# Patient Record
Sex: FEMALE | Race: WHITE | NOT HISPANIC OR LATINO | Employment: FULL TIME | URBAN - METROPOLITAN AREA
[De-identification: names, ages, dates, MRNs, and addresses within clinical notes are randomized per-mention and may not be internally consistent; named-entity substitution may affect disease eponyms.]

---

## 2017-03-22 DIAGNOSIS — Z94.0 KIDNEY REPLACED BY TRANSPLANT: Primary | ICD-10-CM

## 2017-03-22 DIAGNOSIS — Z94.0 STATUS POST KIDNEY TRANSPLANT: ICD-10-CM

## 2017-03-22 RX ORDER — TACROLIMUS 0.5 MG/1
1.5 CAPSULE ORAL 2 TIMES DAILY
Qty: 180 CAPSULE | Refills: 0 | Status: SHIPPED | OUTPATIENT
Start: 2017-03-22 | End: 2017-07-25

## 2017-03-22 RX ORDER — SULFAMETHOXAZOLE AND TRIMETHOPRIM 400; 80 MG/1; MG/1
1 TABLET ORAL DAILY
Qty: 30 TABLET | Refills: 0 | Status: SHIPPED | OUTPATIENT
Start: 2017-03-22 | End: 2017-07-25

## 2017-04-22 DIAGNOSIS — Z94.0 KIDNEY REPLACED BY TRANSPLANT: ICD-10-CM

## 2017-04-22 DIAGNOSIS — Z94.0 STATUS POST KIDNEY TRANSPLANT: ICD-10-CM

## 2017-04-22 DIAGNOSIS — Z94.0 KIDNEY TRANSPLANTED: Primary | ICD-10-CM

## 2017-04-24 RX ORDER — MYCOPHENOLATE MOFETIL 250 MG/1
500 CAPSULE ORAL 2 TIMES DAILY
Qty: 120 CAPSULE | Refills: 0 | Status: SHIPPED | OUTPATIENT
Start: 2017-04-24 | End: 2017-04-28

## 2017-04-24 RX ORDER — SULFAMETHOXAZOLE AND TRIMETHOPRIM 400; 80 MG/1; MG/1
1 TABLET ORAL DAILY
Qty: 30 TABLET | Refills: 0 | Status: SHIPPED | OUTPATIENT
Start: 2017-04-24 | End: 2017-07-25

## 2017-04-24 RX ORDER — TACROLIMUS 0.5 MG/1
1.5 CAPSULE ORAL 2 TIMES DAILY
Qty: 180 CAPSULE | Refills: 0 | Status: SHIPPED | OUTPATIENT
Start: 2017-04-24 | End: 2017-07-25

## 2017-04-25 DIAGNOSIS — T86.10 COMPLICATIONS, KIDNEY TRANSPLANT: ICD-10-CM

## 2017-04-25 DIAGNOSIS — Z94.0 KIDNEY REPLACED BY TRANSPLANT: ICD-10-CM

## 2017-04-25 DIAGNOSIS — Z48.298 AFTERCARE FOLLOWING ORGAN TRANSPLANT: ICD-10-CM

## 2017-04-25 DIAGNOSIS — Z94.0 KIDNEY TRANSPLANTED: ICD-10-CM

## 2017-04-25 LAB — MAGNESIUM SERPL-MCNC: 1.7 MG/DL (ref 1.6–2.3)

## 2017-04-26 ENCOUNTER — TELEPHONE (OUTPATIENT)
Dept: TRANSPLANT | Facility: CLINIC | Age: 27
End: 2017-04-26

## 2017-04-26 DIAGNOSIS — Z79.899 ENCOUNTER FOR LONG-TERM CURRENT USE OF MEDICATION: ICD-10-CM

## 2017-04-26 DIAGNOSIS — Z48.298 AFTERCARE FOLLOWING ORGAN TRANSPLANT: Primary | ICD-10-CM

## 2017-04-26 DIAGNOSIS — Z94.0 KIDNEY REPLACED BY TRANSPLANT: ICD-10-CM

## 2017-04-26 LAB
EBV DNA # SPEC NAA+PROBE: ABNORMAL {COPIES}/ML
EBV DNA SPEC NAA+PROBE-LOG#: 4.6 {LOG_COPIES}/ML

## 2017-04-26 NOTE — LETTER
[facility]  Room 2-200  [facility],  [address]  [address]  Tel [phone]  Toll Free [phone]                OUTPATIENT LABORATORY TEST ORDER    Patient Name: Caity Horan  Transplant Date: 12/5/2014 (Kidney)  YOB: 1990  Issue Date & Time: 4-27,-2017 12:44 PM    [facility] MR:  [id number]  Exp. Date (1 year after date issued)      Diagnoses: Kidney Transplant (ICD-10  Z94.0)   Long term use of medications (ICD-10  Z79.899)     Lab results to be available on the same day drawn.   Patient should release information to the [facility], [facility].  Please fax to the Transplant Center at [phone].    With next lab draw   ?MPA level (once)  Monthly    ?Hemogram and Platelet   ?Basic Metabolic Panel (Sodium, Potassium, Chloride, CO2, Creatinine, BUN, Glucose, Calcium)   ?/Tacrolimus/Prograf drug level (mail to [facility] - mailers and instructions provided by the patient)           ?EBV PCR QT ( until no longer detected)                   Every 6 Months Due:                  ?BK (Polyoma Virus) PCR Quantitative - Plasma                                            ?Urine for protein/creatinine    Yearly:   ? PRA/DSA level (mailers provided by the patient)       HIV, HBsAb, HBcAb, HCV  If you have any questions, please call [facility] at [phone] or [phone].    Please fax labs to [phone]  .

## 2017-04-27 NOTE — TELEPHONE ENCOUNTER
Call placed to patient: No answer. Voice message left informing patient of lab schedule and requesting a return call to confirm. Will try back. Order placed

## 2017-04-27 NOTE — TELEPHONE ENCOUNTER
Results for PIETER GREEN (MRN [id number])    Ref. Range 12/13/2016 16:55 4/25/2017 06:29   EBV DNA Copies/mL Latest Ref Range: EBVNEG Copies/mL 65168 (A) 60299 (A)   EBV DNA Log of Copies Latest Ref Range: <2.7 Log_copies/mL 4.5 (H) 4.6 (H)     LPN task:  Update lab orders per protocol.  Add: EBV levels monthly until not detected.  Please add MPA levels with next lab.

## 2017-04-28 DIAGNOSIS — Z94.0 KIDNEY REPLACED BY TRANSPLANT: ICD-10-CM

## 2017-04-28 DIAGNOSIS — Z94.0 STATUS POST KIDNEY TRANSPLANT: ICD-10-CM

## 2017-04-28 DIAGNOSIS — Z94.0 KIDNEY TRANSPLANTED: ICD-10-CM

## 2017-04-28 NOTE — TELEPHONE ENCOUNTER
"Per Dr Mar:  \"  Slight increase in EBV viremia.  Would recommend decreasing mycophenolate mofetil to 250 mg twice daily and target tacrolimus levels at 3-5.              Will decrease MMF from 500 mg bid to 250 mg bid  "

## 2017-04-28 NOTE — TELEPHONE ENCOUNTER
Call placed to patient: No answer. Voice message left requesting a call back to discuss MMF. Will try back

## 2017-05-01 RX ORDER — MYCOPHENOLATE MOFETIL 250 MG/1
250 CAPSULE ORAL 2 TIMES DAILY
Qty: 120 CAPSULE | Refills: 0
Start: 2017-05-01 | End: 2018-02-24

## 2017-05-24 DIAGNOSIS — Z79.899 ENCOUNTER FOR LONG-TERM CURRENT USE OF MEDICATION: ICD-10-CM

## 2017-05-24 DIAGNOSIS — Z94.0 KIDNEY REPLACED BY TRANSPLANT: ICD-10-CM

## 2017-05-24 DIAGNOSIS — Z48.298 AFTERCARE FOLLOWING ORGAN TRANSPLANT: ICD-10-CM

## 2017-05-24 LAB
ANION GAP SERPL CALCULATED.3IONS-SCNC: 6 MMOL/L (ref 3–14)
BUN SERPL-MCNC: 16 MG/DL (ref 7–30)
CALCIUM SERPL-MCNC: 8.6 MG/DL (ref 8.5–10.1)
CHLORIDE SERPL-SCNC: 108 MMOL/L (ref 94–109)
CO2 SERPL-SCNC: 26 MMOL/L (ref 20–32)
CREAT SERPL-MCNC: 1.09 MG/DL (ref 0.52–1.04)
ERYTHROCYTE [DISTWIDTH] IN BLOOD BY AUTOMATED COUNT: 13.2 % (ref 10–15)
GFR SERPL CREATININE-BSD FRML MDRD: 60 ML/MIN/1.7M2
GLUCOSE SERPL-MCNC: 98 MG/DL (ref 70–99)
HCT VFR BLD AUTO: 31.3 % (ref 35–47)
HGB BLD-MCNC: 9.5 G/DL (ref 11.7–15.7)
MAGNESIUM SERPL-MCNC: 1.6 MG/DL (ref 1.6–2.3)
MCH RBC QN AUTO: 27.6 PG (ref 26.5–33)
MCHC RBC AUTO-ENTMCNC: 30.4 G/DL (ref 31.5–36.5)
MCV RBC AUTO: 91 FL (ref 78–100)
PLATELET # BLD AUTO: 313 10E9/L (ref 150–450)
POTASSIUM SERPL-SCNC: 4.8 MMOL/L (ref 3.4–5.3)
RBC # BLD AUTO: 3.44 10E12/L (ref 3.8–5.2)
SODIUM SERPL-SCNC: 141 MMOL/L (ref 133–144)
TACROLIMUS BLD-MCNC: 4.5 UG/L (ref 5–15)
TME LAST DOSE: ABNORMAL H
WBC # BLD AUTO: 6.7 10E9/L (ref 4–11)

## 2017-05-25 LAB
EBV DNA # SPEC NAA+PROBE: ABNORMAL {COPIES}/ML
EBV DNA SPEC NAA+PROBE-LOG#: 4.7 {LOG_COPIES}/ML

## 2017-05-27 LAB
MYCOPHENOLATE SERPL LC/MS/MS-MCNC: 0.75 MG/L (ref 1–3.5)
MYCOPHENOLATE-G SERPL LC/MS/MS-MCNC: 10.2 MG/L (ref 30–95)
TME LAST DOSE: ABNORMAL H

## 2017-05-30 ENCOUNTER — DOCUMENTATION ONLY (OUTPATIENT)
Dept: TRANSPLANT | Facility: CLINIC | Age: 27
End: 2017-05-30

## 2017-05-30 NOTE — PROGRESS NOTES
Notes Recorded by Otilio Mar MD on 5/26/2017 at 6:27 AM  Slightly increased EBV viremia and recommend lowering tacrolimus level to target closer to 3.    Would recheck EBV PCR in ~ 3-4 weeks.  Otherwise, labs are normal or unchanged.  Would make no changes or interventions at this time.  ------  Notes Recorded by Mayi Mcarthur RN on 5/25/2017 at 6:04 PM  5/24/17: EBV = 75259 (29841) (10401)  Sent to Provider to review.

## 2017-06-01 DIAGNOSIS — Z94.0 KIDNEY REPLACED BY TRANSPLANT: ICD-10-CM

## 2017-06-01 DIAGNOSIS — Z94.0 STATUS POST KIDNEY TRANSPLANT: Primary | ICD-10-CM

## 2017-06-01 DIAGNOSIS — Z94.0 KIDNEY TRANSPLANTED: ICD-10-CM

## 2017-06-01 DIAGNOSIS — E83.42 HYPOMAGNESEMIA: ICD-10-CM

## 2017-06-02 RX ORDER — TACROLIMUS 0.5 MG/1
1.5 CAPSULE ORAL 2 TIMES DAILY
Qty: 180 CAPSULE | Refills: 4 | Status: SHIPPED | OUTPATIENT
Start: 2017-06-02 | End: 2017-10-27

## 2017-06-02 RX ORDER — MYCOPHENOLATE MOFETIL 250 MG/1
500 CAPSULE ORAL 2 TIMES DAILY
Qty: 120 CAPSULE | Refills: 4 | Status: SHIPPED | OUTPATIENT
Start: 2017-06-02 | End: 2017-07-25

## 2017-06-02 RX ORDER — MAGNESIUM OXIDE 400 MG/1
400 TABLET ORAL DAILY
Qty: 60 TABLET | Refills: 4 | Status: SHIPPED | OUTPATIENT
Start: 2017-06-02 | End: 2018-01-05

## 2017-06-02 RX ORDER — SULFAMETHOXAZOLE AND TRIMETHOPRIM 400; 80 MG/1; MG/1
1 TABLET ORAL DAILY
Qty: 30 TABLET | Refills: 4 | Status: SHIPPED | OUTPATIENT
Start: 2017-06-02 | End: 2017-10-27

## 2017-07-19 DIAGNOSIS — Z94.0 KIDNEY REPLACED BY TRANSPLANT: ICD-10-CM

## 2017-07-19 DIAGNOSIS — Z79.899 ENCOUNTER FOR LONG-TERM CURRENT USE OF MEDICATION: ICD-10-CM

## 2017-07-19 DIAGNOSIS — Z48.298 AFTERCARE FOLLOWING ORGAN TRANSPLANT: ICD-10-CM

## 2017-07-19 LAB
ANION GAP SERPL CALCULATED.3IONS-SCNC: 6 MMOL/L (ref 3–14)
BUN SERPL-MCNC: 22 MG/DL (ref 7–30)
CALCIUM SERPL-MCNC: 8.8 MG/DL (ref 8.5–10.1)
CHLORIDE SERPL-SCNC: 105 MMOL/L (ref 94–109)
CO2 SERPL-SCNC: 27 MMOL/L (ref 20–32)
CREAT SERPL-MCNC: 1.19 MG/DL (ref 0.52–1.04)
CREAT UR-MCNC: 200 MG/DL
ERYTHROCYTE [DISTWIDTH] IN BLOOD BY AUTOMATED COUNT: 14.9 % (ref 10–15)
GFR SERPL CREATININE-BSD FRML MDRD: 54 ML/MIN/1.7M2
GLUCOSE SERPL-MCNC: 92 MG/DL (ref 70–99)
HCT VFR BLD AUTO: 29.8 % (ref 35–47)
HGB BLD-MCNC: 8.7 G/DL (ref 11.7–15.7)
MCH RBC QN AUTO: 24.6 PG (ref 26.5–33)
MCHC RBC AUTO-ENTMCNC: 29.2 G/DL (ref 31.5–36.5)
MCV RBC AUTO: 84 FL (ref 78–100)
PLATELET # BLD AUTO: 276 10E9/L (ref 150–450)
POTASSIUM SERPL-SCNC: 4.1 MMOL/L (ref 3.4–5.3)
PROT UR-MCNC: 0.2 G/L
PROT/CREAT 24H UR: 0.1 G/G CR (ref 0–0.2)
RBC # BLD AUTO: 3.53 10E12/L (ref 3.8–5.2)
SODIUM SERPL-SCNC: 137 MMOL/L (ref 133–144)
TACROLIMUS BLD-MCNC: 4.4 UG/L (ref 5–15)
TME LAST DOSE: ABNORMAL H
WBC # BLD AUTO: 5.5 10E9/L (ref 4–11)

## 2017-07-20 ENCOUNTER — TELEPHONE (OUTPATIENT)
Dept: TRANSPLANT | Facility: CLINIC | Age: 27
End: 2017-07-20

## 2017-07-20 DIAGNOSIS — Z94.0 KIDNEY REPLACED BY TRANSPLANT: ICD-10-CM

## 2017-07-20 DIAGNOSIS — B27.90 EBV INFECTION: Primary | ICD-10-CM

## 2017-07-20 NOTE — TELEPHONE ENCOUNTER
"Per Dr Mar:  \"She needs CMV, EBV and parvovirus PCR.  Would also send LDH, haptoglobin, peripheral smear and iron studies.  If EBV viremia is higher, she needs a CT chest/abd/pelvis.\"    Orders put in her chart.  I left a message on her VM letting her know that Dr Mar has some additional labs he charo like drawn before her appt with him next Tuesday. Asked to go get them drawn either tomorrow or Monday.  "

## 2017-07-21 LAB
BKV DNA # SPEC NAA+PROBE: NORMAL COPIES/ML
BKV DNA SPEC NAA+PROBE-LOG#: NORMAL LOG COPIES/ML
EBV DNA # SPEC NAA+PROBE: ABNORMAL {COPIES}/ML
EBV DNA SPEC NAA+PROBE-LOG#: 5.2 {LOG_COPIES}/ML
SPECIMEN SOURCE: NORMAL

## 2017-07-25 ENCOUNTER — OFFICE VISIT (OUTPATIENT)
Dept: NEPHROLOGY | Facility: CLINIC | Age: 27
End: 2017-07-25
Attending: INTERNAL MEDICINE
Payer: COMMERCIAL

## 2017-07-25 VITALS
DIASTOLIC BLOOD PRESSURE: 74 MMHG | HEIGHT: 63 IN | HEART RATE: 82 BPM | BODY MASS INDEX: 24.41 KG/M2 | OXYGEN SATURATION: 100 % | SYSTOLIC BLOOD PRESSURE: 114 MMHG | WEIGHT: 137.8 LBS

## 2017-07-25 DIAGNOSIS — B27.00 EBV (EPSTEIN-BARR VIRUS) VIREMIA: Primary | ICD-10-CM

## 2017-07-25 DIAGNOSIS — E83.42 HYPOMAGNESEMIA: ICD-10-CM

## 2017-07-25 DIAGNOSIS — Z94.0 KIDNEY REPLACED BY TRANSPLANT: ICD-10-CM

## 2017-07-25 DIAGNOSIS — Z48.298 AFTERCARE FOLLOWING ORGAN TRANSPLANT: ICD-10-CM

## 2017-07-25 DIAGNOSIS — B27.90 EBV INFECTION: ICD-10-CM

## 2017-07-25 DIAGNOSIS — D84.9 IMMUNOSUPPRESSED STATUS (H): ICD-10-CM

## 2017-07-25 DIAGNOSIS — B27.00 EBV (EPSTEIN-BARR VIRUS) VIREMIA: ICD-10-CM

## 2017-07-25 LAB
BASOPHILS # BLD AUTO: 0 10E9/L (ref 0–0.2)
BASOPHILS NFR BLD AUTO: 0.6 %
DIFFERENTIAL METHOD BLD: ABNORMAL
EOSINOPHIL # BLD AUTO: 0.1 10E9/L (ref 0–0.7)
EOSINOPHIL NFR BLD AUTO: 0.9 %
ERYTHROCYTE [DISTWIDTH] IN BLOOD BY AUTOMATED COUNT: 15.1 % (ref 10–15)
FERRITIN SERPL-MCNC: 3 NG/ML (ref 12–150)
HCG UR QL: NEGATIVE
HCT VFR BLD AUTO: 28.9 % (ref 35–47)
HGB BLD-MCNC: 8.6 G/DL (ref 11.7–15.7)
IMM GRANULOCYTES # BLD: 0 10E9/L (ref 0–0.4)
IMM GRANULOCYTES NFR BLD: 0.3 %
IRON SATN MFR SERPL: 4 % (ref 15–46)
IRON SERPL-MCNC: 21 UG/DL (ref 35–180)
LDH SERPL L TO P-CCNC: 175 U/L (ref 81–234)
LYMPHOCYTES # BLD AUTO: 1.4 10E9/L (ref 0.8–5.3)
LYMPHOCYTES NFR BLD AUTO: 22 %
MCH RBC QN AUTO: 25 PG (ref 26.5–33)
MCHC RBC AUTO-ENTMCNC: 29.8 G/DL (ref 31.5–36.5)
MCV RBC AUTO: 84 FL (ref 78–100)
MONOCYTES # BLD AUTO: 0.7 10E9/L (ref 0–1.3)
MONOCYTES NFR BLD AUTO: 10.7 %
NEUTROPHILS # BLD AUTO: 4.3 10E9/L (ref 1.6–8.3)
NEUTROPHILS NFR BLD AUTO: 65.5 %
NRBC # BLD AUTO: 0 10*3/UL
NRBC BLD AUTO-RTO: 0 /100
PLATELET # BLD AUTO: 310 10E9/L (ref 150–450)
RBC # BLD AUTO: 3.44 10E12/L (ref 3.8–5.2)
RETICS # AUTO: 44.4 10E9/L (ref 25–95)
RETICS/RBC NFR AUTO: 1.3 % (ref 0.5–2)
TIBC SERPL-MCNC: 467 UG/DL (ref 240–430)
WBC # BLD AUTO: 6.5 10E9/L (ref 4–11)

## 2017-07-25 PROCEDURE — 36415 COLL VENOUS BLD VENIPUNCTURE: CPT | Performed by: INTERNAL MEDICINE

## 2017-07-25 PROCEDURE — 86665 EPSTEIN-BARR CAPSID VCA: CPT | Performed by: INTERNAL MEDICINE

## 2017-07-25 PROCEDURE — 85025 COMPLETE CBC W/AUTO DIFF WBC: CPT | Performed by: INTERNAL MEDICINE

## 2017-07-25 PROCEDURE — 83550 IRON BINDING TEST: CPT | Performed by: INTERNAL MEDICINE

## 2017-07-25 PROCEDURE — 83540 ASSAY OF IRON: CPT | Performed by: INTERNAL MEDICINE

## 2017-07-25 PROCEDURE — 99212 OFFICE O/P EST SF 10 MIN: CPT | Mod: ZF

## 2017-07-25 PROCEDURE — 83615 LACTATE (LD) (LDH) ENZYME: CPT | Performed by: INTERNAL MEDICINE

## 2017-07-25 PROCEDURE — 83010 ASSAY OF HAPTOGLOBIN QUANT: CPT | Performed by: INTERNAL MEDICINE

## 2017-07-25 PROCEDURE — 85045 AUTOMATED RETICULOCYTE COUNT: CPT | Performed by: INTERNAL MEDICINE

## 2017-07-25 PROCEDURE — 82728 ASSAY OF FERRITIN: CPT | Performed by: INTERNAL MEDICINE

## 2017-07-25 PROCEDURE — 87798 DETECT AGENT NOS DNA AMP: CPT | Performed by: INTERNAL MEDICINE

## 2017-07-25 PROCEDURE — 40000611 ZZHCL STATISTIC MORPHOLOGY W/INTERP HEMEPATH TC 85060: Performed by: INTERNAL MEDICINE

## 2017-07-25 PROCEDURE — 81025 URINE PREGNANCY TEST: CPT | Performed by: INTERNAL MEDICINE

## 2017-07-25 PROCEDURE — 87799 DETECT AGENT NOS DNA QUANT: CPT | Performed by: INTERNAL MEDICINE

## 2017-07-25 ASSESSMENT — PAIN SCALES - GENERAL: PAINLEVEL: NO PAIN (0)

## 2017-07-25 NOTE — LETTER
7/25/2017      RE: Caity Horan  [address]  [facility] [address]       Assessment and Plan:  1. LDKT - baseline Cr ~ 1.0-1.2, which has remained stable.  Normal proteinuria.  No DSA.  Will make no changes in immunosuppression.  2. BP - well controlled at target of less than 140/90 off antihypertensive medications.  No changes.  3. Anemia in chronic renal disease - decreased Hgb with no evidence of hemolysis.  Patient is very iron deficient and will start oral iron sulfate 325 mg daily.  Will follow.  4. 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  Marked increase in EBV PCR, now up to ~ 160K, which is not unexpected with new seroconversion.  Will obtain CT chest/abd/pelvis to rule out lymphoma and refer patient to Transplant ID.  Patient is on lower immunosuppression, but with expected decrease in EBV viremia, will look to slightly increase immunosuppression back up.  5. Hypomagnesemia - normal serum magnesium level and will continue on oral magnesium supplement.  6. Recommend return visit in 3 months.    Assessment and plan was discussed with patient and she voiced her understanding and agreement.    Addendum: new EBV viremia and will decrease mycophenolate mofetil to 500 mg bid.  Will follow.    Reason for Visit:  Ms. Horan is here for routine follow up.    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         Transplant Hx:       Tx: LDKT  Date: 12/5/14       Present Maintenance IS: Tacrolimus and Mycophenolate mofetil       Baseline Creatinine: 1.0-1.2       Recent DSA: No  Date last checked: 12/2016       Biopsy: No    Ms. Horan reports feeling good overall with minimal medical complaints.  Since patient's last clinic visit, she has developed marked EBV viremia.  Patient had her mycophenolate mofetil dose reduced to 250 mg bid and goal tacrolimus 3-5.   Her energy level remains good to being a bit more tired the last couple of months.  She is active and continues to get regular exercise.  Denies any chest pain or shortness of breath with exertion.  Appetite is good and weight is stable.  No nausea, vomiting or diarrhea.  No fever, sweats or chills.  No night sweats.  No leg swelling.  No lymph nodes or new lumps or bumps.    Home BP: Not checked.      ROS:   A comprehensive review of systems was obtained and negative, except as noted in the HPI or PMH.    Active Medical Problems:  Patient Active Problem List   Diagnosis     IgA nephropathy     Kidney replaced by transplant     Immunosuppressed status (H)     Hypomagnesemia     Aftercare following organ transplant     EBV (Nicole-Barr virus) viremia       Personal Hx:  Social History     Social History     Marital status: Single     Spouse name: N/A     Number of children: N/A     Years of education: 16     Occupational History     advertising [employer]     Social History Main Topics     Smoking status: Never Smoker     Smokeless tobacco: Never Used     Alcohol use No     Drug use: No     Sexual activity: No     Other Topics Concern     Blood Transfusions No     Seat Belt Yes     Social History Narrative       Allergies:  Allergies   Allergen Reactions     Amoxicillin Rash       Medications:  Prior to Admission medications    Medication Sig Start Date End Date Taking? Authorizing Provider   tacrolimus (PROGRAF BRAND) 0.5 MG capsule Take 1.5 mg in the am  And 1.0  Mg in pm     Generic  Patient taking differently: Take 1.5 mg in the PM  And 1.0  Mg in AM     Generic 2/6/15  Yes Patricia Rodriguez MD   magnesium oxide (MAG-OX) 400 MG tablet Take 2 tablets (800 mg) by mouth daily 1/2/15  Yes More Cardona MD   senna-docusate (SENOKOT-S;PERICOLACE) 8.6-50 MG per tablet Take 2 tablets by mouth 2 times daily as needed for constipation  Patient taking differently: Take 2 tablets by mouth as needed for  "constipation  12/29/14  Yes Patricia Rodriguez MD   mycophenolate (CELLCEPT-GENERIC EQUIVALENT) 250 MG capsule Take 3 capsules (750 mg) by mouth 2 times daily 12/9/14  Yes Jessica Dumont PA-C   sulfamethoxazole-trimethoprim (BACTRIM,SEPTRA) 400-80 MG per tablet Take 1 tablet by mouth daily 12/9/14  Yes Jessica Dumont PA-C       Vitals:  /74  Pulse 82  Ht 1.6 m (5' 3\")  Wt 62.5 kg (137 lb 12.8 oz)  SpO2 100%  BMI 24.41 kg/m2    Exam:   GENERAL APPEARANCE: alert and no distress  HENT: mouth without ulcers or lesions  LYMPHATICS: no cervical or supraclavicular nodes  RESP: lungs clear to auscultation - no rales, rhonchi or wheezes  CV: regular rhythm, normal rate, no rub, no murmur  EDEMA: no LE edema bilaterally  ABDOMEN: soft, nondistended, nontender, bowel sounds normal  MS: extremities normal - no gross deformities noted, no evidence of inflammation in joints, no muscle tenderness  SKIN: no rash  TX KIDNEY: normal    Results:   Recent Results (from the past 504 hour(s))   HCG qualitative urine    Collection Time: 07/25/17  4:25 PM   Result Value Ref Range    HCG Qual Urine Negative NEG   CMV DNA quantification    Collection Time: 07/25/17  4:48 PM   Result Value Ref Range    CMV DNA Quantitation Specimen Plasma, EDTA anticoagulant     CMV Quant IU/mL  CMVND [IU]/mL     CMV DNA Not Detected   Mutations within the highly conserved regions of the viral genome covered by   the ALICIA AmpliPrep/ALICIA TaqMan CMV Test primers and/or probes have been   identified and may result in under-quantitation of or failure to detect the   virus.  Supplemental testing methods should be used for testing when this is   suspected.   The ALICIA AmpliPrep/ALICIA TaqMan CMV Test is an FDA-approved in vitro nucleic   acid amplification test for the quantitation of cytomegalovirus DNA in human   plasma (EDTA plasma) using the ALICIA AmpliPrep Instrument for automated viral   nucleic acid extraction and the ALICIA TaqMan " Analyzer or ALICIA TaqMan for   automated Real Time amplification and detection of the viral nucleic acid   target.   Titer results are reported in International Units/mL (IU/mL using 1st WHO   International standard for Human Cytomegalovirus for Nucleic Acid Amplification   based assays. The conversion factor between CMV DNA copis/mL (as defined by the   Roche ALICIA  TaqMan CMV test) and International Units is the CMV DNA   concentration in IU/mL x 1.1 copies/IU = CMV DNA in copies/mL.   This assay has received FDA approval for the testing of human plasma only. The   Infectious Disease Diagnostic Laboratory at the [facility], [place], has validated the performance characteristics of the Roche   CMV assay for plasma, bronchial alveolar lavage/wash and urine.      Log IU/mL of CMVQNT Not Calculated <2.1 [Log_IU]/mL   EBV DNA PCR Quantitative Whole Blood    Collection Time: 07/25/17  4:48 PM   Result Value Ref Range    EBV DNA Copies/mL 11817 (A) EBVNEG [Copies]/mL    EBV DNA Log of Copies 5.0 (H) <2.7 [Log_copies]/mL   Parvovirus B19 DNA PCR    Collection Time: 07/25/17  4:48 PM   Result Value Ref Range    Parvovirus DNAPCR Specimen Plasma     Parvovirus DNAPCR       Not Detected  (Note)  NOT DETECTED - A negative result does not rule out the  presence of PCR inhibitors in the patient specimen or assay  specific nucleic acid in concentrations below the level of  detection by the assay.  INTERPRETIVE INFORMATION: Parvovirus B19 by Qualitative PCR  Test developed and characteristics determined by [facility]. See Compliance Statement B: [employer]  Performed by [facility],  [address] [place] [address] [phone]  [email or web address], Austin Marte MD, Lab. Director     Lactate Dehydrogenase    Collection Time: 07/25/17  4:48 PM   Result Value Ref Range    Lactate Dehydrogenase 175 81 - 234 U/L   Haptoglobin    Collection Time: 07/25/17  4:48 PM   Result Value Ref Range     Haptoglobin 104 15 - 200 mg/dL   Bld morphology pathology review    Collection Time: 07/25/17  4:48 PM   Result Value Ref Range    Copath Report       Patient Name: PIETER GREEN  MR#: [id number]  Specimen #: [id number]  Collected: 7/25/2017  Received: 7/26/2017  Reported: 7/26/2017 15:37  Ordering Phy(s): MAURISIO GOODWIN    For improved result formatting, select 'View Enhanced Report Format'  under Linked Documents section.    TEST(S):  Blood Smear Morphology    FINAL DIAGNOSIS:  Peripheral blood smear:       Moderate hypochromic and normochromic normocytic anemia with no  increase in polychromasia and rare elliptocytes.    I have personally reviewed all specimens and/or slides, including the  listed special stains, and used them with my medical judgment to  determine the final diagnosis.    Electronically signed out by:  Berna Mathew M.D., [facility]    Technical testing/processing performed at [place], [place]    CLINICAL HISTORY:  27 year old female with history of renal transplant 12/2014 for ESRD  from IgA nephropathy now with increasing anemia.    MICR OSCOPIC DESCRIPTION:  Peripheral Blood  PERIPHERAL BLOOD DATA (Date: 07/2/2017)         Patient Value      (Reference range 18 years and older female)             6.5     WBC (4.0-11.0 x10*9/L)           3.4     RBC (3.8-5.2 x10*12/L)           8.6     Hgb (11.7-15.7 g/dl)           84     MCV ( fl)           29.8     MCHC (31.5-36.5 g/dL)           15.1     RDW (10.0-15.0 %)           310     Plt (150-450 x10*9/L)           44.4    RETIC (25-95x10*9/L)    PERIPHERAL BLOOD DIFFERENTIAL (automated):                                     (Reference range 18 years and older)  Percent       Neutrophils,         segmented and bands       65.5%       Lymphocytes     22.0       Monocytes     10.7       Eosinophils     0.9       Basophils     0.6       Immature granulocytes      0.3    Absolute       Neutrophils,         segmented and bands     4.3     (1.6-8.3)       Lymphocytes     1.4      (0.8-5.3)       Monocytes     0.7      (0-1.3)       Eosinophils     0.1     (0-0.7)       Basophil s     0.0     (0-0.2)       Immature granulocytes     0.0    The red blood cells include both normochromic and hypochromic forms.  Poikilocytosis includes rare elliptocytes and rare non-specific  poikilocytosis.  Polychromasia is not increased. Rouleaux formation is  not increased.  The morphology of the platelets is normal; rare large  platelet seen with normal granulation.    Reporting Fellow: HERMELINDO Rodriguez MD    CPT Codes:  A: 97550-YKCVG    TESTING LAB LOCATION:  [facility], [address]   [address]  [phone]    COLLECTION SITE:  Client:  [facility]  Location:  [facility] ()     IRON AND IRON BINDING CAPACITY    Collection Time: 07/25/17  4:48 PM   Result Value Ref Range    Iron 21 (L) 35 - 180 ug/dL    Iron Binding Cap 467 (H) 240 - 430 ug/dL    Iron Saturation Index 4 (L) 15 - 46 %   FERRITIN    Collection Time: 07/25/17  4:48 PM   Result Value Ref Range    Ferritin 3 (L) 12 - 150 ng/mL   EBV Capsid Antibody IgG    Collection Time: 07/25/17  4:48 PM   Result Value Ref Range    EBV Capsid Antibody IgG (H) 0.0 - 0.8 AI     >8.0  Positive, suggests recent or past exposure   Antibody index (AI) values reflect qualitative changes in antibody   concentration that cannot be directly associated with clinical condition or   disease state.     CBC with platelets differential    Collection Time: 07/25/17  4:48 PM   Result Value Ref Range    WBC 6.5 4.0 - 11.0 10e9/L    RBC Count 3.44 (L) 3.8 - 5.2 10e12/L    Hemoglobin 8.6 (L) 11.7 - 15.7 g/dL    Hematocrit 28.9 (L) 35.0 - 47.0 %    MCV 84 78 - 100 fl    MCH 25.0 (L) 26.5 - 33.0 pg    MCHC 29.8 (L) 31.5 - 36.5 g/dL    RDW 15.1 (H) 10.0 -  15.0 %    Platelet Count 310 150 - 450 10e9/L    Diff Method Automated Method     % Neutrophils 65.5 %    % Lymphocytes 22.0 %    % Monocytes 10.7 %    % Eosinophils 0.9 %    % Basophils 0.6 %    % Immature Granulocytes 0.3 %    Nucleated RBCs 0 0 /100    Absolute Neutrophil 4.3 1.6 - 8.3 10e9/L    Absolute Lymphocytes 1.4 0.8 - 5.3 10e9/L    Absolute Monocytes 0.7 0.0 - 1.3 10e9/L    Absolute Eosinophils 0.1 0.0 - 0.7 10e9/L    Absolute Basophils 0.0 0.0 - 0.2 10e9/L    Abs Immature Granulocytes 0.0 0 - 0.4 10e9/L    Absolute Nucleated RBC 0.0    Reticulocyte count    Collection Time: 07/25/17  4:48 PM   Result Value Ref Range    % Retic 1.3 0.5 - 2.0 %    Absolute Retic 44.4 25 - 95 10e9/L       Otilio Mar MD

## 2017-07-25 NOTE — NURSING NOTE
"Chief Complaint   Patient presents with     RECHECK     Kidney Tx follow up        Initial /74  Pulse 82  Ht 1.6 m (5' 3\")  Wt 62.5 kg (137 lb 12.8 oz)  SpO2 100%  BMI 24.41 kg/m2 Estimated body mass index is 24.41 kg/(m^2) as calculated from the following:    Height as of this encounter: 1.6 m (5' 3\").    Weight as of this encounter: 62.5 kg (137 lb 12.8 oz).  Medication Reconciliation: complete   Mariah Guajardo CMA    "

## 2017-07-25 NOTE — MR AVS SNAPSHOT
After Visit Summary   7/25/2017    Caity Horan    MRN: [id number]           Patient Information     Date Of Birth          1990        Visit Information        Provider Department      7/25/2017 3:35 PM Otilio Mar MD [facility] Ne[facility]        Today's Diagnoses     EBV (Nicole-Barr virus) viremia    -  1    Immunosuppressed status (H)        Kidney replaced by transplant        Aftercare following organ transplant        Hypomagnesemia           Follow-ups after your visit        Additional Services     Infectious Disease Referral                 Follow-up notes from your care team     Return in about 3 months (around 10/25/2017).      Your next 10 appointments already scheduled     Oct 03, 2017  3:35 PM CDT   (Arrive by 3:05 PM)   Return Kidney Transplant with Otilio Mar MD   [facility] ([facility])    [address] [place] [address] [phone]              Who to contact     If you have questions or need follow up information about today's clinic visit or your schedule please contact [facility] directly at [phone].  Normal or non-critical lab and imaging results will be communicated to you by MyChart, letter or phone within 4 business days after the clinic has received the results. If you do not hear from us within 7 days, please contact the clinic through [facility] or phone. If you have a critical or abnormal lab result, we will notify you by phone as soon as possible.  Submit refill requests through [facility] or call your pharmacy and they will forward the refill request to us. Please allow 3 business days for your refill to be completed.          Additional Information About Your Visit        Bswifthart Information     [facility] gives you secure access to your electronic health record. If you see a primary care provider, you can also send messages to your care team and make appointments. If you  "have questions, please call your primary care clinic.  If you do not have a primary care provider, please call [phone] and they will assist you.        Care EveryWhere ID     This is your Care EveryWhere ID. This could be used by other organizations to access your [place] medical records  [id number]        Your Vitals Were     Pulse Height Pulse Oximetry BMI (Body Mass Index)          82 1.6 m (5' 3\") 100% 24.41 kg/m2         Blood Pressure from Last 3 Encounters:   07/25/17 114/74   12/13/16 127/74   06/07/16 116/70    Weight from Last 3 Encounters:   07/25/17 62.5 kg (137 lb 12.8 oz)   12/13/16 62.4 kg (137 lb 9.6 oz)   06/07/16 62.6 kg (138 lb)              We Performed the Following     HCG qualitative urine     Infectious Disease Referral        Primary Care Provider Office Phone # Fax #    Ritu CHLOE Little [phone]       [address]  [address] [address]        Equal Access to Services     [facility]: Hadii aad ku hadasho Soomaali, waaxda luqadaha, qaybta kaalmada adeegyada, philip lee . So [facility] [phone].    ATENCIÓN: Si habla español, tiene a carreon disposición servicios gratuitos de asistencia lingüística. Llame al [phone].    We comply with applicable federal civil rights laws and [place] laws. We do not discriminate on the basis of race, color, national origin, age, disability sex, sexual orientation or gender identity.            Thank you!     Thank you for choosing [facility]  for your care. Our goal is always to provide you with excellent care. Hearing back from our patients is one way we can continue to improve our services. Please take a few minutes to complete the written survey that you may receive in the mail after your visit with us. Thank you!             Your Updated Medication List - Protect others around you: Learn how to safely use, store and throw away your medicines at " www.disposemymeds.org.          This list is accurate as of: 7/25/17 11:59 PM.  Always use your most recent med list.                   Brand Name Dispense Instructions for use Diagnosis    magnesium oxide 400 MG tablet    MAG-OX    60 tablet    Take 1 tablet (400 mg) by mouth daily    Hypomagnesemia, Status post kidney transplant, Kidney replaced by transplant, Kidney transplanted       mycophenolate 250 MG capsule    GENERIC EQUIVALENT    120 capsule    Take 1 capsule (250 mg) by mouth 2 times daily    Kidney transplanted, Kidney replaced by transplant, Status post kidney transplant       sulfamethoxazole-trimethoprim 400-80 MG per tablet    BACTRIM/SEPTRA    30 tablet    Take 1 tablet by mouth daily    Status post kidney transplant, Kidney replaced by transplant, Hypomagnesemia, Kidney transplanted       tacrolimus 0.5 MG capsule    GENERIC EQUIVALENT    180 capsule    Take 3 capsules (1.5 mg) by mouth 2 times daily    Kidney replaced by transplant, Status post kidney transplant, Hypomagnesemia, Kidney transplanted

## 2017-07-26 ENCOUNTER — DOCUMENTATION ONLY (OUTPATIENT)
Dept: TRANSPLANT | Facility: CLINIC | Age: 27
End: 2017-07-26

## 2017-07-26 LAB
CMV DNA SPEC NAA+PROBE-ACNC: NORMAL [IU]/ML
CMV DNA SPEC NAA+PROBE-LOG#: NORMAL {LOG_IU}/ML
COPATH REPORT: NORMAL
EBV DNA # SPEC NAA+PROBE: ABNORMAL {COPIES}/ML
EBV DNA SPEC NAA+PROBE-LOG#: 5 {LOG_COPIES}/ML
EBV VCA IGG SER QL IA: ABNORMAL AI (ref 0–0.8)
HAPTOGLOB SERPL-MCNC: 104 MG/DL (ref 15–200)
SPECIMEN SOURCE: NORMAL

## 2017-07-26 NOTE — PROGRESS NOTES
CT Chest Abdomen Pelvis w/o Contrast   Status:  Final result   Visible to patient:  Yes (MyChart) Dx:  EBV (Nicole-Barr virus) viremia; Imm... Order: [id number]       Notes Recorded by Otilio Mar MD on 7/26/2017 at 6:39 AM  Unremarkable CT scan with no evidence for lymphoma or other abnormality.

## 2017-07-27 ENCOUNTER — TELEPHONE (OUTPATIENT)
Dept: TRANSPLANT | Facility: CLINIC | Age: 27
End: 2017-07-27

## 2017-07-27 DIAGNOSIS — Z94.0 KIDNEY REPLACED BY TRANSPLANT: Primary | ICD-10-CM

## 2017-07-27 NOTE — TELEPHONE ENCOUNTER
Notes Recorded by Otilio Mar MD on 7/26/2017 at 2:49 PM  Decreased EBV viremia and will continue on lower immunosuppression.    Otilio Mar MD Ututalum, Teresa, RN                   [facility],     Please recheck EBV PCR in 3 weeks and if significantly lower, would look to slightly increase mycophenolate mofetil back up.     Please let me know.       LPN task:  Please call and have EBV PCR Quant rechecked in 3 weeks.  If results are lower, Dr. Mar plans to slightly increase MMF.

## 2017-07-29 LAB
B19V DNA SER QL NAA+PROBE: NORMAL
SPECIMEN SOURCE: NORMAL

## 2017-08-15 ENCOUNTER — TELEPHONE (OUTPATIENT)
Dept: NEPHROLOGY | Facility: CLINIC | Age: 27
End: 2017-08-15

## 2017-08-15 DIAGNOSIS — D64.9 ANEMIA: Primary | ICD-10-CM

## 2017-08-15 PROBLEM — B27.00 EBV (EPSTEIN-BARR VIRUS) VIREMIA: Status: ACTIVE | Noted: 2017-08-15

## 2017-08-15 RX ORDER — FERROUS SULFATE 325(65) MG
1 TABLET ORAL DAILY
Qty: 90 TABLET | Refills: 3 | Status: SHIPPED | OUTPATIENT
Start: 2017-08-15 | End: 2017-10-03

## 2017-08-15 NOTE — TELEPHONE ENCOUNTER
Spoke with patient, verbalized understanding. Rx sent. She will be repeating labs within the next few days.    Angelina Dwyer RN

## 2017-08-15 NOTE — PROGRESS NOTES
Assessment and Plan:  1. LDKT - baseline Cr ~ 1.0-1.2, which has remained stable.  Normal proteinuria.  No DSA.  Will make no changes in immunosuppression.  2. BP - well controlled at target of less than 140/90 off antihypertensive medications.  No changes.  3. Anemia in chronic renal disease - decreased Hgb with no evidence of hemolysis.  Patient is very iron deficient and will start oral iron sulfate 325 mg daily.  Will follow.  4. 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  Marked increase in EBV PCR, now up to ~ 160K, which is not unexpected with new seroconversion.  Will obtain CT chest/abd/pelvis to rule out lymphoma and refer patient to Transplant ID.  Patient is on lower immunosuppression, but with expected decrease in EBV viremia, will look to slightly increase immunosuppression back up.  5. Hypomagnesemia - normal serum magnesium level and will continue on oral magnesium supplement.  6. Recommend return visit in 3 months.    Assessment and plan was discussed with patient and she voiced her understanding and agreement.    Addendum: new EBV viremia and will decrease mycophenolate mofetil to 500 mg bid.  Will follow.    Reason for Visit:  Ms. Horan is here for routine follow up.    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         Transplant Hx:       Tx: LDKT  Date: 12/5/14       Present Maintenance IS: Tacrolimus and Mycophenolate mofetil       Baseline Creatinine: 1.0-1.2       Recent DSA: No  Date last checked: 12/2016       Biopsy: No    Ms. Horan reports feeling good overall with minimal medical complaints.  Since patient's last clinic visit, she has developed marked EBV viremia.  Patient had her mycophenolate mofetil dose reduced to 250 mg bid and goal tacrolimus 3-5.  Her energy level remains good to being a bit more tired the last couple of months.  She is  active and continues to get regular exercise.  Denies any chest pain or shortness of breath with exertion.  Appetite is good and weight is stable.  No nausea, vomiting or diarrhea.  No fever, sweats or chills.  No night sweats.  No leg swelling.  No lymph nodes or new lumps or bumps.    Home BP: Not checked.      ROS:   A comprehensive review of systems was obtained and negative, except as noted in the HPI or PMH.    Active Medical Problems:  Patient Active Problem List   Diagnosis     IgA nephropathy     Kidney replaced by transplant     Immunosuppressed status (H)     Hypomagnesemia     Aftercare following organ transplant     EBV (Nicole-Barr virus) viremia       Personal Hx:  Social History     Social History     Marital status: Single     Spouse name: N/A     Number of children: N/A     Years of education: 16     Occupational History     [employer]vertising [employer]     Social History Main Topics     Smoking status: Never Smoker     Smokeless tobacco: Never Used     Alcohol use No     Drug use: No     Sexual activity: No     Other Topics Concern     Blood Transfusions No     Seat Belt Yes     Social History Narrative       Allergies:  Allergies   Allergen Reactions     Amoxicillin Rash       Medications:  Prior to Admission medications    Medication Sig Start Date End Date Taking? Authorizing Provider   tacrolimus (PROGRAF BRAND) 0.5 MG capsule Take 1.5 mg in the am  And 1.0  Mg in pm     Generic  Patient taking differently: Take 1.5 mg in the PM  And 1.0  Mg in AM     Generic 2/6/15  Yes Patricia Rodriguez MD   magnesium oxide (MAG-OX) 400 MG tablet Take 2 tablets (800 mg) by mouth daily 1/2/15  Yes More Cardona MD   senna-docusate (SENOKOT-S;PERICOLACE) 8.6-50 MG per tablet Take 2 tablets by mouth 2 times daily as needed for constipation  Patient taking differently: Take 2 tablets by mouth as needed for constipation  12/29/14  Yes Patricia Rodriguez MD   mycophenolate (CELLCEPT-GENERIC  "EQUIVALENT) 250 MG capsule Take 3 capsules (750 mg) by mouth 2 times daily 12/9/14  Yes Fast, Jessica Rm PA-C   sulfamethoxazole-trimethoprim (BACTRIM,SEPTRA) 400-80 MG per tablet Take 1 tablet by mouth daily 12/9/14  Yes Fast, Jessica Rm PA-C       Vitals:  /74  Pulse 82  Ht 1.6 m (5' 3\")  Wt 62.5 kg (137 lb 12.8 oz)  SpO2 100%  BMI 24.41 kg/m2    Exam:   GENERAL APPEARANCE: alert and no distress  HENT: mouth without ulcers or lesions  LYMPHATICS: no cervical or supraclavicular nodes  RESP: lungs clear to auscultation - no rales, rhonchi or wheezes  CV: regular rhythm, normal rate, no rub, no murmur  EDEMA: no LE edema bilaterally  ABDOMEN: soft, nondistended, nontender, bowel sounds normal  MS: extremities normal - no gross deformities noted, no evidence of inflammation in joints, no muscle tenderness  SKIN: no rash  TX KIDNEY: normal    Results:   Recent Results (from the past 504 hour(s))   HCG qualitative urine    Collection Time: 07/25/17  4:25 PM   Result Value Ref Range    HCG Qual Urine Negative NEG   CMV DNA quantification    Collection Time: 07/25/17  4:48 PM   Result Value Ref Range    CMV DNA Quantitation Specimen Plasma, EDTA anticoagulant     CMV Quant IU/mL  CMVND [IU]/mL     CMV DNA Not Detected   Mutations within the highly conserved regions of the viral genome covered by   the ALICIA AmpliPrep/LAICIA TaqMan CMV Test primers and/or probes have been   identified and may result in under-quantitation of or failure to detect the   virus.  Supplemental testing methods should be used for testing when this is   suspected.   The ALICIA AmpliPrep/ALICIA TaqMan CMV Test is an FDA-approved in vitro nucleic   acid amplification test for the quantitation of cytomegalovirus DNA in human   plasma (EDTA plasma) using the ALICIA AmpliPrep Instrument for automated viral   nucleic acid extraction and the atCollab TaqMan Analyzer or atCollab TaqMan for   automated Real Time amplification and detection of the " viral nucleic acid   target.   Titer results are reported in International Units/mL (IU/mL using 1st WHO   International standard for Human Cytomegalovirus for Nucleic Acid Amplification   based assays. The conversion factor between CMV DNA copis/mL (as defined by the   Roche ALICIA  TaqMan CMV test) and International Units is the CMV DNA   concentration in IU/mL x 1.1 copies/IU = CMV DNA in copies/mL.   This assay has received FDA approval for the testing of human plasma only. The   [facility]fect[facility] Diagnostic Laboratory at the [facility], [place], has validated the performance characteristics of the Roche   CMV assay for plasma, bronchial alveolar lavage/wash and urine.      Log IU/mL of CMVQNT Not Calculated <2.1 [Log_IU]/mL   EBV DNA PCR Quantitative Whole Blood    Collection Time: 07/25/17  4:48 PM   Result Value Ref Range    EBV DNA Copies/mL 68189 (A) EBVNEG [Copies]/mL    EBV DNA Log of Copies 5.0 (H) <2.7 [Log_copies]/mL   Parvovirus B19 DNA PCR    Collection Time: 07/25/17  4:48 PM   Result Value Ref Range    Parvovirus DNAPCR Specimen Plasma     Parvovirus DNAPCR       Not Detected  (Note)  NOT DETECTED - A negative result does not rule out the  presence of PCR inhibitors in the patient specimen or assay  specific nucleic acid in concentrations below the level of  detection by the assay.  INTERPRETIVE INFORMATION: Parvovirus B19 by Qualitative PCR  Test developed and characteristics determined by [facility]. See Compliance Statement B: [facility]  Performed by [facility],  [address] [place] [id number] [phone]  [email or web address], Austin Marte MD, Lab. Director     Lactate Dehydrogenase    Collection Time: 07/25/17  4:48 PM   Result Value Ref Range    Lactate Dehydrogenase 175 81 - 234 U/L   Haptoglobin    Collection Time: 07/25/17  4:48 PM   Result Value Ref Range    Haptoglobin 104 15 - 200 mg/dL   Bld morphology pathology review    Collection Time:  07/25/17  4:48 PM   Result Value Ref Range    Copath Report       Patient Name: PIETER GREEN  MR#: [id number]  Specimen #: [id number]  Collected: 7/25/2017  Received: 7/26/2017  Reported: 7/26/2017 15:37  Ordering Phy(s): MAURISIO GOODWIN    For improved result formatting, select 'View Enhanced Report Format'  under Linked Documents section.    TEST(S):  Blood Smear Morphology    FINAL DIAGNOSIS:  Peripheral blood smear:       Moderate hypochromic and normochromic normocytic anemia with no  increase in polychromasia and rare elliptocytes.    I have personally reviewed all specimens and/or slides, including the  listed special stains, and used them with my medical judgment to  determine the final diagnosis.    Electronically signed out by:  Berna Mathew M.D., [facility]    Technical testing/processing performed at [place], [place]    CLINICAL HISTORY:  27 year old female with history of renal transplant 12/2014 for ESRD  from IgA nephropathy now with increasing anemia.    MICR OSCOPIC DESCRIPTION:  Peripheral Blood  PERIPHERAL BLOOD DATA (Date: 07/2/2017)         Patient Value      (Reference range 18 years and older female)             6.5     WBC (4.0-11.0 x10*9/L)           3.4     RBC (3.8-5.2 x10*12/L)           8.6     Hgb (11.7-15.7 g/dl)           84     MCV ( fl)           29.8     MCHC (31.5-36.5 g/dL)           15.1     RDW (10.0-15.0 %)           310     Plt (150-450 x10*9/L)           44.4    RETIC (25-95x10*9/L)    PERIPHERAL BLOOD DIFFERENTIAL (automated):                                     (Reference range 18 years and older)  Percent       Neutrophils,         segmented and bands       65.5%       Lymphocytes     22.0       Monocytes     10.7       Eosinophils     0.9       Basophils     0.6       Immature granulocytes     0.3    Absolute       Neutrophils,         segmented and bands     4.3     (1.6-8.3)        Lymphocytes     1.4      (0.8-5.3)       Monocytes     0.7      (0-1.3)       Eosinophils     0.1     (0-0.7)       Basophil s     0.0     (0-0.2)       Immature granulocytes     0.0    The red blood cells include both normochromic and hypochromic forms.  Poikilocytosis includes rare elliptocytes and rare non-specific  poikilocytosis.  Polychromasia is not increased. Rouleaux formation is  not increased.  The morphology of the platelets is normal; rare large  platelet seen with normal granulation.    Reporting Fellow: HERMELINDO Rodriguez MD    CPT Codes:  A: 33669-FDTLP    TESTING LAB LOCATION:  [facility], [facility] [address]  [address] [place] [address]   [id number] [phone]    COLLECTION SITE:  Client:  [facility]  Location:  [facility] (B)     IRON AND IRON BINDING CAPACITY    Collection Time: 07/25/17  4:48 PM   Result Value Ref Range    Iron 21 (L) 35 - 180 ug/dL    Iron Binding Cap 467 (H) 240 - 430 ug/dL    Iron Saturation Index 4 (L) 15 - 46 %   FERRITIN    Collection Time: 07/25/17  4:48 PM   Result Value Ref Range    Ferritin 3 (L) 12 - 150 ng/mL   EBV Capsid Antibody IgG    Collection Time: 07/25/17  4:48 PM   Result Value Ref Range    EBV Capsid Antibody IgG (H) 0.0 - 0.8 AI     >8.0  Positive, suggests recent or past exposure   Antibody index (AI) values reflect qualitative changes in antibody   concentration that cannot be directly associated with clinical condition or   disease state.     CBC with platelets differential    Collection Time: 07/25/17  4:48 PM   Result Value Ref Range    WBC 6.5 4.0 - 11.0 10e9/L    RBC Count 3.44 (L) 3.8 - 5.2 10e12/L    Hemoglobin 8.6 (L) 11.7 - 15.7 g/dL    Hematocrit 28.9 (L) 35.0 - 47.0 %    MCV 84 78 - 100 fl    MCH 25.0 (L) 26.5 - 33.0 pg    MCHC 29.8 (L) 31.5 - 36.5 g/dL    RDW 15.1 (H) 10.0 - 15.0 %    Platelet Count 310 150 - 450 10e9/L    Diff Method Automated Method     %  Neutrophils 65.5 %    % Lymphocytes 22.0 %    % Monocytes 10.7 %    % Eosinophils 0.9 %    % Basophils 0.6 %    % Immature Granulocytes 0.3 %    Nucleated RBCs 0 0 /100    Absolute Neutrophil 4.3 1.6 - 8.3 10e9/L    Absolute Lymphocytes 1.4 0.8 - 5.3 10e9/L    Absolute Monocytes 0.7 0.0 - 1.3 10e9/L    Absolute Eosinophils 0.1 0.0 - 0.7 10e9/L    Absolute Basophils 0.0 0.0 - 0.2 10e9/L    Abs Immature Granulocytes 0.0 0 - 0.4 10e9/L    Absolute Nucleated RBC 0.0    Reticulocyte count    Collection Time: 07/25/17  4:48 PM   Result Value Ref Range    % Retic 1.3 0.5 - 2.0 %    Absolute Retic 44.4 25 - 95 10e9/L

## 2017-08-15 NOTE — TELEPHONE ENCOUNTER
Received message from Dr. Mar:  Patient is very iron deficient and recommend she start oral iron sulfate 325 mg daily at lunch.     You could see if she has any questions about her EBV viremia.  The viral load is decreasing and her CT looked fine.  I still want her to see Transplant ID and will get that scheduled.     Left voicemail for patient to call back. Sent [employer] message with update.    Angelina Dwyer RN

## 2017-08-16 DIAGNOSIS — Z94.0 KIDNEY REPLACED BY TRANSPLANT: ICD-10-CM

## 2017-08-16 DIAGNOSIS — Z48.298 AFTERCARE FOLLOWING ORGAN TRANSPLANT: ICD-10-CM

## 2017-08-16 DIAGNOSIS — Z79.899 ENCOUNTER FOR LONG-TERM CURRENT USE OF MEDICATION: ICD-10-CM

## 2017-08-16 LAB
ANION GAP SERPL CALCULATED.3IONS-SCNC: 8 MMOL/L (ref 3–14)
BUN SERPL-MCNC: 19 MG/DL (ref 7–30)
CALCIUM SERPL-MCNC: 8.9 MG/DL (ref 8.5–10.1)
CHLORIDE SERPL-SCNC: 106 MMOL/L (ref 94–109)
CO2 SERPL-SCNC: 24 MMOL/L (ref 20–32)
CREAT SERPL-MCNC: 1.19 MG/DL (ref 0.52–1.04)
ERYTHROCYTE [DISTWIDTH] IN BLOOD BY AUTOMATED COUNT: 15.4 % (ref 10–15)
GFR SERPL CREATININE-BSD FRML MDRD: 54 ML/MIN/1.7M2
GLUCOSE SERPL-MCNC: 100 MG/DL (ref 70–99)
HCT VFR BLD AUTO: 28.4 % (ref 35–47)
HGB BLD-MCNC: 8.2 G/DL (ref 11.7–15.7)
MCH RBC QN AUTO: 24.3 PG (ref 26.5–33)
MCHC RBC AUTO-ENTMCNC: 28.9 G/DL (ref 31.5–36.5)
MCV RBC AUTO: 84 FL (ref 78–100)
PLATELET # BLD AUTO: 307 10E9/L (ref 150–450)
POTASSIUM SERPL-SCNC: 4.6 MMOL/L (ref 3.4–5.3)
RBC # BLD AUTO: 3.38 10E12/L (ref 3.8–5.2)
SODIUM SERPL-SCNC: 139 MMOL/L (ref 133–144)
TACROLIMUS BLD-MCNC: 4.3 UG/L (ref 5–15)
TME LAST DOSE: ABNORMAL H
WBC # BLD AUTO: 4.6 10E9/L (ref 4–11)

## 2017-08-17 LAB
EBV DNA # SPEC NAA+PROBE: ABNORMAL {COPIES}/ML
EBV DNA SPEC NAA+PROBE-LOG#: 5 {LOG_COPIES}/ML

## 2017-08-18 ENCOUNTER — DOCUMENTATION ONLY (OUTPATIENT)
Dept: TRANSPLANT | Facility: CLINIC | Age: 27
End: 2017-08-18

## 2017-08-18 NOTE — PROGRESS NOTES
Notes Recorded by Otilio Mar MD on 8/17/2017 at 6:16 PM  Stable, elevated EBV viremia and patient has been referred to Transplant ID.  Also low hemoglobin and would continue with oral iron.  Otherwise, labs are normal or unchanged.  Would make no changes or interventions at this time.

## 2017-09-29 ENCOUNTER — TELEPHONE (OUTPATIENT)
Dept: TRANSPLANT | Facility: CLINIC | Age: 27
End: 2017-09-29

## 2017-09-29 DIAGNOSIS — Z94.0 KIDNEY REPLACED BY TRANSPLANT: Primary | ICD-10-CM

## 2017-09-29 DIAGNOSIS — Z48.298 AFTERCARE FOLLOWING ORGAN TRANSPLANT: ICD-10-CM

## 2017-09-29 DIAGNOSIS — Z94.0 KIDNEY REPLACED BY TRANSPLANT: ICD-10-CM

## 2017-09-29 DIAGNOSIS — Z79.899 ENCOUNTER FOR LONG-TERM CURRENT USE OF MEDICATION: ICD-10-CM

## 2017-09-29 LAB
ANION GAP SERPL CALCULATED.3IONS-SCNC: 6 MMOL/L (ref 3–14)
BUN SERPL-MCNC: 20 MG/DL (ref 7–30)
CALCIUM SERPL-MCNC: 9.2 MG/DL (ref 8.5–10.1)
CHLORIDE SERPL-SCNC: 109 MMOL/L (ref 94–109)
CO2 SERPL-SCNC: 24 MMOL/L (ref 20–32)
CREAT SERPL-MCNC: 1.49 MG/DL (ref 0.52–1.04)
ERYTHROCYTE [DISTWIDTH] IN BLOOD BY AUTOMATED COUNT: 16.3 % (ref 10–15)
GFR SERPL CREATININE-BSD FRML MDRD: 42 ML/MIN/1.7M2
GLUCOSE SERPL-MCNC: 106 MG/DL (ref 70–99)
HCT VFR BLD AUTO: 29.4 % (ref 35–47)
HGB BLD-MCNC: 8.9 G/DL (ref 11.7–15.7)
MCH RBC QN AUTO: 25 PG (ref 26.5–33)
MCHC RBC AUTO-ENTMCNC: 30.3 G/DL (ref 31.5–36.5)
MCV RBC AUTO: 83 FL (ref 78–100)
PLATELET # BLD AUTO: 269 10E9/L (ref 150–450)
POTASSIUM SERPL-SCNC: 4.5 MMOL/L (ref 3.4–5.3)
RBC # BLD AUTO: 3.56 10E12/L (ref 3.8–5.2)
SODIUM SERPL-SCNC: 140 MMOL/L (ref 133–144)
TACROLIMUS BLD-MCNC: 4.8 UG/L (ref 5–15)
TME LAST DOSE: 2230 H
WBC # BLD AUTO: 7.4 10E9/L (ref 4–11)

## 2017-09-29 NOTE — TELEPHONE ENCOUNTER
ISSUE: creatinine = 1.49    LPN task:  Check if patient feels sick?  Having UTI / URTI symptoms?  Diarrhea? Dehydration?  Started on new medication?  If on diuretic, was it increased?    PLAN:  Instruct to improve hydration and recheck BMP.  If with symptoms, recheck once symptoms are better.  [facility] [phone] (Phone)  [phone] (Fax)

## 2017-10-01 LAB
EBV DNA # SPEC NAA+PROBE: ABNORMAL {COPIES}/ML
EBV DNA SPEC NAA+PROBE-LOG#: 5 {LOG_COPIES}/ML

## 2017-10-02 ENCOUNTER — TELEPHONE (OUTPATIENT)
Dept: TRANSPLANT | Facility: CLINIC | Age: 27
End: 2017-10-02

## 2017-10-02 NOTE — TELEPHONE ENCOUNTER
Call placed to patient: No answer. Detailed voice message left requesting patient return call to discuss recent creatinine level. Order sent

## 2017-10-03 ENCOUNTER — OFFICE VISIT (OUTPATIENT)
Dept: NEPHROLOGY | Facility: CLINIC | Age: 27
End: 2017-10-03
Attending: INTERNAL MEDICINE
Payer: COMMERCIAL

## 2017-10-03 VITALS
HEIGHT: 63 IN | WEIGHT: 142.6 LBS | SYSTOLIC BLOOD PRESSURE: 117 MMHG | DIASTOLIC BLOOD PRESSURE: 73 MMHG | HEART RATE: 86 BPM | OXYGEN SATURATION: 100 % | BODY MASS INDEX: 25.27 KG/M2

## 2017-10-03 DIAGNOSIS — D84.9 IMMUNOSUPPRESSED STATUS (H): ICD-10-CM

## 2017-10-03 DIAGNOSIS — R79.89 ELEVATED SERUM CREATININE: ICD-10-CM

## 2017-10-03 DIAGNOSIS — Z48.298 AFTERCARE FOLLOWING ORGAN TRANSPLANT: ICD-10-CM

## 2017-10-03 DIAGNOSIS — Z94.0 KIDNEY REPLACED BY TRANSPLANT: ICD-10-CM

## 2017-10-03 DIAGNOSIS — B27.00 EBV (EPSTEIN-BARR VIRUS) VIREMIA: ICD-10-CM

## 2017-10-03 DIAGNOSIS — Z94.0 KIDNEY REPLACED BY TRANSPLANT: Primary | ICD-10-CM

## 2017-10-03 DIAGNOSIS — Z23 NEED FOR INFLUENZA VACCINATION: ICD-10-CM

## 2017-10-03 LAB
ANION GAP SERPL CALCULATED.3IONS-SCNC: 6 MMOL/L (ref 3–14)
BUN SERPL-MCNC: 22 MG/DL (ref 7–30)
CALCIUM SERPL-MCNC: 8.5 MG/DL (ref 8.5–10.1)
CHLORIDE SERPL-SCNC: 106 MMOL/L (ref 94–109)
CO2 SERPL-SCNC: 23 MMOL/L (ref 20–32)
CREAT SERPL-MCNC: 1.29 MG/DL (ref 0.52–1.04)
GFR SERPL CREATININE-BSD FRML MDRD: 49 ML/MIN/1.7M2
GLUCOSE SERPL-MCNC: 98 MG/DL (ref 70–99)
POTASSIUM SERPL-SCNC: 4.3 MMOL/L (ref 3.4–5.3)
SODIUM SERPL-SCNC: 135 MMOL/L (ref 133–144)

## 2017-10-03 PROCEDURE — G0008 ADMIN INFLUENZA VIRUS VAC: HCPCS

## 2017-10-03 PROCEDURE — 90686 IIV4 VACC NO PRSV 0.5 ML IM: CPT | Mod: ZF | Performed by: INTERNAL MEDICINE

## 2017-10-03 PROCEDURE — 80048 BASIC METABOLIC PNL TOTAL CA: CPT | Performed by: INTERNAL MEDICINE

## 2017-10-03 PROCEDURE — 99212 OFFICE O/P EST SF 10 MIN: CPT | Mod: ZF

## 2017-10-03 PROCEDURE — 25000128 H RX IP 250 OP 636: Mod: ZF | Performed by: INTERNAL MEDICINE

## 2017-10-03 PROCEDURE — 36415 COLL VENOUS BLD VENIPUNCTURE: CPT | Performed by: INTERNAL MEDICINE

## 2017-10-03 RX ADMIN — INFLUENZA A VIRUS A/MICHIGAN/45/2015 X-275 (H1N1) ANTIGEN (FORMALDEHYDE INACTIVATED), INFLUENZA A VIRUS A/HONG KONG/4801/2014 X-263B (H3N2) ANTIGEN (FORMALDEHYDE INACTIVATED), INFLUENZA B VIRUS B/PHUKET/3073/2013 ANTIGEN (FORMALDEHYDE INACTIVATED), AND INFLUENZA B VIRUS B/BRISBANE/60/2008 ANTIGEN (FORMALDEHYDE INACTIVATED) 0.5 ML: 15; 15; 15; 15 INJECTION, SUSPENSION INTRAMUSCULAR at 17:19

## 2017-10-03 ASSESSMENT — PAIN SCALES - GENERAL: PAINLEVEL: NO PAIN (0)

## 2017-10-03 NOTE — PROGRESS NOTES
Assessment and Plan:  1. LDKT - baseline Cr ~ 1.0-1.2.  She has slightly worse renal function last week but history significant for dehydration prior to blood work. On recheck today the creatinine is slightly improved but not at baseline.  Normal proteinuria.  No DSA.  Will make no changes in immunosuppression.  2. BP - well controlled at target of less than 140/90 off antihypertensive medications.  No changes.  3. Anemia in chronic renal disease - decreased Hgb with no evidence of hemolysis.  Patient is very iron deficient and not tolerant to ferrous sulfate.  Will refer to anemia services to see if patient tolerant of a different formulation of iron or requiring iv iron.  4. 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  Marked increase in EBV PCR, stable ~ 100K.  Will refer patient to Transplant ID for assistance in management.  Patient is on lower immunosuppression, but with expected decrease in EBV viremia, will look to slightly increase immunosuppression back up.  5. Hypomagnesemia - normal serum magnesium level and will continue on oral magnesium supplement.  6. Recommend return visit in 4 months.    Assessment and plan was discussed with patient and she voiced her understanding and agreement.    Attestation:  This patient has been seen and evaluated by me, Otilio Mar MD.  I have reviewed the note and agree with plan of care as documented by the fellow.       Reason for Visit:  Ms. Horan is here for routine follow up.    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          Transplant Hx:        Tx: LDKT                                    Date: 12/5/14       Present Maintenance IS: Tacrolimus and Mycophenolate mofetil       Baseline Creatinine: 1.0-1.2       Recent DSA: No                                    Date last checked: 12/2016       Biopsy: No     Ms. Horan  reports feeling good overall with minimal medical complaints.  She developed EBV viremia in 12/2016.  This was new exposure as she was D-R-.  Her mycophenolate was reduced to 500 mg bid.  In 7/2017, the patient developed an increase in EBV up to 162k.  She seroconverted in 7/2017.  Her mycophenolate was additionally lowered to 250 mg bid.  Since then, her EBV has slightly decreased but still at 100k.  She underwent a CT scan of the chest, abdomen, and pelvis that had no evidence of lymphoma.  She was referred to ID in the past but she is hesitant about taking time off work to see additional physicians.  Her tacrolimus is consistently at goal, within 3-5.  Her energy level remains good.  She is active and continues to get regular exercise.  She did hot yoga before her most recent labs on 9/29 where her creatinine is slightly higher.  Denies any chest pain or shortness of breath with exertion.  Appetite is good and weight is stable.  No nausea, vomiting or diarrhea.  No fever, sweats or chills.  No night sweats.  No leg swelling.  No lymph nodes or new lumps or bumps.    Home BP: Not checked.      ROS:   A comprehensive review of systems was obtained and negative, except as noted in the HPI or PMH.    Active Medical Problems:  Patient Active Problem List   Diagnosis     IgA nephropathy     Kidney replaced by transplant     Immunosuppressed status (H)     Hypomagnesemia     Aftercare following organ transplant     EBV (Nicole-Barr virus) viremia       Personal Hx:  Social History     Social History     Marital status: Single     Spouse name: N/A     Number of children: N/A     Years of education: 16     Occupational History     [employer]     Social History Main Topics     Smoking status: Never Smoker     Smokeless tobacco: Never Used     Alcohol use No     Drug use: No     Sexual activity: No     Other Topics Concern     Blood Transfusions No     Seat Belt Yes     Social History Narrative  "      Allergies:  Allergies   Allergen Reactions     Amoxicillin Rash       Medications:  Prior to Admission medications    Medication Sig Start Date End Date Taking? Authorizing Provider   sulfamethoxazole-trimethoprim (BACTRIM/SEPTRA) 400-80 MG per tablet Take 1 tablet by mouth daily 6/2/17  Yes Otilio Mar MD   tacrolimus (PROGRAF - GENERIC EQUIVALENT) 0.5 MG capsule Take 3 capsules (1.5 mg) by mouth 2 times daily 6/2/17  Yes Otilio Mar MD   magnesium oxide (MAG-OX) 400 MG tablet Take 1 tablet (400 mg) by mouth daily 6/2/17  Yes Otilio Mar MD   mycophenolate (CELLCEPT - GENERIC EQUIVALENT) 250 MG capsule Take 1 capsule (250 mg) by mouth 2 times daily 5/1/17  Yes Otilio Mar MD               Vitals:  /73  Pulse 86  Ht 1.6 m (5' 3\")  Wt 64.7 kg (142 lb 9.6 oz)  SpO2 100%  BMI 25.26 kg/m2    Exam:   GENERAL APPEARANCE: alert and no distress  HENT: mouth without ulcers or lesions.  Tonsils are mildly enlarged.  LYMPHATICS: no cervical or supraclavicular nodes  RESP: lungs clear to auscultation - no rales, rhonchi or wheezes  CV: regular rhythm, normal rate, no rub, no murmur  EDEMA: no LE edema bilaterally  ABDOMEN: soft, nondistended, nontender, bowel sounds normal  MS: extremities normal - no gross deformities noted, no evidence of inflammation in joints, no muscle tenderness  SKIN: no rash    Results:   Recent Results (from the past 200 hour(s))   EBV DNA PCR Quantitative Whole Blood    Collection Time: 09/29/17  7:40 AM   Result Value Ref Range    EBV DNA Copies/mL 273407 (A) EBVNEG^EBV DNA Not Detected [Copies]/mL    EBV DNA Log of Copies 5.0 (H) <2.7 [Log_copies]/mL   CBC with platelets    Collection Time: 09/29/17  7:40 AM   Result Value Ref Range    WBC 7.4 4.0 - 11.0 10e9/L    RBC Count 3.56 (L) 3.8 - 5.2 10e12/L    Hemoglobin 8.9 (L) 11.7 - 15.7 g/dL    Hematocrit 29.4 (L) 35.0 - 47.0 %    MCV 83 78 - 100 fl    MCH 25.0 (L) 26.5 - 33.0 pg    MCHC 30.3 " (L) 31.5 - 36.5 g/dL    RDW 16.3 (H) 10.0 - 15.0 %    Platelet Count 269 150 - 450 10e9/L   Basic metabolic panel    Collection Time: 09/29/17  7:40 AM   Result Value Ref Range    Sodium 140 133 - 144 mmol/L    Potassium 4.5 3.4 - 5.3 mmol/L    Chloride 109 94 - 109 mmol/L    Carbon Dioxide 24 20 - 32 mmol/L    Anion Gap 6 3 - 14 mmol/L    Glucose 106 (H) 70 - 99 mg/dL    Urea Nitrogen 20 7 - 30 mg/dL    Creatinine 1.49 (H) 0.52 - 1.04 mg/dL    GFR Estimate 42 (L) >60 mL/min/1.7m2    GFR Estimate If Black 51 (L) >60 mL/min/1.7m2    Calcium 9.2 8.5 - 10.1 mg/dL   Tacrolimus level    Collection Time: 09/29/17  7:40 AM   Result Value Ref Range    Tacrolimus Last Dose 2230     Tacrolimus Level 4.8 (L) 5.0 - 15.0 ug/L

## 2017-10-03 NOTE — NURSING NOTE
"Chief Complaint   Patient presents with     RECHECK     6 month post kidney tx follow up        Initial /73  Pulse 86  Ht 1.6 m (5' 3\")  Wt 64.7 kg (142 lb 9.6 oz)  SpO2 100%  BMI 25.26 kg/m2 Estimated body mass index is 25.26 kg/(m^2) as calculated from the following:    Height as of this encounter: 1.6 m (5' 3\").    Weight as of this encounter: 64.7 kg (142 lb 9.6 oz).  Medication Reconciliation: complete   Mariah Guajardo CMA    "

## 2017-10-03 NOTE — MR AVS SNAPSHOT
After Visit Summary   10/3/2017    Caity Horan    MRN: [id number]           Patient Information     Date Of Birth          1990        Visit Information        Provider Department      10/3/2017 3:35 PM Otilio Mar MD [facility] Nephrology        Today's Diagnoses     Kidney replaced by transplant    -  1    Aftercare following organ transplant        Immunosuppressed status (H)        EBV (Nicole-Barr virus) viremia        Elevated serum creatinine        Need for influenza vaccination          Care Instructions    1. We will refer you to the infectious disease physicians to help manage the EBV Virus  2. We will refer you to anemia services to help manage the low hemoglobin levels and iron deficiency.  The anemia services pharmacist will contact you regarding treatment with iron.  3. Please check your kidney function today to ensure that it is improved and related to the dehydration          Follow-ups after your visit        Additional Services     Infectious Disease Referral                 Follow-up notes from your care team     Return in about 4 months (around 2/3/2018).      Your next 10 appointments already scheduled     Oct 03, 2017  5:00 PM CDT   Lab with  LAB   [facility] Lab ([facility])    [address] [place] [address] [id number] [phone]            Nov 01, 2017  2:30 PM CDT   (Arrive by 2:15 PM)   New Patient Visit with Raquel Mensah MD   [facility] and Infectious Diseases ([facility])    [address] [place] [address] [id number] [phone]            Feb 27, 2018  3:35 PM CST   (Arrive by 3:05 PM)   Return Kidney Transplant with Otilio Mar MD   [facility] Nephrology ([facility])    [address] [place] [address] [id number] [phone]              Future tests that were ordered for you today     Open Future  "Orders        Priority Expected Expires Ordered    Basic metabolic panel Routine  11/2/2017 10/3/2017            Who to contact     If you have questions or need follow up information about today's clinic visit or your schedule please contact [facility] NEPHROLOGY directly at [phone].  Normal or non-critical lab and imaging results will be communicated to you by Snootlabhart, letter or phone within 4 business days after the clinic has received the results. If you do not hear from us within 7 days, please contact the clinic through Snootlabhart or phone. If you have a critical or abnormal lab result, we will notify you by phone as soon as possible.  Submit refill requests through [employer]ame or call your pharmacy and they will forward the refill request to us. Please allow 3 business days for your refill to be completed.          Additional Information About Your Visit        Snootlabhart Information     [employer] gives you secure access to your electronic health record. If you see a primary care provider, you can also send messages to your care team and make appointments. If you have questions, please call your primary care clinic.  If you do not have a primary care provider, please call [phone] and they will assist you.        Care EveryWhere ID     This is your Care EveryWhere ID. This could be used by other organizations to access your [place] medical records  [id number]        Your Vitals Were     Pulse Height Pulse Oximetry BMI (Body Mass Index)          86 1.6 m (5' 3\") 100% 25.26 kg/m2         Blood Pressure from Last 3 Encounters:   10/03/17 117/73   07/25/17 114/74   12/13/16 127/74    Weight from Last 3 Encounters:   10/03/17 64.7 kg (142 lb 9.6 oz)   07/25/17 62.5 kg (137 lb 12.8 oz)   12/13/16 62.4 kg (137 lb 9.6 oz)              We Performed the Following     Infectious Disease Referral          Today's Medication Changes          These changes are accurate as of: 10/3/17  4:41 PM.  If you have any questions, ask " your nurse or doctor.               Stop taking these medicines if you haven't already. Please contact your care team if you have questions.     ferrous sulfate 325 (65 FE) MG tablet   Commonly known as:  IRON   Stopped by:  Otilio Mar MD                    Primary Care Provider Office Phone # Fax #    Ritu LittleALEJANDRO [phone]       [address]  [address] [address]        Equal Access to Services     [facility]: Hadii aad ku hadasho Soomaali, waaxda luqadaha, qaybta kaalmada adeegyada, waxay idiin hayaan adeeg khyulianash la'jordon . So [facility] [phone].    ATENCIÓN: Si amiela espsilvano, tiene a carreon disposición servicios gratuitos de asistencia lingüística. Llame al [phone].    We comply with applicable federal civil rights laws and [place] laws. We do not discriminate on the basis of race, color, national origin, age, disability, sex, sexual orientation, or gender identity.            Thank you!     Thank you for choosing [facility] NEPHROLOGY  for your care. Our goal is always to provide you with excellent care. Hearing back from our patients is one way we can continue to improve our services. Please take a few minutes to complete the written survey that you may receive in the mail after your visit with us. Thank you!             Your Updated Medication List - Protect others around you: Learn how to safely use, store and throw away your medicines at www.disposemymeds.org.          This list is accurate as of: 10/3/17  4:41 PM.  Always use your most recent med list.                   Brand Name Dispense Instructions for use Diagnosis    magnesium oxide 400 MG tablet    MAG-OX    60 tablet    Take 1 tablet (400 mg) by mouth daily    Hypomagnesemia, Status post kidney transplant, Kidney replaced by transplant, Kidney transplanted       mycophenolate 250 MG capsule    GENERIC EQUIVALENT    120 capsule    Take 1 capsule (250 mg) by mouth 2 times daily     Kidney transplanted, Kidney replaced by transplant, Status post kidney transplant       sulfamethoxazole-trimethoprim 400-80 MG per tablet    BACTRIM/SEPTRA    30 tablet    Take 1 tablet by mouth daily    Status post kidney transplant, Kidney replaced by transplant, Hypomagnesemia, Kidney transplanted       tacrolimus 0.5 MG capsule    GENERIC EQUIVALENT    180 capsule    Take 3 capsules (1.5 mg) by mouth 2 times daily    Kidney replaced by transplant, Status post kidney transplant, Hypomagnesemia, Kidney transplanted

## 2017-10-03 NOTE — LETTER
10/3/2017      RE: Caity Horan  [address]  [facility] [address]       Assessment and Plan:  1. LDKT - baseline Cr ~ 1.0-1.2.  She has slightly worse renal function last week but history significant for dehydration prior to blood work. On recheck today the creatinine is slightly improved but not at baseline.  Normal proteinuria.  No DSA.  Will make no changes in immunosuppression.  2. BP - well controlled at target of less than 140/90 off antihypertensive medications.  No changes.  3. Anemia in chronic renal disease - decreased Hgb with no evidence of hemolysis.  Patient is very iron deficient and not tolerant to ferrous sulfate.  Will refer to anemia services to see if patient tolerant of a different formulation of iron or requiring iv iron.  4. 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  Marked increase in EBV PCR, stable ~ 100K.  Will refer patient to Transplant ID for assistance in management.  Patient is on lower immunosuppression, but with expected decrease in EBV viremia, will look to slightly increase immunosuppression back up.  5. Hypomagnesemia - normal serum magnesium level and will continue on oral magnesium supplement.  6. Recommend return visit in 4 months.    Assessment and plan was discussed with patient and she voiced her understanding and agreement.    Attestation:  This patient has been seen and evaluated by me, Otilio Mar MD.  I have reviewed the note and agree with plan of care as documented by the fellow.       Reason for Visit:  Ms. Horan is here for routine follow up.    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          Transplant Hx:        Tx: LDKT                                    Date: 12/5/14       Present Maintenance IS: Tacrolimus and Mycophenolate mofetil       Baseline Creatinine: 1.0-1.2       Recent DSA: No                                     Date last checked: 12/2016       Biopsy: No     Ms. Horan reports feeling good overall with minimal medical complaints.  She developed EBV viremia in 12/2016.  This was new exposure as she was D-R-.  Her mycophenolate was reduced to 500 mg bid.  In 7/2017, the patient developed an increase in EBV up to 162k.  She seroconverted in 7/2017.  Her mycophenolate was additionally lowered to 250 mg bid.  Since then, her EBV has slightly decreased but still at 100k.  She underwent a CT scan of the chest, abdomen, and pelvis that had no evidence of lymphoma.  She was referred to ID in the past but she is hesitant about taking time off work to see additional physicians.  Her tacrolimus is consistently at goal, within 3-5.  Her energy level remains good.  She is active and continues to get regular exercise.  She did hot yoga before her most recent labs on 9/29 where her creatinine is slightly higher.  Denies any chest pain or shortness of breath with exertion.  Appetite is good and weight is stable.  No nausea, vomiting or diarrhea.  No fever, sweats or chills.  No night sweats.  No leg swelling.  No lymph nodes or new lumps or bumps.    Home BP: Not checked.      ROS:   A comprehensive review of systems was obtained and negative, except as noted in the HPI or PMH.    Active Medical Problems:  Patient Active Problem List   Diagnosis     IgA nephropathy     Kidney replaced by transplant     Immunosuppressed status (H)     Hypomagnesemia     Aftercare following organ transplant     EBV (Nicole-Barr virus) viremia       Personal Hx:  Social History     Social History     Marital status: Single     Spouse name: N/A     Number of children: N/A     Years of education: 16     Occupational History     advertising [employer]     Social History Main Topics     Smoking status: Never Smoker     Smokeless tobacco: Never Used     Alcohol use No     Drug use: No     Sexual activity: No     Other  "Topics Concern     Blood Transfusions No     Seat Belt Yes     Social History Narrative       Allergies:  Allergies   Allergen Reactions     Amoxicillin Rash       Medications:  Prior to Admission medications    Medication Sig Start Date End Date Taking? Authorizing Provider   sulfamethoxazole-trimethoprim (BACTRIM/SEPTRA) 400-80 MG per tablet Take 1 tablet by mouth daily 6/2/17  Yes Otilio Mar MD   tacrolimus (PROGRAF - GENERIC EQUIVALENT) 0.5 MG capsule Take 3 capsules (1.5 mg) by mouth 2 times daily 6/2/17  Yes Otilio Mar MD   magnesium oxide (MAG-OX) 400 MG tablet Take 1 tablet (400 mg) by mouth daily 6/2/17  Yes Otilio Mar MD   mycophenolate (CELLCEPT - GENERIC EQUIVALENT) 250 MG capsule Take 1 capsule (250 mg) by mouth 2 times daily 5/1/17  Yes Otilio Mar MD               Vitals:  /73  Pulse 86  Ht 1.6 m (5' 3\")  Wt 64.7 kg (142 lb 9.6 oz)  SpO2 100%  BMI 25.26 kg/m2    Exam:   GENERAL APPEARANCE: alert and no distress  HENT: mouth without ulcers or lesions.  Tonsils are mildly enlarged.  LYMPHATICS: no cervical or supraclavicular nodes  RESP: lungs clear to auscultation - no rales, rhonchi or wheezes  CV: regular rhythm, normal rate, no rub, no murmur  EDEMA: no LE edema bilaterally  ABDOMEN: soft, nondistended, nontender, bowel sounds normal  MS: extremities normal - no gross deformities noted, no evidence of inflammation in joints, no muscle tenderness  SKIN: no rash    Results:   Recent Results (from the past 200 hour(s))   EBV DNA PCR Quantitative Whole Blood    Collection Time: 09/29/17  7:40 AM   Result Value Ref Range    EBV DNA Copies/mL 342815 (A) EBVNEG^EBV DNA Not Detected [Copies]/mL    EBV DNA Log of Copies 5.0 (H) <2.7 [Log_copies]/mL   CBC with platelets    Collection Time: 09/29/17  7:40 AM   Result Value Ref Range    WBC 7.4 4.0 - 11.0 10e9/L    RBC Count 3.56 (L) 3.8 - 5.2 10e12/L    Hemoglobin 8.9 (L) 11.7 - 15.7 g/dL    Hematocrit " 29.4 (L) 35.0 - 47.0 %    MCV 83 78 - 100 fl    MCH 25.0 (L) 26.5 - 33.0 pg    MCHC 30.3 (L) 31.5 - 36.5 g/dL    RDW 16.3 (H) 10.0 - 15.0 %    Platelet Count 269 150 - 450 10e9/L   Basic metabolic panel    Collection Time: 09/29/17  7:40 AM   Result Value Ref Range    Sodium 140 133 - 144 mmol/L    Potassium 4.5 3.4 - 5.3 mmol/L    Chloride 109 94 - 109 mmol/L    Carbon Dioxide 24 20 - 32 mmol/L    Anion Gap 6 3 - 14 mmol/L    Glucose 106 (H) 70 - 99 mg/dL    Urea Nitrogen 20 7 - 30 mg/dL    Creatinine 1.49 (H) 0.52 - 1.04 mg/dL    GFR Estimate 42 (L) >60 mL/min/1.7m2    GFR Estimate If Black 51 (L) >60 mL/min/1.7m2    Calcium 9.2 8.5 - 10.1 mg/dL   Tacrolimus level    Collection Time: 09/29/17  7:40 AM   Result Value Ref Range    Tacrolimus Last Dose 2230     Tacrolimus Level 4.8 (L) 5.0 - 15.0 ug/L           Otilio Mar MD

## 2017-10-03 NOTE — PATIENT INSTRUCTIONS
1. We will refer you to the infectious disease physicians to help manage the EBV Virus  2. We will refer you to anemia services to help manage the low hemoglobin levels and iron deficiency.  The anemia services pharmacist will contact you regarding treatment with iron.  3. Please check your kidney function today to ensure that it is improved and related to the dehydration

## 2017-10-05 ENCOUNTER — TELEPHONE (OUTPATIENT)
Dept: TRANSPLANT | Facility: CLINIC | Age: 27
End: 2017-10-05

## 2017-10-05 DIAGNOSIS — Z94.0 KIDNEY TRANSPLANTED: Primary | ICD-10-CM

## 2017-10-05 DIAGNOSIS — Z48.298 AFTERCARE FOLLOWING ORGAN TRANSPLANT: ICD-10-CM

## 2017-10-05 DIAGNOSIS — D64.9 ANEMIA: ICD-10-CM

## 2017-10-05 DIAGNOSIS — T86.10 COMPLICATIONS, KIDNEY TRANSPLANT: ICD-10-CM

## 2017-10-05 NOTE — TELEPHONE ENCOUNTER
Joselito Livingston MD Hillman, Lisa A, [facility]        Cc: Mayi Mcarthur, MARIS                     We would like to refer Ms. Horan for anemia services with iron deficiency.  She does not tolerate po in the past.       Anemia Management referral, ordered.

## 2017-10-06 ENCOUNTER — TELEPHONE (OUTPATIENT)
Dept: PHARMACY | Facility: CLINIC | Age: 27
End: 2017-10-06

## 2017-10-06 DIAGNOSIS — N18.30 ANEMIA IN STAGE 3 CHRONIC KIDNEY DISEASE (H): Primary | ICD-10-CM

## 2017-10-06 DIAGNOSIS — D63.1 ANEMIA IN STAGE 3 CHRONIC KIDNEY DISEASE (H): Primary | ICD-10-CM

## 2017-10-06 DIAGNOSIS — N18.30 CHRONIC KIDNEY DISEASE, STAGE 3 (MODERATE): ICD-10-CM

## 2017-10-06 NOTE — TELEPHONE ENCOUNTER
Anemia Management Note - Enrollment  SUBJECTIVE/OBJECTIVE:  Patient called today for enrollment in Anemia Management Service.      Referred by Dr. Otilio Mar on 10/6/2017  Primary Diagnosis: Anemia in Chronic Kidney Disease (N18.3, D63.1)     Secondary Diagnosis:  Chronic Kidney Disease, Stage 3 (N18.3)   Hgb goal range:  9-10  Epo/Darbo: None  Iron regimen:  Does not tolerate oral iron  Recent MICKEY use, transfusion, IV iron: None    Anemia Latest Ref Rng & Units 8/23/2016 12/13/2016 5/24/2017 7/19/2017 7/25/2017 8/16/2017 9/29/2017   Hemoglobin 11.7 - 15.7 g/dL 12.2 12.1 9.5(L) 8.7(L) 8.6(L) 8.2(L) 8.9(L)   TSAT 15 - 46 % - - - - 4(L) - -   Ferritin 12 - 150 ng/mL - - - - 3(L) - -     BP Readings from Last 3 Encounters:   10/03/17 117/73   07/25/17 114/74   12/13/16 127/74     Wt Readings from Last 2 Encounters:   10/03/17 142 lb 9.6 oz (64.7 kg)   07/25/17 137 lb 12.8 oz (62.5 kg)     Current Outpatient Prescriptions   Medication Sig Dispense Refill     sulfamethoxazole-trimethoprim (BACTRIM/SEPTRA) 400-80 MG per tablet Take 1 tablet by mouth daily 30 tablet 4     tacrolimus (PROGRAF - GENERIC EQUIVALENT) 0.5 MG capsule Take 3 capsules (1.5 mg) by mouth 2 times daily 180 capsule 4     magnesium oxide (MAG-OX) 400 MG tablet Take 1 tablet (400 mg) by mouth daily 60 tablet 4     mycophenolate (CELLCEPT - GENERIC EQUIVALENT) 250 MG capsule Take 1 capsule (250 mg) by mouth 2 times daily 120 capsule 0     ASSESSMENT:  Hgb Not at goal   Ferritin: Due for labs  TSat: Due for labs    PLAN:  1. Due for iron studies  2. Send new patient letter    Next call date:  10/10    Anemia Management Service  Alesha Whitehead PharmD and Princess Harrington CPhT  Phone: [phone]  Fax: [phone]

## 2017-10-09 NOTE — TELEPHONE ENCOUNTER
10/9/17:  Caity returned Alesha's call, I told her Alesha will be calling her to discuss the services an role the Anemia clinic plays in her anemia treatment.  In the meantime I recommended that she return for ferritin and iron labs that are needed to initiated her treatment plan.  She will go in this week.    Amaris

## 2017-10-10 ENCOUNTER — TELEPHONE (OUTPATIENT)
Dept: PHARMACY | Facility: CLINIC | Age: 27
End: 2017-10-10

## 2017-10-10 NOTE — TELEPHONE ENCOUNTER
Follow-up with anemia management service:    LM for Caity that I will f/u after I see her labs (hgb and iron studies; to enroll in anemia clinic).    Anemia Latest Ref Rng & Units 2016   Hemoglobin 11.7 - 15.7 g/dL 12.2 12.1 9.5(L) 8.7(L) 8.6(L) 8.2(L) 8.9(L)   TSAT 15 - 46 % - - - - 4(L) - -   Ferritin 12 - 150 ng/mL - - - - 3(L) - -     Orders needed to be renewed (for next follow-up date) in EPIC: None   Lab orders : 10/5/18    Follow-up call date: 10/13    [facility]    Anemia Management Service  Alesha Whitehead,Dionna and Princess Harrington CPhT  Phone: [phone]  Fax: [phone]

## 2017-10-12 DIAGNOSIS — D63.1 ANEMIA IN STAGE 3 CHRONIC KIDNEY DISEASE (H): ICD-10-CM

## 2017-10-12 DIAGNOSIS — N18.30 CHRONIC KIDNEY DISEASE, STAGE 3 (MODERATE): ICD-10-CM

## 2017-10-12 DIAGNOSIS — Z94.0 KIDNEY REPLACED BY TRANSPLANT: ICD-10-CM

## 2017-10-12 DIAGNOSIS — N18.30 ANEMIA IN STAGE 3 CHRONIC KIDNEY DISEASE (H): ICD-10-CM

## 2017-10-12 LAB
ANION GAP SERPL CALCULATED.3IONS-SCNC: 7 MMOL/L (ref 3–14)
BUN SERPL-MCNC: 16 MG/DL (ref 7–30)
CALCIUM SERPL-MCNC: 8.8 MG/DL (ref 8.5–10.1)
CHLORIDE SERPL-SCNC: 108 MMOL/L (ref 94–109)
CO2 SERPL-SCNC: 24 MMOL/L (ref 20–32)
CREAT SERPL-MCNC: 1.19 MG/DL (ref 0.52–1.04)
FERRITIN SERPL-MCNC: 3 NG/ML (ref 12–150)
GFR SERPL CREATININE-BSD FRML MDRD: 54 ML/MIN/1.7M2
GLUCOSE SERPL-MCNC: 94 MG/DL (ref 70–99)
HCT VFR BLD AUTO: 29.8 % (ref 35–47)
HGB BLD-MCNC: 8.8 G/DL (ref 11.7–15.7)
IRON SATN MFR SERPL: 3 % (ref 15–46)
IRON SERPL-MCNC: 14 UG/DL (ref 35–180)
POTASSIUM SERPL-SCNC: 4.7 MMOL/L (ref 3.4–5.3)
SODIUM SERPL-SCNC: 139 MMOL/L (ref 133–144)
TIBC SERPL-MCNC: 423 UG/DL (ref 240–430)

## 2017-10-13 ENCOUNTER — TELEPHONE (OUTPATIENT)
Dept: PHARMACY | Facility: CLINIC | Age: 27
End: 2017-10-13

## 2017-10-13 NOTE — TELEPHONE ENCOUNTER
Anemia Management Note  SUBJECTIVE/OBJECTIVE:  Referred by Dr. Otilio Mar on 10/6/2017  Primary Diagnosis: Anemia in Chronic Kidney Disease (N18.3, D63.1)     Secondary Diagnosis:  Chronic Kidney Disease, Stage 3 (N18.3)   Hgb goal range:  9-10  Epo/Darbo: None  Iron regimen:  Does not tolerate oral iron    Anemia Latest Ref Rng & Units 12/13/2016 5/24/2017 7/19/2017 7/25/2017 8/16/2017 9/29/2017 10/12/2017   Hemoglobin 11.7 - 15.7 g/dL 12.1 9.5(L) 8.7(L) 8.6(L) 8.2(L) 8.9(L) 8.8(L)   TSAT 15 - 46 % - - - 4(L) - - 3(L)   Ferritin 12 - 150 ng/mL - - - 3(L) - - 3(L)     BP Readings from Last 3 Encounters:   10/03/17 117/73   07/25/17 114/74   12/13/16 127/74     Wt Readings from Last 2 Encounters:   10/03/17 142 lb 9.6 oz (64.7 kg)   07/25/17 137 lb 12.8 oz (62.5 kg)     ASSESSMENT:  Hgb:Not at goal  TSat: not at goal of >30% Ferritin: At goal (>100ng/mL). Recommend course IV iron    PLAN:  Entered therapy plan for Injectafer 750mg weekly x 2 doses  Sent messge to Dr Mensah about getting IV iron prior to heme appt then will call Caity.    Orders needed to be renewed (for next follow-up date) in EPIC: None    Iron labs due:  4 weeks after IV iron    Plan discussed with:  Caity  Plan provided by:  Alesha    NEXT FOLLOW-UP DATE:  10/17    Anemia Management Service  Alesha Whitehead,FemiD and Princess Harrington CPhT  Phone: [phone]  Fax: [phone]

## 2017-10-17 ENCOUNTER — TELEPHONE (OUTPATIENT)
Dept: PHARMACY | Facility: CLINIC | Age: 27
End: 2017-10-17

## 2017-10-17 NOTE — TELEPHONE ENCOUNTER
----- Message from Raquel Mensah MD sent at 10/13/2017  4:29 PM CDT -----  Regarding: RE: IV iron  Hi,   I think she will be seeing me for EBV. If that's the case, then the iv iron will not interfere and she can go ahead and get it.   Genesis García     ----- Message -----     From: Alesha Whitehead, [facility]     Sent: 10/13/2017   3:18 PM       To: Otilio Mar MD, #  Subject: IV iron                                          Hi Dr Mensah    I am ordering course IV iron for Caity. She sees you 11/1 and wants to make sure this will not get in the way of your work-up. Should she wait on IV iron or ok to schedule now?    Thanks    Alesha Whitehead, PharmD, [facility]  [phone]  October 13, 2017

## 2017-10-17 NOTE — TELEPHONE ENCOUNTER
Caity returned Alesha's call.  I gave her the phone # [phone] to call to schedule her 2 IV injectafer infusion appts (one week apart)    Amaris

## 2017-10-17 NOTE — TELEPHONE ENCOUNTER
ALEXEY Carolina to call back. She should schedule Injectafer weekly x 2 doses, per Dr Mar and Dr Mensah.    Call date: 10/18    Alesha Whitehead, PharmD, [facility]  [phone]  October 17, 2017

## 2017-10-18 NOTE — TELEPHONE ENCOUNTER
Injectafer weekly x 2 doses has been scheduled for 11/3 at 8:00 and 11/10/17 at 1:00 pm    NEXT FOLLOW-UP DATE:  11/3/17    Amaris

## 2017-10-27 DIAGNOSIS — Z94.0 KIDNEY TRANSPLANTED: ICD-10-CM

## 2017-10-27 DIAGNOSIS — Z94.0 KIDNEY REPLACED BY TRANSPLANT: Primary | ICD-10-CM

## 2017-10-27 DIAGNOSIS — E83.42 HYPOMAGNESEMIA: ICD-10-CM

## 2017-10-27 DIAGNOSIS — Z94.0 STATUS POST KIDNEY TRANSPLANT: ICD-10-CM

## 2017-10-27 RX ORDER — TACROLIMUS 0.5 MG/1
1.5 CAPSULE ORAL 2 TIMES DAILY
Qty: 180 CAPSULE | Refills: 11 | Status: SHIPPED | OUTPATIENT
Start: 2017-10-27 | End: 2018-10-13

## 2017-10-27 RX ORDER — SULFAMETHOXAZOLE AND TRIMETHOPRIM 400; 80 MG/1; MG/1
1 TABLET ORAL DAILY
Qty: 30 TABLET | Refills: 11 | Status: SHIPPED | OUTPATIENT
Start: 2017-10-27 | End: 2017-11-03

## 2017-10-27 NOTE — TELEPHONE ENCOUNTER
Drug Name: tacrolimus 0.5mg  Last Fill Date: 10/4/17  Quantity: 180    Drug Name: smz/tmp 400-80mg  Last Fill Date: 10/4/17  Quantity: 30    Bruna Duran   [facility]  [phone]

## 2017-10-31 ENCOUNTER — TELEPHONE (OUTPATIENT)
Dept: TRANSPLANT | Facility: CLINIC | Age: 27
End: 2017-10-31

## 2017-10-31 DIAGNOSIS — Z94.0 KIDNEY REPLACED BY TRANSPLANT: Primary | ICD-10-CM

## 2017-10-31 DIAGNOSIS — Z79.899 ENCOUNTER FOR LONG-TERM CURRENT USE OF MEDICATION: ICD-10-CM

## 2017-10-31 DIAGNOSIS — Z48.298 AFTERCARE FOLLOWING ORGAN TRANSPLANT: ICD-10-CM

## 2017-10-31 DIAGNOSIS — Z94.0 KIDNEY REPLACED BY TRANSPLANT: ICD-10-CM

## 2017-10-31 LAB
ANION GAP SERPL CALCULATED.3IONS-SCNC: 8 MMOL/L (ref 3–14)
BUN SERPL-MCNC: 26 MG/DL (ref 7–30)
CALCIUM SERPL-MCNC: 8.4 MG/DL (ref 8.5–10.1)
CHLORIDE SERPL-SCNC: 106 MMOL/L (ref 94–109)
CO2 SERPL-SCNC: 22 MMOL/L (ref 20–32)
CREAT SERPL-MCNC: 1.22 MG/DL (ref 0.52–1.04)
EBV DNA # SPEC NAA+PROBE: ABNORMAL {COPIES}/ML
EBV DNA SPEC NAA+PROBE-LOG#: 5 {LOG_COPIES}/ML
ERYTHROCYTE [DISTWIDTH] IN BLOOD BY AUTOMATED COUNT: 16 % (ref 10–15)
GFR SERPL CREATININE-BSD FRML MDRD: 53 ML/MIN/1.7M2
GLUCOSE SERPL-MCNC: 92 MG/DL (ref 70–99)
HCT VFR BLD AUTO: 29.4 % (ref 35–47)
HGB BLD-MCNC: 8.8 G/DL (ref 11.7–15.7)
MCH RBC QN AUTO: 25.1 PG (ref 26.5–33)
MCHC RBC AUTO-ENTMCNC: 29.9 G/DL (ref 31.5–36.5)
MCV RBC AUTO: 84 FL (ref 78–100)
PLATELET # BLD AUTO: 272 10E9/L (ref 150–450)
POTASSIUM SERPL-SCNC: 4.8 MMOL/L (ref 3.4–5.3)
RBC # BLD AUTO: 3.51 10E12/L (ref 3.8–5.2)
SODIUM SERPL-SCNC: 137 MMOL/L (ref 133–144)
TACROLIMUS BLD-MCNC: 7.7 UG/L (ref 5–15)
TME LAST DOSE: NORMAL H
WBC # BLD AUTO: 7.6 10E9/L (ref 4–11)

## 2017-10-31 NOTE — TELEPHONE ENCOUNTER
Tacrolimus level 7.7   Goal 3-5  Please phone patient and ask if last Tacrolimus level was a good 12 hour level.  If so, decrease Tacrolimus from 1.5 mg twice per day to 1.0 mg twice per day  Repeat labs in 1-2 weeks.

## 2017-11-01 ENCOUNTER — OFFICE VISIT (OUTPATIENT)
Dept: INFECTIOUS DISEASES | Facility: CLINIC | Age: 27
End: 2017-11-01
Attending: STUDENT IN AN ORGANIZED HEALTH CARE EDUCATION/TRAINING PROGRAM
Payer: COMMERCIAL

## 2017-11-01 VITALS
BODY MASS INDEX: 24.64 KG/M2 | TEMPERATURE: 98 F | OXYGEN SATURATION: 100 % | SYSTOLIC BLOOD PRESSURE: 110 MMHG | WEIGHT: 139.1 LBS | HEART RATE: 87 BPM | DIASTOLIC BLOOD PRESSURE: 73 MMHG | RESPIRATION RATE: 16 BRPM | HEIGHT: 63 IN

## 2017-11-01 DIAGNOSIS — Z94.0 KIDNEY REPLACED BY TRANSPLANT: ICD-10-CM

## 2017-11-01 DIAGNOSIS — B27.00 EBV (EPSTEIN-BARR VIRUS) VIREMIA: ICD-10-CM

## 2017-11-01 DIAGNOSIS — D50.9 IRON DEFICIENCY ANEMIA, UNSPECIFIED IRON DEFICIENCY ANEMIA TYPE: Primary | ICD-10-CM

## 2017-11-01 PROCEDURE — 90715 TDAP VACCINE 7 YRS/> IM: CPT | Mod: ZF | Performed by: STUDENT IN AN ORGANIZED HEALTH CARE EDUCATION/TRAINING PROGRAM

## 2017-11-01 PROCEDURE — 90472 IMMUNIZATION ADMIN EACH ADD: CPT | Mod: ZF

## 2017-11-01 PROCEDURE — 99212 OFFICE O/P EST SF 10 MIN: CPT | Mod: 25,ZF

## 2017-11-01 PROCEDURE — 25000128 H RX IP 250 OP 636: Mod: ZF | Performed by: STUDENT IN AN ORGANIZED HEALTH CARE EDUCATION/TRAINING PROGRAM

## 2017-11-01 PROCEDURE — G0009 ADMIN PNEUMOCOCCAL VACCINE: HCPCS | Mod: ZF

## 2017-11-01 PROCEDURE — 90670 PCV13 VACCINE IM: CPT | Mod: ZF | Performed by: STUDENT IN AN ORGANIZED HEALTH CARE EDUCATION/TRAINING PROGRAM

## 2017-11-01 RX ADMIN — TETANUS TOXOID, REDUCED DIPHTHERIA TOXOID AND ACELLULAR PERTUSSIS VACCINE, ADSORBED 0.5 ML: 5; 2.5; 8; 8; 2.5 SUSPENSION INTRAMUSCULAR at 16:17

## 2017-11-01 RX ADMIN — PNEUMOCOCCAL 13-VALENT CONJUGATE VACCINE 0.5 ML: 2.2; 2.2; 2.2; 2.2; 2.2; 4.4; 2.2; 2.2; 2.2; 2.2; 2.2; 2.2; 2.2 INJECTION, SUSPENSION INTRAMUSCULAR at 16:17

## 2017-11-01 ASSESSMENT — PAIN SCALES - GENERAL: PAINLEVEL: NO PAIN (0)

## 2017-11-01 NOTE — MR AVS SNAPSHOT
After Visit Summary   11/1/2017    Caity Horan    MRN: [id number]           Patient Information     Date Of Birth          1990        Visit Information        Provider Department      11/1/2017 2:30 PM Raquel Mensah MD [facility] and Infectious Diseases        Today's Diagnoses     Iron deficiency anemia, unspecified iron deficiency anemia type    -  1    EBV (Nicole-Barr virus) viremia        Kidney replaced by transplant           Follow-ups after your visit        Additional Services     ONC/HEME ADULT REFERRAL       Your provider has referred you to: [facility]: Blood and Marrow Transplant Clinic - [place] [phone]   [email or web address]    Please be aware that coverage of these services is subject to the terms and limitations of your health insurance plan.  Call member services at your health plan with any benefit or coverage questions.      Patient is 3 years post kidney transplant with iron deficiency anemia of 1 year duration. Also has EBV viremia. Has symptoms of possible inflammatory bowel disease. Please evaluate for underlying cause and if she needs colonoscopy. Thanks.     Please bring the following with you to your appointment:    (1) Any X-Rays, CTs or MRIs which have been performed.  Contact the facility where they were done to arrange for  prior to your scheduled appointment.   (2) List of current medications  (3) This referral request   (4) Any documents/labs given to you for this referral                  Your next 10 appointments already scheduled     Nov 03, 2017  8:00 AM CDT   Infusion 120 with UC SPEC INFUSION, UC 44 ATC   [facility] ([facility])    [address] [place] [address]  2nd Floor  [facility] [id number] [phone]            Nov 10, 2017  1:00 PM CST   Infusion 120 with UC SPEC INFUSION, UC 41 ATC     [facility] ([facility])    [address] [facility] [address]   [phone]            Dec 01, 2017  1:00 PM CST   (Arrive by 12:45 PM)   New Patient Visit with Cassia Nayak MD   [facility] ([facility])    [address]  [address] [facility] [address]   [phone]            Jan 26, 2018  2:30 PM CST   (Arrive by 2:15 PM)   Return Visit with Raquel Mensah MD   [facility] and Infectious Diseases ([facility])    [address] [facility] [address]   [phone]            Feb 27, 2018  3:35 PM CST   (Arrive by 3:05 PM)   Return Kidney Transplant with Otilio Mar MD   [facility] Nephrology ([facility])    [address]  [address] [facility] [address] [phone]              Future tests that were ordered for you today     Open Future Orders        Priority Expected Expires Ordered    T cell subset profile Routine  11/2/2018 11/1/2017    IgG Routine  11/1/2018 11/1/2017    Occult blood stool 1-3 spec Routine  11/1/2018 11/1/2017    Tacrolimus level Routine 11/3/2017 12/1/2017 11/1/2017            Who to contact     If you have questions or need follow up information about today's clinic visit or your schedule please contact [facility] AND INFECTIOUS DISEASES directly at [phone].  Normal or non-critical lab and imaging results will be communicated to you by MyChart, letter or phone within 4 business days after the clinic has received the results. If you do not hear from us within 7 days, please contact the clinic through MyChart or phone. If you have a critical or abnormal lab result, we will notify you by phone as soon as possible.  Submit refill requests through [employer] or call your pharmacy and they will forward the refill  "request to us. Please allow 3 business days for your refill to be completed.          Additional Information About Your Visit        [employer] Information     [employer] gives you secure access to your electronic health record. If you see a primary care provider, you can also send messages to your care team and make appointments. If you have questions, please call your primary care clinic.  If you do not have a primary care provider, please call [phone] and they will assist you.        Care EveryWhere ID     This is your Care EveryWhere ID. This could be used by other organizations to access your [facility] medical records  [id number]        Your Vitals Were     Pulse Temperature Respirations Height Pulse Oximetry BMI (Body Mass Index)    87 98  F (36.7  C) (Oral) 16 1.6 m (5' 2.99\") 100% 24.65 kg/m2       Blood Pressure from Last 3 Encounters:   11/01/17 110/73   10/03/17 117/73   07/25/17 114/74    Weight from Last 3 Encounters:   11/01/17 63.1 kg (139 lb 1.6 oz)   10/03/17 64.7 kg (142 lb 9.6 oz)   07/25/17 62.5 kg (137 lb 12.8 oz)              We Performed the Following     ONC/HEME ADULT REFERRAL     TDAP VACCINE (BOOSTRIX)        Primary Care Provider Office Phone # Fax #    Ritu Little [phone]       [place] [address] [facility] [address] [address]        Equal Access to Services     [facility]: Hadii aad ku hadasho Soomaali, waaxda luqadaha, qaybta kaalmada adeegyada, philip lee . So [facility] [phone].    ATENCIÓN: Si habla español, tiene a carreon disposición servicios gratuitos de asistencia lingüística. Llame al [phone].    We comply with applicable federal civil rights laws and [place] laws. We do not discriminate on the basis of race, color, national origin, age, disability, sex, sexual orientation, or gender identity.            Thank you!     Thank you for choosing [facility] AND INFECTIOUS DISEASES  for your " care. Our goal is always to provide you with excellent care. Hearing back from our patients is one way we can continue to improve our services. Please take a few minutes to complete the written survey that you may receive in the mail after your visit with us. Thank you!             Your Updated Medication List - Protect others around you: Learn how to safely use, store and throw away your medicines at www.disposemymeds.org.          This list is accurate as of: 11/1/17  4:21 PM.  Always use your most recent med list.                   Brand Name Dispense Instructions for use Diagnosis    magnesium oxide 400 MG tablet    MAG-OX    60 tablet    Take 1 tablet (400 mg) by mouth daily    Hypomagnesemia, Status post kidney transplant, Kidney replaced by transplant, Kidney transplanted       mycophenolate 250 MG capsule    GENERIC EQUIVALENT    120 capsule    Take 1 capsule (250 mg) by mouth 2 times daily    Kidney transplanted, Kidney replaced by transplant, Status post kidney transplant       sulfamethoxazole-trimethoprim 400-80 MG per tablet    BACTRIM/SEPTRA    30 tablet    Take 1 tablet by mouth daily    Status post kidney transplant, Kidney replaced by transplant, Hypomagnesemia, Kidney transplanted       tacrolimus 0.5 MG capsule    GENERIC EQUIVALENT    180 capsule    Take 3 capsules (1.5 mg) by mouth 2 times daily    Kidney replaced by transplant, Status post kidney transplant, Hypomagnesemia, Kidney transplanted

## 2017-11-01 NOTE — LETTER
11/1/2017       RE: Caity Horan  [address]  [facility] [address]     Dear Colleague,    Thank you for referring your patient, Caity Horan, to the [facility] AND INFECTIOUS DISEASES at [facility]. Please see a copy of my visit note below.    [facility]  Transplant Infectious Disease Consultation note  Today's Date: 11/01/2017     Recommendations:  - referral to hematology for iron deficiency anemia. ?Possibility of GI lymphoma   - check stool guaiac test. If pos will refer to GI for colonoscopy  - stop bactrim   - immunizations - Tdap and Prevnar 13 today    Reassured her, so that she will worry less about the possibility of lymphoma.     Thank you for involving me in the care of this patient. Please do not hesitate to contact me with any questions.     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   Has been referred to me for EBV viremia notes since 12/2016, currently around 100k. CT chest/abdomen/pelvis without lymphadenopathy. Also has been anemic (iron deficiency anemia) since May 2017.     Bone marrow or GI lymphoma causing blood loss are possibilities in her case. ue to a primary EBV infecton, chances of PTLD are higher. However she could just have inflammatory bowel disease causing iron deficiency anemia. Therefore will refer her to hematology, get a stool guaiac, IgG and CD4 levels (to get an idea of state of immune suppression). If stool guaiac pos will refer to GI for colonoscopy. Meanwhile will stop bactrim (may make the anemia better). Anyway she is more than 1 year out of transplant, therefore risk of PCP is very low.     - Serostatus:CMV D-/R-, EBV D-/R-  - Gamma globulin status:unknown   - Immunization status:Due for Prevnar 13 and Td, Immune to Hep B   - Prophylaxis: on bactrim        Attestation: I have reviewed today's vital signs, medications, labs and imaging. I personally reviewed the imaging. Reviewed medical history, surgical history, and family history in Epic History tab. No updates needed to be made.  Face to face time 60 mins. >50% spent counseling and coordinating care.     Raquel Mensah  , [facility]  Pager - [phone]    -------------------------------------------------------------------------------------------------------------------   Reason for consult / Chief complaint:   Consulted by Dr. 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 She has had no major issues post transplant and is on minimal immune suppression with Prograf 1.5 bid, level around 4 (level 7 recently because accidentally took double dose) and Cellcept 250 bid.     1 year ago EBV was checked and was found to be elevated to ~50k  and then since July 2017 has been stable but at around 100k. She had a Ct chest/abdomen/pelvis done in July 2017 which did not reveal any lymphadenopathy.      She states that she feels fine. Other medical issus include anemia detected this year. She states that she has been anemic most of her life, a reason was not found but post transplant her Hb was normal until May 2017 when it dropped to 9.5 and is now 8.8. MCV and iron studies reveal iron deficiency anemia and she is scheduled to start iv iron soon.     She states that her periods are light and regular. She however notes since the age of 17 or so, that she has had irritable bowel syndrome. They present as episodes of constipation and weight gain lasting for days to weeks which end with blood in stool. She gets these episodes about twice a year or less. inbetween these episodes her stools are normal. She saw a gastroenterologist years ago,  "since these episodes were infrequent no further work up was recommended. She had a gastrograffin enema in 12/2014 for these symptoms which was normal.     Despite the anemia and EBV viremia she feels fine. She feels anxious about the anemia and EBV.     Social Hx:  Social History   Substance Use Topics     Smoking status: Never Smoker     Smokeless tobacco: Never Used     Alcohol use Yes   works in advertising     Immunizations:  Immunization History   Administered Date(s) Administered     Influenza Vaccine IM 3yrs+ 4 Valent IIV4 12/13/2016, 10/03/2017     Pneumococcal (PCV 13) 11/01/2017     Pneumococcal 23 valent 11/10/2014     TDAP Vaccine (Boostrix) 11/01/2017   Tdap 2007  Hep B, HPV     Allergies:   Allergies   Allergen Reactions     Amoxicillin Rash       Medications:  Current Outpatient Prescriptions   Medication     tacrolimus (GENERIC EQUIVALENT) 0.5 MG capsule     sulfamethoxazole-trimethoprim (BACTRIM/SEPTRA) 400-80 MG per tablet     magnesium oxide (MAG-OX) 400 MG tablet     mycophenolate (CELLCEPT - GENERIC EQUIVALENT) 250 MG capsule     No current facility-administered medications for this visit.        Past Medical Hx:  Past Medical History:   Diagnosis Date     Anemia in stage 5 chronic kidney disease (H)      Anemia in stage 5 chronic kidney disease (H) 10/27/2014     ESRD (end stage renal disease) on dialysis (H)      HTN (hypertension) 10/27/2014     Hypertension 10/2014     IgA nephropathy     biopsy proven         Family History:  Family History   Problem Relation Age of Onset     KIDNEY DISEASE No family hx of          Review of Systems:  10 systems reviewed and are negative except for pertinent positives noted in my HPI.      Examination:  Vital signs:   /73 (BP Location: Right arm, Patient Position: Sitting, Cuff Size: Adult Regular)  Pulse 87  Temp 98  F (36.7  C) (Oral)  Resp 16  Ht 1.6 m (5' 2.99\")  Wt 63.1 kg (139 lb 1.6 oz)  SpO2 100%  BMI 24.65 kg/m2    Constitutional: " Patient in no distress  Eyes: not pale, not jaundiced  Lymph nodes: no lymphadenopathy in neck, axilla or groin  CVS: no added sounds  RS: clear  Abdomen: soft, BS+, no hepatosplenomegaly   Skin: no rash  Extremities: no pedal edema, fine tremors of hands present  Psych: Alert and oriented x 3    Laboratory:  Hematology:  Recent Labs   Lab Test  10/31/17   0748  10/12/17   0744  09/29/17   0740  08/16/17   0744   WBC  7.6   --   7.4  4.6   RBC  3.51*   --   3.56*  3.38*   HGB  8.8*  8.8*  8.9*  8.2*   HCT  29.4*  29.8*  29.4*  28.4*   MCV  84   --   83  84   MCH  25.1*   --   25.0*  24.3*   MCHC  29.9*   --   30.3*  28.9*   RDW  16.0*   --   16.3*  15.4*   PLT  272   --   269  307       Chemistry:  Recent Labs   Lab Test  10/31/17   0748  10/12/17   0744  10/03/17   1651   NA  137  139  135   POTASSIUM  4.8  4.7  4.3   CHLORIDE  106  108  106   CO2  22  24  23   ANIONGAP  8  7  6   GLC  92  94  98   BUN  26  16  22   CR  1.22*  1.19*  1.29*   MICAH  8.4*  8.8  8.5       Liver Function Studies:  Recent Labs   Lab Test  06/30/15   0816  01/30/15   0801  01/23/15   0845   PROTTOTAL  7.7  7.4  7.4   ALBUMIN  4.1  3.9  4.0   BILITOTAL  0.8  0.4  0.3   ALKPHOS  90  86  88   AST  15  18  23   ALT  18  39  35       Immune Globulin Studies:   No lab results found.  No Cd4 count found    Microbiology:    Results for PIETER GREEN (MRN [id number]) as of 11/1/2017 17:08   Ref. Range 12/13/2016 16:55 7/25/2017 16:48   CMV Quant IU/mL Latest Ref Range: CMVND [IU]/mL CMV DNA Not Detec... CMV DNA Not Detec...     Results for PIETER GREEN (MRN [id number]) as of 11/1/2017 17:08   Ref. Range 12/13/2016 16:55 4/25/2017 06:29 5/24/2017 07:25 7/19/2017 07:22 7/25/2017 16:48 8/16/2017 07:44 9/29/2017 07:40 10/31/2017 07:48   EBV DNA Copies/mL Latest Ref Range: EBVNEG^EBV DNA Not Detected Copies/mL 28398 (A) 06752 (A) 99845 (A) 795825 (A) 22903 (A) 32262 (A) 224764 (A) 041826 (A)       Imaging:  Ct chest abdomen pelvis  7/25/17  Impression:   1. No lymphadenopathy in the chest, abdomen or pelvis and no  splenomegaly to suggest lymphoma.  2. Unremarkable noncontrast appearance of the right lower quadrant  renal transplantation with native atrophic bilateral kidneys.       Again, thank you for allowing me to participate in the care of your patient.      Sincerely,    Raquel Mensah MD

## 2017-11-01 NOTE — NURSING NOTE
"Chief Complaint   Patient presents with     Consult     S/P Kidney TX, EBV virus        Initial /73 (BP Location: Right arm, Patient Position: Sitting, Cuff Size: Adult Regular)  Pulse 87  Temp 98  F (36.7  C) (Oral)  Resp 16  Ht 1.6 m (5' 2.99\")  Wt 63.1 kg (139 lb 1.6 oz)  SpO2 100%  BMI 24.65 kg/m2 Estimated body mass index is 24.65 kg/(m^2) as calculated from the following:    Height as of this encounter: 1.6 m (5' 2.99\").    Weight as of this encounter: 63.1 kg (139 lb 1.6 oz).  Medication Reconciliation: complete     Marilyn King [facility]  11/1/2017 2:38 PM        "

## 2017-11-01 NOTE — PROGRESS NOTES
[facility]  Transplant Infectious Disease Consultation note  Today's Date: 11/01/2017     Recommendations:  - referral to hematology for iron deficiency anemia. ?Possibility of GI lymphoma   - check stool guaiac test. If pos will refer to GI for colonoscopy  - stop bactrim   - immunizations - Tdap and Prevnar 13 today    Reassured her, so that she will worry less about the possibility of lymphoma.     Thank you for involving me in the care of this patient. Please do not hesitate to contact me with any questions.     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   Has been referred to me for EBV viremia notes since 12/2016, currently around 100k. CT chest/abdomen/pelvis without lymphadenopathy. Also has been anemic (iron deficiency anemia) since May 2017.     Bone marrow or GI lymphoma causing blood loss are possibilities in her case. ue to a primary EBV infecton, chances of PTLD are higher. However she could just have inflammatory bowel disease causing iron deficiency anemia. Therefore will refer her to hematology, get a stool guaiac, IgG and CD4 levels (to get an idea of state of immune suppression). If stool guaiac pos will refer to GI for colonoscopy. Meanwhile will stop bactrim (may make the anemia better). Anyway she is more than 1 year out of transplant, therefore risk of PCP is very low.     - Serostatus:CMV D-/R-, EBV D-/R-  - Gamma globulin status:unknown   - Immunization status:Due for Prevnar 13 and Td, Immune to Hep B   - Prophylaxis: on bactrim       Attestation: I have reviewed today's vital signs, medications, labs and imaging. I personally reviewed the imaging. Reviewed medical history, surgical history, and family history in Epic History tab. No updates needed to be made.  Face to face time 60 mins. >50% spent counseling and coordinating care.      Raquel Mensah  , [facility]  Pager - [phone]    -------------------------------------------------------------------------------------------------------------------   Reason for consult / Chief complaint:   Consulted by Dr. 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 She has had no major issues post transplant and is on minimal immune suppression with Prograf 1.5 bid, level around 4 (level 7 recently because accidentally took double dose) and Cellcept 250 bid.     1 year ago EBV was checked and was found to be elevated to ~50k  and then since July 2017 has been stable but at around 100k. She had a Ct chest/abdomen/pelvis done in July 2017 which did not reveal any lymphadenopathy.      She states that she feels fine. Other medical issus include anemia detected this year. She states that she has been anemic most of her life, a reason was not found but post transplant her Hb was normal until May 2017 when it dropped to 9.5 and is now 8.8. MCV and iron studies reveal iron deficiency anemia and she is scheduled to start iv iron soon.     She states that her periods are light and regular. She however notes since the age of 17 or so, that she has had irritable bowel syndrome. They present as episodes of constipation and weight gain lasting for days to weeks which end with blood in stool. She gets these episodes about twice a year or less. inbetween these episodes her stools are normal. She saw a gastroenterologist years ago, since these episodes were infrequent no further work up was recommended. She had a gastrograffin enema in 12/2014 for these symptoms which was normal.     Despite the anemia and EBV viremia she feels fine. She feels anxious about the anemia and EBV.     Social Hx:  Social History   Substance Use Topics      "Smoking status: Never Smoker     Smokeless tobacco: Never Used     Alcohol use Yes   works in advertising     Immunizations:  Immunization History   Administered Date(s) Administered     Influenza Vaccine IM 3yrs+ 4 Valent IIV4 12/13/2016, 10/03/2017     Pneumococcal (PCV 13) 11/01/2017     Pneumococcal 23 valent 11/10/2014     TDAP Vaccine (Boostrix) 11/01/2017   Tdap 2007  Hep B, HPV     Allergies:   Allergies   Allergen Reactions     Amoxicillin Rash       Medications:  Current Outpatient Prescriptions   Medication     tacrolimus (GENERIC EQUIVALENT) 0.5 MG capsule     sulfamethoxazole-trimethoprim (BACTRIM/SEPTRA) 400-80 MG per tablet     magnesium oxide (MAG-OX) 400 MG tablet     mycophenolate (CELLCEPT - GENERIC EQUIVALENT) 250 MG capsule     No current facility-administered medications for this visit.        Past Medical Hx:  Past Medical History:   Diagnosis Date     Anemia in stage 5 chronic kidney disease (H)      Anemia in stage 5 chronic kidney disease (H) 10/27/2014     ESRD (end stage renal disease) on dialysis (H)      HTN (hypertension) 10/27/2014     Hypertension 10/2014     IgA nephropathy     biopsy proven         Family History:  Family History   Problem Relation Age of Onset     KIDNEY DISEASE No family hx of          Review of Systems:  10 systems reviewed and are negative except for pertinent positives noted in my HPI.      Examination:  Vital signs:   /73 (BP Location: Right arm, Patient Position: Sitting, Cuff Size: Adult Regular)  Pulse 87  Temp 98  F (36.7  C) (Oral)  Resp 16  Ht 1.6 m (5' 2.99\")  Wt 63.1 kg (139 lb 1.6 oz)  SpO2 100%  BMI 24.65 kg/m2    Constitutional: Patient in no distress  Eyes: not pale, not jaundiced  Lymph nodes: no lymphadenopathy in neck, axilla or groin  CVS: no added sounds  RS: clear  Abdomen: soft, BS+, no hepatosplenomegaly   Skin: no rash  Extremities: no pedal edema, fine tremors of hands present  Psych: Alert and oriented x " 3    Laboratory:  Hematology:  Recent Labs   Lab Test  10/31/17   0748  10/12/17   0744  09/29/17   0740  08/16/17   0744   WBC  7.6   --   7.4  4.6   RBC  3.51*   --   3.56*  3.38*   HGB  8.8*  8.8*  8.9*  8.2*   HCT  29.4*  29.8*  29.4*  28.4*   MCV  84   --   83  84   MCH  25.1*   --   25.0*  24.3*   MCHC  29.9*   --   30.3*  28.9*   RDW  16.0*   --   16.3*  15.4*   PLT  272   --   269  307       Chemistry:  Recent Labs   Lab Test  10/31/17   0748  10/12/17   0744  10/03/17   1651   NA  137  139  135   POTASSIUM  4.8  4.7  4.3   CHLORIDE  106  108  106   CO2  22  24  23   ANIONGAP  8  7  6   GLC  92  94  98   BUN  26  16  22   CR  1.22*  1.19*  1.29*   MICAH  8.4*  8.8  8.5       Liver Function Studies:  Recent Labs   Lab Test  06/30/15   0816  01/30/15   0801  01/23/15   0845   PROTTOTAL  7.7  7.4  7.4   ALBUMIN  4.1  3.9  4.0   BILITOTAL  0.8  0.4  0.3   ALKPHOS  90  86  88   AST  15  18  23   ALT  18  39  35       Immune Globulin Studies:   No lab results found.  No Cd4 count found    Microbiology:    Results for PIETER GREEN (MRN [id number]) as of 11/1/2017 17:08   Ref. Range 12/13/2016 16:55 7/25/2017 16:48   CMV Quant IU/mL Latest Ref Range: CMVND [IU]/mL CMV DNA Not Detec... CMV DNA Not Detec...     Results for PIETER GREEN (MRN [id number]) as of 11/1/2017 17:08   Ref. Range 12/13/2016 16:55 4/25/2017 06:29 5/24/2017 07:25 7/19/2017 07:22 7/25/2017 16:48 8/16/2017 07:44 9/29/2017 07:40 10/31/2017 07:48   EBV DNA Copies/mL Latest Ref Range: EBVNEG^EBV DNA Not Detected Copies/mL 22265 (A) 29393 (A) 91696 (A) 409955 (A) 81190 (A) 24418 (A) 206978 (A) 620242 (A)       Imaging:  Ct chest abdomen pelvis 7/25/17  Impression:   1. No lymphadenopathy in the chest, abdomen or pelvis and no  splenomegaly to suggest lymphoma.  2. Unremarkable noncontrast appearance of the right lower quadrant  renal transplantation with native atrophic bilateral kidneys.

## 2017-11-01 NOTE — TELEPHONE ENCOUNTER
Sent: 10/31/2017   1:37 PM        To: Nephrology Nurses-   Subject: Updates about my health                           Dr. Mar --     FYI - I came in for labs this morning (as you saw) and I know my Tac level is up....     I accidentally took my meds twice yesterday morning, I wasn't even sure if I did that or not, work has just been really crazy.  I'm assuming this confirms that increase.     Let me know if this is a major concern or if you want me back for labs next week to just double check.       PLAN:  Patient took Tac before lab draw.  Just have patient recheck levels.

## 2017-11-03 ENCOUNTER — INFUSION THERAPY VISIT (OUTPATIENT)
Dept: INFUSION THERAPY | Facility: CLINIC | Age: 27
End: 2017-11-03
Attending: INTERNAL MEDICINE
Payer: COMMERCIAL

## 2017-11-03 VITALS
OXYGEN SATURATION: 100 % | DIASTOLIC BLOOD PRESSURE: 44 MMHG | TEMPERATURE: 97.2 F | RESPIRATION RATE: 16 BRPM | HEART RATE: 72 BPM | SYSTOLIC BLOOD PRESSURE: 98 MMHG

## 2017-11-03 DIAGNOSIS — D50.9 IRON DEFICIENCY ANEMIA, UNSPECIFIED IRON DEFICIENCY ANEMIA TYPE: Primary | ICD-10-CM

## 2017-11-03 PROCEDURE — 96365 THER/PROPH/DIAG IV INF INIT: CPT

## 2017-11-03 PROCEDURE — 25000128 H RX IP 250 OP 636: Mod: ZF | Performed by: INTERNAL MEDICINE

## 2017-11-03 RX ADMIN — FERRIC CARBOXYMALTOSE INJECTION 750 MG: 50 INJECTION, SOLUTION INTRAVENOUS at 08:41

## 2017-11-03 NOTE — PROGRESS NOTES
Infusion Nursing Note:  Caity Horan presents today to [facility] for a *** infusion.  During today's [facility] appointment orders from *** were completed.  Frequency: ***    Progress note:  ID verified by name and .  Assessment completed. Vitals were stable throughout time in [facility]. Patient education regarding infusion and possible side effects provided.  Patient verbalized understanding. ***    Premedications: {[facility] PREMEDICATIONS:957366276}    Infusion Rates: {[facility] TITRATION RATES:562469048}     Labs {[facility] LABS:524020128}    Vascular access: {[facility] VASCULAR ACCESS:951041437}    Treatment Conditions:   {[facility] TREATMENT CONDITIONS:437884909}    Patient tolerated infusion {[facility] WELL:842827025}    Discharge Plan:   Follow up plan of care with: {[facility] FOLLOW UP:315581892}  Discharge instructions were reviewed with patient.  Patient/representative verbalized understanding of discharge instructions and all questions answered.    [facility] in [facility] condition.    More Tidwell RN

## 2017-11-03 NOTE — MR AVS SNAPSHOT
After Visit Summary   11/3/2017    Caity Horan    MRN: [id number]           Patient Information     Date Of Birth          1990        Visit Information        Provider Department      11/3/2017 8:00 AM DENG 44 ATC; DENG SPEC INFUSION [facility]        Today's Diagnoses     Iron deficiency anemia, unspecified iron deficiency anemia type    -  1      Care Instructions      Ferric carboxymaltose Solution for injection  What is this medicine?  FERRIC CARBOXYMALTOSE (ferr-ik car-box-ee-mol-toes) is an iron complex. Iron is used to make healthy red blood cells, which carry oxygen and nutrients throughout the body. This medicine is used to treat anemia in people with chronic kidney disease or people who cannot take iron by mouth.  This medicine may be used for other purposes; ask your health care provider or pharmacist if you have questions.  What should I tell my health care provider before I take this medicine?  They need to know if you have any of these conditions:    anemia not caused by low iron levels    high levels of iron in the blood    liver disease    an unusual or allergic reaction to iron, other medicines, foods, dyes, or preservatives    pregnant or trying to get pregnant    breast-feeding  How should I use this medicine?  This medicine is for infusion into a vein. It is given by a health care professional in a hospital or clinic setting.  Talk to your pediatrician regarding the use of this medicine in children. Special care may be needed.  Overdosage: If you think you've taken too much of this medicine contact a poison control center or emergency room at once.  NOTE: This medicine is only for you. Do not share this medicine with others.  What if I miss a dose?  It is important not to miss your dose. Call your doctor or health care professional if you are unable to keep an appointment.  What may interact with this medicine?  Do not take this  medicine with any of the following medications:  deferoxamine  dimercaprol  other iron products  This medicine may also interact with the following medications:  chloramphenicol  deferasirox  This list may not describe all possible interactions. Give your health care provider a list of all the medicines, herbs, non-prescription drugs, or dietary supplements you use. Also tell them if you smoke, drink alcohol, or use illegal drugs. Some items may interact with your medicine.  What should I watch for while using this medicine?  Visit your doctor or health care professional regularly. Tell your doctor if your symptoms do not start to get better or if they get worse. You may need blood work done while you are taking this medicine.  You may need to follow a special diet. Talk to your doctor. Foods that contain iron include: whole grains/cereals, dried fruits, beans, or peas, leafy green vegetables, and organ meats (liver, kidney).  What side effects may I notice from receiving this medicine?  Side effects that you should report to your doctor or health care professional as soon as possible:  allergic reactions like skin rash, itching or hives, swelling of the face, lips, or tonguebreathing problems  changes in blood pressure  feeling faint or lightheaded, falls  flushing, sweating, or hot feelings  Side effects that usually do not require medical attention (Report these to your doctor or health care professional if they continue or are bothersome.):  changes in taste  constipation  dizziness  headache  nausea  pain, redness, or irritation at site where injected  vomiting  This list may not describe all possible side effects. Call your doctor for medical advice about side effects. You may report side effects to FDA at 6-910-PFS-9858.  Where should I keep my medicine?  This drug is given in a hospital or clinic and will not be stored at home.  NOTE: This sheet is a summary. It may not cover all possible information. If you  have questions about this medicine, talk to your doctor, pharmacist, or health care provider.  NOTE:This sheet is a summary. It may not cover all possible information. If you have questions about this medicine, talk to your doctor, pharmacist, or health care provider. Copyright  2016 Gold Standard                Follow-ups after your visit        Your next 10 appointments already scheduled     Nov 10, 2017  1:00 PM CST   Infusion 120 with UC SPEC INFUSION, UC 41 ATC   [facility] ([facility])    [address] [place] [address] [id number] [phone]            Dec 01, 2017  1:00 PM CST   (Arrive by 12:45 PM)   New Patient Visit with Cassia Nayak MD   [facility] ([facility])    [address] [place] [address] [id number] [phone]            Jan 26, 2018  2:30 PM CST   (Arrive by 2:15 PM)   Return Visit with Raquel Mensah MD   [facility] ([facility])    [address] [place] [address] [id number] [phone]            Feb 27, 2018  3:35 PM CST   (Arrive by 3:05 PM)   Return Kidney Transplant with Otilio Mar MD   [facility] ([facility])    [address] [place] [address] [id number] [phone]              Who to contact     If you have questions or need follow up information about today's clinic visit or your schedule please contact [facility] directly at [phone].  Normal or non-critical lab and imaging results will be communicated to you by MyChart, letter or phone within 4 business days after the clinic has received the results. If you do not hear from us within 7 days, please contact the clinic through MyChart or phone. If you have a critical or abnormal  lab result, we will notify you by phone as soon as possible.  Submit refill requests through Message Bus or call your pharmacy and they will forward the refill request to us. Please allow 3 business days for your refill to be completed.          Additional Information About Your Visit        [employer] Information     Message Bus gives you secure access to your electronic health record. If you see a primary care provider, you can also send messages to your care team and make appointments. If you have questions, please call your primary care clinic.  If you do not have a primary care provider, please call [phone] and they will assist you.        Care EveryWhere ID     This is your Care EveryWhere ID. This could be used by other organizations to access your [place] medical records  [id number]        Your Vitals Were     Pulse Temperature Respirations Pulse Oximetry          79 97.2  F (36.2  C) (Oral) 16 100%         Blood Pressure from Last 3 Encounters:   11/03/17 105/44   11/01/17 110/73   10/03/17 117/73    Weight from Last 3 Encounters:   11/01/17 63.1 kg (139 lb 1.6 oz)   10/03/17 64.7 kg (142 lb 9.6 oz)   07/25/17 62.5 kg (137 lb 12.8 oz)              Today, you had the following     No orders found for display       Primary Care Provider Office Phone # Fax #    Ritu CHLOE Little [phone]       [address]  [address] [address]        Equal Access to Services     YANIQUE QUACH AH: Hadii aad ku hadasho Soomaali, waaxda luqadaha, qaybta kaalmada adeegyada, philip haddad. So [facility] [phone].    ATENCIÓN: Si habla español, tiene a carreon disposición servicios gratuitos de asistencia lingüística. Llame al [phone].    We comply with applicable federal civil rights laws and [place] laws. We do not discriminate on the basis of race, color, national origin, age, disability, sex, sexual orientation, or gender identity.            Thank you!      Thank you for choosing [facility]  for your care. Our goal is always to provide you with excellent care. Hearing back from our patients is one way we can continue to improve our services. Please take a few minutes to complete the written survey that you may receive in the mail after your visit with us. Thank you!             Your Updated Medication List - Protect others around you: Learn how to safely use, store and throw away your medicines at www.disposemymeds.org.          This list is accurate as of: 11/3/17  8:45 AM.  Always use your most recent med list.                   Brand Name Dispense Instructions for use Diagnosis    magnesium oxide 400 MG tablet    MAG-OX    60 tablet    Take 1 tablet (400 mg) by mouth daily    Hypomagnesemia, Status post kidney transplant, Kidney replaced by transplant, Kidney transplanted       mycophenolate 250 MG capsule    GENERIC EQUIVALENT    120 capsule    Take 1 capsule (250 mg) by mouth 2 times daily    Kidney transplanted, Kidney replaced by transplant, Status post kidney transplant       tacrolimus 0.5 MG capsule    GENERIC EQUIVALENT    180 capsule    Take 3 capsules (1.5 mg) by mouth 2 times daily    Kidney replaced by transplant, Status post kidney transplant, Hypomagnesemia, Kidney transplanted

## 2017-11-03 NOTE — PATIENT INSTRUCTIONS
Ferric carboxymaltose Solution for injection  What is this medicine?  FERRIC CARBOXYMALTOSE (ferr-ik car-box-ee-mol-toes) is an iron complex. Iron is used to make healthy red blood cells, which carry oxygen and nutrients throughout the body. This medicine is used to treat anemia in people with chronic kidney disease or people who cannot take iron by mouth.  This medicine may be used for other purposes; ask your health care provider or pharmacist if you have questions.  What should I tell my health care provider before I take this medicine?  They need to know if you have any of these conditions:    anemia not caused by low iron levels    high levels of iron in the blood    liver disease    an unusual or allergic reaction to iron, other medicines, foods, dyes, or preservatives    pregnant or trying to get pregnant    breast-feeding  How should I use this medicine?  This medicine is for infusion into a vein. It is given by a health care professional in a hospital or clinic setting.  Talk to your pediatrician regarding the use of this medicine in children. Special care may be needed.  Overdosage: If you think you've taken too much of this medicine contact a poison control center or emergency room at once.  NOTE: This medicine is only for you. Do not share this medicine with others.  What if I miss a dose?  It is important not to miss your dose. Call your doctor or health care professional if you are unable to keep an appointment.  What may interact with this medicine?  Do not take this medicine with any of the following medications:  deferoxamine  dimercaprol  other iron products  This medicine may also interact with the following medications:  chloramphenicol  deferasirox  This list may not describe all possible interactions. Give your health care provider a list of all the medicines, herbs, non-prescription drugs, or dietary supplements you use. Also tell them if you smoke, drink alcohol, or use illegal drugs. Some  items may interact with your medicine.  What should I watch for while using this medicine?  Visit your doctor or health care professional regularly. Tell your doctor if your symptoms do not start to get better or if they get worse. You may need blood work done while you are taking this medicine.  You may need to follow a special diet. Talk to your doctor. Foods that contain iron include: whole grains/cereals, dried fruits, beans, or peas, leafy green vegetables, and organ meats (liver, kidney).  What side effects may I notice from receiving this medicine?  Side effects that you should report to your doctor or health care professional as soon as possible:  allergic reactions like skin rash, itching or hives, swelling of the face, lips, or tonguebreathing problems  changes in blood pressure  feeling faint or lightheaded, falls  flushing, sweating, or hot feelings  Side effects that usually do not require medical attention (Report these to your doctor or health care professional if they continue or are bothersome.):  changes in taste  constipation  dizziness  headache  nausea  pain, redness, or irritation at site where injected  vomiting  This list may not describe all possible side effects. Call your doctor for medical advice about side effects. You may report side effects to FDA at 4-373-FDA-9104.  Where should I keep my medicine?  This drug is given in a hospital or clinic and will not be stored at home.  NOTE: This sheet is a summary. It may not cover all possible information. If you have questions about this medicine, talk to your doctor, pharmacist, or health care provider.  NOTE:This sheet is a summary. It may not cover all possible information. If you have questions about this medicine, talk to your doctor, pharmacist, or health care provider. Copyright  2016 Gold Standard

## 2017-11-03 NOTE — TELEPHONE ENCOUNTER
Documented 1 of 2 Injectafer. Next dose 11/10.    Anemia Latest Ref Rng & Units 7/19/2017 7/25/2017 8/16/2017 9/29/2017 10/12/2017 10/31/2017 11/3/2017   Hemoglobin 11.7 - 15.7 g/dL 8.7(L) 8.6(L) 8.2(L) 8.9(L) 8.8(L) 8.8(L) -   IV Iron Dose - - - - - - - 750mg   TSAT 15 - 46 % - 4(L) - - 3(L) - -   Ferritin 12 - 150 ng/mL - 3(L) - - 3(L) - -      Call date: 11/10    Alesha Whitehead, PharmD, [facility]  [phone]  November 3, 2017

## 2017-11-03 NOTE — PROGRESS NOTES
Infusion Nursing Note:  Caity Horan presents today to [facility] for a Injectafer infusion.  During today's [facility] appointment orders from Dr. Mar were completed.  Frequency: dose 1/2    Progress note:  ID verified by name and .  Assessment completed. Vitals were stable throughout time in [facility]. Patient education regarding infusion and possible side effects provided.  Patient verbalized understanding. yes    Premedications: were not ordered.    Infusion Rates: Infusion given over approximately 15min. Pt was monitored for 30 minutes post.    Labs were not ordered for this appointment.    Vascular access: Peripheral IV placed today.    Treatment Conditions:   No treatment conditions.    Patient tolerated infusion well.    Discharge Plan:   Follow up plan of care with: Ongoing infusions at [facility]  Discharge instructions were reviewed with patient.  Patient/representative verbalized understanding of discharge instructions and all questions answered.    Patient discharged from [facility] in stable condition.    More Tidwell RN        /44 (BP Location: Right arm)  Pulse 79  Temp 97.2  F (36.2  C) (Oral)  Resp 16  SpO2 100%

## 2017-11-06 ENCOUNTER — DOCUMENTATION ONLY (OUTPATIENT)
Dept: TRANSPLANT | Facility: CLINIC | Age: 27
End: 2017-11-06

## 2017-11-06 NOTE — PROGRESS NOTES
Notes Recorded by Otilio Mar MD on 11/1/2017 at 9:28 AM  Stable EBV viremia and will continue on lower immunosuppression.    EBV = [id number] ([id number])

## 2017-11-09 DIAGNOSIS — D63.1 ANEMIA IN STAGE 3 CHRONIC KIDNEY DISEASE (H): ICD-10-CM

## 2017-11-09 DIAGNOSIS — Z94.0 KIDNEY REPLACED BY TRANSPLANT: ICD-10-CM

## 2017-11-09 DIAGNOSIS — D50.9 IRON DEFICIENCY ANEMIA, UNSPECIFIED IRON DEFICIENCY ANEMIA TYPE: ICD-10-CM

## 2017-11-09 DIAGNOSIS — N18.30 ANEMIA IN STAGE 3 CHRONIC KIDNEY DISEASE (H): ICD-10-CM

## 2017-11-09 DIAGNOSIS — N18.30 CHRONIC KIDNEY DISEASE, STAGE 3 (MODERATE): ICD-10-CM

## 2017-11-09 LAB
CD3 CELLS # BLD: 1119 CELLS/UL (ref 603–2990)
CD3 CELLS NFR BLD: 71 % (ref 49–84)
CD3+CD4+ CELLS # BLD: 614 CELLS/UL (ref 441–2156)
CD3+CD4+ CELLS NFR BLD: 39 % (ref 28–63)
CD3+CD4+ CELLS/CD3+CD8+ CLL BLD: 1.26 % (ref 1.4–2.6)
CD3+CD8+ CELLS # BLD: 483 CELLS/UL (ref 125–1312)
CD3+CD8+ CELLS NFR BLD: 31 % (ref 10–40)
HCT VFR BLD AUTO: 29.5 % (ref 35–47)
HGB BLD-MCNC: 8.9 G/DL (ref 11.7–15.7)
IFC SPECIMEN: ABNORMAL
TACROLIMUS BLD-MCNC: 3.4 UG/L (ref 5–15)
TME LAST DOSE: ABNORMAL H

## 2017-11-10 ENCOUNTER — TELEPHONE (OUTPATIENT)
Dept: PHARMACY | Facility: CLINIC | Age: 27
End: 2017-11-10

## 2017-11-10 ENCOUNTER — INFUSION THERAPY VISIT (OUTPATIENT)
Dept: INFUSION THERAPY | Facility: CLINIC | Age: 27
End: 2017-11-10
Attending: INTERNAL MEDICINE
Payer: COMMERCIAL

## 2017-11-10 VITALS
TEMPERATURE: 97.2 F | RESPIRATION RATE: 16 BRPM | OXYGEN SATURATION: 100 % | SYSTOLIC BLOOD PRESSURE: 109 MMHG | DIASTOLIC BLOOD PRESSURE: 78 MMHG | HEART RATE: 72 BPM

## 2017-11-10 DIAGNOSIS — D50.9 IRON DEFICIENCY ANEMIA, UNSPECIFIED IRON DEFICIENCY ANEMIA TYPE: Primary | ICD-10-CM

## 2017-11-10 LAB — IGG SERPL-MCNC: 1150 MG/DL (ref 695–1620)

## 2017-11-10 PROCEDURE — 25000128 H RX IP 250 OP 636: Mod: ZF | Performed by: INTERNAL MEDICINE

## 2017-11-10 PROCEDURE — 96374 THER/PROPH/DIAG INJ IV PUSH: CPT

## 2017-11-10 RX ADMIN — FERRIC CARBOXYMALTOSE INJECTION 750 MG: 50 INJECTION, SOLUTION INTRAVENOUS at 13:33

## 2017-11-10 NOTE — PROGRESS NOTES
Infusion nursing note:    Caity Horan presents today to [facility] for a injectafer infusion.  During today's [facility] appointment orders from Dr Whitehead for Dr Mar were completed.  Frequency: today is dose #2 of 2    Progress note:  ID verified by name and .  Assessment completed.  Vitals were stable throughout time in [facility].  verbal education given to patient/representative regarding infusion and possible side effects.  Patient verbalized understanding.    Note: Pt states she feels fine; she had no adverse reactions to her first dose of jinjectafer    Infusion given over approximately  15min; with 30minute observation time after dose completed.     Administrations This Visit     ferric carboxymaltose (INJECTAFER) 750 mg intermittent infusion     Admin Date Action Dose Route Administered By             11/10/2017 New Bag 750 mg Intravenous Radhika Cody RN                          BP (P) 109/78 (BP Location: Left arm)  Pulse (P) 72  Temp (P) 97.2  F (36.2  C)  Resp (P) 16  Discharge plan:    Discharge instructions were reviewed with patient: Yes  Patient/representative verbalized understanding of discharge instructions and all questions answered: Yes.    Discharged from [facility] at 1425 with self to home.    Radhika Cody

## 2017-11-10 NOTE — TELEPHONE ENCOUNTER
Follow-up with anemia management service:    Documented 2 of 2 Injectafer. Due for hgb and iron studies in 4 weeks. LM for Caity to check labs (hgb and iron studies) in 4 weeks.     Anemia Latest Ref Rng & Units 2017 2017 10/12/2017 10/31/2017 11/3/2017 2017 11/10/2017   Hemoglobin 11.7 - 15.7 g/dL 8.2(L) 8.9(L) 8.8(L) 8.8(L) - 8.9(L) -   IV Iron Dose - - - - - 750mg - 750mg   TSAT 15 - 46 % - - 3(L) - - - -   Ferritin 12 - 150 ng/mL - - 3(L) - - - -     Orders needed to be renewed (for next follow-up date) in EPIC: None   Lab orders : 10/5/18    Follow-up call date:     [facility]    Anemia Management Service  Alesha Whitehead,FemiD and Princess Harrington CPhT  Phone: [phone]  Fax: [phone]

## 2017-11-21 ENCOUNTER — TELEPHONE (OUTPATIENT)
Dept: PHARMACY | Facility: CLINIC | Age: 27
End: 2017-11-21

## 2017-12-01 ENCOUNTER — ONCOLOGY VISIT (OUTPATIENT)
Dept: ONCOLOGY | Facility: CLINIC | Age: 27
End: 2017-12-01
Attending: INTERNAL MEDICINE
Payer: COMMERCIAL

## 2017-12-01 VITALS
BODY MASS INDEX: 25.38 KG/M2 | OXYGEN SATURATION: 95 % | DIASTOLIC BLOOD PRESSURE: 71 MMHG | WEIGHT: 137.9 LBS | RESPIRATION RATE: 16 BRPM | SYSTOLIC BLOOD PRESSURE: 109 MMHG | TEMPERATURE: 98.3 F | HEART RATE: 65 BPM | HEIGHT: 62 IN

## 2017-12-01 DIAGNOSIS — Z79.899 ENCOUNTER FOR LONG-TERM CURRENT USE OF MEDICATION: ICD-10-CM

## 2017-12-01 DIAGNOSIS — Z94.0 KIDNEY REPLACED BY TRANSPLANT: ICD-10-CM

## 2017-12-01 DIAGNOSIS — B27.00 EBV (EPSTEIN-BARR VIRUS) VIREMIA: ICD-10-CM

## 2017-12-01 DIAGNOSIS — D50.9 IRON DEFICIENCY ANEMIA, UNSPECIFIED IRON DEFICIENCY ANEMIA TYPE: ICD-10-CM

## 2017-12-01 DIAGNOSIS — E53.8 B12 DEFICIENCY DUE TO DIET: Primary | ICD-10-CM

## 2017-12-01 DIAGNOSIS — N18.30 CHRONIC KIDNEY DISEASE, STAGE 3 (MODERATE): ICD-10-CM

## 2017-12-01 DIAGNOSIS — Z48.298 AFTERCARE FOLLOWING ORGAN TRANSPLANT: ICD-10-CM

## 2017-12-01 DIAGNOSIS — D63.1 ANEMIA IN STAGE 3 CHRONIC KIDNEY DISEASE (H): ICD-10-CM

## 2017-12-01 DIAGNOSIS — N18.30 ANEMIA IN STAGE 3 CHRONIC KIDNEY DISEASE (H): ICD-10-CM

## 2017-12-01 LAB
ANION GAP SERPL CALCULATED.3IONS-SCNC: 9 MMOL/L (ref 3–14)
BASOPHILS # BLD AUTO: 0.1 10E9/L (ref 0–0.2)
BASOPHILS NFR BLD AUTO: 1 %
BUN SERPL-MCNC: 16 MG/DL (ref 7–30)
CALCIUM SERPL-MCNC: 9 MG/DL (ref 8.5–10.1)
CHLORIDE SERPL-SCNC: 105 MMOL/L (ref 94–109)
CO2 SERPL-SCNC: 22 MMOL/L (ref 20–32)
CREAT SERPL-MCNC: 0.98 MG/DL (ref 0.52–1.04)
DIFFERENTIAL METHOD BLD: ABNORMAL
EOSINOPHIL # BLD AUTO: 0.5 10E9/L (ref 0–0.7)
EOSINOPHIL NFR BLD AUTO: 7.2 %
ERYTHROCYTE [DISTWIDTH] IN BLOOD BY AUTOMATED COUNT: 21.2 % (ref 10–15)
FERRITIN SERPL-MCNC: 277 NG/ML (ref 12–150)
GFR SERPL CREATININE-BSD FRML MDRD: 68 ML/MIN/1.7M2
GLUCOSE SERPL-MCNC: 87 MG/DL (ref 70–99)
HCT VFR BLD AUTO: 34 % (ref 35–47)
HGB BLD-MCNC: 10.4 G/DL (ref 11.7–15.7)
IMM GRANULOCYTES # BLD: 0 10E9/L (ref 0–0.4)
IMM GRANULOCYTES NFR BLD: 0.3 %
IRON SATN MFR SERPL: 41 % (ref 15–46)
IRON SERPL-MCNC: 112 UG/DL (ref 35–180)
LYMPHOCYTES # BLD AUTO: 1.3 10E9/L (ref 0.8–5.3)
LYMPHOCYTES NFR BLD AUTO: 20 %
MCH RBC QN AUTO: 28.6 PG (ref 26.5–33)
MCHC RBC AUTO-ENTMCNC: 30.6 G/DL (ref 31.5–36.5)
MCV RBC AUTO: 93 FL (ref 78–100)
MONOCYTES # BLD AUTO: 0.4 10E9/L (ref 0–1.3)
MONOCYTES NFR BLD AUTO: 6.8 %
NEUTROPHILS # BLD AUTO: 4.1 10E9/L (ref 1.6–8.3)
NEUTROPHILS NFR BLD AUTO: 64.7 %
NRBC # BLD AUTO: 0 10*3/UL
NRBC BLD AUTO-RTO: 0 /100
PLATELET # BLD AUTO: 234 10E9/L (ref 150–450)
POTASSIUM SERPL-SCNC: 3.9 MMOL/L (ref 3.4–5.3)
RBC # BLD AUTO: 3.64 10E12/L (ref 3.8–5.2)
RETICS # AUTO: 59.3 10E9/L (ref 25–95)
RETICS/RBC NFR AUTO: 1.6 % (ref 0.5–2)
SODIUM SERPL-SCNC: 136 MMOL/L (ref 133–144)
TIBC SERPL-MCNC: 274 UG/DL (ref 240–430)
VIT B12 SERPL-MCNC: 236 PG/ML (ref 193–986)
WBC # BLD AUTO: 6.3 10E9/L (ref 4–11)

## 2017-12-01 PROCEDURE — 85045 AUTOMATED RETICULOCYTE COUNT: CPT | Performed by: INTERNAL MEDICINE

## 2017-12-01 PROCEDURE — 36415 COLL VENOUS BLD VENIPUNCTURE: CPT | Performed by: INTERNAL MEDICINE

## 2017-12-01 PROCEDURE — 80048 BASIC METABOLIC PNL TOTAL CA: CPT | Performed by: INTERNAL MEDICINE

## 2017-12-01 PROCEDURE — 87799 DETECT AGENT NOS DNA QUANT: CPT | Performed by: INTERNAL MEDICINE

## 2017-12-01 PROCEDURE — 83550 IRON BINDING TEST: CPT | Performed by: INTERNAL MEDICINE

## 2017-12-01 PROCEDURE — 99204 OFFICE O/P NEW MOD 45 MIN: CPT | Mod: ZP | Performed by: INTERNAL MEDICINE

## 2017-12-01 PROCEDURE — 99212 OFFICE O/P EST SF 10 MIN: CPT | Mod: ZF

## 2017-12-01 PROCEDURE — 83540 ASSAY OF IRON: CPT | Performed by: INTERNAL MEDICINE

## 2017-12-01 PROCEDURE — 82607 VITAMIN B-12: CPT | Performed by: INTERNAL MEDICINE

## 2017-12-01 PROCEDURE — 83010 ASSAY OF HAPTOGLOBIN QUANT: CPT | Performed by: INTERNAL MEDICINE

## 2017-12-01 PROCEDURE — 85025 COMPLETE CBC W/AUTO DIFF WBC: CPT | Performed by: INTERNAL MEDICINE

## 2017-12-01 PROCEDURE — 82728 ASSAY OF FERRITIN: CPT | Performed by: INTERNAL MEDICINE

## 2017-12-01 ASSESSMENT — PAIN SCALES - GENERAL: PAINLEVEL: NO PAIN (0)

## 2017-12-01 NOTE — LETTER
"12/1/2017       RE: Caity Horan  [address]  [facility] [id number]     Dear Colleague,    Thank you for referring your patient, Caity Horan, to the [facility]. Please see a copy of my visit note below.    Hematology consult note    Reason for consult: Anemia, referred by     History of present illness:  is a 27-year-old woman status post kidney transplant in December 2014 for IgA nephropathy who is referred for anemia. She says that she has had problems with anemia ever since she was a young teenager. She's been on iron tablets in the past but does not tolerate them because they really bother her stomach. She had a normal hemoglobin after her transplant, but has had a gradual decreased down into the 8s since this summer. She was found to have a ferritin of only 3 on 7/25/17 with a repeat ferritin of 3 on 10/12/17. She was given Injectafer (ferric carboxymaltose) 750 mg IV on 11/3 and 11/10/17. She says she feels significantly better since receiving the IV iron with more energy and ability to do workouts. She says that she has been diagnosed with \"IBS\" several years ago and does have some alternating diarrhea and constipation. However she also has \"flares\" a couple of times a year in which she'll have abdominal pain and some bright red blood per rectum. She denies any melena or maroon stools or hematemesis. Her menstrual periods are fairly regular about once a month and lasts 3-5 days and she says are always very light even with dog birth control pills. She's never had any problems with heavy menses. She has been vegetarian for about 8 years with the only animal protein she eats his fish. She does not tolerate oral iron supplementation because it really bothers her GI tract.    Also since her transplant she has been noted to be EBV positive. It's been checked several times since 12/13/16 and is in the log 3.8-5 range. She says she and her daughter were " "EBV negative prior to her kidney transplant but she doesn't recall feeling ill at all that would have made anyone suspect that she had acute EBV infection. She had a CT of chest abdomen and pelvis on 7/25/17 which showed no lymphadenopathy. She has received recommendation that she should be seen by gastroenterology for colonoscopy. She has been somewhat reluctant to this.    Past history:  1. Status post kidney transplant December 2014 for IgA nephropathy. She was only on dialysis for about a month.  2. Hypertension- resolved with transplant  3. Irritable bowel syndrome?    Medications:  Tacrolimus 1.5 mg b.i.d.  Mycophenolate 250 mg b.i.d.  Magnesium oxide 400 mg daily    Family history: No anemia or autoimmune disorders. Hemorrhoids tend to run in the family.    Social history: She is working full-time in [employer]. She works out regularly. She has vegetarian for about 8 years with animal protein of fish, she does not smoke and was never a smoker, she drinks alcohol occasionally.    Review of Systems:  As in history above. She had ice chewing for years, which is improved since iron infusion. The rest of the >10 point ROS is negative.      PHYSICAL EXAMINATION:  /71  Pulse 65  Temp 98.3  F (36.8  C) (Oral)  Resp 16  Ht 1.575 m (5' 2\")  Wt 62.6 kg (137 lb 14.4 oz)  LMP 11/08/2017  SpO2 95%  BMI 25.22 kg/m2    General appearance:  Patient is 26 yo woman in no acute distress.     HEENT:  No pallor, icterus, or mucositis.  No thyromegaly.   Lymph nodes:  No cervical, supraclavicular, axillary, or inguinal lymphadenopathy.   Lungs:  Clear to auscultation bilaterally.   Heart:  Regular rate and rhythm; no S3 S4 or murmer.     Abdomen: Well-healed RLQ surgical scar.  Positive bowel sounds, soft and nontender, nondistended.  No hepatomegaly. No splenomegaly appreciated.    Extremities:  No joint swelling or tenderness.  No ankle edema.     Skin:  No rash, no petechiae or ecchymoses.    Labs: today's " pending  Results for PIETER GREEN (MRN [id number]) as of 12/1/2017 14:02   Ref. Range 10/12/2017 07:44 10/31/2017 07:48   Sodium Latest Ref Range: 133 - 144 mmol/L 139 137   Potassium Latest Ref Range: 3.4 - 5.3 mmol/L 4.7 4.8   Chloride Latest Ref Range: 94 - 109 mmol/L 108 106   Carbon Dioxide Latest Ref Range: 20 - 32 mmol/L 24 22   Urea Nitrogen Latest Ref Range: 7 - 30 mg/dL 16 26   Creatinine Latest Ref Range: 0.52 - 1.04 mg/dL 1.19 (H) 1.22 (H)   GFR Estimate Latest Ref Range: >60 mL/min/1.7m2 54 (L) 53 (L)   GFR Estimate If Black Latest Ref Range: >60 mL/min/1.7m2 66 64   Calcium Latest Ref Range: 8.5 - 10.1 mg/dL 8.8 8.4 (L)   Anion Gap Latest Ref Range: 3 - 14 mmol/L 7 8   Ferritin Latest Ref Range: 12 - 150 ng/mL 3 (L)    Iron Latest Ref Range: 35 - 180 ug/dL 14 (L)    Iron Binding Cap Latest Ref Range: 240 - 430 ug/dL 423    Iron Saturation Index Latest Ref Range: 15 - 46 % 3 (L)    Glucose Latest Ref Range: 70 - 99 mg/dL 94 92   WBC Latest Ref Range: 4.0 - 11.0 10e9/L  7.6   Hemoglobin Latest Ref Range: 11.7 - 15.7 g/dL 8.8 (L) 8.8 (L)   Hematocrit Latest Ref Range: 35.0 - 47.0 % 29.8 (L) 29.4 (L)   Platelet Count Latest Ref Range: 150 - 450 10e9/L  272   RBC Count Latest Ref Range: 3.8 - 5.2 10e12/L  3.51 (L)   MCV Latest Ref Range: 78 - 100 fl  84   MCH Latest Ref Range: 26.5 - 33.0 pg  25.1 (L)   MCHC Latest Ref Range: 31.5 - 36.5 g/dL  29.9 (L)   RDW Latest Ref Range: 10.0 - 15.0 %  16.0 (H)       Assessment and plan:     This is a 27-year-old woman with clear-cut iron deficiency anemia with symptomatic improvement since receiving intravenous iron. The question remains why she became iron deficient and also if there are other concerning factors. She has light menstrual periods, however with the vegetarian diet it's unlikely that she takes in enough iron to make up for menstrual blood loss. On top of this she also has had some bright red blood per rectum.  It's hard to know much blood she is  actually been losing from the GI tract.  She does have a chronically positive EBV PCR in her blood. I have seen transplant patients who have GI involvement by post transplant lymphoproliferative disorder (PTLD) that is not easily diagnosed. Therefore with her history of GI symptoms, I strongly agree that she should have follow-up with GI and a colonoscopy. There is no other suggestion that she has PTLD since she had no adenopathy on CT scan in July, no lymphadenopathy on exam and does not have any systemic symptoms such as fever or weight loss to suggest lymphoma.    Sometimes patients can become iron deficient from hemolysis, I think that's unlikely in this case, but I will check a haptoglobin just to make sure.    Since she is vegetarian there is also a chance she could be B12 deficient so I will also check that today.    Depending on his labs and decide if she needs routine follow-up with me.  Cassia Nayak MD  Hematology

## 2017-12-01 NOTE — PROGRESS NOTES
"Hematology consult note    Reason for consult: Anemia, referred by     History of present illness:  is a 27-year-old woman status post kidney transplant in December 2014 for IgA nephropathy who is referred for anemia. She says that she has had problems with anemia ever since she was a young teenager. She's been on iron tablets in the past but does not tolerate them because they really bother her stomach. She had a normal hemoglobin after her transplant, but has had a gradual decreased down into the 8s since this summer. She was found to have a ferritin of only 3 on 7/25/17 with a repeat ferritin of 3 on 10/12/17. She was given Injectafer (ferric carboxymaltose) 750 mg IV on 11/3 and 11/10/17. She says she feels significantly better since receiving the IV iron with more energy and ability to do workouts. She says that she has been diagnosed with \"IBS\" several years ago and does have some alternating diarrhea and constipation. However she also has \"flares\" a couple of times a year in which she'll have abdominal pain and some bright red blood per rectum. She denies any melena or maroon stools or hematemesis. Her menstrual periods are fairly regular about once a month and lasts 3-5 days and she says are always very light even with dog birth control pills. She's never had any problems with heavy menses. She has been vegetarian for about 8 years with the only animal protein she eats his fish. She does not tolerate oral iron supplementation because it really bothers her GI tract.    Also since her transplant she has been noted to be EBV positive. It's been checked several times since 12/13/16 and is in the log 3.8-5 range. She says she and her daughter were EBV negative prior to her kidney transplant but she doesn't recall feeling ill at all that would have made anyone suspect that she had acute EBV infection. She had a CT of chest abdomen and pelvis on 7/25/17 which showed no lymphadenopathy. She has " "received recommendation that she should be seen by gastroenterology for colonoscopy. She has been somewhat reluctant to this.    Past history:  1. Status post kidney transplant December 2014 for IgA nephropathy. She was only on dialysis for about a month.  2. Hypertension- resolved with transplant  3. Irritable bowel syndrome?    Medications:  Tacrolimus 1.5 mg b.i.d.  Mycophenolate 250 mg b.i.d.  Magnesium oxide 400 mg daily    Family history: No anemia or autoimmune disorders. Hemorrhoids tend to run in the family.    Social history: She is working full-time in [employer]. She works out regularly. She has vegetarian for about 8 years with animal protein of fish, she does not smoke and was never a smoker, she drinks alcohol occasionally.    Review of Systems:  As in history above. She had ice chewing for years, which is improved since iron infusion. The rest of the >10 point ROS is negative.      PHYSICAL EXAMINATION:  /71  Pulse 65  Temp 98.3  F (36.8  C) (Oral)  Resp 16  Ht 1.575 m (5' 2\")  Wt 62.6 kg (137 lb 14.4 oz)  LMP 11/08/2017  SpO2 95%  BMI 25.22 kg/m2    General appearance:  Patient is 26 yo woman in no acute distress.     HEENT:  No pallor, icterus, or mucositis.  No thyromegaly.   Lymph nodes:  No cervical, supraclavicular, axillary, or inguinal lymphadenopathy.   Lungs:  Clear to auscultation bilaterally.   Heart:  Regular rate and rhythm; no S3 S4 or murmer.     Abdomen: Well-healed RLQ surgical scar.  Positive bowel sounds, soft and nontender, nondistended.  No hepatomegaly. No splenomegaly appreciated.    Extremities:  No joint swelling or tenderness.  No ankle edema.     Skin:  No rash, no petechiae or ecchymoses.    Labs: today's pending  Results for PIETER GREEN (MRN [id number]) as of 12/1/2017 14:02   Ref. Range 10/12/2017 07:44 10/31/2017 07:48   Sodium Latest Ref Range: 133 - 144 mmol/L 139 137   Potassium Latest Ref Range: 3.4 - 5.3 mmol/L 4.7 4.8   Chloride Latest Ref " Range: 94 - 109 mmol/L 108 106   Carbon Dioxide Latest Ref Range: 20 - 32 mmol/L 24 22   Urea Nitrogen Latest Ref Range: 7 - 30 mg/dL 16 26   Creatinine Latest Ref Range: 0.52 - 1.04 mg/dL 1.19 (H) 1.22 (H)   GFR Estimate Latest Ref Range: >60 mL/min/1.7m2 54 (L) 53 (L)   GFR Estimate If Black Latest Ref Range: >60 mL/min/1.7m2 66 64   Calcium Latest Ref Range: 8.5 - 10.1 mg/dL 8.8 8.4 (L)   Anion Gap Latest Ref Range: 3 - 14 mmol/L 7 8   Ferritin Latest Ref Range: 12 - 150 ng/mL 3 (L)    Iron Latest Ref Range: 35 - 180 ug/dL 14 (L)    Iron Binding Cap Latest Ref Range: 240 - 430 ug/dL 423    Iron Saturation Index Latest Ref Range: 15 - 46 % 3 (L)    Glucose Latest Ref Range: 70 - 99 mg/dL 94 92   WBC Latest Ref Range: 4.0 - 11.0 10e9/L  7.6   Hemoglobin Latest Ref Range: 11.7 - 15.7 g/dL 8.8 (L) 8.8 (L)   Hematocrit Latest Ref Range: 35.0 - 47.0 % 29.8 (L) 29.4 (L)   Platelet Count Latest Ref Range: 150 - 450 10e9/L  272   RBC Count Latest Ref Range: 3.8 - 5.2 10e12/L  3.51 (L)   MCV Latest Ref Range: 78 - 100 fl  84   MCH Latest Ref Range: 26.5 - 33.0 pg  25.1 (L)   MCHC Latest Ref Range: 31.5 - 36.5 g/dL  29.9 (L)   RDW Latest Ref Range: 10.0 - 15.0 %  16.0 (H)       Assessment and plan:     This is a 27-year-old woman with clear-cut iron deficiency anemia with symptomatic improvement since receiving intravenous iron. The question remains why she became iron deficient and also if there are other concerning factors. She has light menstrual periods, however with the vegetarian diet it's unlikely that she takes in enough iron to make up for menstrual blood loss. On top of this she also has had some bright red blood per rectum.  It's hard to know much blood she is actually been losing from the GI tract.  She does have a chronically positive EBV PCR in her blood. I have seen transplant patients who have GI involvement by post transplant lymphoproliferative disorder (PTLD) that is not easily diagnosed. Therefore with  her history of GI symptoms, I strongly agree that she should have follow-up with GI and a colonoscopy. There is no other suggestion that she has PTLD since she had no adenopathy on CT scan in July, no lymphadenopathy on exam and does not have any systemic symptoms such as fever or weight loss to suggest lymphoma.    Sometimes patients can become iron deficient from hemolysis, I think that's unlikely in this case, but I will check a haptoglobin just to make sure.    Since she is vegetarian there is also a chance she could be B12 deficient so I will also check that today.    Depending on his labs and decide if she needs routine follow-up with me.  Cassia Nayak MD  Hematology    Addendum: The B12 level is well below 300, so She may be B12 deficient. The hemoglobin is improved, and  ferritin has bumped up with the IV iron. The normal haptoglobin goes against hemolysis. EBV is about the same.  I recommend she start B12 pills 1000 mg po daily. Keep f/u with GI.     Labs:  Results for PIETER GREEN (MRN [id number]) as of 12/4/2017 16:40   Ref. Range 12/1/2017 14:17   Sodium Latest Ref Range: 133 - 144 mmol/L 136   Potassium Latest Ref Range: 3.4 - 5.3 mmol/L 3.9   Chloride Latest Ref Range: 94 - 109 mmol/L 105   Carbon Dioxide Latest Ref Range: 20 - 32 mmol/L 22   Urea Nitrogen Latest Ref Range: 7 - 30 mg/dL 16   Creatinine Latest Ref Range: 0.52 - 1.04 mg/dL 0.98   GFR Estimate Latest Ref Range: >60 mL/min/1.7m2 68   GFR Estimate If Black Latest Ref Range: >60 mL/min/1.7m2 83   Calcium Latest Ref Range: 8.5 - 10.1 mg/dL 9.0   Anion Gap Latest Ref Range: 3 - 14 mmol/L 9   Ferritin Latest Ref Range: 12 - 150 ng/mL 277 (H)   Iron Latest Ref Range: 35 - 180 ug/dL 112   Iron Binding Cap Latest Ref Range: 240 - 430 ug/dL 274   Iron Saturation Index Latest Ref Range: 15 - 46 % 41   Vitamin B12 Latest Ref Range: 193 - 986 pg/mL 236   Glucose Latest Ref Range: 70 - 99 mg/dL 87   WBC Latest Ref Range: 4.0 - 11.0 10e9/L 6.3    Hemoglobin Latest Ref Range: 11.7 - 15.7 g/dL 10.4 (L)   Hematocrit Latest Ref Range: 35.0 - 47.0 % 34.0 (L)   Platelet Count Latest Ref Range: 150 - 450 10e9/L 234   RBC Count Latest Ref Range: 3.8 - 5.2 10e12/L 3.64 (L)   MCV Latest Ref Range: 78 - 100 fl 93   MCH Latest Ref Range: 26.5 - 33.0 pg 28.6   MCHC Latest Ref Range: 31.5 - 36.5 g/dL 30.6 (L)   RDW Latest Ref Range: 10.0 - 15.0 % 21.2 (H)   Diff Method Unknown Automated Method   % Neutrophils Latest Units: % 64.7   % Lymphocytes Latest Units: % 20.0   % Monocytes Latest Units: % 6.8   % Eosinophils Latest Units: % 7.2   % Basophils Latest Units: % 1.0   % Immature Granulocytes Latest Units: % 0.3   Nucleated RBCs Latest Ref Range: 0 /100 0   Absolute Neutrophil Latest Ref Range: 1.6 - 8.3 10e9/L 4.1   Absolute Lymphocytes Latest Ref Range: 0.8 - 5.3 10e9/L 1.3   Absolute Monocytes Latest Ref Range: 0.0 - 1.3 10e9/L 0.4   Absolute Eosinophils Latest Ref Range: 0.0 - 0.7 10e9/L 0.5   Absolute Basophils Latest Ref Range: 0.0 - 0.2 10e9/L 0.1   Abs Immature Granulocytes Latest Ref Range: 0 - 0.4 10e9/L 0.0   Absolute Nucleated RBC Unknown 0.0   % Retic Latest Ref Range: 0.5 - 2.0 % 1.6   Absolute Retic Latest Ref Range: 25 - 95 10e9/L 59.3   EBV DNA Copies/mL Latest Ref Range: EBVNEG^EBV DNA Not Detected Copies/mL [id number] ([id number])   EBV DNA Log of Copies Latest Ref Range: <2.7 Log_copies/mL 5.1 (H)   Haptoglobin Latest Ref Range: 15 - 200 mg/dL 123

## 2017-12-01 NOTE — MR AVS SNAPSHOT
After Visit Summary   12/1/2017    Caity Horan    MRN: [id number]           Patient Information     Date Of Birth          1990        Visit Information        Provider Department      12/1/2017 1:00 PM Cassia Nayak MD [facility]        Today's Diagnoses     Kidney replaced by transplant        Iron deficiency anemia, unspecified iron deficiency anemia type        EBV (Nicole-Barr virus) viremia           Follow-ups after your visit        Follow-up notes from your care team     Return if symptoms worsen or fail to improve.      Your next 10 appointments already scheduled     Jan 26, 2018  2:30 PM CST   (Arrive by 2:15 PM)   Return Visit with Raquel Mensah MD   [facility] and Infectious Diseases ([facility])    [address] [place] [id number] [phone]            Feb 27, 2018  3:35 PM CST   (Arrive by 3:05 PM)   Return Kidney Transplant with Otilio Mar MD   [facility] Nephrology ([facility])    [address] [place] [address] [phone]              Who to contact     If you have questions or need follow up information about today's clinic visit or your schedule please contact [facility] directly at [phone].  Normal or non-critical lab and imaging results will be communicated to you by MyChart, letter or phone within 4 business days after the clinic has received the results. If you do not hear from us within 7 days, please contact the clinic through MyChart or phone. If you have a critical or abnormal lab result, we will notify you by phone as soon as possible.  Submit refill requests through Attract[employer] or call your pharmacy and they will forward the refill request to us. Please allow 3 business days for your refill to be completed.          Additional Information About Your Visit        MyChart Information  "    [employer] gives you secure access to your electronic health record. If you see a primary care provider, you can also send messages to your care team and make appointments. If you have questions, please call your primary care clinic.  If you do not have a primary care provider, please call [phone] and they will assist you.        Care EveryWhere ID     This is your Care EveryWhere ID. This could be used by other organizations to access your [facility] medical records  [id number]        Your Vitals Were     Pulse Temperature Respirations Height Last Period Pulse Oximetry    65 98.3  F (36.8  C) (Oral) 16 1.575 m (5' 2\") 11/08/2017 95%    BMI (Body Mass Index)                   25.22 kg/m2            Blood Pressure from Last 3 Encounters:   12/01/17 109/71   11/10/17 109/78   11/03/17 98/44    Weight from Last 3 Encounters:   12/01/17 62.6 kg (137 lb 14.4 oz)   11/01/17 63.1 kg (139 lb 1.6 oz)   10/03/17 64.7 kg (142 lb 9.6 oz)               Primary Care Provider Office Phone # Fax #    Ritu CHLOE Little [phone]       [address]  [address] [address]        Equal Access to Services     AUNDREA QUACH AH: Hadii aad ku hadasho Soomaali, waaxda luqadaha, qaybta kaalmada adeegyada, waxay idiin hayaan adelalo kharaned la'jordon . So [facility] [phone].    ATENCIÓN: Si habla español, tiene a carreon disposición servicios gratuitos de asistencia lingüística. Llame al [phone].    We comply with applicable federal civil rights laws and [place] laws. We do not discriminate on the basis of race, color, national origin, age, disability, sex, sexual orientation, or gender identity.            Thank you!     Thank you for choosing [facility]  for your care. Our goal is always to provide you with excellent care. Hearing back from our patients is one way we can continue to improve our services. Please take a few minutes to complete the written survey that you may " receive in the mail after your visit with us. Thank you!             Your Updated Medication List - Protect others around you: Learn how to safely use, store and throw away your medicines at www.disposemymeds.org.          This list is accurate as of: 12/1/17  2:11 PM.  Always use your most recent med list.                   Brand Name Dispense Instructions for use Diagnosis    magnesium oxide 400 MG tablet    MAG-OX    60 tablet    Take 1 tablet (400 mg) by mouth daily    Hypomagnesemia, Status post kidney transplant, Kidney replaced by transplant, Kidney transplanted       mycophenolate 250 MG capsule    GENERIC EQUIVALENT    120 capsule    Take 1 capsule (250 mg) by mouth 2 times daily    Kidney transplanted, Kidney replaced by transplant, Status post kidney transplant       tacrolimus 0.5 MG capsule    GENERIC EQUIVALENT    180 capsule    Take 3 capsules (1.5 mg) by mouth 2 times daily    Kidney replaced by transplant, Status post kidney transplant, Hypomagnesemia, Kidney transplanted

## 2017-12-03 LAB
EBV DNA # SPEC NAA+PROBE: ABNORMAL {COPIES}/ML
EBV DNA SPEC NAA+PROBE-LOG#: 5.1 {LOG_COPIES}/ML

## 2017-12-04 LAB — HAPTOGLOB SERPL-MCNC: 123 MG/DL (ref 15–200)

## 2017-12-07 ENCOUNTER — TELEPHONE (OUTPATIENT)
Dept: PHARMACY | Facility: CLINIC | Age: 27
End: 2017-12-07

## 2017-12-08 NOTE — TELEPHONE ENCOUNTER
Anemia Management Note  SUBJECTIVE/OBJECTIVE:  Referred by Dr. Otilio Mar on 10/6/2017  Primary Diagnosis: Anemia in Chronic Kidney Disease (N18.3, D63.1)     Secondary Diagnosis:  Chronic Kidney Disease, Stage 3 (N18.3)   Hgb goal range:  9-10  Epo/Darbo: None  Iron regimen:  Does not tolerate oral iron    Anemia Latest Ref Rng & Units 9/29/2017 10/12/2017 10/31/2017 11/3/2017 11/9/2017 11/10/2017 12/1/2017   Hemoglobin 11.7 - 15.7 g/dL 8.9(L) 8.8(L) 8.8(L) - 8.9(L) - 10.4(L)   IV Iron Dose - - - - 750mg - 750mg -   TSAT 15 - 46 % - 3(L) - - - - 41   Ferritin 12 - 150 ng/mL - 3(L) - - - - 277(H)     BP Readings from Last 3 Encounters:   12/01/17 109/71   11/10/17 109/78   11/03/17 98/44     Wt Readings from Last 2 Encounters:   12/01/17 137 lb 14.4 oz (62.6 kg)   11/01/17 139 lb 1.6 oz (63.1 kg)     Labs were drawn 3 weeks after final IV iron infusion. Is this too soon for Anemic Clinic purposes    ASSESSMENT:  Hgb: at goal - continue to monitor - Nice improvement  TSat: at goal >30% Ferritin: At goal (>100ng/mL)    PLAN:  RTC for Hgb, ferritin and iron labs in 4 week(s) unless directed otherwise by Alesha Luke/: ALEXEY for Caity to call back. Recommend labs in 4 weeks as indicated in plan. -tatyana    Orders needed to be renewed (for next follow-up date) in EPIC: None    Iron labs due:  12/29/17    Plan discussed with:  No call made yet  Plan provided by:  Amaris    NEXT FOLLOW-UP DATE: 1/2    Anemia Management Service  Alesha Whitehead,PharmD and [facility] CPhT  Phone: [phone]  Fax: [phone]

## 2017-12-24 ENCOUNTER — HEALTH MAINTENANCE LETTER (OUTPATIENT)
Age: 27
End: 2017-12-24

## 2018-01-02 ENCOUNTER — TELEPHONE (OUTPATIENT)
Dept: PHARMACY | Facility: CLINIC | Age: 28
End: 2018-01-02

## 2018-01-03 NOTE — TELEPHONE ENCOUNTER
Follow-up with anemia management service:    LM for Caity reminding her that she is due for Hgb, ferritin and iron labs     Anemia Latest Ref Rng & Units 2017 10/12/2017 10/31/2017 11/3/2017 2017 11/10/2017 2017   Hemoglobin 11.7 - 15.7 g/dL 8.9(L) 8.8(L) 8.8(L) - 8.9(L) - 10.4(L)   IV Iron Dose - - - - 750mg - 750mg -   TSAT 15 - 46 % - 3(L) - - - - 41   Ferritin 12 - 150 ng/mL - 3(L) - - - - 277(H)     Orders needed to be renewed (for next follow-up date) in EPIC: None                          Lab orders : 10/5/18    Follow-up call date: 18    Amaris    Anemia Management Service  Alesha Whitehead,Dionna and Princess Har[facility] CPhT  Phone: [phone]  Fax: [phone]

## 2018-01-09 ENCOUNTER — TELEPHONE (OUTPATIENT)
Dept: PHARMACY | Facility: CLINIC | Age: 28
End: 2018-01-09

## 2018-01-09 NOTE — TELEPHONE ENCOUNTER
Follow-up with anemia management service:    LM for Caity reminding her that she is due for Hgb, ferritin and iron labs - 2nd attempt     Anemia Latest Ref Rng & Units 2017 10/12/2017 10/31/2017 11/3/2017 2017 11/10/2017 2017   Hemoglobin 11.7 - 15.7 g/dL 8.9(L) 8.8(L) 8.8(L) - 8.9(L) - 10.4(L)   IV Iron Dose - - - - 750mg - 750mg -   TSAT 15 - 46 % - 3(L) - - - - 41   Ferritin 12 - 150 ng/mL - 3(L) - - - - 277(H)       Orders needed to be renewed (for next follow-up date) in EPIC: None                          Lab orders : 10/5/18    Follow-up call date: 18    Amaris    Anemia Management Service  Alesha Whitehead,Dionna and [facility] CPhT  Phone: [phone]  Fax: [phone]

## 2018-01-12 ENCOUNTER — MYC MEDICAL ADVICE (OUTPATIENT)
Dept: INFECTIOUS DISEASES | Facility: CLINIC | Age: 28
End: 2018-01-12

## 2018-01-12 DIAGNOSIS — D50.0 IRON DEFICIENCY ANEMIA DUE TO CHRONIC BLOOD LOSS: ICD-10-CM

## 2018-01-12 DIAGNOSIS — R19.8 GASTROINTESTINAL SYMPTOM: Primary | ICD-10-CM

## 2018-01-16 ENCOUNTER — MYC MEDICAL ADVICE (OUTPATIENT)
Dept: INFECTIOUS DISEASES | Facility: CLINIC | Age: 28
End: 2018-01-16

## 2018-01-17 ENCOUNTER — TELEPHONE (OUTPATIENT)
Dept: PHARMACY | Facility: CLINIC | Age: 28
End: 2018-01-17

## 2018-01-17 NOTE — TELEPHONE ENCOUNTER
Follow-up with anemia management service:    I spoke to  Caity reminding her that she is due for Hgb, ferritin and iron labs - she will schedule an appt.  Patient has an appt w/Dr Mar on 18    Anemia Latest Ref Rng & Units 2017 10/12/2017 10/31/2017 11/3/2017 2017 11/10/2017 2017   Hemoglobin 11.7 - 15.7 g/dL 8.9(L) 8.8(L) 8.8(L) - 8.9(L) - 10.4(L)   IV Iron Dose - - - - 750mg - 750mg -   TSAT 15 - 46 % - 3(L) - - - - 41   Ferritin 12 - 150 ng/mL - 3(L) - - - - 277(H)       Orders needed to be renewed (for next follow-up date) in EPIC: None   Med order expires:    Lab orders : 10/6/18    Follow-up call date: 18    Amaris    Anemia Management Service  Abdia Song,PharmD and [facility] CPhT  Phone: [phone]  Fax: [phone]

## 2018-01-24 ENCOUNTER — TELEPHONE (OUTPATIENT)
Dept: PHARMACY | Facility: CLINIC | Age: 28
End: 2018-01-24

## 2018-01-25 NOTE — TELEPHONE ENCOUNTER
Follow-up with anemia management service:    I left another message for Caity, reminding her that she is due for Hgb, ferritin and iron labs - she will schedule an appt.  Patient has an appt w/Dr Mar on 18    Anemia Latest Ref Rng & Units 2017 10/12/2017 10/31/2017 11/3/2017 2017 11/10/2017 2017   Hemoglobin 11.7 - 15.7 g/dL 8.9(L) 8.8(L) 8.8(L) - 8.9(L) - 10.4(L)   IV Iron Dose - - - - 750mg - 750mg -   TSAT 15 - 46 % - 3(L) - - - - 41   Ferritin 12 - 150 ng/mL - 3(L) - - - - 277(H)       Orders needed to be renewed (for next follow-up date) in EPIC: None                          Med order expires:                           Lab orders : 10/6/18  Follow-up call date: 18  Reviewed 2018 MAJ Glez    Anemia Management Service  Abida Song,PharmD and [facility] CPhT  Phone: [phone]  Fax: [phone]

## 2018-02-01 ENCOUNTER — TELEPHONE (OUTPATIENT)
Dept: PHARMACY | Facility: CLINIC | Age: 28
End: 2018-02-01

## 2018-02-01 ENCOUNTER — DOCUMENTATION ONLY (OUTPATIENT)
Dept: TRANSPLANT | Facility: CLINIC | Age: 28
End: 2018-02-01

## 2018-02-01 DIAGNOSIS — N18.30 ANEMIA IN STAGE 3 CHRONIC KIDNEY DISEASE (H): ICD-10-CM

## 2018-02-01 DIAGNOSIS — N18.30 CHRONIC KIDNEY DISEASE, STAGE 3 (MODERATE): ICD-10-CM

## 2018-02-01 DIAGNOSIS — Z48.298 AFTERCARE FOLLOWING ORGAN TRANSPLANT: ICD-10-CM

## 2018-02-01 DIAGNOSIS — D63.1 ANEMIA IN STAGE 3 CHRONIC KIDNEY DISEASE (H): ICD-10-CM

## 2018-02-01 DIAGNOSIS — Z79.899 ENCOUNTER FOR LONG-TERM CURRENT USE OF MEDICATION: ICD-10-CM

## 2018-02-01 DIAGNOSIS — Z94.0 KIDNEY REPLACED BY TRANSPLANT: ICD-10-CM

## 2018-02-01 LAB
ANION GAP SERPL CALCULATED.3IONS-SCNC: 7 MMOL/L (ref 3–14)
BUN SERPL-MCNC: 17 MG/DL (ref 7–30)
CALCIUM SERPL-MCNC: 8.3 MG/DL (ref 8.5–10.1)
CHLORIDE SERPL-SCNC: 108 MMOL/L (ref 94–109)
CO2 SERPL-SCNC: 26 MMOL/L (ref 20–32)
CREAT SERPL-MCNC: 1.04 MG/DL (ref 0.52–1.04)
CREAT UR-MCNC: 151 MG/DL
ERYTHROCYTE [DISTWIDTH] IN BLOOD BY AUTOMATED COUNT: 13.6 % (ref 10–15)
FERRITIN SERPL-MCNC: 14 NG/ML (ref 12–150)
GFR SERPL CREATININE-BSD FRML MDRD: 63 ML/MIN/1.7M2
GLUCOSE SERPL-MCNC: 103 MG/DL (ref 70–99)
HCT VFR BLD AUTO: 30.6 % (ref 35–47)
HGB BLD-MCNC: 9.5 G/DL (ref 11.7–15.7)
IRON SATN MFR SERPL: 12 % (ref 15–46)
IRON SERPL-MCNC: 36 UG/DL (ref 35–180)
MCH RBC QN AUTO: 30.6 PG (ref 26.5–33)
MCHC RBC AUTO-ENTMCNC: 31 G/DL (ref 31.5–36.5)
MCV RBC AUTO: 99 FL (ref 78–100)
PLATELET # BLD AUTO: 323 10E9/L (ref 150–450)
POTASSIUM SERPL-SCNC: 4.4 MMOL/L (ref 3.4–5.3)
PROT UR-MCNC: 0.08 G/L
PROT/CREAT 24H UR: 0.05 G/G CR (ref 0–0.2)
RBC # BLD AUTO: 3.1 10E12/L (ref 3.8–5.2)
SODIUM SERPL-SCNC: 140 MMOL/L (ref 133–144)
TACROLIMUS BLD-MCNC: 6.1 UG/L (ref 5–15)
TIBC SERPL-MCNC: 291 UG/DL (ref 240–430)
TME LAST DOSE: NORMAL H
WBC # BLD AUTO: 9.8 10E9/L (ref 4–11)

## 2018-02-01 NOTE — PROGRESS NOTES
Tacrolimus = 6.1, above goal.  Tacrolimus goal 3-5, closer to 3 (EBV)  Previous levels in the 3's and 4's.    Last Tac dose was taken at 930PM and labs drawn at 7AM.   Not a good 12 hr trough. No dose change at this time. Will continue to monitor.

## 2018-02-02 ENCOUNTER — TELEPHONE (OUTPATIENT)
Dept: PHARMACY | Facility: CLINIC | Age: 28
End: 2018-02-02

## 2018-02-02 LAB
BKV DNA # SPEC NAA+PROBE: NORMAL COPIES/ML
BKV DNA SPEC NAA+PROBE-LOG#: NORMAL LOG COPIES/ML
EBV DNA # SPEC NAA+PROBE: NORMAL {COPIES}/ML
EBV DNA SPEC NAA+PROBE-LOG#: NORMAL {LOG_COPIES}/ML
SPECIMEN SOURCE: NORMAL

## 2018-02-02 NOTE — TELEPHONE ENCOUNTER
Called with pricing for different iron formulations used with anemia service, so Caity can make an informed choice based on concerns with cost.    Feraheme, Venofer and Injectafer options given.    Abida Song, PharmD, [facility]  Anemia Management Service  Phone: [phone]  Fax: [phone]

## 2018-02-02 NOTE — TELEPHONE ENCOUNTER
Anemia Management Note  SUBJECTIVE/OBJECTIVE:  Referred by Dr. Otilio Mar on 10/6/2017  Primary Diagnosis: Anemia in Chronic Kidney Disease (N18.3, D63.1)     Secondary Diagnosis:  Chronic Kidney Disease, Stage 3 (N18.3)   Hgb goal range:  9-10  Epo/Darbo: None  Iron regimen:  Does not tolerate oral iron    Anemia Latest Ref Rng & Units 10/12/2017 10/31/2017 11/3/2017 11/9/2017 11/10/2017 12/1/2017 2/1/2018   Hemoglobin 11.7 - 15.7 g/dL 8.8(L) 8.8(L) - 8.9(L) - 10.4(L) 9.5(L)   IV Iron Dose - - - 750mg - 750mg - -   TSAT 15 - 46 % 3(L) - - - - 41 12(L)   Ferritin 12 - 150 ng/mL 3(L) - - - - 277(H) 14     BP Readings from Last 3 Encounters:   12/01/17 109/71   11/10/17 109/78   11/03/17 98/44     Wt Readings from Last 2 Encounters:   12/01/17 137 lb 14.4 oz (62.6 kg)   11/01/17 139 lb 1.6 oz (63.1 kg)       ASSESSMENT:  Hgb:at goal - continue to monitor  TSat: not at goal of >30% Ferritin: Not at goal of (>100ng/mL)    PLAN:  RTC for Hgb, ferritin and iron labs in 4 week(s)  Referred to Abida to determine if additional iron therapy is needed  IV iron recommended.  Caity would like to do it, but last course was not well covered by insurance.  email sent to Chris's group to ask if they can check for a preferred option with better coverage.  Offered options for iron, Caity chooses to do Injectafer since knows tolerates well. EDUARDO  Orders needed to be renewed (for next follow-up date) in EPIC: None    Iron labs due:  3/1/18    Plan discussed with:  Caity 02/02/2018 EDUARDO  Plan provided by:  Amaris  Reviewed 02/02/2018 EDUARDO  NEXT FOLLOW-UP DATE:  02/06/2018 to chart if IV iron given    Anemia Management Service  Abida Song,PharmD and [facility] CPhT  Phone: [phone]  Fax: [phone]

## 2018-02-07 ENCOUNTER — TELEPHONE (OUTPATIENT)
Dept: PHARMACY | Facility: CLINIC | Age: 28
End: 2018-02-07

## 2018-02-07 NOTE — TELEPHONE ENCOUNTER
Follow-up with anemia management service:    Scheduled for IV iron on 2/22/18 and 3/1/18    Anemia Latest Ref Rng & Units 10/12/2017 10/31/2017 11/3/2017 11/9/2017 11/10/2017 12/1/2017 2/1/2018   Hemoglobin 11.7 - 15.7 g/dL 8.8(L) 8.8(L) - 8.9(L) - 10.4(L) 9.5(L)   IV Iron Dose - - - 750mg - 750mg - -   TSAT 15 - 46 % 3(L) - - - - 41 12(L)   Ferritin 12 - 150 ng/mL 3(L) - - - - 277(H) 14       Orders needed to be renewed (for next follow-up date) in EPIC: None - IV iron has been ordered   Follow-up call date: 2/23 to document dose      Anemia Management Service  Abida Song,PharmD and Princess [facility] CPhT  Phone: [phone]  Fax: [phone]

## 2018-02-20 NOTE — TELEPHONE ENCOUNTER
APPT INFO    Date /Time: 2/22/18 8AM    Reason for Appt: Per per, DX: Iron deficiency anemia // refered from Dr. Raquel Mensah // All records within [facility]. RS surekha Sparks in tx office   Ref Provider/Clinic: [facility]    Are there internal records? Yes/No?  IF YES, list clinic names: [facility] and Infectious Diseases Rodrick BATES (referring)   Nephrology Zaid BATES / Images in PACS   [facility] Nael BATES   [facility]    Are there outside records? Yes/No? No   Patient Contact (Y/N) & Call Details: No, pt was referred.    Action: Closing encounter      OUTSIDE RECORDS CHECKLIST     CLINIC NAME COMMENTS REC (x) IMG (x)   [facility]  Recs scanned in [facility] 10/2014  X X   [facility]  Recs scanned in Epic 4/2012 X None

## 2018-02-21 ENCOUNTER — TELEPHONE (OUTPATIENT)
Dept: GASTROENTEROLOGY | Facility: CLINIC | Age: 28
End: 2018-02-21

## 2018-02-22 ENCOUNTER — OFFICE VISIT (OUTPATIENT)
Dept: GASTROENTEROLOGY | Facility: CLINIC | Age: 28
End: 2018-02-22
Payer: COMMERCIAL

## 2018-02-22 ENCOUNTER — PRE VISIT (OUTPATIENT)
Dept: GASTROENTEROLOGY | Facility: CLINIC | Age: 28
End: 2018-02-22

## 2018-02-22 ENCOUNTER — INFUSION THERAPY VISIT (OUTPATIENT)
Dept: INFUSION THERAPY | Facility: CLINIC | Age: 28
End: 2018-02-22
Attending: INTERNAL MEDICINE
Payer: COMMERCIAL

## 2018-02-22 VITALS
HEART RATE: 99 BPM | TEMPERATURE: 98.6 F | RESPIRATION RATE: 16 BRPM | DIASTOLIC BLOOD PRESSURE: 60 MMHG | SYSTOLIC BLOOD PRESSURE: 100 MMHG | OXYGEN SATURATION: 100 %

## 2018-02-22 VITALS
TEMPERATURE: 98.6 F | HEART RATE: 97 BPM | HEIGHT: 63 IN | OXYGEN SATURATION: 99 % | BODY MASS INDEX: 23.04 KG/M2 | SYSTOLIC BLOOD PRESSURE: 106 MMHG | DIASTOLIC BLOOD PRESSURE: 66 MMHG | WEIGHT: 130 LBS

## 2018-02-22 DIAGNOSIS — D50.9 IRON DEFICIENCY ANEMIA, UNSPECIFIED IRON DEFICIENCY ANEMIA TYPE: Primary | ICD-10-CM

## 2018-02-22 DIAGNOSIS — K62.5 RECTAL BLEEDING: ICD-10-CM

## 2018-02-22 DIAGNOSIS — K62.5 RECTAL BLEEDING: Primary | ICD-10-CM

## 2018-02-22 LAB
CRP SERPL-MCNC: 11.6 MG/L (ref 0–8)
ERYTHROCYTE [SEDIMENTATION RATE] IN BLOOD BY WESTERGREN METHOD: 104 MM/H (ref 0–20)

## 2018-02-22 PROCEDURE — 25000128 H RX IP 250 OP 636: Mod: ZF | Performed by: INTERNAL MEDICINE

## 2018-02-22 PROCEDURE — 36415 COLL VENOUS BLD VENIPUNCTURE: CPT

## 2018-02-22 PROCEDURE — 85652 RBC SED RATE AUTOMATED: CPT | Performed by: PHYSICIAN ASSISTANT

## 2018-02-22 PROCEDURE — 96374 THER/PROPH/DIAG INJ IV PUSH: CPT

## 2018-02-22 PROCEDURE — 86140 C-REACTIVE PROTEIN: CPT | Performed by: PHYSICIAN ASSISTANT

## 2018-02-22 RX ADMIN — FERRIC CARBOXYMALTOSE INJECTION 750 MG: 50 INJECTION, SOLUTION INTRAVENOUS at 09:33

## 2018-02-22 NOTE — MR AVS SNAPSHOT
After Visit Summary   2/22/2018    Caity Horan    MRN: [id number]           Patient Information     Date Of Birth          1990        Visit Information        Provider Department      2/22/2018 9:00 AM DENG 51 ATC; DENG SPEC INFUSION [facility] Specialty and Procedure        Today's Diagnoses     Iron deficiency anemia, unspecified iron deficiency anemia type    -  1      Care Instructions      Ferric carboxymaltose Solution for injection  What is this medicine?  FERRIC CARBOXYMALTOSE (ferr-ik car-box-ee-mol-toes) is an iron complex. Iron is used to make healthy red blood cells, which carry oxygen and nutrients throughout the body. This medicine is used to treat anemia in people with chronic kidney disease or people who cannot take iron by mouth.  This medicine may be used for other purposes; ask your health care provider or pharmacist if you have questions.  What should I tell my health care provider before I take this medicine?  They need to know if you have any of these conditions:    anemia not caused by low iron levels    high levels of iron in the blood    liver disease    an unusual or allergic reaction to iron, other medicines, foods, dyes, or preservatives    pregnant or trying to get pregnant    breast-feeding  How should I use this medicine?  This medicine is for infusion into a vein. It is given by a health care professional in a hospital or clinic setting.  Talk to your pediatrician regarding the use of this medicine in children. Special care may be needed.  Overdosage: If you think you've taken too much of this medicine contact a poison control center or emergency room at once.  NOTE: This medicine is only for you. Do not share this medicine with others.  What if I miss a dose?  It is important not to miss your dose. Call your doctor or health care professional if you are unable to keep an appointment.  What may interact with this medicine?  Do not take this  medicine with any of the following medications:  deferoxamine  dimercaprol  other iron products  This medicine may also interact with the following medications:  chloramphenicol  deferasirox  This list may not describe all possible interactions. Give your health care provider a list of all the medicines, herbs, non-prescription drugs, or dietary supplements you use. Also tell them if you smoke, drink alcohol, or use illegal drugs. Some items may interact with your medicine.  What should I watch for while using this medicine?  Visit your doctor or health care professional regularly. Tell your doctor if your symptoms do not start to get better or if they get worse. You may need blood work done while you are taking this medicine.  You may need to follow a special diet. Talk to your doctor. Foods that contain iron include: whole grains/cereals, dried fruits, beans, or peas, leafy green vegetables, and organ meats (liver, kidney).  What side effects may I notice from receiving this medicine?  Side effects that you should report to your doctor or health care professional as soon as possible:  allergic reactions like skin rash, itching or hives, swelling of the face, lips, or tonguebreathing problems  changes in blood pressure  feeling faint or lightheaded, falls  flushing, sweating, or hot feelings  Side effects that usually do not require medical attention (Report these to your doctor or health care professional if they continue or are bothersome.):  changes in taste  constipation  dizziness  headache  nausea  pain, redness, or irritation at site where injected  vomiting  This list may not describe all possible side effects. Call your doctor for medical advice about side effects. You may report side effects to FDA at 7-180-UWT-4564.  Where should I keep my medicine?  This drug is given in a hospital or clinic and will not be stored at home.  NOTE: This sheet is a summary. It may not cover all possible information. If you  have questions about this medicine, talk to your doctor, pharmacist, or health care provider.  NOTE:This sheet is a summary. It may not cover all possible information. If you have questions about this medicine, talk to your doctor, pharmacist, or health care provider. Copyright  2016 Gold Standard                Follow-ups after your visit        Your next 10 appointments already scheduled     Feb 22, 2018  9:00 AM CST   Infusion 120 with UC SPEC INFUSION, UC 51 ATC   [facility] Specialty and Procedure ([facility])    [address]  [address]  [facility] [id number] [phone]            Feb 27, 2018  3:35 PM CST   (Arrive by 3:05 PM)   Return Kidney Transplant with Otilio Mar MD   [facility] Nephrology ([facility])    [address] [place] [address]  [address]  [facility] [id number] [phone]            Mar 01, 2018  2:00 PM CST   Infusion 120 with UC SPEC INFUSION, UC 41 ATC   [facility] Specialty and Procedure ([facility])    [address]  [address]  [facility] [id number] [phone]            Mar 22, 2018  8:40 AM CDT   (Arrive by 8:25 AM)   New Patient Visit with Rupert Dsouza PA-C   [facility] Gastroenterology and IBD Clinic ([facility])    [address] [place] [address]  4th Floor  [facility] [id number] [phone]              Future tests that were ordered for you today     Open Future Orders        Priority Expected Expires Ordered    CRP inflammation Routine  2/22/2019 2/22/2018    Erythrocyte sedimentation rate auto Routine 2/22/2018 3/24/2018 2/22/2018            Who to contact     If you have questions or need follow up information about today's clinic visit or your schedule please contact [facility] SPECIALTY AND PROCEDURE directly at [phone].  Normal or non-critical lab and imaging results  will be communicated to you by MyChart, letter or phone within 4 business days after the clinic has received the results. If you do not hear from us within 7 days, please contact the clinic through [employer] or phone. If you have a critical or abnormal lab result, we will notify you by phone as soon as possible.  Submit refill requests through [employer] or call your pharmacy and they will forward the refill request to us. Please allow 3 business days for your refill to be completed.          Additional Information About Your Visit        [employer]o Information     [employer] gives you secure access to your electronic health record. If you see a primary care provider, you can also send messages to your care team and make appointments. If you have questions, please call your primary care clinic.  If you do not have a primary care provider, please call [phone] and they will assist you.        Care EveryWhere ID     This is your Care EveryWhere ID. This could be used by other organizations to access your [facility] medical records  [id number]         Blood Pressure from Last 3 Encounters:   02/22/18 106/66   12/01/17 109/71   11/10/17 109/78    Weight from Last 3 Encounters:   02/22/18 59 kg (130 lb)   12/01/17 62.6 kg (137 lb 14.4 oz)   11/01/17 63.1 kg (139 lb 1.6 oz)              Today, you had the following     No orders found for display       Primary Care Provider Office Phone # Fax #    Ritu CHLOE Little [phone]       [address]  [address] [address]        Equal Access to Services     [facility] AH: Hadii aad ku hadasho Soomaali, waaxda luqadaha, qaybta kaalmada adeegyada, waxsissy lee . So [facility] [phone].    ATENCIÓN: Si habla español, tiene a carreno disposición servicios gratuitos de asistencia lingüística. Llame al [phone].    We comply with applicable federal civil rights laws and [place] laws. We do not discriminate on the  basis of race, color, national origin, age, disability, sex, sexual orientation, or gender identity.            Thank you!     Thank you for choosing [facility] SPECIALTY AND PROCEDURE  for your care. Our goal is always to provide you with excellent care. Hearing back from our patients is one way we can continue to improve our services. Please take a few minutes to complete the written survey that you may receive in the mail after your visit with us. Thank you!             Your Updated Medication List - Protect others around you: Learn how to safely use, store and throw away your medicines at www.disposemymeds.org.          This list is accurate as of 2/22/18  8:55 AM.  Always use your most recent med list.                   Brand Name Dispense Instructions for use Diagnosis    B-12 1000 MCG Tbcr     100 tablet    Take 1,000 mcg by mouth daily    B12 deficiency due to diet       magnesium oxide 400 MG tablet    MAG-OX    90 tablet    Take 1 tablet (400 mg) by mouth daily    Hypomagnesemia, Status post kidney transplant, Kidney replaced by transplant, Kidney transplanted       mycophenolate 250 MG capsule    GENERIC EQUIVALENT    120 capsule    Take 1 capsule (250 mg) by mouth 2 times daily    Kidney transplanted, Kidney replaced by transplant, Status post kidney transplant       tacrolimus 0.5 MG capsule    GENERIC EQUIVALENT    180 capsule    Take 3 capsules (1.5 mg) by mouth 2 times daily    Kidney replaced by transplant, Status post kidney transplant, Hypomagnesemia, Kidney transplanted

## 2018-02-22 NOTE — LETTER
2/22/2018       RE: Caity Horan  [address]  [facility] [address]     Dear Colleague,    Thank you for referring your patient, Caity Horan, to the [facility] GASTROENTEROLOGY AND IBD CLINIC at [facility]. Please see a copy of my visit note below.    GI CLINIC VISIT    CC/REFERRING MD:  Raquel Mensah  REASON FOR CONSULTATION: Rectal bleeding and change in bowel habits    ASSESSMENT/PLAN:  27-year-old female with past medical history of iron deficiency anemia, IgA nephropathy status post kidney transplantation in 2014 who presents to the GI clinic for consultation regarding rectal bleeding and change in bowel habits:    1.  Change in bowel habits with rectal bleeding: Patient's current presentation is of course worrisome for inflammatory bowel disease, in the setting of other autoimmune disease and chronic iron deficiency anemia.  She continues to have periods of formed stool during these episodes and seems to be less likely consistent with an infection.  At this time we will obtain baseline laboratory studies and obtain a colonoscopy to evaluate for any inflammatory causes of patient's symptoms.  Important to determine source of blood loss so we will also obtain an upper endoscopy at the same time.  --Laboratory studies to be obtained at patient's earliest convenience  --Colonoscopy and upper endoscopy in the near future  --Given significant constipation in the past suggested patient to start MiraLAX  twice daily.  I have also encouraged patient to start a clear liquid diet at least 2 days prior to prep.    2.  Iron deficiency anemia: Patient follows closely with hematology and her transplant team.  She has also been evaluated by infectious disease.  Various etiologies have been proposed as potential causes.  She has light menstrual periods however with vegetarian diet unlikely she takes in enough iron to make up for menstrual blood loss.  There has been a  question of PTLD as a potential source, although no other suggestion that she would have PTLD with most recent imaging and no clinical features.  --Pending findings of endoscopy, determine next steps in treatment and effective iron replacement    RTC after upper endoscopy and colonoscopy     Thank you for this consultation.  It was a pleasure to participate in the care of this patient; please contact us with any further questions.       Rupert Dsouza PA-C  Division of Gastroenterology, Hepatology and Nutrition  [facility]      HPI  27-year-old female with past medical history of iron deficiency anemia, IgA nephropathy status post kidney transplantation in 2014 who presents to the GI clinic for consultation regarding rectal bleeding and change in bowel habits.  Patient reports that she first began having GI problems with alternating constipation and diarrhea in spring 2012.  She attributes this change in bowel pattern to stress with college.  She was evaluated by [place] gastroenterology and at that time symptoms were attributed to IBS and instructed to initiate fiber and MiraLAX.  It was recommended to undergo a colonoscopy and upper endoscopy but patient never completed either.  Patient initiated these changes and identified triggers to symptoms to include spinach and red sauces.  She also feels that fiber was effective in helping symptoms.  Symptoms largely resolved for a period of time.  She developed lower extremity edema, fatigue and hypertension and diagnosed with IgA nephropathy in September 2014 followed by dialysis in November 2014 and kidney transplantation in December 2014.  She was again asymptomatic, completely for approximately 2 years.  She reports no GI related problems during this time.  Winter 2016 transplant team discovered an elevated EBV level and new found anemia.  During this time patient developed significant diarrhea with blood in her stool.  It was recommended for the patient  to undergo a colonoscopy and upper endoscopy at this time however patient had yet to complete this.  Patient had symptoms consistent with bloating, alternation of constipation and diarrhea with occasional accidents.  Symptoms went on until May 2017 and then completely resolved.  GI symptoms again appeared in late December 2017.  This episode was accompanied by weight loss and blood in the stool with each bowel movement.  These symptoms are consistent with what is currently present.  She reports urgency but no fecal incontinence or accidents.  She notes abdominal pain right before the bowel movement in her lower abdomen and sometimes while having a bowel movement particularly with excess flatulence or formed stool.  She is not experiencing any bloating and does not feel any discomfort unless prior to a bowel movement or during a bowel movement.  She notes hesitancy passing flatus.  She denies any upper GI symptoms such as nausea or vomiting.  She is currently having anywhere from 4-8 bowel movements.  She may have more bowel movements if she drinks coffee that day.  Consistency is described as [place] stool scale 4-7 and often is either type VII or type IV.  She denies any melanotic stools.  Blood in the stool is described as bright red blood in the toilet and occasionally will see clots.  She has nighttime stools approximately 2-3 times per night and is awake starting at 3 AM with bowel movements. No rashes, no joint pain, no eye problems. No IBD or CRC in family.    ROS:    No fevers or chills  + weight loss (7 lbs over last year)  No blurry vision, double vision or change in vision  No sore throat  No lymphadenopathy  + headache  No paraesthesias, or weakness in a limb  + shortness of breath or wheezing  No chest pain or pressure  No arthralgias or myalgias  No rashes or skin changes  No odynophagia or dysphagia  + BRBPR, hematochezia  No melena  No dysuria, frequency or urgency  + cold intolerance   + anxiety      PROBLEM LIST  Patient Active Problem List    Diagnosis Date Noted     Anemia, iron deficiency 10/13/2017     Priority: Medium     EBV (Nicole-Barr virus) viremia 08/15/2017     Priority: Medium     Aftercare following organ transplant 12/18/2016     Priority: Medium     Hypomagnesemia 02/25/2015     Priority: Medium     Immunosuppressed status (H) 12/10/2014     Priority: Medium     Kidney replaced by transplant 12/05/2014     Priority: Medium     Living 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) 12/5/2014       IgA nephropathy 11/23/2014     Priority: Medium     biopsy proven         PERTINENT PAST MEDICAL HISTORY:  Past Medical History:   Diagnosis Date     Anemia in stage 5 chronic kidney disease (H)      Anemia in stage 5 chronic kidney disease (H) 10/27/2014     ESRD (end stage renal disease) on dialysis (H)      HTN (hypertension) 10/27/2014     Hypertension 10/2014     IgA nephropathy     biopsy proven       PREVIOUS SURGERIES:  Past Surgical History:   Procedure Laterality Date     EXTRACTION(S) DENTAL       TRANSPLANT KIDNEY RECIPIENT LIVING RELATED N/A 12/5/2014    Procedure: TRANSPLANT KIDNEY RECIPIENT LIVING RELATED;  Surgeon: Dale Middleton MD;  Location:  OR       PREVIOUS ENDOSCOPY:  None    ALLERGIES:     Allergies   Allergen Reactions     Amoxicillin Rash       PERTINENT MEDICATIONS:    Current Outpatient Prescriptions:      magnesium oxide (MAG-OX) 400 MG tablet, Take 1 tablet (400 mg) by mouth daily, Disp: 90 tablet, Rfl: 3     tacrolimus (GENERIC EQUIVALENT) 0.5 MG capsule, Take 3 capsules (1.5 mg) by mouth 2 times daily, Disp: 180 capsule, Rfl: 11     mycophenolate (CELLCEPT - GENERIC EQUIVALENT) 250 MG capsule, Take 1 capsule (250 mg) by mouth 2 times daily, Disp: 120 capsule, Rfl: 0     Cyanocobalamin (B-12) 1000 MCG TBCR, Take 1,000 mcg by mouth daily (Patient not taking: Reported on 2/22/2018), Disp: 100 tablet, Rfl: 1    SOCIAL HISTORY:  Social History     Social History     Marital status: Single  "    Spouse name: N/A     Number of children: N/A     Years of education: 16     Occupational History     advertising [employer]     Social History Main Topics     Smoking status: Never Smoker     Smokeless tobacco: Never Used     Alcohol use Yes     Drug use: No     Sexual activity: No     Other Topics Concern     Blood Transfusions No     Seat Belt Yes     Social History Narrative   Advertising and     FAMILY HISTORY:  FH of CRC: None  FH of IBD: None  Family History   Problem Relation Age of Onset     KIDNEY DISEASE No family hx of      Past/family/social history reviewed and no changes    PHYSICAL EXAMINATION:  Constitutional: aaox3, cooperative, pleasant, not dyspneic/diaphoretic, no acute distress  Vitals reviewed: /66 (BP Location: Left arm, Patient Position: Chair, Cuff Size: Adult Regular)  Pulse 97  Temp 98.6  F (37  C)  Ht 1.6 m (5' 2.99\")  Wt 59 kg (130 lb)  SpO2 99%  BMI 23.04 kg/m2  Wt:   Wt Readings from Last 2 Encounters:   02/22/18 59 kg (130 lb)   12/01/17 62.6 kg (137 lb 14.4 oz)      Eyes: Sclera anicteric/injected  Ears/nose/mouth/throat: Normal oropharynx without ulcers or exudate, mucus membranes moist, hearing intact  Neck: supple, thyroid normal size  CV: No edema  Respiratory: Unlabored breathing  Lymph: No axillary, submandibular, supraclavicular or inguinal lymphadenopathy  Abd: Nondistended, +bs, no hepatosplenomegaly, nontender, no peritoneal signs  Skin: warm, perfused, no jaundice  Psych: Normal affect  MSK: Normal gait      PERTINENT STUDIES:    Orders Only on 02/01/2018   Component Date Value Ref Range Status     WBC 02/01/2018 9.8  4.0 - 11.0 10e9/L Final     RBC Count 02/01/2018 3.10* 3.8 - 5.2 10e12/L Final     Hemoglobin 02/01/2018 9.5* 11.7 - 15.7 g/dL Final     Hematocrit 02/01/2018 30.6* 35.0 - 47.0 % Final     MCV 02/01/2018 99  78 - 100 fl Final     MCH 02/01/2018 30.6  26.5 - 33.0 pg Final     MCHC 02/01/2018 31.0* 31.5 - 36.5 g/dL Final     " RDW 02/01/2018 13.6  10.0 - 15.0 % Final     Platelet Count 02/01/2018 323  150 - 450 10e9/L Final     Sodium 02/01/2018 140  133 - 144 mmol/L Final     Potassium 02/01/2018 4.4  3.4 - 5.3 mmol/L Final     Chloride 02/01/2018 108  94 - 109 mmol/L Final     Carbon Dioxide 02/01/2018 26  20 - 32 mmol/L Final     Anion Gap 02/01/2018 7  3 - 14 mmol/L Final     Glucose 02/01/2018 103* 70 - 99 mg/dL Final     Urea Nitrogen 02/01/2018 17  7 - 30 mg/dL Final     Creatinine 02/01/2018 1.04  0.52 - 1.04 mg/dL Final     GFR Estimate 02/01/2018 63  >60 mL/min/1.7m2 Final     GFR Estimate If Black 02/01/2018 77  >60 mL/min/1.7m2 Final     Calcium 02/01/2018 8.3* 8.5 - 10.1 mg/dL Final     Tacrolimus Last Dose 02/01/2018 01/31/18  2145   Final     Tacrolimus Level 02/01/2018 6.1  5.0 - 15.0 ug/L Final     EBV DNA Copies/mL 02/01/2018 EBV DNA Not Detected  EBVNEG^EBV DNA Not Detected [Copies]/mL Final     EBV DNA Log of Copies 02/01/2018 Not Calculated  <2.7 [Log_copies]/mL Final     BK Virus Specimen 02/01/2018 Plasma   Final     BK Virus Result 02/01/2018 BK Virus DNA Not Detected  BKNEG^BK Virus DNA Not Detected copies/mL Final     BK Virus Log 02/01/2018 Not Calculated  <2.7 Log copies/mL Final     Protein Random Urine 02/01/2018 0.08  g/L Final     Protein Total Urine g/gr Creatinine 02/01/2018 0.05  0 - 0.2 g/g Cr Final     Iron 02/01/2018 36  35 - 180 ug/dL Final     Iron Binding Cap 02/01/2018 291  240 - 430 ug/dL Final     Iron Saturation Index 02/01/2018 12* 15 - 46 % Final     Ferritin 02/01/2018 14  12 - 150 ng/mL Final     Creatinine Urine 02/01/2018 151  mg/dL Final           Again, thank you for allowing me to participate in the care of your patient.      Sincerely,    Rupert Dsouza PA-C

## 2018-02-22 NOTE — NURSING NOTE
"No chief complaint on file.      Vitals:    02/22/18 0756   BP: 106/66   BP Location: Left arm   Patient Position: Chair   Cuff Size: Adult Regular   Pulse: 97   Temp: 98.6  F (37  C)   SpO2: 99%   Weight: 130 lb   Height: 5' 2.99\"       Body mass index is 23.04 kg/(m^2).      Sruthi Monte                          "

## 2018-02-22 NOTE — MR AVS SNAPSHOT
After Visit Summary   2/22/2018    Caity Horan    MRN: [id number]           Patient Information     Date Of Birth          1990        Visit Information        Provider Department      2/22/2018 8:00 AM Rupert Dsouza PA-C [facility]astroenter[facility] IBD [facility]        Today's Diagnoses     Rectal bleeding    -  1      Care Instructions    It was a pleasure taking care of you today.  I've included a brief summary of our discussion and care plan from today's visit below.  Please review this information with your primary care provider.  ______________________________________________________________________    My recommendations are summarized as follows:    -- Upper endoscopy and colonoscopy to be done in the near future  -- Pending these results we will determine.  -- Labs today    Return to GI Clinic in 4 weeks to review your progress.    ______________________________________________________________________    Who do I call with any questions after my visit?  Please be in touch if there are any further questions that arise following today's visit.  There are multiple ways to contact your gastroenterology care team.        During business hours, you may reach a Gastroenterology nurse at [phone], option 3.       To schedule or reschedule an appointment, please call [phone].       You can always send a secure message through Olocode.  Olocode messages are answered by your nurse or doctor typically within 24 hours.  Please allow extra time on weekends and holidays.        For urgent/emergent questions after business hours, you may reach the on-call GI Fellow by contacting the [facility] at [phone].     How will I get the results of any tests ordered?    You will receive all of your results.  If you have signed up for Olocode, any tests ordered at your visit will be available to you after your physician reviews them.  Typically this takes 1-2 weeks.  If there  are urgent results that require a change in your care plan, your physician or nurse will call you to discuss the next steps.      What is [place] [employer]?  Zeo is a secure way for you to access all of your healthcare records from the [facility].  It is a web based computer program, so you can sign on to it from any location.  It also allows you to send secure messages to your care team.  I recommend signing up for Zeo access if you have not already done so and are comfortable with using a computer.      How to I schedule a follow-up visit?  If you did not schedule a follow-up visit today, please call [phone] to schedule a follow-up office visit.        Sincerely,    Rupert Dsouza PA-C  [facility]  Division of Gastroenterology                Follow-ups after your visit        Additional Services     GASTROENTEROLOGY ADULT REF PROCEDURE ONLY       Last Lab Result: Creatinine (mg/dL)       Date                     Value                 02/01/2018               1.04             ----------  Body mass index is 23.04 kg/(m^2).     Needed:  No  Language:  English    Patient will be contacted to schedule procedure.     Please be aware that coverage of these services is subject to the terms and limitations of your health insurance plan.  Call member services at your health plan with any benefit or coverage questions.  Any procedures must be performed at a [facility] facility OR coordinated by your clinic's referral office.    Please bring the following with you to your appointment:    (1) Any X-Rays, CTs or MRIs which have been performed.  Contact the facility where they were done to arrange for  prior to your scheduled appointment.    (2) List of current medications   (3) This referral request   (4) Any documents/labs given to you for this referral                  Follow-up notes from your care team     Return in about 4 weeks (around 3/22/2018).      Your next 10 appointments  already scheduled     Feb 22, 2018  9:00 AM CST   Infusion 120 with UC SPEC INFUSION, UC 51 ATC   [facility] ([facility])    [address]  [address]  [facility] [id number] [phone]            Feb 27, 2018  3:35 PM CST   (Arrive by 3:05 PM)   Return Kidney Transplant with Otilio Mar MD   [facility] ([facility])    [address]  [address]  [facility] [id number] [phone]            Mar 01, 2018  2:00 PM CST   Infusion 120 with UC SPEC INFUSION, UC 41 ATC   [facility] ([facility])    [address]  [address]  [facility] [address] [phone]              Future tests that were ordered for you today     Open Future Orders        Priority Expected Expires Ordered    CRP inflammation Routine  2/22/2019 2/22/2018    Erythrocyte sedimentation rate auto Routine 2/22/2018 3/24/2018 2/22/2018            Who to contact     Please call your clinic at [phone] to:    Ask questions about your health    Make or cancel appointments    Discuss your medicines    Learn about your test results    Speak to your doctor            Additional Information About Your Visit        Beabloo Information     Beabloo gives you secure access to your electronic health record. If you see a primary care provider, you can also send messages to your care team and make appointments. If you have questions, please call your primary care clinic.  If you do not have a primary care provider, please call [phone] and they will assist you.      Beabloo is an electronic gateway that provides easy, online access to your medical records. With Beabloo, you can request a clinic appointment, read your test results, renew a prescription or communicate with your care team.     To access your existing account, please contact  "your [facility] or call [phone] for assistance.        Care EveryWhere ID     This is your Care EveryWhere ID. This could be used by other organizations to access your [facility] medical records  [id number]        Your Vitals Were     Pulse Temperature Height Pulse Oximetry BMI (Body Mass Index)       97 98.6  F (37  C) 1.6 m (5' 2.99\") 99% 23.04 kg/m2        Blood Pressure from Last 3 Encounters:   02/22/18 106/66   12/01/17 109/71   11/10/17 109/78    Weight from Last 3 Encounters:   02/22/18 59 kg (130 lb)   12/01/17 62.6 kg (137 lb 14.4 oz)   11/01/17 63.1 kg (139 lb 1.6 oz)              We Performed the Following     GASTROENTEROLOGY ADULT REF PROCEDURE ONLY        Primary Care Provider Office Phone # Fax #    Ritu CHLOE Little [phone]       [address]  [address] [address]        Equal Access to Services     YANIQUE QUACH : Hadii aad ku hadasho Soomaali, waaxda luqadaha, qaybta kaalmada adeegyada, waxay idiin hayleandern lynn lee . So [facility] [phone].    ATENCIÓN: Si habla español, tiene a carreon disposición servicios gratuitos de asistencia lingüística. Llame al [phone].    We comply with applicable federal civil rights laws and [place] laws. We do not discriminate on the basis of race, color, national origin, age, disability, sex, sexual orientation, or gender identity.            Thank you!     Thank you for choosing [facility] GASTROENTEROLOGY AND IBD CLINIC  for your care. Our goal is always to provide you with excellent care. Hearing back from our patients is one way we can continue to improve our services. Please take a few minutes to complete the written survey that you may receive in the mail after your visit with us. Thank you!             Your Updated Medication List - Protect others around you: Learn how to safely use, store and throw away your medicines at www.disposemymeds.org.          This list is " accurate as of 2/22/18  8:47 AM.  Always use your most recent med list.                   Brand Name Dispense Instructions for use Diagnosis    B-12 1000 MCG Tbcr     100 tablet    Take 1,000 mcg by mouth daily    B12 deficiency due to diet       magnesium oxide 400 MG tablet    MAG-OX    90 tablet    Take 1 tablet (400 mg) by mouth daily    Hypomagnesemia, Status post kidney transplant, Kidney replaced by transplant, Kidney transplanted       mycophenolate 250 MG capsule    GENERIC EQUIVALENT    120 capsule    Take 1 capsule (250 mg) by mouth 2 times daily    Kidney transplanted, Kidney replaced by transplant, Status post kidney transplant       tacrolimus 0.5 MG capsule    GENERIC EQUIVALENT    180 capsule    Take 3 capsules (1.5 mg) by mouth 2 times daily    Kidney replaced by transplant, Status post kidney transplant, Hypomagnesemia, Kidney transplanted

## 2018-02-22 NOTE — PROGRESS NOTES
Infusion Nursing Note:  Caity Horan presents today to [facility] for a Injectafer infusion.  During today's [facility] appointment orders from Dr. Mar were completed.  Frequency: dose 1 of 2    Progress note:  ID verified by name and .  Assessment completed. Vitals were stable throughout time in [facility]. Patient education regarding infusion and possible side effects provided.  Patient verbalized understanding. yes    Premedications: were not ordered.    Infusion Rates: Infusion given over approximately 15min. Pt was monitored for 30 minutes post.    Labs were ordered for this appointment.    Vascular access: Peripheral IV placed today.    Treatment Conditions:   No treatment conditions.    Patient tolerated infusion well.    Discharge Plan:   Follow up plan of care with: [facility]  Discharge instructions were reviewed with patient.  Patient/representative verbalized understanding of discharge instructions and all questions answered.    Patient discharged from [facility] in stable condition.    Rosie Montalvo RN    Administrations This Visit     ferric carboxymaltose (INJECTAFER) 750 mg intermittent infusion     Admin Date Action Dose Route Administered By             2018 New Bag 750 mg Intravenous Rosie Montalvo RN                          BP 98/55  Pulse 100  Resp 16  SpO2 100%

## 2018-02-22 NOTE — PATIENT INSTRUCTIONS
Ferric carboxymaltose Solution for injection  What is this medicine?  FERRIC CARBOXYMALTOSE (ferr-ik car-box-ee-mol-toes) is an iron complex. Iron is used to make healthy red blood cells, which carry oxygen and nutrients throughout the body. This medicine is used to treat anemia in people with chronic kidney disease or people who cannot take iron by mouth.  This medicine may be used for other purposes; ask your health care provider or pharmacist if you have questions.  What should I tell my health care provider before I take this medicine?  They need to know if you have any of these conditions:    anemia not caused by low iron levels    high levels of iron in the blood    liver disease    an unusual or allergic reaction to iron, other medicines, foods, dyes, or preservatives    pregnant or trying to get pregnant    breast-feeding  How should I use this medicine?  This medicine is for infusion into a vein. It is given by a health care professional in a hospital or clinic setting.  Talk to your pediatrician regarding the use of this medicine in children. Special care may be needed.  Overdosage: If you think you've taken too much of this medicine contact a poison control center or emergency room at once.  NOTE: This medicine is only for you. Do not share this medicine with others.  What if I miss a dose?  It is important not to miss your dose. Call your doctor or health care professional if you are unable to keep an appointment.  What may interact with this medicine?  Do not take this medicine with any of the following medications:  deferoxamine  dimercaprol  other iron products  This medicine may also interact with the following medications:  chloramphenicol  deferasirox  This list may not describe all possible interactions. Give your health care provider a list of all the medicines, herbs, non-prescription drugs, or dietary supplements you use. Also tell them if you smoke, drink alcohol, or use illegal drugs. Some  items may interact with your medicine.  What should I watch for while using this medicine?  Visit your doctor or health care professional regularly. Tell your doctor if your symptoms do not start to get better or if they get worse. You may need blood work done while you are taking this medicine.  You may need to follow a special diet. Talk to your doctor. Foods that contain iron include: whole grains/cereals, dried fruits, beans, or peas, leafy green vegetables, and organ meats (liver, kidney).  What side effects may I notice from receiving this medicine?  Side effects that you should report to your doctor or health care professional as soon as possible:  allergic reactions like skin rash, itching or hives, swelling of the face, lips, or tonguebreathing problems  changes in blood pressure  feeling faint or lightheaded, falls  flushing, sweating, or hot feelings  Side effects that usually do not require medical attention (Report these to your doctor or health care professional if they continue or are bothersome.):  changes in taste  constipation  dizziness  headache  nausea  pain, redness, or irritation at site where injected  vomiting  This list may not describe all possible side effects. Call your doctor for medical advice about side effects. You may report side effects to FDA at 1-256-FDA-0018.  Where should I keep my medicine?  This drug is given in a hospital or clinic and will not be stored at home.  NOTE: This sheet is a summary. It may not cover all possible information. If you have questions about this medicine, talk to your doctor, pharmacist, or health care provider.  NOTE:This sheet is a summary. It may not cover all possible information. If you have questions about this medicine, talk to your doctor, pharmacist, or health care provider. Copyright  2016 Gold Standard

## 2018-02-23 ENCOUNTER — TELEPHONE (OUTPATIENT)
Dept: GASTROENTEROLOGY | Facility: CLINIC | Age: 28
End: 2018-02-23

## 2018-02-24 DIAGNOSIS — Z94.0 KIDNEY TRANSPLANTED: ICD-10-CM

## 2018-02-24 DIAGNOSIS — Z94.0 KIDNEY REPLACED BY TRANSPLANT: ICD-10-CM

## 2018-02-24 DIAGNOSIS — Z94.0 STATUS POST KIDNEY TRANSPLANT: ICD-10-CM

## 2018-02-26 ENCOUNTER — TELEPHONE (OUTPATIENT)
Dept: PHARMACY | Facility: CLINIC | Age: 28
End: 2018-02-26

## 2018-02-26 RX ORDER — MYCOPHENOLATE MOFETIL 250 MG/1
CAPSULE ORAL
Qty: 120 CAPSULE | Refills: 0 | Status: SHIPPED | OUTPATIENT
Start: 2018-02-26 | End: 2018-04-24

## 2018-02-26 NOTE — TELEPHONE ENCOUNTER
Anemia Management Note  SUBJECTIVE/OBJECTIVE:  Referred by Dr. Otilio Mar on 10/6/2017  Primary Diagnosis: Anemia in Chronic Kidney Disease (N18.3, D63.1)     Secondary Diagnosis:  Chronic Kidney Disease, Stage 3 (N18.3)   Hgb goal range:  9-10  Epo/Darbo: None  Iron regimen:  Does not tolerate oral iron    Anemia Latest Ref Rng & Units 10/31/2017 11/3/2017 11/9/2017 11/10/2017 12/1/2017 2/1/2018 2/22/2018   Hemoglobin 11.7 - 15.7 g/dL 8.8(L) - 8.9(L) - 10.4(L) 9.5(L) -   IV Iron Dose - - 750mg - 750mg - - 750mg   TSAT 15 - 46 % - - - - 41 12(L) -   Ferritin 12 - 150 ng/mL - - - - 277(H) 14 -     BP Readings from Last 3 Encounters:   02/22/18 100/60   02/22/18 106/66   12/01/17 109/71     Wt Readings from Last 2 Encounters:   02/22/18 130 lb (59 kg)   12/01/17 137 lb 14.4 oz (62.6 kg)       Patient has an appt w/Dr Mar on 2/27/18 and her 2nd IV injectafer appt on 3/1/18    ASSESSMENT:  Hgb: at goal - continue to monitor  TSat: Due for iron studies 4 weeks after last IV iron infusion Ferritin: Due for iron studies 4 weeks after last IV iron infusion    PLAN:  RTC for 2nd IV injectafer infusion on 3/1/18  RTC for Hgb labs again on 3/15/18    Orders needed to be renewed (for next follow-up date) in EPIC: None    Iron labs due:  3/29/18    Plan discussed with:  No call made yet  Plan provided by:  Amaris  Reviewed 02/28/2018 EDUARDO  NEXT FOLLOW-UP DATE:  3/1/18 to review labs/notes from 2/27 visit and document IV iron infusion, then 3/15/18    Anemia Management Service  Abida Song,PharmD and Princess Harrington CPhT  Phone: [phone]  Fax: [phone]

## 2018-02-27 ENCOUNTER — OFFICE VISIT (OUTPATIENT)
Dept: NEPHROLOGY | Facility: CLINIC | Age: 28
End: 2018-02-27
Attending: INTERNAL MEDICINE
Payer: COMMERCIAL

## 2018-02-27 VITALS
HEIGHT: 63 IN | WEIGHT: 126.8 LBS | SYSTOLIC BLOOD PRESSURE: 100 MMHG | TEMPERATURE: 98.5 F | HEART RATE: 91 BPM | DIASTOLIC BLOOD PRESSURE: 60 MMHG | BODY MASS INDEX: 22.47 KG/M2 | OXYGEN SATURATION: 99 %

## 2018-02-27 DIAGNOSIS — B27.00 EBV (EPSTEIN-BARR VIRUS) VIREMIA: ICD-10-CM

## 2018-02-27 DIAGNOSIS — D84.9 IMMUNOSUPPRESSED STATUS (H): ICD-10-CM

## 2018-02-27 DIAGNOSIS — Z48.298 AFTERCARE FOLLOWING ORGAN TRANSPLANT: ICD-10-CM

## 2018-02-27 DIAGNOSIS — E53.8 VITAMIN B12 DEFICIENCY: ICD-10-CM

## 2018-02-27 DIAGNOSIS — D50.0 IRON DEFICIENCY ANEMIA DUE TO CHRONIC BLOOD LOSS: ICD-10-CM

## 2018-02-27 DIAGNOSIS — E83.42 HYPOMAGNESEMIA: Primary | ICD-10-CM

## 2018-02-27 DIAGNOSIS — E53.8 B12 DEFICIENCY DUE TO DIET: ICD-10-CM

## 2018-02-27 DIAGNOSIS — Z94.0 KIDNEY REPLACED BY TRANSPLANT: ICD-10-CM

## 2018-02-27 PROCEDURE — G0463 HOSPITAL OUTPT CLINIC VISIT: HCPCS | Mod: ZF

## 2018-02-27 ASSESSMENT — PAIN SCALES - GENERAL: PAINLEVEL: NO PAIN (0)

## 2018-02-27 NOTE — LETTER
2/27/2018      RE: Caity Horan  [address]  [facility] [address]       Assessment and Plan:  1. LDKT - baseline Cr ~ 1.0-1.2, which has remained stable.  Normal proteinuria.  No DSA.  Will make no changes in immunosuppression.  2. BP - well controlled at target of less than 140/90 off antihypertensive medications.  No changes.  3. Anemia in chronic kidney disease - decreased Hgb with ongoing hematochezia.  Patient continues to be iron deficient and has received intermittent courses of IV iron.  Patient has been seen by Hematology.  In addition, patient had low vitamin B12 levels and was recommended to start oral vitamin B12 supplement, but this never happened.  Will start oral vitamin B12 1000 mcg daily.  She also being worked up by GI.  Will follow.  4. 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  She had initial marked increase in EBV PCR, up to a peak of ~ 160K 7/2017 and then generally stable in 100K range since that time.  However, her last EBV PCR was actually negative.  Recheck on EBV PCR is pending.  Previous CT chest/abd/pelvis 7/2017 showed no evidence of lymphoma.  Her immunosuppression was decreased and patient will remain on lower immunosuppression.  Patient has been seen by Transplant ID.  5. Diarrhea with hematochezia - ongoing symptoms with associated iron deficiency.  High CRP-inflammation and ESR levels and with other symptoms, is concerning for inflammatory bowel disease.  There are many case reports of an association with inflammatory bowel disease and IgA nephropathy in the literature, although unclear if there is a direct link.  Patient is being followed by GI and has colonoscopy and EGD scheduled.  6. Hypomagnesemia - normal serum magnesium level and will continue on oral magnesium supplement.  7. Skin cancer risk - no new skin lesions.  Discussed sun protection.  8. Recommend return visit in 6  "months.    Assessment and plan was discussed with patient and she voiced her understanding and agreement.    Reason for Visit:  Ms. Horan is here for routine follow up.    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         Transplant Hx:       Tx: LDKT  Date: 12/5/14       Present Maintenance IS: Tacrolimus and Mycophenolate mofetil       Baseline Creatinine: 1.0-1.2       Recent DSA: No  Date last checked: 12/2016       Biopsy: No    Ms. Horan reports feeling good overall with minimal medical complaints.  Since patient's last clinic visit she has been seen by Transplant ID for EBV viremia and Hematology for ongoing anemia and iron deficiency.  Patient has received a couple of courses of IV iron with initial improvement in iron stores, but they drop again over time.  She notes ongoing diarrhea with blood in her stool or at least in the toilet bowl.  The bright red blood has increased in frequency.  With the diarrhea, patient will get occasional low crampy abdominal pain.  In addition, she has had some lab evaluation and was found to have high CRP-inflammation and ESR levels.  There is concern by GI that patient may have some inflammatory bowel disease with this, but PTLD is also a concern with her EBV viremia.  Patient is scheduled for a colonoscopy and an EGD in a couple of weeks.    Her energy level has been \"really bad\" at times, mostly when her iron level and anemia worsen.  She then tends to feel better with more energy after receiving a course of IV iron.  Patient is active and does get some exercise.  Denies any chest pain or shortness of breath with exertion.  Appetite is okay and she feels hungry, but she has lost about 10-15 lbs.  No nausea or vomiting.  Increase in diarrhea, as noted above.  No fever, sweats or chills.  No leg swelling.    Home BP: Not checked.  She does report feeling a bit lightheaded at times when she becomes more " anemic.      ROS:   A comprehensive review of systems was obtained and negative, except as noted in the HPI or PMH.    Active Medical Problems:  Patient Active Problem List   Diagnosis     IgA nephropathy     Kidney replaced by transplant     Immunosuppressed status (H)     Hypomagnesemia     Aftercare following organ transplant     EBV (Nicole-Barr virus) viremia     Anemia, iron deficiency     Vitamin B12 deficiency       Personal Hx:  Social History     Social History     Marital status: Single     Spouse name: N/A     Number of children: N/A     Years of education: 16     Occupational History     advertising [employer]     Social History Main Topics     Smoking status: Never Smoker     Smokeless tobacco: Never Used     Alcohol use Yes     Drug use: No     Sexual activity: No     Other Topics Concern     Blood Transfusions No     Seat Belt Yes     Social History Narrative       Allergies:  Allergies   Allergen Reactions     Amoxicillin Rash       Medications:  Prior to Admission medications    Medication Sig Start Date End Date Taking? Authorizing Provider   tacrolimus (PROGRAF BRAND) 0.5 MG capsule Take 1.5 mg in the am  And 1.0  Mg in pm     Generic  Patient taking differently: Take 1.5 mg in the PM  And 1.0  Mg in AM     Generic 2/6/15  Yes Patricia Rodriguez MD   magnesium oxide (MAG-OX) 400 MG tablet Take 2 tablets (800 mg) by mouth daily 1/2/15  Yes More Cardona MD   senna-docusate (SENOKOT-S;PERICOLACE) 8.6-50 MG per tablet Take 2 tablets by mouth 2 times daily as needed for constipation  Patient taking differently: Take 2 tablets by mouth as needed for constipation  12/29/14  Yes Patricia Rodriguez MD   mycophenolate (CELLCEPT-GENERIC EQUIVALENT) 250 MG capsule Take 3 capsules (750 mg) by mouth 2 times daily 12/9/14  Yes Jessica Dumont PA-C   sulfamethoxazole-trimethoprim (BACTRIM,SEPTRA) 400-80 MG per tablet Take 1 tablet by mouth daily 12/9/14  Yes Jessica Dumont PA-C  "      Vitals:  /60  Pulse 91  Temp 98.5  F (36.9  C) (Oral)  Ht 1.6 m (5' 2.99\")  Wt 57.5 kg (126 lb 12.8 oz)  SpO2 99%  BMI 22.47 kg/m2    Exam:   GENERAL APPEARANCE: alert and no distress  HENT: mouth without ulcers or lesions  LYMPHATICS: no cervical or supraclavicular nodes  RESP: lungs clear to auscultation - no rales, rhonchi or wheezes  CV: regular rhythm, normal rate, no rub, no murmur  EDEMA: no LE edema bilaterally  ABDOMEN: soft, nondistended, nontender, bowel sounds normal  MS: extremities normal - no gross deformities noted, no evidence of inflammation in joints, no muscle tenderness  SKIN: no rash  TX KIDNEY: normal    Results:   Recent Results (from the past 840 hour(s))   Protein  random urine    Collection Time: 02/01/18  6:56 AM   Result Value Ref Range    Protein Random Urine 0.08 g/L    Protein Total Urine g/gr Creatinine 0.05 0 - 0.2 g/g Cr   Creatinine urine calculation only    Collection Time: 02/01/18  6:56 AM   Result Value Ref Range    Creatinine Urine 151 mg/dL   CBC with platelets    Collection Time: 02/01/18  7:02 AM   Result Value Ref Range    WBC 9.8 4.0 - 11.0 10e9/L    RBC Count 3.10 (L) 3.8 - 5.2 10e12/L    Hemoglobin 9.5 (L) 11.7 - 15.7 g/dL    Hematocrit 30.6 (L) 35.0 - 47.0 %    MCV 99 78 - 100 fl    MCH 30.6 26.5 - 33.0 pg    MCHC 31.0 (L) 31.5 - 36.5 g/dL    RDW 13.6 10.0 - 15.0 %    Platelet Count 323 150 - 450 10e9/L   Basic metabolic panel    Collection Time: 02/01/18  7:02 AM   Result Value Ref Range    Sodium 140 133 - 144 mmol/L    Potassium 4.4 3.4 - 5.3 mmol/L    Chloride 108 94 - 109 mmol/L    Carbon Dioxide 26 20 - 32 mmol/L    Anion Gap 7 3 - 14 mmol/L    Glucose 103 (H) 70 - 99 mg/dL    Urea Nitrogen 17 7 - 30 mg/dL    Creatinine 1.04 0.52 - 1.04 mg/dL    GFR Estimate 63 >60 mL/min/1.7m2    GFR Estimate If Black 77 >60 mL/min/1.7m2    Calcium 8.3 (L) 8.5 - 10.1 mg/dL   Tacrolimus level    Collection Time: 02/01/18  7:02 AM   Result Value Ref Range    " Tacrolimus Last Dose 01/31/18 2145     Tacrolimus Level 6.1 5.0 - 15.0 ug/L   EBV DNA PCR Quantitative Whole Blood    Collection Time: 02/01/18  7:02 AM   Result Value Ref Range    EBV DNA Copies/mL EBV DNA Not Detected EBVNEG^EBV DNA Not Detected [Copies]/mL    EBV DNA Log of Copies Not Calculated <2.7 [Log_copies]/mL   Iron and iron binding capacity    Collection Time: 02/01/18  7:02 AM   Result Value Ref Range    Iron 36 35 - 180 ug/dL    Iron Binding Cap 291 240 - 430 ug/dL    Iron Saturation Index 12 (L) 15 - 46 %   Ferritin    Collection Time: 02/01/18  7:02 AM   Result Value Ref Range    Ferritin 14 12 - 150 ng/mL   BK virus PCR quantitative    Collection Time: 02/01/18  7:03 AM   Result Value Ref Range    BK Virus Specimen Plasma     BK Virus Result BK Virus DNA Not Detected BKNEG^BK Virus DNA Not Detected copies/mL    BK Virus Log Not Calculated <2.7 Log copies/mL   CRP inflammation    Collection Time: 02/22/18  9:06 AM   Result Value Ref Range    CRP Inflammation 11.6 (H) 0.0 - 8.0 mg/L   Erythrocyte sedimentation rate auto    Collection Time: 02/22/18  9:06 AM   Result Value Ref Range    Sed Rate 104 (H) 0 - 20 mm/h   Magnesium    Collection Time: 03/01/18  2:15 PM   Result Value Ref Range    Magnesium 1.7 1.6 - 2.3 mg/dL       Otilio Mar MD

## 2018-02-27 NOTE — NURSING NOTE
"Chief Complaint   Patient presents with     RECHECK     Kidney tx follow up       Initial /60  Pulse 91  Temp 98.5  F (36.9  C) (Oral)  Ht 1.6 m (5' 2.99\")  Wt 57.5 kg (126 lb 12.8 oz)  SpO2 99%  BMI 22.47 kg/m2 Estimated body mass index is 22.47 kg/(m^2) as calculated from the following:    Height as of this encounter: 1.6 m (5' 2.99\").    Weight as of this encounter: 57.5 kg (126 lb 12.8 oz).  Medication Reconciliation: complete   DAVID GARCIA CMA      "

## 2018-02-27 NOTE — MR AVS SNAPSHOT
After Visit Summary   2/27/2018    Caity Horan    MRN: [id number]           Patient Information     Date Of Birth          1990        Visit Information        Provider Department      2/27/2018 3:35 PM Otilio Mar MD [facility]        Today's Diagnoses     Hypomagnesemia    -  1    EBV (Nicole-Barr virus) viremia        B12 deficiency due to diet        Kidney replaced by transplant        Aftercare following organ transplant        Iron deficiency anemia due to chronic blood loss        Immunosuppressed status (H)        Vitamin B12 deficiency           Follow-ups after your visit        Follow-up notes from your care team     Return in about 6 months (around 8/27/2018).      Your next 10 appointments already scheduled     Mar 12, 2018   Procedure with Kory Massey MD   [facility], [place], Endoscopy ([facility], [facility])    [address] [id number] [phone]           The [facility] is located on the corner of [address] and [address] on the [facility]. It is easily accessible from virtually any point in the [place], via [address].            Mar 22, 2018  8:40 AM CDT   (Arrive by 8:25 AM)   New Patient Visit with Rupert Dsouza PA-C   [facility] ([facility])    [address]  4th [address]  [facility] [id number] [phone]            Aug 28, 2018  7:15 AM CDT   Lab with  LAB   [facility] ([facility])    [address]  1st [address]  [facility] [id number] [phone]            Aug 28, 2018  3:35 PM CDT   (Arrive by 3:05 PM)   Return Kidney Transplant with Otilio Mar MD   [facility] ([facility])    [address]  [address]  [facility] [id number] [phone]              Who to contact     If you  "have questions or need follow up information about today's clinic visit or your schedule please contact [facility] directly at [phone].  Normal or non-critical lab and imaging results will be communicated to you by MyChart, letter or phone within 4 business days after the clinic has received the results. If you do not hear from us within 7 days, please contact the clinic through [facility] or phone. If you have a critical or abnormal lab result, we will notify you by phone as soon as possible.  Submit refill requests through [facility] or call your pharmacy and they will forward the refill request to us. Please allow 3 business days for your refill to be completed.          Additional Information About Your Visit        [facility] Lab Information     [facility] gives you secure access to your electronic health record. If you see a primary care provider, you can also send messages to your care team and make appointments. If you have questions, please call your primary care clinic.  If you do not have a primary care provider, please call [phone] and they will assist you.        Care EveryWhere ID     This is your Care EveryWhere ID. This could be used by other organizations to access your [place] medical records  [id number]        Your Vitals Were     Pulse Temperature Height Pulse Oximetry BMI (Body Mass Index)       91 98.5  F (36.9  C) (Oral) 1.6 m (5' 2.99\") 99% 22.47 kg/m2        Blood Pressure from Last 3 Encounters:   03/01/18 111/56   02/27/18 100/60   02/22/18 100/60    Weight from Last 3 Encounters:   02/27/18 57.5 kg (126 lb 12.8 oz)   02/22/18 59 kg (130 lb)   12/01/17 62.6 kg (137 lb 14.4 oz)                 Where to get your medicines      These medications were sent to [facility]/pharmacy #[id number] - [place], [place] - [address] [address]     Phone:  [phone]     [address] [facility]          Primary Care Provider Office Phone # Fax #    Ritu Little NP " [phone]       [facility]  [id number]   [facility] [address]        Equal Access to Services     AUNDREA QUACH : Hadii manuel avalos gabriela Machuca, walarryda luqamerica, qabartta kajamesda live, philip haddad. So [facility] [phone].    ATENCIÓN: Si habla español, tiene a carreon disposición servicios gratuitos de asistencia lingüística. Llame al [phone].    We comply with applicable federal civil rights laws and [place] laws. We do not discriminate on the basis of race, color, national origin, age, disability, sex, sexual orientation, or gender identity.            Thank you!     Thank you for choosing [facility]  for your care. Our goal is always to provide you with excellent care. Hearing back from our patients is one way we can continue to improve our services. Please take a few minutes to complete the written survey that you may receive in the mail after your visit with us. Thank you!             Your Updated Medication List - Protect others around you: Learn how to safely use, store and throw away your medicines at www.disposemymeds.org.          This list is accurate as of 2/27/18 11:59 PM.  Always use your most recent med list.                   Brand Name Dispense Instructions for use Diagnosis    B-12 1000 MCG Tbcr     90 tablet    Take 1,000 mcg by mouth daily    B12 deficiency due to diet       magnesium oxide 400 MG tablet    MAG-OX    90 tablet    Take 1 tablet (400 mg) by mouth daily    Hypomagnesemia, Status post kidney transplant, Kidney replaced by transplant, Kidney transplanted       mycophenolate 250 MG capsule    GENERIC EQUIVALENT    120 capsule    TAKE 2 CAPSULES BY MOUTH TWICE A DAY    Kidney transplanted, Kidney replaced by transplant, Status post kidney transplant       tacrolimus 0.5 MG capsule    GENERIC EQUIVALENT    180 capsule    Take 3 capsules (1.5 mg) by mouth 2 times daily    Kidney replaced by transplant, Status  post kidney transplant, Hypomagnesemia, Kidney transplanted

## 2018-03-01 ENCOUNTER — INFUSION THERAPY VISIT (OUTPATIENT)
Dept: INFUSION THERAPY | Facility: CLINIC | Age: 28
End: 2018-03-01
Attending: INTERNAL MEDICINE
Payer: COMMERCIAL

## 2018-03-01 ENCOUNTER — TELEPHONE (OUTPATIENT)
Dept: PHARMACY | Facility: CLINIC | Age: 28
End: 2018-03-01

## 2018-03-01 VITALS — HEART RATE: 86 BPM | DIASTOLIC BLOOD PRESSURE: 56 MMHG | SYSTOLIC BLOOD PRESSURE: 111 MMHG

## 2018-03-01 DIAGNOSIS — B27.00 EBV (EPSTEIN-BARR VIRUS) VIREMIA: ICD-10-CM

## 2018-03-01 DIAGNOSIS — D50.9 IRON DEFICIENCY ANEMIA, UNSPECIFIED IRON DEFICIENCY ANEMIA TYPE: Primary | ICD-10-CM

## 2018-03-01 DIAGNOSIS — E83.42 HYPOMAGNESEMIA: ICD-10-CM

## 2018-03-01 LAB — MAGNESIUM SERPL-MCNC: 1.7 MG/DL (ref 1.6–2.3)

## 2018-03-01 PROCEDURE — 96374 THER/PROPH/DIAG INJ IV PUSH: CPT

## 2018-03-01 PROCEDURE — 25000128 H RX IP 250 OP 636: Mod: ZF | Performed by: INTERNAL MEDICINE

## 2018-03-01 PROCEDURE — 87799 DETECT AGENT NOS DNA QUANT: CPT | Performed by: INTERNAL MEDICINE

## 2018-03-01 PROCEDURE — 83735 ASSAY OF MAGNESIUM: CPT | Performed by: INTERNAL MEDICINE

## 2018-03-01 RX ADMIN — FERRIC CARBOXYMALTOSE INJECTION 750 MG: 50 INJECTION, SOLUTION INTRAVENOUS at 14:28

## 2018-03-01 NOTE — PROGRESS NOTES
Nursing Note  Caity Horan [facility] for:   Chief Complaint   Patient presents with     Infusion     Injectafer     During today's [facility] appointment, orders from Dr. Mar were completed.  Frequency: today is dose 2 of 2 total.    Progress note:  Patient identification verified by name and date of birth.  Assessment completed.  Vitals recorded in Doc Flowsheets.  Patient was provided with education regarding infusion and possible side effects.  Patient verbalized understanding.      needed: No  Premedications: were not ordered.  Infusion Rates: infusion given over approximately 8 minutes IVP.  Approximate Infusion length:1 hours.   Labs: were drawn per orders.   Vascular access: peripheral IV placed today.  Treatment Conditions: patient monitored for 30 minutes after medication was infused.  Patient tolerated infusion: well.        Discharge Plan:   Follow up plan of care with: f/u with GI.  Discharge instructions were reviewed with patient.  Patient/representative verbalized understanding of discharge instructions and all questions answered.  Patient discharged from [facility] in stable condition.    Aracely Song RN    Administrations This Visit     ferric carboxymaltose (INJECTAFER) 750 mg     Admin Date Action Dose Route Administered By             03/01/2018 New Bag 750 mg Intravenous Aracely Song RN                          /42  Pulse 90

## 2018-03-01 NOTE — MR AVS SNAPSHOT
After Visit Summary   3/1/2018    Caity Horan    MRN: [id number]           Patient Information     Date Of Birth          1990        Visit Information        Provider Department      3/1/2018 2:00 PM DENG 41 ATC; DENG SPEC INFUSION [facility]        Today's Diagnoses     Iron deficiency anemia, unspecified iron deficiency anemia type    -  1    EBV (Nicole-Barr virus) viremia        Hypomagnesemia          Care Instructions      Ferric carboxymaltose Solution for injection  What is this medicine?  FERRIC CARBOXYMALTOSE (ferr-ik car-box-ee-mol-toes) is an iron complex. Iron is used to make healthy red blood cells, which carry oxygen and nutrients throughout the body. This medicine is used to treat anemia in people with chronic kidney disease or people who cannot take iron by mouth.  This medicine may be used for other purposes; ask your health care provider or pharmacist if you have questions.  What should I tell my health care provider before I take this medicine?  They need to know if you have any of these conditions:    anemia not caused by low iron levels    high levels of iron in the blood    liver disease    an unusual or allergic reaction to iron, other medicines, foods, dyes, or preservatives    pregnant or trying to get pregnant    breast-feeding  How should I use this medicine?  This medicine is for infusion into a vein. It is given by a health care professional in a hospital or clinic setting.  Talk to your pediatrician regarding the use of this medicine in children. Special care may be needed.  Overdosage: If you think you've taken too much of this medicine contact a poison control center or emergency room at once.  NOTE: This medicine is only for you. Do not share this medicine with others.  What if I miss a dose?  It is important not to miss your dose. Call your doctor or health care professional if you are unable to keep an  appointment.  What may interact with this medicine?  Do not take this medicine with any of the following medications:  deferoxamine  dimercaprol  other iron products  This medicine may also interact with the following medications:  chloramphenicol  deferasirox  This list may not describe all possible interactions. Give your health care provider a list of all the medicines, herbs, non-prescription drugs, or dietary supplements you use. Also tell them if you smoke, drink alcohol, or use illegal drugs. Some items may interact with your medicine.  What should I watch for while using this medicine?  Visit your doctor or health care professional regularly. Tell your doctor if your symptoms do not start to get better or if they get worse. You may need blood work done while you are taking this medicine.  You may need to follow a special diet. Talk to your doctor. Foods that contain iron include: whole grains/cereals, dried fruits, beans, or peas, leafy green vegetables, and organ meats (liver, kidney).  What side effects may I notice from receiving this medicine?  Side effects that you should report to your doctor or health care professional as soon as possible:  allergic reactions like skin rash, itching or hives, swelling of the face, lips, or tonguebreathing problems  changes in blood pressure  feeling faint or lightheaded, falls  flushing, sweating, or hot feelings  Side effects that usually do not require medical attention (Report these to your doctor or health care professional if they continue or are bothersome.):  changes in taste  constipation  dizziness  headache  nausea  pain, redness, or irritation at site where injected  vomiting  This list may not describe all possible side effects. Call your doctor for medical advice about side effects. You may report side effects to FDA at 2-909-FDA-8142.  Where should I keep my medicine?  This drug is given in a hospital or clinic and will not be stored at home.  NOTE: This  sheet is a summary. It may not cover all possible information. If you have questions about this medicine, talk to your doctor, pharmacist, or health care provider.  NOTE:This sheet is a summary. It may not cover all possible information. If you have questions about this medicine, talk to your doctor, pharmacist, or health care provider. Copyright  2016 Gold Standard                Follow-ups after your visit        Your next 10 appointments already scheduled     Mar 12, 2018   Procedure with Kory Massey MD   [facility] [place], Endoscopy ([facility], [facility])    [address] [id number] [phone]           The [facility] is located on the corner of [address] and [address] on the [address]. It is easily accessible from virtually any point in the [place] area, via [employer] and [employer].            Mar 22, 2018  8:40 AM CDT   (Arrive by 8:25 AM)   New Patient Visit with Rupert Dsouza PA-C   [facility] ([facility])    [address]  4th Floor  [place] [facility] [id number] [phone]            Aug 28, 2018  7:15 AM CDT   Lab with [facility]   [facility] ([facility])    [address]  1st Floor  [place] [id number] [phone]            Aug 28, 2018  3:35 PM CDT   (Arrive by 3:05 PM)   Return Kidney Transplant with Otilio Mar MD   [facility] ([facility])    [address] [place] [address]  [address]  [facility] [id number] [phone]              Who to contact     If you have questions or need follow up information about today's clinic visit or your schedule please contact [facility] directly at [phone].  Normal or non-critical lab and imaging results will be communicated to you by MyChart, letter or phone within 4 business  days after the clinic has received the results. If you do not hear from us within 7 days, please contact the clinic through [employer] or phone. If you have a critical or abnormal lab result, we will notify you by phone as soon as possible.  Submit refill requests through [employer] or call your pharmacy and they will forward the refill request to us. Please allow 3 business days for your refill to be completed.          Additional Information About Your Visit        [employer] Information     [employer] gives you secure access to your electronic health record. If you see a primary care provider, you can also send messages to your care team and make appointments. If you have questions, please call your primary care clinic.  If you do not have a primary care provider, please call [phone] and they will assist you.        Care EveryWhere ID     This is your Care EveryWhere ID. This could be used by other organizations to access your [facility] medical records  [id number]        Your Vitals Were     Pulse                   86            Blood Pressure from Last 3 Encounters:   03/01/18 111/56   02/27/18 100/60   02/22/18 100/60    Weight from Last 3 Encounters:   02/27/18 57.5 kg (126 lb 12.8 oz)   02/22/18 59 kg (130 lb)   12/01/17 62.6 kg (137 lb 14.4 oz)              We Performed the Following     EBV DNA PCR Quantitative Whole Blood     Magnesium        Primary Care Provider Office Phone # Fax #    Ritu CHLOE Little [phone]       [address]  [address] [address]        Equal Access to Services     AUNDREA QUACH : Hadii aad ku hadasho Soomaali, waaxda luqadaha, qaybta kaalmada adeegyada, philip lee . So [facility] [phone].    ATENCIÓN: Si habla español, tiene a carreon disposición servicios gratuitos de asistencia lingüística. Llame al [phone].    We comply with applicable federal civil rights laws and [place] laws. We do not discriminate on  the basis of race, color, national origin, age, disability, sex, sexual orientation, or gender identity.            Thank you!     Thank you for choosing [facility] SPECIAL[facility] AND PROCEDURE  for your care. Our goal is always to provide you with excellent care. Hearing back from our patients is one way we can continue to improve our services. Please take a few minutes to complete the written survey that you may receive in the mail after your visit with us. Thank you!             Your Updated Medication List - Protect others around you: Learn how to safely use, store and throw away your medicines at www.disposemymeds.org.          This list is accurate as of 3/1/18  3:37 PM.  Always use your most recent med list.                   Brand Name Dispense Instructions for use Diagnosis    B-12 1000 MCG Tbcr     90 tablet    Take 1,000 mcg by mouth daily    B12 deficiency due to diet       magnesium oxide 400 MG tablet    MAG-OX    90 tablet    Take 1 tablet (400 mg) by mouth daily    Hypomagnesemia, Status post kidney transplant, Kidney replaced by transplant, Kidney transplanted       mycophenolate 250 MG capsule    GENERIC EQUIVALENT    120 capsule    TAKE 2 CAPSULES BY MOUTH TWICE A DAY    Kidney transplanted, Kidney replaced by transplant, Status post kidney transplant       tacrolimus 0.5 MG capsule    GENERIC EQUIVALENT    180 capsule    Take 3 capsules (1.5 mg) by mouth 2 times daily    Kidney replaced by transplant, Status post kidney transplant, Hypomagnesemia, Kidney transplanted

## 2018-03-01 NOTE — PATIENT INSTRUCTIONS
Ferric carboxymaltose Solution for injection  What is this medicine?  FERRIC CARBOXYMALTOSE (ferr-ik car-box-ee-mol-toes) is an iron complex. Iron is used to make healthy red blood cells, which carry oxygen and nutrients throughout the body. This medicine is used to treat anemia in people with chronic kidney disease or people who cannot take iron by mouth.  This medicine may be used for other purposes; ask your health care provider or pharmacist if you have questions.  What should I tell my health care provider before I take this medicine?  They need to know if you have any of these conditions:    anemia not caused by low iron levels    high levels of iron in the blood    liver disease    an unusual or allergic reaction to iron, other medicines, foods, dyes, or preservatives    pregnant or trying to get pregnant    breast-feeding  How should I use this medicine?  This medicine is for infusion into a vein. It is given by a health care professional in a hospital or clinic setting.  Talk to your pediatrician regarding the use of this medicine in children. Special care may be needed.  Overdosage: If you think you've taken too much of this medicine contact a poison control center or emergency room at once.  NOTE: This medicine is only for you. Do not share this medicine with others.  What if I miss a dose?  It is important not to miss your dose. Call your doctor or health care professional if you are unable to keep an appointment.  What may interact with this medicine?  Do not take this medicine with any of the following medications:  deferoxamine  dimercaprol  other iron products  This medicine may also interact with the following medications:  chloramphenicol  deferasirox  This list may not describe all possible interactions. Give your health care provider a list of all the medicines, herbs, non-prescription drugs, or dietary supplements you use. Also tell them if you smoke, drink alcohol, or use illegal drugs. Some  items may interact with your medicine.  What should I watch for while using this medicine?  Visit your doctor or health care professional regularly. Tell your doctor if your symptoms do not start to get better or if they get worse. You may need blood work done while you are taking this medicine.  You may need to follow a special diet. Talk to your doctor. Foods that contain iron include: whole grains/cereals, dried fruits, beans, or peas, leafy green vegetables, and organ meats (liver, kidney).  What side effects may I notice from receiving this medicine?  Side effects that you should report to your doctor or health care professional as soon as possible:  allergic reactions like skin rash, itching or hives, swelling of the face, lips, or tonguebreathing problems  changes in blood pressure  feeling faint or lightheaded, falls  flushing, sweating, or hot feelings  Side effects that usually do not require medical attention (Report these to your doctor or health care professional if they continue or are bothersome.):  changes in taste  constipation  dizziness  headache  nausea  pain, redness, or irritation at site where injected  vomiting  This list may not describe all possible side effects. Call your doctor for medical advice about side effects. You may report side effects to FDA at 8-782-FDA-4550.  Where should I keep my medicine?  This drug is given in a hospital or clinic and will not be stored at home.  NOTE: This sheet is a summary. It may not cover all possible information. If you have questions about this medicine, talk to your doctor, pharmacist, or health care provider.  NOTE:This sheet is a summary. It may not cover all possible information. If you have questions about this medicine, talk to your doctor, pharmacist, or health care provider. Copyright  2016 Gold Standard

## 2018-03-01 NOTE — TELEPHONE ENCOUNTER
Anemia Management Note  SUBJECTIVE/OBJECTIVE:  Referred by Dr. Otilio Mar on 10/6/2017  Primary Diagnosis: Anemia in Chronic Kidney Disease (N18.3, D63.1)     Secondary Diagnosis:  Chronic Kidney Disease, Stage 3 (N18.3)   Hgb goal range:  9-10  Epo/Darbo: None  Iron regimen:  Does not tolerate oral iron    Anemia Latest Ref Rng & Units 11/3/2017 11/9/2017 11/10/2017 12/1/2017 2/1/2018 2/22/2018 3/1/2018   Hemoglobin 11.7 - 15.7 g/dL - 8.9(L) - 10.4(L) 9.5(L) - -   IV Iron Dose - 750mg - 750mg - - 750mg 750mg   TSAT 15 - 46 % - - - 41 12(L) - -   Ferritin 12 - 150 ng/mL - - - 277(H) 14 - -     BP Readings from Last 3 Encounters:   03/01/18 111/56   02/27/18 100/60   02/22/18 100/60     Wt Readings from Last 2 Encounters:   02/27/18 126 lb 12.8 oz (57.5 kg)   02/22/18 130 lb (59 kg)         ASSESSMENT:  Hgb:at goal - continue to monitor  TSat: Due for iron studies 4 weeks after last IV iron infusion Ferritin: Due for iron studies 4 weeks after last IV iron infusion    PLAN:  RTC for Hgb, ferritin and iron studies in 4 week(s)    Orders needed to be renewed (for next follow-up date) in EPIC: None  Reviewed 03/02/2018 EDUARDO    Iron labs due:  3/29/18    Plan discussed with:  Caity  Plan provided by:  Amaris    NEXT FOLLOW-UP DATE:  3/13/2018 to check GI visit then 03/29/2018 for labs    Anemia Management Service  Abida Song,PharmD and Princess Harrington CPhT  Phone: [phone]  Fax: [phone]

## 2018-03-02 PROBLEM — E53.8 VITAMIN B12 DEFICIENCY: Status: ACTIVE | Noted: 2018-03-02

## 2018-03-02 LAB
EBV DNA # SPEC NAA+PROBE: ABNORMAL {COPIES}/ML
EBV DNA SPEC NAA+PROBE-LOG#: 5 {LOG_COPIES}/ML

## 2018-03-02 NOTE — PROGRESS NOTES
Assessment and Plan:  1. LDKT - baseline Cr ~ 1.0-1.2, which has remained stable.  Normal proteinuria.  No DSA.  Will make no changes in immunosuppression.  2. BP - well controlled at target of less than 140/90 off antihypertensive medications.  No changes.  3. Anemia in chronic kidney disease - decreased Hgb with ongoing hematochezia.  Patient continues to be iron deficient and has received intermittent courses of IV iron.  Patient has been seen by Hematology.  In addition, patient had low vitamin B12 levels and was recommended to start oral vitamin B12 supplement, but this never happened.  Will start oral vitamin B12 1000 mcg daily.  She also being worked up by GI.  Will follow.  4. 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  She had initial marked increase in EBV PCR, up to a peak of ~ 160K 7/2017 and then generally stable in 100K range since that time.  However, her last EBV PCR was actually negative.  Recheck on EBV PCR is pending.  Previous CT chest/abd/pelvis 7/2017 showed no evidence of lymphoma.  Her immunosuppression was decreased and patient will remain on lower immunosuppression.  Patient has been seen by Transplant ID.  5. Diarrhea with hematochezia - ongoing symptoms with associated iron deficiency.  High CRP-inflammation and ESR levels and with other symptoms, is concerning for inflammatory bowel disease.  There are many case reports of an association with inflammatory bowel disease and IgA nephropathy in the literature, although unclear if there is a direct link.  Patient is being followed by GI and has colonoscopy and EGD scheduled.  6. Hypomagnesemia - normal serum magnesium level and will continue on oral magnesium supplement.  7. Skin cancer risk - no new skin lesions.  Discussed sun protection.  8. Recommend return visit in 6 months.    Assessment and plan was discussed with patient and she voiced her understanding and  "agreement.    Reason for Visit:  Ms. Horan is here for routine follow up.    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         Transplant Hx:       Tx: LDKT  Date: 12/5/14       Present Maintenance IS: Tacrolimus and Mycophenolate mofetil       Baseline Creatinine: 1.0-1.2       Recent DSA: No  Date last checked: 12/2016       Biopsy: No    Ms. Horan reports feeling good overall with minimal medical complaints.  Since patient's last clinic visit she has been seen by Transplant ID for EBV viremia and Hematology for ongoing anemia and iron deficiency.  Patient has received a couple of courses of IV iron with initial improvement in iron stores, but they drop again over time.  She notes ongoing diarrhea with blood in her stool or at least in the toilet bowl.  The bright red blood has increased in frequency.  With the diarrhea, patient will get occasional low crampy abdominal pain.  In addition, she has had some lab evaluation and was found to have high CRP-inflammation and ESR levels.  There is concern by GI that patient may have some inflammatory bowel disease with this, but PTLD is also a concern with her EBV viremia.  Patient is scheduled for a colonoscopy and an EGD in a couple of weeks.    Her energy level has been \"really bad\" at times, mostly when her iron level and anemia worsen.  She then tends to feel better with more energy after receiving a course of IV iron.  Patient is active and does get some exercise.  Denies any chest pain or shortness of breath with exertion.  Appetite is okay and she feels hungry, but she has lost about 10-15 lbs.  No nausea or vomiting.  Increase in diarrhea, as noted above.  No fever, sweats or chills.  No leg swelling.    Home BP: Not checked.  She does report feeling a bit lightheaded at times when she becomes more anemic.      ROS:   A comprehensive review of systems was obtained and negative, except as noted " "in the HPI or PMH.    Active Medical Problems:  Patient Active Problem List   Diagnosis     IgA nephropathy     Kidney replaced by transplant     Immunosuppressed status (H)     Hypomagnesemia     Aftercare following organ transplant     EBV (Nicole-Barr virus) viremia     Anemia, iron deficiency     Vitamin B12 deficiency       Personal Hx:  Social History     Social History     Marital status: Single     Spouse name: N/A     Number of children: N/A     Years of education: 16     Occupational History     [employer]vertising [employer]     Social History Main Topics     Smoking status: Never Smoker     Smokeless tobacco: Never Used     Alcohol use Yes     Drug use: No     Sexual activity: No     Other Topics Concern     Blood Transfusions No     Seat Belt Yes     Social History Narrative       Allergies:  Allergies   Allergen Reactions     Amoxicillin Rash       Medications:  Prior to Admission medications    Medication Sig Start Date End Date Taking? Authorizing Provider   tacrolimus (PROGRAF BRAND) 0.5 MG capsule Take 1.5 mg in the am  And 1.0  Mg in pm     Generic  Patient taking differently: Take 1.5 mg in the PM  And 1.0  Mg in AM     Generic 2/6/15  Yes Patricia Rodriguez MD   magnesium oxide (MAG-OX) 400 MG tablet Take 2 tablets (800 mg) by mouth daily 1/2/15  Yes More Cardona MD   senna-docusate (SENOKOT-S;PERICOLACE) 8.6-50 MG per tablet Take 2 tablets by mouth 2 times daily as needed for constipation  Patient taking differently: Take 2 tablets by mouth as needed for constipation  12/29/14  Yes Patricia Rodriguez MD   mycophenolate (CELLCEPT-GENERIC EQUIVALENT) 250 MG capsule Take 3 capsules (750 mg) by mouth 2 times daily 12/9/14  Yes Jessica Dumont PA-C   sulfamethoxazole-trimethoprim (BACTRIM,SEPTRA) 400-80 MG per tablet Take 1 tablet by mouth daily 12/9/14  Yes Jessica Dumont PA-C       Vitals:  /60  Pulse 91  Temp 98.5  F (36.9  C) (Oral)  Ht 1.6 m (5' 2.99\")  Wt " 57.5 kg (126 lb 12.8 oz)  SpO2 99%  BMI 22.47 kg/m2    Exam:   GENERAL APPEARANCE: alert and no distress  HENT: mouth without ulcers or lesions  LYMPHATICS: no cervical or supraclavicular nodes  RESP: lungs clear to auscultation - no rales, rhonchi or wheezes  CV: regular rhythm, normal rate, no rub, no murmur  EDEMA: no LE edema bilaterally  ABDOMEN: soft, nondistended, nontender, bowel sounds normal  MS: extremities normal - no gross deformities noted, no evidence of inflammation in joints, no muscle tenderness  SKIN: no rash  TX KIDNEY: normal    Results:   Recent Results (from the past 840 hour(s))   Protein  random urine    Collection Time: 02/01/18  6:56 AM   Result Value Ref Range    Protein Random Urine 0.08 g/L    Protein Total Urine g/gr Creatinine 0.05 0 - 0.2 g/g Cr   Creatinine urine calculation only    Collection Time: 02/01/18  6:56 AM   Result Value Ref Range    Creatinine Urine 151 mg/dL   CBC with platelets    Collection Time: 02/01/18  7:02 AM   Result Value Ref Range    WBC 9.8 4.0 - 11.0 10e9/L    RBC Count 3.10 (L) 3.8 - 5.2 10e12/L    Hemoglobin 9.5 (L) 11.7 - 15.7 g/dL    Hematocrit 30.6 (L) 35.0 - 47.0 %    MCV 99 78 - 100 fl    MCH 30.6 26.5 - 33.0 pg    MCHC 31.0 (L) 31.5 - 36.5 g/dL    RDW 13.6 10.0 - 15.0 %    Platelet Count 323 150 - 450 10e9/L   Basic metabolic panel    Collection Time: 02/01/18  7:02 AM   Result Value Ref Range    Sodium 140 133 - 144 mmol/L    Potassium 4.4 3.4 - 5.3 mmol/L    Chloride 108 94 - 109 mmol/L    Carbon Dioxide 26 20 - 32 mmol/L    Anion Gap 7 3 - 14 mmol/L    Glucose 103 (H) 70 - 99 mg/dL    Urea Nitrogen 17 7 - 30 mg/dL    Creatinine 1.04 0.52 - 1.04 mg/dL    GFR Estimate 63 >60 mL/min/1.7m2    GFR Estimate If Black 77 >60 mL/min/1.7m2    Calcium 8.3 (L) 8.5 - 10.1 mg/dL   Tacrolimus level    Collection Time: 02/01/18  7:02 AM   Result Value Ref Range    Tacrolimus Last Dose 01/31/18  4314     Tacrolimus Level 6.1 5.0 - 15.0 ug/L   EBV DNA PCR  Quantitative Whole Blood    Collection Time: 02/01/18  7:02 AM   Result Value Ref Range    EBV DNA Copies/mL EBV DNA Not Detected EBVNEG^EBV DNA Not Detected [Copies]/mL    EBV DNA Log of Copies Not Calculated <2.7 [Log_copies]/mL   Iron and iron binding capacity    Collection Time: 02/01/18  7:02 AM   Result Value Ref Range    Iron 36 35 - 180 ug/dL    Iron Binding Cap 291 240 - 430 ug/dL    Iron Saturation Index 12 (L) 15 - 46 %   Ferritin    Collection Time: 02/01/18  7:02 AM   Result Value Ref Range    Ferritin 14 12 - 150 ng/mL   BK virus PCR quantitative    Collection Time: 02/01/18  7:03 AM   Result Value Ref Range    BK Virus Specimen Plasma     BK Virus Result BK Virus DNA Not Detected BKNEG^BK Virus DNA Not Detected copies/mL    BK Virus Log Not Calculated <2.7 Log copies/mL   CRP inflammation    Collection Time: 02/22/18  9:06 AM   Result Value Ref Range    CRP Inflammation 11.6 (H) 0.0 - 8.0 mg/L   Erythrocyte sedimentation rate auto    Collection Time: 02/22/18  9:06 AM   Result Value Ref Range    Sed Rate 104 (H) 0 - 20 mm/h   Magnesium    Collection Time: 03/01/18  2:15 PM   Result Value Ref Range    Magnesium 1.7 1.6 - 2.3 mg/dL

## 2018-03-05 ENCOUNTER — DOCUMENTATION ONLY (OUTPATIENT)
Dept: TRANSPLANT | Facility: CLINIC | Age: 28
End: 2018-03-05

## 2018-03-05 ENCOUNTER — TELEPHONE (OUTPATIENT)
Dept: GASTROENTEROLOGY | Facility: CLINIC | Age: 28
End: 2018-03-05

## 2018-03-05 DIAGNOSIS — D50.8 OTHER IRON DEFICIENCY ANEMIA: Primary | ICD-10-CM

## 2018-03-05 NOTE — TELEPHONE ENCOUNTER
Patient scheduled for colonoscopy and upper egd    Indication for procedure. anemia    Referring Provider. Rupert Dsouza NP and Dr. Mensah    ? no    Arrival time verified? yes    Facility location verified? [address] [facility]. , [phone]    Instructions given regarding prep and procedure    Prep Type Golytely    Are you taking any anticoagulants or blood thinners? no    Instructions given? Yes, verbally and written    Electronic implanted devices? no    Pre procedure teaching completed? Yes    Transportation from procedure? Yes, mom    H&P / Pre op physical completed? Na    Mable El RN

## 2018-03-05 NOTE — PROGRESS NOTES
3/1/18 result: EBV [id number]  Notes Recorded by Otilio Mar MD on 3/3/2018 at 8:09 AM  Stable EBV viremia.  Last negative check likely not accurate.  Will continue on lower immunosuppression.

## 2018-03-12 ENCOUNTER — SURGERY (OUTPATIENT)
Age: 28
End: 2018-03-12

## 2018-03-12 ENCOUNTER — HOSPITAL ENCOUNTER (OUTPATIENT)
Facility: CLINIC | Age: 28
Discharge: HOME | End: 2018-03-12
Attending: INTERNAL MEDICINE | Admitting: INTERNAL MEDICINE
Payer: COMMERCIAL

## 2018-03-12 VITALS
DIASTOLIC BLOOD PRESSURE: 66 MMHG | HEART RATE: 94 BPM | OXYGEN SATURATION: 99 % | RESPIRATION RATE: 13 BRPM | SYSTOLIC BLOOD PRESSURE: 96 MMHG

## 2018-03-12 LAB — COLONOSCOPY: NORMAL

## 2018-03-12 PROCEDURE — 88305 TISSUE EXAM BY PATHOLOGIST: CPT | Performed by: INTERNAL MEDICINE

## 2018-03-12 PROCEDURE — 45380 COLONOSCOPY AND BIOPSY: CPT | Performed by: INTERNAL MEDICINE

## 2018-03-12 PROCEDURE — 88342 IMHCHEM/IMCYTCHM 1ST ANTB: CPT | Performed by: INTERNAL MEDICINE

## 2018-03-12 PROCEDURE — 25000128 H RX IP 250 OP 636: Performed by: INTERNAL MEDICINE

## 2018-03-12 PROCEDURE — G0500 MOD SEDAT ENDO SERVICE >5YRS: HCPCS | Performed by: INTERNAL MEDICINE

## 2018-03-12 PROCEDURE — 25000132 ZZH RX MED GY IP 250 OP 250 PS 637: Performed by: INTERNAL MEDICINE

## 2018-03-12 PROCEDURE — 99153 MOD SED SAME PHYS/QHP EA: CPT | Performed by: INTERNAL MEDICINE

## 2018-03-12 RX ORDER — FENTANYL CITRATE 50 UG/ML
INJECTION, SOLUTION INTRAMUSCULAR; INTRAVENOUS PRN
Status: DISCONTINUED | OUTPATIENT
Start: 2018-03-12 | End: 2018-03-13 | Stop reason: HOSPADM

## 2018-03-12 RX ORDER — ONDANSETRON 2 MG/ML
4 INJECTION INTRAMUSCULAR; INTRAVENOUS
Status: DISCONTINUED | OUTPATIENT
Start: 2018-03-12 | End: 2018-03-13 | Stop reason: HOSPADM

## 2018-03-12 RX ORDER — LIDOCAINE 40 MG/G
CREAM TOPICAL
Status: DISCONTINUED | OUTPATIENT
Start: 2018-03-12 | End: 2018-03-13 | Stop reason: HOSPADM

## 2018-03-12 RX ORDER — SIMETHICONE
LIQUID (ML) MISCELLANEOUS PRN
Status: DISCONTINUED | OUTPATIENT
Start: 2018-03-12 | End: 2018-03-13 | Stop reason: HOSPADM

## 2018-03-12 RX ADMIN — MIDAZOLAM 1 MG: 1 INJECTION INTRAMUSCULAR; INTRAVENOUS at 16:01

## 2018-03-12 RX ADMIN — Medication 2 ML: at 16:22

## 2018-03-12 RX ADMIN — MIDAZOLAM 2 MG: 1 INJECTION INTRAMUSCULAR; INTRAVENOUS at 16:09

## 2018-03-12 RX ADMIN — MIDAZOLAM 3 MG: 1 INJECTION INTRAMUSCULAR; INTRAVENOUS at 15:58

## 2018-03-12 RX ADMIN — FENTANYL CITRATE 50 MCG: 50 INJECTION, SOLUTION INTRAMUSCULAR; INTRAVENOUS at 15:58

## 2018-03-12 NOTE — IP AVS SNAPSHOT
MRN:[id number]                      After Visit Summary   3/12/2018    Caity Horan    MRN: [id number]           Thank you!     Thank you for choosing [facility] for your care. Our goal is always to provide you with excellent care. Hearing back from our patients is one way we can continue to improve our services. Please take a few minutes to complete the written survey that you may receive in the mail after you visit with us. Thank you!        Patient Information     Date Of Birth          1990        About your hospital stay     You were admitted on:  March 12, 2018 You last received care in the:  [facility], Endoscopy    You were discharged on:  March 12, 2018       Who to Call     For medical emergencies, please call 911.  For non-urgent questions about your medical care, please call your primary care provider or clinic, [phone]  For questions related to your surgery, please call your surgery clinic        Attending Provider     Provider Specialty    Kory Massey MD Gastroenterology       Primary Care Provider Office Phone # Fax #    Ritu CHLOE Little [phone]      Your next 10 appointments already scheduled     Mar 22, 2018  8:40 AM CDT   (Arrive by 8:25 AM)   New Patient Visit with Rupert Dsouza PA-C   [facility] ([facility])    [address]  4th Floor  [facility] [address] [phone]            Aug 28, 2018  7:15 AM CDT   Lab with  LAB   [facility] Lab ([facility])    [address]  1st Floor  [facility] [address] [phone]            Aug 28, 2018  3:35 PM CDT   (Arrive by 3:05 PM)   Return Kidney Transplant with Otilio Mar MD   [facility] Nephrology ([facility])    [address] [place] [address]  [address]  [facility] [address] [phone]              Further instructions from your care team         Your  Child Has Crohn's Disease    Crohn s disease is a type of inflammatory bowel disease (IBD). It causes swelling and sores in your child s digestive tract. The whole tract may be affected, from the mouth to the anus (where stool leaves the body). Swelling and sores make it harder for the body to get nutrients from food. This can make your child feel very ill. It can also lead to growth failure. No one knows what causes Crohn s disease. There is no cure, but your child s symptoms can be managed.   What are the symptoms of Crohn s Disease?  Common symptoms of Crohn s disease include:    Belly pain    Fever    Diarrhea    Growth failure (short stature)    Nausea or vomiting    Weight loss    Fatigue (tiredness)    Sores in the mouth or around the anus    Bleeding from the rectum (where stool collects before leaving the body)    Abscess (infection) or fistula (abnormal opening in the digestive tract)   How is Crohn s disease diagnosed?  The healthcare provider will start by examining your child and asking some questions. The healthcare provider may order certain tests, such as:    Upper GI series with small bowel follow-through. A series of X-rays of your child s upper digestive tract and small intestine.    Blood and stool tests    CT scan, MRI, or ultrasound. Tests that take detailed pictures from outside your child s body.    Endoscopy and colonoscopy. Tests during which a flexible tube with a camera is used to view the inside of your child's digestive tract. This lets the healthcare provider perform a biopsy (take a tiny tissue sample).    Wireless capsule endoscopy. This wireless camera is swallowed like a pill and takes pictures of the inside of the intestine. It comes out in the stool with a bowel movement. Pictures are uploaded to a computer to be analyzed.   How is Crohn s disease treated?     All layers of the digestive tract can become inflamed, even the inner lining and outer walls.   Your child will be  "referred to a pediatric gastroenterologist. This is a healthcare provider who is an expert in managing Crohn's disease. There is no cure for Crohn s disease. But there are ways to help your child feel better. These may include:    Medicines that control swelling and sores. These may be oral or injected.     Supplements to give extra nutrition.    Change in diet. This includes an elemental diet, low-residue diet, lactose-free diet, or other changes depending on your child's disease and complications.     Bowel rest to give the intestines time to recover. If your child s symptoms have been severe, the healthcare provider may suggest giving your child TPN (total parenteral nutrition). During TPN, nutrients are given through a vein in the arm or chest. This gives your child nutrition without going through the digestive tract.    Surgery to relieve your child s symptoms. Taking medicine may not be enough to prevent flare-ups. In this case, the healthcare provider may talk to you about a surgery to help control symptoms.  Following up with the healthcare provider  Once symptoms are under control or \"quiet\". (This is called remission.) The gastroenterologist will want to see your child 3 to 4 times a year for follow-up. Your primary healthcare provider and gastroenterologist will work together to see that your child's immunizations (vaccines) are up to date. They will also monitor your child's growth and well-being. Tests may be done, including:    Bone scans (painless tests that check the health of your child s bones)    Eye exams    Blood tests  Handling flare-ups  Flare-ups (times when your child feels symptoms) may still happen. These tips can help you and your child deal with flare-ups:    Allow more time for your child to get ready in the morning. During a flare-up, your child may need longer bathroom time. Stress and pressure can make symptoms worse. Make sure your child has plenty of time and doesn t feel " rushed.    Talk to your child s teachers and school officials about your child s Crohn s disease. Talk with your child s teachers about flare-ups. Ask them to let your child use the bathroom as needed. This can help prevent accidents. Also, children with Crohn s may miss school more often during flare-ups. Work with your child s teachers to help your child keep up with schoolwork. Your child should qualify for school accommodations under Section 504 of the Rehabilitation Act of 1973. Your healthcare provider can assist your child in receiving these accommodations by writing a letter to the school.     Let your child be active. Having Crohn s disease doesn t mean your child can t play sports or be active. During flare-ups, your child might not feel well enough to be active. Let your child decide how much activity feels OK during flare-ups. Encourage your child to be active when symptoms are quiet.       Encourage good nutrition. A healthy diet provides good nutrition for growth. Talk to your child about making healthy food choices when away from home.    Make sure your child takes all of his or her medicines. This can help reduce the number of flare-ups.    Look into Crohn s disease support groups and resources. If your child is feeling alone or different from peers, a support group can offer tips on helping your child lead a normal, active life.  Learn more about living well with Crohn s disease  Check out these resources:    Crohn s & Colitis Foundation of [place]: www.ccfa.org    National Digestive Diseases Information Clearinghouse: www.digestive.niddk.nih.gov/ddiseases/pubs/crohns/index.aspx   Date Last Reviewed: 10/1/2016    4861-2752 [employer]. [address] [place] [address], [place] [address]. All rights reserved. This information is not intended as a substitute for professional medical care. Always follow your healthcare professional's instructions.        Discharge Instructions after Colonoscopy  or  Sigmoidoscopy    Today you had a _x___ Colonoscopy Activity and Diet  You were given medicine for pain. You may be dizzy or sleepy.  For 24 hours:    Do not drive or use heavy equipment.    Do not make important decisions.    Do not drink any alcohol.  You may return to your normal diet and medicines.    Discomfort    Air was placed in your colon during the exam in order to see it. Walking helps to pass the air.    You may take Tylenol (acetaminophen) for pain unless your doctor has told you not to.  Do not take aspirin or ibuprofen (Advil, Motrin, or other anti-inflammatory  drugs)     Follow-up  __x__ We took small tissue samples or polyps to study. Your doctor will call you with the results  within two weeks.    When to call:    Call right away if you have:    Unusual pain in belly or chest pain not relieved with passing air.    More than 1 to 2 Tablespoons of bleeding from your rectum.    Fever above 100.6  F (37.5  C).    If you have severe pain, bleeding, or shortness of breath, go to an emergency room.    If you have questions, call:  Monday to Friday, 7 a.m. to 4:30 p.m.  Endoscopy: [phone] (We may have to call you back)    After hours  Hospital: [phone] (Ask for the GI fellow on call)    Pending Results     No orders found from 3/10/2018 to 3/13/2018.            Admission Information     Date & Time Provider Department Dept. Phone    3/12/2018 Kory Massey MD [facility] [place], Endoscopy [phone]      Your Vitals Were     Blood Pressure Pulse Respirations Last Period Pulse Oximetry       93/50 94 22 02/04/2018 100%       MyChart Information     [employer] gives you secure access to your electronic health record. If you see a primary care provider, you can also send messages to your care team and make appointments. If you have questions, please call your primary care clinic.  If you do not have a primary care provider, please call [phone] and they will assist you.        Care EveryWhere ID      This is your Care EveryWhere ID. This could be used by other organizations to access your [facility] medical records  [id number]        Equal Access to Services     AUNDREA QUAHC : Hadii aad ku hadstephencesar Sven, walarryda luvaleriaamerica, jud kajamesda live, philip haddad. Gosia [facility] [phone].    ATENCIÓN: Si habla español, tiene a carreon disposición servicios gratuitos de asistencia lingüística. Llame al [phone].    We comply with applicable federal civil rights laws and [place] laws. We do not discriminate on the basis of race, color, national origin, age, disability, sex, sexual orientation, or gender identity.               Review of your medicines      UNREVIEWED medicines. Ask your doctor about these medicines        Dose / Directions    B-12 1000 MCG Tbcr   Used for:  B12 deficiency due to diet        Dose:  1000 mcg   Take 1,000 mcg by mouth daily   Quantity:  90 tablet   Refills:  3       magnesium oxide 400 MG tablet   Commonly known as:  MAG-OX   Used for:  Hypomagnesemia, Status post kidney transplant, Kidney replaced by transplant, Kidney transplanted        Dose:  400 mg   Take 1 tablet (400 mg) by mouth daily   Quantity:  90 tablet   Refills:  3       mycophenolate 250 MG capsule   Commonly known as:  GENERIC EQUIVALENT   Used for:  Kidney transplanted, Kidney replaced by transplant, Status post kidney transplant        TAKE 2 CAPSULES BY MOUTH TWICE A DAY   Quantity:  120 capsule   Refills:  0       tacrolimus 0.5 MG capsule   Commonly known as:  GENERIC EQUIVALENT   Used for:  Kidney replaced by transplant, Status post kidney transplant, Hypomagnesemia, Kidney transplanted        Dose:  1.5 mg   Take 3 capsules (1.5 mg) by mouth 2 times daily   Quantity:  180 capsule   Refills:  11                Protect others around you: Learn how to safely use, store and throw away your medicines at www.disposemymeds.org.             Medication List: This is a list of all your  medications and when to take them. Check marks below indicate your daily home schedule. Keep this list as a reference.      Medications           Morning Afternoon Evening Bedtime As Needed    B-12 1000 MCG Tbcr   Take 1,000 mcg by mouth daily                                magnesium oxide 400 MG tablet   Commonly known as:  MAG-OX   Take 1 tablet (400 mg) by mouth daily                                mycophenolate 250 MG capsule   Commonly known as:  GENERIC EQUIVALENT   TAKE 2 CAPSULES BY MOUTH TWICE A DAY                                tacrolimus 0.5 MG capsule   Commonly known as:  GENERIC EQUIVALENT   Take 3 capsules (1.5 mg) by mouth 2 times daily

## 2018-03-12 NOTE — DISCHARGE INSTRUCTIONS
Discharge Instructions after Colonoscopy  or Sigmoidoscopy    Today you had a _x___ Colonoscopy Activity and Diet  You were given medicine for pain. You may be dizzy or sleepy.  For 24 hours:    Do not drive or use heavy equipment.    Do not make important decisions.    Do not drink any alcohol.  You may return to your normal diet and medicines.    Discomfort    Air was placed in your colon during the exam in order to see it. Walking helps to pass the air.    You may take Tylenol (acetaminophen) for pain unless your doctor has told you not to.  Do not take aspirin or ibuprofen (Advil, Motrin, or other anti-inflammatory  drugs)     Follow-up  __x__ We took small tissue samples or polyps to study. Your doctor will call you with the results  within two weeks.    When to call:    Call right away if you have:    Unusual pain in belly or chest pain not relieved with passing air.    More than 1 to 2 Tablespoons of bleeding from your rectum.    Fever above 100.6  F (37.5  C).    If you have severe pain, bleeding, or shortness of breath, go to an emergency room.    If you have questions, call:  Monday to Friday, 7 a.m. to 4:30 p.m.  Endoscopy: [phone] (We may have to call you back)    After hours  Hospital: [phone] (Ask for the GI fellow on call)

## 2018-03-12 NOTE — OR NURSING
Pt tolerated colonoscopy with biopsies very well. EGD was cancelled, see Md note. Pt to follow up with GI for suspected UC.

## 2018-03-13 ENCOUNTER — TELEPHONE (OUTPATIENT)
Dept: PHARMACY | Facility: CLINIC | Age: 28
End: 2018-03-13

## 2018-03-13 NOTE — TELEPHONE ENCOUNTER
Follow-up with anemia management service:    Gastro appt today found bleeding in GI tract shows GI bleeding in colon.    Anemia Latest Ref Rng & Units 11/3/2017 2017 11/10/2017 2017 2018 2018 3/1/2018   Hemoglobin 11.7 - 15.7 g/dL - 8.9(L) - 10.4(L) 9.5(L) - -   IV Iron Dose - 750mg - 750mg - - 750mg 750mg   TSAT 15 - 46 % - - - 41 12(L) - -   Ferritin 12 - 150 ng/mL - - - 277(H) 14 - -       Orders needed to be renewed (for next follow-up date) in EPIC: None   Med order expires:    Lab orders : 10/05/2018    Next labs for Ferritin, TSAT and Hgb due 2018     Follow-up call date: 2018    Left  regarding next follow up & that saw had gastro exam.  [facility]    Anemia Management Service  Abida Song,PharmD and Princess Harrington CPhT  Phone: [phone]  Fax: [phone]

## 2018-03-14 LAB — COPATH REPORT: NORMAL

## 2018-03-16 ENCOUNTER — TELEPHONE (OUTPATIENT)
Dept: GASTROENTEROLOGY | Facility: CLINIC | Age: 28
End: 2018-03-16

## 2018-03-17 ENCOUNTER — TELEPHONE (OUTPATIENT)
Dept: GASTROENTEROLOGY | Facility: CLINIC | Age: 28
End: 2018-03-17

## 2018-03-17 DIAGNOSIS — K51.011 ULCERATIVE PANCOLITIS WITH RECTAL BLEEDING (H): Primary | ICD-10-CM

## 2018-03-17 RX ORDER — PREDNISONE 5 MG/1
TABLET ORAL
Qty: 228 TABLET | Refills: 0 | Status: SHIPPED | OUTPATIENT
Start: 2018-03-17 | End: 2018-07-17

## 2018-03-17 NOTE — TELEPHONE ENCOUNTER
I called Caity to see how she is doing and to share the colonoscopy and path results with her, which confirm moderate-severe UC pancolitis. She is reporting 6 BMs during the day and 2-4 BMs at night, all bloody. Endorses urgency. She is tearful regarding her severe symptoms and fear of her new diagnosis of ulcerative colitis.     She reports GI symptoms since 2012, with worsening end of Dec and more progression over the past 4 weeks. She is s/p kidney transplant for IgA nephropathy and is on immunosuppression for this (cellcept and tacrolimus).  Will plan a prednisone taper (prescribed). Risks of prednisone were discussed, including infections, anxiety, insomnia. Long term effects were shared as well, including diabetes, bone density loss and body habitus changes.  If she worsens from a GI standpoint, she will call the hospital to speak to the GI physician on call and will need a C diff check and cessation of steroid.     I will see her in the office this Wed, March 21.  Will likely start Entyvio (instead of anti-TNF therapy), given patient's immunosuppression.    All questions answered.

## 2018-03-21 ENCOUNTER — OFFICE VISIT (OUTPATIENT)
Dept: GASTROENTEROLOGY | Facility: CLINIC | Age: 28
End: 2018-03-21
Payer: COMMERCIAL

## 2018-03-21 VITALS
DIASTOLIC BLOOD PRESSURE: 62 MMHG | SYSTOLIC BLOOD PRESSURE: 112 MMHG | WEIGHT: 129.7 LBS | BODY MASS INDEX: 23.87 KG/M2 | HEIGHT: 62 IN | OXYGEN SATURATION: 100 % | HEART RATE: 90 BPM

## 2018-03-21 DIAGNOSIS — K51.011 ULCERATIVE PANCOLITIS WITH RECTAL BLEEDING (H): Primary | ICD-10-CM

## 2018-03-21 ASSESSMENT — PAIN SCALES - GENERAL: PAINLEVEL: NO PAIN (0)

## 2018-03-21 NOTE — MR AVS SNAPSHOT
After Visit Summary   3/21/2018    Caity Horan    MRN: [id number]           Patient Information     Date Of Birth          1990        Visit Information        Provider Department      3/21/2018 3:40 PM Yenifer Doan MD [facility]        Today's Diagnoses     Ulcerative pancolitis with rectal bleeding (H)    -  1      Care Instructions    Dear Caity,    Thank you for coming today; it was a pleasure meeting you.  As discussed, the plan will be:    --Blood work today  --Stool sample (fecal calprotectin) to check for inflammation in your colon  --Please keep me informed on how you're doing via or [email or web address] or call     Check out [email or web address]     Return for follow up with Rupert Dsouza in 6 weeks and with me in 4 months.             Follow-ups after your visit        Follow-up notes from your care team     Return in about 4 months (around 7/21/2018).      Your next 10 appointments already scheduled     Mar 22, 2018  7:45 AM CDT   LAB with  LAB   [facility])    [address] [place] [address]  [address] [facility] [id number] [phone]           Please do not eat 10-12 hours before your appointment if you are coming in fasting for labs on lipids, cholesterol, or glucose (sugar). This does not apply to pregnant women. Water, hot tea and black coffee (with nothing added) are okay. Do not drink other fluids, diet soda or chew gum.            May 01, 2018  2:00 PM CDT   (Arrive by 1:45 PM)   RETURN INFLAMMATORY BOWEL DISEASE with Rupert Dsouza PA-C   [facility] ([facility])    [address] [place] [address]  [address] [facility] [address]   [phone]            Jul 17, 2018  3:40 PM CDT   (Arrive by 3:25 PM)   RETURN INFLAMMATORY BOWEL DISEASE with Yenifer Doan MD   [facility] ([facility])    [address] [place] [address]  Floor  [facility] [id number] [phone]            Aug 28, 2018  7:15 AM CDT   Lab with  LAB    [facility] ([facility])    [address]  1st Floor  [facility] [id number] [phone]            Aug 28, 2018  3:35 PM CDT   (Arrive by 3:05 PM)   Return Kidney Transplant with Otilio Mar MD   [facility] ([facility])    [address]  [address]  [facility] [id number] [phone]              Future tests that were ordered for you today     Open Future Orders        Priority Expected Expires Ordered    CBC with platelets differential [KSX896] Routine 3/21/2018 5/20/2018 3/21/2018    Hepatic panel [LAB20] Routine 3/21/2018 5/20/2018 3/21/2018    CRP inflammation [ONH7279] Routine 3/21/2018 5/20/2018 3/21/2018    Erythrocyte sedimentation rate auto [MIX851] Routine 3/21/2018 5/20/2018 3/21/2018    Basic metabolic panel [LAB15] Routine 3/21/2018 5/20/2018 3/21/2018    Calprotectin Feces [SZK7258] Routine 3/21/2018 5/20/2018 3/21/2018    Vitamin D Deficiency [HHR258] Routine 3/21/2018 5/20/2018 3/21/2018    Vitamin B12 [LAB67] Routine 3/21/2018 5/20/2018 3/21/2018    Hepatitis B Surface Antibody [MDY1356] Routine 3/21/2018 5/20/2018 3/21/2018    Hepatitis B surface antigen [WAN331] Routine 3/21/2018 5/20/2018 3/21/2018    Hepatitis B core antibody [YYO3677] Routine 3/21/2018 5/20/2018 3/21/2018    Tuberculosis by Quantiferon (gold) [KJW3477] Routine 3/21/2018 5/20/2018 3/21/2018            Who to contact     Please call your clinic at [phone] to:    Ask questions about your health    Make or cancel appointments    Discuss your medicines    Learn about your test results    Speak to your doctor            Additional Information About Your Visit        MyChart Information     [employer] gives you secure access to your electronic health record. If you see a primary care provider, you can also send messages to your care  "team and make appointments. If you have questions, please call your primary care clinic.  If you do not have a primary care provider, please call [phone] and they will assist you.      [employer] is an electronic gateway that provides easy, online access to your medical records. With [employer], you can request a clinic appointment, read your test results, renew a prescription or communicate with your care team.     To access your existing account, please contact your [facility] or call [phone] for assistance.        Care EveryWhere ID     This is your Care EveryWhere ID. This could be used by other organizations to access your [place] medical records  [id number]        Your Vitals Were     Pulse Height Pulse Oximetry BMI (Body Mass Index)          90 1.575 m (5' 2\") 100% 23.72 kg/m2         Blood Pressure from Last 3 Encounters:   03/21/18 112/62   03/12/18 96/66   03/01/18 111/56    Weight from Last 3 Encounters:   03/21/18 58.8 kg (129 lb 11.2 oz)   02/27/18 57.5 kg (126 lb 12.8 oz)   02/22/18 59 kg (130 lb)               Primary Care Provider Office Phone # Fax #    Ritu CHLOE Little [phone]       [address]  [address] [address]        Equal Access to Services     AUNDREA QUACH : Hadii aad ku hadasho Soomaali, waaxda luqadaha, qaybta kaalmada adeegyada, philip haddad. So [facility] [phone].    ATENCIÓN: Si habla español, tiene a carreon disposición servicios gratuitos de asistencia lingüística. Llame al [phone].    We comply with applicable federal civil rights laws and [place] laws. We do not discriminate on the basis of race, color, national origin, age, disability, sex, sexual orientation, or gender identity.            Thank you!     Thank you for choosing [facility]  for your care. Our goal is always to provide you with excellent care. Hearing back from our " patients is one way we can continue to improve our services. Please take a few minutes to complete the written survey that you may receive in the mail after your visit with us. Thank you!             Your Updated Medication List - Protect others around you: Learn how to safely use, store and throw away your medicines at www.disposemymeds.org.          This list is accurate as of 3/21/18  5:34 PM.  Always use your most recent med list.                   Brand Name Dispense Instructions for use Diagnosis    B-12 1000 MCG Tbcr     90 tablet    Take 1,000 mcg by mouth daily    B12 deficiency due to diet       magnesium oxide 400 MG tablet    MAG-OX    90 tablet    Take 1 tablet (400 mg) by mouth daily    Hypomagnesemia, Status post kidney transplant, Kidney replaced by transplant, Kidney transplanted       mycophenolate 250 MG capsule    GENERIC EQUIVALENT    120 capsule    TAKE 2 CAPSULES BY MOUTH TWICE A DAY    Kidney transplanted, Kidney replaced by transplant, Status post kidney transplant       predniSONE 5 MG tablet    DELTASONE    228 tablet    40 mg daily x 2 wks, decrease by 10 mg weekly til at 20 mg daily, decrease by 5 mg wkly til at 5 mg daily, take 5mg every other day x 1 wk    Ulcerative pancolitis with rectal bleeding (H)       tacrolimus 0.5 MG capsule    GENERIC EQUIVALENT    180 capsule    Take 3 capsules (1.5 mg) by mouth 2 times daily    Kidney replaced by transplant, Status post kidney transplant, Hypomagnesemia, Kidney transplanted

## 2018-03-21 NOTE — PATIENT INSTRUCTIONS
Tosha Carolina,    Thank you for coming today; it was a pleasure meeting you.  As discussed, the plan will be:    --Blood work today  --Stool sample (fecal calprotectin) to check for inflammation in your colon  --Please keep me informed on how you're doing via or [employer] or call     Check out [email or web address]     Return for follow up with Rupert Dsouza in 6 weeks and with me in 4 months.

## 2018-03-21 NOTE — PROGRESS NOTES
"[facility] UC NEW       PATIENT: Caity Horan    MRN: [id number]    Date of Birth 1990    Tel: [phone] (home)     PCP: Ritu Little     HPI: Ms. Horan is a 27 year old year old female here to establish care for newly diagnosed UC. She is presenting today with her mother, Krupa.     UC history  Spring 2012 -- graduated college, had a lot of life stressors. Experienced severe urgency with incontinence. Some blood in the stool and diarrhea alternating with constipation. Spontaneously resolved after 2 months.   End of Dec 2016 - April 2017: intermittent blood in stool, \"major constipation\" and weight gain, no urgency.    October 2017 -- was found to be anemic (attributed to kidney disease) and had iron infusions.  Sep -Dec 2017 Blood with well formed stool. This continued to worsen.   Worst stretch was mid-Feb to now: weight loss, >8 loose stools per day with blood.    In the past had upper abdominal bloating/pain and LLQ pain. No n/v.     Started prednisone 40 mg daily on Monday and feeling much better with decreased frequency and blood in stools.     Macroscopic extent of disease (most recent) E3    Current UC symptoms  Bowel frequency in day 4-5 (before Monday, up to 8-10)  Bowel frequency in night 0 now (before Monday, 2-3)   Urgency of defecation NO (before Monday, yes)  Blood in stool once in the morning (before Monday, with every BM)  General well being 4 = terrible (before Monday)  Extracolonic features (multiple select): none     Constitutional symptoms:  Fever NO  Weight loss YES (in the past -- lost 12 lb since October     Other GI symptoms present: none  Total number of IBD surgeries (except perianal): 0    Current IBD Medications:  Prednisone     Current medications: MMF, tacrolimus, Mg, prednisone, B12     Past IBD Medications:   None    Past Medical History:   Diagnosis Date     Anemia in stage 5 chronic kidney disease (H)      Anemia in stage 5 chronic kidney disease (H) " 10/27/2014     ESRD (end stage renal disease) on dialysis (H)      HTN (hypertension) 10/27/2014     Hypertension 10/2014     IgA nephropathy     biopsy proven        Past Surgical History:   Procedure Laterality Date     COLONOSCOPY N/A 3/12/2018    Procedure: COMBINED COLONOSCOPY, SINGLE OR MULTIPLE BIOPSY/POLYPECTOMY BY BIOPSY;  EGD/Colonoscopy ;  Surgeon: Kory Massey MD;  Location:  GI     EXTRACTION(S) DENTAL       TRANSPLANT KIDNEY RECIPIENT LIVING RELATED N/A 12/5/2014    Procedure: TRANSPLANT KIDNEY RECIPIENT LIVING RELATED;  Surgeon: Dale Middleton MD;  Location:  OR       Social History   Substance Use Topics     Smoking status: Never Smoker     Smokeless tobacco: Never Used     Alcohol use Yes       Family History   Problem Relation Age of Onset     KIDNEY DISEASE No family hx of    Negative for IBD. Dad had psoriasis.   Grandfather with BCC. Grandma has several skin lesions removed (does not recall the diagnosis)    Allergies   Allergen Reactions     Amoxicillin Rash        Outpatient Encounter Prescriptions as of 3/21/2018   Medication Sig Dispense Refill     predniSONE (DELTASONE) 5 MG tablet 40 mg daily x 2 wks, decrease by 10 mg weekly til at 20 mg daily, decrease by 5 mg wkly til at 5 mg daily, take 5mg every other day x 1 wk 228 tablet 0     Cyanocobalamin (B-12) 1000 MCG TBCR Take 1,000 mcg by mouth daily 90 tablet 3     mycophenolate (GENERIC EQUIVALENT) 250 MG capsule TAKE 2 CAPSULES BY MOUTH TWICE A  capsule 0     magnesium oxide (MAG-OX) 400 MG tablet Take 1 tablet (400 mg) by mouth daily 90 tablet 3     tacrolimus (GENERIC EQUIVALENT) 0.5 MG capsule Take 3 capsules (1.5 mg) by mouth 2 times daily 180 capsule 11     No facility-administered encounter medications on file as of 3/21/2018.       NSAID  NO    Review of Systems  Complete 10 System ROS performed. All are negative except as documented below, in the HPI, or in patient questionnaire from today's visit.    1)  "Constitutional: No fevers, chills, night sweats or malaise, weight loss or gain  2) Skin: No rash  3) Pulmonary: No wheeze, SOB, cough, sputum or hemoptysis  4) Cardiovascular: No Chest pain or palpitations  5) Genitourinary: No blood in urine or dysuria  6) Endocrine: No increased sweating, hunger, thirst or thyroid problems  7) Hematologic: No bruising and easy bleeding  8) Musculoskeletal: no new pain in joints or limitation in ROM  9) Neurologic: No dizziness, paresthesias or weakness or falls  10) Psychiatric:  not depressed/anxious, no sleep problems    PHYSICAL EXAM  Vitals: /62  Pulse 90  Ht 1.575 m (5' 2\")  Wt 58.8 kg (129 lb 11.2 oz)  SpO2 100%  BMI 23.72 kg/m2    No Pain (0)     Constitutional: pale-appearing. no acute distress, well developed, well nourished  Psych: alert, awake & oriented times three (AA&O x 3), normal judgment and insight  Skin: no rash  Ears/Nose/Mouth/Throat: oropharynx pink and moist, no oral ulcers, pharynx non-injected  Neck: Supple, no thyromegaly, no adenopathy or masses Nodes - No cervical  Cardiovascular: normal s1, s2, regular rhythm, no murmurs or rubs  Respiratory: clear to auscultation bilaterally without rales or wheezes  Gastrointestinal: soft, non-distended, non-tender with normal bowel sounds, no masses, no hepatosplenomegaly, no hernias  Neuro: alert, cranial nerves grossly intact, no focal neurological deficit    DATA:  Reviewed in detail past documentation, medications and prior workup available in electronic health records or through outside records.    PERTINENT STUDIES:  Most recent CBC:  WBC   Date Value Ref Range Status   02/01/2018 9.8 4.0 - 11.0 10e9/L Final   ]  Hemoglobin   Date Value Ref Range Status   02/01/2018 9.5 (L) 11.7 - 15.7 g/dL Final   ]   Platelet Count   Date Value Ref Range Status   02/01/2018 323 150 - 450 10e9/L Final       Most recent coag:  INR   Date Value Ref Range Status   12/05/2014 1.25 (H) 0.86 - 1.14 Final       Most " recent hepatic panel:  AST   Date Value Ref Range Status   06/30/2015 15 0 - 45 U/L Final     ALT   Date Value Ref Range Status   06/30/2015 18 0 - 50 U/L Final     No results found for: BILICONJ   Bilirubin Total   Date Value Ref Range Status   06/30/2015 0.8 0.2 - 1.3 mg/dL Final     Albumin   Date Value Ref Range Status   06/30/2015 4.1 3.4 - 5.0 g/dL Final     Alkaline Phosphatase   Date Value Ref Range Status   06/30/2015 90 40 - 150 U/L Final       Most recent creatinine:  Creatinine   Date Value Ref Range Status   02/01/2018 1.04 0.52 - 1.04 mg/dL Final     Endoscopy:   cscope 3/2018 showed Mcbride 3 colitis involving the rectum to hepatic flexure with sparing of the AC. Normal TI. Biopsies showed moderate chronic active colitis with cryptitis and crypt abscess formation.      Imaging:  CT c/a/p on 7/2017 (indication EBV viremia): no bowel wall thickening. SB appeared normal.     IMPRESSION:  Ms. Horan is a 27 year old year old here to establish care for newly diagnosed moderate to severe E3 ulcerative colitis.  She is currently on prednisone (day #3) and responding well. Of note, she has IgA nephropathy s/p LDKT in 12/2014 on tacrolimus and Cellcept.  Given her immunosuppression, EBV viremia and family history of skin cancer, I think the risks of anti-TNF therapy would be too high.  I had an extensive discussion regarding vedolizumab, which has a safer risk profile and is a gut specific biologic agent.  Caity and her Mom were in agreement that this would be the best plan for maintenance therapy.     Of note, she will be leaving to [place] and [place] from March 30 to April 15.     # Ulcerative colitis, severe, E3  # Immunosuppressed state (tacrolimus and Cellcept)    PLAN:  --Blood work today  --Fecal calprotectin  --Continue prednisone taper as prescribed   --Start vedolizumab    IBD Health Care Maintenance:  --Had flu shot this year  --UTD with Prevnar, Pneumovax and TDaP     cc Rupert Dsouza, Dr. Kaylee perez  [place]    Immunization History   Administered Date(s) Administered     Influenza Vaccine IM 3yrs+ 4 Valent IIV4 12/13/2016, 10/03/2017     Pneumo Conj 13-V (2010&after) 11/01/2017     Pneumococcal 23 valent 11/10/2014     TDAP Vaccine (Boostrix) 11/01/2017      Follow up with Rupert Dsouza in 6 weeks.   Return in about 4 months (around 7/21/2018).    Yenifer Doan MD   of Medicine  Division of Gastroenterology, Hepatology and Nutrition  [facility]    I spent a total of 60 minutes, face to face, was spent with this patient, >50% of which was counseling regarding the above delineated issues.

## 2018-03-21 NOTE — LETTER
"3/21/2018       RE: Caity Horan  [address]  [facility] [address]     Dear Colleague,    Thank you for referring your patient, Caity Horan, to the [facility] GASTRO[facility] AND IBD CLINIC at [facility]. Please see a copy of my visit note below.    [facility] NEW       PATIENT: Caity Horan    MRN: [id number]    Date of Birth 1990    Tel: [phone] (home)     PCP: Ritu Little     HPI: Ms. Horan is a 27 year old year old female here to establish care for newly diagnosed UC. She is presenting today with her mother, Krupa.     UC history  Spring 2012 -- graduated college, had a lot of life stressors. Experienced severe urgency with incontinence. Some blood in the stool and diarrhea alternating with constipation. Spontaneously resolved after 2 months.   End of Dec 2016 - April 2017: intermittent blood in stool, \"major constipation\" and weight gain, no urgency.    October 2017 -- was found to be anemic (attributed to kidney disease) and had iron infusions.  Sep -Dec 2017 Blood with well formed stool. This continued to worsen.   Worst stretch was mid-Feb to now: weight loss, >8 loose stools per day with blood.    In the past had upper abdominal bloating/pain and LLQ pain. No n/v.     Started prednisone 40 mg daily on Monday and feeling much better with decreased frequency and blood in stools.     Macroscopic extent of disease (most recent) E3    Current UC symptoms  Bowel frequency in day 4-5 (before Monday, up to 8-10)  Bowel frequency in night 0 now (before Monday, 2-3)   Urgency of defecation NO (before Monday, yes)  Blood in stool once in the morning (before Monday, with every BM)  General well being 4 = terrible (before Monday)  Extracolonic features (multiple select): none     Constitutional symptoms:  Fever NO  Weight loss YES (in the past -- lost 12 lb since October     Other GI symptoms present: none  Total number of IBD " surgeries (except perianal): 0    Current IBD Medications:  Prednisone     Current medications: MMF, tacrolimus, Mg, prednisone, B12     Past IBD Medications:   None    Past Medical History:   Diagnosis Date     Anemia in stage 5 chronic kidney disease (H)      Anemia in stage 5 chronic kidney disease (H) 10/27/2014     ESRD (end stage renal disease) on dialysis (H)      HTN (hypertension) 10/27/2014     Hypertension 10/2014     IgA nephropathy     biopsy proven        Past Surgical History:   Procedure Laterality Date     COLONOSCOPY N/A 3/12/2018    Procedure: COMBINED COLONOSCOPY, SINGLE OR MULTIPLE BIOPSY/POLYPECTOMY BY BIOPSY;  EGD/Colonoscopy ;  Surgeon: Kory Massey MD;  Location:  GI     EXTRACTION(S) DENTAL       TRANSPLANT KIDNEY RECIPIENT LIVING RELATED N/A 12/5/2014    Procedure: TRANSPLANT KIDNEY RECIPIENT LIVING RELATED;  Surgeon: Dale Middleton MD;  Location:  OR       Social History   Substance Use Topics     Smoking status: Never Smoker     Smokeless tobacco: Never Used     Alcohol use Yes       Family History   Problem Relation Age of Onset     KIDNEY DISEASE No family hx of    Negative for IBD. Dad had psoriasis.   Grandfather with BCC. Grandma has several skin lesions removed (does not recall the diagnosis)    Allergies   Allergen Reactions     Amoxicillin Rash        Outpatient Encounter Prescriptions as of 3/21/2018   Medication Sig Dispense Refill     predniSONE (DELTASONE) 5 MG tablet 40 mg daily x 2 wks, decrease by 10 mg weekly til at 20 mg daily, decrease by 5 mg wkly til at 5 mg daily, take 5mg every other day x 1 wk 228 tablet 0     Cyanocobalamin (B-12) 1000 MCG TBCR Take 1,000 mcg by mouth daily 90 tablet 3     mycophenolate (GENERIC EQUIVALENT) 250 MG capsule TAKE 2 CAPSULES BY MOUTH TWICE A  capsule 0     magnesium oxide (MAG-OX) 400 MG tablet Take 1 tablet (400 mg) by mouth daily 90 tablet 3     tacrolimus (GENERIC EQUIVALENT) 0.5 MG capsule Take 3 capsules (1.5 mg)  "by mouth 2 times daily 180 capsule 11     No facility-administered encounter medications on file as of 3/21/2018.       NSAID  NO    Review of Systems  Complete 10 System ROS performed. All are negative except as documented below, in the HPI, or in patient questionnaire from today's visit.    1) Constitutional: No fevers, chills, night sweats or malaise, weight loss or gain  2) Skin: No rash  3) Pulmonary: No wheeze, SOB, cough, sputum or hemoptysis  4) Cardiovascular: No Chest pain or palpitations  5) Genitourinary: No blood in urine or dysuria  6) Endocrine: No increased sweating, hunger, thirst or thyroid problems  7) Hematologic: No bruising and easy bleeding  8) Musculoskeletal: no new pain in joints or limitation in ROM  9) Neurologic: No dizziness, paresthesias or weakness or falls  10) Psychiatric:  not depressed/anxious, no sleep problems    PHYSICAL EXAM  Vitals: /62  Pulse 90  Ht 1.575 m (5' 2\")  Wt 58.8 kg (129 lb 11.2 oz)  SpO2 100%  BMI 23.72 kg/m2    No Pain (0)     Constitutional: pale-appearing. no acute distress, well developed, well nourished  Psych: alert, awake & oriented times three (AA&O x 3), normal judgment and insight  Skin: no rash  Ears/Nose/Mouth/Throat: oropharynx pink and moist, no oral ulcers, pharynx non-injected  Neck: Supple, no thyromegaly, no adenopathy or masses Nodes - No cervical  Cardiovascular: normal s1, s2, regular rhythm, no murmurs or rubs  Respiratory: clear to auscultation bilaterally without rales or wheezes  Gastrointestinal: soft, non-distended, non-tender with normal bowel sounds, no masses, no hepatosplenomegaly, no hernias  Neuro: alert, cranial nerves grossly intact, no focal neurological deficit    DATA:  Reviewed in detail past documentation, medications and prior workup available in electronic health records or through outside records.    PERTINENT STUDIES:  Most recent CBC:  WBC   Date Value Ref Range Status   02/01/2018 9.8 4.0 - 11.0 10e9/L " Final   ]  Hemoglobin   Date Value Ref Range Status   02/01/2018 9.5 (L) 11.7 - 15.7 g/dL Final   ]   Platelet Count   Date Value Ref Range Status   02/01/2018 323 150 - 450 10e9/L Final       Most recent coag:  INR   Date Value Ref Range Status   12/05/2014 1.25 (H) 0.86 - 1.14 Final       Most recent hepatic panel:  AST   Date Value Ref Range Status   06/30/2015 15 0 - 45 U/L Final     ALT   Date Value Ref Range Status   06/30/2015 18 0 - 50 U/L Final     No results found for: BILICONJ   Bilirubin Total   Date Value Ref Range Status   06/30/2015 0.8 0.2 - 1.3 mg/dL Final     Albumin   Date Value Ref Range Status   06/30/2015 4.1 3.4 - 5.0 g/dL Final     Alkaline Phosphatase   Date Value Ref Range Status   06/30/2015 90 40 - 150 U/L Final       Most recent creatinine:  Creatinine   Date Value Ref Range Status   02/01/2018 1.04 0.52 - 1.04 mg/dL Final     Endoscopy:   cscope 3/2018 showed Mcbride 3 colitis involving the rectum to hepatic flexure with sparing of the AC. Normal TI. Biopsies showed moderate chronic active colitis with cryptitis and crypt abscess formation.      Imaging:  CT c/a/p on 7/2017 (indication EBV viremia): no bowel wall thickening. SB appeared normal.     IMPRESSION:  Ms. Horan is a 27 year old year old here to establish care for newly diagnosed moderate to severe E3 ulcerative colitis.  She is currently on prednisone (day #3) and responding well. Of note, she has IgA nephropathy s/p LDKT in 12/2014 on tacrolimus and Cellcept.  Given her immunosuppression, EBV viremia and family history of skin cancer, I think the risks of anti-TNF therapy would be too high.  I had an extensive discussion regarding vedolizumab, which has a safer risk profile and is a gut specific biologic agent.  Caity and her Mom were in agreement that this would be the best plan for maintenance therapy.     Of note, she will be leaving to [place] and [place] from March 30 to April 15.     # Ulcerative colitis, severe, E3  #  Immunosuppressed state (tacrolimus and Cellcept)    PLAN:  --Blood work today  --Fecal calprotectin  --Continue prednisone taper as prescribed   --Start vedolizumab    IBD Health Care Maintenance:  --Had flu shot this year  --UTD with Prevnar, Pneumovax and TDaP     cc Rupert Dsouza, Dr. Kaylee Massey    Immunization History   Administered Date(s) Administered     Influenza Vaccine IM 3yrs+ 4 Valent IIV4 12/13/2016, 10/03/2017     Pneumo Conj 13-V (2010&after) 11/01/2017     Pneumococcal 23 valent 11/10/2014     TDAP Vaccine (Boostrix) 11/01/2017      Follow up with Rupert Dsouza in 6 weeks.   Return in about 4 months (around 7/21/2018).    I spent a total of 60 minutes, face to face, was spent with this patient, >50% of which was counseling regarding the above delineated issues.      Again, thank you for allowing me to participate in the care of your patient.      Sincerely,    Yenifer Doan MD

## 2018-03-21 NOTE — NURSING NOTE
"Chief Complaint   Patient presents with     Consult     IB       Vitals:    03/21/18 1547   BP: 112/62   Pulse: 90   SpO2: 100%   Weight: 129 lb 11.2 oz   Height: 5' 2\"       Body mass index is 23.72 kg/(m^2).      WOUND EVALUATION:                        "

## 2018-03-23 DIAGNOSIS — K51.011 ULCERATIVE PANCOLITIS WITH RECTAL BLEEDING (H): ICD-10-CM

## 2018-03-23 LAB
ALBUMIN SERPL-MCNC: 2.6 G/DL (ref 3.4–5)
ALP SERPL-CCNC: 56 U/L (ref 40–150)
ALT SERPL W P-5'-P-CCNC: 11 U/L (ref 0–50)
ANION GAP SERPL CALCULATED.3IONS-SCNC: 7 MMOL/L (ref 3–14)
AST SERPL W P-5'-P-CCNC: 9 U/L (ref 0–45)
BASOPHILS # BLD AUTO: 0 10E9/L (ref 0–0.2)
BASOPHILS NFR BLD AUTO: 0.4 %
BILIRUB DIRECT SERPL-MCNC: <0.1 MG/DL (ref 0–0.2)
BILIRUB SERPL-MCNC: 0.1 MG/DL (ref 0.2–1.3)
BUN SERPL-MCNC: 18 MG/DL (ref 7–30)
CALCIUM SERPL-MCNC: 8.4 MG/DL (ref 8.5–10.1)
CHLORIDE SERPL-SCNC: 108 MMOL/L (ref 94–109)
CO2 SERPL-SCNC: 24 MMOL/L (ref 20–32)
CREAT SERPL-MCNC: 0.95 MG/DL (ref 0.52–1.04)
CRP SERPL-MCNC: 8.1 MG/L (ref 0–8)
DEPRECATED CALCIDIOL+CALCIFEROL SERPL-MC: 17 UG/L (ref 20–75)
DIFFERENTIAL METHOD BLD: ABNORMAL
EOSINOPHIL # BLD AUTO: 0.2 10E9/L (ref 0–0.7)
EOSINOPHIL NFR BLD AUTO: 1.6 %
ERYTHROCYTE [DISTWIDTH] IN BLOOD BY AUTOMATED COUNT: 17.1 % (ref 10–15)
ERYTHROCYTE [SEDIMENTATION RATE] IN BLOOD BY WESTERGREN METHOD: 85 MM/H (ref 0–20)
GFR SERPL CREATININE-BSD FRML MDRD: 70 ML/MIN/1.7M2
GLUCOSE SERPL-MCNC: 91 MG/DL (ref 70–99)
HBV CORE AB SERPL QL IA: NONREACTIVE
HBV SURFACE AB SERPL IA-ACNC: 606.57 M[IU]/ML
HBV SURFACE AG SERPL QL IA: NONREACTIVE
HCT VFR BLD AUTO: 26.8 % (ref 35–47)
HGB BLD-MCNC: 7.9 G/DL (ref 11.7–15.7)
IMM GRANULOCYTES # BLD: 0.3 10E9/L (ref 0–0.4)
IMM GRANULOCYTES NFR BLD: 3.7 %
LYMPHOCYTES # BLD AUTO: 2.1 10E9/L (ref 0.8–5.3)
LYMPHOCYTES NFR BLD AUTO: 22.3 %
MCH RBC QN AUTO: 31.2 PG (ref 26.5–33)
MCHC RBC AUTO-ENTMCNC: 29.5 G/DL (ref 31.5–36.5)
MCV RBC AUTO: 106 FL (ref 78–100)
MONOCYTES # BLD AUTO: 1 10E9/L (ref 0–1.3)
MONOCYTES NFR BLD AUTO: 11.1 %
NEUTROPHILS # BLD AUTO: 5.7 10E9/L (ref 1.6–8.3)
NEUTROPHILS NFR BLD AUTO: 60.9 %
NRBC # BLD AUTO: 0 10*3/UL
NRBC BLD AUTO-RTO: 0 /100
PLATELET # BLD AUTO: 359 10E9/L (ref 150–450)
POTASSIUM SERPL-SCNC: 3.7 MMOL/L (ref 3.4–5.3)
PROT SERPL-MCNC: 6.3 G/DL (ref 6.8–8.8)
RBC # BLD AUTO: 2.53 10E12/L (ref 3.8–5.2)
SODIUM SERPL-SCNC: 139 MMOL/L (ref 133–144)
VIT B12 SERPL-MCNC: 636 PG/ML (ref 193–986)
WBC # BLD AUTO: 9.3 10E9/L (ref 4–11)

## 2018-03-26 ENCOUNTER — CARE COORDINATION (OUTPATIENT)
Dept: GASTROENTEROLOGY | Facility: CLINIC | Age: 28
End: 2018-03-26

## 2018-03-26 ENCOUNTER — TELEPHONE (OUTPATIENT)
Dept: TRANSPLANT | Facility: CLINIC | Age: 28
End: 2018-03-26

## 2018-03-26 DIAGNOSIS — K51.011 ULCERATIVE CHRONIC PANCOLITIS WITH RECTAL BLEEDING (H): Primary | ICD-10-CM

## 2018-03-26 DIAGNOSIS — T86.10 COMPLICATIONS, KIDNEY TRANSPLANT: ICD-10-CM

## 2018-03-26 DIAGNOSIS — Z94.0 KIDNEY TRANSPLANTED: Primary | ICD-10-CM

## 2018-03-26 DIAGNOSIS — Z48.298 AFTERCARE FOLLOWING ORGAN TRANSPLANT: ICD-10-CM

## 2018-03-26 LAB
M TB TUBERC IFN-G BLD QL: NEGATIVE
M TB TUBERC IFN-G/MITOGEN IGNF BLD: 0 IU/ML

## 2018-03-26 RX ORDER — SULFAMETHOXAZOLE AND TRIMETHOPRIM 400; 80 MG/1; MG/1
1 TABLET ORAL DAILY
Qty: 30 TABLET | Refills: 3 | Status: SHIPPED | OUTPATIENT
Start: 2018-03-26 | End: 2018-07-26

## 2018-03-26 NOTE — TELEPHONE ENCOUNTER
Message  Received: Yesterday       Otilio Mar MD Ututalum, Teresa, RN                   [facility],     Patient was just diagnosed with ulcerative colitis and started on prednisone 40 mg daily.  Can we put her back on Bactrim SS daily for prophylaxis.         PLAN:  Call Caity Horan with Dr. Mar's recommendations to restart on bactrim SS daily (prophylaxis).  Requesting prescription sent to:  [facility]/pharmacy #[id number] - [address] [phone] (Phone)  [phone] (Fax)

## 2018-03-27 LAB — CALPROTECTIN STL-MCNT: 1123.6 MG/KG (ref 0–49.9)

## 2018-03-28 ENCOUNTER — TELEPHONE (OUTPATIENT)
Dept: TRANSPLANT | Facility: CLINIC | Age: 28
End: 2018-03-28

## 2018-03-28 DIAGNOSIS — Z48.298 AFTERCARE FOLLOWING ORGAN TRANSPLANT: ICD-10-CM

## 2018-03-28 DIAGNOSIS — D84.9 IMMUNOSUPPRESSED STATUS (H): ICD-10-CM

## 2018-03-28 DIAGNOSIS — Z94.0 KIDNEY TRANSPLANTED: Primary | ICD-10-CM

## 2018-03-28 DIAGNOSIS — T86.10 COMPLICATIONS, KIDNEY TRANSPLANT: ICD-10-CM

## 2018-03-28 NOTE — TELEPHONE ENCOUNTER
FW: Immunosuppression for a mutual patient   Received: Today       Otilio Mar MD Ututalum, Teresa RN                   FYI below.            Previous Messages       ----- Message -----      From: Raquel Mensah MD      Sent: 3/27/2018   4:57 PM        To: Otilio Mar MD, Yenifer Doan MD   Subject: RE: Immunosuppression for a mutual patient       Apart from bactrim or PCP prophylaxis while on prednisone and increased dose of cellcept, she will need EBV PCR checked every 1-2 months to make sure its not rising exponentially.   Thanks   Raquel     ----- Message -----      From: Yenifer Doan MD      Sent: 3/26/2018   7:31 AM        To: Otilio Mar MD, *   Subject: RE: Immunosuppression for a mutual patient       Thank you, Aquilino. I appreciate your arranging for the Bactrim start. Please let me know if I can help with anything.     -Yenifer     ----- Message -----      From: Otilio Mar MD      Sent: 3/25/2018  12:03 PM        To: Raquel Mensah MD, Yenifer Doan MD   Subject: RE: Immunosuppression for a mutual patient       Her absolute CD4 level was over 600 in November, which is why she is not on PCP prophylaxis now (no literature in transplant recipients, but per AIDS/HIV literature, CD4 level over 200 doesn't need PCP prophylaxis).  With her recent bolus in prednisone, it would be reasonable to start her on Bactrim /80 daily for now.  We can arrange this through her transplant coordinator.     Will defer to Dr. Mensah if she feels differently.     Aquilino   ----- Message -----      From: Yenifer Doan MD      Sent: 3/24/2018  10:57 AM        To: Otilio Mar MD, *   Subject: Immunosuppression for a mutual patient           Dear Dr. Mensah,     I am a GI physician taking care of our mutual patient -- Caity Horan. She has newly diagnosed ulcerative colitis (thank you for referring her to GI).  I plan to start her on vedolizumab, which  is a gut-specific immunosuppressant (a4b7 anti-integrin). While we're awaiting insurance approval for this medication, I have started her on a prednisone taper and she is currently on prednisone PO 40 mg daily.  As you know, she is also on tacrolimus and MMF and has EBV viremia.     I would appreciate your input on whether or not she needs PCP prophylaxis. According to [facility], MMF has antimicrobial ab to PCP and prophylaxis is usually not recommended after renal transplant. However,  given she is now on steroids as well, I wanted to get your input.  Also, I don't think she'll need prophylaxis with vedolizumab (gut specific biologic), but would like your input on this as well.  I want to make sure we're keeping her as safe as possible regarding the immunosuppression, especially in light of her EBV viremia.     I'm cc'ing Dr. Mar (nephrology) to this message as well.     Thank you very much,   Yenifer Doan          PLAN:  EBV PCR quantitative ordered monthly. [facility]

## 2018-03-29 DIAGNOSIS — K51.011 ULCERATIVE CHRONIC PANCOLITIS WITH RECTAL BLEEDING (H): ICD-10-CM

## 2018-03-29 DIAGNOSIS — Z94.0 KIDNEY TRANSPLANTED: ICD-10-CM

## 2018-03-29 DIAGNOSIS — Z79.899 ENCOUNTER FOR LONG-TERM CURRENT USE OF MEDICATION: ICD-10-CM

## 2018-03-29 DIAGNOSIS — T86.10 COMPLICATIONS, KIDNEY TRANSPLANT: ICD-10-CM

## 2018-03-29 DIAGNOSIS — D63.1 ANEMIA IN STAGE 3 CHRONIC KIDNEY DISEASE (H): ICD-10-CM

## 2018-03-29 DIAGNOSIS — N18.30 CHRONIC KIDNEY DISEASE, STAGE 3 (MODERATE): ICD-10-CM

## 2018-03-29 DIAGNOSIS — Z94.0 KIDNEY REPLACED BY TRANSPLANT: ICD-10-CM

## 2018-03-29 DIAGNOSIS — N18.30 ANEMIA IN STAGE 3 CHRONIC KIDNEY DISEASE (H): ICD-10-CM

## 2018-03-29 DIAGNOSIS — Z48.298 AFTERCARE FOLLOWING ORGAN TRANSPLANT: ICD-10-CM

## 2018-03-29 DIAGNOSIS — D84.9 IMMUNOSUPPRESSED STATUS (H): ICD-10-CM

## 2018-03-29 LAB
ANION GAP SERPL CALCULATED.3IONS-SCNC: 8 MMOL/L (ref 3–14)
BASOPHILS # BLD AUTO: 0 10E9/L (ref 0–0.2)
BASOPHILS NFR BLD AUTO: 0.2 %
BUN SERPL-MCNC: 23 MG/DL (ref 7–30)
CALCIUM SERPL-MCNC: 8.8 MG/DL (ref 8.5–10.1)
CHLORIDE SERPL-SCNC: 105 MMOL/L (ref 94–109)
CO2 SERPL-SCNC: 26 MMOL/L (ref 20–32)
CREAT SERPL-MCNC: 1.06 MG/DL (ref 0.52–1.04)
CRP SERPL-MCNC: 6.4 MG/L (ref 0–8)
DIFFERENTIAL METHOD BLD: ABNORMAL
EBV DNA # SPEC NAA+PROBE: ABNORMAL {COPIES}/ML
EBV DNA SPEC NAA+PROBE-LOG#: 5.5 {LOG_COPIES}/ML
EOSINOPHIL # BLD AUTO: 0.1 10E9/L (ref 0–0.7)
EOSINOPHIL NFR BLD AUTO: 0.8 %
ERYTHROCYTE [DISTWIDTH] IN BLOOD BY AUTOMATED COUNT: 17.3 % (ref 10–15)
FERRITIN SERPL-MCNC: 90 NG/ML (ref 12–150)
GFR SERPL CREATININE-BSD FRML MDRD: 62 ML/MIN/1.7M2
GLUCOSE SERPL-MCNC: 94 MG/DL (ref 70–99)
HCT VFR BLD AUTO: 27.2 % (ref 35–47)
HGB BLD-MCNC: 8 G/DL (ref 11.7–15.7)
IMM GRANULOCYTES # BLD: 0.2 10E9/L (ref 0–0.4)
IMM GRANULOCYTES NFR BLD: 2.2 %
IRON SATN MFR SERPL: 14 % (ref 15–46)
IRON SERPL-MCNC: 29 UG/DL (ref 35–180)
LYMPHOCYTES # BLD AUTO: 2.3 10E9/L (ref 0.8–5.3)
LYMPHOCYTES NFR BLD AUTO: 27.2 %
MCH RBC QN AUTO: 31.1 PG (ref 26.5–33)
MCHC RBC AUTO-ENTMCNC: 29.4 G/DL (ref 31.5–36.5)
MCV RBC AUTO: 106 FL (ref 78–100)
MONOCYTES # BLD AUTO: 0.8 10E9/L (ref 0–1.3)
MONOCYTES NFR BLD AUTO: 9.4 %
NEUTROPHILS # BLD AUTO: 5.1 10E9/L (ref 1.6–8.3)
NEUTROPHILS NFR BLD AUTO: 60.2 %
NRBC # BLD AUTO: 0 10*3/UL
NRBC BLD AUTO-RTO: 0 /100
PLATELET # BLD AUTO: 366 10E9/L (ref 150–450)
POTASSIUM SERPL-SCNC: 4.4 MMOL/L (ref 3.4–5.3)
RBC # BLD AUTO: 2.57 10E12/L (ref 3.8–5.2)
SODIUM SERPL-SCNC: 139 MMOL/L (ref 133–144)
TACROLIMUS BLD-MCNC: 3.4 UG/L (ref 5–15)
TIBC SERPL-MCNC: 210 UG/DL (ref 240–430)
TME LAST DOSE: ABNORMAL H
WBC # BLD AUTO: 8.5 10E9/L (ref 4–11)

## 2018-03-30 ENCOUNTER — TELEPHONE (OUTPATIENT)
Dept: PHARMACY | Facility: CLINIC | Age: 28
End: 2018-03-30

## 2018-03-30 NOTE — TELEPHONE ENCOUNTER
Anemia Management Note  SUBJECTIVE/OBJECTIVE:  Referred by Dr. Otilio Mar on 10/6/2017  Primary Diagnosis: Anemia in Chronic Kidney Disease (N18.3, D63.1)     Secondary Diagnosis:  Chronic Kidney Disease, Stage 3 (N18.3)   Hgb goal range:  9-10  Epo/Darbo: None  Iron regimen:  Does not tolerate oral iron  Communication: Consent to communicate on file OK to leave messages for scheduling, medical and billing on cell phone.  Dated 10/27/2014    Anemia Latest Ref Rng & Units 11/10/2017 12/1/2017 2/1/2018 2/22/2018 3/1/2018 3/23/2018 3/29/2018   Hemoglobin 11.7 - 15.7 g/dL - 10.4(L) 9.5(L) - - 7.9(L) 8.0(L)   IV Iron Dose - 750mg - - 750mg 750mg - -   TSAT 15 - 46 % - 41 12(L) - - - 14(L)   Ferritin 12 - 150 ng/mL - 277(H) 14 - - - 90     BP Readings from Last 3 Encounters:   03/21/18 112/62   03/12/18 96/66   03/01/18 111/56     Wt Readings from Last 2 Encounters:   03/21/18 129 lb 11.2 oz (58.8 kg)   02/27/18 126 lb 12.8 oz (57.5 kg)       Note from Gastro appt states going out of town 03/30/2018 to 04/15/2018.  Blood loss verified with GI, starting treatments.     ASSESSMENT:  Hgb:Not at goal/Unknown  TSat: not at goal of >30% Ferritin: Not at goal of (>100ng/mL)    PLAN:  RTC for Hgb in 2 week(s), discuss GI TX planned, and if iron or other treatments warranted.    Orders needed to be renewed (for next follow-up date) in EPIC: None    Iron labs due:  04/24/2018    Plan discussed with:  Left   Plan provided by:      NEXT FOLLOW-UP DATE:  04/16/2018    Anemia Management Service  Abida Song,PharmD and Princess Harrington CPhT  Phone: [phone]  Fax: [phone]

## 2018-04-03 ENCOUNTER — DOCUMENTATION ONLY (OUTPATIENT)
Dept: TRANSPLANT | Facility: CLINIC | Age: 28
End: 2018-04-03

## 2018-04-03 ENCOUNTER — TELEPHONE (OUTPATIENT)
Dept: TRANSPLANT | Facility: CLINIC | Age: 28
End: 2018-04-03

## 2018-04-03 DIAGNOSIS — Z48.298 AFTERCARE FOLLOWING ORGAN TRANSPLANT: ICD-10-CM

## 2018-04-03 DIAGNOSIS — Z94.0 KIDNEY TRANSPLANTED: Primary | ICD-10-CM

## 2018-04-03 DIAGNOSIS — B27.90 EBV INFECTION: ICD-10-CM

## 2018-04-03 DIAGNOSIS — T86.10 COMPLICATIONS, KIDNEY TRANSPLANT: ICD-10-CM

## 2018-04-03 DIAGNOSIS — D84.9 IMMUNOSUPPRESSED STATUS (H): ICD-10-CM

## 2018-04-03 NOTE — TELEPHONE ENCOUNTER
Zaid, MD Lyubov Velasquez Teresa, RN                   [facility],     Please have patient get EBV PCR q2 weeks.  Last check was markedly increased, likely due to steroids for treatment of ulcerative colitis.     Aquilino       PLAN:  Increase EBV monitoring every 2 weeks. --- Ordered, in EPIC.    LPN task:  Please call Caity Horan and make aware of dr. Mar's recommendation to increase EBV monitoring.

## 2018-04-03 NOTE — PROGRESS NOTES
Notes Recorded by Otilio Mar MD on 4/3/2018 at 3:12 PM  Marked increase in EBV viremia and would recommend decreasing prednisone burst.    Will follow EBV PCR closely.  Otherwise, labs are normal or unchanged.    Would make no other changes or interventions at this time.      Notes Recorded by Mayi Mcarthur RN on 3/30/2018 at 11:52 AM  3/29/18 EBV [id number] ([id number]).  Sent to Provider to review.

## 2018-04-17 ENCOUNTER — TELEPHONE (OUTPATIENT)
Dept: PHARMACY | Facility: CLINIC | Age: 28
End: 2018-04-17

## 2018-04-17 NOTE — TELEPHONE ENCOUNTER
Follow-up with anemia management service:    Left message requesting Hgb check when in clinic 2018, call back to discuss treatment options, including more IV iron, or watching and waiting until complete infusion TX for bowel issues.    Anemia Latest Ref Rng & Units 11/10/2017 2017 2018 2018 3/1/2018 3/23/2018 3/29/2018   Hemoglobin 11.7 - 15.7 g/dL - 10.4(L) 9.5(L) - - 7.9(L) 8.0(L)   IV Iron Dose - 750mg - - 750mg 750mg - -   TSAT 15 - 46 % - 41 12(L) - - - 14(L)   Ferritin 12 - 150 ng/mL - 277(H) 14 - - - 90       Orders needed to be renewed (for next follow-up date) in EPIC: None   Med order expires: none   Lab orders : 10/05/2018    Follow-up call date: 2018    [facility]    Anemia Management Service  Abida Song,PharmD and Princess Harrington CPhT  Phone: [phone]  Fax: [phone]

## 2018-04-19 ENCOUNTER — INFUSION THERAPY VISIT (OUTPATIENT)
Dept: INFUSION THERAPY | Facility: CLINIC | Age: 28
End: 2018-04-19
Attending: INTERNAL MEDICINE
Payer: COMMERCIAL

## 2018-04-19 VITALS
WEIGHT: 135.14 LBS | RESPIRATION RATE: 16 BRPM | DIASTOLIC BLOOD PRESSURE: 66 MMHG | HEART RATE: 94 BPM | TEMPERATURE: 97.8 F | SYSTOLIC BLOOD PRESSURE: 117 MMHG | BODY MASS INDEX: 24.72 KG/M2

## 2018-04-19 DIAGNOSIS — K51.011 ULCERATIVE PANCOLITIS WITH RECTAL BLEEDING (H): Primary | ICD-10-CM

## 2018-04-19 DIAGNOSIS — N18.30 ANEMIA IN STAGE 3 CHRONIC KIDNEY DISEASE (H): ICD-10-CM

## 2018-04-19 DIAGNOSIS — N18.30 CHRONIC KIDNEY DISEASE, STAGE 3 (MODERATE): ICD-10-CM

## 2018-04-19 DIAGNOSIS — D63.1 ANEMIA IN STAGE 3 CHRONIC KIDNEY DISEASE (H): ICD-10-CM

## 2018-04-19 LAB
ALBUMIN SERPL-MCNC: 2.6 G/DL (ref 3.4–5)
ALP SERPL-CCNC: 49 U/L (ref 40–150)
ALT SERPL W P-5'-P-CCNC: 20 U/L (ref 0–50)
AST SERPL W P-5'-P-CCNC: 12 U/L (ref 0–45)
BASOPHILS # BLD AUTO: 0 10E9/L (ref 0–0.2)
BASOPHILS NFR BLD AUTO: 0.3 %
BILIRUB DIRECT SERPL-MCNC: <0.1 MG/DL (ref 0–0.2)
BILIRUB SERPL-MCNC: 0.2 MG/DL (ref 0.2–1.3)
CRP SERPL-MCNC: 3.1 MG/L (ref 0–8)
DIFFERENTIAL METHOD BLD: ABNORMAL
EOSINOPHIL # BLD AUTO: 0 10E9/L (ref 0–0.7)
EOSINOPHIL NFR BLD AUTO: 0.3 %
ERYTHROCYTE [DISTWIDTH] IN BLOOD BY AUTOMATED COUNT: 14.9 % (ref 10–15)
ERYTHROCYTE [SEDIMENTATION RATE] IN BLOOD BY WESTERGREN METHOD: 53 MM/H (ref 0–20)
HCT VFR BLD AUTO: 29 % (ref 35–47)
HGB BLD-MCNC: 8.6 G/DL (ref 11.7–15.7)
IMM GRANULOCYTES # BLD: 0.1 10E9/L (ref 0–0.4)
IMM GRANULOCYTES NFR BLD: 1.8 %
LYMPHOCYTES # BLD AUTO: 0.6 10E9/L (ref 0.8–5.3)
LYMPHOCYTES NFR BLD AUTO: 8.3 %
MCH RBC QN AUTO: 29.9 PG (ref 26.5–33)
MCHC RBC AUTO-ENTMCNC: 29.7 G/DL (ref 31.5–36.5)
MCV RBC AUTO: 101 FL (ref 78–100)
MONOCYTES # BLD AUTO: 0.2 10E9/L (ref 0–1.3)
MONOCYTES NFR BLD AUTO: 3.5 %
NEUTROPHILS # BLD AUTO: 5.9 10E9/L (ref 1.6–8.3)
NEUTROPHILS NFR BLD AUTO: 85.8 %
NRBC # BLD AUTO: 0 10*3/UL
NRBC BLD AUTO-RTO: 0 /100
PLATELET # BLD AUTO: 306 10E9/L (ref 150–450)
PROT SERPL-MCNC: 5.7 G/DL (ref 6.8–8.8)
RBC # BLD AUTO: 2.88 10E12/L (ref 3.8–5.2)
WBC # BLD AUTO: 6.8 10E9/L (ref 4–11)

## 2018-04-19 PROCEDURE — 25000128 H RX IP 250 OP 636: Mod: ZF | Performed by: INTERNAL MEDICINE

## 2018-04-19 PROCEDURE — 85018 HEMOGLOBIN: CPT | Performed by: INTERNAL MEDICINE

## 2018-04-19 PROCEDURE — 85025 COMPLETE CBC W/AUTO DIFF WBC: CPT | Performed by: INTERNAL MEDICINE

## 2018-04-19 PROCEDURE — 80076 HEPATIC FUNCTION PANEL: CPT | Performed by: INTERNAL MEDICINE

## 2018-04-19 PROCEDURE — 86140 C-REACTIVE PROTEIN: CPT | Performed by: INTERNAL MEDICINE

## 2018-04-19 PROCEDURE — 85652 RBC SED RATE AUTOMATED: CPT | Performed by: INTERNAL MEDICINE

## 2018-04-19 PROCEDURE — 96413 CHEMO IV INFUSION 1 HR: CPT

## 2018-04-19 RX ADMIN — VEDOLIZUMAB 300 MG: 300 INJECTION, POWDER, LYOPHILIZED, FOR SOLUTION INTRAVENOUS at 13:48

## 2018-04-19 NOTE — MR AVS SNAPSHOT
After Visit Summary   4/19/2018    Caity Horan    MRN: [id number]           Patient Information     Date Of Birth          1990        Visit Information        Provider Department      4/19/2018 1:00 PM UC 41 ATC; UC SPEC INFUSION [facility] Specialty [facility] Procedure        Today's Diagnoses     Ulcerative pancolitis with rectal bleeding (H)    -  1    Anemia in stage 3 chronic kidney disease        Chronic kidney disease, stage 3 (moderate)          Care Instructions    Dear Caity Horan    Thank you for choosing [facility] ([facility]) for your infusion.  The following information is a summary of our appointment as well as important reminders.      EDUCATION POST BIOLOGICAL/CHEMOTHERAPY INFUSION  Call the triage nurse at your clinic or seek medical attention if you have chills and/or temperature greater than or equal to 100.5, uncontrolled nausea/vomiting, diarrhea, constipation, dizziness, shortness of breath, chest pain, heart palpitations, weakness or any other new or concerning symptoms, questions or concerns.  You can not have any live virus vaccines prior to or during treatment or up to 6 months post infusion.  If you have an upcoming surgery, medical procedure or dental procedure during treatment, this should be discussed with your ordering physician and your surgeon/dentist.  If you are having any concerning symptom, if you are unsure if you should get your next infusion or wish to speak to a provider before your next infusion, please call your care coordinator or triage nurse at your clinic to notify them so we can adequately serve you.      Vedolizumab Solution for injection  What is this medicine?  VEDOLIZUMAB (Ve strong JACKI you mab) is used to treat ulcerative colitis and Crohn's disease in adult patients.  This medicine may be used for other purposes; ask your health care provider or pharmacist if you  have questions.  What should I tell my health care provider before I take this medicine?  They need to know if you have any of these conditions:    diabetes    hepatitis B or history of hepatitis B infection    HIV or AIDS    immune system problems    infection or history of infections    liver disease    recently received or scheduled to receive a vaccine    scheduled to have surgery    tuberculosis, a positive skin test for tuberculosis or have recently been in close contact with someone who has tuberculosis    an unusual or allergic reaction to vedolizumab, other medicines, foods, dyes, or preservatives    pregnant or trying to get pregnant    breast-feeding  How should I use this medicine?  This medicine is for infusion into a vein. It is given by a health care professional in a hospital or clinic setting.  A special MedGuide will be given to you by the pharmacist with each prescription and refill. Be sure to read this information carefully each time.  Talk to your pediatrician regarding the use of this medicine in children. This medicine is not approved for use in children.  Overdosage: If you think you've taken too much of this medicine contact a poison control center or emergency room at once.  NOTE: This medicine is only for you. Do not share this medicine with others.  What if I miss a dose?  It is important not to miss your dose. Call your doctor or health care professional if you are unable to keep an appointment.  What may interact with this medicine?    steroid medicines like prednisone or cortisone    TNF-alpha inhibitors like natalizumab, adalimumab, and infliximab    vaccines  This list may not describe all possible interactions. Give your health care provider a list of all the medicines, herbs, non-prescription drugs, or dietary supplements you use. Also tell them if you smoke, drink alcohol, or use illegal drugs. Some items may interact with your medicine.  What should I watch for while using this  medicine?  Your condition will be monitored carefully while you are receiving this medicine. Visit your doctor for regular check ups. Tell your doctor or healthcare professional if your symptoms do not start to get better or if they get worse.  Stay away from people who are sick. Call your doctor or health care professional for advice if you get a fever, chills or sore throat, or other symptoms of a cold or flu. Do not treat yourself.  In some patients, this medicine may cause a serious brain infection that may cause death. If you have any problems seeing, thinking, speaking, walking, or standing, tell your doctor right away. If you cannot reach your doctor, get urgent medical care.  What side effects may I notice from receiving this medicine?  Side effects that you should report to your doctor or health care professional as soon as possible:    allergic reactions like skin rash, itching or hives, swelling of the face, lips, or tongue    breathing problems    changes in vision    chest pain    dark urine    depression, feelings of sadness    dizziness    general ill feeling or flu-like symptoms    irregular, missed, or painful menstrual periods    light-colored stools    loss of appetite, nausea    muscle weakness    problems with balance, talking, or walking    right upper belly pain    unusually weak or tired    yellowing of the eyes or skin  Side effects that usually do not require medical attention (Report these to your doctor or health care professional if they continue or are bothersome.):    aches, pains    headache    stomach upset    tiredness  This list may not describe all possible side effects. Call your doctor for medical advice about side effects. You may report side effects to FDA at 8-023-FDA-1085.  Where should I keep my medicine?  This drug is given in a hospital or clinic and will not be stored at home.  NOTE: This sheet is a summary. It may not cover all possible information. If you have  questions about this medicine, talk to your doctor, pharmacist, or health care provider.  NOTE:This sheet is a summary. It may not cover all possible information. If you have questions about this medicine, talk to your doctor, pharmacist, or health care provider. Copyright  2016 Gold Standard          We look forward in seeing you on your next appointment here at [facility].  Please don t hesitate to call us at [phone] to reschedule any of your appointments or to speak with one of the [facility] registered nurses.  It was a pleasure taking care of you today.    Sincerely,    [facility]  Specialty Infusion [facility] Procedure Center  [address] [place] [address]  [address]  Phone:  [phone]            Follow-ups after your visit        Your next 10 appointments already scheduled     May 01, 2018  2:00 PM CDT   (Arrive by 1:45 PM)   RETURN INFLAMMATORY BOWEL DISEASE with Rupert Dsouza PA-C   [facility] ([facility])    [address] [place] [address]  4th [facility] [id number] [phone]            May 03, 2018  1:00 PM CDT   Infusion 120 with UC SPEC INFUSION, UC 41 ATC   [facility] Specialty and Procedure ([facility])    [address] [place] [address]  [address]  [facility] [id number] [phone]            May 31, 2018 10:00 AM CDT   Infusion 120 with UC SPEC INFUSION, UC 48 ATC   [facility] Specialty and Procedure ([facility])    [address] [place] [address]  [address]  [facility] [id number] [phone]            Jul 17, 2018  3:40 PM CDT   (Arrive by 3:25 PM)   RETURN INFLAMMATORY BOWEL DISEASE with Yenifer Doan MD   [facility] ([facility])    [address] [place] [address]  4th [facility] [id number] [phone]            Aug 28, 2018  7:15 AM CDT   Lab with UC LAB   [facility]  Lab ([facility])    [address]  1st Floor  [facility] [address] [phone]            Aug 28, 2018  3:35 PM CDT   (Arrive by 3:05 PM)   Return Kidney Transplant with Otilio Mar MD   [facility] ([facility])    [address]  [address]  [facility] [id number] [phone]              Future tests that were ordered for you today     Open Standing Orders        Priority Remaining Interval Expires Ordered    Notify Physician Routine [id number] PRN  4/19/2018            Who to contact     If you have questions or need follow up information about today's clinic visit or your schedule please contact [facility] SPECIALTY AND PROCEDURE directly at [phone].  Normal or non-critical lab and imaging results will be communicated to you by [employer], letter or phone within 4 business days after the clinic has received the results. If you do not hear from us within 7 days, please contact the clinic through [employer] or phone. If you have a critical or abnormal lab result, we will notify you by phone as soon as possible.  Submit refill requests through [employer] or call your pharmacy and they will forward the refill request to us. Please allow 3 business days for your refill to be completed.          Additional Information About Your Visit        [employer] Information     [employer] gives you secure access to your electronic health record. If you see a primary care provider, you can also send messages to your care team and make appointments. If you have questions, please call your primary care clinic.  If you do not have a primary care provider, please call [phone] and they will assist you.        Care EveryWhere ID     This is your Care EveryWhere ID. This could be used by other organizations to access your [facility] medical records  [id number]        Your Vitals Were     Pulse Temperature BMI (Body Mass  Index)             92 97.8  F (36.6  C) (Oral) 24.72 kg/m2          Blood Pressure from Last 3 Encounters:   04/19/18 118/69   03/21/18 112/62   03/12/18 96/66    Weight from Last 3 Encounters:   04/19/18 61.3 kg (135 lb 2.3 oz)   03/21/18 58.8 kg (129 lb 11.2 oz)   02/27/18 57.5 kg (126 lb 12.8 oz)              Today, you had the following     No orders found for display       Primary Care Provider Office Phone # Fax #    Ritu CHLOE Little [phone]       [address]  [address] [address]        Equal Access to Services     AUNDREA QUACH : Hadii aad ku hadasho Sojessicaali, waaxda luqadaha, qaybta kaalmada adeegyada, philip lee . So [facility] [phone].    ATENCIÓN: Si habla español, tiene a carreon disposición servicios gratuitos de asistencia lingüística. [facility] [phone].    We comply with applicable federal civil rights laws and [place] laws. We do not discriminate on the basis of race, color, national origin, age, disability, sex, sexual orientation, or gender identity.            Thank you!     Thank you for choosing [facility] SPECIALTY AND PROCEDURE  for your care. Our goal is always to provide you with excellent care. Hearing back from our patients is one way we can continue to improve our services. Please take a few minutes to complete the written survey that you may receive in the mail after your visit with us. Thank you!             Your Updated Medication List - Protect others around you: Learn how to safely use, store and throw away your medicines at www.disposemymeds.org.          This list is accurate as of 4/19/18  1:32 PM.  Always use your most recent med list.                   Brand Name Dispense Instructions for use Diagnosis    B-12 1000 MCG Tbcr     90 tablet    Take 1,000 mcg by mouth daily    B12 deficiency due to diet       magnesium oxide 400 MG tablet    MAG-OX    90 tablet    Take 1 tablet  (400 mg) by mouth daily    Hypomagnesemia, Status post kidney transplant, Kidney replaced by transplant, Kidney transplanted       mycophenolate 250 MG capsule    GENERIC EQUIVALENT    120 capsule    TAKE 2 CAPSULES BY MOUTH TWICE A DAY    Kidney transplanted, Kidney replaced by transplant, Status post kidney transplant       predniSONE 5 MG tablet    DELTASONE    228 tablet    40 mg daily x 2 wks, decrease by 10 mg weekly til at 20 mg daily, decrease by 5 mg wkly til at 5 mg daily, take 5mg every other day x 1 wk    Ulcerative pancolitis with rectal bleeding (H)       sulfamethoxazole-trimethoprim 400-80 MG per tablet    BACTRIM/SEPTRA    30 tablet    Take 1 tablet by mouth daily    Kidney transplanted, Complications, kidney transplant, Aftercare following organ transplant       tacrolimus 0.5 MG capsule    GENERIC EQUIVALENT    180 capsule    Take 3 capsules (1.5 mg) by mouth 2 times daily    Kidney replaced by transplant, Status post kidney transplant, Hypomagnesemia, Kidney transplanted       vitamin D 2000 units tablet     100 tablet    Take 2,000 Units by mouth daily    Vitamin D deficiency

## 2018-04-19 NOTE — PATIENT INSTRUCTIONS
Dear Caity Horan    Thank you for choosing [facility] ([facility]) for your infusion.  The following information is a summary of our appointment as well as important reminders.      EDUCATION POST BIOLOGICAL/CHEMOTHERAPY INFUSION  Call the triage nurse at your clinic or seek medical attention if you have chills and/or temperature greater than or equal to 100.5, uncontrolled nausea/vomiting, diarrhea, constipation, dizziness, shortness of breath, chest pain, heart palpitations, weakness or any other new or concerning symptoms, questions or concerns.  You can not have any live virus vaccines prior to or during treatment or up to 6 months post infusion.  If you have an upcoming surgery, medical procedure or dental procedure during treatment, this should be discussed with your ordering physician and your surgeon/dentist.  If you are having any concerning symptom, if you are unsure if you should get your next infusion or wish to speak to a provider before your next infusion, please call your care coordinator or triage nurse at your clinic to notify them so we can adequately serve you.      Vedolizumab Solution for injection  What is this medicine?  VEDOLIZUMAB (Ve strong JACKI you mab) is used to treat ulcerative colitis and Crohn's disease in adult patients.  This medicine may be used for other purposes; ask your health care provider or pharmacist if you have questions.  What should I tell my health care provider before I take this medicine?  They need to know if you have any of these conditions:    diabetes    hepatitis B or history of hepatitis B infection    HIV or AIDS    immune system problems    infection or history of infections    liver disease    recently received or scheduled to receive a vaccine    scheduled to have surgery    tuberculosis, a positive skin test for tuberculosis or have recently been in close contact with someone who has tuberculosis    an  unusual or allergic reaction to vedolizumab, other medicines, foods, dyes, or preservatives    pregnant or trying to get pregnant    breast-feeding  How should I use this medicine?  This medicine is for infusion into a vein. It is given by a health care professional in a hospital or clinic setting.  A special MedGuide will be given to you by the pharmacist with each prescription and refill. Be sure to read this information carefully each time.  Talk to your pediatrician regarding the use of this medicine in children. This medicine is not approved for use in children.  Overdosage: If you think you've taken too much of this medicine contact a poison control center or emergency room at once.  NOTE: This medicine is only for you. Do not share this medicine with others.  What if I miss a dose?  It is important not to miss your dose. Call your doctor or health care professional if you are unable to keep an appointment.  What may interact with this medicine?    steroid medicines like prednisone or cortisone    TNF-alpha inhibitors like natalizumab, adalimumab, and infliximab    vaccines  This list may not describe all possible interactions. Give your health care provider a list of all the medicines, herbs, non-prescription drugs, or dietary supplements you use. Also tell them if you smoke, drink alcohol, or use illegal drugs. Some items may interact with your medicine.  What should I watch for while using this medicine?  Your condition will be monitored carefully while you are receiving this medicine. Visit your doctor for regular check ups. Tell your doctor or healthcare professional if your symptoms do not start to get better or if they get worse.  Stay away from people who are sick. Call your doctor or health care professional for advice if you get a fever, chills or sore throat, or other symptoms of a cold or flu. Do not treat yourself.  In some patients, this medicine may cause a serious brain infection that may cause  death. If you have any problems seeing, thinking, speaking, walking, or standing, tell your doctor right away. If you cannot reach your doctor, get urgent medical care.  What side effects may I notice from receiving this medicine?  Side effects that you should report to your doctor or health care professional as soon as possible:    allergic reactions like skin rash, itching or hives, swelling of the face, lips, or tongue    breathing problems    changes in vision    chest pain    dark urine    depression, feelings of sadness    dizziness    general ill feeling or flu-like symptoms    irregular, missed, or painful menstrual periods    light-colored stools    loss of appetite, nausea    muscle weakness    problems with balance, talking, or walking    right upper belly pain    unusually weak or tired    yellowing of the eyes or skin  Side effects that usually do not require medical attention (Report these to your doctor or health care professional if they continue or are bothersome.):    aches, pains    headache    stomach upset    tiredness  This list may not describe all possible side effects. Call your doctor for medical advice about side effects. You may report side effects to FDA at 6-779-ILY-1589.  Where should I keep my medicine?  This drug is given in a hospital or clinic and will not be stored at home.  NOTE: This sheet is a summary. It may not cover all possible information. If you have questions about this medicine, talk to your doctor, pharmacist, or health care provider.  NOTE:This sheet is a summary. It may not cover all possible information. If you have questions about this medicine, talk to your doctor, pharmacist, or health care provider. Copyright  2016 Gold Standard          We look forward in seeing you on your next appointment here at [facility].  Please don t hesitate to call us at [phone] to reschedule any of your appointments or to speak with one of the [facility] registered nurses.  It was a pleasure  taking care of you today.    Sincerely,    [facility]  Specialty Infusion & Procedure Center  [address] [place]  [address]  Phone:  [phone]

## 2018-04-19 NOTE — PROGRESS NOTES
Nursing Note  Caity Horan presents today [facility] for:   Chief Complaint   Patient presents with     Infusion     Entyvio infusion     During today's [facility] appointment, orders from Dr. Yenifer Doan were completed.  Frequency: Week 0,2 6 and then every 8 weeks.     Progress note:  Patient identification verified by name and date of birth.  Assessment completed.  Vitals recorded in Doc Flowsheets.  Patient was provided with education regarding infusion and possible side effects.  Patient verbalized understanding.      needed: No  Premedications: were not ordered.  Infusion Rates: infusion given over approximately 30 minutes.  Labs: were drawn per orders.   Vascular access: peripheral IV placed today.  Treatment Conditions: Biologic/Chemo Checklist     ~~~ NOTE: If the patient answers yes to any of the questions below, hold the infusion and contact ordering provider or on-call provider.    1. Have you recently had an elevated temperature, fever, chills, productive cough, coughing for 3 weeks or longer or hemoptysis,  abnormal vital signs, night sweats,  chest pain or have you noticed a decrease in your appetite, unexplained weight loss or fatigue? No  2. Do you have any open wounds or new incisions? No  3. Do you have any recent or upcoming hospitalizations, surgeries or dental procedures? No  4. Do you currently have or recently have had any signs of illness or infection or are you on any antibiotics? No  5. Have you had any new, sudden or worsening abdominal pain? No  6. Have you or anyone in your household received a live vaccination in the past 4 weeks? Please note:  No live vaccines while on biologic/chemotherapy until 6 months after the last treatment.  Patient can receive the flu vaccine (shot only) and the pneumovax.  It is optimal for the patient to get these vaccines mid cycle, but they can be given at any time as long as it is  not on the day of the infusion. No  7. Have you recently been diagnosed with any new nervous system diseases (ie. Multiple sclerosis, Guillain Smoaks, seizures, neurological changes) or cancer diagnosis? Are you on any form of radiation or chemotherapy? No  8. Are you pregnant or breast feeding or do you have plans of pregnancy in the future? No  9. Have you been having any signs of worsening depression or suicidal ideations?  (benlysta only) No  10. Have there been any other new onset medical symptoms? No    Patient tolerated infusion: well.    Administrations This Visit     vedolizumab (ENTYVIO) 300 mg in sodium chloride 0.9 % 280 mL infusion     Admin Date Action Dose Rate Route Administered By          04/19/2018 New Bag 300 mg 560 mL/hr Intravenous Namrata Tobias RN                           Discharge Plan:   Follow up plan of care with: ongoing infusions at [facility].  Discharge instructions were reviewed with patient.  Patient/representative verbalized understanding of discharge instructions and all questions answered.  Patient discharged from [facility] in stable condition.    Namrata Tobias RN        /69  Pulse 92  Temp 97.8  F (36.6  C) (Oral)  Wt 61.3 kg (135 lb 2.3 oz)  BMI 24.72 kg/m2

## 2018-04-20 ENCOUNTER — TELEPHONE (OUTPATIENT)
Dept: PHARMACY | Facility: CLINIC | Age: 28
End: 2018-04-20

## 2018-04-20 NOTE — TELEPHONE ENCOUNTER
Anemia Management Note  SUBJECTIVE/OBJECTIVE:  Referred by Dr. Otilio Mar on 10/6/2017  Primary Diagnosis: Anemia in Chronic Kidney Disease (N18.3, D63.1)     Secondary Diagnosis:  Chronic Kidney Disease, Stage 3 (N18.3)   Hgb goal range:  9-10  Epo/Darbo: None  Iron regimen:  Does not tolerate oral iron  Communication: Consent to communicate on file OK to leave messages for scheduling, medical and billing on cell phone.  Dated 10/27/2014    Anemia Latest Ref Rng & Units 12/1/2017 2/1/2018 2/22/2018 3/1/2018 3/23/2018 3/29/2018 4/19/2018   Hemoglobin 11.7 - 15.7 g/dL 10.4(L) 9.5(L) - - 7.9(L) 8.0(L) 8.6(L)   IV Iron Dose - - - 750mg 750mg - - -   TSAT 15 - 46 % 41 12(L) - - - 14(L) -   Ferritin 12 - 150 ng/mL 277(H) 14 - - - 90 -     BP Readings from Last 3 Encounters:   04/19/18 117/66   03/21/18 112/62   03/12/18 96/66     Wt Readings from Last 2 Encounters:   04/19/18 135 lb 2.3 oz (61.3 kg)   03/21/18 129 lb 11.2 oz (58.8 kg)     States feeling much better, on prednisone, and IV infusion for bowel.  Ulcerative colitis under much better control with treatment, not seeing any blood since started prednisone.      ASSESSMENT:  Hgb: Not at goal but improving - Continue to monitor  TSat: not at goal of >30% Ferritin: Not at goal of (>100ng/mL) - some improvement since IV iron but labs are still low    PLAN:  RTC for Hgb, ferritin and iron labs  in 2 week(s)  Referred to Abida Song to determine if addition iron therapy is needed and if MICKEY therapy needs to be initiated  Injectafer order placed 04/24/2018 EDUARDO     Plan is to monitor labs.  Had GI blood loss, currently doing infusion treatment for ulcerative colitis, and has subsided.  Once GI blood loss decreased Hgb should come back up, but still iron depleted due to ongoing bleeding.  If after Ulcerative colitis tx finished & still low, redose with IV iron.       Orders needed to be renewed (for next follow-up date) in EPIC: None    Iron labs due:   4/26/18    Plan discussed with:  Left VM  Plan provided by:  Amaris    NEXT FOLLOW-UP DATE:  5/3/18    Anemia Management Service  Abida Song PharmD and Princess Harrington CPhT  Phone: [phone]  Fax: [phone]

## 2018-04-24 DIAGNOSIS — Z94.0 STATUS POST KIDNEY TRANSPLANT: ICD-10-CM

## 2018-04-24 DIAGNOSIS — Z94.0 KIDNEY REPLACED BY TRANSPLANT: ICD-10-CM

## 2018-04-24 DIAGNOSIS — Z94.0 KIDNEY TRANSPLANTED: Primary | ICD-10-CM

## 2018-04-24 RX ORDER — MYCOPHENOLATE MOFETIL 250 MG/1
500 CAPSULE ORAL 2 TIMES DAILY
Qty: 120 CAPSULE | Refills: 11 | Status: SHIPPED | OUTPATIENT
Start: 2018-04-24 | End: 2018-11-02

## 2018-04-27 ENCOUNTER — TELEPHONE (OUTPATIENT)
Dept: GASTROENTEROLOGY | Facility: CLINIC | Age: 28
End: 2018-04-27

## 2018-05-01 ENCOUNTER — OFFICE VISIT (OUTPATIENT)
Dept: GASTROENTEROLOGY | Facility: CLINIC | Age: 28
End: 2018-05-01
Payer: COMMERCIAL

## 2018-05-01 VITALS
DIASTOLIC BLOOD PRESSURE: 67 MMHG | OXYGEN SATURATION: 99 % | HEART RATE: 82 BPM | SYSTOLIC BLOOD PRESSURE: 117 MMHG | HEIGHT: 62 IN | BODY MASS INDEX: 24.27 KG/M2 | TEMPERATURE: 98.6 F | WEIGHT: 131.9 LBS

## 2018-05-01 DIAGNOSIS — K51.00 ULCERATIVE PANCOLITIS WITHOUT COMPLICATION (H): Primary | ICD-10-CM

## 2018-05-01 ASSESSMENT — PAIN SCALES - GENERAL: PAINLEVEL: NO PAIN (0)

## 2018-05-01 NOTE — PROGRESS NOTES
"[facility] UC FOLLOW UP      PATIENT: Caity Horan    MRN: [id number]    Date of Birth 1990    Tel: [phone] (home)     PCP: Ritu Little     HPI: Ms. Horan is a 27 year old year old female here for follow up of newly diagnosed UC. She has had great improvement with prednisone taper, and currently on 5 mg daily.  She has been able to taper without recurrence of symptoms. She has had her first infusion on Vedolizumab on 4/19/18. Notes her last menstrual cycle was in February and no chance of pregnancy. Completed iron infusions in November and February and holding on repeat dose to see if UC therapy will improve anemia.  Had increase in EBV viremia, and Dr. Mar recommended decreasing prednisone taper and to follow EBV PCR closely. No changes from ID perspective. Repeat EBV level with Dr. Mar in 8/2018.    UC history  Spring 2012 -- graduated college, had a lot of life stressors. Experienced severe urgency with incontinence. Some blood in the stool and diarrhea alternating with constipation. Spontaneously resolved after 2 months.   End of Dec 2016 - April 2017: intermittent blood in stool, \"major constipation\" and weight gain, no urgency.    October 2017 -- was found to be anemic (attributed to kidney disease) and had iron infusions.  Sep -Dec 2017 Blood with well formed stool. This continued to worsen.   Worst stretch was mid-Feb to march: weight loss, >8 loose stools per day with blood.  April - now: much improved, no abdominal pain and urgency resolved.    In the past had upper abdominal bloating/pain and LLQ pain. No n/v.     Macroscopic extent of disease (most recent) E3    Current UC symptoms  Bowel frequency in day 2-3   Bowel frequency in night 0 now    Urgency of defecation NO  Blood in stool less than half stools, seen once every other week  General well being 8 (feeling well)  Extracolonic features (multiple select): none     Constitutional symptoms:  Fever NO  Weight loss YES " (in the past -- lost 12 lb since October), now stable    Other GI symptoms present: none  Total number of IBD surgeries (except perianal): 0    Current IBD Medications:  Prednisone  Vedolizumab induction     Current medications: MMF, tacrolimus, Mg, prednisone, B12     Past IBD Medications:   None    Past Medical History:   Diagnosis Date     Anemia in stage 5 chronic kidney disease (H)      Anemia in stage 5 chronic kidney disease (H) 10/27/2014     ESRD (end stage renal disease) on dialysis (H)      HTN (hypertension) 10/27/2014     Hypertension 10/2014     IgA nephropathy     biopsy proven        Past Surgical History:   Procedure Laterality Date     COLONOSCOPY N/A 3/12/2018    Procedure: COMBINED COLONOSCOPY, SINGLE OR MULTIPLE BIOPSY/POLYPECTOMY BY BIOPSY;  EGD/Colonoscopy ;  Surgeon: Kory Massey MD;  [facility]     EXTRACTION(S) DENTAL       TRANSPLANT KIDNEY RECIPIENT LIVING RELATED N/A 12/5/2014    Procedure: TRANSPLANT KIDNEY RECIPIENT LIVING RELATED;  Surgeon: Dale Middleton MD;  Location: [facility]       Social History   Substance Use Topics     Smoking status: Never Smoker     Smokeless tobacco: Never Used     Alcohol use Yes       Family History   Problem Relation Age of Onset     KIDNEY DISEASE No family hx of    Negative for IBD. Dad had psoriasis.   Grandfather with BCC. Grandma has several skin lesions removed (does not recall the diagnosis)    Allergies   Allergen Reactions     Amoxicillin Rash        Outpatient Encounter Prescriptions as of 5/1/2018   Medication Sig Dispense Refill     Cholecalciferol (VITAMIN D) 2000 UNITS tablet Take 2,000 Units by mouth daily 100 tablet 3     Cyanocobalamin (B-12) 1000 MCG TBCR Take 1,000 mcg by mouth daily 90 tablet 3     magnesium oxide (MAG-OX) 400 MG tablet Take 1 tablet (400 mg) by mouth daily 90 tablet 3     mycophenolate (GENERIC EQUIVALENT) 250 MG capsule Take 2 capsules (500 mg) by mouth 2 times daily 120 capsule 11     predniSONE (DELTASONE)  "5 MG tablet 40 mg daily x 2 wks, decrease by 10 mg weekly til at 20 mg daily, decrease by 5 mg wkly til at 5 mg daily, take 5mg every other day x 1 wk 228 tablet 0     sulfamethoxazole-trimethoprim (BACTRIM/SEPTRA) 400-80 MG per tablet Take 1 tablet by mouth daily 30 tablet 3     tacrolimus (GENERIC EQUIVALENT) 0.5 MG capsule Take 3 capsules (1.5 mg) by mouth 2 times daily 180 capsule 11     No facility-administered encounter medications on file as of 5/1/2018.       NSAID  NO    Review of Systems  Complete 10 System ROS performed. All are negative except as documented below, in the HPI, or in patient questionnaire from today's visit.    1) Constitutional: No fevers, chills, night sweats or malaise, weight loss or gain  2) Skin: No rash  3) Pulmonary: No wheeze, SOB, cough, sputum or hemoptysis  4) Cardiovascular: No Chest pain or palpitations  5) Genitourinary: No blood in urine or dysuria  6) Endocrine: No increased sweating, hunger, thirst or thyroid problems  7) Hematologic: No bruising and easy bleeding  8) Musculoskeletal: no new pain in joints or limitation in ROM  9) Neurologic: No dizziness, paresthesias or weakness or falls  10) Psychiatric:  not depressed/anxious, no sleep problems    PHYSICAL EXAM  Vitals: /67  Pulse 82  Temp 98.6  F (37  C) (Oral)  Ht 1.575 m (5' 2\")  Wt 59.8 kg (131 lb 14.4 oz)  SpO2 99%  BMI 24.12 kg/m2    No Pain (0)     Eyes: Sclera anicteric/injected  Ears/nose/mouth/throat: Normal oropharynx without ulcers or exudate, mucus membranes moist, hearing intact  Neck: supple, thyroid normal size  CV: No edema  Respiratory: Unlabored breathing  Lymph: No axillary, submandibular, supraclavicular or inguinal lymphadenopathy  Abd: Nondistended, +bs, no hepatosplenomegaly, nontender, no peritoneal signs  Skin: warm, perfused, no jaundice  Psych: Normal affect  MSK: Normal gait    DATA:  Reviewed in detail past documentation, medications and prior workup available in electronic " health records or through outside records.    PERTINENT STUDIES:  Most recent CBC:  WBC   Date Value Ref Range Status   04/19/2018 6.8 4.0 - 11.0 10e9/L Final   ]  Hemoglobin   Date Value Ref Range Status   04/19/2018 8.6 (L) 11.7 - 15.7 g/dL Final   ]   Platelet Count   Date Value Ref Range Status   04/19/2018 306 150 - 450 10e9/L Final       Most recent coag:  INR   Date Value Ref Range Status   12/05/2014 1.25 (H) 0.86 - 1.14 Final       Most recent hepatic panel:  AST   Date Value Ref Range Status   04/19/2018 12 0 - 45 U/L Final     ALT   Date Value Ref Range Status   04/19/2018 20 0 - 50 U/L Final     No results found for: BILICONJ   Bilirubin Total   Date Value Ref Range Status   04/19/2018 0.2 0.2 - 1.3 mg/dL Final     Albumin   Date Value Ref Range Status   04/19/2018 2.6 (L) 3.4 - 5.0 g/dL Final     Alkaline Phosphatase   Date Value Ref Range Status   04/19/2018 49 40 - 150 U/L Final       Most recent creatinine:  Creatinine   Date Value Ref Range Status   03/29/2018 1.06 (H) 0.52 - 1.04 mg/dL Final     Endoscopy:   cscope 3/2018 showed Mcbride 3 colitis involving the rectum to hepatic flexure with sparing of the AC. Normal TI. Biopsies showed moderate chronic active colitis with cryptitis and crypt abscess formation.    Imaging:  CT c/a/p on 7/2017 (indication EBV viremia): no bowel wall thickening. SB appeared normal.     IMPRESSION:  Ms. Horan is a 27 year old year old with newly diagnosed moderate to severe E3 ulcerative colitis, who achieved symptomatic remission on prednisone taper and initiation of vedolizumab induction (x1 infusion only).      # Ulcerative colitis, severe, E3  # Immunosuppressed state (tacrolimus and Cellcept)    PLAN:  --Blood work with infusions  --Continue prednisone taper as prescribed   --Continue vedolizumab induction  --------Vedolizumab level to be obtained just before her first 8 week interval infusion to optimize dose.  --Consider repeat endoscopic assessment in 6 months  for endoscopic evaluation +/- trending fecal calprotectins.     IBD Health Care Maintenance:  Vaccinations:  -- Influenza (every year): Last given 2017  -- TdaP (every 10 years): Last given 2017  -- Pneumococcal Pneumonia (once then every 5 years): Last given 2017    One time confirmation of immunity or serologies:  -- Hepatitis A (serologies or immunizations): Not documented  -- Hepatitis B (serologies or immunizations): 2001/2002, immune per serologies  -- Varicella: had chickenpox as a child  -- MMR:had all immunization as a child  -- HPV (all aged 18-26): 2007/2008  -- Meningococcal meningitis (all patients at risk for meningitis): 2007   -- Due to the immunosuppression in this patient, I would not advise administration of live vaccines such as varicella/VZV, intranasal influenza, MMR, or yellow fever vaccine (if travelling).      Cancer Screening:  Colon cancer screening:  Given pancolitis colonoscopy every 2-3 years recommended after 8 years of disease.  Dysplasia screening is recommended 2026, will require colonoscopy for mucosal healing on vedolizumab.    Cervical cancer screening: Annual due to immunosupression    Skin cancer screening: Annual visual exam of skin by dermatologist since patient is immunocompromised    Bone mineral density screening   -- Recommend all patients supplement with calcium and vitamin D  -- Given prior steroid use recommend DEXA if not already done    Depression Screening:  -- Over the last month, have you felt down, depressed, or hopeless? No  -- Over the last month, have you felt little interest or pleasure doing things? No    Misc:  -- Avoid tobacco use  -- Avoid NSAIDs as there is potentially a 25% chance of causing an IBD flare    Immunization History   Administered Date(s) Administered     Influenza Vaccine IM 3yrs+ 4 Valent IIV4 12/13/2016, 10/03/2017     Pneumo Conj 13-V (2010&after) 11/01/2017     Pneumococcal 23 valent 11/10/2014     TDAP Vaccine (Boostrix) 11/01/2017       Follow up with Dr. Doan in 3 months      ELISABET PerezC  Division of Gastroenterology, Hepatology and Nutrition  [facility]       Answers for HPI/ROS submitted by the patient on 5/1/2018   General Symptoms: No  Skin Symptoms: No  HENT Symptoms: No  EYE SYMPTOMS: No  HEART SYMPTOMS: No  LUNG SYMPTOMS: No  INTESTINAL SYMPTOMS: No  URINARY SYMPTOMS: No  GYNECOLOGIC SYMPTOMS: No  BREAST SYMPTOMS: No  SKELETAL SYMPTOMS: No  BLOOD SYMPTOMS: No  NERVOUS SYSTEM SYMPTOMS: No  MENTAL HEALTH SYMPTOMS: No

## 2018-05-01 NOTE — LETTER
"5/1/2018       RE: Caity Horan  [address]  [place] [address]     Dear Colleague,    Thank you for referring your patient, Caity Horan, to the [facility] GASTROENTER[facility] IBD [facility] at [facility]. Please see a copy of my visit note below.    [facility] UC FOLLOW UP      PATIENT: Caity Horan    MRN: [id number]    Date of Birth 1990    Tel: [phone] (home)     PCP: Ritu Little     HPI: Ms. Horan is a 27 year old year old female here for follow up of newly diagnosed UC. She has had great improvement with prednisone taper, and currently on 5 mg daily.  She has been able to taper without recurrence of symptoms. She has had her first infusion on Vedolizumab on 4/19/18. Notes her last menstrual cycle was in February and no chance of pregnancy. Completed iron infusions in November and February and holding on repeat dose to see if UC therapy will improve anemia.  Had increase in EBV viremia, and Dr. Mar recommended decreasing prednisone taper and to follow EBV PCR closely. No changes from ID perspective. Repeat EBV level with Dr. Mar in 8/2018.    UC history  Spring 2012 -- graduated college, had a lot of life stressors. Experienced severe urgency with incontinence. Some blood in the stool and diarrhea alternating with constipation. Spontaneously resolved after 2 months.   End of Dec 2016 - April 2017: intermittent blood in stool, \"major constipation\" and weight gain, no urgency.    October 2017 -- was found to be anemic (attributed to kidney disease) and had iron infusions.  Sep -Dec 2017 Blood with well formed stool. This continued to worsen.   Worst stretch was mid-Feb to march: weight loss, >8 loose stools per day with blood.  April - now: much improved, no abdominal pain and urgency resolved.    In the past had upper abdominal bloating/pain and LLQ pain. No n/v.     Macroscopic extent of disease (most recent) E3    Current " UC symptoms  Bowel frequency in day 2-3   Bowel frequency in night 0 now    Urgency of defecation NO  Blood in stool less than half stools, seen once every other week  General well being 8 (feeling well)  Extracolonic features (multiple select): none     Constitutional symptoms:  Fever NO  Weight loss YES (in the past -- lost 12 lb since October), now stable    Other GI symptoms present: none  Total number of IBD surgeries (except perianal): 0    Current IBD Medications:  Prednisone  Vedolizumab induction     Current medications: MMF, tacrolimus, Mg, prednisone, B12     Past IBD Medications:   None    Past Medical History:   Diagnosis Date     Anemia in stage 5 chronic kidney disease (H)      Anemia in stage 5 chronic kidney disease (H) 10/27/2014     ESRD (end stage renal disease) on dialysis (H)      HTN (hypertension) 10/27/2014     Hypertension 10/2014     IgA nephropathy     biopsy proven        Past Surgical History:   Procedure Laterality Date     COLONOSCOPY N/A 3/12/2018    Procedure: COMBINED COLONOSCOPY, SINGLE OR MULTIPLE BIOPSY/POLYPECTOMY BY BIOPSY;  EGD/Colonoscopy ;  Surgeon: Kory Massey MD;  Location: [facility]     EXTRACTION(S) DENTAL       TRANSPLANT KIDNEY RECIPIENT LIVING RELATED N/A 12/5/2014    Procedure: TRANSPLANT KIDNEY RECIPIENT LIVING RELATED;  Surgeon: Dale Middleton MD;  Location: [facility]       Social History   Substance Use Topics     Smoking status: Never Smoker     Smokeless tobacco: Never Used     Alcohol use Yes       Family History   Problem Relation Age of Onset     KIDNEY DISEASE No family hx of    Negative for IBD. Dad had psoriasis.   Grandfather with BCC. Grandma has several skin lesions removed (does not recall the diagnosis)    Allergies   Allergen Reactions     Amoxicillin Rash        Outpatient Encounter Prescriptions as of 5/1/2018   Medication Sig Dispense Refill     Cholecalciferol (VITAMIN D) 2000 UNITS tablet Take 2,000 Units by mouth daily 100 tablet 3      "Cyanocobalamin (B-12) 1000 MCG TBCR Take 1,000 mcg by mouth daily 90 tablet 3     magnesium oxide (MAG-OX) 400 MG tablet Take 1 tablet (400 mg) by mouth daily 90 tablet 3     mycophenolate (GENERIC EQUIVALENT) 250 MG capsule Take 2 capsules (500 mg) by mouth 2 times daily 120 capsule 11     predniSONE (DELTASONE) 5 MG tablet 40 mg daily x 2 wks, decrease by 10 mg weekly til at 20 mg daily, decrease by 5 mg wkly til at 5 mg daily, take 5mg every other day x 1 wk 228 tablet 0     sulfamethoxazole-trimethoprim (BACTRIM/SEPTRA) 400-80 MG per tablet Take 1 tablet by mouth daily 30 tablet 3     tacrolimus (GENERIC EQUIVALENT) 0.5 MG capsule Take 3 capsules (1.5 mg) by mouth 2 times daily 180 capsule 11     No facility-administered encounter medications on file as of 5/1/2018.       NSAID  NO    Review of Systems  Complete 10 System ROS performed. All are negative except as documented below, in the HPI, or in patient questionnaire from today's visit.    1) Constitutional: No fevers, chills, night sweats or malaise, weight loss or gain  2) Skin: No rash  3) Pulmonary: No wheeze, SOB, cough, sputum or hemoptysis  4) Cardiovascular: No Chest pain or palpitations  5) Genitourinary: No blood in urine or dysuria  6) Endocrine: No increased sweating, hunger, thirst or thyroid problems  7) Hematologic: No bruising and easy bleeding  8) Musculoskeletal: no new pain in joints or limitation in ROM  9) Neurologic: No dizziness, paresthesias or weakness or falls  10) Psychiatric:  not depressed/anxious, no sleep problems    PHYSICAL EXAM  Vitals: /67  Pulse 82  Temp 98.6  F (37  C) (Oral)  Ht 1.575 m (5' 2\")  Wt 59.8 kg (131 lb 14.4 oz)  SpO2 99%  BMI 24.12 kg/m2    No Pain (0)     Eyes: Sclera anicteric/injected  Ears/nose/mouth/throat: Normal oropharynx without ulcers or exudate, mucus membranes moist, hearing intact  Neck: supple, thyroid normal size  CV: No edema  Respiratory: Unlabored breathing  Lymph: No axillary, " submandibular, supraclavicular or inguinal lymphadenopathy  Abd: Nondistended, +bs, no hepatosplenomegaly, nontender, no peritoneal signs  Skin: warm, perfused, no jaundice  Psych: Normal affect  MSK: Normal gait    DATA:  Reviewed in detail past documentation, medications and prior workup available in electronic health records or through outside records.    PERTINENT STUDIES:  Most recent CBC:  WBC   Date Value Ref Range Status   04/19/2018 6.8 4.0 - 11.0 10e9/L Final   ]  Hemoglobin   Date Value Ref Range Status   04/19/2018 8.6 (L) 11.7 - 15.7 g/dL Final   ]   Platelet Count   Date Value Ref Range Status   04/19/2018 306 150 - 450 10e9/L Final       Most recent coag:  INR   Date Value Ref Range Status   12/05/2014 1.25 (H) 0.86 - 1.14 Final       Most recent hepatic panel:  AST   Date Value Ref Range Status   04/19/2018 12 0 - 45 U/L Final     ALT   Date Value Ref Range Status   04/19/2018 20 0 - 50 U/L Final     No results found for: BILICONJ   Bilirubin Total   Date Value Ref Range Status   04/19/2018 0.2 0.2 - 1.3 mg/dL Final     Albumin   Date Value Ref Range Status   04/19/2018 2.6 (L) 3.4 - 5.0 g/dL Final     Alkaline Phosphatase   Date Value Ref Range Status   04/19/2018 49 40 - 150 U/L Final       Most recent creatinine:  Creatinine   Date Value Ref Range Status   03/29/2018 1.06 (H) 0.52 - 1.04 mg/dL Final     Endoscopy:   cscope 3/2018 showed Mcbride 3 colitis involving the rectum to hepatic flexure with sparing of the AC. Normal TI. Biopsies showed moderate chronic active colitis with cryptitis and crypt abscess formation.    Imaging:  CT c/a/p on 7/2017 (indication EBV viremia): no bowel wall thickening. SB appeared normal.     IMPRESSION:  Ms. Horan is a 27 year old year old with newly diagnosed moderate to severe E3 ulcerative colitis, who achieved symptomatic remission on prednisone taper and initiation of vedolizumab induction (x1 infusion only).      # Ulcerative colitis, severe, E3  #  Immunosuppressed state (tacrolimus and Cellcept)    PLAN:  --Blood work with infusions  --Continue prednisone taper as prescribed   --Continue vedolizumab induction  --------Vedolizumab level to be obtained just before her first 8 week interval infusion to optimize dose.  --Consider repeat endoscopic assessment in 6 months for endoscopic evaluation +/- trending fecal calprotectins.     IBD Health Care Maintenance:  Vaccinations:  -- Influenza (every year): Last given 2017  -- TdaP (every 10 years): Last given 2017  -- Pneumococcal Pneumonia (once then every 5 years): Last given 2017    One time confirmation of immunity or serologies:  -- Hepatitis A (serologies or immunizations): Not documented  -- Hepatitis B (serologies or immunizations): 2001/2002, immune per serologies  -- Varicella: had chickenpox as a child  -- MMR:had all immunization as a child  -- HPV (all aged 18-26): 2007/2008  -- Meningococcal meningitis (all patients at risk for meningitis): 2007   -- Due to the immunosuppression in this patient, I would not advise administration of live vaccines such as varicella/VZV, intranasal influenza, MMR, or yellow fever vaccine (if travelling).      Cancer Screening:  Colon cancer screening:  Given pancolitis colonoscopy every 2-3 years recommended after 8 years of disease.  Dysplasia screening is recommended 2026, will require colonoscopy for mucosal healing on vedolizumab.    Cervical cancer screening: Annual due to immunosupression    Skin cancer screening: Annual visual exam of skin by dermatologist since patient is immunocompromised    Bone mineral density screening   -- Recommend all patients supplement with calcium and vitamin D  -- Given prior steroid use recommend DEXA if not already done    Depression Screening:  -- Over the last month, have you felt down, depressed, or hopeless? No  -- Over the last month, have you felt little interest or pleasure doing things? No    Misc:  -- Avoid tobacco use  --  Avoid NSAIDs as there is potentially a 25% chance of causing an IBD flare    Immunization History   Administered Date(s) Administered     Influenza Vaccine IM 3yrs+ 4 Valent IIV4 12/13/2016, 10/03/2017     Pneumo Conj 13-V (2010&after) 11/01/2017     Pneumococcal 23 valent 11/10/2014     TDAP Vaccine (Boostrix) 11/01/2017      Follow up with Dr. Doan in 3 months      Rupert Dsouza PA-C  Division of Gastroenterology, Hepatology and Nutrition  [facility]

## 2018-05-01 NOTE — NURSING NOTE
"Chief Complaint   Patient presents with     IBD     IBD       Vitals:    05/01/18 1407   BP: 117/67   Pulse: 82   Temp: 98.6  F (37  C)   TempSrc: Oral   SpO2: 99%   Weight: 131 lb 14.4 oz   Height: 5' 2\"       Body mass index is 24.12 kg/(m^2).      Mable SIMON LPN                          "

## 2018-05-01 NOTE — PATIENT INSTRUCTIONS
It was a pleasure taking care of you today.  I've included a brief summary of our discussion and care plan from today's visit below.  Please review this information with your primary care provider.  ______________________________________________________________________    My recommendations are summarized as follows:    -- Continue Entyvio induction then every 8 weeks  -- Continue prednisone taper. Can stop after 5 every other day x 1 week  -- Labs with every infusion  -- Next endoscopic assessment: flexible sigmoidoscopy in 6 months  -- Patient with IBD we recommend supplementation vitamin D 1000 units daily and calcium 500 mg twice daily.  -- Vaccines/immunizations to be updated: all up to date  -- Yearly Dermatology visit for skin check while on immunosuppressive therapy. Can call [phone] to schedule.  -- Yearly pap smear while on immunosuppressive therapy  -- No NSAIDs (ibuprofen, or anything containing ibuprofen)     Return to GI Clinic in 3 months to review your progress.    ______________________________________________________________________    Who do I call with any questions after my visit?  Please be in touch if there are any further questions that arise following today's visit.  There are multiple ways to contact your gastroenterology care team.        During business hours, you may reach a Gastroenterology nurse at [phone], option 3.       To schedule or reschedule an appointment, please call [phone].       You can always send a secure message through [employer].  [employer] messages are answered by your nurse or doctor typically within 24 hours.  Please allow extra time on weekends and holidays.        For urgent/emergent questions after business hours, you may reach the on-call GI Fellow by contacting the [facility] at [phone].     How will I get the results of any tests ordered?    You will receive all of your results.  If you have signed up for MyChart, any tests  ordered at your visit will be available to you after your physician reviews them.  Typically this takes 1-2 weeks.  If there are urgent results that require a change in your care plan, your physician or nurse will call you to discuss the next steps.      What is Twinglyhart?  WazeTrip is a secure way for you to access all of your healthcare records from the [facility].  It is a web based computer program, so you can sign on to it from any location.  It also allows you to send secure messages to your care team.  I recommend signing up for WazeTrip access if you have not already done so and are comfortable with using a computer.      How to I schedule a follow-up visit?  If you did not schedule a follow-up visit today, please call [phone] to schedule a follow-up office visit.        Sincerely,    Rupert Dsouza PA-C  [facility]  Division of Gastroenterology

## 2018-05-01 NOTE — MR AVS SNAPSHOT
After Visit Summary   5/1/2018    Caity Horan    MRN: [id number]           Patient Information     Date Of Birth          1990        Visit Information        Provider Department      5/1/2018 2:00 PM Rupert Dsouza PA-C [facility]        Today's Diagnoses     Ulcerative pancolitis without complication (H)    -  1      Care Instructions    It was a pleasure taking care of you today.  I've included a brief summary of our discussion and care plan from today's visit below.  Please review this information with your primary care provider.  ______________________________________________________________________    My recommendations are summarized as follows:    -- Continue Entyvio induction then every 8 weeks  -- Continue prednisone taper. Can stop after 5 every other day x 1 week  -- Labs with every infusion  -- Next endoscopic assessment: flexible sigmoidoscopy in 6 months  -- Patient with IBD we recommend supplementation vitamin D 1000 units daily and calcium 500 mg twice daily.  -- Vaccines/immunizations to be updated: all up to date  -- Yearly Dermatology visit for skin check while on immunosuppressive therapy. Can call [phone] to schedule.  -- Yearly pap smear while on immunosuppressive therapy  -- No NSAIDs (ibuprofen, or anything containing ibuprofen)     Return to GI Clinic in 3 months to review your progress.    ______________________________________________________________________    Who do I call with any questions after my visit?  Please be in touch if there are any further questions that arise following today's visit.  There are multiple ways to contact your gastroenterology care team.        During business hours, you may reach a Gastroenterology nurse at [phone], option 3.       To schedule or reschedule an appointment, please call [phone].       You can always send a secure message through [employer].  [employer] messages are answered by your  nurse or doctor typically within 24 hours.  Please allow extra time on weekends and holidays.        For urgent/emergent questions after business hours, you may reach the on-call GI Fellow by contacting the [facility]  at [phone].     How will I get the results of any tests ordered?    You will receive all of your results.  If you have signed up for Westhousehart, any tests ordered at your visit will be available to you after your physician reviews them.  Typically this takes 1-2 weeks.  If there are urgent results that require a change in your care plan, your physician or nurse will call you to discuss the next steps.      What is Westhousehart?  [employer] is a secure way for you to access all of your healthcare records from the [facility].  It is a web based computer program, so you can sign on to it from any location.  It also allows you to send secure messages to your care team.  I recommend signing up for [employer] access if you have not already done so and are comfortable with using a computer.      How to I schedule a follow-up visit?  If you did not schedule a follow-up visit today, please call [phone] to schedule a follow-up office visit.        Sincerely,    Rupert Dsouza PA-C  [facility]  Division of Gastroenterology                Follow-ups after your visit        Additional Services     DERMATOLOGY REFERRAL       Your provider has referred you to: [facility]: Dermatology Clinic - [place] [phone]   [email or web address]    Please be aware that coverage of these services is subject to the terms and limitations of your health insurance plan.  Call member services at your health plan with any benefit or coverage questions.      Please bring the following with you to your appointment:    (1) Any X-Rays, CTs or MRIs which have been performed.  Contact the facility where they were done to arrange for  prior to your scheduled  appointment.    (2) List of current medications  (3) This referral request   (4) Any documents/labs given to you for this referral                  Follow-up notes from your care team     Return in about 3 months (around 8/1/2018).      Your next 10 appointments already scheduled     May 03, 2018  1:00 PM CDT   Infusion 120 with UC SPEC INFUSION, UC 41 ATC   [facility] Special[facility] Procedure ([facility])    [address]  [address]  [facility] [id number] [phone]            May 31, 2018 10:00 AM CDT   Infusion 120 with UC SPEC INFUSION, UC 48 ATC   [facility] Procedure ([facility])    [address]  [address]  [place] [id number] [phone]            Jul 17, 2018  3:40 PM CDT   (Arrive by 3:25 PM)   RETURN INFLAMMATORY BOWEL DISEASE with Yenifer Doan MD   [facility]astroenterology [facility] IBD [facility] ([facility])    [address]  4th Floor  [place] [id number] [phone]            Aug 28, 2018  7:15 AM CDT   Lab with UC LAB   [facility] Lab ([facility])    [address]  1st Floor  [place] [facility] [place] [id number] [phone]            Aug 28, 2018  3:35 PM CDT   (Arrive by 3:05 PM)   Return Kidney Transplant with Otilio Mar MD   [facility] Nephrology ([facility])    [address] [place] [address]  [address]  [facility] [id number] [phone]              Who to contact     Please call your clinic at [phone] to:    Ask questions about your health    Make or cancel appointments    Discuss your medicines    Learn about your test results    Speak to your doctor            Additional Information About Your Visit        Mobile Active Defensehart Information     [employer] gives you secure access to your electronic health record. If you see a primary care provider, you can also send messages to  "your care team and make appointments. If you have questions, please call your primary care clinic.  If you do not have a primary care provider, please call [phone] and they will assist you.      Kovio is an electronic gateway that provides easy, online access to your medical records. With Kovio, you can request a clinic appointment, read your test results, renew a prescription or communicate with your care team.     To access your existing account, please contact your [facility] or call [phone] for assistance.        Care EveryWhere ID     This is your Care EveryWhere ID. This could be used by other organizations to access your [facility] medical records  [id number]        Your Vitals Were     Pulse Temperature Height Pulse Oximetry BMI (Body Mass Index)       82 98.6  F (37  C) (Oral) 1.575 m (5' 2\") 99% 24.12 kg/m2        Blood Pressure from Last 3 Encounters:   05/01/18 117/67   04/19/18 117/66   03/21/18 112/62    Weight from Last 3 Encounters:   05/01/18 59.8 kg (131 lb 14.4 oz)   04/19/18 61.3 kg (135 lb 2.3 oz)   03/21/18 58.8 kg (129 lb 11.2 oz)              We Performed the Following     DERMATOLOGY REFERRAL        Primary Care Provider Office Phone # Fax #    Ritu CHLOE Little [phone]       [address]  [address] [address]        Equal Access to Services     YANIQUE QUACH AH: Hadii manuel ku hadasho Somaryam, waaxda luqadaha, qaybta kaalmada adeegyada, philip haddad. So [facility] [phone].    ATENCIÓN: Si habla español, tiene a carreon disposición servicios gratuitos de asistencia lingüística. Llame al [phone].    We comply with applicable federal civil rights laws and [place] laws. We do not discriminate on the basis of race, color, national origin, age, disability, sex, sexual orientation, or gender identity.            Thank you!     Thank you for choosing [facility] GASTROENTEROLOGY " AND IBD CLINIC  for your care. Our goal is always to provide you with excellent care. Hearing back from our patients is one way we can continue to improve our services. Please take a few minutes to complete the written survey that you may receive in the mail after your visit with us. Thank you!             Your Updated Medication List - Protect others around you: Learn how to safely use, store and throw away your medicines at www.disposemymeds.org.          This list is accurate as of 5/1/18  2:56 PM.  Always use your most recent med list.                   Brand Name Dispense Instructions for use Diagnosis    B-12 1000 MCG Tbcr     90 tablet    Take 1,000 mcg by mouth daily    B12 deficiency due to diet       magnesium oxide 400 MG tablet    MAG-OX    90 tablet    Take 1 tablet (400 mg) by mouth daily    Hypomagnesemia, Status post kidney transplant, Kidney replaced by transplant, Kidney transplanted       mycophenolate 250 MG capsule    GENERIC EQUIVALENT    120 capsule    Take 2 capsules (500 mg) by mouth 2 times daily    Kidney transplanted, Kidney replaced by transplant, Status post kidney transplant       predniSONE 5 MG tablet    DELTASONE    228 tablet    40 mg daily x 2 wks, decrease by 10 mg weekly til at 20 mg daily, decrease by 5 mg wkly til at 5 mg daily, take 5mg every other day x 1 wk    Ulcerative pancolitis with rectal bleeding (H)       sulfamethoxazole-trimethoprim 400-80 MG per tablet    BACTRIM/SEPTRA    30 tablet    Take 1 tablet by mouth daily    Kidney transplanted, Complications, kidney transplant, Aftercare following organ transplant       tacrolimus 0.5 MG capsule    GENERIC EQUIVALENT    180 capsule    Take 3 capsules (1.5 mg) by mouth 2 times daily    Kidney replaced by transplant, Status post kidney transplant, Hypomagnesemia, Kidney transplanted       vitamin D 2000 units tablet     100 tablet    Take 2,000 Units by mouth daily    Vitamin D deficiency

## 2018-05-03 ENCOUNTER — INFUSION THERAPY VISIT (OUTPATIENT)
Dept: INFUSION THERAPY | Facility: CLINIC | Age: 28
End: 2018-05-03
Attending: INTERNAL MEDICINE
Payer: COMMERCIAL

## 2018-05-03 VITALS
BODY MASS INDEX: 24.31 KG/M2 | TEMPERATURE: 97.8 F | RESPIRATION RATE: 16 BRPM | OXYGEN SATURATION: 100 % | WEIGHT: 132.9 LBS | DIASTOLIC BLOOD PRESSURE: 57 MMHG | HEART RATE: 69 BPM | SYSTOLIC BLOOD PRESSURE: 104 MMHG

## 2018-05-03 DIAGNOSIS — Z48.298 AFTERCARE FOLLOWING ORGAN TRANSPLANT: ICD-10-CM

## 2018-05-03 DIAGNOSIS — D63.1 ANEMIA IN STAGE 3 CHRONIC KIDNEY DISEASE (H): ICD-10-CM

## 2018-05-03 DIAGNOSIS — D84.9 IMMUNOSUPPRESSED STATUS (H): ICD-10-CM

## 2018-05-03 DIAGNOSIS — T86.10 COMPLICATIONS, KIDNEY TRANSPLANT: ICD-10-CM

## 2018-05-03 DIAGNOSIS — N18.30 ANEMIA IN STAGE 3 CHRONIC KIDNEY DISEASE (H): ICD-10-CM

## 2018-05-03 DIAGNOSIS — K51.011 ULCERATIVE PANCOLITIS WITH RECTAL BLEEDING (H): ICD-10-CM

## 2018-05-03 DIAGNOSIS — N18.30 CHRONIC KIDNEY DISEASE, STAGE 3 (MODERATE): ICD-10-CM

## 2018-05-03 DIAGNOSIS — Z94.0 KIDNEY TRANSPLANTED: Primary | ICD-10-CM

## 2018-05-03 DIAGNOSIS — Z79.899 ENCOUNTER FOR LONG-TERM CURRENT USE OF MEDICATION: ICD-10-CM

## 2018-05-03 DIAGNOSIS — Z94.0 KIDNEY REPLACED BY TRANSPLANT: ICD-10-CM

## 2018-05-03 LAB
ALBUMIN SERPL-MCNC: 3.2 G/DL (ref 3.4–5)
ALP SERPL-CCNC: 55 U/L (ref 40–150)
ALT SERPL W P-5'-P-CCNC: 15 U/L (ref 0–50)
ANION GAP SERPL CALCULATED.3IONS-SCNC: 7 MMOL/L (ref 3–14)
AST SERPL W P-5'-P-CCNC: 14 U/L (ref 0–45)
BASOPHILS # BLD AUTO: 0 10E9/L (ref 0–0.2)
BASOPHILS NFR BLD AUTO: 0.3 %
BILIRUB DIRECT SERPL-MCNC: <0.1 MG/DL (ref 0–0.2)
BILIRUB SERPL-MCNC: 0.2 MG/DL (ref 0.2–1.3)
BUN SERPL-MCNC: 15 MG/DL (ref 7–30)
CALCIUM SERPL-MCNC: 8.8 MG/DL (ref 8.5–10.1)
CHLORIDE SERPL-SCNC: 109 MMOL/L (ref 94–109)
CO2 SERPL-SCNC: 23 MMOL/L (ref 20–32)
CREAT SERPL-MCNC: 1.03 MG/DL (ref 0.52–1.04)
CRP SERPL-MCNC: <2.9 MG/L (ref 0–8)
DIFFERENTIAL METHOD BLD: ABNORMAL
EOSINOPHIL # BLD AUTO: 0.1 10E9/L (ref 0–0.7)
EOSINOPHIL NFR BLD AUTO: 0.9 %
ERYTHROCYTE [DISTWIDTH] IN BLOOD BY AUTOMATED COUNT: 14.1 % (ref 10–15)
ERYTHROCYTE [SEDIMENTATION RATE] IN BLOOD BY WESTERGREN METHOD: 64 MM/H (ref 0–20)
FERRITIN SERPL-MCNC: 13 NG/ML (ref 12–150)
GFR SERPL CREATININE-BSD FRML MDRD: 64 ML/MIN/1.7M2
GLUCOSE SERPL-MCNC: 97 MG/DL (ref 70–99)
HCT VFR BLD AUTO: 28 % (ref 35–47)
HGB BLD-MCNC: 8.6 G/DL (ref 11.7–15.7)
IMM GRANULOCYTES # BLD: 0 10E9/L (ref 0–0.4)
IMM GRANULOCYTES NFR BLD: 0.5 %
IRON SATN MFR SERPL: 6 % (ref 15–46)
IRON SERPL-MCNC: 19 UG/DL (ref 35–180)
LYMPHOCYTES # BLD AUTO: 0.8 10E9/L (ref 0.8–5.3)
LYMPHOCYTES NFR BLD AUTO: 11.9 %
MCH RBC QN AUTO: 29.4 PG (ref 26.5–33)
MCHC RBC AUTO-ENTMCNC: 30.7 G/DL (ref 31.5–36.5)
MCV RBC AUTO: 96 FL (ref 78–100)
MONOCYTES # BLD AUTO: 0.3 10E9/L (ref 0–1.3)
MONOCYTES NFR BLD AUTO: 5.3 %
NEUTROPHILS # BLD AUTO: 5.2 10E9/L (ref 1.6–8.3)
NEUTROPHILS NFR BLD AUTO: 81.1 %
NRBC # BLD AUTO: 0 10*3/UL
NRBC BLD AUTO-RTO: 0 /100
PLATELET # BLD AUTO: 341 10E9/L (ref 150–450)
POTASSIUM SERPL-SCNC: 4 MMOL/L (ref 3.4–5.3)
PROT SERPL-MCNC: 6.8 G/DL (ref 6.8–8.8)
RBC # BLD AUTO: 2.93 10E12/L (ref 3.8–5.2)
SODIUM SERPL-SCNC: 139 MMOL/L (ref 133–144)
TIBC SERPL-MCNC: 321 UG/DL (ref 240–430)
WBC # BLD AUTO: 6.5 10E9/L (ref 4–11)

## 2018-05-03 PROCEDURE — 83540 ASSAY OF IRON: CPT | Performed by: INTERNAL MEDICINE

## 2018-05-03 PROCEDURE — 36415 COLL VENOUS BLD VENIPUNCTURE: CPT

## 2018-05-03 PROCEDURE — 82728 ASSAY OF FERRITIN: CPT | Performed by: INTERNAL MEDICINE

## 2018-05-03 PROCEDURE — 25000128 H RX IP 250 OP 636: Mod: ZF | Performed by: INTERNAL MEDICINE

## 2018-05-03 PROCEDURE — 80048 BASIC METABOLIC PNL TOTAL CA: CPT | Performed by: INTERNAL MEDICINE

## 2018-05-03 PROCEDURE — 87799 DETECT AGENT NOS DNA QUANT: CPT | Performed by: INTERNAL MEDICINE

## 2018-05-03 PROCEDURE — 96413 CHEMO IV INFUSION 1 HR: CPT

## 2018-05-03 PROCEDURE — 85025 COMPLETE CBC W/AUTO DIFF WBC: CPT | Performed by: INTERNAL MEDICINE

## 2018-05-03 PROCEDURE — 85652 RBC SED RATE AUTOMATED: CPT | Performed by: INTERNAL MEDICINE

## 2018-05-03 PROCEDURE — 83550 IRON BINDING TEST: CPT | Performed by: INTERNAL MEDICINE

## 2018-05-03 PROCEDURE — 80076 HEPATIC FUNCTION PANEL: CPT | Performed by: INTERNAL MEDICINE

## 2018-05-03 PROCEDURE — 86140 C-REACTIVE PROTEIN: CPT | Performed by: INTERNAL MEDICINE

## 2018-05-03 RX ADMIN — VEDOLIZUMAB 300 MG: 300 INJECTION, POWDER, LYOPHILIZED, FOR SOLUTION INTRAVENOUS at 13:46

## 2018-05-03 NOTE — PATIENT INSTRUCTIONS
Deasherry Horan    Thank you for choosing [facility] ([facility]) for your infusion.  The following information is a summary of our appointment as well as important reminders.        Vedolizumab Solution for injection  What is this medicine?  VEDOLIZUMAB (Ve strong AJCKI you mab) is used to treat ulcerative colitis and Crohn's disease in adult patients.  This medicine may be used for other purposes; ask your health care provider or pharmacist if you have questions.  What should I tell my health care provider before I take this medicine?  They need to know if you have any of these conditions:    diabetes    hepatitis B or history of hepatitis B infection    HIV or AIDS    immune system problems    infection or history of infections    liver disease    recently received or scheduled to receive a vaccine    scheduled to have surgery    tuberculosis, a positive skin test for tuberculosis or have recently been in close contact with someone who has tuberculosis    an unusual or allergic reaction to vedolizumab, other medicines, foods, dyes, or preservatives    pregnant or trying to get pregnant    breast-feeding  How should I use this medicine?  This medicine is for infusion into a vein. It is given by a health care professional in a hospital or clinic setting.  A special MedGuide will be given to you by the pharmacist with each prescription and refill. Be sure to read this information carefully each time.  Talk to your pediatrician regarding the use of this medicine in children. This medicine is not approved for use in children.  Overdosage: If you think you've taken too much of this medicine contact a poison control center or emergency room at once.  NOTE: This medicine is only for you. Do not share this medicine with others.  What if I miss a dose?  It is important not to miss your dose. Call your doctor or health care professional if you are unable to keep an  appointment.  What may interact with this medicine?    steroid medicines like prednisone or cortisone    TNF-alpha inhibitors like natalizumab, adalimumab, and infliximab    vaccines  This list may not describe all possible interactions. Give your health care provider a list of all the medicines, herbs, non-prescription drugs, or dietary supplements you use. Also tell them if you smoke, drink alcohol, or use illegal drugs. Some items may interact with your medicine.  What should I watch for while using this medicine?  Your condition will be monitored carefully while you are receiving this medicine. Visit your doctor for regular check ups. Tell your doctor or healthcare professional if your symptoms do not start to get better or if they get worse.  Stay away from people who are sick. Call your doctor or health care professional for advice if you get a fever, chills or sore throat, or other symptoms of a cold or flu. Do not treat yourself.  In some patients, this medicine may cause a serious brain infection that may cause death. If you have any problems seeing, thinking, speaking, walking, or standing, tell your doctor right away. If you cannot reach your doctor, get urgent medical care.  What side effects may I notice from receiving this medicine?  Side effects that you should report to your doctor or health care professional as soon as possible:    allergic reactions like skin rash, itching or hives, swelling of the face, lips, or tongue    breathing problems    changes in vision    chest pain    dark urine    depression, feelings of sadness    dizziness    general ill feeling or flu-like symptoms    irregular, missed, or painful menstrual periods    light-colored stools    loss of appetite, nausea    muscle weakness    problems with balance, talking, or walking    right upper belly pain    unusually weak or tired    yellowing of the eyes or skin  Side effects that usually do not require medical attention (Report  these to your doctor or health care professional if they continue or are bothersome.):    aches, pains    headache    stomach upset    tiredness  This list may not describe all possible side effects. Call your doctor for medical advice about side effects. You may report side effects to FDA at 5-654-WTC-3435.  Where should I keep my medicine?  This drug is given in a hospital or clinic and will not be stored at home.  NOTE: This sheet is a summary. It may not cover all possible information. If you have questions about this medicine, talk to your doctor, pharmacist, or health care provider.  NOTE:This sheet is a summary. It may not cover all possible information. If you have questions about this medicine, talk to your doctor, pharmacist, or health care provider. Copyright  2016 Gold Standard    We look forward in seeing you on your next appointment here at [facility].  Please don t hesitate to call us at [phone] to reschedule any of your appointments or to speak with one of the [facility] registered nurses.  It was a pleasure taking care of you today.    Sincerely,    [facility]  [facility]  [address] [place] [address]  [address]  Phone:  [phone]

## 2018-05-03 NOTE — PROGRESS NOTES
Nursing Note  Caity Horan [facility] for:   Chief Complaint   Patient presents with     Infusion     Entyvio     During today's [facility] appointment, orders from Dr. Yenifer Doan were completed.  Frequency: Week 0, 2, 6 and then every 8 weeks. Today is dose #2.    Progress note:  Patient identification verified by name and date of birth.  Assessment completed.  Vitals recorded in Doc Flowsheets.  Patient was provided with education regarding infusion and possible side effects.  Patient verbalized understanding.      needed: No  Premedications: were not ordered.   Infusion Rates: infusion given over approximately 30 minutes.  Labs: were drawn per orders. Standing labs also drawn per patient request. Tac not drawn.  Vascular access: peripheral IV placed today.  Treatment Conditions: Biologic/Chemo Checklist     ~~~ NOTE: If the patient answers yes to any of the questions below, hold the infusion and contact ordering provider or on-call provider.    1. Have you recently had an elevated temperature, fever, chills, productive cough, coughing for 3 weeks or longer or hemoptysis,  abnormal vital signs, night sweats,  chest pain or have you noticed a decrease in your appetite, unexplained weight loss or fatigue? No  2. Do you have any open wounds or new incisions? No  3. Do you have any recent or upcoming hospitalizations, surgeries or dental procedures? No  4. Do you currently have or recently have had any signs of illness or infection or are you on any antibiotics? No  5. Have you had any new, sudden or worsening abdominal pain? No  6. Have you or anyone in your household received a live vaccination in the past 4 weeks? Please note:  No live vaccines while on biologic/chemotherapy until 6 months after the last treatment.  Patient can receive the flu vaccine (shot only) and the pneumovax.  It is optimal for the patient to get these  vaccines mid cycle, but they can be given at any time as long as it is not on the day of the infusion. No  7. Have you recently been diagnosed with any new nervous system diseases (ie. Multiple sclerosis, Guillain [place], seizures, neurological changes) or cancer diagnosis? Are you on any form of radiation or chemotherapy? No  8. Are you pregnant or breast feeding or do you have plans of pregnancy in the future? No  9. Have you been having any signs of worsening depression or suicidal ideations?  (benlysta only) No  10. Have there been any other new onset medical symptoms? No    Patient tolerated infusion: well.      Discharge Plan:   Follow up plan of care with: ongoing infusions at [facility].  Discharge instructions were reviewed with patient.  Patient/representative verbalized understanding of discharge instructions and all questions answered.  Patient discharged from [facility] in stable condition.    Nicolle Ogden RN    Administrations This Visit     vedolizumab (ENTYVIO) 300 mg in sodium chloride 0.9 % 280 mL infusion     Admin Date Action Dose Rate Route Administered By          05/03/2018 New Bag 300 mg 560 mL/hr Intravenous Nicolle Ogden RN                        /57 (BP Location: Left arm)  Pulse 75  Temp 97.8  F (36.6  C) (Oral)  Resp 16  Wt 60.3 kg (132 lb 14.4 oz)  SpO2 100%  BMI 24.31 kg/m2

## 2018-05-03 NOTE — MR AVS SNAPSHOT
After Visit Summary   5/3/2018    Caity Horan    MRN: [id number]           Patient Information     Date Of Birth          1990        Visit Information        Provider Department      5/3/2018 1:00 PM UC 41 ATC; UC SPEC INFUSION [facility]        Today's Diagnoses     Kidney transplanted    -  1    Complications, kidney transplant        Aftercare following organ transplant        Immunosuppressed status (H)        Anemia in stage 3 chronic kidney disease        Chronic kidney disease, stage 3 (moderate)        Ulcerative pancolitis with rectal bleeding (H)        Kidney replaced by transplant        Encounter for long-term current use of medication          Care Instructions    Dear Caity Horan    Thank you for choosing [facility] ([facility]) for your infusion.  The following information is a summary of our appointment as well as important reminders.        Vedolizumab Solution for injection  What is this medicine?  VEDOLIZUMAB (Ve strong JACKI you mab) is used to treat ulcerative colitis and Crohn's disease in adult patients.  This medicine may be used for other purposes; ask your health care provider or pharmacist if you have questions.  What should I tell my health care provider before I take this medicine?  They need to know if you have any of these conditions:    diabetes    hepatitis B or history of hepatitis B infection    HIV or AIDS    immune system problems    infection or history of infections    liver disease    recently received or scheduled to receive a vaccine    scheduled to have surgery    tuberculosis, a positive skin test for tuberculosis or have recently been in close contact with someone who has tuberculosis    an unusual or allergic reaction to vedolizumab, other medicines, foods, dyes, or preservatives    pregnant or trying to get pregnant    breast-feeding  How should  I use this medicine?  This medicine is for infusion into a vein. It is given by a health care professional in a hospital or clinic setting.  A special MedGuide will be given to you by the pharmacist with each prescription and refill. Be sure to read this information carefully each time.  Talk to your pediatrician regarding the use of this medicine in children. This medicine is not approved for use in children.  Overdosage: If you think you've taken too much of this medicine contact a poison control center or emergency room at once.  NOTE: This medicine is only for you. Do not share this medicine with others.  What if I miss a dose?  It is important not to miss your dose. Call your doctor or health care professional if you are unable to keep an appointment.  What may interact with this medicine?    steroid medicines like prednisone or cortisone    TNF-alpha inhibitors like natalizumab, adalimumab, and infliximab    vaccines  This list may not describe all possible interactions. Give your health care provider a list of all the medicines, herbs, non-prescription drugs, or dietary supplements you use. Also tell them if you smoke, drink alcohol, or use illegal drugs. Some items may interact with your medicine.  What should I watch for while using this medicine?  Your condition will be monitored carefully while you are receiving this medicine. Visit your doctor for regular check ups. Tell your doctor or healthcare professional if your symptoms do not start to get better or if they get worse.  Stay away from people who are sick. Call your doctor or health care professional for advice if you get a fever, chills or sore throat, or other symptoms of a cold or flu. Do not treat yourself.  In some patients, this medicine may cause a serious brain infection that may cause death. If you have any problems seeing, thinking, speaking, walking, or standing, tell your doctor right away. If you cannot reach your doctor, get urgent  medical care.  What side effects may I notice from receiving this medicine?  Side effects that you should report to your doctor or health care professional as soon as possible:    allergic reactions like skin rash, itching or hives, swelling of the face, lips, or tongue    breathing problems    changes in vision    chest pain    dark urine    depression, feelings of sadness    dizziness    general ill feeling or flu-like symptoms    irregular, missed, or painful menstrual periods    light-colored stools    loss of appetite, nausea    muscle weakness    problems with balance, talking, or walking    right upper belly pain    unusually weak or tired    yellowing of the eyes or skin  Side effects that usually do not require medical attention (Report these to your doctor or health care professional if they continue or are bothersome.):    aches, pains    headache    stomach upset    tiredness  This list may not describe all possible side effects. Call your doctor for medical advice about side effects. You may report side effects to FDA at 4-440-FDA-5762.  Where should I keep my medicine?  This drug is given in a hospital or clinic and will not be stored at home.  NOTE: This sheet is a summary. It may not cover all possible information. If you have questions about this medicine, talk to your doctor, pharmacist, or health care provider.  NOTE:This sheet is a summary. It may not cover all possible information. If you have questions about this medicine, talk to your doctor, pharmacist, or health care provider. Copyright  2016 Gold Standard    We look forward in seeing you on your next appointment here at [facility].  Please don t hesitate to call us at [phone] to reschedule any of your appointments or to speak with one of the [facility] registered nurses.  It was a pleasure taking care of you today.    Sincerely,    [facility]  [facility]fusion [facility]  [address] [place] [address]   [id number]  Phone:  [phone]            Follow-ups after your visit        Your next 10 appointments already scheduled     May 31, 2018 10:00 AM CDT   Infusion 120 with UC SPEC INFUSION, UC 48 ATC   [facility] Specialty and Procedure ([facility])    [address]  [address]  [facility] [id number] [phone]            Jul 17, 2018  3:40 PM CDT   (Arrive by 3:25 PM)   RETURN INFLAMMATORY BOWEL DISEASE with Yenifer Doan MD   [facility] ([facility])    [address]  4th Floor  [facility] [id number] [phone]            Jul 26, 2018  8:00 AM CDT   Infusion 120 with UC SPEC INFUSION, UC 48 ATC   [facility] Specialty and Procedure ([facility])    [address]  [address]  [facility] [id number] [phone]            Aug 28, 2018  7:15 AM CDT   Lab with UC LAB   [facility] Lab ([facility])    [address]  1st Floor  [facility] [id number] [phone]            Aug 28, 2018  3:35 PM CDT   (Arrive by 3:05 PM)   Return Kidney Transplant with Otilio Mar MD   [facility] Nephrology ([facility])    [address] [place] [address]  [address]  [facility] [id number] [phone]              Future tests that were ordered for you today     Open Standing Orders        Priority Remaining Interval Expires Ordered    Notify Physician Routine [id number] PRN  5/3/2018            Who to contact     If you have questions or need follow up information about today's clinic visit or your schedule please contact [facility] SPECIALTY AND PROCEDURE directly at [phone].  Normal or non-critical lab and imaging results will be communicated to you by MyChart, letter or phone within 4 business days after the clinic has received the results. If you do not  hear from us within 7 days, please contact the clinic through Quip or phone. If you have a critical or abnormal lab result, we will notify you by phone as soon as possible.  Submit refill requests through Quip or call your pharmacy and they will forward the refill request to us. Please allow 3 business days for your refill to be completed.          Additional Information About Your Visit        Latest [employer] Information     [employer] gives you secure access to your electronic health record. If you see a primary care provider, you can also send messages to your care team and make appointments. If you have questions, please call your primary care clinic.  If you do not have a primary care provider, please call [phone] and they will assist you.        Care EveryWhere ID     This is your Care EveryWhere ID. This could be used by other organizations to access your [place] medical records  [id number]        Your Vitals Were     Pulse Temperature Respirations Pulse Oximetry BMI (Body Mass Index)       69 97.8  F (36.6  C) (Oral) 16 100% 24.31 kg/m2        Blood Pressure from Last 3 Encounters:   05/03/18 104/57   05/01/18 117/67   04/19/18 117/66    Weight from Last 3 Encounters:   05/03/18 60.3 kg (132 lb 14.4 oz)   05/01/18 59.8 kg (131 lb 14.4 oz)   04/19/18 61.3 kg (135 lb 2.3 oz)              We Performed the Following     Basic metabolic panel     CBC with platelets differential     CRP inflammation     EBV DNA PCR Quantitative Whole Blood     Erythrocyte sedimentation rate auto     Ferritin     Hepatic panel     Iron and iron binding capacity        Primary Care Provider Office Phone # Fax #    Ritu Little [phone]       [place] [address] [address]   [facility] [address]        Equal Access to Services     YANIQUE QUACH : Hadii manuel Machuca, walarryda luanna marie, qaybta jazminalphilip miller. So [facility] [phone].    ATENCIÓN:  Si amiela menea, tiene a carreon disposición servicios gratuitos de asistencia lingüística. eNgro carter [phone].    We comply with applicable federal civil rights laws and [place] laws. We do not discriminate on the basis of race, color, national origin, age, disability, sex, sexual orientation, or gender identity.            Thank you!     Thank you for choosing [facility] SPECIALTY AND PROCEDURE  for your care. Our goal is always to provide you with excellent care. Hearing back from our patients is one way we can continue to improve our services. Please take a few minutes to complete the written survey that you may receive in the mail after your visit with us. Thank you!             Your Updated Medication List - Protect others around you: Learn how to safely use, store and throw away your medicines at www.disposemymeds.org.          This list is accurate as of 5/3/18  2:47 PM.  Always use your most recent med list.                   Brand Name Dispense Instructions for use Diagnosis    B-12 1000 MCG Tbcr     90 tablet    Take 1,000 mcg by mouth daily    B12 deficiency due to diet       magnesium oxide 400 MG tablet    MAG-OX    90 tablet    Take 1 tablet (400 mg) by mouth daily    Hypomagnesemia, Status post kidney transplant, Kidney replaced by transplant, Kidney transplanted       mycophenolate 250 MG capsule    GENERIC EQUIVALENT    120 capsule    Take 2 capsules (500 mg) by mouth 2 times daily    Kidney transplanted, Kidney replaced by transplant, Status post kidney transplant       predniSONE 5 MG tablet    DELTASONE    228 tablet    40 mg daily x 2 wks, decrease by 10 mg weekly til at 20 mg daily, decrease by 5 mg wkly til at 5 mg daily, take 5mg every other day x 1 wk    Ulcerative pancolitis with rectal bleeding (H)       sulfamethoxazole-trimethoprim 400-80 MG per tablet    BACTRIM/SEPTRA    30 tablet    Take 1 tablet by mouth daily    Kidney transplanted, Complications, kidney  transplant, Aftercare following organ transplant       tacrolimus 0.5 MG capsule    GENERIC EQUIVALENT    180 capsule    Take 3 capsules (1.5 mg) by mouth 2 times daily    Kidney replaced by transplant, Status post kidney transplant, Hypomagnesemia, Kidney transplanted       vitamin D 2000 units tablet     100 tablet    Take 2,000 Units by mouth daily    Vitamin D deficiency

## 2018-05-04 ENCOUNTER — TELEPHONE (OUTPATIENT)
Dept: PHARMACY | Facility: CLINIC | Age: 28
End: 2018-05-04

## 2018-05-04 NOTE — TELEPHONE ENCOUNTER
Anemia Management Note  SUBJECTIVE/OBJECTIVE:  Referred by Dr. Otilio Mar on 10/6/2017  Primary Diagnosis: Anemia in Chronic Kidney Disease (N18.3, D63.1)     Secondary Diagnosis:  Chronic Kidney Disease, Stage 3 (N18.3)   Hgb goal range:  9-10  Epo/Darbo: None  Iron regimen:  Does not tolerate oral iron  Communication: Consent to communicate on file OK to leave messages for scheduling, medical and billing on cell phone.  Dated 10/27/2014    Anemia Latest Ref Rng & Units 2/1/2018 2/22/2018 3/1/2018 3/23/2018 3/29/2018 4/19/2018 5/3/2018   Hemoglobin 11.7 - 15.7 g/dL 9.5(L) - - 7.9(L) 8.0(L) 8.6(L) 8.6(L)   IV Iron Dose - - 750mg 750mg - - - -   TSAT 15 - 46 % 12(L) - - - 14(L) - 6(L)   Ferritin 12 - 150 ng/mL 14 - - - 90 - 13     BP Readings from Last 3 Encounters:   05/03/18 104/57   05/01/18 117/67   04/19/18 117/66     Wt Readings from Last 2 Encounters:   05/03/18 132 lb 14.4 oz (60.3 kg)   05/01/18 131 lb 14.4 oz (59.8 kg)           ASSESSMENT:  Hgb: Not at goal but stable - continue to monitor  TSat: not at goal of >30% Ferritin: Not at goal of (>100ng/mL)    PLAN:  RTC for Hgb lab in 2 week(s)    Plan is to monitor labs.  Had GI blood loss, currently doing infusion treatment for ulcerative colitis, and has subsided.  Once GI blood loss decreased Hgb should come back up, but still iron depleted due to ongoing bleeding.  If after Ulcerative colitis tx finished & still low, redose with IV iron.       Last infusion for UC 07/26/2018, IBD recheck 07/17/2018 EDUARDO    Orders needed to be renewed (for next follow-up date) in EPIC: None    Iron labs due:  5/31/18  Reviewed 05/07/2018 EDUARDO  Plan discussed with:  ALEXEY for Caity  Plan provided by:  Janette Harrington [facility]  Anemia Clinic  [phone]    NEXT FOLLOW-UP DATE:  5/17/18    Anemia Management Service  Abida Song PharmD and Princess Harrington CPhT  Phone: [phone]  Fax: [phone]

## 2018-05-07 LAB
EBV DNA # SPEC NAA+PROBE: ABNORMAL {COPIES}/ML
EBV DNA SPEC NAA+PROBE-LOG#: 4.8 {LOG_COPIES}/ML

## 2018-05-17 ENCOUNTER — TELEPHONE (OUTPATIENT)
Dept: PHARMACY | Facility: CLINIC | Age: 28
End: 2018-05-17

## 2018-05-17 DIAGNOSIS — N18.30 ANEMIA IN STAGE 3 CHRONIC KIDNEY DISEASE (H): ICD-10-CM

## 2018-05-17 DIAGNOSIS — T86.10 COMPLICATIONS, KIDNEY TRANSPLANT: ICD-10-CM

## 2018-05-17 DIAGNOSIS — Z79.899 ENCOUNTER FOR LONG-TERM CURRENT USE OF MEDICATION: ICD-10-CM

## 2018-05-17 DIAGNOSIS — Z48.298 AFTERCARE FOLLOWING ORGAN TRANSPLANT: ICD-10-CM

## 2018-05-17 DIAGNOSIS — Z94.0 KIDNEY TRANSPLANTED: ICD-10-CM

## 2018-05-17 DIAGNOSIS — N18.30 CHRONIC KIDNEY DISEASE, STAGE 3 (MODERATE): ICD-10-CM

## 2018-05-17 DIAGNOSIS — D63.1 ANEMIA IN STAGE 3 CHRONIC KIDNEY DISEASE (H): ICD-10-CM

## 2018-05-17 DIAGNOSIS — Z94.0 KIDNEY REPLACED BY TRANSPLANT: ICD-10-CM

## 2018-05-17 DIAGNOSIS — D84.9 IMMUNOSUPPRESSED STATUS (H): ICD-10-CM

## 2018-05-17 LAB
ANION GAP SERPL CALCULATED.3IONS-SCNC: 7 MMOL/L (ref 3–14)
BUN SERPL-MCNC: 16 MG/DL (ref 7–30)
CALCIUM SERPL-MCNC: 9.1 MG/DL (ref 8.5–10.1)
CHLORIDE SERPL-SCNC: 111 MMOL/L (ref 94–109)
CO2 SERPL-SCNC: 24 MMOL/L (ref 20–32)
CREAT SERPL-MCNC: 1.25 MG/DL (ref 0.52–1.04)
ERYTHROCYTE [DISTWIDTH] IN BLOOD BY AUTOMATED COUNT: 13.8 % (ref 10–15)
FERRITIN SERPL-MCNC: 7 NG/ML (ref 12–150)
GFR SERPL CREATININE-BSD FRML MDRD: 51 ML/MIN/1.7M2
GLUCOSE SERPL-MCNC: 90 MG/DL (ref 70–99)
HCT VFR BLD AUTO: 31.6 % (ref 35–47)
HGB BLD-MCNC: 9.4 G/DL (ref 11.7–15.7)
IRON SATN MFR SERPL: 5 % (ref 15–46)
IRON SERPL-MCNC: 19 UG/DL (ref 35–180)
MCH RBC QN AUTO: 28.2 PG (ref 26.5–33)
MCHC RBC AUTO-ENTMCNC: 29.7 G/DL (ref 31.5–36.5)
MCV RBC AUTO: 95 FL (ref 78–100)
PLATELET # BLD AUTO: 289 10E9/L (ref 150–450)
POTASSIUM SERPL-SCNC: 4.9 MMOL/L (ref 3.4–5.3)
RBC # BLD AUTO: 3.33 10E12/L (ref 3.8–5.2)
SODIUM SERPL-SCNC: 142 MMOL/L (ref 133–144)
TACROLIMUS BLD-MCNC: 5.6 UG/L (ref 5–15)
TIBC SERPL-MCNC: 369 UG/DL (ref 240–430)
TME LAST DOSE: NORMAL H
WBC # BLD AUTO: 5.4 10E9/L (ref 4–11)

## 2018-05-17 NOTE — TELEPHONE ENCOUNTER
Anemia Management Note  SUBJECTIVE/OBJECTIVE:  Referred by Dr. Otilio Mar on 10/6/2017  Primary Diagnosis: Anemia in Chronic Kidney Disease (N18.3, D63.1)     Secondary Diagnosis:  Chronic Kidney Disease, Stage 3 (N18.3)   Hgb goal range:  9-10  Epo/Darbo: None  Iron regimen:  Does not tolerate oral iron  Communication: Consent to communicate on file OK to leave messages for scheduling, medical and billing on cell phone.  Dated 10/27/2014    Anemia Latest Ref Rng & Units 2/22/2018 3/1/2018 3/23/2018 3/29/2018 4/19/2018 5/3/2018 5/17/2018   Hemoglobin 11.7 - 15.7 g/dL - - 7.9(L) 8.0(L) 8.6(L) 8.6(L) 9.4(L)   IV Iron Dose - 750mg 750mg - - - - -   TSAT 15 - 46 % - - - 14(L) - 6(L) 5(L)   Ferritin 12 - 150 ng/mL - - - 90 - 13 7(L)     BP Readings from Last 3 Encounters:   05/03/18 104/57   05/01/18 117/67   04/19/18 117/66     Wt Readings from Last 2 Encounters:   05/03/18 132 lb 14.4 oz (60.3 kg)   05/01/18 131 lb 14.4 oz (59.8 kg)       ASSESSMENT:  Hgb:at goal - continue to monitor  TSat: not at goal of >30% Ferritin: Not at goal of (>100ng/mL)    PLAN:  RTC for Hgb lab in 2 week(s)  Referred to Abida Song to address low iron labs and contact the patient - Left  that iron orders placed if want to do a course if IV iron after 05/31/2018 when more intense TX for colitis completed. 05/21/2018 EDUARDO     Plan is to monitor labs.  Had GI blood loss, currently doing infusion treatment for ulcerative colitis, and has subsided.  Once GI blood loss decreased Hgb should come back up, but still iron depleted due to ongoing bleeding.  If after Ulcerative colitis tx finished & still low, redose with IV iron.        Last infusion for UC 07/26/2018, IBD recheck 07/17/2018 EDUARDO     Orders needed to be renewed (for next follow-up date) in EPIC: None     Iron labs due:  5/31/18    Plan discussed with: Left VM  Plan provided by:  Janette Harrington [facility]  Anemia Clinic  [phone]    NEXT FOLLOW-UP DATE:  5/22/18 - Abida Bullard  05/21/2018 [facility]  Anemia Management Service  Abida Song PharmD and Princess Harrington CPhT  Phone: [phone]  Fax: [phone]

## 2018-05-18 LAB
EBV DNA # SPEC NAA+PROBE: ABNORMAL {COPIES}/ML
EBV DNA SPEC NAA+PROBE-LOG#: 5 {LOG_COPIES}/ML

## 2018-05-21 ENCOUNTER — TELEPHONE (OUTPATIENT)
Dept: NEPHROLOGY | Facility: CLINIC | Age: 28
End: 2018-05-21

## 2018-05-21 NOTE — TELEPHONE ENCOUNTER
EBV stable. Pt to see ID.   Attempted to call pt to discuss. No answer. Left her a vm letting her know that Dr. Mar would like her to see ID. Message to scheduling.      EBV DNA PCR Quantitative Whole Blood   Status:  Final result   Visible to patient:  Yes (MyChart) Dx:  Aftercare following organ transplant;... Order: [id number]       Notes Recorded by Otilio Mar MD on 5/19/2018 at 8:29 AM  Stable EBV viremia.  Patient to follow up with Transplant ID.

## 2018-05-21 NOTE — TELEPHONE ENCOUNTER
"Discussed EBV with patient. She would like to hold off on having ID appointment until after she sees Dr. Mar this summer, stating \"I have a few other things I'm dealing with right now that have to take priority.\"  She voiced understanding about continuing to monitor levels and that if something drastically changes she will need to see ID sooner.   "

## 2018-05-22 ENCOUNTER — CARE COORDINATION (OUTPATIENT)
Dept: GASTROENTEROLOGY | Facility: CLINIC | Age: 28
End: 2018-05-22

## 2018-05-22 ENCOUNTER — TELEPHONE (OUTPATIENT)
Dept: PHARMACY | Facility: CLINIC | Age: 28
End: 2018-05-22

## 2018-05-22 DIAGNOSIS — K51.811 OTHER ULCERATIVE COLITIS WITH RECTAL BLEEDING (H): Primary | ICD-10-CM

## 2018-05-22 RX ORDER — PREDNISONE 5 MG/1
TABLET ORAL
Qty: 75 TABLET | Refills: 0 | Status: SHIPPED | OUTPATIENT
Start: 2018-05-22 | End: 2018-07-17

## 2018-05-22 NOTE — PROGRESS NOTES
Patient called to report increased rectal bleeding since last dose of prednisone last Monday. Patient due for last Entyvio induction infusion on 5/31.     States since completing PDN taper, noticed increase blood in stool ~ 3-4x per week. Having 3 stools per day primarily in the morning, with increased urgency. Denies abdominal pain.     Discussed with Dr. Shahid. Recommended starting Prednisone taper at 20 mg and checking Entyvio level prior to first maintenance infusion. If patient continues to have symptoms may move to 4 week infusions. Prescription placed and patient updated. OK to plan. Caity will update clinic with any worsening symptoms.

## 2018-05-22 NOTE — TELEPHONE ENCOUNTER
Follow-up with anemia management service:    Left VM regarding iron status, and what course of action Caity would like to pursue.    Caity called back to discuss iron labs.  GI bleeding significantly less now, still working with GI on issues.  Would like to do IV iron again, leaving on vacation -, will try to schedule at least dose before that.  Next labs 2018.    Anemia Latest Ref Rng & Units 2018 3/1/2018 3/23/2018 3/29/2018 2018 5/3/2018 2018   Hemoglobin 11.7 - 15.7 g/dL - - 7.9(L) 8.0(L) 8.6(L) 8.6(L) 9.4(L)   IV Iron Dose - 750mg 750mg - - - - -   TSAT 15 - 46 % - - - 14(L) - 6(L) 5(L)   Ferritin 12 - 150 ng/mL - - - 90 - 13 7(L)       Orders needed to be renewed (for next follow-up date) in EPIC: None   Med order expires: N/A   Lab orders : 10/05/2018    Follow-up call date: 2018    [facility]    Anemia Management Service  Abida Song PharmD and Princess Harrington CPhT  Phone: [phone]  Fax: [phone]

## 2018-05-22 NOTE — PROGRESS NOTES
Returned call to patient regarding ongoing symptoms.     Left voicemail with clinic contact information to discuss.

## 2018-05-24 ENCOUNTER — HOME INFUSION (PRE-WILLOW HOME INFUSION) (OUTPATIENT)
Dept: PHARMACY | Facility: CLINIC | Age: 28
End: 2018-05-24

## 2018-05-24 NOTE — PROGRESS NOTES
Therapy:IV Entyvio  Insurance: [employer]  Ded: $1000  Met: $1000    Co-Insurance: 25%  Max Out of Pocket: $3500  Met: $3500    Please contact Intake with any questions, [phone] or In Basket pool, [facility] ([phone]).    In reference to referral made on 5/24/18 to check Iv entyvio benefits.

## 2018-05-31 ENCOUNTER — INFUSION THERAPY VISIT (OUTPATIENT)
Dept: INFUSION THERAPY | Facility: CLINIC | Age: 28
End: 2018-05-31
Attending: INTERNAL MEDICINE
Payer: COMMERCIAL

## 2018-05-31 VITALS
DIASTOLIC BLOOD PRESSURE: 73 MMHG | BODY MASS INDEX: 24.78 KG/M2 | SYSTOLIC BLOOD PRESSURE: 110 MMHG | OXYGEN SATURATION: 98 % | RESPIRATION RATE: 16 BRPM | TEMPERATURE: 97.3 F | WEIGHT: 135.5 LBS | HEART RATE: 73 BPM

## 2018-05-31 DIAGNOSIS — D84.9 IMMUNOSUPPRESSED STATUS (H): ICD-10-CM

## 2018-05-31 DIAGNOSIS — Z48.298 AFTERCARE FOLLOWING ORGAN TRANSPLANT: ICD-10-CM

## 2018-05-31 DIAGNOSIS — Z94.0 KIDNEY TRANSPLANTED: ICD-10-CM

## 2018-05-31 DIAGNOSIS — D50.0 IRON DEFICIENCY ANEMIA DUE TO CHRONIC BLOOD LOSS: Primary | ICD-10-CM

## 2018-05-31 DIAGNOSIS — T86.10 COMPLICATIONS, KIDNEY TRANSPLANT: ICD-10-CM

## 2018-05-31 DIAGNOSIS — K51.011 ULCERATIVE PANCOLITIS WITH RECTAL BLEEDING (H): ICD-10-CM

## 2018-05-31 DIAGNOSIS — Z94.0 KIDNEY REPLACED BY TRANSPLANT: ICD-10-CM

## 2018-05-31 DIAGNOSIS — N18.30 ANEMIA IN STAGE 3 CHRONIC KIDNEY DISEASE (H): ICD-10-CM

## 2018-05-31 DIAGNOSIS — N18.30 CHRONIC KIDNEY DISEASE, STAGE 3 (MODERATE): ICD-10-CM

## 2018-05-31 DIAGNOSIS — D63.1 ANEMIA IN STAGE 3 CHRONIC KIDNEY DISEASE (H): ICD-10-CM

## 2018-05-31 LAB
ALBUMIN SERPL-MCNC: 3.4 G/DL (ref 3.4–5)
ALP SERPL-CCNC: 46 U/L (ref 40–150)
ALT SERPL W P-5'-P-CCNC: 15 U/L (ref 0–50)
AST SERPL W P-5'-P-CCNC: 12 U/L (ref 0–45)
BASOPHILS # BLD AUTO: 0.1 10E9/L (ref 0–0.2)
BASOPHILS NFR BLD AUTO: 0.8 %
BILIRUB DIRECT SERPL-MCNC: <0.1 MG/DL (ref 0–0.2)
BILIRUB SERPL-MCNC: 0.3 MG/DL (ref 0.2–1.3)
CRP SERPL-MCNC: <2.9 MG/L (ref 0–8)
DIFFERENTIAL METHOD BLD: ABNORMAL
EOSINOPHIL # BLD AUTO: 0.1 10E9/L (ref 0–0.7)
EOSINOPHIL NFR BLD AUTO: 2.2 %
ERYTHROCYTE [DISTWIDTH] IN BLOOD BY AUTOMATED COUNT: 14.2 % (ref 10–15)
ERYTHROCYTE [SEDIMENTATION RATE] IN BLOOD BY WESTERGREN METHOD: 45 MM/H (ref 0–20)
HCT VFR BLD AUTO: 30.1 % (ref 35–47)
HGB BLD-MCNC: 9.1 G/DL (ref 11.7–15.7)
IMM GRANULOCYTES # BLD: 0 10E9/L (ref 0–0.4)
IMM GRANULOCYTES NFR BLD: 0.5 %
LYMPHOCYTES # BLD AUTO: 2.3 10E9/L (ref 0.8–5.3)
LYMPHOCYTES NFR BLD AUTO: 36.9 %
MCH RBC QN AUTO: 27.7 PG (ref 26.5–33)
MCHC RBC AUTO-ENTMCNC: 30.2 G/DL (ref 31.5–36.5)
MCV RBC AUTO: 92 FL (ref 78–100)
MONOCYTES # BLD AUTO: 0.6 10E9/L (ref 0–1.3)
MONOCYTES NFR BLD AUTO: 8.9 %
NEUTROPHILS # BLD AUTO: 3.2 10E9/L (ref 1.6–8.3)
NEUTROPHILS NFR BLD AUTO: 50.7 %
NRBC # BLD AUTO: 0 10*3/UL
NRBC BLD AUTO-RTO: 0 /100
PLATELET # BLD AUTO: 341 10E9/L (ref 150–450)
PROT SERPL-MCNC: 7 G/DL (ref 6.8–8.8)
RBC # BLD AUTO: 3.29 10E12/L (ref 3.8–5.2)
WBC # BLD AUTO: 6.3 10E9/L (ref 4–11)

## 2018-05-31 PROCEDURE — 96413 CHEMO IV INFUSION 1 HR: CPT

## 2018-05-31 PROCEDURE — 86140 C-REACTIVE PROTEIN: CPT | Performed by: INTERNAL MEDICINE

## 2018-05-31 PROCEDURE — 25000128 H RX IP 250 OP 636: Mod: ZF | Performed by: INTERNAL MEDICINE

## 2018-05-31 PROCEDURE — 87799 DETECT AGENT NOS DNA QUANT: CPT | Performed by: INTERNAL MEDICINE

## 2018-05-31 PROCEDURE — 85018 HEMOGLOBIN: CPT | Performed by: INTERNAL MEDICINE

## 2018-05-31 PROCEDURE — 80076 HEPATIC FUNCTION PANEL: CPT | Performed by: INTERNAL MEDICINE

## 2018-05-31 PROCEDURE — 85025 COMPLETE CBC W/AUTO DIFF WBC: CPT | Performed by: INTERNAL MEDICINE

## 2018-05-31 PROCEDURE — 85652 RBC SED RATE AUTOMATED: CPT | Performed by: INTERNAL MEDICINE

## 2018-05-31 PROCEDURE — 36415 COLL VENOUS BLD VENIPUNCTURE: CPT

## 2018-05-31 PROCEDURE — 96367 TX/PROPH/DG ADDL SEQ IV INF: CPT

## 2018-05-31 RX ADMIN — VEDOLIZUMAB 300 MG: 300 INJECTION, POWDER, LYOPHILIZED, FOR SOLUTION INTRAVENOUS at 10:42

## 2018-05-31 RX ADMIN — FERRIC CARBOXYMALTOSE INJECTION 750 MG: 50 INJECTION, SOLUTION INTRAVENOUS at 10:22

## 2018-05-31 NOTE — PROGRESS NOTES
Infusion Nursing Note  Caity Horan [facility] for:   Chief Complaint   Patient presents with     Infusion     Injectafer and Entyvio     During today's [facility] appointment, orders from Dr. Doan and Dr. Mar were completed.  Frequency: Today is third dose of Entyvio, now to start Q8 weeks. Injectafer dose 1 of 2, minimum of 7 days apart.     Progress note:  Patient identification verified by name and date of birth.  Assessment completed.  Vitals recorded in Doc Flowsheets.  Patient was provided with education regarding infusion and possible side effects.  Patient verbalized understanding.     Premedications: were not ordered.  Infusion Rates: Injectafer given over approximately 15 mins.  Entyvio given over approximately 30 mins.  Labs: were drawn per orders.   Vascular access: peripheral IV placed today.  Treatment Conditions: Biologic/Chemo Checklist ~~~ NOTE: If the patient answers yes to any of the questions below, hold the infusion and contact ordering provider or on-call provider.    1. Have you recently had an elevated temperature, fever, chills, productive cough, coughing for 3 weeks or longer or hemoptysis,  abnormal vital signs, night sweats,  chest pain or have you noticed a decrease in your appetite, unexplained weight loss or fatigue? No  2. Do you have any open wounds or new incisions? No  3. Do you have any recent or upcoming hospitalizations, surgeries or dental procedures? No  4. Do you currently have or recently have had any signs of illness or infection or are you on any antibiotics? No  5. Have you had any new, sudden or worsening abdominal pain? No  6. Have you or anyone in your household received a live vaccination in the past 4 weeks? Please note:  No live vaccines while on biologic/chemotherapy until 6 months after the last treatment.  Patient can receive the flu vaccine (shot only) and the pneumovax.  It is  optimal for the patient to get these vaccines mid cycle, but they can be given at any time as long as it is not on the day of the infusion. No  7. Have you recently been diagnosed with any new nervous system diseases (ie. Multiple sclerosis, Guillain Galena, seizures, neurological changes) or cancer diagnosis? Are you on any form of radiation or chemotherapy? No  8. Are you pregnant or breast feeding or do you have plans of pregnancy in the future? No  9. Have you been having any signs of worsening depression or suicidal ideations?  (benlysta only) No  10. Have there been any other new onset medical symptoms? No    Patient tolerated infusion: well    Discharge Plan:   Follow up plan of care with: ongoing infusions at [facility]. and primary medical doctor.  Discharge instructions were reviewed with patient.  Patient/representative verbalized understanding of discharge instructions and all questions answered.  Patient discharged from [facility] in stable condition.    Kelly Dyson RN       Administrations This Visit     ferric carboxymaltose (INJECTAFER) 750 mg in sodium chloride 0.9 % 100 mL intermittent infusion     Admin Date Action Dose Rate Route Administered By          05/31/2018 New Bag 750 mg 500 mL/hr Intravenous Kelly Dyson RN                   vedolizumab (ENTYVIO) 300 mg in sodium chloride 0.9 % 280 mL infusion     Admin Date Action Dose Rate Route Administered By          05/31/2018 New Bag 300 mg 560 mL/hr Intravenous Kelly Dyson, MARIS                           Temp (P) 97.3  F (36.3  C) (Oral)  Wt 61.5 kg (135 lb 8 oz)  BMI 24.78 kg/m2

## 2018-05-31 NOTE — MR AVS SNAPSHOT
After Visit Summary   5/31/2018    Caity Horan    MRN: [id number]           Patient Information     Date Of Birth          1990        Visit Information        Provider Department      5/31/2018 10:00 AM UC 48 ATC; UC SPEC INFUSION [facility]        Today's Diagnoses     Iron deficiency anemia due to chronic blood loss    -  1    Kidney replaced by transplant        Ulcerative pancolitis with rectal bleeding (H)        Anemia in stage 3 chronic kidney disease        Chronic kidney disease, stage 3 (moderate)        Kidney transplanted        Complications, kidney transplant        Aftercare following organ transplant        Immunosuppressed status (H)           Follow-ups after your visit        Your next 10 appointments already scheduled     Jun 14, 2018  8:00 AM CDT   Infusion 120 with UC SPEC INFUSION, UC 44 ATC   [facility] ([facility])    [address]  [address]  [facility] [address] [phone]            Jul 17, 2018  3:40 PM CDT   (Arrive by 3:25 PM)   RETURN INFLAMMATORY BOWEL DISEASE with Yenifer Doan MD   [facility] ([facility])    [address] [place] [address]  4th Floor  [facility] [address] [phone]            Jul 26, 2018  8:00 AM CDT   Infusion 120 with UC SPEC INFUSION, UC 48 ATC   [facility] ([facility])    [address]  [address]  [facility] [address] [phone]            Aug 28, 2018  7:15 AM CDT   Lab with UC LAB   [facility] ([facility])    [address] [place] [address]  1st Floor  [facility] [address] [phone]            Aug 28, 2018  3:35 PM CDT   (Arrive by 3:05 PM)   Return Kidney Transplant with Uc Kidney/Pancreas Recipient   [facility] ([facility])    [address]  [address]  [facility] [address] [phone]              Who to contact     If you have questions or need follow up information about today's clinic visit or your schedule please contact [facility] directly at [phone].  Normal or non-critical lab and imaging results will be communicated to you by MyChart, letter or phone within 4 business days after the clinic has received the results. If you do not hear from us within 7 days, please contact the clinic through "[employer]hart or phone. If you have a critical or abnormal lab result, we will notify you by phone as soon as possible.  Submit refill requests through [employer] or call your pharmacy and they will forward the refill request to us. Please allow 3 business days for your refill to be completed.          Additional Information About Your Visit        "[employer] Information     [employer] gives you secure access to your electronic health record. If you see a primary care provider, you can also send messages to your care team and make appointments. If you have questions, please call your primary care clinic.  If you do not have a primary care provider, please call [phone] and they will assist you.        Care EveryWhere ID     This is your Care EveryWhere ID. This could be used by other organizations to access your [facility] medical records  [id number]        Your Vitals Were     Pulse Temperature Respirations Pulse Oximetry BMI (Body Mass Index)       73 97.3  F (36.3  C) (Oral) 16 98% 24.78 kg/m2        Blood Pressure from Last 3 Encounters:   05/31/18 110/73   05/03/18 104/57   05/01/18 117/67    Weight from Last 3 Encounters:   05/31/18 61.5 kg (135 lb 8 oz)   05/03/18 60.3 kg (132 lb 14.4 oz)   05/01/18 59.8 kg (131 lb 14.4 oz)              We Performed the Following     CBC with platelets differential     CRP inflammation     EBV DNA PCR Quantitative  Whole Blood     Erythrocyte sedimentation rate auto     Hepatic panel        Primary Care Provider Office Phone # Fax #    Ritu Little, ALEJANDRO [phone]       [address]  [address] [address]        Equal Access to Services     AUNDREA QUACH : Hadii aad ku hadstepheno Soomaali, waaxda luqadaha, qaybta kaalmada adeegyada, waxsissy merrittn adelalo dumas laShanekajordon haddad. So [facility] [phone].    ATENCIÓN: Si habla español, tiene a carreon disposición servicios gratuitos de asistencia lingüística. Llame al [phone].    We comply with applicable federal civil rights laws and [place] laws. We do not discriminate on the basis of race, color, national origin, age, disability, sex, sexual orientation, or gender identity.            Thank you!     Thank you for choosing [facility]  for your care. Our goal is always to provide you with excellent care. Hearing back from our patients is one way we can continue to improve our services. Please take a few minutes to complete the written survey that you may receive in the mail after your visit with us. Thank you!             Your Updated Medication List - Protect others around you: Learn how to safely use, store and throw away your medicines at www.disposemymeds.org.          This list is accurate as of 5/31/18  3:42 PM.  Always use your most recent med list.                   Brand Name Dispense Instructions for use Diagnosis    B-12 1000 MCG Tbcr     90 tablet    Take 1,000 mcg by mouth daily    B12 deficiency due to diet       magnesium oxide 400 MG tablet    MAG-OX    90 tablet    Take 1 tablet (400 mg) by mouth daily    Hypomagnesemia, Status post kidney transplant, Kidney replaced by transplant, Kidney transplanted       mycophenolate 250 MG capsule    GENERIC EQUIVALENT    120 capsule    Take 2 capsules (500 mg) by mouth 2 times daily    Kidney transplanted, Kidney replaced by transplant,  Status post kidney transplant       * predniSONE 5 MG tablet    DELTASONE    228 tablet    40 mg daily x 2 wks, decrease by 10 mg weekly til at 20 mg daily, decrease by 5 mg wkly til at 5 mg daily, take 5mg every other day x 1 wk    Ulcerative pancolitis with rectal bleeding (H)       * predniSONE 5 MG tablet    DELTASONE    75 tablet    4 tbs qd X 1 wks, 3 tbs qd X 1wk, 2 tbs qd X 1wk, 1 tbs qd X 1wk,  then 1tb qod X 1 wk    Other ulcerative colitis with rectal bleeding (H)       sulfamethoxazole-trimethoprim 400-80 MG per tablet    BACTRIM/SEPTRA    30 tablet    Take 1 tablet by mouth daily    Kidney transplanted, Complications, kidney transplant, Aftercare following organ transplant       tacrolimus 0.5 MG capsule    GENERIC EQUIVALENT    180 capsule    Take 3 capsules (1.5 mg) by mouth 2 times daily    Kidney replaced by transplant, Status post kidney transplant, Hypomagnesemia, Kidney transplanted       vitamin D 2000 units tablet     100 tablet    Take 2,000 Units by mouth daily    Vitamin D deficiency       * Notice:  This list has 2 medication(s) that are the same as other medications prescribed for you. Read the directions carefully, and ask your doctor or other care provider to review them with you.

## 2018-06-01 ENCOUNTER — TELEPHONE (OUTPATIENT)
Dept: TRANSPLANT | Facility: CLINIC | Age: 28
End: 2018-06-01

## 2018-06-01 ENCOUNTER — TELEPHONE (OUTPATIENT)
Dept: PHARMACY | Facility: CLINIC | Age: 28
End: 2018-06-01

## 2018-06-01 DIAGNOSIS — Z94.0 STATUS POST KIDNEY TRANSPLANT: Primary | ICD-10-CM

## 2018-06-01 DIAGNOSIS — B27.90 INFECTIOUS MONONUCLEOSIS: ICD-10-CM

## 2018-06-01 LAB
EBV DNA # SPEC NAA+PROBE: ABNORMAL {COPIES}/ML
EBV DNA SPEC NAA+PROBE-LOG#: 5.2 {LOG_COPIES}/ML

## 2018-06-01 NOTE — TELEPHONE ENCOUNTER
Anemia Management Note  SUBJECTIVE/OBJECTIVE:  Referred by Dr. Otilio Mar on 10/6/2017  Primary Diagnosis: Anemia in Chronic Kidney Disease (N18.3, D63.1)     Secondary Diagnosis:  Chronic Kidney Disease, Stage 3 (N18.3)   Hgb goal range:  9-10  Epo/Darbo: None  Iron regimen:  Does not tolerate oral iron  Labs : 10/05/2018  Communication: Consent to communicate on file OK to leave messages for scheduling, medical and billing on cell phone.  Dated 10/27/2014    Anemia Latest Ref Rng & Units 3/1/2018 3/23/2018 3/29/2018 2018 5/3/2018 2018 2018   Hemoglobin 11.7 - 15.7 g/dL - 7.9(L) 8.0(L) 8.6(L) 8.6(L) 9.4(L) 9.1(L)   IV Iron Dose - 750mg - - - - - 750mg   TSAT 15 - 46 % - - 14(L) - 6(L) 5(L) -   Ferritin 12 - 150 ng/mL - - 90 - 13 7(L) -       BP Readings from Last 3 Encounters:   18 110/73   18 104/57   18 117/67     Wt Readings from Last 2 Encounters:   18 135 lb 8 oz (61.5 kg)   18 132 lb 14.4 oz (60.3 kg)     Back on prednisone for 3 week taper    ASSESSMENT:  Hgb:Not at goal but improving - recommend dose and continue current regimen  TSat: Due for iron studies 4 weeks after last IV iron Ferritin: Due for iron studies 4 weeks after last IV iron infusion    PLAN:  RTC for IV iron 2018 as scheduled. Recheck iron labs at 2018 appointment.    Orders needed to be renewed (for next follow-up date) in EPIC: None    Iron labs due:  4 weeks after second IV iron    Plan discussed with:  Caity  Plan provided by:      NEXT FOLLOW-UP DATE:  2018    Anemia Management Service  Abida Song,PharmD and Princess Harrington CPhT  Phone: [phone]  Fax: [phone]

## 2018-06-01 NOTE — TELEPHONE ENCOUNTER
"Per Dr. Mar, repeat CT chest/abd/pelvis in July d/t EBV >100,000  Repeat EBV on 5/31/18 = [id number]    I spoke with Caity. She voiced understanding. We discussed the importance of the routine surveillance and risk of PTLD. She \"has a lot\" going on right now with her UC. I placed CT orders for her. She will call to schedule and let me know if she has difficulty arranging.     Message  Received: 2 days ago       Otilio Mar MD Krull, Leisa K, RN                   Erin,     If EBV viremia remains over 100K, would repeat CT chest/abd/pelvis in July to rule out lymphoma.     Aquilino         "

## 2018-06-05 ENCOUNTER — CARE COORDINATION (OUTPATIENT)
Dept: GASTROENTEROLOGY | Facility: CLINIC | Age: 28
End: 2018-06-05

## 2018-06-05 DIAGNOSIS — K51.019 ULCERATIVE PANCOLITIS WITH COMPLICATION (H): Primary | ICD-10-CM

## 2018-06-14 ENCOUNTER — INFUSION THERAPY VISIT (OUTPATIENT)
Dept: INFUSION THERAPY | Facility: CLINIC | Age: 28
End: 2018-06-14
Attending: INTERNAL MEDICINE
Payer: COMMERCIAL

## 2018-06-14 VITALS
SYSTOLIC BLOOD PRESSURE: 107 MMHG | OXYGEN SATURATION: 100 % | TEMPERATURE: 96.4 F | BODY MASS INDEX: 24.71 KG/M2 | HEART RATE: 75 BPM | DIASTOLIC BLOOD PRESSURE: 64 MMHG | WEIGHT: 135.1 LBS | RESPIRATION RATE: 16 BRPM

## 2018-06-14 DIAGNOSIS — Z79.899 ENCOUNTER FOR LONG-TERM CURRENT USE OF MEDICATION: ICD-10-CM

## 2018-06-14 DIAGNOSIS — Z48.298 AFTERCARE FOLLOWING ORGAN TRANSPLANT: ICD-10-CM

## 2018-06-14 DIAGNOSIS — Z94.0 KIDNEY REPLACED BY TRANSPLANT: ICD-10-CM

## 2018-06-14 DIAGNOSIS — D50.0 IRON DEFICIENCY ANEMIA DUE TO CHRONIC BLOOD LOSS: Primary | ICD-10-CM

## 2018-06-14 LAB
ANION GAP SERPL CALCULATED.3IONS-SCNC: 8 MMOL/L (ref 3–14)
BUN SERPL-MCNC: 17 MG/DL (ref 7–30)
CALCIUM SERPL-MCNC: 8.5 MG/DL (ref 8.5–10.1)
CHLORIDE SERPL-SCNC: 108 MMOL/L (ref 94–109)
CO2 SERPL-SCNC: 24 MMOL/L (ref 20–32)
CREAT SERPL-MCNC: 1.04 MG/DL (ref 0.52–1.04)
ERYTHROCYTE [DISTWIDTH] IN BLOOD BY AUTOMATED COUNT: 18.7 % (ref 10–15)
GFR SERPL CREATININE-BSD FRML MDRD: 63 ML/MIN/1.7M2
GLUCOSE SERPL-MCNC: 91 MG/DL (ref 70–99)
HCT VFR BLD AUTO: 35.5 % (ref 35–47)
HGB BLD-MCNC: 10.7 G/DL (ref 11.7–15.7)
MCH RBC QN AUTO: 29.2 PG (ref 26.5–33)
MCHC RBC AUTO-ENTMCNC: 30.1 G/DL (ref 31.5–36.5)
MCV RBC AUTO: 97 FL (ref 78–100)
PLATELET # BLD AUTO: 240 10E9/L (ref 150–450)
POTASSIUM SERPL-SCNC: 4.1 MMOL/L (ref 3.4–5.3)
RBC # BLD AUTO: 3.66 10E12/L (ref 3.8–5.2)
SODIUM SERPL-SCNC: 140 MMOL/L (ref 133–144)
TACROLIMUS BLD-MCNC: 5.1 UG/L (ref 5–15)
TME LAST DOSE: NORMAL H
WBC # BLD AUTO: 5.5 10E9/L (ref 4–11)

## 2018-06-14 PROCEDURE — 85027 COMPLETE CBC AUTOMATED: CPT | Performed by: INTERNAL MEDICINE

## 2018-06-14 PROCEDURE — 25000128 H RX IP 250 OP 636: Mod: ZF | Performed by: INTERNAL MEDICINE

## 2018-06-14 PROCEDURE — 80197 ASSAY OF TACROLIMUS: CPT | Performed by: INTERNAL MEDICINE

## 2018-06-14 PROCEDURE — 96365 THER/PROPH/DIAG IV INF INIT: CPT

## 2018-06-14 PROCEDURE — 36415 COLL VENOUS BLD VENIPUNCTURE: CPT

## 2018-06-14 PROCEDURE — 80048 BASIC METABOLIC PNL TOTAL CA: CPT | Performed by: INTERNAL MEDICINE

## 2018-06-14 RX ADMIN — FERRIC CARBOXYMALTOSE INJECTION 750 MG: 50 INJECTION, SOLUTION INTRAVENOUS at 08:49

## 2018-06-14 NOTE — PATIENT INSTRUCTIONS
Dear Caity Horan    Thank you for choosing [facility] ([facility] for your infusion.  The following information is a summary of our appointment as well as important reminders.        Ferric carboxymaltose Solution for injection  What is this medicine?  FERRIC CARBOXYMALTOSE (ferr-ik car-box-ee-mol-toes) is an iron complex. Iron is used to make healthy red blood cells, which carry oxygen and nutrients throughout the body. This medicine is used to treat anemia in people with chronic kidney disease or people who cannot take iron by mouth.  This medicine may be used for other purposes; ask your health care provider or pharmacist if you have questions.  What should I tell my health care provider before I take this medicine?  They need to know if you have any of these conditions:    anemia not caused by low iron levels    high levels of iron in the blood    liver disease    an unusual or allergic reaction to iron, other medicines, foods, dyes, or preservatives    pregnant or trying to get pregnant    breast-feeding  How should I use this medicine?  This medicine is for infusion into a vein. It is given by a health care professional in a hospital or clinic setting.  Talk to your pediatrician regarding the use of this medicine in children. Special care may be needed.  Overdosage: If you think you've taken too much of this medicine contact a poison control center or emergency room at once.  NOTE: This medicine is only for you. Do not share this medicine with others.  What if I miss a dose?  It is important not to miss your dose. Call your doctor or health care professional if you are unable to keep an appointment.  What may interact with this medicine?  Do not take this medicine with any of the following medications:  deferoxamine  dimercaprol  other iron products  This medicine may also interact with the following medications:  chloramphenicol  deferasirox  This  list may not describe all possible interactions. Give your health care provider a list of all the medicines, herbs, non-prescription drugs, or dietary supplements you use. Also tell them if you smoke, drink alcohol, or use illegal drugs. Some items may interact with your medicine.  What should I watch for while using this medicine?  Visit your doctor or health care professional regularly. Tell your doctor if your symptoms do not start to get better or if they get worse. You may need blood work done while you are taking this medicine.  You may need to follow a special diet. Talk to your doctor. Foods that contain iron include: whole grains/cereals, dried fruits, beans, or peas, leafy green vegetables, and organ meats (liver, kidney).  What side effects may I notice from receiving this medicine?  Side effects that you should report to your doctor or health care professional as soon as possible:  allergic reactions like skin rash, itching or hives, swelling of the face, lips, or tonguebreathing problems  changes in blood pressure  feeling faint or lightheaded, falls  flushing, sweating, or hot feelings  Side effects that usually do not require medical attention (Report these to your doctor or health care professional if they continue or are bothersome.):  changes in taste  constipation  dizziness  headache  nausea  pain, redness, or irritation at site where injected  vomiting  This list may not describe all possible side effects. Call your doctor for medical advice about side effects. You may report side effects to FDA at 3-523-FDA-8530.  Where should I keep my medicine?  This drug is given in a hospital or clinic and will not be stored at home.  NOTE: This sheet is a summary. It may not cover all possible information. If you have questions about this medicine, talk to your doctor, pharmacist, or health care provider.  NOTE:This sheet is a summary. It may not cover all possible information. If you have questions about  this medicine, talk to your doctor, pharmacist, or health care provider. Copyright  2016 Gold Standard    We look forward in seeing you on your next appointment here at [facility].  Please don t hesitate to call us at [phone] to reschedule any of your appointments or to speak with one of the [facility] registered nurses.  It was a pleasure taking care of you today.    Sincerely,    [facility]  [facility]  [address] [place] [address]  [address]  Phone:  [phone]

## 2018-06-14 NOTE — PROGRESS NOTES
Nursing Note  Caity Horan [facility] for:   Chief Complaint   Patient presents with     Infusion     Injectafer     During today's [facility] appointment, orders from Dr. Mar were completed.  Frequency: weekly X2, dose #2    Progress note:  Patient identification verified by name and date of birth.  Assessment completed.  Vitals recorded in Doc Flowsheets.  Patient was provided with education regarding infusion and possible side effects.  Patient verbalized understanding.      needed: No  Premedications: were not ordered.  Infusion Rates: infusion given over approximately 15 minutes.  Labs: Standing transplant labs were drawn per orders. Last tac dose 10:30 PM yesterday.  Vascular access: peripheral IV placed today.  Treatment Conditions: patient monitored for 30 minutes post infusion.  Patient tolerated infusion: well.    Discharge Plan:   Follow up plan of care with: ongoing infusions at [facility].  Discharge instructions were reviewed with patient.  Patient/representative verbalized understanding of discharge instructions and all questions answered.  Patient discharged from [facility] in stable condition.    Nicolle Ogden RN     Administrations This Visit     ferric carboxymaltose (INJECTAFER) 750 mg in sodium chloride 0.9 % 100 mL intermittent infusion     Admin Date Action Dose Rate Route Administered By          06/14/2018 New Bag 750 mg 500 mL/hr Intravenous Nicolle Ogden RN                       /62 (BP Location: Left arm)  Pulse 70  Temp 96.4  F (35.8  C) (Oral)  Resp 16  Wt 61.3 kg (135 lb 1.6 oz)  SpO2 100%  BMI 24.71 kg/m2

## 2018-06-14 NOTE — MR AVS SNAPSHOT
After Visit Summary   6/14/2018    Caity Horan    MRN: [id number]           Patient Information     Date Of Birth          1990        Visit Information        Provider Department      6/14/2018 8:00 AM UC 44 ATC; DENG SPEC INFUSION [facility]        Today's Diagnoses     Iron deficiency anemia due to chronic blood loss    -  1    Kidney replaced by transplant        Aftercare following organ transplant        Encounter for long-term current use of medication          Care Instructions    Dear Caity Horan    Thank you for choosing [facility] ([facility]) for your infusion.  The following information is a summary of our appointment as well as important reminders.        Ferric carboxymaltose Solution for injection  What is this medicine?  FERRIC CARBOXYMALTOSE (ferr-ik car-box-ee-mol-toes) is an iron complex. Iron is used to make healthy red blood cells, which carry oxygen and nutrients throughout the body. This medicine is used to treat anemia in people with chronic kidney disease or people who cannot take iron by mouth.  This medicine may be used for other purposes; ask your health care provider or pharmacist if you have questions.  What should I tell my health care provider before I take this medicine?  They need to know if you have any of these conditions:    anemia not caused by low iron levels    high levels of iron in the blood    liver disease    an unusual or allergic reaction to iron, other medicines, foods, dyes, or preservatives    pregnant or trying to get pregnant    breast-feeding  How should I use this medicine?  This medicine is for infusion into a vein. It is given by a health care professional in a hospital or clinic setting.  Talk to your pediatrician regarding the use of this medicine in children. Special care may be needed.  Overdosage: If you think you've taken too much  of this medicine contact a poison control center or emergency room at once.  NOTE: This medicine is only for you. Do not share this medicine with others.  What if I miss a dose?  It is important not to miss your dose. Call your doctor or health care professional if you are unable to keep an appointment.  What may interact with this medicine?  Do not take this medicine with any of the following medications:  deferoxamine  dimercaprol  other iron products  This medicine may also interact with the following medications:  chloramphenicol  deferasirox  This list may not describe all possible interactions. Give your health care provider a list of all the medicines, herbs, non-prescription drugs, or dietary supplements you use. Also tell them if you smoke, drink alcohol, or use illegal drugs. Some items may interact with your medicine.  What should I watch for while using this medicine?  Visit your doctor or health care professional regularly. Tell your doctor if your symptoms do not start to get better or if they get worse. You may need blood work done while you are taking this medicine.  You may need to follow a special diet. Talk to your doctor. Foods that contain iron include: whole grains/cereals, dried fruits, beans, or peas, leafy green vegetables, and organ meats (liver, kidney).  What side effects may I notice from receiving this medicine?  Side effects that you should report to your doctor or health care professional as soon as possible:  allergic reactions like skin rash, itching or hives, swelling of the face, lips, or tonguebreathing problems  changes in blood pressure  feeling faint or lightheaded, falls  flushing, sweating, or hot feelings  Side effects that usually do not require medical attention (Report these to your doctor or health care professional if they continue or are bothersome.):  changes in taste  constipation  dizziness  headache  nausea  pain, redness, or irritation at site where  injected  vomiting  This list may not describe all possible side effects. Call your doctor for medical advice about side effects. You may report side effects to FDA at 4-679-SJG-7058.  Where should I keep my medicine?  This drug is given in a hospital or clinic and will not be stored at home.  NOTE: This sheet is a summary. It may not cover all possible information. If you have questions about this medicine, talk to your doctor, pharmacist, or health care provider.  NOTE:This sheet is a summary. It may not cover all possible information. If you have questions about this medicine, talk to your doctor, pharmacist, or health care provider. Copyright  2016 Gold Standard    We look forward in seeing you on your next appointment here at [facility].  Please don t hesitate to call us at [phone] to reschedule any of your appointments or to speak with one of the [facility] registered nurses.  It was a pleasure taking care of you today.    Sincerely,    [facility]  Special[facility]  [address] [place] [address]  [address]  Phone:  [phone]           Follow-ups after your visit        Your next 10 appointments already scheduled     Jul 17, 2018  3:40 PM CDT   (Arrive by 3:25 PM)   RETURN INFLAMMATORY BOWEL DISEASE with Yenifer Daon MD   [facility] ([facility])    [address] [place] [address]  4th Floor  [facility] [id number] [phone]            Jul 26, 2018  8:00 AM CDT   Infusion 120 with UC SPEC INFUSION, UC 48 ATC   [facility] ([facility])    [address] [place] [address]  [address]  [facility] [id number] [phone]            Aug 28, 2018  7:15 AM CDT   Lab with UC LAB   [facility] ([facility])    [address] [place] [address]  1st Floor  [facility] [id number] [phone]            Aug 28, 2018  3:35 PM CDT   (Arrive  by 3:05 PM)   Return Kidney Transplant with  Kidney/Pancreas Recipient   [facility] ([facility])    [address]  [address]  [facility] [address] [phone]            Sep 20, 2018  9:00 AM CDT   Infusion 120 with UC SPEC INFUSION, UC 47 ATC   [facility] Special[facility] and Procedure ([facility])    [address]  [address]  [facility] [address] [phone]              Who to contact     If you have questions or need follow up information about today's clinic visit or your schedule please contact [facility] SPECIALTY AND PROCEDURE directly at [phone].  Normal or non-critical lab and imaging results will be communicated to you by [email or web address], letter or phone within 4 business days after the clinic has received the results. If you do not hear from us within 7 days, please contact the clinic through [employer] or phone. If you have a critical or abnormal lab result, we will notify you by phone as soon as possible.  Submit refill requests through [employer] or call your pharmacy and they will forward the refill request to us. Please allow 3 business days for your refill to be completed.          Additional Information About Your Visit        [email or web address] Information     [employer] gives you secure access to your electronic health record. If you see a primary care provider, you can also send messages to your care team and make appointments. If you have questions, please call your primary care clinic.  If you do not have a primary care provider, please call [phone] and they will assist you.        Care EveryWhere ID     This is your Care EveryWhere ID. This could be used by other organizations to access your [place] medical records  [id number]        Your Vitals Were     Pulse Temperature Respirations Pulse Oximetry BMI (Body Mass Index)       75 96.4  F (35.8  C) (Oral) 16 100% 24.71 kg/m2         Blood Pressure from Last 3 Encounters:   06/14/18 107/64   05/31/18 110/73   05/03/18 104/57    Weight from Last 3 Encounters:   06/14/18 61.3 kg (135 lb 1.6 oz)   05/31/18 61.5 kg (135 lb 8 oz)   05/03/18 60.3 kg (132 lb 14.4 oz)              We Performed the Following     Basic metabolic panel     CBC with platelets     Tacrolimus level        Primary Care Provider Office Phone # Fax #    Ritu CHLOE Little [phone]       [address] [place] [address]        Equal Access to Services     : Hadii manuel avalos hadasho Soomaali, waaxda luqadaha, qaybta kaalmada adelaloyada, philip lee . So [facility] [phone].    ATENCIÓN: Si habla español, tiene a carreon disposición servicios gratuitos de asistencia lingüística. Llame al [phone].    We comply with applicable federal civil rights laws and [place] laws. We do not discriminate on the basis of race, color, national origin, age, disability, sex, sexual orientation, or gender identity.            Thank you!     Thank you for choosing [facility] SPECIAL[facility]ED[facility]  for your care. Our goal is always to provide you with excellent care. Hearing back from our patients is one way we can continue to improve our services. Please take a few minutes to complete the written survey that you may receive in the mail after your visit with us. Thank you!             Your Updated Medication List - Protect others around you: Learn how to safely use, store and throw away your medicines at www.disposemymeds.org.          This list is accurate as of 6/14/18  9:33 AM.  Always use your most recent med list.                   Brand Name Dispense Instructions for use Diagnosis    B-12 1000 MCG Tbcr     90 tablet    Take 1,000 mcg by mouth daily    B12 deficiency due to diet       magnesium oxide 400 MG tablet    MAG-OX    90 tablet    Take 1 tablet (400 mg) by mouth daily     Hypomagnesemia, Status post kidney transplant, Kidney replaced by transplant, Kidney transplanted       mycophenolate 250 MG capsule    GENERIC EQUIVALENT    120 capsule    Take 2 capsules (500 mg) by mouth 2 times daily    Kidney transplanted, Kidney replaced by transplant, Status post kidney transplant       * predniSONE 5 MG tablet    DELTASONE    228 tablet    40 mg daily x 2 wks, decrease by 10 mg weekly til at 20 mg daily, decrease by 5 mg wkly til at 5 mg daily, take 5mg every other day x 1 wk    Ulcerative pancolitis with rectal bleeding (H)       * predniSONE 5 MG tablet    DELTASONE    75 tablet    4 tbs qd X 1 wks, 3 tbs qd X 1wk, 2 tbs qd X 1wk, 1 tbs qd X 1wk,  then 1tb qod X 1 wk    Other ulcerative colitis with rectal bleeding (H)       sulfamethoxazole-trimethoprim 400-80 MG per tablet    BACTRIM/SEPTRA    30 tablet    Take 1 tablet by mouth daily    Kidney transplanted, Complications, kidney transplant, Aftercare following organ transplant       tacrolimus 0.5 MG capsule    GENERIC EQUIVALENT    180 capsule    Take 3 capsules (1.5 mg) by mouth 2 times daily    Kidney replaced by transplant, Status post kidney transplant, Hypomagnesemia, Kidney transplanted       vitamin D 2000 units tablet     100 tablet    Take 2,000 Units by mouth daily    Vitamin D deficiency       * Notice:  This list has 2 medication(s) that are the same as other medications prescribed for you. Read the directions carefully, and ask your doctor or other care provider to review them with you.

## 2018-06-15 ENCOUNTER — TELEPHONE (OUTPATIENT)
Dept: PHARMACY | Facility: CLINIC | Age: 28
End: 2018-06-15

## 2018-06-15 NOTE — TELEPHONE ENCOUNTER
Anemia Management Note  SUBJECTIVE/OBJECTIVE:  Referred by Dr. Otilio Mar on 10/6/2017  Primary Diagnosis: Anemia in Chronic Kidney Disease (N18.3, D63.1)     Secondary Diagnosis:  Chronic Kidney Disease, Stage 3 (N18.3)   Hgb goal range:  9-10  Epo/Darbo: None  Iron regimen:  Does not tolerate oral iron  Labs : 10/05/2018  Communication: Consent to communicate on file OK to leave messages for scheduling, medical and billing on cell phone.  Dated 10/27/2014    Anemia Latest Ref Rng & Units 3/23/2018 3/29/2018 2018 5/3/2018 2018 2018 2018   Hemoglobin 11.7 - 15.7 g/dL 7.9(L) 8.0(L) 8.6(L) 8.6(L) 9.4(L) 9.1(L) 10.7(L)   IV Iron Dose - - - - - - 750mg 750mg   TSAT 15 - 46 % - 14(L) - 6(L) 5(L) - -   Ferritin 12 - 150 ng/mL - 90 - 13 7(L) - -     BP Readings from Last 3 Encounters:   18 107/64   18 110/73   18 104/57     Wt Readings from Last 2 Encounters:   18 135 lb 1.6 oz (61.3 kg)   18 135 lb 8 oz (61.5 kg)         ASSESSMENT:  Hgb: at goal - continue to monitor  TSat: Due for iron studies 4 weeks after last IV iron infusion Ferritin: Due for iron studies 4 weeks after last IV iron infusion    PLAN:  Recheck iron labs at 2018 appointment    Orders needed to be renewed (for next follow-up date) in EPIC: None    Iron labs due:  18    Plan discussed with:  Make reminder call 18  Plan provided by:  Janette Harrington CPhT  [facility]  [phone]    NEXT FOLLOW-UP DATE:  18  Reviewed 2018 [facility]  Anemia Management Service  Abida Song,PharmD and Princess Harrington CPhT  Phone: [phone]  Fax: [phone]

## 2018-06-18 ENCOUNTER — CARE COORDINATION (OUTPATIENT)
Dept: GASTROENTEROLOGY | Facility: CLINIC | Age: 28
End: 2018-06-18

## 2018-07-05 ENCOUNTER — TELEPHONE (OUTPATIENT)
Dept: TRANSPLANT | Facility: CLINIC | Age: 28
End: 2018-07-05

## 2018-07-05 NOTE — TELEPHONE ENCOUNTER
Previously have discussed CT with pt, who declined until her GI symptoms had improved.   Attempted to call her to discuss again. No answer. Left vm asking for call back to [facility] office to discuss plan of care.     Otilio Mar MD Krull, Leisa K, RN                     Increase in EBV viremia, possibly due to additional steroids for ulcerative colitis treatment.  Would recommend mycophenolate mofetil to azathioprine 100 mg daily.  In addition, would repeat CT scan of chest/abdomen/pelvis to rule out lymphoma.

## 2018-07-13 ENCOUNTER — TELEPHONE (OUTPATIENT)
Dept: PHARMACY | Facility: CLINIC | Age: 28
End: 2018-07-13

## 2018-07-13 NOTE — TELEPHONE ENCOUNTER
Follow-up with anemia management service:    LM for Caity reminding her that she is due for Hgb, ferritin and iron labs - Has appt 2018 at [facility]    Anemia Latest Ref Rng & Units 3/23/2018 3/29/2018 2018 5/3/2018 2018 2018 2018   Hemoglobin 11.7 - 15.7 g/dL 7.9(L) 8.0(L) 8.6(L) 8.6(L) 9.4(L) 9.1(L) 10.7(L)   IV Iron Dose - - - - - - 750mg 750mg   TSAT 15 - 46 % - 14(L) - 6(L) 5(L) - -   Ferritin 12 - 150 ng/mL - 90 - 13 7(L) - -       Orders needed to be renewed (for next follow-up date) in EPIC: None   Med order expires: N/A   Lab orders : 10/05/2018    Follow-up call date: 18    Janette Harrington [facility]  Anemia Clinic  [phone]  Reviewed 2018   Anemia Management Service  Abida Song,PharmD and Princess Harrington CPhT  Phone: [phone]  Fax: [phone]

## 2018-07-16 ENCOUNTER — TELEPHONE (OUTPATIENT)
Dept: GASTROENTEROLOGY | Facility: CLINIC | Age: 28
End: 2018-07-16

## 2018-07-17 ENCOUNTER — OFFICE VISIT (OUTPATIENT)
Dept: GASTROENTEROLOGY | Facility: CLINIC | Age: 28
End: 2018-07-17
Payer: COMMERCIAL

## 2018-07-17 VITALS
TEMPERATURE: 98.2 F | DIASTOLIC BLOOD PRESSURE: 64 MMHG | WEIGHT: 135.3 LBS | HEIGHT: 64 IN | HEART RATE: 73 BPM | OXYGEN SATURATION: 100 % | SYSTOLIC BLOOD PRESSURE: 109 MMHG | RESPIRATION RATE: 16 BRPM | BODY MASS INDEX: 23.1 KG/M2

## 2018-07-17 DIAGNOSIS — Z79.52 LONG TERM SYSTEMIC STEROID USER: Primary | ICD-10-CM

## 2018-07-17 DIAGNOSIS — K51.011 ULCERATIVE PANCOLITIS WITH RECTAL BLEEDING (H): ICD-10-CM

## 2018-07-17 ASSESSMENT — PAIN SCALES - GENERAL: PAINLEVEL: NO PAIN (0)

## 2018-07-17 NOTE — PROGRESS NOTES
"[facility] UC FOLLOW UP      PATIENT: Caity Horan    MRN: [id number]    Date of Birth 1990    Tel: [phone] (home)     PCP: Ritu Little     HPI: Ms. Horan is a 27 year old year old female here for follow up of newly diagnosed UC. She has had great improvement with prednisone taper, and currently on 5 mg daily.  She has been able to taper without recurrence of symptoms. She has had her first infusion on Vedolizumab on 4/19/18. Notes her last menstrual cycle was in February and no chance of pregnancy. Completed iron infusions in November and February and holding on repeat dose to see if UC therapy will improve anemia.  Had increase in EBV viremia, and Dr. Mar recommended decreasing prednisone taper and to follow EBV PCR closely. No changes from ID perspective. Repeat EBV level with Dr. Mar in 8/2018.    UC history  Spring 2012 -- graduated college, had a lot of life stressors. Experienced severe urgency with incontinence. Some blood in the stool and diarrhea alternating with constipation. Spontaneously resolved after 2 months.   End of Dec 2016 - April 2017: intermittent blood in stool, \"major constipation\" and weight gain, no urgency.    October 2017 -- was found to be anemic (attributed to kidney disease) and had iron infusions.  Sep -Dec 2017 Blood with well formed stool. This continued to worsen.   Worst stretch was mid-Feb to march: weight loss, >8 loose stools per day with blood.  April - now: much improved, no abdominal pain and urgency resolved.    In the past had upper abdominal bloating/pain and LLQ pain. No n/v.     Macroscopic extent of disease (most recent) E3    Current UC symptoms  Bowel frequency in day 2-3   Bowel frequency in night 0 now    Urgency of defecation NO  Blood in stool less than half stools, seen once every other week  General well being 8 (feeling well)  Extracolonic features (multiple select): none     Constitutional symptoms:  Fever NO  Weight loss YES " (in the past -- lost 12 lb since October), now stable    Other GI symptoms present: none  Total number of IBD surgeries (except perianal): 0    Current IBD Medications:  Prednisone  Vedolizumab induction     Current medications: MMF, tacrolimus, Mg, prednisone, B12     Past IBD Medications:   None    Interval history:  Overlapped prednisone with Entyvio until after 2nd induction dose. Had recurrence of symptoms (bloody diarrhea) and restarted prednisone 20 mg daily + long taper and stopped prednisone July 1. Doing well.     Current UC symptoms  Bowel frequency in day 1-2, brown well-formed   Bowel frequency in night 0     Urgency of defecation NO  Blood in stool none   General well being 8 (feeling well)  Extracolonic features (multiple select): none     4-6 weeks after kidney transplant had significant hair loss. Took about a year to fully recover.   Experiencing similar signs now with significant hair loss diffusely; no signs of alopecia with baldness.     Last Entyvio dose was 6.5 weeks ago.     Past Medical History:   Diagnosis Date     Anemia in stage 5 chronic kidney disease (H)      Anemia in stage 5 chronic kidney disease (H) 10/27/2014     ESRD (end stage renal disease) on dialysis (H)      HTN (hypertension) 10/27/2014     Hypertension 10/2014     IgA nephropathy     biopsy proven        Past Surgical History:   Procedure Laterality Date     COLONOSCOPY N/A 3/12/2018    Procedure: COMBINED COLONOSCOPY, SINGLE OR MULTIPLE BIOPSY/POLYPECTOMY BY BIOPSY;  EGD/Colonoscopy ;  Surgeon: Kory Massey MD;  Location:  GI     EXTRACTION(S) DENTAL       TRANSPLANT KIDNEY RECIPIENT LIVING RELATED N/A 12/5/2014    Procedure: TRANSPLANT KIDNEY RECIPIENT LIVING RELATED;  Surgeon: Dale Middleton MD;  Location:  OR       Social History   Substance Use Topics     Smoking status: Never Smoker     Smokeless tobacco: Never Used     Alcohol use 1.8 - 5.4 oz/week     1 - 3 Glasses of wine, 1 - 3 Cans of beer, 1 - 3 Shots  of liquor per week      Comment: 1-3X/month       Family History   Problem Relation Age of Onset     KIDNEY DISEASE No family hx of    Negative for IBD. Dad had psoriasis.   Grandfather with BCC. Grandma has several skin lesions removed (does not recall the diagnosis)    Allergies   Allergen Reactions     Amoxicillin Rash        Outpatient Encounter Prescriptions as of 7/17/2018   Medication Sig Dispense Refill     Cholecalciferol (VITAMIN D) 2000 UNITS tablet Take 2,000 Units by mouth daily 100 tablet 3     Cyanocobalamin (B-12) 1000 MCG TBCR Take 1,000 mcg by mouth daily 90 tablet 3     magnesium oxide (MAG-OX) 400 MG tablet Take 1 tablet (400 mg) by mouth daily 90 tablet 3     mycophenolate (GENERIC EQUIVALENT) 250 MG capsule Take 2 capsules (500 mg) by mouth 2 times daily 120 capsule 11     sulfamethoxazole-trimethoprim (BACTRIM/SEPTRA) 400-80 MG per tablet Take 1 tablet by mouth daily 30 tablet 3     tacrolimus (GENERIC EQUIVALENT) 0.5 MG capsule Take 3 capsules (1.5 mg) by mouth 2 times daily 180 capsule 11     Vedolizumab (ENTYVIO IV)        [DISCONTINUED] predniSONE (DELTASONE) 5 MG tablet 4 tbs qd X 1 wks, 3 tbs qd X 1wk, 2 tbs qd X 1wk, 1 tbs qd X 1wk,  then 1tb qod X 1 wk 75 tablet 0     [DISCONTINUED] predniSONE (DELTASONE) 5 MG tablet 40 mg daily x 2 wks, decrease by 10 mg weekly til at 20 mg daily, decrease by 5 mg wkly til at 5 mg daily, take 5mg every other day x 1 wk 228 tablet 0     No facility-administered encounter medications on file as of 7/17/2018.       NSAID  NO    Review of Systems  Complete 10 System ROS performed. All are negative except as documented below, in the HPI, or in patient questionnaire from today's visit.    1) Constitutional: No fevers, chills, night sweats or malaise, weight loss or gain  2) Skin: No rash  3) Pulmonary: No wheeze, SOB, cough, sputum or hemoptysis  4) Cardiovascular: No Chest pain or palpitations  5) Genitourinary: No blood in urine or dysuria  6) Endocrine:  "No increased sweating, hunger, thirst or thyroid problems  7) Hematologic: No bruising and easy bleeding  8) Musculoskeletal: no new pain in joints or limitation in ROM  9) Neurologic: No dizziness, paresthesias or weakness or falls  10) Psychiatric:  not depressed/anxious, no sleep problems    PHYSICAL EXAM  Vitals: /64 (BP Location: Left arm, Patient Position: Sitting, Cuff Size: Adult Regular)  Pulse 73  Temp 98.2  F (36.8  C) (Oral)  Resp 16  Ht 1.62 m (5' 3.78\")  Wt 61.4 kg (135 lb 4.8 oz)  SpO2 100%  BMI 23.38 kg/m2    No Pain (0)     Eyes: Sclera anicteric/injected  Ears/nose/mouth/throat: Normal oropharynx without ulcers or exudate, mucus membranes moist, hearing intact  Neck: supple, thyroid normal size  CV: No edema  Respiratory: Unlabored breathing  Lymph: No axillary, submandibular, supraclavicular or inguinal lymphadenopathy  Abd: Nondistended, +bs, no hepatosplenomegaly, nontender, no peritoneal signs  Skin: warm, perfused, no jaundice. Appears tan.   Psych: Normal affect  MSK: Normal gait    DATA:  Reviewed in detail past documentation, medications and prior workup available in electronic health records or through outside records.    PERTINENT STUDIES:  Most recent CBC:  WBC   Date Value Ref Range Status   06/14/2018 5.5 4.0 - 11.0 10e9/L Final   ]  Hemoglobin   Date Value Ref Range Status   06/14/2018 10.7 (L) 11.7 - 15.7 g/dL Final   ]   Platelet Count   Date Value Ref Range Status   06/14/2018 240 150 - 450 10e9/L Final       Most recent coag:  INR   Date Value Ref Range Status   12/05/2014 1.25 (H) 0.86 - 1.14 Final       Most recent hepatic panel:  AST   Date Value Ref Range Status   05/31/2018 12 0 - 45 U/L Final     ALT   Date Value Ref Range Status   05/31/2018 15 0 - 50 U/L Final     No results found for: BILICONJ   Bilirubin Total   Date Value Ref Range Status   05/31/2018 0.3 0.2 - 1.3 mg/dL Final     Albumin   Date Value Ref Range Status   05/31/2018 3.4 3.4 - 5.0 g/dL Final "     Alkaline Phosphatase   Date Value Ref Range Status   05/31/2018 46 40 - 150 U/L Final       Most recent creatinine:  Creatinine   Date Value Ref Range Status   06/14/2018 1.04 0.52 - 1.04 mg/dL Final     Endoscopy:   cscope 3/2018 showed Mcbride 3 colitis involving the rectum to hepatic flexure with sparing of the AC. Normal TI. Biopsies showed moderate chronic active colitis with cryptitis and crypt abscess formation.    Imaging:  CT c/a/p on 7/2017 (indication EBV viremia): no bowel wall thickening. SB appeared normal.     IMPRESSION:  Ms. Horan is a 28 year old year old with recently diagnosed moderate to severe E3 ulcerative colitis, who achieved symptomatic remission on prednisone taper and initiation of vedolizumab induction. She continues to do well and is off prednisone x 2 weeks.     She does not significant hair loss, which also happened 4-6 weeks after her kidney transplant. Most likely stress-related and will resolve after a while (took 1 year last time).     # Ulcerative colitis, severe, E3, now in clinical remission  # Immunosuppressed state (tacrolimus and Cellcept)  # Hair loss without alopecia    PLAN:  --Blood work with infusions  --Continue vedolizumab  --------Vedolizumab level to be obtained just before her first 8 week interval infusion to optimize dose.  --Plan for cscope in September, which will be 6 months after VDZ initiation   --Check fecal calprotectin. Will work on pre-auth.   --Declined derm referral. Biotin supplementation.    IBD Health Care Maintenance:  Vaccinations:  -- Influenza (every year): Last given 2017  -- TdaP (every 10 years): Last given 2017  -- Pneumococcal Pneumonia (once then every 5 years): Last given 2017    One time confirmation of immunity or serologies:  -- Hepatitis A (serologies or immunizations): Not documented  -- Hepatitis B (serologies or immunizations): 2001/2002, immune per serologies  -- Varicella: had chickenpox as a child  -- MMR:had all immunization  as a child  -- HPV (all aged 18-26): 2007/2008  -- Meningococcal meningitis (all patients at risk for meningitis): 2007   -- Due to the immunosuppression in this patient, I would not advise administration of live vaccines such as varicella/VZV, intranasal influenza, MMR, or yellow fever vaccine (if travelling).      Cancer Screening:  Colon cancer screening:  Given pancolitis colonoscopy every 2-3 years recommended after 8 years of disease.  Dysplasia screening is recommended 2026, will require colonoscopy for mucosal healing on vedolizumab.    Cervical cancer screening: Annual due to immunosupression    Skin cancer screening: Annual visual exam of skin by dermatologist since patient is immunocompromised    Bone mineral density screening   -- Recommend all patients supplement with calcium and vitamin D  -- DEXA scan ordered given >3 months steroid use     Depression Screening:  -- Over the last month, have you felt down, depressed, or hopeless? No  -- Over the last month, have you felt little interest or pleasure doing things? No    Misc:  -- Avoid tobacco use  -- Avoid NSAIDs as there is potentially a 25% chance of causing an IBD flare    Immunization History   Administered Date(s) Administered     Influenza Vaccine IM 3yrs+ 4 Valent IIV4 12/13/2016, 10/03/2017     Pneumo Conj 13-V (2010&after) 11/01/2017     Pneumococcal 23 valent 11/10/2014     TDAP Vaccine (Boostrix) 11/01/2017      Follow up with either myself or Rupert in 3 months     I spent a total of 40 minutes, face to face, was spent with this patient, >50% of which was counseling regarding the above delineated issues.

## 2018-07-17 NOTE — NURSING NOTE
"Chief Complaint   Patient presents with     Gastrointestinal Problem     Ulcerative Pancolitis       Vitals:    07/17/18 1541   BP: 109/64   BP Location: Left arm   Patient Position: Sitting   Cuff Size: Adult Regular   Pulse: 73   Resp: 16   Temp: 98.2  F (36.8  C)   TempSrc: Oral   SpO2: 100%   Weight: 61.4 kg (135 lb 4.8 oz)   Height: 1.62 m (5' 3.78\")       Body mass index is 23.38 kg/(m^2).      Negin Perez LPN                          "

## 2018-07-17 NOTE — PATIENT INSTRUCTIONS
Plan  --Establish care with a primary care doctor. Discuss pap smears.    --Obtain vedolizumab level prior to next infusion    --Bone density scan ordered  Check in time is [phone] [facility] floor imaging   July 27   --Colonoscopy in September     --Fecal calprotectin. We will work on getting pre-authorization.     --Take biotin supplementation to help with hair growth    --Let me know if you want a dermatology referral for hair loss (likely stress related and will take a while to resolve)    Return for follow up in 3 months with myself or Rupert Dsouza

## 2018-07-17 NOTE — MR AVS SNAPSHOT
After Visit Summary   7/17/2018    Caity Horan    MRN: [id number]           Patient Information     Date Of Birth          1990        Visit Information        Provider Department      7/17/2018 3:40 PM Yenifer Doan MD [facility]        Today's Diagnoses     Long term systemic steroid user    -  1    Ulcerative pancolitis with rectal bleeding (H)          Care Instructions    Plan  --Establish care with a primary care doctor. Discuss pap smears.    --Obtain vedolizumab level prior to next infusion    --Bone density scan ordered  Check in time is [phone] [facility]   First floor imaging   July 27   --Colonoscopy in September     --Fecal calprotectin. We will work on getting pre-authorization.     --Take biotin supplementation to help with hair growth    --Let me know if you want a dermatology referral for hair loss (likely stress related and will take a while to resolve)    Return for follow up in 3 months with myself or Rupert Dsouza            Follow-ups after your visit        Additional Services     GASTROENTEROLOGY ADULT REF PROCEDURE ONLY                 Your next 10 appointments already scheduled     Jul 26, 2018  8:00 AM CDT   Infusion 120 with UC SPEC INFUSION, UC 48 ATC   [facility] Specialty and Procedure ([facility] [place])    [address]  [address]  [place] [address] [phone]            Jul 27, 2018  7:30 AM CDT   DX HIP/PELVIS/SPINE with UCDX1   [facility] Dexa ([facility] [place])    [address]  1st Floor  [place] [id number] [phone]           Please do not take any of the following 24 hours prior to the day of your exam: vitamins, calcium tablets, antacids.  If possible, please wear clothes without metal (snaps, zippers). A sweatsuit works well.            Aug 28, 2018  7:15 AM CDT   Lab with UC LAB   [facility] ([facility] [place])    [address]  1st Floor  [place] [address]   [phone]            Aug 28, 2018  3:35 PM CDT   (Arrive by 3:05 PM)   Return Kidney Transplant with Uc Kidney/Pancreas Recipient   [facility] Nephrology ([facility])    [address]  [address]  [place] [address]   [phone]            Sep 20, 2018  9:00 AM CDT   Infusion 120 with UC SPEC INFUSION, UC 47 ATC   [facility] Procedure ([facility])    [address]  [address]  [place] [address]   [phone]            Oct 08, 2018  8:20 AM CDT   (Arrive by 8:05 AM)   RETURN INFLAMMATORY BOWEL DISEASE with Rupert Dsouza PA-C   [facility] ([facility])    [address]  4th Floor  [place] [address] [phone]              Future tests that were ordered for you today     Open Future Orders        Priority Expected Expires Ordered    DX Hip/Pelvis/Spine Routine  7/17/2019 7/17/2018            Who to contact     Please call your clinic at [phone] to:    Ask questions about your health    Make or cancel appointments    Discuss your medicines    Learn about your test results    Speak to your doctor            Additional Information About Your Visit        [email or web address] Information     [email or web address] gives you secure access to your electronic health record. If you see a primary care provider, you can also send messages to your care team and make appointments. If you have questions, please call your primary care clinic.  If you do not have a primary care provider, please call [phone] and they will assist you.      [email or web address] is an electronic gateway that provides easy, online access to your medical records. With [email or web address], you can request a clinic appointment, read your test results, renew a prescription or communicate with your care team.     To  "access your existing account, please contact your [facility] or call [phone] for assistance.        Care EveryWhere ID     This is your Care EveryWhere ID. This could be used by other organizations to access your [place] medical records  [id number]        Your Vitals Were     Pulse Temperature Respirations Height Pulse Oximetry BMI (Body Mass Index)    73 98.2  F (36.8  C) (Oral) 16 1.62 m (5' 3.78\") 100% 23.38 kg/m2       Blood Pressure from Last 3 Encounters:   07/17/18 109/64   06/14/18 107/64   05/31/18 110/73    Weight from Last 3 Encounters:   07/17/18 61.4 kg (135 lb 4.8 oz)   06/14/18 61.3 kg (135 lb 1.6 oz)   05/31/18 61.5 kg (135 lb 8 oz)              We Performed the Following     GASTROENTEROLOGY ADULT REF PROCEDURE ONLY        Primary Care Provider    None Specified       No primary provider on file.        Equal Access to Services     [facility] : Hadii manuel Machuca, walarryda jaylan, qaybta kaalmaadrien mancini, philip lee . So [facility] [phone].    ATENCIÓN: Si habla español, tiene a carreon disposición servicios gratuitos de asistencia lingüística. Llame al [phone].    We comply with applicable federal civil rights laws and [place] laws. We do not discriminate on the basis of race, color, national origin, age, disability, sex, sexual orientation, or gender identity.            Thank you!     Thank you for choosing [facility]  for your care. Our goal is always to provide you with excellent care. Hearing back from our patients is one way we can continue to improve our services. Please take a few minutes to complete the written survey that you may receive in the mail after your visit with us. Thank you!             Your Updated Medication List - Protect others around you: Learn how to safely use, store and throw away your medicines at www.disposemymeds.org.          This list is accurate as of " 7/17/18  4:55 PM.  Always use your most recent med list.                   Brand Name Dispense Instructions for use Diagnosis    B-12 1000 MCG Tbcr     90 tablet    Take 1,000 mcg by mouth daily    B12 deficiency due to diet       ENTYVIO IV           magnesium oxide 400 MG tablet    MAG-OX    90 tablet    Take 1 tablet (400 mg) by mouth daily    Hypomagnesemia, Status post kidney transplant, Kidney replaced by transplant, Kidney transplanted       mycophenolate 250 MG capsule    GENERIC EQUIVALENT    120 capsule    Take 2 capsules (500 mg) by mouth 2 times daily    Kidney transplanted, Kidney replaced by transplant, Status post kidney transplant       sulfamethoxazole-trimethoprim 400-80 MG per tablet    BACTRIM/SEPTRA    30 tablet    Take 1 tablet by mouth daily    Kidney transplanted, Complications, kidney transplant, Aftercare following organ transplant       tacrolimus 0.5 MG capsule    GENERIC EQUIVALENT    180 capsule    Take 3 capsules (1.5 mg) by mouth 2 times daily    Kidney replaced by transplant, Status post kidney transplant, Hypomagnesemia, Kidney transplanted       vitamin D 2000 units tablet     100 tablet    Take 2,000 Units by mouth daily    Vitamin D deficiency

## 2018-07-17 NOTE — LETTER
"7/17/2018       RE: Caity Horan  [address]  [facility] [address]     Dear Colleague,    Thank you for referring your patient, Caity Horan, to the [facility] GASTROENTEROLOGY AND IBD CLINIC at [facility]. Please see a copy of my visit note below.    [facility] UC FOLLOW UP      PATIENT: Caity Horan    MRN: [id number]    Date of Birth 1990    Tel: [phone] (home)     PCP: Ritu Little     HPI: Ms. Horan is a 27 year old year old female here for follow up of newly diagnosed UC. She has had great improvement with prednisone taper, and currently on 5 mg daily.  She has been able to taper without recurrence of symptoms. She has had her first infusion on Vedolizumab on 4/19/18. Notes her last menstrual cycle was in February and no chance of pregnancy. Completed iron infusions in November and February and holding on repeat dose to see if UC therapy will improve anemia.  Had increase in EBV viremia, and Dr. Mar recommended decreasing prednisone taper and to follow EBV PCR closely. No changes from ID perspective. Repeat EBV level with Dr. Mar in 8/2018.    UC history  Spring 2012 -- graduated college, had a lot of life stressors. Experienced severe urgency with incontinence. Some blood in the stool and diarrhea alternating with constipation. Spontaneously resolved after 2 months.   End of Dec 2016 - April 2017: intermittent blood in stool, \"major constipation\" and weight gain, no urgency.    October 2017 -- was found to be anemic (attributed to kidney disease) and had iron infusions.  Sep -Dec 2017 Blood with well formed stool. This continued to worsen.   Worst stretch was mid-Feb to march: weight loss, >8 loose stools per day with blood.  April - now: much improved, no abdominal pain and urgency resolved.    In the past had upper abdominal bloating/pain and LLQ pain. No n/v.     Macroscopic extent of disease (most recent) " E3    Current UC symptoms  Bowel frequency in day 2-3   Bowel frequency in night 0 now    Urgency of defecation NO  Blood in stool less than half stools, seen once every other week  General well being 8 (feeling well)  Extracolonic features (multiple select): none     Constitutional symptoms:  Fever NO  Weight loss YES (in the past -- lost 12 lb since October), now stable    Other GI symptoms present: none  Total number of IBD surgeries (except perianal): 0    Current IBD Medications:  Prednisone  Vedolizumab induction     Current medications: MMF, tacrolimus, Mg, prednisone, B12     Past IBD Medications:   None    Interval history:  Overlapped prednisone with Entyvio until after 2nd induction dose. Had recurrence of symptoms (bloody diarrhea) and restarted prednisone 20 mg daily + long taper and stopped prednisone July 1. Doing well.     Current UC symptoms  Bowel frequency in day 1-2, brown well-formed   Bowel frequency in night 0     Urgency of defecation NO  Blood in stool none   General well being 8 (feeling well)  Extracolonic features (multiple select): none     4-6 weeks after kidney transplant had significant hair loss. Took about a year to fully recover.   Experiencing similar signs now with significant hair loss diffusely; no signs of alopecia with baldness.     Last Entyvio dose was 6.5 weeks ago.     Past Medical History:   Diagnosis Date     Anemia in stage 5 chronic kidney disease (H)      Anemia in stage 5 chronic kidney disease (H) 10/27/2014     ESRD (end stage renal disease) on dialysis (H)      HTN (hypertension) 10/27/2014     Hypertension 10/2014     IgA nephropathy     biopsy proven        Past Surgical History:   Procedure Laterality Date     COLONOSCOPY N/A 3/12/2018    Procedure: COMBINED COLONOSCOPY, SINGLE OR MULTIPLE BIOPSY/POLYPECTOMY BY BIOPSY;  EGD/Colonoscopy ;  Surgeon: Kory Massey MD;  Location: [facility]     EXTRACTION(S) DENTAL       TRANSPLANT KIDNEY RECIPIENT LIVING RELATED  N/A 12/5/2014    Procedure: TRANSPLANT KIDNEY RECIPIENT LIVING RELATED;  Surgeon: Dale Middleton MD;  Location:  OR       Social History   Substance Use Topics     Smoking status: Never Smoker     Smokeless tobacco: Never Used     Alcohol use 1.8 - 5.4 oz/week     1 - 3 Glasses of wine, 1 - 3 Cans of beer, 1 - 3 Shots of liquor per week      Comment: 1-3X/month       Family History   Problem Relation Age of Onset     KIDNEY DISEASE No family hx of    Negative for IBD. Dad had psoriasis.   Grandfather with BCC. Grandma has several skin lesions removed (does not recall the diagnosis)    Allergies   Allergen Reactions     Amoxicillin Rash        Outpatient Encounter Prescriptions as of 7/17/2018   Medication Sig Dispense Refill     Cholecalciferol (VITAMIN D) 2000 UNITS tablet Take 2,000 Units by mouth daily 100 tablet 3     Cyanocobalamin (B-12) 1000 MCG TBCR Take 1,000 mcg by mouth daily 90 tablet 3     magnesium oxide (MAG-OX) 400 MG tablet Take 1 tablet (400 mg) by mouth daily 90 tablet 3     mycophenolate (GENERIC EQUIVALENT) 250 MG capsule Take 2 capsules (500 mg) by mouth 2 times daily 120 capsule 11     sulfamethoxazole-trimethoprim (BACTRIM/SEPTRA) 400-80 MG per tablet Take 1 tablet by mouth daily 30 tablet 3     tacrolimus (GENERIC EQUIVALENT) 0.5 MG capsule Take 3 capsules (1.5 mg) by mouth 2 times daily 180 capsule 11     Vedolizumab (ENTYVIO IV)        [DISCONTINUED] predniSONE (DELTASONE) 5 MG tablet 4 tbs qd X 1 wks, 3 tbs qd X 1wk, 2 tbs qd X 1wk, 1 tbs qd X 1wk,  then 1tb qod X 1 wk 75 tablet 0     [DISCONTINUED] predniSONE (DELTASONE) 5 MG tablet 40 mg daily x 2 wks, decrease by 10 mg weekly til at 20 mg daily, decrease by 5 mg wkly til at 5 mg daily, take 5mg every other day x 1 wk 228 tablet 0     No facility-administered encounter medications on file as of 7/17/2018.       NSAID  NO    Review of Systems  Complete 10 System ROS performed. All are negative except as documented below, in the HPI,  "or in patient questionnaire from today's visit.    1) Constitutional: No fevers, chills, night sweats or malaise, weight loss or gain  2) Skin: No rash  3) Pulmonary: No wheeze, SOB, cough, sputum or hemoptysis  4) Cardiovascular: No Chest pain or palpitations  5) Genitourinary: No blood in urine or dysuria  6) Endocrine: No increased sweating, hunger, thirst or thyroid problems  7) Hematologic: No bruising and easy bleeding  8) Musculoskeletal: no new pain in joints or limitation in ROM  9) Neurologic: No dizziness, paresthesias or weakness or falls  10) Psychiatric:  not depressed/anxious, no sleep problems    PHYSICAL EXAM  Vitals: /64 (BP Location: Left arm, Patient Position: Sitting, Cuff Size: Adult Regular)  Pulse 73  Temp 98.2  F (36.8  C) (Oral)  Resp 16  Ht 1.62 m (5' 3.78\")  Wt 61.4 kg (135 lb 4.8 oz)  SpO2 100%  BMI 23.38 kg/m2    No Pain (0)     Eyes: Sclera anicteric/injected  Ears/nose/mouth/throat: Normal oropharynx without ulcers or exudate, mucus membranes moist, hearing intact  Neck: supple, thyroid normal size  CV: No edema  Respiratory: Unlabored breathing  Lymph: No axillary, submandibular, supraclavicular or inguinal lymphadenopathy  Abd: Nondistended, +bs, no hepatosplenomegaly, nontender, no peritoneal signs  Skin: warm, perfused, no jaundice. Appears tan.   Psych: Normal affect  MSK: Normal gait    DATA:  Reviewed in detail past documentation, medications and prior workup available in electronic health records or through outside records.    PERTINENT STUDIES:  Most recent CBC:  WBC   Date Value Ref Range Status   06/14/2018 5.5 4.0 - 11.0 10e9/L Final   ]  Hemoglobin   Date Value Ref Range Status   06/14/2018 10.7 (L) 11.7 - 15.7 g/dL Final   ]   Platelet Count   Date Value Ref Range Status   06/14/2018 240 150 - 450 10e9/L Final       Most recent coag:  INR   Date Value Ref Range Status   12/05/2014 1.25 (H) 0.86 - 1.14 Final       Most recent hepatic panel:  AST   Date Value " Ref Range Status   05/31/2018 12 0 - 45 U/L Final     ALT   Date Value Ref Range Status   05/31/2018 15 0 - 50 U/L Final     No results found for: BILICONJ   Bilirubin Total   Date Value Ref Range Status   05/31/2018 0.3 0.2 - 1.3 mg/dL Final     Albumin   Date Value Ref Range Status   05/31/2018 3.4 3.4 - 5.0 g/dL Final     Alkaline Phosphatase   Date Value Ref Range Status   05/31/2018 46 40 - 150 U/L Final       Most recent creatinine:  Creatinine   Date Value Ref Range Status   06/14/2018 1.04 0.52 - 1.04 mg/dL Final     Endoscopy:   cscope 3/2018 showed Mcbride 3 colitis involving the rectum to hepatic flexure with sparing of the AC. Normal TI. Biopsies showed moderate chronic active colitis with cryptitis and crypt abscess formation.    Imaging:  CT c/a/p on 7/2017 (indication EBV viremia): no bowel wall thickening. SB appeared normal.     IMPRESSION:  Ms. Horan is a 28 year old year old with recently diagnosed moderate to severe E3 ulcerative colitis, who achieved symptomatic remission on prednisone taper and initiation of vedolizumab induction. She continues to do well and is off prednisone x 2 weeks.     She does not significant hair loss, which also happened 4-6 weeks after her kidney transplant. Most likely stress-related and will resolve after a while (took 1 year last time).     # Ulcerative colitis, severe, E3, now in clinical remission  # Immunosuppressed state (tacrolimus and Cellcept)  # Hair loss without alopecia    PLAN:  --Blood work with infusions  --Continue vedolizumab  --------Vedolizumab level to be obtained just before her first 8 week interval infusion to optimize dose.  --Plan for cscope in September, which will be 6 months after VDZ initiation   --Check fecal calprotectin. Will work on pre-auth.   --Declined derm referral. Biotin supplementation.    IBD Health Care Maintenance:  Vaccinations:  -- Influenza (every year): Last given 2017  -- TdaP (every 10 years): Last given 2017  --  Pneumococcal Pneumonia (once then every 5 years): Last given 2017    One time confirmation of immunity or serologies:  -- Hepatitis A (serologies or immunizations): Not documented  -- Hepatitis B (serologies or immunizations): 2001/2002, immune per serologies  -- Varicella: had chickenpox as a child  -- MMR:had all immunization as a child  -- HPV (all aged 18-26): 2007/2008  -- Meningococcal meningitis (all patients at risk for meningitis): 2007   -- Due to the immunosuppression in this patient, I would not advise administration of live vaccines such as varicella/VZV, intranasal influenza, MMR, or yellow fever vaccine (if travelling).      Cancer Screening:  Colon cancer screening:  Given pancolitis colonoscopy every 2-3 years recommended after 8 years of disease.  Dysplasia screening is recommended 2026, will require colonoscopy for mucosal healing on vedolizumab.    Cervical cancer screening: Annual due to immunosupression    Skin cancer screening: Annual visual exam of skin by dermatologist since patient is immunocompromised    Bone mineral density screening   -- Recommend all patients supplement with calcium and vitamin D  -- DEXA scan ordered given >3 months steroid use     Depression Screening:  -- Over the last month, have you felt down, depressed, or hopeless? No  -- Over the last month, have you felt little interest or pleasure doing things? No    Misc:  -- Avoid tobacco use  -- Avoid NSAIDs as there is potentially a 25% chance of causing an IBD flare    Immunization History   Administered Date(s) Administered     Influenza Vaccine IM 3yrs+ 4 Valent IIV4 12/13/2016, 10/03/2017     Pneumo Conj 13-V (2010&after) 11/01/2017     Pneumococcal 23 valent 11/10/2014     TDAP Vaccine (Boostrix) 11/01/2017      Follow up with either myself or Rupert in 3 months     I spent a total of 40 minutes, face to face, was spent with this patient, >50% of which was counseling regarding the above delineated issues.      Again,  thank you for allowing me to participate in the care of your patient.      Sincerely,    Yenifer Doan MD

## 2018-07-18 ENCOUNTER — MEDICAL CORRESPONDENCE (OUTPATIENT)
Dept: HEALTH INFORMATION MANAGEMENT | Facility: CLINIC | Age: 28
End: 2018-07-18

## 2018-07-18 ENCOUNTER — TELEPHONE (OUTPATIENT)
Dept: GASTROENTEROLOGY | Facility: CLINIC | Age: 28
End: 2018-07-18

## 2018-07-18 ENCOUNTER — TELEPHONE (OUTPATIENT)
Dept: PHARMACY | Facility: CLINIC | Age: 28
End: 2018-07-18

## 2018-07-18 NOTE — TELEPHONE ENCOUNTER
Follow-up with anemia management service:    I left another message for Caity reminding her that she is due for Hgb, ferritin and iron labs  No labs were drawn at her appt on  at the [facility]    Anemia Latest Ref Rng & Units 3/23/2018 3/29/2018 2018 5/3/2018 2018 2018 2018   Hemoglobin 11.7 - 15.7 g/dL 7.9(L) 8.0(L) 8.6(L) 8.6(L) 9.4(L) 9.1(L) 10.7(L)   IV Iron Dose - - - - - - 750mg 750mg   TSAT 15 - 46 % - 14(L) - 6(L) 5(L) - -   Ferritin 12 - 150 ng/mL - 90 - 13 7(L) - -       Orders needed to be renewed (for next follow-up date) in EPIC: None   Med order expires: N/A   Lab orders : 10/05/2018     Follow-up call date: 18    Janette Harrington CPhT  Anemia Clinic  [phone]  Reviewed 2018 [facility]  Anemia Management Service  Abida Song,Dionna and Princess Harrington CPhT  Phone: [phone]  Fax: [phone]

## 2018-07-19 ENCOUNTER — TELEPHONE (OUTPATIENT)
Dept: GASTROENTEROLOGY | Facility: CLINIC | Age: 28
End: 2018-07-19

## 2018-07-20 ENCOUNTER — TELEPHONE (OUTPATIENT)
Dept: GASTROENTEROLOGY | Facility: CLINIC | Age: 28
End: 2018-07-20

## 2018-07-26 ENCOUNTER — TELEPHONE (OUTPATIENT)
Dept: PHARMACY | Facility: CLINIC | Age: 28
End: 2018-07-26

## 2018-07-26 ENCOUNTER — INFUSION THERAPY VISIT (OUTPATIENT)
Dept: INFUSION THERAPY | Facility: CLINIC | Age: 28
End: 2018-07-26
Attending: INTERNAL MEDICINE
Payer: COMMERCIAL

## 2018-07-26 VITALS
SYSTOLIC BLOOD PRESSURE: 113 MMHG | DIASTOLIC BLOOD PRESSURE: 65 MMHG | OXYGEN SATURATION: 99 % | TEMPERATURE: 98.3 F | HEART RATE: 64 BPM

## 2018-07-26 DIAGNOSIS — K51.011 ULCERATIVE PANCOLITIS WITH RECTAL BLEEDING (H): Primary | ICD-10-CM

## 2018-07-26 DIAGNOSIS — K51.019 ULCERATIVE PANCOLITIS WITH COMPLICATION (H): ICD-10-CM

## 2018-07-26 DIAGNOSIS — Z94.0 KIDNEY TRANSPLANTED: ICD-10-CM

## 2018-07-26 DIAGNOSIS — T86.10 COMPLICATIONS, KIDNEY TRANSPLANT: ICD-10-CM

## 2018-07-26 DIAGNOSIS — N18.30 ANEMIA IN STAGE 3 CHRONIC KIDNEY DISEASE (H): ICD-10-CM

## 2018-07-26 DIAGNOSIS — Z48.298 AFTERCARE FOLLOWING ORGAN TRANSPLANT: ICD-10-CM

## 2018-07-26 DIAGNOSIS — D84.9 IMMUNOSUPPRESSED STATUS (H): ICD-10-CM

## 2018-07-26 DIAGNOSIS — Z94.0 KIDNEY REPLACED BY TRANSPLANT: ICD-10-CM

## 2018-07-26 DIAGNOSIS — Z79.899 ENCOUNTER FOR LONG-TERM CURRENT USE OF MEDICATION: ICD-10-CM

## 2018-07-26 DIAGNOSIS — D63.1 ANEMIA IN STAGE 3 CHRONIC KIDNEY DISEASE (H): ICD-10-CM

## 2018-07-26 DIAGNOSIS — N18.30 CHRONIC KIDNEY DISEASE, STAGE 3 (MODERATE): ICD-10-CM

## 2018-07-26 LAB
ALBUMIN SERPL-MCNC: 3.5 G/DL (ref 3.4–5)
ALP SERPL-CCNC: 48 U/L (ref 40–150)
ALT SERPL W P-5'-P-CCNC: 16 U/L (ref 0–50)
ANION GAP SERPL CALCULATED.3IONS-SCNC: 8 MMOL/L (ref 3–14)
AST SERPL W P-5'-P-CCNC: 17 U/L (ref 0–45)
BASOPHILS # BLD AUTO: 0 10E9/L (ref 0–0.2)
BASOPHILS NFR BLD AUTO: 0.7 %
BILIRUB DIRECT SERPL-MCNC: 0.1 MG/DL (ref 0–0.2)
BILIRUB SERPL-MCNC: 0.4 MG/DL (ref 0.2–1.3)
BUN SERPL-MCNC: 34 MG/DL (ref 7–30)
CALCIUM SERPL-MCNC: 8.5 MG/DL (ref 8.5–10.1)
CHLORIDE SERPL-SCNC: 110 MMOL/L (ref 94–109)
CO2 SERPL-SCNC: 20 MMOL/L (ref 20–32)
CREAT SERPL-MCNC: 1.02 MG/DL (ref 0.52–1.04)
CRP SERPL-MCNC: <2.9 MG/L (ref 0–8)
DIFFERENTIAL METHOD BLD: ABNORMAL
EOSINOPHIL # BLD AUTO: 0.5 10E9/L (ref 0–0.7)
EOSINOPHIL NFR BLD AUTO: 8.2 %
ERYTHROCYTE [DISTWIDTH] IN BLOOD BY AUTOMATED COUNT: 16.6 % (ref 10–15)
ERYTHROCYTE [SEDIMENTATION RATE] IN BLOOD BY WESTERGREN METHOD: 16 MM/H (ref 0–20)
FERRITIN SERPL-MCNC: 216 NG/ML (ref 12–150)
GFR SERPL CREATININE-BSD FRML MDRD: 64 ML/MIN/1.7M2
GLUCOSE SERPL-MCNC: 95 MG/DL (ref 70–99)
HCT VFR BLD AUTO: 37.6 % (ref 35–47)
HGB BLD-MCNC: 12.1 G/DL (ref 11.7–15.7)
IMM GRANULOCYTES # BLD: 0 10E9/L (ref 0–0.4)
IMM GRANULOCYTES NFR BLD: 0.4 %
IRON SATN MFR SERPL: 30 % (ref 15–46)
IRON SERPL-MCNC: 73 UG/DL (ref 35–180)
LYMPHOCYTES # BLD AUTO: 1.4 10E9/L (ref 0.8–5.3)
LYMPHOCYTES NFR BLD AUTO: 25.8 %
MCH RBC QN AUTO: 30.7 PG (ref 26.5–33)
MCHC RBC AUTO-ENTMCNC: 32.2 G/DL (ref 31.5–36.5)
MCV RBC AUTO: 95 FL (ref 78–100)
MISCELLANEOUS TEST: NORMAL
MONOCYTES # BLD AUTO: 0.5 10E9/L (ref 0–1.3)
MONOCYTES NFR BLD AUTO: 8.6 %
NEUTROPHILS # BLD AUTO: 3.1 10E9/L (ref 1.6–8.3)
NEUTROPHILS NFR BLD AUTO: 56.3 %
NRBC # BLD AUTO: 0 10*3/UL
NRBC BLD AUTO-RTO: 0 /100
PLATELET # BLD AUTO: 231 10E9/L (ref 150–450)
POTASSIUM SERPL-SCNC: 4.5 MMOL/L (ref 3.4–5.3)
PROT SERPL-MCNC: 7.3 G/DL (ref 6.8–8.8)
RBC # BLD AUTO: 3.94 10E12/L (ref 3.8–5.2)
SODIUM SERPL-SCNC: 138 MMOL/L (ref 133–144)
TIBC SERPL-MCNC: 242 UG/DL (ref 240–430)
WBC # BLD AUTO: 5.5 10E9/L (ref 4–11)

## 2018-07-26 PROCEDURE — 80048 BASIC METABOLIC PNL TOTAL CA: CPT | Performed by: INTERNAL MEDICINE

## 2018-07-26 PROCEDURE — 36415 COLL VENOUS BLD VENIPUNCTURE: CPT

## 2018-07-26 PROCEDURE — 96413 CHEMO IV INFUSION 1 HR: CPT

## 2018-07-26 PROCEDURE — 83550 IRON BINDING TEST: CPT | Performed by: INTERNAL MEDICINE

## 2018-07-26 PROCEDURE — 86140 C-REACTIVE PROTEIN: CPT | Performed by: INTERNAL MEDICINE

## 2018-07-26 PROCEDURE — 80076 HEPATIC FUNCTION PANEL: CPT | Performed by: INTERNAL MEDICINE

## 2018-07-26 PROCEDURE — 80197 ASSAY OF TACROLIMUS: CPT | Performed by: INTERNAL MEDICINE

## 2018-07-26 PROCEDURE — 87799 DETECT AGENT NOS DNA QUANT: CPT | Performed by: INTERNAL MEDICINE

## 2018-07-26 PROCEDURE — 85027 COMPLETE CBC AUTOMATED: CPT | Performed by: INTERNAL MEDICINE

## 2018-07-26 PROCEDURE — 85025 COMPLETE CBC W/AUTO DIFF WBC: CPT | Performed by: INTERNAL MEDICINE

## 2018-07-26 PROCEDURE — 85652 RBC SED RATE AUTOMATED: CPT | Performed by: INTERNAL MEDICINE

## 2018-07-26 PROCEDURE — 83540 ASSAY OF IRON: CPT | Performed by: INTERNAL MEDICINE

## 2018-07-26 PROCEDURE — 82728 ASSAY OF FERRITIN: CPT | Performed by: INTERNAL MEDICINE

## 2018-07-26 PROCEDURE — 25000128 H RX IP 250 OP 636: Mod: ZF | Performed by: INTERNAL MEDICINE

## 2018-07-26 RX ADMIN — VEDOLIZUMAB 300 MG: 300 INJECTION, POWDER, LYOPHILIZED, FOR SOLUTION INTRAVENOUS at 08:45

## 2018-07-26 NOTE — TELEPHONE ENCOUNTER
Anemia Management Note  SUBJECTIVE/OBJECTIVE:  Referred by Dr. Otilio Mar on 10/6/2017  Primary Diagnosis: Anemia in Chronic Kidney Disease (N18.3, D63.1)     Secondary Diagnosis:  Chronic Kidney Disease, Stage 3 (N18.3)   Hgb goal range:  9-10  Epo/Darbo: None  Iron regimen:  Does not tolerate oral iron  Labs : 10/05/2018  Communication: Consent to communicate on file OK to leave messages for scheduling, medical and billing on cell phone.  Dated 10/27/2014    Anemia Latest Ref Rng & Units 3/29/2018 2018 5/3/2018 2018 2018 2018 2018   Hemoglobin 11.7 - 15.7 g/dL 8.0(L) 8.6(L) 8.6(L) 9.4(L) 9.1(L) 10.7(L) 12.1   IV Iron Dose - - - - - 750mg 750mg -   TSAT 15 - 46 % 14(L) - 6(L) 5(L) - - 30   Ferritin 12 - 150 ng/mL 90 - 13 7(L) - - 216(H)     BP Readings from Last 3 Encounters:   18 113/65   18 109/64   18 107/64     Wt Readings from Last 2 Encounters:   18 135 lb 4.8 oz (61.4 kg)   18 135 lb 1.6 oz (61.3 kg)         ASSESSMENT:  Hgb:at goal - continue to monitor  TSat: at goal >30% Ferritin: At goal (>100ng/mL)    PLAN:  RTC for Hgb, ferritin and iron labs in 4 week(s)    Orders needed to be renewed (for next follow-up date) in EPIC: None    Iron labs due:  18    Plan discussed with:  ALEXEY for Caity, my chart sent  Plan provided by:  Janette Harrington CPhT  Anemia Clinic  [phone]    NEXT FOLLOW-UP DATE:  18  Reviewed 2018 [facility]  Anemia Management Service  Abida Song,PharmD and Princess Harrington CPhT  Phone: [phone]  Fax: [phone]

## 2018-07-26 NOTE — MR AVS SNAPSHOT
After Visit Summary   7/26/2018    Caity Horan    MRN: [id number]           Patient Information     Date Of Birth          1990        Visit Information        Provider Department      7/26/2018 8:00 AM  48 ATC;  SPEC INFUSION [facility] Specialty and Procedure        Today's Diagnoses     Ulcerative pancolitis with rectal bleeding (H)    -  1    Anemia in stage 3 chronic kidney disease        Chronic kidney disease, stage 3 (moderate)        Ulcerative pancolitis with complication (H)        Kidney transplanted        Complications, kidney transplant        Aftercare following organ transplant        Immunosuppressed status (H)        Kidney replaced by transplant        Encounter for long-term current use of medication           Follow-ups after your visit        Your next 10 appointments already scheduled     Jul 27, 2018  7:30 AM CDT   DX HIP/PELVIS/SPINE with UCDX1   [facility] ([facility])    [address]  1st Floor  [facility] [id number] [phone]           Please do not take any of the following 24 hours prior to the day of your exam: vitamins, calcium tablets, antacids.  If possible, please wear clothes without metal (snaps, zippers). A sweatsuit works well.            Aug 28, 2018  7:15 AM CDT   Lab with  LAB   [facility] ([facility])    [address]  1st Floor  [facility] [id number] [phone]            Aug 28, 2018  3:35 PM CDT   (Arrive by 3:05 PM)   Return Kidney Transplant with  Kidney/Pancreas Recipient   [facility] ([facility])    [address]  [address]  [facility] [id number] [phone]            Sep 10, 2018   Procedure with Yenifer Doan MD   [facility] ([facility])    [address] [place] [address] [address]  5th Floor  [facility] [address]    [phone]           Located in the [facility] at [address] [place] [address], [facility] [address].   parking is very convenient and highly recommended.  is a $6 flat rate fee.  Both  and self parkers should enter the main arrival plaza from [facility]; parking attendants will direct you based on your parking preference.            Sep 20, 2018  9:00 AM CDT   Infusion 120 with UC SPEC INFUSION, UC 47 ATC   [facility] Specialty and Procedure ([facility])    [address] [place] [address]  [address]  [facility] [address] [phone]            Oct 08, 2018  8:20 AM CDT   (Arrive by 8:05 AM)   RETURN INFLAMMATORY BOWEL DISEASE with Rupert Dsouza PA-C   [facility] ([facility])    [address] [place] [address]  4th Floor  [facility] [address] [phone]            Nov 15, 2018  8:30 AM CST   Infusion 120 with UC SPEC INFUSION, UC 48 ATC   [facility] Specialty and Procedure ([facility])    [address] [place] [address]  [address]  [facility] [id number] [phone]              Future tests that were ordered for you today     Open Standing Orders        Priority Remaining Interval Expires Ordered    Notify Physician Routine [id number] PRN  7/26/2018            Who to contact     If you have questions or need follow up information about today's clinic visit or your schedule please contact [facility] SPECIALTY AND PROCEDURE directly at [phone].  Normal or non-critical lab and imaging results will be communicated to you by MyChart, letter or phone within 4 business days after the clinic has received the results. If you do not hear from us within 7 days, please contact the clinic through MyChart or phone. If you have a critical or abnormal lab result, we will notify you by phone as soon as possible.  Submit refill  requests through [facility] [employer] [facility] or call your pharmacy and they will forward the refill request to us. Please allow 3 business days for your refill to be completed.          Additional Information About Your Visit        [employer] Information     [facility] [employer] [facility] gives you secure access to your electronic health record. If you see a primary care provider, you can also send messages to your care team and make appointments. If you have questions, please call your primary care clinic.  If you do not have a primary care provider, please call [phone] and they will assist you.        Care EveryWhere ID     This is your Care EveryWhere ID. This could be used by other organizations to access your [facility] medical records  [id number]        Your Vitals Were     Pulse Temperature Pulse Oximetry             64 98.3  F (36.8  C) (Oral) 99%          Blood Pressure from Last 3 Encounters:   07/26/18 113/65   07/17/18 109/64   06/14/18 107/64    Weight from Last 3 Encounters:   07/17/18 61.4 kg (135 lb 4.8 oz)   06/14/18 61.3 kg (135 lb 1.6 oz)   05/31/18 61.5 kg (135 lb 8 oz)              We Performed the Following     Basic metabolic panel     CBC with platelets differential     CRP inflammation     EBV DNA PCR Quantitative Whole Blood     Entyvio level- use [place]: Laboratory Miscellaneous Order     Erythrocyte sedimentation rate auto     Ferritin     Hepatic panel     Iron and iron binding capacity     Send outs misc test     Tacrolimus level        Primary Care Provider    None Specified       No primary provider on file.        Equal Access to Services     AUNDREA QUACH : Hadmarvin Machuca, waaxda jaylan, qaybta kaalphilip miller . So [facility] [phone].    ATENCIÓN: Si habla español, tiene a carreon disposición servicios gratuitos de asistencia lingüística. Llame al [phone].    We comply with applicable federal civil rights laws and [place] laws. We do not discriminate on the  basis of race, color, national origin, age, disability, sex, sexual orientation, or gender identity.            Thank you!     Thank you for choosing [facility] SPECIALTY AND PROCEDURE  for your care. Our goal is always to provide you with excellent care. Hearing back from our patients is one way we can continue to improve our services. Please take a few minutes to complete the written survey that you may receive in the mail after your visit with us. Thank you!             Your Updated Medication List - Protect others around you: Learn how to safely use, store and throw away your medicines at www.disposemymeds.org.          This list is accurate as of 7/26/18  3:43 PM.  Always use your most recent med list.                   Brand Name Dispense Instructions for use Diagnosis    B-12 1000 MCG Tbcr     90 tablet    Take 1,000 mcg by mouth daily    B12 deficiency due to diet       ENTYVIO IV           magnesium oxide 400 MG tablet    MAG-OX    90 tablet    Take 1 tablet (400 mg) by mouth daily    Hypomagnesemia, Status post kidney transplant, Kidney replaced by transplant, Kidney transplanted       mycophenolate 250 MG capsule    GENERIC EQUIVALENT    120 capsule    Take 2 capsules (500 mg) by mouth 2 times daily    Kidney transplanted, Kidney replaced by transplant, Status post kidney transplant       tacrolimus 0.5 MG capsule    GENERIC EQUIVALENT    180 capsule    Take 3 capsules (1.5 mg) by mouth 2 times daily    Kidney replaced by transplant, Status post kidney transplant, Hypomagnesemia, Kidney transplanted       vitamin D 2000 units tablet     100 tablet    Take 2,000 Units by mouth daily    Vitamin D deficiency

## 2018-07-26 NOTE — PROGRESS NOTES
Nursing Note  Caity Horan [facility] for:   Chief Complaint   Patient presents with     Infusion     ENTYVIO     During [facility]'s [facility] appointment, orders from Dr. Yenifer Doan were completed.  Frequency:  every 8 weeks.     Progress note:  Patient identification verified by name and date of birth.  Assessment completed.  Vitals recorded in Doc Flowsheets.  Patient was provided with education regarding infusion and possible side effects.  Patient verbalized understanding.      needed: No  Premedications: were not ordered.  Infusion Rates: infusion given over approximately 30 minutes.  Labs: were drawn per orders.   Vascular access: peripheral IV placed today.  Treatment Conditions: Biologic/Chemo Checklist     ~~~ NOTE: If the patient answers yes to any of the questions below, hold the infusion and contact ordering provider or on-call provider.    1. Have you recently had an elevated temperature, fever, chills, productive cough, coughing for 3 weeks or longer or hemoptysis,  abnormal vital signs, night sweats,  chest pain or have you noticed a decrease in your appetite, unexplained weight loss or fatigue? No  2. Do you have any open wounds or new incisions? No  3. Do you have any recent or upcoming hospitalizations, surgeries or dental procedures? No  4. Do you currently have or recently have had any signs of illness or infection or are you on any antibiotics? No  5. Have you had any new, sudden or worsening abdominal pain? No  6. Have you or anyone in your household received a live vaccination in the past 4 weeks? Please note:  No live vaccines while on biologic/chemotherapy until 6 months after the last treatment.  Patient can receive the flu vaccine (shot only) and the pneumovax.  It is optimal for the patient to get these vaccines mid cycle, but they can be given at any time as long as it is not on the day of the  infusion. No  7. Have you recently been diagnosed with any new nervous system diseases (ie. Multiple sclerosis, Guillain Buckner, seizures, neurological changes) or cancer diagnosis? Are you on any form of radiation or chemotherapy? No  8. Are you pregnant or breast feeding or do you have plans of pregnancy in the future? No  9. Have you been having any signs of worsening depression or suicidal ideations?  (benlysta only) No  10. Have there been any other new onset medical symptoms? No    Patient tolerated infusion: well.      Administrations This Visit     vedolizumab (ENTYVIO) 300 mg in sodium chloride 0.9 % 280 mL infusion     Admin Date Action Dose Rate Route Administered By          07/26/2018 New Bag 300 mg 560 mL/hr Intravenous Rosie Montalvo, RN                           Discharge Plan:   Follow up plan of care with: ongoing infusions at [facility].  Discharge instructions were reviewed with patient.  Patient/representative verbalized understanding of discharge instructions and all questions answered.  Patient discharged [facility] in stable condition.    Rosie Montalvo RN        /66  Pulse 71  Temp 98.3  F (36.8  C) (Oral)  SpO2 99%

## 2018-07-27 ENCOUNTER — RADIANT APPOINTMENT (OUTPATIENT)
Dept: BONE DENSITY | Facility: CLINIC | Age: 28
End: 2018-07-27
Attending: INTERNAL MEDICINE
Payer: COMMERCIAL

## 2018-07-27 DIAGNOSIS — Z79.52 LONG TERM SYSTEMIC STEROID USER: ICD-10-CM

## 2018-07-27 LAB
EBV DNA # SPEC NAA+PROBE: ABNORMAL {COPIES}/ML
EBV DNA SPEC NAA+PROBE-LOG#: 4.6 {LOG_COPIES}/ML
TACROLIMUS BLD-MCNC: 4.7 UG/L (ref 5–15)
TME LAST DOSE: ABNORMAL H

## 2018-08-23 ENCOUNTER — TELEPHONE (OUTPATIENT)
Dept: PHARMACY | Facility: CLINIC | Age: 28
End: 2018-08-23

## 2018-08-23 DIAGNOSIS — K51.011 ULCERATIVE PANCOLITIS WITH RECTAL BLEEDING (H): ICD-10-CM

## 2018-08-23 DIAGNOSIS — Z94.0 KIDNEY REPLACED BY TRANSPLANT: ICD-10-CM

## 2018-08-23 DIAGNOSIS — Z48.298 AFTERCARE FOLLOWING ORGAN TRANSPLANT: ICD-10-CM

## 2018-08-23 DIAGNOSIS — D63.1 ANEMIA IN STAGE 3 CHRONIC KIDNEY DISEASE (H): ICD-10-CM

## 2018-08-23 DIAGNOSIS — N18.30 ANEMIA IN STAGE 3 CHRONIC KIDNEY DISEASE (H): ICD-10-CM

## 2018-08-23 DIAGNOSIS — N18.30 CHRONIC KIDNEY DISEASE, STAGE 3 (MODERATE): ICD-10-CM

## 2018-08-23 DIAGNOSIS — T86.10 COMPLICATIONS, KIDNEY TRANSPLANT: ICD-10-CM

## 2018-08-23 DIAGNOSIS — D84.9 IMMUNOSUPPRESSED STATUS (H): ICD-10-CM

## 2018-08-23 DIAGNOSIS — Z79.899 ENCOUNTER FOR LONG-TERM CURRENT USE OF MEDICATION: ICD-10-CM

## 2018-08-23 DIAGNOSIS — Z94.0 KIDNEY TRANSPLANTED: ICD-10-CM

## 2018-08-23 LAB
FERRITIN SERPL-MCNC: 226 NG/ML (ref 12–150)
IRON SATN MFR SERPL: 55 % (ref 15–46)
IRON SERPL-MCNC: 131 UG/DL (ref 35–180)
MISCELLANEOUS TEST: NORMAL
TACROLIMUS BLD-MCNC: 4.9 UG/L (ref 5–15)
TIBC SERPL-MCNC: 236 UG/DL (ref 240–430)
TME LAST DOSE: ABNORMAL H

## 2018-08-23 NOTE — TELEPHONE ENCOUNTER
Anemia Management Note  SUBJECTIVE/OBJECTIVE:  Referred by Dr. Otilio Mar on 10/6/2017  Primary Diagnosis: Anemia in Chronic Kidney Disease (N18.3, D63.1)     Secondary Diagnosis:  Chronic Kidney Disease, Stage 3 (N18.3)   Hgb goal range:  9-10  Epo/Darbo: None  Iron regimen:  Does not tolerate oral iron  Labs : 10/05/2018  Communication: Consent to communicate on file OK to leave messages for scheduling, medical and billing on cell phone.  Dated 10/27/2014    Anemia Latest Ref Rng & Units 2018 5/3/2018 2018 2018 2018 2018 2018   Hemoglobin 11.7 - 15.7 g/dL 8.6(L) 8.6(L) 9.4(L) 9.1(L) 10.7(L) 12.1 12.5   IV Iron Dose - - - - 750mg 750mg - -   TSAT 15 - 46 % - 6(L) 5(L) - - 30 55(H)   Ferritin 12 - 150 ng/mL - 13 7(L) - - 216(H) 226(H)       BP Readings from Last 3 Encounters:   18 113/65   18 109/64   18 107/64     Wt Readings from Last 2 Encounters:   18 135 lb 4.8 oz (61.4 kg)   18 135 lb 1.6 oz (61.3 kg)     Patient has an appt w/Dr Mar on 18.  Feeling well, no S/S blood loss.  Has infusion appointment 2018, plans Hgb check that day.    ASSESSMENT:  Hgb: Above goal - Continue to monitor  TSat: elevated at >50% Ferritin: At goal (>100ng/mL)    PLAN:  RTC for Hgb, ferritin and iron labs in 4 week(s)    Orders needed to be renewed (for next follow-up date) in EPIC: None    Iron labs due:  18    Plan discussed with:  Sent message via [employer]  Plan provided by:  Janette Harrington CP  [facility]  [phone]    NEXT FOLLOW-UP DATE: 2018    Anemia Management Service  Abida Song,PharmD and Princess Harrington CPhT  Phone: [phone]  Fax: [phone]

## 2018-08-24 ENCOUNTER — TELEPHONE (OUTPATIENT)
Dept: GASTROENTEROLOGY | Facility: CLINIC | Age: 28
End: 2018-08-24

## 2018-08-24 LAB
EBV DNA # SPEC NAA+PROBE: ABNORMAL {COPIES}/ML
EBV DNA SPEC NAA+PROBE-LOG#: 4.6 {LOG_COPIES}/ML
HCT VFR BLD AUTO: 39.2 % (ref 35–47)
HGB BLD-MCNC: 12.5 G/DL (ref 11.7–15.7)

## 2018-09-04 ENCOUNTER — TELEPHONE (OUTPATIENT)
Dept: GASTROENTEROLOGY | Facility: CLINIC | Age: 28
End: 2018-09-04

## 2018-09-04 DIAGNOSIS — Z12.11 ENCOUNTER FOR SCREENING COLONOSCOPY: Primary | ICD-10-CM

## 2018-09-04 NOTE — TELEPHONE ENCOUNTER
Patient scheduled for colonoscopy     Indication for procedure. Ulcerative pancolitis with rectal bleeding    Referring Provider. Ritu Little NP    ? No     Arrival time verified? 630 am     Facility location verified? [address] [facility], 5th floor     Instructions given regarding prep and procedure. RN answered questions about prep. Transportation policy reviewed and verbalized understanding.     Prep Type Golytely.     Are you taking any anticoagulants or blood thinners? Denies     Instructions given? Yes     Electronic implanted devices? Denies     Pre procedure teaching completed? Yes    Transportation from procedure? Yes     H&P / Pre op physical completed? N/A    Sergei Shaikh RN

## 2018-09-07 LAB — LAB SCANNED RESULT: NORMAL

## 2018-09-10 ENCOUNTER — SURGERY (OUTPATIENT)
Age: 28
End: 2018-09-10

## 2018-09-10 ENCOUNTER — HOSPITAL ENCOUNTER (OUTPATIENT)
Facility: AMBULATORY SURGERY CENTER | Age: 28
End: 2018-09-10
Attending: INTERNAL MEDICINE
Payer: COMMERCIAL

## 2018-09-10 VITALS
RESPIRATION RATE: 20 BRPM | DIASTOLIC BLOOD PRESSURE: 77 MMHG | TEMPERATURE: 98.2 F | HEIGHT: 64 IN | HEART RATE: 69 BPM | BODY MASS INDEX: 22.53 KG/M2 | SYSTOLIC BLOOD PRESSURE: 106 MMHG | WEIGHT: 132 LBS | OXYGEN SATURATION: 100 %

## 2018-09-10 LAB
COLONOSCOPY: NORMAL
HCG UR QL: NEGATIVE
INTERNAL QC OK POCT: YES

## 2018-09-10 RX ORDER — FENTANYL CITRATE 50 UG/ML
INJECTION, SOLUTION INTRAMUSCULAR; INTRAVENOUS PRN
Status: DISCONTINUED | OUTPATIENT
Start: 2018-09-10 | End: 2018-09-10 | Stop reason: HOSPADM

## 2018-09-10 RX ORDER — SIMETHICONE
LIQUID (ML) MISCELLANEOUS PRN
Status: DISCONTINUED | OUTPATIENT
Start: 2018-09-10 | End: 2018-09-10 | Stop reason: HOSPADM

## 2018-09-10 RX ORDER — LIDOCAINE 40 MG/G
CREAM TOPICAL
Status: DISCONTINUED | OUTPATIENT
Start: 2018-09-10 | End: 2018-09-11 | Stop reason: HOSPADM

## 2018-09-10 RX ORDER — ONDANSETRON 2 MG/ML
4 INJECTION INTRAMUSCULAR; INTRAVENOUS
Status: DISCONTINUED | OUTPATIENT
Start: 2018-09-10 | End: 2018-09-11 | Stop reason: HOSPADM

## 2018-09-10 RX ADMIN — FENTANYL CITRATE 50 MCG: 50 INJECTION, SOLUTION INTRAMUSCULAR; INTRAVENOUS at 08:14

## 2018-09-10 RX ADMIN — Medication 2 ML: at 07:27

## 2018-09-10 RX ADMIN — FENTANYL CITRATE 25 MCG: 50 INJECTION, SOLUTION INTRAMUSCULAR; INTRAVENOUS at 08:23

## 2018-09-10 NOTE — IP AVS SNAPSHOT
MRN:[id number]                      After Visit Summary   9/10/2018    Caity Horan    MRN: [id number]           Thank you!     Thank you for choosing [facility] for your care. Our goal is always to provide you with excellent care. Hearing back from our patients is one way we can continue to improve our services. Please take a few minutes to complete the written survey that you may receive in the mail after you visit with us. Thank you!        Patient Information     Date Of Birth          1990        About your hospital stay     You were admitted on:  September 10, 2018 You last received care in the:  [facility]    You were discharged on:  September 10, 2018       Who to Call     For medical emergencies, please call 911.  For non-urgent questions about your medical care, please call your primary care provider or clinic, None  For questions related to your surgery, please call your surgery clinic        Attending Provider     Provider Specialty    Yenifer Doan MD Gastroenterology       Primary Care Provider    None Specified      Your next 10 appointments already scheduled     Sep 20, 2018  9:00 AM CDT   Infusion 120 with UC SPEC INFUSION, UC 47 ATC   [facility] ([facility])    [address]  [address]  [place] [address]   [phone]            Sep 21, 2018  3:10 PM CDT   (Arrive by 2:40 PM)   Return Kidney Transplant with Uc Kidney/Pancreas Recipient 1   [facility] ([facility])    [address]  [address]  [place] [address]   [phone]            Oct 08, 2018  8:20 AM CDT   (Arrive by 8:05 AM)   RETURN INFLAMMATORY BOWEL DISEASE with Rupert Dsouza PA-C   [facility] ([facility])    [address]  4th Floor  [place] [address]   [phone]             "Nov 15, 2018  8:30 AM CST   Infusion 120 with UC SPEC INFUSION, UC 48 ATC   [facility] ([facility])    [address]  [address]  [place] [address] [phone]              Pending Results     No orders found from 9/8/2018 to 9/11/2018.            Admission Information     Date & Time Provider Department Dept. Phone    9/10/2018 Yenifer Doan MD [facility] [phone]      Your Vitals Were     Blood Pressure Pulse Temperature Respirations Height Weight    93/58 69 97.9  F (36.6  C) (Oral) 23 1.626 m (5' 4\") 59.9 kg (132 lb)    Pulse Oximetry BMI (Body Mass Index)                97% 22.66 kg/m2          MyChart Information     [facility] gives you secure access to your electronic health record. If you see a primary care provider, you can also send messages to your care team and make appointments. If you have questions, please call your primary care clinic.  If you do not have a primary care provider, please call [phone] and they will assist you.      [facility] is an electronic gateway that provides easy, online access to your medical records. With [facility], you can request a clinic appointment, read your test results, renew a prescription or communicate with your care team.     To access your existing account, please contact your [facility] or call [phone] for assistance.        Care EveryWhere ID     This is your Care EveryWhere ID. This could be used by other organizations to access your [facility] medical records  [id number]        Equal Access to Services     AUNDREA QUACH : Hadii manuel Machuca, waaxda rejiadaha, qaybta kaalphilip miller. So [facility] [phone].    ATENCIÓN: Si habla español, tiene a carreon disposición servicios gratuitos de asistencia lingüística. Llame al [phone].    We comply with " applicable federal civil rights laws and [place] laws. We do not discriminate on the basis of race, color, national origin, age, disability, sex, sexual orientation, or gender identity.               Review of your medicines      UNREVIEWED medicines. Ask your doctor about these medicines        Dose / Directions    B-12 1000 MCG Tbcr   Used for:  B12 deficiency due to diet        Dose:  1000 mcg   Take 1,000 mcg by mouth daily   Quantity:  90 tablet   Refills:  3       ENTYVIO IV        Refills:  0       magnesium oxide 400 MG tablet   Commonly known as:  MAG-OX   Used for:  Hypomagnesemia, Status post kidney transplant, Kidney replaced by transplant, Kidney transplanted        Dose:  400 mg   Take 1 tablet (400 mg) by mouth daily   Quantity:  90 tablet   Refills:  3       mycophenolate 250 MG capsule   Commonly known as:  GENERIC EQUIVALENT   Used for:  Kidney transplanted, Kidney replaced by transplant, Status post kidney transplant        Dose:  500 mg   Take 2 capsules (500 mg) by mouth 2 times daily   Quantity:  120 capsule   Refills:  11       tacrolimus 0.5 MG capsule   Commonly known as:  GENERIC EQUIVALENT   Used for:  Kidney replaced by transplant, Status post kidney transplant, Hypomagnesemia, Kidney transplanted        Dose:  1.5 mg   Take 3 capsules (1.5 mg) by mouth 2 times daily   Quantity:  180 capsule   Refills:  11       UNABLE TO FIND        MEDICATION NAME: Biotin   Refills:  0       vitamin D 2000 units tablet   Used for:  Vitamin D deficiency        Dose:  2000 Units   Take 2,000 Units by mouth daily   Quantity:  100 tablet   Refills:  3                Protect others around you: Learn how to safely use, store and throw away your medicines at www.disposemymeds.org.             Medication List: This is a list of all your medications and when to take them. Check marks below indicate your daily home schedule. Keep this list as a reference.      Medications           Morning Afternoon Evening  Bedtime As Needed    B-12 1000 MCG Tbcr   Take 1,000 mcg by mouth daily                                ENTYVIO IV                                magnesium oxide 400 MG tablet   Commonly known as:  MAG-OX   Take 1 tablet (400 mg) by mouth daily                                mycophenolate 250 MG capsule   Commonly known as:  GENERIC EQUIVALENT   Take 2 capsules (500 mg) by mouth 2 times daily                                tacrolimus 0.5 MG capsule   Commonly known as:  GENERIC EQUIVALENT   Take 3 capsules (1.5 mg) by mouth 2 times daily                                UNABLE TO FIND   MEDICATION NAME: Biotin                                vitamin D 2000 units tablet   Take 2,000 Units by mouth daily

## 2018-09-10 NOTE — IP AVS SNAPSHOT
[facility]    [address] [place] [address]    Phone:  [phone]    Fax:  [phone]                                       After Visit Summary   9/10/2018    Caity Horan    MRN: [id number]           After Visit Summary Signature Page     I have received my discharge instructions, and my questions have been answered. I have discussed any challenges I see with this plan with the nurse or doctor.    ..........................................................................................................................................  Patient/Patient Representative Signature      ..........................................................................................................................................  Patient Representative Print Name and Relationship to Patient    ..................................................               ................................................  Date                                            Time    ..........................................................................................................................................  Reviewed by Signature/Title    ...................................................              ..............................................  Date                                                            Time          22EPIC Rev 08/18

## 2018-09-13 LAB — COPATH REPORT: NORMAL

## 2018-09-19 ENCOUNTER — DOCUMENTATION ONLY (OUTPATIENT)
Dept: TRANSPLANT | Facility: CLINIC | Age: 28
End: 2018-09-19

## 2018-09-19 DIAGNOSIS — Z94.0 KIDNEY TRANSPLANTED: Primary | ICD-10-CM

## 2018-09-19 DIAGNOSIS — Z48.298 AFTERCARE FOLLOWING ORGAN TRANSPLANT: ICD-10-CM

## 2018-09-19 DIAGNOSIS — Z79.899 IMMUNOSUPPRESSIVE MANAGEMENT ENCOUNTER FOLLOWING KIDNEY TRANSPLANT: ICD-10-CM

## 2018-09-19 DIAGNOSIS — T86.10 COMPLICATIONS, KIDNEY TRANSPLANT: ICD-10-CM

## 2018-09-19 DIAGNOSIS — Z94.0 IMMUNOSUPPRESSIVE MANAGEMENT ENCOUNTER FOLLOWING KIDNEY TRANSPLANT: ICD-10-CM

## 2018-09-19 NOTE — LETTER
[facility]  Room 2-200  [facility],  [address]  [address]  Tel [phone]  Toll Free [phone]                OUTPATIENT LABORATORY TEST ORDER    Patient Name: Caity Horan  Transplant Date: 12/5/2014 (Kidney)  YOB: 1990  Issue Date:  September 19, 2018   [facility] MR:  [id number]  Exp. Date (1 year after date issued)      Diagnoses: Kidney Transplant (ICD-10  Z94.0)   Long term use of medications (ICD-10  Z79.899)    Monthly    ?Hemogram and Platelet   ?Basic Metabolic Panel (Sodium, Potassium, Chloride, CO2, Creatinine, BUN, Glucose, Calcium)   ?/Tacrolimus/Prograf drug level (mail to [facility] - mailers and instructions provided by the patient)           ?EBV PCR QT ( until no longer detected)                   Every 6 Months Due:                 ?BK (Polyoma Virus) PCR Quantitative - Plasma                                           ?Urine for protein/creatinine    Yearly:  ? PRA/DSA level (mailers provided by the patient)      Patient should release information to the [facility].   Please fax to the Transplant Center at [phone].  Any questions please call [phone].      .

## 2018-09-19 NOTE — PROGRESS NOTES
Chart Prep    Clinic Visit on: 9/21/18    Last lab completed:  7/26/18    Lab letter updated: 9/19/18    Lab:  [facility]    Lab orders up to date in [facility].

## 2018-09-20 ENCOUNTER — INFUSION THERAPY VISIT (OUTPATIENT)
Dept: INFUSION THERAPY | Facility: CLINIC | Age: 28
End: 2018-09-20
Attending: INTERNAL MEDICINE
Payer: COMMERCIAL

## 2018-09-20 VITALS
BODY MASS INDEX: 22.93 KG/M2 | HEART RATE: 70 BPM | TEMPERATURE: 97.8 F | RESPIRATION RATE: 16 BRPM | WEIGHT: 133.6 LBS | SYSTOLIC BLOOD PRESSURE: 111 MMHG | OXYGEN SATURATION: 100 % | DIASTOLIC BLOOD PRESSURE: 75 MMHG

## 2018-09-20 DIAGNOSIS — K51.011 ULCERATIVE PANCOLITIS WITH RECTAL BLEEDING (H): Primary | ICD-10-CM

## 2018-09-20 LAB
ALBUMIN SERPL-MCNC: 3.7 G/DL (ref 3.4–5)
ALP SERPL-CCNC: 49 U/L (ref 40–150)
ALT SERPL W P-5'-P-CCNC: 16 U/L (ref 0–50)
AST SERPL W P-5'-P-CCNC: 12 U/L (ref 0–45)
BASOPHILS # BLD AUTO: 0 10E9/L (ref 0–0.2)
BASOPHILS NFR BLD AUTO: 0.6 %
BILIRUB DIRECT SERPL-MCNC: 0.2 MG/DL (ref 0–0.2)
BILIRUB SERPL-MCNC: 0.6 MG/DL (ref 0.2–1.3)
CRP SERPL-MCNC: <2.9 MG/L (ref 0–8)
DIFFERENTIAL METHOD BLD: NORMAL
EOSINOPHIL # BLD AUTO: 0.2 10E9/L (ref 0–0.7)
EOSINOPHIL NFR BLD AUTO: 3.6 %
ERYTHROCYTE [DISTWIDTH] IN BLOOD BY AUTOMATED COUNT: 12.1 % (ref 10–15)
ERYTHROCYTE [SEDIMENTATION RATE] IN BLOOD BY WESTERGREN METHOD: 26 MM/H (ref 0–20)
HCT VFR BLD AUTO: 37.4 % (ref 35–47)
HGB BLD-MCNC: 12.5 G/DL (ref 11.7–15.7)
IMM GRANULOCYTES # BLD: 0 10E9/L (ref 0–0.4)
IMM GRANULOCYTES NFR BLD: 0.2 %
LYMPHOCYTES # BLD AUTO: 1.3 10E9/L (ref 0.8–5.3)
LYMPHOCYTES NFR BLD AUTO: 25 %
MCH RBC QN AUTO: 32 PG (ref 26.5–33)
MCHC RBC AUTO-ENTMCNC: 33.4 G/DL (ref 31.5–36.5)
MCV RBC AUTO: 96 FL (ref 78–100)
MONOCYTES # BLD AUTO: 0.7 10E9/L (ref 0–1.3)
MONOCYTES NFR BLD AUTO: 12.3 %
NEUTROPHILS # BLD AUTO: 3.1 10E9/L (ref 1.6–8.3)
NEUTROPHILS NFR BLD AUTO: 58.3 %
NRBC # BLD AUTO: 0 10*3/UL
NRBC BLD AUTO-RTO: 0 /100
PLATELET # BLD AUTO: 217 10E9/L (ref 150–450)
PROT SERPL-MCNC: 7.6 G/DL (ref 6.8–8.8)
RBC # BLD AUTO: 3.91 10E12/L (ref 3.8–5.2)
WBC # BLD AUTO: 5.3 10E9/L (ref 4–11)

## 2018-09-20 PROCEDURE — 96413 CHEMO IV INFUSION 1 HR: CPT

## 2018-09-20 PROCEDURE — 85025 COMPLETE CBC W/AUTO DIFF WBC: CPT | Performed by: INTERNAL MEDICINE

## 2018-09-20 PROCEDURE — 25000128 H RX IP 250 OP 636: Mod: ZF | Performed by: INTERNAL MEDICINE

## 2018-09-20 PROCEDURE — 86140 C-REACTIVE PROTEIN: CPT | Performed by: INTERNAL MEDICINE

## 2018-09-20 PROCEDURE — 80076 HEPATIC FUNCTION PANEL: CPT | Performed by: INTERNAL MEDICINE

## 2018-09-20 PROCEDURE — 36415 COLL VENOUS BLD VENIPUNCTURE: CPT

## 2018-09-20 PROCEDURE — 85652 RBC SED RATE AUTOMATED: CPT | Performed by: INTERNAL MEDICINE

## 2018-09-20 RX ADMIN — VEDOLIZUMAB 300 MG: 300 INJECTION, POWDER, LYOPHILIZED, FOR SOLUTION INTRAVENOUS at 10:00

## 2018-09-20 NOTE — PATIENT INSTRUCTIONS
Dear Caity Horan    Thank you for choosing [facility] ([facility]) for your infusion.  The following information is a summary of our appointment as well as important reminders.      EDUCATION POST BIOLOGICAL/CHEMOTHERAPY INFUSION  Call the triage nurse at your clinic or seek medical attention if you have chills and/or temperature greater than or equal to 100.5, uncontrolled nausea/vomiting, diarrhea, constipation, dizziness, shortness of breath, chest pain, heart palpitations, weakness or any other new or concerning symptoms, questions or concerns.  You can not have any live virus vaccines prior to or during treatment or up to 6 months post infusion.  If you have an upcoming surgery, medical procedure or dental procedure during treatment, this should be discussed with your ordering physician and your surgeon/dentist.  If you are having any concerning symptom, if you are unsure if you should get your next infusion or wish to speak to a provider before your next infusion, please call your care coordinator or triage nurse at your clinic to notify them so we can adequately serve you.    We look forward in seeing you on your next appointment here at [facility].  Please don t hesitate to call us at [phone] to reschedule any of your appointments or to speak with one of the [facility] registered nurses.  It was a pleasure taking care of you today.    Sincerely,    [facility]  [facility]  [address] [place] [address]  [id number]  Phone:  [phone]

## 2018-09-20 NOTE — PROGRESS NOTES
Nursing Note  Caity Horan [facility] for:   Chief Complaint   Patient presents with     Infusion     Entyvio infusion     During [facility]'s [facility] appointment, orders from Dr. Yenifer Doan were completed.  Frequency: every 8 weeks    Progress note:  Patient identification verified by name and date of birth.  Assessment completed.  Vitals recorded in Doc Flowsheets.  Patient was provided with education regarding infusion and possible side effects.  Patient verbalized understanding.      needed: No  Premedications: were not ordered.  Approximate Infusion length:30 minutes.   Labs: were drawn per orders.   Vascular access: peripheral IV placed today.  Treatment Conditions: Biologic/Chemo Checklist     ~~~ NOTE: If the patient answers yes to any of the questions below, hold the infusion and contact ordering provider or on-call provider.    1. Have you recently had an elevated temperature, fever, chills, productive cough, coughing for 3 weeks or longer or hemoptysis,  abnormal vital signs, night sweats,  chest pain or have you noticed a decrease in your appetite, unexplained weight loss or fatigue? No  2. Do you have any open wounds or new incisions? No  3. Do you have any recent or upcoming hospitalizations, surgeries or dental procedures? No  4. Do you currently have or recently have had any signs of illness or infection or are you on any antibiotics? No  5. Have you had any new, sudden or worsening abdominal pain? No  6. Have you or anyone in your household received a live vaccination in the past 4 weeks? Please note:  No live vaccines while on biologic/chemotherapy until 6 months after the last treatment.  Patient can receive the flu vaccine (shot only) and the pneumovax.  It is optimal for the patient to get these vaccines mid cycle, but they can be given at any time as long as it is not on the day of the infusion.  No  7. Have you recently been diagnosed with any new nervous system diseases (ie. Multiple sclerosis, Guillain Julian, seizures, neurological changes) or cancer diagnosis? Are you on any form of radiation or chemotherapy? No  8. Are you pregnant or breast feeding or do you have plans of pregnancy in the future? No  9. Have you been having any signs of worsening depression or suicidal ideations?  (benlysta only) No  10. Have there been any other new onset medical symptoms? No    Patient tolerated infusion: well.    Administrations This Visit     vedolizumab (ENTYVIO) 300 mg in sodium chloride 0.9 % 280 mL infusion     Admin Date Action Dose Rate Route Administered By          09/20/2018 New Bag 300 mg 560 mL/hr Intravenous Namrata Villegas RN                           Discharge Plan:   Follow up plan of care with: ongoing infusions [facility].  Discharge instructions were reviewed with patient. Pt declined printed AVS.  Patient/representative verbalized understanding of discharge instructions and all questions answered.  Patient discharged [facility] in stable condition.    Namrata Villegas RN        /72  Pulse 74  Temp 97.8  F (36.6  C) (Oral)  Resp 16  Wt 60.6 kg (133 lb 9.6 oz)  SpO2 100%  BMI 22.93 kg/m2

## 2018-09-20 NOTE — MR AVS SNAPSHOT
After Visit Summary   9/20/2018    Caity Horan    MRN: [id number]           Patient Information     Date Of Birth          1990        Visit Information        Provider Department      9/20/2018 9:00 AM UC 47 ATC; UC SPEC INFUSION [facility] Specialty [facility] Procedure        Today's Diagnoses     Ulcerative pancolitis with rectal bleeding (H)    -  1      Care Instructions    Dear Caity Horan    Thank you for choosing [facility] ([facility]) for your infusion.  The following information is a summary of our appointment as well as important reminders.      EDUCATION POST BIOLOGICAL/CHEMOTHERAPY INFUSION  Call the triage nurse at your clinic or seek medical attention if you have chills and/or temperature greater than or equal to 100.5, uncontrolled nausea/vomiting, diarrhea, constipation, dizziness, shortness of breath, chest pain, heart palpitations, weakness or any other new or concerning symptoms, questions or concerns.  You can not have any live virus vaccines prior to or during treatment or up to 6 months post infusion.  If you have an upcoming surgery, medical procedure or dental procedure during treatment, this should be discussed with your ordering physician and your surgeon/dentist.  If you are having any concerning symptom, if you are unsure if you should get your next infusion or wish to speak to a provider before your next infusion, please call your care coordinator or triage nurse at your clinic to notify them so we can adequately serve you.    We look forward in seeing you on your next appointment here at [facility].  Please don t hesitate to call us at [phone] to reschedule any of your appointments or to speak with one of the [facility] registered nurses.  It was a pleasure taking care of you today.    Sincerely,    [facility]  Specialty Infusion & Procedure Center  [address] [place] [address]  [place]  [address]  Phone:  [phone]            Follow-ups after your visit        Your next 10 appointments already scheduled     Sep 21, 2018  3:10 PM CDT   (Arrive by 2:40 PM)   Return Kidney Transplant with Uc Kidney/Pancreas Recipient 1   [facility] Nephrology ([facility])    [address]  [address]  [place] [address]   [phone]            Oct 08, 2018  8:20 AM CDT   (Arrive by 8:05 AM)   RETURN INFLAMMATORY BOWEL DISEASE with Rupert Dsouza PA-C   [facility] Gastroenterology and IBD Clinic ([facility])    [address]  [address]  [place] [id number] [phone]            Nov 15, 2018  8:30 AM CST   Infusion 120 with UC SPEC INFUSION, UC 48 ATC   [facility] Specialty and Procedure ([facility])    [address]  [address]  [place] [id number] [phone]            Osei 10, 2019  8:00 AM CST   Infusion 120 with UC SPEC INFUSION, UC 44 ATC   [facility] Specialty and Procedure ([facility])    [address]  [address]  [place] [id number] [phone]              Future tests that were ordered for you today     Open Standing Orders        Priority Remaining Interval Expires Ordered    Notify Physician Routine [id number] PRN  9/20/2018    BK virus PCR quantitative Routine 2/2 Every 6 Months 9/19/2019 9/19/2018    Protein  random urine with Creat Ratio Routine 2/2  9/19/2019 9/19/2018    PRA Donor Specific Antibody Routine 1/1 Yearly 9/19/2019 9/19/2018    Basic metabolic panel Routine 12/12 Monthly 9/19/2019 9/19/2018    CBC with platelets Routine 12/12 9/19/2019 9/19/2018    Tacrolimus level Routine 12/12 9/19/2019 9/19/2018    EBV DNA PCR Quantitative Whole Blood Routine 12/12 Monthly 9/19/2019 9/19/2018            Who to contact     If you have questions or need follow up  information about today's clinic visit or your schedule please contact [facility] directly at [phone].  Normal or non-critical lab and imaging results will be communicated to you by [employer], letter or phone within 4 business days after the clinic has received the results. If you do not hear from us within 7 days, please contact the clinic through Tap[employer] or phone. If you have a critical or abnormal lab result, we will notify you by phone as soon as possible.  Submit refill requests through [employer] or call your pharmacy and they will forward the refill request to us. Please allow 3 business days for your refill to be completed.          Additional Information About Your Visit        [employer] Information     [employer] gives you secure access to your electronic health record. If you see a primary care provider, you can also send messages to your care team and make appointments. If you have questions, please call your primary care clinic.  If you do not have a primary care provider, please call [phone] and they will assist you.        Care EveryWhere ID     This is your Care EveryWhere ID. This could be used by other organizations to access your [facility] medical records  [id number]        Your Vitals Were     Pulse Temperature Respirations Pulse Oximetry BMI (Body Mass Index)       70 97.8  F (36.6  C) (Oral) 16 100% 22.93 kg/m2        Blood Pressure from Last 3 Encounters:   09/20/18 111/75   09/10/18 106/77   07/26/18 113/65    Weight from Last 3 Encounters:   09/20/18 60.6 kg (133 lb 9.6 oz)   09/10/18 59.9 kg (132 lb)   07/17/18 61.4 kg (135 lb 4.8 oz)              We Performed the Following     CBC with platelets differential     CRP inflammation     Erythrocyte sedimentation rate auto     Hepatic panel        Primary Care Provider    None Specified       No primary provider on file.        Equal Access to Services     AUNDREA QUACH AH: Chelle ochoa  Sven, walarryda luqadaha, qaybta kakapil mancini, philip donaldin hayaan mikielalo paddyrosa laShanekajordon boo. So [facility] [phone].    ATENCIÓN: Si michael robledo, tiene a carreon disposición servicios gratuitos de asistencia lingüística. Negro al [phone].    We comply with applicable federal civil rights laws and [place] laws. We do not discriminate on the basis of race, color, national origin, age, disability, sex, sexual orientation, or gender identity.            Thank you!     Thank you for choosing [facility] SPECIALTY [facility] PROCEDURE  for your care. Our goal is always to provide you with excellent care. Hearing back from our patients is one way we can continue to improve our services. Please take a few minutes to complete the written survey that you may receive in the mail after your visit with us. Thank you!             Your Updated Medication List - Protect others around you: Learn how to safely use, store and throw away your medicines at www.disposemymeds.org.          This list is accurate as of 9/20/18  1:08 PM.  Always use your most recent med list.                   Brand Name Dispense Instructions for use Diagnosis    B-12 1000 MCG Tbcr     90 tablet    Take 1,000 mcg by mouth daily    B12 deficiency due to diet       ENTYVIO IV           magnesium oxide 400 MG tablet    MAG-OX    90 tablet    Take 1 tablet (400 mg) by mouth daily    Hypomagnesemia, Status post kidney transplant, Kidney replaced by transplant, Kidney transplanted       mycophenolate 250 MG capsule    GENERIC EQUIVALENT    120 capsule    Take 2 capsules (500 mg) by mouth 2 times daily    Kidney transplanted, Kidney replaced by transplant, Status post kidney transplant       tacrolimus 0.5 MG capsule    GENERIC EQUIVALENT    180 capsule    Take 3 capsules (1.5 mg) by mouth 2 times daily    Kidney replaced by transplant, Status post kidney transplant, Hypomagnesemia, Kidney transplanted       UNABLE TO FIND      MEDICATION  NAME: Biotin        vitamin D 2000 units tablet     100 tablet    Take 2,000 Units by mouth daily    Vitamin D deficiency

## 2018-09-21 ENCOUNTER — TELEPHONE (OUTPATIENT)
Dept: PHARMACY | Facility: CLINIC | Age: 28
End: 2018-09-21

## 2018-09-21 NOTE — TELEPHONE ENCOUNTER
Anemia Management Note  SUBJECTIVE/OBJECTIVE:  Referred by Dr. Otilio Mar on 10/6/2017  Primary Diagnosis: Anemia in Chronic Kidney Disease (N18.3, D63.1)     Secondary Diagnosis:  Chronic Kidney Disease, Stage 3 (N18.3)   Hgb goal range:  9-10  Epo/Darbo: None  Iron regimen:  Does not tolerate oral iron  Labs : 10/05/2018  Communication: Consent to communicate on file OK to leave messages for scheduling, medical and billing on cell phone.  Dated 10/27/2014    Anemia Latest Ref Rng & Units 5/3/2018 2018 2018 2018 2018 2018 2018   Hemoglobin 11.7 - 15.7 g/dL 8.6(L) 9.4(L) 9.1(L) 10.7(L) 12.1 12.5 12.5   IV Iron Dose - - - 750mg 750mg - - -   TSAT 15 - 46 % 6(L) 5(L) - - 30 55(H) -   Ferritin 12 - 150 ng/mL 13 7(L) - - 216(H) 226(H) -     BP Readings from Last 3 Encounters:   18 111/75   09/10/18 106/77   18 113/65     Wt Readings from Last 2 Encounters:   18 133 lb 9.6 oz (60.6 kg)   09/10/18 132 lb (59.9 kg)       Appt w/Dr Mar on 18    ASSESSMENT:  Hgb: above goal - continue to monitor  TSat: elevated at >50% Ferritin: At goal (>100ng/mL)    PLAN:  LM for Caity to get her ferritin and iron labs drawn on 10/8 when she is here for another appt  and then RTC for Hgb, ferritin and iron labs on     Orders needed to be renewed (for next follow-up date) in EPIC: Hgb, ferritin and iron standing lab orders  on 10/5    Iron labs due:  now    Plan discussed with:  ALEXEY for Caity  Plan provided by:  Janette Harrington CPhT  An[facility]  [phone]    NEXT FOLLOW-UP DATE:  10/8/18  Reviewed 2018 [facility]  Anemia Management Service  Abida Song,PharmD and Princess Harrington CPhT  Phone: [phone]  Fax: [phone]

## 2018-10-05 ENCOUNTER — TELEPHONE (OUTPATIENT)
Dept: GASTROENTEROLOGY | Facility: CLINIC | Age: 28
End: 2018-10-05

## 2018-10-08 ENCOUNTER — OFFICE VISIT (OUTPATIENT)
Dept: GASTROENTEROLOGY | Facility: CLINIC | Age: 28
End: 2018-10-08
Payer: COMMERCIAL

## 2018-10-08 VITALS
BODY MASS INDEX: 22.65 KG/M2 | WEIGHT: 132.7 LBS | HEART RATE: 64 BPM | SYSTOLIC BLOOD PRESSURE: 100 MMHG | HEIGHT: 64 IN | DIASTOLIC BLOOD PRESSURE: 60 MMHG | OXYGEN SATURATION: 98 %

## 2018-10-08 DIAGNOSIS — K51.00 ULCERATIVE PANCOLITIS WITHOUT COMPLICATION (H): Primary | ICD-10-CM

## 2018-10-08 ASSESSMENT — PAIN SCALES - GENERAL: PAINLEVEL: NO PAIN (0)

## 2018-10-08 NOTE — NURSING NOTE

## 2018-10-08 NOTE — MR AVS SNAPSHOT
After Visit Summary   10/8/2018    Caity Horan    MRN: [id number]           Patient Information     Date Of Birth          1990        Visit Information        Provider Department      10/8/2018 8:20 AM Rupert Dsouza PA-C [facility]        Care Instructions    It was a pleasure taking care of you today.  I've included a brief summary of our discussion and care plan from today's visit below.  Please review this information with your primary care provider.  ______________________________________________________________________    My recommendations are summarized as follows:    -- Continue entyvio every 8 weeks  -- Labs with infusions  -- Next endoscopic assessment: TBD  -- Patient with IBD we recommend supplementation vitamin D 1000 units daily and calcium 500 mg twice daily.  -- Vaccines/immunizations to be updated: flu shot today  -- Yearly Dermatology visit for skin check while on immunosuppressive therapy. Can call [phone] to schedule.  -- Yearly pap smear while on immunosuppressive therapy  -- No NSAIDs (ibuprofen, or anything containing ibuprofen)       For additional resources about inflammatory bowel disease visit [email or web address]      Return to GI Clinic in 3 months to review your progress.    ______________________________________________________________________    Who do I call with any questions after my visit?  Please be in touch if there are any further questions that arise following today's visit.  There are multiple ways to contact your gastroenterology care team.        During business hours, you may reach a Gastroenterology nurse at [phone], option [phone].       To schedule or reschedule an appointment, please call [phone].       You can always send a secure message through MainOne.  [employer]One messages are answered by your nurse or doctor typically within 24 hours.  Please allow extra time on weekends and holidays.         For urgent/emergent questions after business hours, you may reach the on-call GI Fellow by contacting the [facility]  at [phone].      In order for your refill to be processed in a timely fashion, it is your responsibility to ensure you follow the recommendations from your provider regarding your laboratory studies and follow up appointments.       How will I get the results of any tests ordered?    You will receive all of your results.  If you have signed up for bettermarkshart, any tests ordered at your visit will be available to you after your physician reviews them.  Typically this takes 1-2 weeks.  If there are urgent results that require a change in your care plan, your physician or nurse will call you to discuss the next steps.      What is bettermarkshart?  [employer] is a secure way for you to access all of your healthcare records from the [facility].  It is a web based computer program, so you can sign on to it from any location.  It also allows you to send secure messages to your care team.  I recommend signing up for [employer] access if you have not already done so and are comfortable with using a computer.      How to I schedule a follow-up visit?  If you did not schedule a follow-up visit today, please call [phone] to schedule a follow-up office visit.        Sincerely,    Rupert Dsouza PA-C  [facility]  Division of Gastroenterology                Follow-ups after your visit        Follow-up notes from your care team     Return in about 3 months (around 1/8/2019).      Your next 10 appointments already scheduled     Nov 02, 2018  4:05 PM CDT   (Arrive by 3:35 PM)   Return Kidney Transplant with  Kidney/Pancreas Recipient [address] [place] [address] Nephrology ([facility])    [address] [place] [address]  [address]  [facility] [id number] [phone]            Nov 15, 2018  8:30 AM CST   Infusion 120 with UC SPEC INFUSION, UC 48 ATC   [facility] Specialty and Procedure ([facility])    [address]  [address]  [facility] [id number] [phone]            Osei 10, 2019  8:00 AM CST   Infusion 120 with UC SPEC INFUSION, UC 44 ATC   [facility] Specialty and Procedure ([facility])    [address]  [address]  [facility] [id number] [phone]              Who to contact     Please call your clinic at [phone] to:    Ask questions about your health    Make or cancel appointments    Discuss your medicines    Learn about your test results    Speak to your doctor            Additional Information About Your Visit        [employer] Information     [employer] gives you secure access to your electronic health record. If you see a primary care provider, you can also send messages to your care team and make appointments. If you have questions, please call your primary care clinic.  If you do not have a primary care provider, please call [phone] and they will assist you.      [employer] is an electronic gateway that provides easy, online access to your medical records. With [employer], you can request a clinic appointment, read your test results, renew a prescription or communicate with your care team.     To access your existing account, please contact your [facility] or call [phone] for assistance.        Care EveryWhere ID     This is your Care EveryWhere ID. This could be used by other organizations to access your [place] medical records  [id number]        Your Vitals Were     Pulse Height Pulse Oximetry BMI (Body Mass Index)          64 1.626 m (5' 4\") 98% 22.78 kg/m2         Blood Pressure from Last 3 Encounters:   10/08/18 100/60   09/20/18 111/75   09/10/18 106/77    Weight from Last 3 Encounters:   10/08/18 60.2 kg (132 lb 11.2 oz)   09/20/18 60.6 kg (133 lb 9.6 oz)   09/10/18 59.9 kg (132 lb)            "   Today, you had the following     No orders found for display       Primary Care Provider    None Specified       No primary provider on file.        Equal Access to Services     AUNDREA QUACH : Hadii aad ku hadstephencesar Machuca, miguelitoda rejidioha, jud jazminjamesadrien baileyronenadrien, philip presley renéisaac thomasonyuliananed haddad. So [facility] [phone].    ATENCIÓN: Si habla español, tiene a carreon disposición servicios gratuitos de asistencia lingüística. Llame al [phone].    We comply with applicable federal civil rights laws and [place] laws. We do not discriminate on the basis of race, color, national origin, age, disability, sex, sexual orientation, or gender identity.            Thank you!     Thank you for choosing [facility] GASTROENTEROLOGY AND IBD CLINIC  for your care. Our goal is always to provide you with excellent care. Hearing back from our patients is one way we can continue to improve our services. Please take a few minutes to complete the written survey that you may receive in the mail after your visit with us. Thank you!             Your Updated Medication List - Protect others around you: Learn how to safely use, store and throw away your medicines at www.disposemymeds.org.          This list is accurate as of 10/8/18  8:51 AM.  Always use your most recent med list.                   Brand Name Dispense Instructions for use Diagnosis    B-12 1000 MCG Tbcr     90 tablet    Take 1,000 mcg by mouth daily    B12 deficiency due to diet       ENTYVIO IV           magnesium oxide 400 MG tablet    MAG-OX    90 tablet    Take 1 tablet (400 mg) by mouth daily    Hypomagnesemia, Status post kidney transplant, Kidney replaced by transplant, Kidney transplanted       mycophenolate 250 MG capsule    GENERIC EQUIVALENT    120 capsule    Take 2 capsules (500 mg) by mouth 2 times daily    Kidney transplanted, Kidney replaced by transplant, Status post kidney transplant       tacrolimus 0.5 MG capsule    GENERIC EQUIVALENT    180  capsule    Take 3 capsules (1.5 mg) by mouth 2 times daily    Kidney replaced by transplant, Status post kidney transplant, Hypomagnesemia, Kidney transplanted       UNABLE TO FIND      MEDICATION NAME: Biotin        vitamin D 2000 units tablet     100 tablet    Take 2,000 Units by mouth daily    Vitamin D deficiency

## 2018-10-08 NOTE — LETTER
"10/8/2018       RE: Caity Horan  [address]  [facility] [address]     Dear Colleague,    Thank you for referring your patient, Caity Horan, to the [facility] GASTROENTEROLOG[facility] AND IBD CLINIC at [facility]. Please see a copy of my visit note below.    [facility] UC FOLLOW UP      PATIENT: Caity Horan    MRN: [id number]    Date of Birth 1990    Tel: [phone] (home)     PCP: Ritu Little     HPI: Ms. Horan is a 28 year old year old female here for follow up of newly diagnosed UC. She has had great improvement with prednisone taper and initiation of Vedolizumab on 4/19/18. Iron deficiency anemia improved with iron infusions and healing of UC.  Had increase in EBV viremia, and Dr. Mar recommended decreasing prednisone taper and to follow EBV PCR closely. No changes from ID perspective.     UC history  Spring 2012 -- graduated college, had a lot of life stressors. Experienced severe urgency with incontinence. Some blood in the stool and diarrhea alternating with constipation. Spontaneously resolved after 2 months.   End of Dec 2016 - April 2017: intermittent blood in stool, \"major constipation\" and weight gain, no urgency.    October 2017 -- was found to be anemic (attributed to kidney disease) and had iron infusions.  Sep -Dec 2017 Blood with well formed stool. This continued to worsen.   Worst stretch was mid-Feb to march: weight loss, >8 loose stools per day with blood.  April - now: much improved, no abdominal pain and urgency resolved.    In the past had upper abdominal bloating/pain and LLQ pain. No n/v.     Macroscopic extent of disease (most recent) E3    Visit to establish care with MD 3/2018   Current UC symptoms  Bowel frequency in day 2-3   Bowel frequency in night 0 now    Urgency of defecation NO  Blood in stool less than half stools, seen once every other week  General well being 8 (feeling well)  Extracolonic features " (multiple select): none     Constitutional symptoms:  Fever NO  Weight loss YES (in the past -- lost 12 lb since October), now stable    Other GI symptoms present: none  Total number of IBD surgeries (except perianal): 0    Current IBD Medications:  Prednisone  Vedolizumab induction     Current medications: MMF, tacrolimus, Mg, prednisone, B12     Past IBD Medications:   None    Interval history 7/2018:  Overlapped prednisone with Entyvio until after 2nd induction dose. Had recurrence of symptoms (bloody diarrhea) and restarted prednisone 20 mg daily + long taper and stopped prednisone July 1. Doing well.     Current UC symptoms  Bowel frequency in day 1-2, brown well-formed   Bowel frequency in night 0     Urgency of defecation NO  Blood in stool none   General well being 8 (feeling well)  Extracolonic features (multiple select): none     4-6 weeks after kidney transplant had significant hair loss. Took about a year to fully recover.   Experiencing similar signs now with significant hair loss diffusely; no signs of alopecia with baldness.     Last Entyvio dose was 6.5 weeks ago.     Interval history 10/2018:  Completed prednisone taper in July and has continued with Entyvio every 8 weeks. No recurrence of symptoms.    Current UC symptoms  Bowel frequency in day 1-2, brown well-formed   Bowel frequency in night 0     Urgency of defecation NO  Blood in stool none   General well being 8 (feeling well)  Extracolonic features (multiple select): none     4-6 weeks after kidney transplant had significant hair loss. Took about a year to fully recover and is improving.    8/23/18 shows adequate Entyvio level 41.7 (8 week trough), no change in dose or interval.    Last Entyvio dose was 9/20/18 (2.5 weeks ago)      Past Medical History:   Diagnosis Date     Anemia in stage 5 chronic kidney disease (H)      Anemia in stage 5 chronic kidney disease (H) 10/27/2014     ESRD (end stage renal disease) on dialysis (H)      HTN  (hypertension) 10/27/2014     Hypertension 10/2014     IgA nephropathy     biopsy proven        Past Surgical History:   Procedure Laterality Date     COLONOSCOPY N/A 3/12/2018    Procedure: COMBINED COLONOSCOPY, SINGLE OR MULTIPLE BIOPSY/POLYPECTOMY BY BIOPSY;  EGD/Colonoscopy ;  Surgeon: Kory Massey MD;  Location: [facility] GI     COLONOSCOPY N/A 9/10/2018    Procedure: COMBINED COLONOSCOPY, SINGLE OR MULTIPLE BIOPSY/POLYPECTOMY BY BIOPSY;  Colonoscopy;  Surgeon: Yenifer Doan MD;  [facility]     EXTRACTION(S) DENTAL       TRANSPLANT KIDNEY RECIPIENT LIVING RELATED N/A 12/5/2014    Procedure: TRANSPLANT KIDNEY RECIPIENT LIVING RELATED;  Surgeon: Dale Middleton MD;  Location: [facility] OR       Social History   Substance Use Topics     Smoking status: Never Smoker     Smokeless tobacco: Never Used     Alcohol use 1.8 - 5.4 oz/week     1 - 3 Glasses of wine, 1 - 3 Cans of beer, 1 - 3 Shots of liquor per week      Comment: 1-3X/month       Family History   Problem Relation Age of Onset     KIDNEY DISEASE No family hx of    Negative for IBD. Dad had psoriasis.   Grandfather with BCC. Grandma has several skin lesions removed (does not recall the diagnosis)    Allergies   Allergen Reactions     Amoxicillin Rash        Outpatient Encounter Prescriptions as of 10/8/2018   Medication Sig Dispense Refill     Cholecalciferol (VITAMIN D) 2000 UNITS tablet Take 2,000 Units by mouth daily 100 tablet 3     Cyanocobalamin (B-12) 1000 MCG TBCR Take 1,000 mcg by mouth daily 90 tablet 3     magnesium oxide (MAG-OX) 400 MG tablet Take 1 tablet (400 mg) by mouth daily 90 tablet 3     mycophenolate (GENERIC EQUIVALENT) 250 MG capsule Take 2 capsules (500 mg) by mouth 2 times daily 120 capsule 11     tacrolimus (GENERIC EQUIVALENT) 0.5 MG capsule Take 3 capsules (1.5 mg) by mouth 2 times daily 180 capsule 11     UNABLE TO FIND MEDICATION NAME: Biotin       Vedolizumab (ENTYVIO IV)        No facility-administered encounter medications  "on file as of 10/8/2018.       NSAID  NO    Review of Systems  Complete 10 System ROS performed. All are negative except as documented below, in the HPI, or in patient questionnaire from today's visit.    1) Constitutional: No fevers, chills, night sweats or malaise, weight loss or gain  2) Skin: No rash  3) Pulmonary: No wheeze, SOB, cough, sputum or hemoptysis  4) Cardiovascular: No Chest pain or palpitations  5) Genitourinary: No blood in urine or dysuria  6) Endocrine: No increased sweating, hunger, thirst or thyroid problems  7) Hematologic: No bruising and easy bleeding  8) Musculoskeletal: no new pain in joints or limitation in ROM  9) Neurologic: No dizziness, paresthesias or weakness or falls  10) Psychiatric:  not depressed/anxious, no sleep problems    PHYSICAL EXAM  Vitals: /60  Pulse 64  Ht 1.626 m (5' 4\")  Wt 60.2 kg (132 lb 11.2 oz)  SpO2 98%  BMI 22.78 kg/m2    No Pain (0)     Eyes: Sclera anicteric/injected  Ears/nose/mouth/throat: Normal oropharynx without ulcers or exudate, mucus membranes moist, hearing intact  Neck: supple, thyroid normal size  CV: No edema  Respiratory: Unlabored breathing  Lymph: No axillary, submandibular, supraclavicular or inguinal lymphadenopathy  Abd: Nondistended, +bs, no hepatosplenomegaly, nontender, no peritoneal signs  Skin: warm, perfused, no jaundice. Appears tan.   Psych: Normal affect  MSK: Normal gait    DATA:  Reviewed in detail past documentation, medications and prior workup available in electronic health records or through outside records.    PERTINENT STUDIES:  Most recent CBC:  WBC   Date Value Ref Range Status   09/20/2018 5.3 4.0 - 11.0 10e9/L Final   ]  Hemoglobin   Date Value Ref Range Status   09/20/2018 12.5 11.7 - 15.7 g/dL Final   ]   Platelet Count   Date Value Ref Range Status   09/20/2018 217 150 - 450 10e9/L Final       Most recent coag:  INR   Date Value Ref Range Status   12/05/2014 1.25 (H) 0.86 - 1.14 Final       Most recent " hepatic panel:  AST   Date Value Ref Range Status   09/20/2018 12 0 - 45 U/L Final     ALT   Date Value Ref Range Status   09/20/2018 16 0 - 50 U/L Final     No results found for: BILICONJ   Bilirubin Total   Date Value Ref Range Status   09/20/2018 0.6 0.2 - 1.3 mg/dL Final     Albumin   Date Value Ref Range Status   09/20/2018 3.7 3.4 - 5.0 g/dL Final     Alkaline Phosphatase   Date Value Ref Range Status   09/20/2018 49 40 - 150 U/L Final       Most recent creatinine:  Creatinine   Date Value Ref Range Status   07/26/2018 1.02 0.52 - 1.04 mg/dL Final     Endoscopy:   cscope 3/2018 showed Mcbride 3 colitis involving the rectum to hepatic flexure with sparing of the AC. Normal TI. Biopsies showed moderate chronic active colitis with cryptitis and crypt abscess formation.     Cscope 9/2018 showed Mcbride 0. Biopsies show mild chronic active colitis ascending and transverse, crypt architectural distortion and descending sigmoid and rectum without active inflammation.  Imaging:  CT c/a/p on 7/2017 (indication EBV viremia): no bowel wall thickening. SB appeared normal.     IMPRESSION:  Ms. Horan is a 28 year old year old with diagnosed moderate to severe E3 ulcerative colitis 3/2018, who achieved symptomatic remission on prednisone taper and initiation of vedolizumab induction. She continues to do well and is off prednisone and continues on vedolizumab monotherapy.    She does note significant hair loss, which also happened 4-6 weeks after her kidney transplant. Most likely stress-related and will resolve after a while (took 1 year last time) and is improving.    # Ulcerative colitis, severe, E3, now in clinical remission  # Immunosuppressed state (tacrolimus and Cellcept)  # Hair loss without alopecia    PLAN:  --Blood work with infusions  --Continue vedolizumab (8/23/18 shows adequate Entyvio level 41.7 (8 week trough), no change in dose or interval)  -- Fecal calprotectin to trend (insurance will not approve, but will  obtain pre-auth)   --Declined derm referral. Biotin supplementation.    IBD Health Care Maintenance:  Vaccinations:  -- Influenza (every year): Last given today (10/2018)  -- TdaP (every 10 years): Last given 2017  -- Pneumococcal Pneumonia (once then every 5 years): Last given 2017    One time confirmation of immunity or serologies:  -- Hepatitis A (serologies or immunizations): Not documented  -- Hepatitis B (serologies or immunizations): 2001/2002, immune per serologies  -- Varicella: had chickenpox as a child  -- MMR:had all immunization as a child  -- HPV (all aged 18-26): 2007/2008  -- Meningococcal meningitis (all patients at risk for meningitis): 2007   -- Due to the immunosuppression in this patient, I would not advise administration of live vaccines such as varicella/VZV, intranasal influenza, MMR, or yellow fever vaccine (if travelling).      Cancer Screening:  Colon cancer screening:  Given pancolitis colonoscopy every 2-3 years recommended after 8 years of disease.  Dysplasia screening is recommended 2026, will require colonoscopy for mucosal healing on vedolizumab.    Cervical cancer screening: Annual due to immunosupression    Skin cancer screening: Annual visual exam of skin by dermatologist since patient is immunocompromised    Bone mineral density screening   -- Recommend all patients supplement with calcium and vitamin D  -- DEXA scan ordered given >3 months steroid use     Depression Screening:  -- Over the last month, have you felt down, depressed, or hopeless? No  -- Over the last month, have you felt little interest or pleasure doing things? No    Misc:  -- Avoid tobacco use  -- Avoid NSAIDs as there is potentially a 25% chance of causing an IBD flare    Immunization History   Administered Date(s) Administered     Influenza Vaccine IM 3yrs+ 4 Valent IIV4 12/13/2016, 10/03/2017     Pneumo Conj 13-V (2010&after) 11/01/2017     Pneumococcal 23 valent 11/10/2014     TDAP Vaccine (Boostrix)  11/01/2017      Follow up in 3 months    I spent a total of 30 minutes, face to face, was spent with this patient, >50% of which was counseling regarding the above delineated issues.      Rupert Dsouza PA-C  Division of Gastroenterology, Hepatology and Nutrition  [facility]

## 2018-10-08 NOTE — NURSING NOTE
"Chief Complaint   Patient presents with     RECHECK     IBD       Vitals:    10/08/18 0818   BP: 100/60   Pulse: 64   SpO2: 98%   Weight: 60.2 kg (132 lb 11.2 oz)   Height: 1.626 m (5' 4\")       Body mass index is 22.78 kg/(m^2).    YARA Aragon                          "

## 2018-10-08 NOTE — PROGRESS NOTES
"[facility] UC FOLLOW UP      PATIENT: Caity Horan    MRN: [id number]    Date of Birth 1990    Tel: [phone] (home)     PCP: Ritu Little     HPI: Ms. Horan is a 28 year old year old female here for follow up of newly diagnosed UC. She has had great improvement with prednisone taper and initiation of Vedolizumab on 4/19/18. Iron deficiency anemia improved with iron infusions and healing of UC.  Had increase in EBV viremia, and Dr. Mar recommended decreasing prednisone taper and to follow EBV PCR closely. No changes from ID perspective.     UC history  Spring 2012 -- graduated college, had a lot of life stressors. Experienced severe urgency with incontinence. Some blood in the stool and diarrhea alternating with constipation. Spontaneously resolved after 2 months.   End of Dec 2016 - April 2017: intermittent blood in stool, \"major constipation\" and weight gain, no urgency.    October 2017 -- was found to be anemic (attributed to kidney disease) and had iron infusions.  Sep -Dec 2017 Blood with well formed stool. This continued to worsen.   Worst stretch was mid-Feb to march: weight loss, >8 loose stools per day with blood.  April - now: much improved, no abdominal pain and urgency resolved.    In the past had upper abdominal bloating/pain and LLQ pain. No n/v.     Macroscopic extent of disease (most recent) E3    Visit to establish care with MD 3/2018   Current UC symptoms  Bowel frequency in day 2-3   Bowel frequency in night 0 now    Urgency of defecation NO  Blood in stool less than half stools, seen once every other week  General well being 8 (feeling well)  Extracolonic features (multiple select): none     Constitutional symptoms:  Fever NO  Weight loss YES (in the past -- lost 12 lb since October), now stable    Other GI symptoms present: none  Total number of IBD surgeries (except perianal): 0    Current IBD Medications:  Prednisone  Vedolizumab induction     Current medications: " MMF, tacrolimus, Mg, prednisone, B12     Past IBD Medications:   None    Interval history 7/2018:  Overlapped prednisone with Entyvio until after 2nd induction dose. Had recurrence of symptoms (bloody diarrhea) and restarted prednisone 20 mg daily + long taper and stopped prednisone July 1. Doing well.     Current UC symptoms  Bowel frequency in day 1-2, brown well-formed   Bowel frequency in night 0     Urgency of defecation NO  Blood in stool none   General well being 8 (feeling well)  Extracolonic features (multiple select): none     4-6 weeks after kidney transplant had significant hair loss. Took about a year to fully recover.   Experiencing similar signs now with significant hair loss diffusely; no signs of alopecia with baldness.     Last Entyvio dose was 6.5 weeks ago.     Interval history 10/2018:  Completed prednisone taper in July and has continued with Entyvio every 8 weeks. No recurrence of symptoms.    Current UC symptoms  Bowel frequency in day 1-2, brown well-formed   Bowel frequency in night 0     Urgency of defecation NO  Blood in stool none   General well being 8 (feeling well)  Extracolonic features (multiple select): none     4-6 weeks after kidney transplant had significant hair loss. Took about a year to fully recover and is improving.    8/23/18 shows adequate Entyvio level 41.7 (8 week trough), no change in dose or interval.    Last Entyvio dose was 9/20/18 (2.5 weeks ago)      Past Medical History:   Diagnosis Date     Anemia in stage 5 chronic kidney disease (H)      Anemia in stage 5 chronic kidney disease (H) 10/27/2014     ESRD (end stage renal disease) on dialysis (H)      HTN (hypertension) 10/27/2014     Hypertension 10/2014     IgA nephropathy     biopsy proven        Past Surgical History:   Procedure Laterality Date     COLONOSCOPY N/A 3/12/2018    Procedure: COMBINED COLONOSCOPY, SINGLE OR MULTIPLE BIOPSY/POLYPECTOMY BY BIOPSY;  EGD/Colonoscopy ;  Surgeon: Kory Massey MD;   Location: [facility] GI     COLONOSCOPY N/A 9/10/2018    Procedure: COMBINED COLONOSCOPY, SINGLE OR MULTIPLE BIOPSY/POLYPECTOMY BY BIOPSY;  Colonoscopy;  Surgeon: Yenifer Doan MD;  [facility]     EXTRACTION(S) DENTAL       TRANSPLANT KIDNEY RECIPIENT LIVING RELATED N/A 12/5/2014    Procedure: TRANSPLANT KIDNEY RECIPIENT LIVING RELATED;  Surgeon: Dale Middleton MD;  Location: [facility] OR       Social History   Substance Use Topics     Smoking status: Never Smoker     Smokeless tobacco: Never Used     Alcohol use 1.8 - 5.4 oz/week     1 - 3 Glasses of wine, 1 - 3 Cans of beer, 1 - 3 Shots of liquor per week      Comment: 1-3X/month       Family History   Problem Relation Age of Onset     KIDNEY DISEASE No family hx of    Negative for IBD. Dad had psoriasis.   Grandfather with BCC. Grandma has several skin lesions removed (does not recall the diagnosis)    Allergies   Allergen Reactions     Amoxicillin Rash        Outpatient Encounter Prescriptions as of 10/8/2018   Medication Sig Dispense Refill     Cholecalciferol (VITAMIN D) 2000 UNITS tablet Take 2,000 Units by mouth daily 100 tablet 3     Cyanocobalamin (B-12) 1000 MCG TBCR Take 1,000 mcg by mouth daily 90 tablet 3     magnesium oxide (MAG-OX) 400 MG tablet Take 1 tablet (400 mg) by mouth daily 90 tablet 3     mycophenolate (GENERIC EQUIVALENT) 250 MG capsule Take 2 capsules (500 mg) by mouth 2 times daily 120 capsule 11     tacrolimus (GENERIC EQUIVALENT) 0.5 MG capsule Take 3 capsules (1.5 mg) by mouth 2 times daily 180 capsule 11     UNABLE TO FIND MEDICATION NAME: Biotin       Vedolizumab (ENTYVIO IV)        No facility-administered encounter medications on file as of 10/8/2018.       NSAID  NO    Review of Systems  Complete 10 System ROS performed. All are negative except as documented below, in the HPI, or in patient questionnaire from today's visit.    1) Constitutional: No fevers, chills, night sweats or malaise, weight loss or gain  2) Skin: No rash  3)  "Pulmonary: No wheeze, SOB, cough, sputum or hemoptysis  4) Cardiovascular: No Chest pain or palpitations  5) Genitourinary: No blood in urine or dysuria  6) Endocrine: No increased sweating, hunger, thirst or thyroid problems  7) Hematologic: No bruising and easy bleeding  8) Musculoskeletal: no new pain in joints or limitation in ROM  9) Neurologic: No dizziness, paresthesias or weakness or falls  10) Psychiatric:  not depressed/anxious, no sleep problems    PHYSICAL EXAM  Vitals: /60  Pulse 64  Ht 1.626 m (5' 4\")  Wt 60.2 kg (132 lb 11.2 oz)  SpO2 98%  BMI 22.78 kg/m2    No Pain (0)     Eyes: Sclera anicteric/injected  Ears/nose/mouth/throat: Normal oropharynx without ulcers or exudate, mucus membranes moist, hearing intact  Neck: supple, thyroid normal size  CV: No edema  Respiratory: Unlabored breathing  Lymph: No axillary, submandibular, supraclavicular or inguinal lymphadenopathy  Abd: Nondistended, +bs, no hepatosplenomegaly, nontender, no peritoneal signs  Skin: warm, perfused, no jaundice. Appears tan.   Psych: Normal affect  MSK: Normal gait    DATA:  Reviewed in detail past documentation, medications and prior workup available in electronic health records or through outside records.    PERTINENT STUDIES:  Most recent CBC:  WBC   Date Value Ref Range Status   09/20/2018 5.3 4.0 - 11.0 10e9/L Final   ]  Hemoglobin   Date Value Ref Range Status   09/20/2018 12.5 11.7 - 15.7 g/dL Final   ]   Platelet Count   Date Value Ref Range Status   09/20/2018 217 150 - 450 10e9/L Final       Most recent coag:  INR   Date Value Ref Range Status   12/05/2014 1.25 (H) 0.86 - 1.14 Final       Most recent hepatic panel:  AST   Date Value Ref Range Status   09/20/2018 12 0 - 45 U/L Final     ALT   Date Value Ref Range Status   09/20/2018 16 0 - 50 U/L Final     No results found for: BILICONJ   Bilirubin Total   Date Value Ref Range Status   09/20/2018 0.6 0.2 - 1.3 mg/dL Final     Albumin   Date Value Ref Range " Status   09/20/2018 3.7 3.4 - 5.0 g/dL Final     Alkaline Phosphatase   Date Value Ref Range Status   09/20/2018 49 40 - 150 U/L Final       Most recent creatinine:  Creatinine   Date Value Ref Range Status   07/26/2018 1.02 0.52 - 1.04 mg/dL Final     Endoscopy:   cscope 3/2018 showed Mcbride 3 colitis involving the rectum to hepatic flexure with sparing of the AC. Normal TI. Biopsies showed moderate chronic active colitis with cryptitis and crypt abscess formation.     Cscope 9/2018 showed Mcbride 0. Biopsies show mild chronic active colitis ascending and transverse, crypt architectural distortion and descending sigmoid and rectum without active inflammation.  Imaging:  CT c/a/p on 7/2017 (indication EBV viremia): no bowel wall thickening. SB appeared normal.     IMPRESSION:  Ms. Horan is a 28 year old year old with diagnosed moderate to severe E3 ulcerative colitis 3/2018, who achieved symptomatic remission on prednisone taper and initiation of vedolizumab induction. She continues to do well and is off prednisone and continues on vedolizumab monotherapy.    She does note significant hair loss, which also happened 4-6 weeks after her kidney transplant. Most likely stress-related and will resolve after a while (took 1 year last time) and is improving.    # Ulcerative colitis, severe, E3, now in clinical remission  # Immunosuppressed state (tacrolimus and Cellcept)  # Hair loss without alopecia    PLAN:  --Blood work with infusions  --Continue vedolizumab (8/23/18 shows adequate Entyvio level 41.7 (8 week trough), no change in dose or interval)  -- Fecal calprotectin to trend (insurance will not approve, but will obtain pre-auth)   --Declined derm referral. Biotin supplementation.    IBD Health Care Maintenance:  Vaccinations:  -- Influenza (every year): Last given today (10/2018)  -- TdaP (every 10 years): Last given 2017  -- Pneumococcal Pneumonia (once then every 5 years): Last given 2017    One time confirmation of  immunity or serologies:  -- Hepatitis A (serologies or immunizations): Not documented  -- Hepatitis B (serologies or immunizations): 2001/2002, immune per serologies  -- Varicella: had chickenpox as a child  -- MMR:had all immunization as a child  -- HPV (all aged 18-26): 2007/2008  -- Meningococcal meningitis (all patients at risk for meningitis): 2007   -- Due to the immunosuppression in this patient, I would not advise administration of live vaccines such as varicella/VZV, intranasal influenza, MMR, or yellow fever vaccine (if travelling).      Cancer Screening:  Colon cancer screening:  Given pancolitis colonoscopy every 2-3 years recommended after 8 years of disease.  Dysplasia screening is recommended 2026, will require colonoscopy for mucosal healing on vedolizumab.    Cervical cancer screening: Annual due to immunosupression    Skin cancer screening: Annual visual exam of skin by dermatologist since patient is immunocompromised    Bone mineral density screening   -- Recommend all patients supplement with calcium and vitamin D  -- DEXA scan ordered given >3 months steroid use     Depression Screening:  -- Over the last month, have you felt down, depressed, or hopeless? No  -- Over the last month, have you felt little interest or pleasure doing things? No    Misc:  -- Avoid tobacco use  -- Avoid NSAIDs as there is potentially a 25% chance of causing an IBD flare    Immunization History   Administered Date(s) Administered     Influenza Vaccine IM 3yrs+ 4 Valent IIV4 12/13/2016, 10/03/2017     Pneumo Conj 13-V (2010&after) 11/01/2017     Pneumococcal 23 valent 11/10/2014     TDAP Vaccine (Boostrix) 11/01/2017      Follow up in 3 months    I spent a total of 30 minutes, face to face, was spent with this patient, >50% of which was counseling regarding the above delineated issues.      Rupert Dsouza PA-C  Division of Gastroenterology, Hepatology and Nutrition  [facility]

## 2018-10-08 NOTE — PATIENT INSTRUCTIONS
It was a pleasure taking care of you today.  I've included a brief summary of our discussion and care plan from today's visit below.  Please review this information with your primary care provider.  ______________________________________________________________________    My recommendations are summarized as follows:    -- Continue entyvio every 8 weeks  -- Labs with infusions  -- Next endoscopic assessment: TBD  -- Patient with IBD we recommend supplementation vitamin D 1000 units daily and calcium 500 mg twice daily.  -- Vaccines/immunizations to be updated: flu shot today  -- Yearly Dermatology visit for skin check while on immunosuppressive therapy. Can call [phone] to schedule.  -- Yearly pap smear while on immunosuppressive therapy  -- No NSAIDs (ibuprofen, or anything containing ibuprofen)       For additional resources about inflammatory bowel disease visit [email or web address]      Return to GI Clinic in 3 months to review your progress.    ______________________________________________________________________    Who do I call with any questions after my visit?  Please be in touch if there are any further questions that arise following today's visit.  There are multiple ways to contact your gastroenterology care team.        During business hours, you may reach a Gastroenterology nurse at [phone], option 3.       To schedule or reschedule an appointment, please call [phone].       You can always send a secure message through [employer].  [employer] messages are answered by your nurse or doctor typically within 24 hours.  Please allow extra time on weekends and holidays.        For urgent/emergent questions after business hours, you may reach the on-call GI Fellow by contacting the [facility] at [phone].      In order for your refill to be processed in a timely fashion, it is your responsibility to ensure you follow the recommendations from your provider  regarding your laboratory studies and follow up appointments.       How will I get the results of any tests ordered?    You will receive all of your results.  If you have signed up for [employer], any tests ordered at your visit will be available to you after your physician reviews them.  Typically this takes 1-2 weeks.  If there are urgent results that require a change in your care plan, your physician or nurse will call you to discuss the next steps.      What is [employer]?  [employer] is a secure way for you to access all of your healthcare records from the [facility].  It is a web based computer program, so you can sign on to it from any location.  It also allows you to send secure messages to your care team.  I recommend signing up for [employer] access if you have not already done so and are comfortable with using a computer.      How to I schedule a follow-up visit?  If you did not schedule a follow-up visit today, please call [phone] to schedule a follow-up office visit.        Sincerely,    Rupert Dsouza PA-C  [facility]  Division of Gastroenterology

## 2018-10-09 ENCOUNTER — TELEPHONE (OUTPATIENT)
Dept: PHARMACY | Facility: CLINIC | Age: 28
End: 2018-10-09

## 2018-10-09 DIAGNOSIS — Z94.0 KIDNEY REPLACED BY TRANSPLANT: Primary | ICD-10-CM

## 2018-10-09 DIAGNOSIS — D50.9 IRON DEFICIENCY ANEMIA, UNSPECIFIED IRON DEFICIENCY ANEMIA TYPE: ICD-10-CM

## 2018-10-09 NOTE — TELEPHONE ENCOUNTER
Follow-up with anemia management service:    I spoke to Caity reminding her that she is due for Hgb, ferritin and iron labs.  She is traveling right now so it won't be for awhile  She has an appt on  with Dr Mar so she will get her labs drawn then.    Anemia Latest Ref Rng & Units 5/3/2018 2018 2018 2018 2018 2018 2018   Hemoglobin 11.7 - 15.7 g/dL 8.6(L) 9.4(L) 9.1(L) 10.7(L) 12.1 12.5 12.5   IV Iron Dose - - - 750mg 750mg - - -   TSAT 15 - 46 % 6(L) 5(L) - - 30 55(H) -   Ferritin 12 - 150 ng/mL 13 7(L) - - 216(H) 226(H) -       Orders needed to be renewed (for next follow-up date) in EPIC: Hgb, ferritin and iron standing lab orders   Med order expires: N/A   Lab orders : 10/05/2018 - updated 10/11/2018 EDUARDO    Follow-up call date: 18    Janette Harrington CPhT  [facility]  [phone]  Reviewed 10/11/2018 EDUARDO  Anemia Management Service  Abida Song,Dionna and Princess Harrington CPhT  Phone: [phone]  Fax: [phone]

## 2018-10-10 ENCOUNTER — DOCUMENTATION ONLY (OUTPATIENT)
Dept: TRANSPLANT | Facility: CLINIC | Age: 28
End: 2018-10-10

## 2018-10-11 NOTE — PROGRESS NOTES
Chart Prep    Clinic Visit on: 11/2/18    Last lab completed: 7/26/18    Lab letter updated: 9/19/18    Lab orders up to date in Epic.

## 2018-10-13 DIAGNOSIS — Z94.0 STATUS POST KIDNEY TRANSPLANT: ICD-10-CM

## 2018-10-13 DIAGNOSIS — Z94.0 KIDNEY TRANSPLANTED: ICD-10-CM

## 2018-10-13 DIAGNOSIS — Z94.0 KIDNEY REPLACED BY TRANSPLANT: Primary | ICD-10-CM

## 2018-10-13 DIAGNOSIS — E83.42 HYPOMAGNESEMIA: ICD-10-CM

## 2018-10-15 RX ORDER — TACROLIMUS 0.5 MG/1
1.5 CAPSULE ORAL 2 TIMES DAILY
Qty: 180 CAPSULE | Refills: 6 | Status: SHIPPED | OUTPATIENT
Start: 2018-10-15 | End: 2019-01-25

## 2018-11-01 ENCOUNTER — RESULTS ONLY (OUTPATIENT)
Dept: OTHER | Facility: CLINIC | Age: 28
End: 2018-11-01

## 2018-11-01 DIAGNOSIS — Z94.0 KIDNEY REPLACED BY TRANSPLANT: ICD-10-CM

## 2018-11-01 DIAGNOSIS — Z94.0 IMMUNOSUPPRESSIVE MANAGEMENT ENCOUNTER FOLLOWING KIDNEY TRANSPLANT: ICD-10-CM

## 2018-11-01 DIAGNOSIS — Z94.0 KIDNEY TRANSPLANTED: ICD-10-CM

## 2018-11-01 DIAGNOSIS — T86.10 COMPLICATIONS, KIDNEY TRANSPLANT: ICD-10-CM

## 2018-11-01 DIAGNOSIS — D50.9 IRON DEFICIENCY ANEMIA, UNSPECIFIED IRON DEFICIENCY ANEMIA TYPE: ICD-10-CM

## 2018-11-01 DIAGNOSIS — Z48.298 AFTERCARE FOLLOWING ORGAN TRANSPLANT: ICD-10-CM

## 2018-11-01 DIAGNOSIS — Z79.899 IMMUNOSUPPRESSIVE MANAGEMENT ENCOUNTER FOLLOWING KIDNEY TRANSPLANT: ICD-10-CM

## 2018-11-01 LAB
ANION GAP SERPL CALCULATED.3IONS-SCNC: 8 MMOL/L (ref 3–14)
BUN SERPL-MCNC: 20 MG/DL (ref 7–30)
CALCIUM SERPL-MCNC: 9 MG/DL (ref 8.5–10.1)
CHLORIDE SERPL-SCNC: 107 MMOL/L (ref 94–109)
CO2 SERPL-SCNC: 26 MMOL/L (ref 20–32)
CREAT SERPL-MCNC: 1.17 MG/DL (ref 0.52–1.04)
CREAT UR-MCNC: 121 MG/DL
ERYTHROCYTE [DISTWIDTH] IN BLOOD BY AUTOMATED COUNT: 12.2 % (ref 10–15)
FERRITIN SERPL-MCNC: 216 NG/ML (ref 12–150)
GFR SERPL CREATININE-BSD FRML MDRD: 55 ML/MIN/1.7M2
GLUCOSE SERPL-MCNC: 102 MG/DL (ref 70–99)
HCT VFR BLD AUTO: 39.6 % (ref 35–47)
HGB BLD-MCNC: 12.5 G/DL (ref 11.7–15.7)
IRON SATN MFR SERPL: 43 % (ref 15–46)
IRON SERPL-MCNC: 113 UG/DL (ref 35–180)
MCH RBC QN AUTO: 31.6 PG (ref 26.5–33)
MCHC RBC AUTO-ENTMCNC: 31.6 G/DL (ref 31.5–36.5)
MCV RBC AUTO: 100 FL (ref 78–100)
PLATELET # BLD AUTO: 211 10E9/L (ref 150–450)
POTASSIUM SERPL-SCNC: 4.5 MMOL/L (ref 3.4–5.3)
PROT UR-MCNC: 0.08 G/L
PROT/CREAT 24H UR: 0.07 G/G CR (ref 0–0.2)
RBC # BLD AUTO: 3.96 10E12/L (ref 3.8–5.2)
SODIUM SERPL-SCNC: 140 MMOL/L (ref 133–144)
TACROLIMUS BLD-MCNC: 5 UG/L (ref 5–15)
TIBC SERPL-MCNC: 262 UG/DL (ref 240–430)
TME LAST DOSE: NORMAL H
WBC # BLD AUTO: 3.6 10E9/L (ref 4–11)

## 2018-11-02 ENCOUNTER — OFFICE VISIT (OUTPATIENT)
Dept: NEPHROLOGY | Facility: CLINIC | Age: 28
End: 2018-11-02
Attending: INTERNAL MEDICINE
Payer: COMMERCIAL

## 2018-11-02 ENCOUNTER — TELEPHONE (OUTPATIENT)
Dept: PHARMACY | Facility: CLINIC | Age: 28
End: 2018-11-02

## 2018-11-02 VITALS
OXYGEN SATURATION: 98 % | HEIGHT: 64 IN | TEMPERATURE: 98.1 F | BODY MASS INDEX: 22.53 KG/M2 | SYSTOLIC BLOOD PRESSURE: 104 MMHG | WEIGHT: 132 LBS | HEART RATE: 69 BPM | DIASTOLIC BLOOD PRESSURE: 65 MMHG

## 2018-11-02 DIAGNOSIS — Z94.0 KIDNEY TRANSPLANTED: Primary | ICD-10-CM

## 2018-11-02 DIAGNOSIS — E55.9 HYPOVITAMINOSIS D: ICD-10-CM

## 2018-11-02 DIAGNOSIS — Z12.83 SKIN CANCER SCREENING: ICD-10-CM

## 2018-11-02 DIAGNOSIS — D84.9 IMMUNOSUPPRESSION (H): ICD-10-CM

## 2018-11-02 DIAGNOSIS — Z94.0 KIDNEY REPLACED BY TRANSPLANT: ICD-10-CM

## 2018-11-02 DIAGNOSIS — Z94.0 STATUS POST KIDNEY TRANSPLANT: ICD-10-CM

## 2018-11-02 DIAGNOSIS — B27.00 EBV (EPSTEIN-BARR VIRUS) VIREMIA: ICD-10-CM

## 2018-11-02 DIAGNOSIS — D72.819 LEUKOPENIA, UNSPECIFIED TYPE: ICD-10-CM

## 2018-11-02 LAB
BKV DNA # SPEC NAA+PROBE: NORMAL COPIES/ML
BKV DNA SPEC NAA+PROBE-LOG#: NORMAL LOG COPIES/ML
EBV DNA # SPEC NAA+PROBE: ABNORMAL {COPIES}/ML
EBV DNA SPEC NAA+PROBE-LOG#: 4.1 {LOG_COPIES}/ML
SPECIMEN SOURCE: NORMAL

## 2018-11-02 PROCEDURE — G0463 HOSPITAL OUTPT CLINIC VISIT: HCPCS | Mod: ZF

## 2018-11-02 RX ORDER — CHOLECALCIFEROL (VITAMIN D3) 50 MCG
2000 TABLET ORAL DAILY
Qty: 100 TABLET | Refills: 3 | Status: SHIPPED | OUTPATIENT
Start: 2018-11-02 | End: 2020-07-21

## 2018-11-02 RX ORDER — MYCOPHENOLATE MOFETIL 250 MG/1
250 CAPSULE ORAL 2 TIMES DAILY
Qty: 60 CAPSULE | Refills: 11 | Status: SHIPPED | OUTPATIENT
Start: 2018-11-02 | End: 2018-12-20

## 2018-11-02 ASSESSMENT — PAIN SCALES - GENERAL: PAINLEVEL: NO PAIN (0)

## 2018-11-02 NOTE — NURSING NOTE
"Chief Complaint   Patient presents with     RECHECK     kidney tx      /65  Pulse 69  Temp 98.1  F (36.7  C) (Oral)  Ht 1.626 m (5' 4\")  Wt 59.9 kg (132 lb)  LMP 10/19/2018  SpO2 98%  BMI 22.66 kg/m2  Rosalie Prater, CMA    "

## 2018-11-02 NOTE — PATIENT INSTRUCTIONS
Goal bp is 100-140/60-90.    Labs every 2 months.     Call with any questions.    [phone]. Ping Diaz.     Follow up in 12 months. Sooner if any problems.

## 2018-11-02 NOTE — TELEPHONE ENCOUNTER
Anemia Management Note  SUBJECTIVE/OBJECTIVE:  Referred by Dr. Otilio Mar on 10/6/2017  Primary Diagnosis: Anemia in Chronic Kidney Disease (N18.3, D63.1)     Secondary Diagnosis:  Chronic Kidney Disease, Stage 3 (N18.3)   Hgb goal range:  9-10  Epo/Darbo: None  Iron regimen:  Does not tolerate oral iron  Labs : 10/05/2018  Communication: Consent to communicate on file OK to leave messages for scheduling, medical and billing on cell phone.  Dated 10/27/2014    Anemia Latest Ref Rng & Units 2018   Hemoglobin 11.7 - 15.7 g/dL 9.4(L) 9.1(L) 10.7(L) 12.1 12.5 12.5 12.5   IV Iron Dose - - 750mg 750mg - - - -   TSAT 15 - 46 % 5(L) - - 30 55(H) - 43   Ferritin 12 - 150 ng/mL 7(L) - - 216(H) 226(H) - 216(H)     BP Readings from Last 3 Encounters:   10/08/18 100/60   18 111/75   09/10/18 106/77     Wt Readings from Last 2 Encounters:   10/08/18 132 lb 11.2 oz (60.2 kg)   18 133 lb 9.6 oz (60.6 kg)       OK to wait 3 months to check iron if Hgb stays stable  ASSESSMENT:  Hgb:Above goal - recommend hold dose  TSat: at goal >30% Ferritin: At goal (>100ng/mL)    PLAN:  RTC for Hgb in 4 week(s)  Check iron levels in 12 weeks    Orders needed to be renewed (for next follow-up date) in EPIC: None    Iron labs due:  2019    Plan discussed with:  Left   Plan provided by:      NEXT FOLLOW-UP DATE:  2018    Anemia Management Service  Abida Song,PharmD and Princess Harrington CPhT  Phone: [phone]  Fax: [phone]

## 2018-11-02 NOTE — PROGRESS NOTES
[facility]  Nephrology Clinic  Kidney/Pancreas Recipient  2018     Name: Caity Horan  MRN: [id number]   Age: 28 year old  : 1990  Referring provider: Otilio Mar     CHRONIC TRANSPLANT NEPHROLOGY VISIT    Assessment and Plan:   # LDKT:    - Baseline Cr ~ 1.0-1.2; Stable   - Proteinuria: Normal 0.07 g / g on 18   - Date of DSA last checked: 16 Latest DSA: No   - BK Viremia: No; 18   - Kidney Tx Biopsy: No    # Immunosuppression: Tacrolimus immediate release (goal  4-6) and Mycophenolate mofetil (goal  1-3.5)   - Changes: No    # Hypertension: Controlled; Goal BP: < 130/80   - Changes: No    # Leukopenia: mild MMF effect / ebV effect. Monitor with CBC with routine screening labs for transplant.     # Mineral Bone Disorder:    - Secondary renal hyperparathyroidism; PTH level is: Not checked recently   - Vitamin D; level is: Low; cholecalciferol 2000 units daily.    - Calcium; level is: Normal   - Phosphorus; level is: Not checked recently     # EBV viremia: 16257 copies today, down from 94852. Continue to monitor. CT chest abdomen pelvis negative for LAD 17.     # Skin Cancer Risk:    - Discussed sun protection and recommend regular follow up with Dermatology.    # Medical Compliance: Yes    Follow-up: Return in about 1 year (around 2019).     # Transplant History:  Etiology of kidney failure: IgA nephropathy  Tx: LDKT  Transplant: 2014 (Kidney)  Donor Type: Living Donor Class: Standard Criteria Donor  Significant changes in immunosuppression: None  Significant transplant-related complications: EBV viremia    Transplant Office Phone Number: [phone]    Assessment and plan was discussed with the patient and they voiced understanding and agreement.    Chief Complaint   Caity Horan is a 28 year old female with a history of end stage renal disease secondary to IgA nephropathy, status-post LDKT completed on 2014, who presents for follow-up.    History of  Present Illness:  The patient was last evaluated on 2/27/2018; please see this note for further details. At that time, blood pressure was well controlled without medications, therefore no changes were made. She was noted to have low vitamin B12 levels and was recommended to start oral vitamin B12 1000 mcg daily. Of note, patient has history of recurrent EBV viremia. She developed new seroconversion after some exposure as her donor was also EBV IgG Ab negative. Last EBV PCR was negative, recheck was pending at time of our last visit. Previous CT showed no evidence of lymphoma and immunosuppression was decreased. She is following with transplant infectious disease physician.     Today, Caity states she is doing quite well. She denies any new complaints or concerns since the time of her last visit. She is up-to-date on her flu vaccination. She is not up-to-date on her Pap smears, but plans to make an appointment in the near future. Additionally, she states she needs to make an appointment with dermatology for a skin check. She does not mention any new skin lesions.     Caity states she does not regularly check her blood pressures at home.     Recent Hospitalizations:  [x] No [] Yes    New Medical Issues: [x] No [] Yes    Decreased energy: [x] No [] Yes    Chest pain or SOB with exertion:  [x] No [] Yes    Appetite change or weight change: [x] No [] Yes    Nausea, vomiting or diarrhea:  [x] No [] Yes    Fever, sweats or chills: [x] No [] Yes    Leg swelling: [x] No [] Yes      Review of Systems:   A comprehensive review of systems was obtained and negative, except as noted in the HPI or past medical history.     Active Medications:      Cholecalciferol (VITAMIN D) 2000 units tablet, Take 2,000 Units by mouth daily, Disp: 100 tablet, Rfl: 3     Cyanocobalamin (B-12) 1000 MCG TBCR, Take 1,000 mcg by mouth daily, Disp: 90 tablet, Rfl: 3     magnesium oxide (MAG-OX) 400 MG tablet, Take 1 tablet (400 mg) by mouth daily,  "Disp: 90 tablet, Rfl: 3     mycophenolate (GENERIC EQUIVALENT) 250 MG capsule, Take 1 capsule (250 mg) by mouth 2 times daily, Disp: 60 capsule, Rfl: 11     tacrolimus (GENERIC EQUIVALENT) 0.5 MG capsule, Take 3 capsules (1.5 mg) by mouth 2 times daily, Disp: 180 capsule, Rfl: 6     UNABLE TO FIND, MEDICATION NAME: Biotin, Disp: , Rfl:      Vedolizumab (ENTYVIO IV), , Disp: , Rfl:      [DISCONTINUED] mycophenolate (GENERIC EQUIVALENT) 250 MG capsule, Take 2 capsules (500 mg) by mouth 2 times daily, Disp: 120 capsule, Rfl: 11      Allergies:   Amoxicillin      Active Medical Problems:  IgA nephropathy  Status post kidney transplant  Immunosuppressed status  Hypomagnesemia  EBV viremia  Anemia, iron deficiency  Vitamin B12 deficiency  Ulcerative pancolitis with rectal bleeding     Social History:   The patient has never smoked. Endorses alcohol use, 1-3 times per month.    Physical Exam:   /65  Pulse 69  Temp 98.1  F (36.7  C) (Oral)  Ht 1.626 m (5' 4\")  Wt 59.9 kg (132 lb)  LMP 10/19/2018  SpO2 98%  BMI 22.66 kg/m2   Wt Readings from Last 4 Encounters:   11/02/18 59.9 kg (132 lb)   10/08/18 60.2 kg (132 lb 11.2 oz)   09/20/18 60.6 kg (133 lb 9.6 oz)   09/10/18 59.9 kg (132 lb)     GENERAL APPEARANCE: alert and no distress  HENT: mouth without ulcers or lesions  LYMPHATICS: no cervical or supraclavicular nodes  RESP: lungs clear to auscultation - no rales, rhonchi or wheezes  CV: regular rhythm, normal rate, no rub, no murmur  EDEMA: no LE edema bilaterally  ABDOMEN: soft, nondistended, nontender, bowel sounds normal  MS: extremities normal - no gross deformities noted, no evidence of inflammation in joints, no muscle tenderness  SKIN: no rash  TX KIDNEY: normal  DIALYSIS ACCESS:  None    Data:   Renal Latest Ref Rng & Units 11/1/2018 7/26/2018 6/14/2018   Na 133 - 144 mmol/L 140 138 140   K 3.4 - 5.3 mmol/L 4.5 4.5 4.1   Cl 94 - 109 mmol/L 107 110(H) 108   CO2 20 - 32 mmol/L 26 20 24   BUN 7 - 30 mg/dL " 20 34(H) 17   Cr 0.52 - 1.04 mg/dL 1.17(H) 1.02 1.04   Glucose 70 - 99 mg/dL 102(H) 95 91   Ca  8.5 - 10.1 mg/dL 9.0 8.5 8.5   Mg 1.6 - 2.3 mg/dL - - -     Bone Health Latest Ref Rng & Units 3/23/2018 3/2/2015 2/25/2015   Phos 2.5 - 4.5 mg/dL - 2.8 3.5   PTHi 12 - 72 pg/mL - - -   Vit D Def 20 - 75 ug/L 17(L) - -     Heme Latest Ref Rng & Units 11/1/2018 9/20/2018 8/23/2018   WBC 4.0 - 11.0 10e9/L 3.6(L) 5.3 -   Hgb 11.7 - 15.7 g/dL 12.5 12.5 12.5   Plt 150 - 450 10e9/L 211 217 -     Liver Latest Ref Rng & Units 9/20/2018 7/26/2018 5/31/2018   AP 40 - 150 U/L 49 48 46   TBili 0.2 - 1.3 mg/dL 0.6 0.4 0.3   DBili 0.0 - 0.2 mg/dL 0.2 0.1 <0.1   ALT 0 - 50 U/L 16 16 15   AST 0 - 45 U/L 12 17 12   Tot Protein 6.8 - 8.8 g/dL 7.6 7.3 7.0   Albumin 3.4 - 5.0 g/dL 3.7 3.5 3.4        Iron studies Latest Ref Rng & Units 11/1/2018 8/23/2018 7/26/2018   Iron 35 - 180 ug/dL 113 131 73   Iron sat 15 - 46 % 43 55(H) 30   Ferritin 12 - 150 ng/mL 216(H) 226(H) 216(H)     [facility] Txp Virology Latest Ref Rng & Units 11/1/2018 3/23/2018 2/1/2018   CVM DNA Quant - - - -   BK Spec - Plasma, EDTA anticoagulant - Plasma   BK Res BKNEG:BK Virus DNA Not Detected copies/mL PENDING - BK Virus DNA Not Detected   BK Log <2.7 Log copies/mL PENDING - Not Calculated   Hep B Core NR:Nonreactive - Nonreactive -        Recent Labs   Lab Test  07/26/18   0830  08/23/18   0741  11/01/18   0738   DOSTAC  Not Provided  915p 8.22.2018  2130 10/31/2018   TACROL  4.7*  4.9*  5.0     Recent Labs   Lab Test  03/02/15   0810  03/09/15   0821  05/24/17   0725   DOSMPA  Not Provided  2130 3/8/15  2230,5/23/17   MPACID  4.29*  3.30  0.75*   MPAG  39.0  41.1  10.2*     Scribe Disclosure:   I, Xuan Moya, am serving as a scribe to document services personally performed by Dr. Fransico Austin at this visit, based upon the provider's statements to me. All documentation has been reviewed by the aforementioned provider prior to being entered into the official medical  record.     Portions of this medical record were completed by a scribe. UPON MY REVIEW AND AUTHENTICATION BY ELECTRONIC SIGNATURE, this confirms (a) I performed the applicable clinical services, and (b) the record is accurate.

## 2018-11-02 NOTE — LETTER
2018      RE: Caity Horan  [address]  [facility] [address]       [facility]  Nephrology Clinic  Kidney/Pancreas Recipient  2018     Name: Caity Horan  MRN: [id number]   Age: 28 year old  : 1990  Referring provider: Otilio Mar     CHRONIC TRANSPLANT NEPHROLOGY VISIT    Assessment and Plan:   # LDKT:    - Baseline Cr ~ 1.0-1.2; Stable   - Proteinuria: Normal 0.07 g / g on 18   - Date of DSA last checked: 16 Latest DSA: No   - BK Viremia: No; 18   - Kidney Tx Biopsy: No    # Immunosuppression: Tacrolimus immediate release (goal  4-6) and Mycophenolate mofetil (goal  1-3.5)   - Changes: No    # Hypertension: Controlled; Goal BP: < 130/80   - Changes: No    # Leukopenia: mild MMF effect / ebV effect. Monitor with CBC with routine screening labs for transplant.     # Mineral Bone Disorder:    - Secondary renal hyperparathyroidism; PTH level is: Not checked recently   - Vitamin D; level is: Low; cholecalciferol 2000 units daily.    - Calcium; level is: Normal   - Phosphorus; level is: Not checked recently     # EBV viremia: 26678 copies today, down from 35513. Continue to monitor. CT chest abdomen pelvis negative for LAD 17.     # Skin Cancer Risk:    - Discussed sun protection and recommend regular follow up with Dermatology.    # Medical Compliance: Yes    Follow-up: Return in about 1 year (around 2019).     # Transplant History:  Etiology of kidney failure: IgA nephropathy  Tx: LDKT  Transplant: 2014 (Kidney)  Donor Type: Living Donor Class: Standard Criteria Donor  Significant changes in immunosuppression: None  Significant transplant-related complications: EBV viremia    Transplant Office Phone Number: [phone]    Assessment and plan was discussed with the patient and they voiced understanding and agreement.    Chief Complaint   Caity Horan is a 28 year old female with a history of end stage renal disease secondary to IgA  nephropathy, status-post LDKT completed on 12/5/2014, who presents for follow-up.    History of Present Illness:  The patient was last evaluated on 2/27/2018; please see this note for further details. At that time, blood pressure was well controlled without medications, therefore no changes were made. She was noted to have low vitamin B12 levels and was recommended to start oral vitamin B12 1000 mcg daily. Of note, patient has history of recurrent EBV viremia. She developed new seroconversion after some exposure as her donor was also EBV IgG Ab negative. Last EBV PCR was negative, recheck was pending at time of our last visit. Previous CT showed no evidence of lymphoma and immunosuppression was decreased. She is following with transplant infectious disease physician.     Today, Caity states she is doing quite well. She denies any new complaints or concerns since the time of her last visit. She is up-to-date on her flu vaccination. She is not up-to-date on her Pap smears, but plans to make an appointment in the near future. Additionally, she states she needs to make an appointment with dermatology for a skin check. She does not mention any new skin lesions.     Caity states she does not regularly check her blood pressures at home.     Recent Hospitalizations:  [x] No [] Yes    New Medical Issues: [x] No [] Yes    Decreased energy: [x] No [] Yes    Chest pain or SOB with exertion:  [x] No [] Yes    Appetite change or weight change: [x] No [] Yes    Nausea, vomiting or diarrhea:  [x] No [] Yes    Fever, sweats or chills: [x] No [] Yes    Leg swelling: [x] No [] Yes      Review of Systems:   A comprehensive review of systems was obtained and negative, except as noted in the HPI or past medical history.     Active Medications:      Cholecalciferol (VITAMIN D) 2000 units tablet, Take 2,000 Units by mouth daily, Disp: 100 tablet, Rfl: 3     Cyanocobalamin (B-12) 1000 MCG TBCR, Take 1,000 mcg by mouth daily, Disp: 90  "tablet, Rfl: 3     magnesium oxide (MAG-OX) 400 MG tablet, Take 1 tablet (400 mg) by mouth daily, Disp: 90 tablet, Rfl: 3     mycophenolate (GENERIC EQUIVALENT) 250 MG capsule, Take 1 capsule (250 mg) by mouth 2 times daily, Disp: 60 capsule, Rfl: 11     tacrolimus (GENERIC EQUIVALENT) 0.5 MG capsule, Take 3 capsules (1.5 mg) by mouth 2 times daily, Disp: 180 capsule, Rfl: 6     UNABLE TO FIND, MEDICATION NAME: Biotin, Disp: , Rfl:      Vedolizumab (ENTYVIO IV), , Disp: , Rfl:      [DISCONTINUED] mycophenolate (GENERIC EQUIVALENT) 250 MG capsule, Take 2 capsules (500 mg) by mouth 2 times daily, Disp: 120 capsule, Rfl: 11      Allergies:   Amoxicillin      Active Medical Problems:  IgA nephropathy  Status post kidney transplant  Immunosuppressed status  Hypomagnesemia  EBV viremia  Anemia, iron deficiency  Vitamin B12 deficiency  Ulcerative pancolitis with rectal bleeding     Social History:   The patient has never smoked. Endorses alcohol use, 1-3 times per month.    Physical Exam:   /65  Pulse 69  Temp 98.1  F (36.7  C) (Oral)  Ht 1.626 m (5' 4\")  Wt 59.9 kg (132 lb)  LMP 10/19/2018  SpO2 98%  BMI 22.66 kg/m2   Wt Readings from Last 4 Encounters:   11/02/18 59.9 kg (132 lb)   10/08/18 60.2 kg (132 lb 11.2 oz)   09/20/18 60.6 kg (133 lb 9.6 oz)   09/10/18 59.9 kg (132 lb)     GENERAL APPEARANCE: alert and no distress  HENT: mouth without ulcers or lesions  LYMPHATICS: no cervical or supraclavicular nodes  RESP: lungs clear to auscultation - no rales, rhonchi or wheezes  CV: regular rhythm, normal rate, no rub, no murmur  EDEMA: no LE edema bilaterally  ABDOMEN: soft, nondistended, nontender, bowel sounds normal  MS: extremities normal - no gross deformities noted, no evidence of inflammation in joints, no muscle tenderness  SKIN: no rash  TX KIDNEY: normal  DIALYSIS ACCESS:  None    Data:   Renal Latest Ref Rng & Units 11/1/2018 7/26/2018 6/14/2018   Na 133 - 144 mmol/L 140 138 140   K 3.4 - 5.3 mmol/L " 4.5 4.5 4.1   Cl 94 - 109 mmol/L 107 110(H) 108   CO2 20 - 32 mmol/L 26 20 24   BUN 7 - 30 mg/dL 20 34(H) 17   Cr 0.52 - 1.04 mg/dL 1.17(H) 1.02 1.04   Glucose 70 - 99 mg/dL 102(H) 95 91   Ca  8.5 - 10.1 mg/dL 9.0 8.5 8.5   Mg 1.6 - 2.3 mg/dL - - -     Bone Health Latest Ref Rng & Units 3/23/2018 3/2/2015 2/25/2015   Phos 2.5 - 4.5 mg/dL - 2.8 3.5   PTHi 12 - 72 pg/mL - - -   Vit D Def 20 - 75 ug/L 17(L) - -     Heme Latest Ref Rng & Units 11/1/2018 9/20/2018 8/23/2018   WBC 4.0 - 11.0 10e9/L 3.6(L) 5.3 -   Hgb 11.7 - 15.7 g/dL 12.5 12.5 12.5   Plt 150 - 450 10e9/L 211 217 -     Liver Latest Ref Rng & Units 9/20/2018 7/26/2018 5/31/2018   AP 40 - 150 U/L 49 48 46   TBili 0.2 - 1.3 mg/dL 0.6 0.4 0.3   DBili 0.0 - 0.2 mg/dL 0.2 0.1 <0.1   ALT 0 - 50 U/L 16 16 15   AST 0 - 45 U/L 12 17 12   Tot Protein 6.8 - 8.8 g/dL 7.6 7.3 7.0   Albumin 3.4 - 5.0 g/dL 3.7 3.5 3.4        Iron studies Latest Ref Rng & Units 11/1/2018 8/23/2018 7/26/2018   Iron 35 - 180 ug/dL 113 131 73   Iron sat 15 - 46 % 43 55(H) 30   Ferritin 12 - 150 ng/mL 216(H) 226(H) 216(H)     [facility] Txp Virology Latest Ref Rng & Units 11/1/2018 3/23/2018 2/1/2018   CVM DNA Quant - - - -   BK Spec - Plasma, EDTA anticoagulant - Plasma   BK Res BKNEG:BK Virus DNA Not Detected copies/mL PENDING - BK Virus DNA Not Detected   BK Log <2.7 Log copies/mL PENDING - Not Calculated   Hep B Core NR:Nonreactive - Nonreactive -        Recent Labs   Lab Test  07/26/18   0830  08/23/18   0741  11/01/18   0738   DOSTAC  Not Provided  915p 8.22.2018 2130 10/31/2018   TACROL  4.7*  4.9*  5.0     Recent Labs   Lab Test  03/02/15   0810  03/09/15   0821  05/24/17   0725   DOSMPA  Not Provided  2130 3/8/15  2230,5/23/17   MPACID  4.29*  3.30  0.75*   MPAG  39.0  41.1  10.2*     Scribe Disclosure:   I, Xuan Moya, am serving as a scribe to document services personally performed by Dr. Fransico Austin at this visit, based upon the provider's statements to me. All documentation has  been reviewed by the aforementioned provider prior to being entered into the official medical record.     Portions of this medical record were completed by a scribe. UPON MY REVIEW AND AUTHENTICATION BY ELECTRONIC SIGNATURE, this confirms (a) I performed the applicable clinical services, and (b) the record is accurate.           Kidney/Pancreas Recipient

## 2018-11-08 LAB
DONOR IDENTIFICATION: NORMAL
DQA1*05: 6853
DQB2: 4003
DSA COMMENTS: NORMAL
DSA PRESENT: YES
DSA TEST METHOD: NORMAL
ORGAN: NORMAL
SA1 CELL: NORMAL
SA1 COMMENTS: NORMAL
SA1 HI RISK ABY: NORMAL
SA1 MOD RISK ABY: NORMAL
SA1 TEST METHOD: NORMAL
SA2 CELL: NORMAL
SA2 COMMENTS: NORMAL
SA2 HI RISK ABY UA: NORMAL
SA2 MOD RISK ABY: NORMAL
SA2 TEST METHOD: NORMAL
UNACCEPTABLE ANTIGEN: NORMAL
UNOS CPRA: 84

## 2018-11-12 ENCOUNTER — TELEPHONE (OUTPATIENT)
Dept: TRANSPLANT | Facility: CLINIC | Age: 28
End: 2018-11-12

## 2018-11-12 DIAGNOSIS — Z94.0 KIDNEY REPLACED BY TRANSPLANT: Primary | ICD-10-CM

## 2018-11-12 NOTE — TELEPHONE ENCOUNTER
Pt does not want to proceed with biopsy, will review with Dr. Mar.    Transplant Coordinator Renal Biopsy Communication    Call placed to Caity Horan to discuss indication for kidney transplant biopsy per Dr. Mar.        Notes Recorded by Otilio Mar MD on 11/8/2018 at 4:30 PM  New DSA and with lower immunosuppression, recommend a kidney transplant biopsy to rule out acute antibody-mediated rejection.       Indication for transplant renal biopsy: See above.  Laterality: Right illiac fossa  Date of biopsy:      Patient location within 70 miles of [facility]: Yes.  If no, must stay overnight locally. Pt verbalizes staying .     Caity Marline's medication list was reviewed.   Anticoagulant: none   Ibuprofen: No.  Fish Oil:  No.  Medications held:      Recent blood pressure readings are WNL or not applicable. Instructed to take medication, especially blood pressure medications, before [facility] at 0600 day of procedure.     Procedure expectations and duration of stay discussed. Expressed pt can expect a phone call from LPN/MA to confirm biopsy date/time/location/directions/review of medications. Pt has no additional questions at this time. Transplant Office phone number given to pt for future questions.      Ping Back  Kidney/Pancreas Transplant Coordinator  [phone] option 5

## 2018-11-12 NOTE — LETTER
December 17, 2018        Dear Caity,    This letter is a reminder of your upcoming kidney biopsy on 12/19. Please check in to the [facility] at 6 a.m. Your procedure is scheduled for 8 a.m. After you have checked in, please stop by the lab on the 1st floor for labs. When you are finished with labs you can go up to the 5th floor for your biopsy.      Please remember:   -Do not take your immunosuppressant medications in the morning because your   drug level will be checked.    -Bring your pills with you to the clinic and take them after you have labs   -Bring something for entertainment while you wait after your procedure   -Plan to spend the morning at the clinic   - your car when you arrive    As we discussed, someone from the research team will also be meeting you on the 5th floor to talk about participation in a research study. Included you will find the consent form. Please review this prior to your visit. If you have questions about participation, please contact the research team directly.     Sincerely,  Post Kidney Transplant Team                                            Consent Form  Version 02/15/2018    Title of Research Study: Flow Cytometric Detection of Kidney Allograft Antigen-Specific T cells ([id number])    Researcher: Arti Garcia MD  Research Affiliation: [facility], Department of Pediatrics    For questions about research appointments, the research study, research results, or other concerns, call the study team at:    Researcher Name: Arti Garcia MD  Phone Number: [phone]  Email Address: [email or web address]    Support: This research is supported by the [facility], Department of Pediatrics.    What is research?  Doctors and researchers are committed to your care and safety. There are important differences  between research and your individual care plan:  ? The goal of research is to learn new things in order to help groups of people in the  future.  Researchers learn things by following the same plan with a number of participants, so they do  not usually make changes to the plan for individual research participants. You, as an individual,  may not be helped by volunteering for a research study.  ? As a patient you have an individual care plan. Participating in this research study is separate  from your medical treatment.    Why am I being asked to take part in this research study?  We are asking you to take part in this research study because you will be having a kidney biopsy as part of your post-transplant care. When you are having your kidney biopsy, we would like to collect an extra piece of kidney tissue for this research study.    What should I know about a research study?  ? Someone will explain this research study to you.  ? Whether or not you take part is up to you.  ? You can choose not to take part.  ? You can agree to take part and later change your mind.  ? Your decision will not be held against you.  ? You can ask all the questions you want before you decide.    Why is this research being done?      Researchers are trying to develop a new method of looking at T-cells in the kidney. T-cells are important in kidney transplant because they do two things: 1) your T-cells help to fight against infection, and 2) your T-cells can identify the kidney transplant as foreign and cause rejection. When T-cells are detected in a kidney biopsy, we have not been able to tell if they are targeted against the kidney.    Researchers will use the extra piece of kidney tissue to run a new test by a new methodology, which has been successful in mice to identify the specific target of the T cells. The researchers anticipate that this new test on your kidney biopsy tissue, will allow researchers to assess the presence and quantity of T cells targeted against the kidney transplant.    How long will the research last?  We expect that you will be in this research  study for one day.    How many people will be studied?  We would like to collect kidney biopsy tissue from 20 patients who have had a kidney transplant. We plan to include both adults and children.    What happens if I say  Yes, I want to be in this research ?  If you agree to take part in this research study, we will collect one extra piece of kidney tissue at the same time you are scheduled to have a kidney biopsy for your care. No one will have a biopsy just for this research study. We will coordinate with the pathology lab and the person performing your kidney biopsy; they may decide that they are unable to give us any tissue for this research.    What are my responsibilities if I take part in this research?  If you agree to take part and sign this consent form, we will coordinate getting the tissue. You will not have to do anything extra for this research.    What happens if I do not want to be in this research?  You do not have to take part in this research if you do not want to. You will still have your kidney biopsy that was ordered by your doctor. We will record that you have declined participation, so that we do not ask you again.     What happens if I say  Yes , but I change my mind later?  Choosing not to be in this study or to stop being in this study will not result in any penalty to you or loss of benefit to which you are entitled. Meaning, your choice not to be in this study will not negatively affect your right to any present or future medical treatment.    What are the risks of being in this study?  There are risks to having a kidney biopsy, which will also be explained to you by your health care team. The kidney biopsy procedure is generally safe. Obtaining an additional core of kidney tissue for research does carry a risk, as each core requires that the biopsy needle puncture the   kidney tissue.      Potential risks include:    bleeding (which may require a blood transfusion or a procedure to  stop the bleeding)    hematoma (a bruise caused by bleeding in the tissue)    arteriovenous fistula (an abnormal passage between an artery and a vein)    pseudoaneurysm (collection of blood in a blood vessel wall)    gross hematuria (blood in the urine)    paranephric bleeding (bleeding in tissue near the kidney)    intestinal perforation (ruptured bowel)    We will minimize these risks by requiring that:    your biopsy be performed by your clinical team with no interference from the research staff    the physician performing the biopsy will decide if an extra core of kidney tissue can safely be  collected for this research study    Will it cost me anything to participate in this research study?  Taking part in this research study will not lead to any costs to you. You and your insurance company will be charged for the health care services that you would ordinarily be responsible to pay, such as the kidney biopsy procedure.    Will being in this study help me in any way?  There are no benefits to you from your taking part in this research. We cannot promise any benefits to others from your taking part in this research. We hope this research will help us learn more about T cells in transplant kidneys to allow more targeted therapies and tests in the future to identify and treat rejection of transplanted kidneys.    What happens to the information collected for the research?  Efforts will be made to limit the use and disclosure of your personal information, including research study and medical records, to people who have a need to review this information. We cannot promise complete privacy. Organizations that may inspect and copy your information include the IRB and other representatives of this institution, including those that have responsibilities for monitoring or ensuring compliance. We will not ask you about child [or elder] abuse, but if you tell us about child [or elder] abuse or neglect, we may be required or  permitted by law or policy to report to authorities.    Will anyone besides the study team be at my consent meeting?  You may be asked by the study team for your permission for an  to observe your consent  meeting (or a recording of your consent meeting). Observing the consent meeting is one way that the [facility] makes sure that your rights as a research participant are protected. The  is there to observe the consent meeting, which will be carried out by the people on the study team. The  will not record any personal (e.g. name, date of birth) or confidential information about you. The  will not observe your consent meeting (or a recording of your consent meeting) without your permission ahead of time.                Who do I contact if I have question, concerns or feedback about my experience?  This research has been reviewed and approved by an Institutional Review Board (IRB) within the Human Research Protections Program (HRPP). To share feedback privately with the HRPP about your research experience, call the Research Participants  Advocate Line at [phone] or go to [email or web address]. You are encouraged to contact the HRPP if:  ? Your questions, concerns, or complaints are not being answered by the research team.  ? You cannot reach the research team.  ? You want to talk to someone besides the research team.  ? You have questions about your rights as a research participant.  ? You want to get information or provide input about this research.    Will I have a chance to provide feedback after the study is over?  The Human Research Protection Program may ask you to complete a survey that asks about your experience as a research participant. You do not have to complete the survey if you do not want to. If you do choose to complete the survey, your responses will be anonymous.  If you are not asked to complete a survey, but you would like to share feedback,  please contact the study team or the Human Research Protection Program (HRPP). See the  Who Can I Talk To?  section of this form for study team and HRPP contact information.    What else do I need to know?  In the event that this research activity results in an injury, treatment will be available, including first aid, emergency treatment and follow-up care as needed. Care for such injuries will be billed in the ordinary manner, to you or your insurance company. If you think that you have suffered a research related injury let the study physicians know right away.    Will I be compensated for my participation?  No, there will be no compensation for your participation in this research study.    Use of Identifiable Health Information  We are committed to respect your privacy and to keep your personal information confidential. When choosing to take part in this study, you are giving us the permission to use your personal health information that includes health information in your medical records and information that can identify you. For example, personal health information may include your name, address, phone number or social security number. Those persons who get your health information may not be required by Federal privacy laws (such as the Privacy Rule) to protect it. Some of those persons may be able to share your information with others without your separate permission. Please read the HIPAA Authorization form that we have provided and discussed.    The results of this study may also be used for teaching, publications, or for presentation at scientific meetings.                    Your signature documents your permission to take part in this research. You will be provided a copy ofthis signed document.  ______________________________________________      __________________  Signature of Participant           Date    ______________________________________________  Printed Name of  Participant    ____________________________________________          __________________  Signature of Person Obtaining Consent          Date    ______________________________________________  Printed Name of Person Obtaining Consent

## 2018-11-15 ENCOUNTER — INFUSION THERAPY VISIT (OUTPATIENT)
Dept: INFUSION THERAPY | Facility: CLINIC | Age: 28
End: 2018-11-15
Attending: INTERNAL MEDICINE
Payer: COMMERCIAL

## 2018-11-15 VITALS
OXYGEN SATURATION: 97 % | TEMPERATURE: 97.7 F | SYSTOLIC BLOOD PRESSURE: 99 MMHG | WEIGHT: 135.8 LBS | BODY MASS INDEX: 23.31 KG/M2 | HEART RATE: 65 BPM | DIASTOLIC BLOOD PRESSURE: 62 MMHG

## 2018-11-15 DIAGNOSIS — K51.011 ULCERATIVE PANCOLITIS WITH RECTAL BLEEDING (H): Primary | ICD-10-CM

## 2018-11-15 PROCEDURE — 25000128 H RX IP 250 OP 636: Mod: ZF | Performed by: INTERNAL MEDICINE

## 2018-11-15 PROCEDURE — 96365 THER/PROPH/DIAG IV INF INIT: CPT

## 2018-11-15 RX ADMIN — VEDOLIZUMAB 300 MG: 300 INJECTION, POWDER, LYOPHILIZED, FOR SOLUTION INTRAVENOUS at 09:04

## 2018-11-15 NOTE — PROGRESS NOTES
Nursing Note  Caity Horan [facility] for:   Chief Complaint   Patient presents with     Infusion     Entyvio     During today's [facility] appointment, orders from Dr. Doan were completed.  Frequency: every 8 weeks    Progress note:  Patient identification verified by name and date of birth.  Assessment completed.  Vitals recorded in Doc Flowsheets.  Patient was provided with education regarding infusion and possible side effects.  Patient verbalized understanding.      needed: No  Premedications: were not ordered.  Infusion Rates: 560 ml/hr.  Approximate Infusion length:30 minutes.   Labs: were not ordered for this appointment.  Vascular access: peripheral IV placed today.  Treatment Conditions:   ~~~ NOTE: If the patient answers yes to any of the questions below, hold the infusion and contact ordering provider or on-call provider.    1. Have you recently had an elevated temperature, fever, chills, productive cough, coughing for 3 weeks or longer or hemoptysis,  abnormal vital signs, night sweats,  chest pain or have you noticed a decrease in your appetite, unexplained weight loss or fatigue? No  2. Do you have any open wounds or new incisions? No  3. Do you have any recent or upcoming hospitalizations, surgeries or dental procedures? No  4. Do you currently have or recently have had any signs of illness or infection or are you on any antibiotics? No  5. Have you had any new, sudden or worsening abdominal pain? No  6. Have you or anyone in your household received a live vaccination in the past 4 weeks? Please note:  No live vaccines while on biologic/chemotherapy until 6 months after the last treatment.  Patient can receive the flu vaccine (shot only) and the pneumovax.  It is optimal for the patient to get these vaccines mid cycle, but they can be given at any time as long as it is not on the day of the infusion. No  7. Have  you recently been diagnosed with any new nervous system diseases (ie. Multiple sclerosis, Guillain [place], seizures, neurological changes) or cancer diagnosis? Are you on any form of radiation or chemotherapy? No  8. Are you pregnant or breast feeding or do you have plans of pregnancy in the future? No  9. Have you been having any signs of worsening depression or suicidal ideations?  (benlysta only) No  10. Have there been any other new onset medical symptoms? No    Patient tolerated infusion: well.    Drug Waste Record? No     Discharge Plan:   Follow up plan of care with: ongoing infusions at [facility]. and primary medical doctor.  Discharge instructions were reviewed with patient.  Patient/representative verbalized understanding of discharge instructions and all questions answered.  Patient discharged from [facility] in stable condition.    Kelly Dyson RN       Administrations This Visit     vedolizumab (ENTYVIO) 300 mg in sodium chloride 0.9 % 280 mL infusion     Admin Date Action Dose Rate Route Administered By          11/15/2018 New Bag 300 mg 560 mL/hr Intravenous Kelly Dyson RN                           /71  Pulse 65  Temp 97.7  F (36.5  C) (Oral)  Wt 61.6 kg (135 lb 12.8 oz)  LMP 10/19/2018  SpO2 97%  BMI 23.31 kg/m2

## 2018-11-15 NOTE — MR AVS SNAPSHOT
After Visit Summary   11/15/2018    Caity Horan    MRN: [id number]           Patient Information     Date Of Birth          1990        Visit Information        Provider Department      11/15/2018 8:30 AM UC 48 ATC; UC SPEC INFUSION [facility]        Today's Diagnoses     Ulcerative pancolitis with rectal bleeding (H)    -  1       Follow-ups after your visit        Your next 10 appointments already scheduled     Osei 10, 2019  8:00 AM CST   Infusion 120 with UC SPEC INFUSION, UC 44 ATC   [facility] ([facility])    [address]  [address]  [place] [address]   [phone]            Feb 01, 2019  8:20 AM CST   (Arrive by 8:05 AM)   RETURN INFLAMMATORY BOWEL DISEASE with Rupert Dsouza PA-C   [facility] ([facility])    [address]  4th Floor  [place] [address]   [phone]            Mar 07, 2019  8:30 AM CST   Infusion 120 with UC SPEC INFUSION, UC 48 ATC   [facility] ([facility])    [address]  [address]  [place] [address]   [phone]            May 02, 2019  8:30 AM CDT   Infusion 120 with UC SPEC INFUSION, UC 48 ATC   [facility] ([facility])    [address]  [address]  [place] [address]   [phone]            Nov 01, 2019  4:35 PM CDT   (Arrive by 4:05 PM)   Return Kidney Transplant with Uc Kidney/Pancreas Recipient 1   [facility] ([facility])    [address]  [address]  [place] [address]   [phone]              Who to contact     If you have questions or need follow up information about today's clinic visit or your schedule please contact [facility] directly at [phone].  Normal or non-critical lab and imaging results will be communicated to you by MyChart, letter or phone within 4 business days after the clinic has received the results. If you do not hear from us within 7 days, please contact the clinic through [employer] or phone. If you have a critical or abnormal lab result, we will notify you by phone as soon as possible.  Submit refill requests through [employer] or call your pharmacy and they will forward the refill request to us. Please allow 3 business days for your refill to be completed.          Additional Information About Your Visit        [employer] Information     [employer] gives you secure access to your electronic health record. If you see a primary care provider, you can also send messages to your care team and make appointments. If you have questions, please call your primary care clinic.  If you do not have a primary care provider, please call [phone] and they will assist you.        Care EveryWhere ID     This is your Care EveryWhere ID. This could be used by other organizations to access your [place] medical records  [id number]        Your Vitals Were     Pulse Temperature Last Period Pulse Oximetry BMI (Body Mass Index)       65 97.7  F (36.5  C) (Oral) 10/19/2018 97% 23.31 kg/m2        Blood Pressure from Last 3 Encounters:   11/15/18 99/62   11/02/18 104/65   10/08/18 100/60    Weight from Last 3 Encounters:   11/15/18 61.6 kg (135 lb 12.8 oz)   11/02/18 59.9 kg (132 lb)   10/08/18 60.2 kg (132 lb 11.2 oz)              Today, you had the following     No orders found for display       Primary Care Provider Fax #    Physician No Ref-Primary [phone]       No address on file        Equal Access to Services     AUNDREA QUACH : Chelle Machuca, meagan tian, jud mancini, philip haddad. So [facility] [phone].    ATENCIÓN: Jensen rodriguez  español, tiene a carreon disposición servicios gratuitos de asistencia lingüística. Negro carter [phone].    We comply with applicable federal civil rights laws and [place] laws. We do not discriminate on the basis of race, color, national origin, age, disability, sex, sexual orientation, or gender identity.            Thank you!     Thank you for choosing [facility]  for your care. Our goal is always to provide you with excellent care. Hearing back from our patients is one way we can continue to improve our services. Please take a few minutes to complete the written survey that you may receive in the mail after your visit with us. Thank you!             Your Updated Medication List - Protect others around you: Learn how to safely use, store and throw away your medicines at www.disposemymeds.org.          This list is accurate as of 11/15/18  9:43 AM.  Always use your most recent med list.                   Brand Name Dispense Instructions for use Diagnosis    B-12 1000 MCG Tbcr     90 tablet    Take 1,000 mcg by mouth daily    B12 deficiency due to diet       ENTYVIO IV           magnesium oxide 400 MG tablet    MAG-OX    90 tablet    Take 1 tablet (400 mg) by mouth daily    Hypomagnesemia, Status post kidney transplant, Kidney replaced by transplant, Kidney transplanted       mycophenolate 250 MG capsule    GENERIC EQUIVALENT    60 capsule    Take 1 capsule (250 mg) by mouth 2 times daily    Kidney transplanted, Kidney replaced by transplant, Status post kidney transplant       tacrolimus 0.5 MG capsule    GENERIC EQUIVALENT    180 capsule    Take 3 capsules (1.5 mg) by mouth 2 times daily    Kidney replaced by transplant, Status post kidney transplant, Hypomagnesemia, Kidney transplanted       UNABLE TO FIND      MEDICATION NAME: Biotin        vitamin D 2000 units tablet     100 tablet    Take 2,000 Units by mouth daily    Hypovitaminosis D

## 2018-12-03 NOTE — TELEPHONE ENCOUNTER
Dr. Mar did call Caity Marline the week of November 12th to discuss potential biopsy. He did leave  and neither Dr. Mar or myself received call back.   Caity Horan did reach out via [employer] stating she has more questions about a biopsy and plan of care but has very limited availability to discuss via phone due to her job.  After discussing options again with Dr. Mar, he still recommends biopsy or at least repeat labs this weeks - he would consider increasing IS if EBV is lower. He would also consider Allosure if covered by insurance.  [facility] sent [employer] message with recommendations from Dr. Mar. Lab ordered entered (will wait on Allosure for now). RN[facility] also let her know she can come back for clinic appt to discuss in person and gave her my direct line to discuss any questions over the phone.

## 2018-12-04 ENCOUNTER — TELEPHONE (OUTPATIENT)
Dept: TRANSPLANT | Facility: CLINIC | Age: 28
End: 2018-12-04

## 2018-12-04 ENCOUNTER — TELEPHONE (OUTPATIENT)
Dept: PHARMACY | Facility: CLINIC | Age: 28
End: 2018-12-04

## 2018-12-04 NOTE — LETTER
December 17, 2018        Dear Caity,    This letter is a reminder of your upcoming kidney biopsy on December 19th. Please check in to the [facility] at 6 a.m. Your procedure is scheduled for 8 a.m. After you have checked in, please stop by the lab on the 1st floor for labs. When you are finished with labs you can go up to the 5th floor for your biopsy.      Please remember:   -Do not take your immunosuppressant medications in the morning because your   drug level will be checked.    -Bring your pills with you to the clinic and take them after you have labs   -Bring something for entertainment while you wait after your procedure   -Plan to spend the morning at the clinic   - your car when you arrive    As we discussed, someone from the research team will also be meeting you on the 5th floor to talk about participation in a research study. Included you will find the consent form. Please review this prior to your visit. If you have questions about participation, please contact the research team directly.     Sincerely,  Post Kidney Transplant Team                                            Consent Form  Version 02/15/2018    Title of Research Study: Flow Cytometric Detection of Kidney Allograft Antigen-Specific T cells ([id number])    Researcher: Arti Garcia MD  Research Affiliation: [facility], Department of Pediatrics    For questions about research appointments, the research study, research results, or other concerns, call the study team at:    Researcher Name: Arti Garcia MD  Phone Number: [phone]  Email Address: [email or web address]    Support: This research is supported by the [facility], Department of Pediatrics.    What is research?  Doctors and researchers are committed to your care and safety. There are important differences  between research and your individual care plan:  ? The goal of research is to learn new things in order to help groups of people in the  future.  Researchers learn things by following the same plan with a number of participants, so they do  not usually make changes to the plan for individual research participants. You, as an individual,  may not be helped by volunteering for a research study.  ? As a patient you have an individual care plan. Participating in this research study is separate  from your medical treatment.    Why am I being asked to take part in this research study?  We are asking you to take part in this research study because you will be having a kidney biopsy as part of your post-transplant care. When you are having your kidney biopsy, we would like to collect an extra piece of kidney tissue for this research study.    What should I know about a research study?  ? Someone will explain this research study to you.  ? Whether or not you take part is up to you.  ? You can choose not to take part.  ? You can agree to take part and later change your mind.  ? Your decision will not be held against you.  ? You can ask all the questions you want before you decide.    Why is this research being done?      Researchers are trying to develop a new method of looking at T-cells in the kidney. T-cells are important in kidney transplant because they do two things: 1) your T-cells help to fight against infection, and 2) your T-cells can identify the kidney transplant as foreign and cause rejection. When T-cells are detected in a kidney biopsy, we have not been able to tell if they are targeted against the kidney.    Researchers will use the extra piece of kidney tissue to run a new test by a new methodology, which has been successful in mice to identify the specific target of the T cells. The researchers anticipate that this new test on your kidney biopsy tissue, will allow researchers to assess the presence and quantity of T cells targeted against the kidney transplant.    How long will the research last?  We expect that you will be in this research  study for one day.    How many people will be studied?  We would like to collect kidney biopsy tissue from 20 patients who have had a kidney transplant. We plan to include both adults and children.    What happens if I say  Yes, I want to be in this research ?  If you agree to take part in this research study, we will collect one extra piece of kidney tissue at the same time you are scheduled to have a kidney biopsy for your care. No one will have a biopsy just for this research study. We will coordinate with the pathology lab and the person performing your kidney biopsy; they may decide that they are unable to give us any tissue for this research.    What are my responsibilities if I take part in this research?  If you agree to take part and sign this consent form, we will coordinate getting the tissue. You will not have to do anything extra for this research.    What happens if I do not want to be in this research?  You do not have to take part in this research if you do not want to. You will still have your kidney biopsy that was ordered by your doctor. We will record that you have declined participation, so that we do not ask you again.     What happens if I say  Yes , but I change my mind later?  Choosing not to be in this study or to stop being in this study will not result in any penalty to you or loss of benefit to which you are entitled. Meaning, your choice not to be in this study will not negatively affect your right to any present or future medical treatment.    What are the risks of being in this study?  There are risks to having a kidney biopsy, which will also be explained to you by your health care team. The kidney biopsy procedure is generally safe. Obtaining an additional core of kidney tissue for research does carry a risk, as each core requires that the biopsy needle puncture the   kidney tissue.      Potential risks include:    bleeding (which may require a blood transfusion or a procedure to  stop the bleeding)    hematoma (a bruise caused by bleeding in the tissue)    arteriovenous fistula (an abnormal passage between an artery and a vein)    pseudoaneurysm (collection of blood in a blood vessel wall)    gross hematuria (blood in the urine)    paranephric bleeding (bleeding in tissue near the kidney)    intestinal perforation (ruptured bowel)    We will minimize these risks by requiring that:    your biopsy be performed by your clinical team with no interference from the research staff    the physician performing the biopsy will decide if an extra core of kidney tissue can safely be  collected for this research study    Will it cost me anything to participate in this research study?  Taking part in this research study will not lead to any costs to you. You and your insurance company will be charged for the health care services that you would ordinarily be responsible to pay, such as the kidney biopsy procedure.    Will being in this study help me in any way?  There are no benefits to you from your taking part in this research. We cannot promise any benefits to others from your taking part in this research. We hope this research will help us learn more about T cells in transplant kidneys to allow more targeted therapies and tests in the future to identify and treat rejection of transplanted kidneys.    What happens to the information collected for the research?  Efforts will be made to limit the use and disclosure of your personal information, including research study and medical records, to people who have a need to review this information. We cannot promise complete privacy. Organizations that may inspect and copy your information include the IRB and other representatives of this institution, including those that have responsibilities for monitoring or ensuring compliance. We will not ask you about child [or elder] abuse, but if you tell us about child [or elder] abuse or neglect, we may be required or  permitted by law or policy to report to authorities.    Will anyone besides the study team be at my consent meeting?  You may be asked by the study team for your permission for an  to observe your consent  meeting (or a recording of your consent meeting). Observing the consent meeting is one way that the [facility] makes sure that your rights as a research participant are protected. The  is there to observe the consent meeting, which will be carried out by the people on the study team. The  will not record any personal (e.g. name, date of birth) or confidential information about you. The  will not observe your consent meeting (or a recording of your consent meeting) without your permission ahead of time.                Who do I contact if I have question, concerns or feedback about my experience?  This research has been reviewed and approved by an Institutional Review Board (IRB) within the Human Research Protections Program (HRPP). To share feedback privately with the HRPP about your research experience, call the Research Participants  Advocate Line at [phone] or go to [email or web address]. You are encouraged to contact the HRPP if:  ? Your questions, concerns, or complaints are not being answered by the research team.  ? You cannot reach the research team.  ? You want to talk to someone besides the research team.  ? You have questions about your rights as a research participant.  ? You want to get information or provide input about this research.    Will I have a chance to provide feedback after the study is over?  The Human Research Protection Program may ask you to complete a survey that asks about your experience as a research participant. You do not have to complete the survey if you do not want to. If you do choose to complete the survey, your responses will be anonymous.  If you are not asked to complete a survey, but you would like to share feedback,  please contact the study team or the Human Research Protection Program (HRPP). See the  Who Can I Talk To?  section of this form for study team and HRPP contact information.    What else do I need to know?  In the event that this research activity results in an injury, treatment will be available, including first aid, emergency treatment and follow-up care as needed. Care for such injuries will be billed in the ordinary manner, to you or your insurance company. If you think that you have suffered a research related injury let the study physicians know right away.    Will I be compensated for my participation?  No, there will be no compensation for your participation in this research study.    Use of Identifiable Health Information  We are committed to respect your privacy and to keep your personal information confidential. When choosing to take part in this study, you are giving us the permission to use your personal health information that includes health information in your medical records and information that can identify you. For example, personal health information may include your name, address, phone number or social security number. Those persons who get your health information may not be required by Federal privacy laws (such as the Privacy Rule) to protect it. Some of those persons may be able to share your information with others without your separate permission. Please read the HIPAA Authorization form that we have provided and discussed.    The results of this study may also be used for teaching, publications, or for presentation at scientific meetings.                    Your signature documents your permission to take part in this research. You will be provided a copy ofthis signed document.  ______________________________________________      __________________  Signature of Participant           Date    ______________________________________________  Printed Name of  Participant    ____________________________________________          __________________  Signature of Person Obtaining Consent          Date    ______________________________________________  Printed Name of Person Obtaining Consent

## 2018-12-04 NOTE — LETTER
DATE & TIME ISSUED: 2018 12:21 PM  PATIENT NAME: Caity Horan   : 1990       Dear Caity,    This letter is a reminder of your upcoming kidney biopsy on .  Please check in to the [facility] at 6 a.m. Your procedure is scheduled for 8AM. After you have checked in, please stop by the lab on the 1st floor for labs. When you are finished with labs you can go up to the 5th floor for your biopsy.      Please remember:   -Do not take your immunosuppressant medications in the morning because your   drug level will be checked.    -Bring your pills with you to the clinic and take them after you have labs   -Bring something for entertainment while you wait after your procedure   -Plan to spend the morning at the clinic   - your car when you arrive    As we discussed, someone from the research team will also be meeting you on the 5th floor to talk about participation in a research study. Included you will find the consent form. Please review this prior to your visit. If you have questions about participation, please contact the research team directly.     Sincerely,  Post Kidney Transplant Team                    Consent Form  Version 02/15/2018    Title of Research Study: Flow Cytometric Detection of Kidney Allograft Antigen-Specific T cells ([id number])    Researcher: Arti Garcia MD  Research Affiliation: [facility], Department of Pediatrics    For questions about research appointments, the research study, research results, or other concerns, call the study team at:    Researcher Name: Arti Garcia MD  Phone Number: [phone]  Email Address: [email or web address]    Support: This research is supported by the [facility], Department of Pediatrics.    What is research?  Doctors and researchers are committed to your care and safety. There are important differences  between research and your individual care plan:  ? The goal of research is to learn  new things in order to help groups of people in the future.  Researchers learn things by following the same plan with a number of participants, so they do  not usually make changes to the plan for individual research participants. You, as an individual,  may not be helped by volunteering for a research study.  ? As a patient you have an individual care plan. Participating in this research study is separate  from your medical treatment.    Why am I being asked to take part in this research study?  We are asking you to take part in this research study because you will be having a kidney biopsy as part of your post-transplant care. When you are having your kidney biopsy, we would like to collect an extra piece of kidney tissue for this research study.    What should I know about a research study?  ? Someone will explain this research study to you.  ? Whether or not you take part is up to you.  ? You can choose not to take part.  ? You can agree to take part and later change your mind.  ? Your decision will not be held against you.  ? You can ask all the questions you want before you decide.    Why is this research being done?      Researchers are trying to develop a new method of looking at T-cells in the kidney. T-cells are important in kidney transplant because they do two things: 1) your T-cells help to fight against infection, and 2) your T-cells can identify the kidney transplant as foreign and cause rejection. When T-cells are detected in a kidney biopsy, we have not been able to tell if they are targeted against the kidney.    Researchers will use the extra piece of kidney tissue to run a new test by a new methodology, which has been successful in mice to identify the specific target of the T cells. The researchers anticipate that this new test on your kidney biopsy tissue, will allow researchers to assess the presence and quantity of T cells targeted against the kidney transplant.    How long will the research  last?  We expect that you will be in this research study for one day.    How many people will be studied?  We would like to collect kidney biopsy tissue from 20 patients who have had a kidney transplant. We plan to include both adults and children.    What happens if I say  Yes, I want to be in this research ?  If you agree to take part in this research study, we will collect one extra piece of kidney tissue at the same time you are scheduled to have a kidney biopsy for your care. No one will have a biopsy just for this research study. We will coordinate with the pathology lab and the person performing your kidney biopsy; they may decide that they are unable to give us any tissue for this research.    What are my responsibilities if I take part in this research?  If you agree to take part and sign this consent form, we will coordinate getting the tissue. You will not have to do anything extra for this research.    What happens if I do not want to be in this research?  You do not have to take part in this research if you do not want to. You will still have your kidney biopsy that was ordered by your doctor. We will record that you have declined participation, so that we do not ask you again.     What happens if I say  Yes , but I change my mind later?  Choosing not to be in this study or to stop being in this study will not result in any penalty to you or loss of benefit to which you are entitled. Meaning, your choice not to be in this study will not negatively affect your right to any present or future medical treatment.    What are the risks of being in this study?  There are risks to having a kidney biopsy, which will also be explained to you by your health care team. The kidney biopsy procedure is generally safe. Obtaining an additional core of kidney tissue for research does carry a risk, as each core requires that the biopsy needle puncture the   kidney tissue.      Potential risks include:    bleeding (which  may require a blood transfusion or a procedure to stop the bleeding)    hematoma (a bruise caused by bleeding in the tissue)    arteriovenous fistula (an abnormal passage between an artery and a vein)    pseudoaneurysm (collection of blood in a blood vessel wall)    gross hematuria (blood in the urine)    paranephric bleeding (bleeding in tissue near the kidney)    intestinal perforation (ruptured bowel)    We will minimize these risks by requiring that:    your biopsy be performed by your clinical team with no interference from the research staff    the physician performing the biopsy will decide if an extra core of kidney tissue can safely be  collected for this research study    Will it cost me anything to participate in this research study?  Taking part in this research study will not lead to any costs to you. You and your insurance company will be charged for the health care services that you would ordinarily be responsible to pay, such as the kidney biopsy procedure.    Will being in this study help me in any way?  There are no benefits to you from your taking part in this research. We cannot promise any benefits to others from your taking part in this research. We hope this research will help us learn more about T cells in transplant kidneys to allow more targeted therapies and tests in the future to identify and treat rejection of transplanted kidneys.    What happens to the information collected for the research?  Efforts will be made to limit the use and disclosure of your personal information, including research study and medical records, to people who have a need to review this information. We cannot promise complete privacy. Organizations that may inspect and copy your information include the IRB and other representatives of this institution, including those that have responsibilities for monitoring or ensuring compliance. We will not ask you about child [or elder] abuse, but if you tell us about child  [or elder] abuse or neglect, we may be required or permitted by law or policy to report to authorities.    Will anyone besides the study team be at my consent meeting?  You may be asked by the study team for your permission for an  to observe your consent  meeting (or a recording of your consent meeting). Observing the consent meeting is one way that the [facility] makes sure that your rights as a research participant are protected. The  is there to observe the consent meeting, which will be carried out by the people on the study team. The  will not record any personal (e.g. name, date of birth) or confidential information about you. The  will not observe your consent meeting (or a recording of your consent meeting) without your permission ahead of time.                Who do I contact if I have question, concerns or feedback about my experience?  This research has been reviewed and approved by an Institutional Review Board (IRB) within the Human Research Protections Program (HRPP). To share feedback privately with the HRPP about your research experience, call the Research Participants  Advocate Line at [phone] or go to [email or web address]. You are encouraged to contact the HRPP if:  ? Your questions, concerns, or complaints are not being answered by the research team.  ? You cannot reach the research team.  ? You want to talk to someone besides the research team.  ? You have questions about your rights as a research participant.  ? You want to get information or provide input about this research.    Will I have a chance to provide feedback after the study is over?  The Human Research Protection Program may ask you to complete a survey that asks about your experience as a research participant. You do not have to complete the survey if you do not want to. If you do choose to complete the survey, your responses will be anonymous.  If you are not asked to complete a  survey, but you would like to share feedback, please contact the study team or the Human Research Protection Program (HRPP). See the  Who Can I Talk To?  section of this form for study team and HRPP contact information.    What else do I need to know?  In the event that this research activity results in an injury, treatment will be available, including first aid, emergency treatment and follow-up care as needed. Care for such injuries will be billed in the ordinary manner, to you or your insurance company. If you think that you have suffered a research related injury let the study physicians know right away.    Will I be compensated for my participation?  No, there will be no compensation for your participation in this research study.    Use of Identifiable Health Information  We are committed to respect your privacy and to keep your personal information confidential. When choosing to take part in this study, you are giving us the permission to use your personal health information that includes health information in your medical records and information that can identify you. For example, personal health information may include your name, address, phone number or social security number. Those persons who get your health information may not be required by Federal privacy laws (such as the Privacy Rule) to protect it. Some of those persons may be able to share your information with others without your separate permission. Please read the HIPAA Authorization form that we have provided and discussed.    The results of this study may also be used for teaching, publications, or for presentation at scientific meetings.                    Your signature documents your permission to take part in this research. You will be provided a copy ofthis signed document.  ______________________________________________      __________________  Signature of Participant            Date    ______________________________________________  Printed Name of Participant    ____________________________________________          __________________  Signature of Person Obtaining Consent          Date    ______________________________________________  Printed Name of Person Obtaining Consent

## 2018-12-04 NOTE — TELEPHONE ENCOUNTER
Call from patient to discuss kidney biopsy and DSA result. [employer] message sent earlier today to further explain DSA and concern for rejection.   Answered patient's questions about kidney biopsy, significance of DSA, how it develops, and how this relates to her EBV viremia. Pt had many questions about all of the above. Would like to know what the probability is that she has rejection with the level of DSA that she has. Will discuss with Dr Mar. 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 Pt tentatively scheduled for biopsy 12/14 or 12/19. Is not on any blood thinners. Will call back when she decides date that works best with her work schedule.

## 2018-12-05 NOTE — TELEPHONE ENCOUNTER
Follow-up with anemia management service:    LM for Caity reminding her that she is due for her monthly Hgb lab  Ferritin and iron labs won't be due to be drawn again until 19    Anemia Latest Ref Rng & Units 2018   Hemoglobin 11.7 - 15.7 g/dL 9.4(L) 9.1(L) 10.7(L) 12.1 12.5 12.5 12.5   IV Iron Dose - - 750mg 750mg - - - -   TSAT 15 - 46 % 5(L) - - 30 55(H) - 43   Ferritin 12 - 150 ng/mL 7(L) - - 216(H) 226(H) - 216(H)       Orders needed to be renewed (for next follow-up date) in EPIC: Hgb, ferritin and iron standing lab orders   Med order expires: N/A   Lab orders : 10/10/2019 EDUARDO  Follow-up call date: 18    Janette Harrington CPhT  [facility]  [phone]  Reviewed 2018 EDUARDO  Anemia Management Service  Abida Song,PharmD, Brittany Santos RN  Phone: [phone]  Fax: [phone]

## 2018-12-06 NOTE — TELEPHONE ENCOUNTER
Transplant Coordinator Renal Biopsy Communication    Call placed to Caity Marline to discuss indication for kidney transplant biopsy per Dr. Mar.  After discussing labs and plan at length, Caity is now agreeable to move forward with biopsy.    Indication for transplant renal biopsy: New DSA   Laterality: Right  Date of biopsy: 12/19    Patient location within 70 miles of [facility]: Yes.      Caity Horan's medication list was reviewed.   Anticoagulant: none   Ibuprofen: No.  Fish Oil:  No.  Medications held: NA    Recent blood pressure readings are WNL or not applicable. Instructed to take medication, especially blood pressure medications, before arriving to the [facility] at 0600 day of procedure.     Procedure expectations and duration of stay discussed. Expressed pt can expect a phone call from LPN/MA to confirm biopsy date/time/location/directions/review of medications. Pt has no additional questions at this time. Transplant Office phone number given to pt for future questions.      Ping Back  Kidney/Pancreas Transplant Coordinator  [phone] option 5

## 2018-12-14 ENCOUNTER — TELEPHONE (OUTPATIENT)
Dept: PHARMACY | Facility: CLINIC | Age: 28
End: 2018-12-14

## 2018-12-17 DIAGNOSIS — Z94.0 KIDNEY REPLACED BY TRANSPLANT: Primary | ICD-10-CM

## 2018-12-17 NOTE — TELEPHONE ENCOUNTER
LPN/MA  Renal Biopsy Communication    Call to pt to confirm renal biopsy procedure. Biopsy orders entered and patient aware of date 12- [place] [facility] [place] 6:00[place].     No need to be NPO.      Discussed anticoagulants (i.e. fish oil, ASA, Plavix, Coumadin, Ibuprofen). Pt denies use of anticoagulants.     Take all medicine before arrival for biopsy and bring all medicine bottles with.      Report to 1st floor lab, then 5th floor for biopsy.      Use [employer] services and bring form of entertainment.     Discussed with patient need to stay overnight locally evening of procedure if live more than 70 miles away.    Call placed to scheduling at [phone] to schedule and confirm biopsy date/time    Lab appointment scheduled for morning of biopsy.      Call placed to patient. Patient v\u of instructions listed above. Orders placed

## 2018-12-17 NOTE — TELEPHONE ENCOUNTER
Tellpe message sent to Caity Horan regarding possible study being done by Dr. Garcia.     Caity Horan is not interesting in participating in research study.  Writer did notify Amalia Florez.

## 2018-12-19 ENCOUNTER — ANCILLARY PROCEDURE (OUTPATIENT)
Dept: RADIOLOGY | Facility: AMBULATORY SURGERY CENTER | Age: 28
End: 2018-12-19
Attending: INTERNAL MEDICINE
Payer: COMMERCIAL

## 2018-12-19 ENCOUNTER — RESULTS ONLY (OUTPATIENT)
Dept: OTHER | Facility: CLINIC | Age: 28
End: 2018-12-19

## 2018-12-19 ENCOUNTER — HOSPITAL ENCOUNTER (OUTPATIENT)
Facility: AMBULATORY SURGERY CENTER | Age: 28
End: 2018-12-19
Attending: INTERNAL MEDICINE
Payer: COMMERCIAL

## 2018-12-19 VITALS
SYSTOLIC BLOOD PRESSURE: 102 MMHG | OXYGEN SATURATION: 98 % | HEART RATE: 62 BPM | TEMPERATURE: 98.4 F | DIASTOLIC BLOOD PRESSURE: 65 MMHG | RESPIRATION RATE: 16 BRPM

## 2018-12-19 DIAGNOSIS — Z94.0 KIDNEY REPLACED BY TRANSPLANT: ICD-10-CM

## 2018-12-19 LAB
ABO + RH BLD: NORMAL
ABO + RH BLD: NORMAL
ALBUMIN UR-MCNC: NEGATIVE MG/DL
ANION GAP SERPL CALCULATED.3IONS-SCNC: 8 MMOL/L (ref 3–14)
APPEARANCE UR: CLEAR
BACTERIA #/AREA URNS HPF: ABNORMAL /HPF
BASOPHILS # BLD AUTO: 0 10E9/L (ref 0–0.2)
BASOPHILS NFR BLD AUTO: 0.7 %
BILIRUB UR QL STRIP: NEGATIVE
BKV DNA # SPEC NAA+PROBE: NORMAL COPIES/ML
BKV DNA SPEC NAA+PROBE-LOG#: NORMAL LOG COPIES/ML
BLD GP AB SCN SERPL QL: NORMAL
BLOOD BANK CMNT PATIENT-IMP: NORMAL
BUN SERPL-MCNC: 27 MG/DL (ref 7–30)
CALCIUM SERPL-MCNC: 8.6 MG/DL (ref 8.5–10.1)
CHLORIDE SERPL-SCNC: 109 MMOL/L (ref 94–109)
CO2 SERPL-SCNC: 23 MMOL/L (ref 20–32)
COLOR UR AUTO: YELLOW
CREAT SERPL-MCNC: 1.13 MG/DL (ref 0.52–1.04)
CREAT UR-MCNC: 167 MG/DL
DIFFERENTIAL METHOD BLD: NORMAL
DONOR IDENTIFICATION: NORMAL
DQA1*05: 7272
DQB2: 4298
DSA COMMENTS: NORMAL
DSA PRESENT: YES
DSA TEST METHOD: NORMAL
EOSINOPHIL # BLD AUTO: 0.2 10E9/L (ref 0–0.7)
EOSINOPHIL NFR BLD AUTO: 3.7 %
ERYTHROCYTE [DISTWIDTH] IN BLOOD BY AUTOMATED COUNT: 11.8 % (ref 10–15)
GFR SERPL CREATININE-BSD FRML MDRD: 66 ML/MIN/{1.73_M2}
GLUCOSE SERPL-MCNC: 99 MG/DL (ref 70–99)
GLUCOSE UR STRIP-MCNC: NEGATIVE MG/DL
HCT VFR BLD AUTO: 36.9 % (ref 35–47)
HGB BLD-MCNC: 11.4 G/DL (ref 11.7–15.7)
HGB BLD-MCNC: 11.7 G/DL (ref 11.7–15.7)
HGB UR QL STRIP: NEGATIVE
IMM GRANULOCYTES # BLD: 0 10E9/L (ref 0–0.4)
IMM GRANULOCYTES NFR BLD: 0.4 %
INR PPP: 1 (ref 0.86–1.14)
KETONES UR STRIP-MCNC: NEGATIVE MG/DL
LEUKOCYTE ESTERASE UR QL STRIP: NEGATIVE
LYMPHOCYTES # BLD AUTO: 2.3 10E9/L (ref 0.8–5.3)
LYMPHOCYTES NFR BLD AUTO: 42.7 %
MCH RBC QN AUTO: 30.8 PG (ref 26.5–33)
MCHC RBC AUTO-ENTMCNC: 31.7 G/DL (ref 31.5–36.5)
MCV RBC AUTO: 97 FL (ref 78–100)
MICROALBUMIN UR-MCNC: 41 MG/L
MICROALBUMIN/CREAT UR: 24.67 MG/G CR (ref 0–25)
MONOCYTES # BLD AUTO: 0.4 10E9/L (ref 0–1.3)
MONOCYTES NFR BLD AUTO: 8.2 %
MUCOUS THREADS #/AREA URNS LPF: PRESENT /LPF
NEUTROPHILS # BLD AUTO: 2.4 10E9/L (ref 1.6–8.3)
NEUTROPHILS NFR BLD AUTO: 44.3 %
NITRATE UR QL: NEGATIVE
NRBC # BLD AUTO: 0 10*3/UL
NRBC BLD AUTO-RTO: 0 /100
ORGAN: NORMAL
PH UR STRIP: 5 PH (ref 5–7)
PLATELET # BLD AUTO: 224 10E9/L (ref 150–450)
POTASSIUM SERPL-SCNC: 4.5 MMOL/L (ref 3.4–5.3)
PROT UR-MCNC: 0.2 G/L
PROT/CREAT 24H UR: 0.12 G/G CR (ref 0–0.2)
RBC # BLD AUTO: 3.8 10E12/L (ref 3.8–5.2)
RBC #/AREA URNS AUTO: 1 /HPF (ref 0–2)
SA1 CELL: NORMAL
SA1 COMMENTS: NORMAL
SA1 HI RISK ABY: NORMAL
SA1 MOD RISK ABY: NORMAL
SA1 TEST METHOD: NORMAL
SA2 CELL: NORMAL
SA2 COMMENTS: NORMAL
SA2 HI RISK ABY UA: NORMAL
SA2 MOD RISK ABY: NORMAL
SA2 TEST METHOD: NORMAL
SODIUM SERPL-SCNC: 139 MMOL/L (ref 133–144)
SOURCE: ABNORMAL
SP GR UR STRIP: 1.02 (ref 1–1.03)
SPECIMEN EXP DATE BLD: NORMAL
SPECIMEN SOURCE: NORMAL
SQUAMOUS #/AREA URNS AUTO: 1 /HPF (ref 0–1)
TACROLIMUS BLD-MCNC: 6.1 UG/L (ref 5–15)
TME LAST DOSE: NORMAL H
UNACCEPTABLE ANTIGEN: NORMAL
UNOS CPRA: 84
UROBILINOGEN UR STRIP-MCNC: 0 MG/DL (ref 0–2)
WBC # BLD AUTO: 5.3 10E9/L (ref 4–11)
WBC #/AREA URNS AUTO: 2 /HPF (ref 0–5)

## 2018-12-19 RX ADMIN — Medication 8 ML: at 08:56

## 2018-12-19 NOTE — H&P
Nephrology Procedure H&P  12/19/2018     Assessment & Recommendations:   1. LDKT - baseline creatinine ~ 1.0-1.2, which has remained stable. Normal proteinuria. Will plan on kidney transplant biopsy to evaluate for rejection with new DSA. Will make no changes in immunosuppression.    Transplant History:  Transplant: 12/5/2014 (Kidney)        Donor Class: Standard Criteria Donor  Crossmatch at time of Transplant:    DSA at time of Transplant:  No  Present Maintenance Immunosuppression:  Tacrolimus and Mycophenolate mofetil  Baseline creatinine:  1.0-1.2  Latest DSA lab date:  PRA:  Class I:   SA1 Comments   Date Value Ref Range Status   11/01/2018   Final     Test performed by modified procedure. Serum heat inactivated and tested   by a modified ([place]) protocol including fetal calf serum addition.   High-risk, mfi >3,000. Mod-risk, mfi 500-3,000.         Class II:    SA2 Comments   Date Value Ref Range Status   11/01/2018   Final     Test performed by modified procedure. Serum heat inactivated and tested   by a modified ([place]) protocol including fetal calf serum addition.   High-risk, mfi >3,000. Mod-risk, mfi 500-3,000.       Biopsy:  No  Rejection History:  No  Significant Complications: EBV viremia  Transplant Coordinator: Ping Back   Transplant Office Phone Number:  [phone]     2. Hypertension - well controlled at target of less than < 130/80. No changes.  3. EBV Viremia - decreased EBV PCR with last check, down to ~ 12K.  With improving EBV, would like to continue on low level immunosuppression, but with new DSA, it would be beneficial to increase immunosuppression.  Will obtain a biopsy to help in decision making.    Reason for Visit:  Ms. Horan is here for DSA and potential kidney transplant biopsy.    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    Ms. Horan reports feeling good overall with  minimal medical complaints.  Her post transplant course was initially unremarkable with a baseline Cr ~ 1.0-1.2.  Patient then developed significant EBV viremia that necessitated reduction in immunosuppression.  In addition, she was diagnosed with ulcerative colitis.  Her kidney function has remained stable, but she now has new DSA.  EBV viremia is improved, but not gone.    Her energy level is good and remains normal.  She continues to get regular exercise.  Denies any chest pain or shortness of breath with exertion.  Appetite is good with no nausea, vomiting or diarrhea lately.  No fever, sweats or chills.  No leg swelling.    Pain Over Kidney Tx:  No Pain or Burning with Urination:  No  Gross Hematuria:  No  Taking NSAIDs:  No    Home BP: Not checked    Review of Systems:  A comprehensive review of systems was obtained and negative, except as noted in the History of Present Illness or Active Medical Problems.    Active Medical Problems:  Patient Active Problem List    Diagnosis     Ulcerative pancolitis with rectal bleeding (H)     Vitamin B12 deficiency     Anemia, iron deficiency     EBV (Nicole-Barr virus) viremia     Aftercare following organ transplant     Hypomagnesemia     Immunosuppressed status (H)     Kidney replaced by transplant     IgA nephropathy     Current Medications:  Current Outpatient Medications   Medication Sig Dispense Refill     Cholecalciferol (VITAMIN D) 2000 units tablet Take 2,000 Units by mouth daily 100 tablet 3     Cyanocobalamin (B-12) 1000 MCG TBCR Take 1,000 mcg by mouth daily 90 tablet 3     magnesium oxide (MAG-OX) 400 MG tablet Take 1 tablet (400 mg) by mouth daily 90 tablet 3     mycophenolate (GENERIC EQUIVALENT) 250 MG capsule Take 1 capsule (250 mg) by mouth 2 times daily 60 capsule 11     tacrolimus (GENERIC EQUIVALENT) 0.5 MG capsule Take 3 capsules (1.5 mg) by mouth 2 times daily 180 capsule 6     UNABLE TO FIND MEDICATION NAME: Biotin       Vedolizumab (ENTYVIO IV)         Vitals:  /69   Pulse 80   Temp 98.1  F (36.7  C) (Oral)   Resp 16   SpO2 98%     Physical Exam:   GENERAL APPEARANCE: alert and no distress  HENT: mouth without ulcers or lesions  PULM: lungs clear to auscultation, equal air movement  CV: regular rhythm, normal rate     - no LE edema bilaterally  GI: soft, nontender, bowel sounds are normal  MS: no evidence of inflammation in joints, no muscle tenderness  TX KIDNEY: nontender    Labs:   All labs reviewed by me  Recent Results (from the past 8 hour(s))   ABO/Rh type and screen    Collection Time: 12/19/18  6:11 AM   Result Value Ref Range    ABO A     RH(D) Pos     Antibody Screen Neg     Test Valid Only At          [facility]    Specimen Expires 12/22/2018    INR    Collection Time: 12/19/18  6:11 AM   Result Value Ref Range    INR 1.00 0.86 - 1.14   CBC with platelets differential    Collection Time: 12/19/18  6:11 AM   Result Value Ref Range    WBC 5.3 4.0 - 11.0 10e9/L    RBC Count 3.80 3.8 - 5.2 10e12/L    Hemoglobin 11.7 11.7 - 15.7 g/dL    Hematocrit 36.9 35.0 - 47.0 %    MCV 97 78 - 100 fl    MCH 30.8 26.5 - 33.0 pg    MCHC 31.7 31.5 - 36.5 g/dL    RDW 11.8 10.0 - 15.0 %    Platelet Count 224 150 - 450 10e9/L    Diff Method Automated Method     % Neutrophils 44.3 %    % Lymphocytes 42.7 %    % Monocytes 8.2 %    % Eosinophils 3.7 %    % Basophils 0.7 %    % Immature Granulocytes 0.4 %    Nucleated RBCs 0 0 /100    Absolute Neutrophil 2.4 1.6 - 8.3 10e9/L    Absolute Lymphocytes 2.3 0.8 - 5.3 10e9/L    Absolute Monocytes 0.4 0.0 - 1.3 10e9/L    Absolute Eosinophils 0.2 0.0 - 0.7 10e9/L    Absolute Basophils 0.0 0.0 - 0.2 10e9/L    Abs Immature Granulocytes 0.0 0 - 0.4 10e9/L    Absolute Nucleated RBC 0.0    Basic metabolic panel    Collection Time: 12/19/18  6:11 AM   Result Value Ref Range    Sodium 139 133 - 144 mmol/L    Potassium 4.5 3.4 - 5.3 mmol/L    Chloride 109 94 - 109 mmol/L    Carbon  Dioxide 23 20 - 32 mmol/L    Anion Gap 8 3 - 14 mmol/L    Glucose 99 70 - 99 mg/dL    Urea Nitrogen 27 7 - 30 mg/dL    Creatinine 1.13 (H) 0.52 - 1.04 mg/dL    GFR Estimate 66 >60 mL/min/[1.73_m2]    GFR Estimate If Black 76 >60 mL/min/[1.73_m2]    Calcium 8.6 8.5 - 10.1 mg/dL   Protein  random urine with Creat Ratio    Collection Time: 12/19/18  6:15 AM   Result Value Ref Range    Protein Random Urine 0.20 g/L    Protein Total Urine g/gr Creatinine 0.12 0 - 0.2 g/g Cr   Albumin Random Urine Quantitative with Creat Ratio    Collection Time: 12/19/18  6:15 AM   Result Value Ref Range    Creatinine Urine 167 mg/dL    Albumin Urine mg/L 41 mg/L    Albumin Urine mg/g Cr 24.67 0 - 25 mg/g Cr   Urine Culture Aerobic Bacterial    Collection Time: 12/19/18  6:15 AM   Result Value Ref Range    Specimen Description Midstream Urine     Special Requests Specimen received in preservative     Culture Micro PENDING    Routine UA with microscopic    Collection Time: 12/19/18  6:15 AM   Result Value Ref Range    Color Urine Yellow     Appearance Urine Clear     Glucose Urine Negative NEG^Negative mg/dL    Bilirubin Urine Negative NEG^Negative    Ketones Urine Negative NEG^Negative mg/dL    Specific Gravity Urine 1.019 1.003 - 1.035    Blood Urine Negative NEG^Negative    pH Urine 5.0 5.0 - 7.0 pH    Protein Albumin Urine Negative NEG^Negative mg/dL    Urobilinogen mg/dL 0.0 0.0 - 2.0 mg/dL    Nitrite Urine Negative NEG^Negative    Leukocyte Esterase Urine Negative NEG^Negative    Source Midstream Urine     WBC Urine 2 0 - 5 /HPF    RBC Urine 1 0 - 2 /HPF    Bacteria Urine Few (A) NEG^Negative /HPF    Squamous Epithelial /HPF Urine 1 0 - 1 /HPF    Mucous Urine Present (A) NEG^Negative /LPF        Otilio Mar MD

## 2018-12-19 NOTE — DISCHARGE INSTRUCTIONS
[facility]  Home Care Following Kidney Biopsy  ACTIVITY: NO heavy lifting (weight greater than 10 pounds) for 1 week. NO strenuous activity for 24 hours; relax and take it easy.     DIET: Resume your regular diet and drink plenty of fluids UNLESS you are fluid restricted.    DRAINAGE: There should be minimal drainage from the biopsy site. If bleeding soaks the dressing, you should lie down and apply pressure to the site for a minimum of 10 minutes. Call one of the numbers below if experienced bleeding that soaked the dressing.     DRESSING: Keep the dressing in place for 24 hours to prevent the site from re-opening and bleeding.     SEDATION: IF you received sedation medications, DO NOT drive or operate heavy machinery, DO NOT drink alcoholic beverages, and DO NOT make important legal decisions.    CALL ONE OF THE NUMBERS BELOW IF YOU EXPERIENCE ANY ONE OF THE FOLLOWING:      Excessive bleeding or drainage    Excessive swelling, redness, or tenderness at the site    Fever above 100.5 F, orally    Severe pain    Drainage that is green, yellow, thick white, or has a bad odor    If you havebloody urine or see bloody clots in your urine     Emergency Department: [phone] (open 24 hours)  Transplant Center: [phone] (open 7:00 AM - 6:30 PM)

## 2018-12-19 NOTE — PROCEDURES
Kidney Transplant Biopsy Procedure Note    Indication/Diagnosis:  Kidney transplant with DSA    Procedure:  The indications and risks of the kidney biopsy, including bleeding severe enough to require hospitalization, transfusion, surgery, or even loss of the kidney or death, were explained, understood and agreed.  Consent was signed.  The kidney, located in the right lower quadrant, was visualized under ultrasound and biopsy site marked.  Patient was prepped and draped in the usual sterile manner.  Biopsy site was anesthetized with 1% lidocaine.  Biopsy consisted of 3 passes with a 18 ga needle under direct ultrasound guidance were made and tissue was sent to pathology.  There were no immediate complications.  Pressure was held over biopsy site and patient will be observed for signs of bleeding or other complications.  Patient remained hemodynamically stable during and early post procedure.

## 2018-12-20 ENCOUNTER — TELEPHONE (OUTPATIENT)
Dept: TRANSPLANT | Facility: CLINIC | Age: 28
End: 2018-12-20

## 2018-12-20 DIAGNOSIS — Z94.0 KIDNEY TRANSPLANTED: Primary | ICD-10-CM

## 2018-12-20 DIAGNOSIS — Z94.0 STATUS POST KIDNEY TRANSPLANT: ICD-10-CM

## 2018-12-20 DIAGNOSIS — Z94.0 KIDNEY REPLACED BY TRANSPLANT: ICD-10-CM

## 2018-12-20 LAB
BACTERIA SPEC CULT: NO GROWTH
Lab: NORMAL
SPECIMEN SOURCE: NORMAL

## 2018-12-20 RX ORDER — MYCOPHENOLATE MOFETIL 250 MG/1
500 CAPSULE ORAL 2 TIMES DAILY
Qty: 122 CAPSULE | Refills: 11 | Status: SHIPPED | OUTPATIENT
Start: 2018-12-20 | End: 2019-07-03

## 2018-12-20 NOTE — TELEPHONE ENCOUNTER
RNCC left detailed VM for Caity Horan regarding dose change of mycophenolate. New prescription sent.     LPN task:    Please update standing lab orders. She also needs monthly EBV PCR and DSA recheck on March of 2019. Thanks!

## 2018-12-20 NOTE — TELEPHONE ENCOUNTER
----- Message from Otilio Mar MD sent at 12/20/2018  4:20 PM CST -----  Ping    Ms. Horan's biopsy showed no evidence of rejection and C4d was negative.  She did have some IgA deposits, but no evidence of IgA nephropathy.    I called and let the patient know the results.  I recommended we increase mycophenolate mofetil to 500 mg bid.  Target tacrolimus 4-6.    Let's recheck EBV PCR monthly and repeat DSA in 3 months.    Thanks.

## 2018-12-21 LAB — COPATH REPORT: NORMAL

## 2018-12-29 DIAGNOSIS — Z94.0 STATUS POST KIDNEY TRANSPLANT: ICD-10-CM

## 2018-12-29 DIAGNOSIS — E83.42 HYPOMAGNESEMIA: ICD-10-CM

## 2018-12-29 DIAGNOSIS — Z94.0 KIDNEY REPLACED BY TRANSPLANT: ICD-10-CM

## 2018-12-29 DIAGNOSIS — Z94.0 KIDNEY TRANSPLANTED: ICD-10-CM

## 2018-12-31 RX ORDER — MAGNESIUM OXIDE 400 MG/1
400 TABLET ORAL DAILY
Qty: 90 TABLET | Refills: 3 | Status: SHIPPED | OUTPATIENT
Start: 2018-12-31 | End: 2019-09-11

## 2019-01-09 ENCOUNTER — TELEPHONE (OUTPATIENT)
Dept: GASTROENTEROLOGY | Facility: CLINIC | Age: 29
End: 2019-01-09

## 2019-01-10 ENCOUNTER — TELEPHONE (OUTPATIENT)
Dept: PHARMACY | Facility: CLINIC | Age: 29
End: 2019-01-10

## 2019-01-10 ENCOUNTER — TELEPHONE (OUTPATIENT)
Dept: GASTROENTEROLOGY | Facility: CLINIC | Age: 29
End: 2019-01-10

## 2019-01-10 ENCOUNTER — INFUSION THERAPY VISIT (OUTPATIENT)
Dept: INFUSION THERAPY | Facility: CLINIC | Age: 29
End: 2019-01-10
Attending: INTERNAL MEDICINE
Payer: COMMERCIAL

## 2019-01-10 VITALS
WEIGHT: 139.4 LBS | DIASTOLIC BLOOD PRESSURE: 73 MMHG | TEMPERATURE: 98.1 F | SYSTOLIC BLOOD PRESSURE: 110 MMHG | BODY MASS INDEX: 23.93 KG/M2 | OXYGEN SATURATION: 98 %

## 2019-01-10 DIAGNOSIS — K51.011 ULCERATIVE PANCOLITIS WITH RECTAL BLEEDING (H): Primary | ICD-10-CM

## 2019-01-10 LAB
ALBUMIN SERPL-MCNC: 3.3 G/DL (ref 3.4–5)
ALP SERPL-CCNC: 44 U/L (ref 40–150)
ALT SERPL W P-5'-P-CCNC: 13 U/L (ref 0–50)
AST SERPL W P-5'-P-CCNC: 13 U/L (ref 0–45)
BASOPHILS # BLD AUTO: 0 10E9/L (ref 0–0.2)
BASOPHILS NFR BLD AUTO: 0.8 %
BILIRUB DIRECT SERPL-MCNC: 0.1 MG/DL (ref 0–0.2)
BILIRUB SERPL-MCNC: 0.4 MG/DL (ref 0.2–1.3)
CRP SERPL-MCNC: <2.9 MG/L (ref 0–8)
DIFFERENTIAL METHOD BLD: ABNORMAL
EOSINOPHIL # BLD AUTO: 0.2 10E9/L (ref 0–0.7)
EOSINOPHIL NFR BLD AUTO: 3.3 %
ERYTHROCYTE [DISTWIDTH] IN BLOOD BY AUTOMATED COUNT: 11.5 % (ref 10–15)
ERYTHROCYTE [SEDIMENTATION RATE] IN BLOOD BY WESTERGREN METHOD: 16 MM/H (ref 0–20)
HCT VFR BLD AUTO: 33.7 % (ref 35–47)
HGB BLD-MCNC: 10.8 G/DL (ref 11.7–15.7)
IMM GRANULOCYTES # BLD: 0 10E9/L (ref 0–0.4)
IMM GRANULOCYTES NFR BLD: 0.2 %
LYMPHOCYTES # BLD AUTO: 2 10E9/L (ref 0.8–5.3)
LYMPHOCYTES NFR BLD AUTO: 38.5 %
MCH RBC QN AUTO: 31.3 PG (ref 26.5–33)
MCHC RBC AUTO-ENTMCNC: 32 G/DL (ref 31.5–36.5)
MCV RBC AUTO: 98 FL (ref 78–100)
MONOCYTES # BLD AUTO: 0.4 10E9/L (ref 0–1.3)
MONOCYTES NFR BLD AUTO: 8.2 %
NEUTROPHILS # BLD AUTO: 2.6 10E9/L (ref 1.6–8.3)
NEUTROPHILS NFR BLD AUTO: 49 %
NRBC # BLD AUTO: 0 10*3/UL
NRBC BLD AUTO-RTO: 0 /100
PLATELET # BLD AUTO: 224 10E9/L (ref 150–450)
PROT SERPL-MCNC: 6.6 G/DL (ref 6.8–8.8)
RBC # BLD AUTO: 3.45 10E12/L (ref 3.8–5.2)
WBC # BLD AUTO: 5.2 10E9/L (ref 4–11)

## 2019-01-10 PROCEDURE — 86140 C-REACTIVE PROTEIN: CPT | Performed by: INTERNAL MEDICINE

## 2019-01-10 PROCEDURE — 96365 THER/PROPH/DIAG IV INF INIT: CPT

## 2019-01-10 PROCEDURE — 80076 HEPATIC FUNCTION PANEL: CPT | Performed by: INTERNAL MEDICINE

## 2019-01-10 PROCEDURE — 25000128 H RX IP 250 OP 636: Mod: ZF | Performed by: INTERNAL MEDICINE

## 2019-01-10 PROCEDURE — 36415 COLL VENOUS BLD VENIPUNCTURE: CPT

## 2019-01-10 PROCEDURE — 85025 COMPLETE CBC W/AUTO DIFF WBC: CPT | Performed by: INTERNAL MEDICINE

## 2019-01-10 PROCEDURE — 85652 RBC SED RATE AUTOMATED: CPT | Performed by: INTERNAL MEDICINE

## 2019-01-10 RX ADMIN — VEDOLIZUMAB 300 MG: 300 INJECTION, POWDER, LYOPHILIZED, FOR SOLUTION INTRAVENOUS at 09:23

## 2019-01-10 NOTE — PROGRESS NOTES
Nursing Note  Caity Horan [facility] for:   Chief Complaint   Patient presents with     Infusion     entyvio      During today's [facility] appointment, orders from  were completed.  Frequency: every 8 weeks    Progress note:  Patient identification verified by name and date of birth.  Assessment completed.  Vitals recorded in Doc Flowsheets.  Patient was provided with education regarding infusion and possible side effects.  Patient verbalized understanding.      needed: No  Premedications: were not ordered.  Infusion Rates: 510 ml/hr.  Approximate Infusion length:30 minutes.   Labs: were drawn per orders.   Vascular access: peripheral IV placed today.  Treatment Conditions:   ~~~ NOTE: If the patient answers yes to any of the questions below, hold the infusion and contact ordering provider or on-call provider.    1. Have you recently had an elevated temperature, fever, chills, productive cough, coughing for 3 weeks or longer or hemoptysis,  abnormal vital signs, night sweats,  chest pain or have you noticed a decrease in your appetite, unexplained weight loss or fatigue? No  2. Do you have any open wounds or new incisions? No  3. Do you have any recent or upcoming hospitalizations, surgeries or dental procedures? No  4. Do you currently have or recently have had any signs of illness or infection or are you on any antibiotics? No  5. Have you had any new, sudden or worsening abdominal pain? No  6. Have you or anyone in your household received a live vaccination in the past 4 weeks? Please note:  No live vaccines while on biologic/chemotherapy until 6 months after the last treatment.  Patient can receive the flu vaccine (shot only) and the pneumovax.  It is optimal for the patient to get these vaccines mid cycle, but they can be given at any time as long as it is not on the day of the infusion. No  7. Have you recently  been diagnosed with any new nervous system diseases (ie. Multiple sclerosis, Guillain Stephens, seizures, neurological changes) or cancer diagnosis? Are you on any form of radiation or chemotherapy? No  8. Are you pregnant or breast feeding or do you have plans of pregnancy in the future? No  9. Have you been having any signs of worsening depression or suicidal ideations?  (benlysta only) No  10. Have there been any other new onset medical symptoms? No    Patient tolerated infusion: well.    Drug Waste Record? No     Discharge Plan:   Follow up plan of care with: ongoing infusions at [facility]. and primary medical doctor.  Discharge instructions were reviewed with patient.  Patient/representative verbalized understanding of discharge instructions and all questions answered.  Patient discharged from [facility] in stable condition.    Kelly Dyson RN       Administrations This Visit     vedolizumab (ENTYVIO) 300 mg in sodium chloride 0.9 % 280 mL infusion     Admin Date  01/10/2019 Action  New Bag Dose  300 mg Rate  560 mL/hr Route  Intravenous Administered By  Batool Moses RN                /70   Temp 98.1  F (36.7  C) (Oral)   Wt 63.2 kg (139 lb 6.4 oz)   SpO2 98%   BMI 23.93 kg/m

## 2019-01-10 NOTE — TELEPHONE ENCOUNTER
Called patient and discussed her insurance coverage and the inability to have infusions in hospital based infusion clinics.    The options for  Infusions were discussed at length with the patient and she had decided to have her infusions at home. Will contact home infusion tomorrow to set up plan

## 2019-01-10 NOTE — TELEPHONE ENCOUNTER
"----- Message from Yadira Davies RN sent at 1/9/2019  5:53 PM CST -----  Regarding: FW: any problem with her appt on the 1oth   THIS PT IS SCHEDULED TOMORROW  ----- Message -----  From: Catrachita Washington  Sent: 1/7/2019   6:49 PM  To: Yadira Davies RN, Anni Cruz, RN, #  Subject: RE: any problem with her appt on the 1oth        Her [employer] plan has changed for 2019 and we will need to submit a new PA, She will be subject to the site of care policy.  Is there a medical reason she can not transition to home infusion?       ----- Message -----  From: Yadira Davies RN  Sent: 1/7/2019  11:03 AM  To: Anni Cruz RN, [facility]  Subject: any problem with her appt on the 1oth            River Carolinae is out of the office today so I sent  Your message to our infusion   to respond.  I see your infusion is on Thursday the 10th.    ===View-only below this line===      ----- Message -----     From: Caity Horan     Sent: 1/4/2019  5:34 PM CST       To: Yenifer Doan MD  Subject: Question about medications    Hi Dr. Doan/Anni!    I had sent a message early December regarding [employer] coverage issues on my Entyvio infusions. Then Anni so kindly followed up and asked for more detail - I wasn't able to respond when I got caught up in kidney stuff (so, so sorry).    Here's what I'm seeing from [employer] (letter attached as well):  - My current service site is not a \"preferred service site\"  - Entyvio is also included on a list of medications that they claim could be added as verified medications to my chosen site of service to make this work?    In the letter it says they'll call me, but I haven't heard a peep.    Since we're into the new year, and I have an infusion next Thursday (1/10) - I'm hoping I can still get the full brand version of Entyvio at the . I'm most comfortable there and would be incredibly stressed trying to find a new location.    Any help or guidance would be so greatly " appreciated!     Many thanks in advance!    Caity

## 2019-01-10 NOTE — TELEPHONE ENCOUNTER
Spoke to Dr. Doan in regards to patients breakthrough symptoms at week 7 of her infusions. Dr. Doan would like to increase her infusions to every 6 weeks. Will change therapy plan and updated the patient on plan of care

## 2019-01-10 NOTE — TELEPHONE ENCOUNTER
"----- Message from Catrachita Washington sent at 1/9/2019  4:57 PM CST -----  Regarding: RE: any problem with her appt on the 1oth   I will have one of the [facility] folks look right now. Sorry I am just seeing this message. 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      ----- Message -----  From: Anni Cruz, RN  Sent: 1/9/2019  12:01 PM  To: Catrachita Washington  Subject: FW: any problem with her appt on the 1oth        HI,    Just  wondering if you had heard anything about her approval for her Entyvio infusion tomorrow, she is schedule at 8 am. I really appreciate it.    Thanks,  Anni Cruz RN, BSN  RN care coordinator  [address] [place] [id number]  Phone: [phone]  Fax: [phone]  After hours:[phone]  ----- Message -----  From: Catrachita Washington  Sent: 1/7/2019   6:49 PM  To: Yadira Davies RN, Anni Cruz RN, #  Subject: RE: any problem with her appt on the 1oth        Her [employer] plan has changed for 2019 and we will need to submit a new PA, She will be subject to the site of care policy.  Is there a medical reason she can not transition to home infusion?       ----- Message -----  From: Yadira Davies RN  Sent: 1/7/2019  11:03 AM  To: Anni Cruz RN, [facility]  Subject: any problem with her appt on the 1oth            Anni Carolina is out of the office today so I sent  Your message to our infusion   to respond.  I see your infusion is on Thursday the 10th.    ===View-only below this line===      ----- Message -----     From: Caity Horan     Sent: 1/4/2019  5:34 PM CST       To: Yenifer Doan MD  Subject: Question about medications    Hi Dr. Doan/Anni!    I had sent a message early December regarding [employer] coverage issues on my Entyvio infusions. Then Anni so kindly followed up and asked for more detail - I wasn't able to respond when I got caught up in kidney stuff (so, so sorry).    Here's what I'm seeing from [employer] (letter attached " "as well):  - My current service site is not a \"preferred service site\"  - Entyvio is also included on a list of medications that they claim could be added as verified medications to my chosen site of service to make this work?    In the letter it says they'll call me, but I haven't heard a peep.    Since we're into the new year, and I have an infusion next Thursday (1/10) - I'm hoping I can still get the full brand version of Entyvio at the . I'm most comfortable there and would be incredibly stressed trying to find a new location.    Any help or guidance would be so greatly appreciated!     Many thanks in advance!    Caity        "

## 2019-01-10 NOTE — TELEPHONE ENCOUNTER
Received  Phone call from patient stating that she was just informed that she can receive a one time hospital based infusion today as scheduled.     Charge nurse updated and patient had an appointment at 8:30 this morning in the infusion center in the [facility] building.

## 2019-01-10 NOTE — TELEPHONE ENCOUNTER
"----- Message from Anni Cruz RN sent at 1/10/2019 12:24 PM CST -----  Hi,    Patient has agreed to go to home care for her infusions. I guess she had a bad experience with a nurse after she had her kidney transplant.    Anyway she was able to get her infusion today (one time exception) [place] at the [facility] and then next one will be at home.    She mentioned to me while we were talking last night that she can start to \"feel\" she needs an infusion around the start of week 7.  Is there anything you want to do?     Anni"

## 2019-01-10 NOTE — PATIENT INSTRUCTIONS
Patient Education     Vedolizumab Solution for injection  What is this medicine?  VEDOLIZUMAB (Ve strong JACKI you mab) is used to treat ulcerative colitis and Crohn's disease in adult patients.  This medicine may be used for other purposes; ask your health care provider or pharmacist if you have questions.  What should I tell my health care provider before I take this medicine?  They need to know if you have any of these conditions:    diabetes    hepatitis B or history of hepatitis B infection    HIV or AIDS    immune system problems    infection or history of infections    liver disease    recently received or scheduled to receive a vaccine    scheduled to have surgery    tuberculosis, a positive skin test for tuberculosis or have recently been in close contact with someone who has tuberculosis    an unusual or allergic reaction to vedolizumab, other medicines, foods, dyes, or preservatives    pregnant or trying to get pregnant    breast-feeding  How should I use this medicine?  This medicine is for infusion into a vein. It is given by a health care professional in a hospital or clinic setting.  A special MedGuide will be given to you by the pharmacist with each prescription and refill. Be sure to read this information carefully each time.  Talk to your pediatrician regarding the use of this medicine in children. This medicine is not approved for use in children.  Overdosage: If you think you've taken too much of this medicine contact a poison control center or emergency room at once.  NOTE: This medicine is only for you. Do not share this medicine with others.  What if I miss a dose?  It is important not to miss your dose. Call your doctor or health care professional if you are unable to keep an appointment.  What may interact with this medicine?    steroid medicines like prednisone or cortisone    TNF-alpha inhibitors like natalizumab, adalimumab, and infliximab    vaccines  This list may not describe all possible  interactions. Give your health care provider a list of all the medicines, herbs, non-prescription drugs, or dietary supplements you use. Also tell them if you smoke, drink alcohol, or use illegal drugs. Some items may interact with your medicine.  What should I watch for while using this medicine?  Your condition will be monitored carefully while you are receiving this medicine. Visit your doctor for regular check ups. Tell your doctor or healthcare professional if your symptoms do not start to get better or if they get worse.  Stay away from people who are sick. Call your doctor or health care professional for advice if you get a fever, chills or sore throat, or other symptoms of a cold or flu. Do not treat yourself.  In some patients, this medicine may cause a serious brain infection that may cause death. If you have any problems seeing, thinking, speaking, walking, or standing, tell your doctor right away. If you cannot reach your doctor, get urgent medical care.  What side effects may I notice from receiving this medicine?  Side effects that you should report to your doctor or health care professional as soon as possible:    allergic reactions like skin rash, itching or hives, swelling of the face, lips, or tongue    breathing problems    changes in vision    chest pain    dark urine    depression, feelings of sadness    dizziness    general ill feeling or flu-like symptoms    irregular, missed, or painful menstrual periods    light-colored stools    loss of appetite, nausea    muscle weakness    problems with balance, talking, or walking    right upper belly pain    unusually weak or tired    yellowing of the eyes or skin  Side effects that usually do not require medical attention (Report these to your doctor or health care professional if they continue or are bothersome.):    aches, pains    headache    stomach upset    tiredness  This list may not describe all possible side effects. Call your doctor for  medical advice about side effects. You may report side effects to FDA at 8-315-STW-1305.  Where should I keep my medicine?  This drug is given in a hospital or clinic and will not be stored at home.  NOTE: This sheet is a summary. It may not cover all possible information. If you have questions about this medicine, talk to your doctor, pharmacist, or health care provider.  NOTE:This sheet is a summary. It may not cover all possible information. If you have questions about this medicine, talk to your doctor, pharmacist, or health care provider. Copyright  2016 Gold Standard

## 2019-01-11 NOTE — TELEPHONE ENCOUNTER
Anemia Management Note  SUBJECTIVE/OBJECTIVE:  Referred by Dr. Otilio Mar on 10/6/2017  Primary Diagnosis: Anemia in Chronic Kidney Disease (N18.3, D63.1)     Secondary Diagnosis:  Chronic Kidney Disease, Stage 3 (N18.3)   Hgb goal range:  9-10  Epo/Darbo: None  Iron regimen:  Does not tolerate oral iron  Labs : 10/05/2018  Communication: Consent to communicate on file OK to leave messages for scheduling, medical and billing on cell phone.  Dated 10/27/2014    Anemia Latest Ref Rng & Units 2018 2018 2018 2018 2018 2018 1/10/2019   Hemoglobin 11.7 - 15.7 g/dL 12.1 12.5 12.5 12.5 11.7 11.4(L) 10.8(L)   IV Iron Dose - - - - - - - -   TSAT 15 - 46 % 30 55(H) - 43 - - -   Ferritin 12 - 150 ng/mL 216(H) 226(H) - 216(H) - - -     BP Readings from Last 3 Encounters:   01/10/19 110/73   18 102/65   11/15/18 99/62     Wt Readings from Last 2 Encounters:   01/10/19 139 lb 6.4 oz (63.2 kg)   11/15/18 135 lb 12.8 oz (61.6 kg)     Patient has an appt at the [facility] on     ASSESSMENT:  Hgb:at goal - continue to monitor  TSat: at goal >30% Ferritin: At goal (>100ng/mL)    PLAN:  RTC for Hgb, ferritin and iron labs in 3 week(s)    Orders needed to be renewed (for next follow-up date) in EPIC: None    Iron labs due:  19    Plan discussed with:  No call made  Plan provided by:  Janette Harrington CP  An[facility] Clinic  [phone]    NEXT FOLLOW-UP DATE:  19  Reviewed 2019 [facility]  Anemia Management Service  Abida Song,FemiD, Princess Harrington,Pavithra Smith, RN  Phone: [phone]  Fax: [phone]

## 2019-01-14 ENCOUNTER — TELEPHONE (OUTPATIENT)
Dept: TRANSPLANT | Facility: CLINIC | Age: 29
End: 2019-01-14

## 2019-01-23 NOTE — TELEPHONE ENCOUNTER
Medication refill: vit B12 1000 mcg  Last OV: 11/2/18  Next OV: 11/1/19  Current labs:   Results for PIETER GREEN (MRN [id number]) as of 1/23/2019 10:03   Ref. Range 1/10/2019 08:12   WBC Latest Ref Range: 4.0 - 11.0 10e9/L 5.2   Hemoglobin Latest Ref Range: 11.7 - 15.7 g/dL 10.8 (L)   Hematocrit Latest Ref Range: 35.0 - 47.0 % 33.7 (L)   Platelet Count Latest Ref Range: 150 - 450 10e9/L 224   RBC Count Latest Ref Range: 3.8 - 5.2 10e12/L 3.45 (L)   MCV Latest Ref Range: 78 - 100 fl 98   MCH Latest Ref Range: 26.5 - 33.0 pg 31.3   MCHC Latest Ref Range: 31.5 - 36.5 g/dL 32.0     Pharmacy: [facility] T [phone]  Refilled per Nephrology protocol  Saskia Guo LPN  Nephrology  [phone]

## 2019-01-24 ENCOUNTER — TELEPHONE (OUTPATIENT)
Dept: GASTROENTEROLOGY | Facility: CLINIC | Age: 29
End: 2019-01-24

## 2019-01-24 DIAGNOSIS — E53.8 B12 DEFICIENCY DUE TO DIET: ICD-10-CM

## 2019-01-24 NOTE — TELEPHONE ENCOUNTER
Last Office Visit with Nephrologist:  11/2/18.  Medication refilled per Nephrology Clinic protocol.    Angelina Dwyer RN

## 2019-01-25 DIAGNOSIS — Z94.0 KIDNEY REPLACED BY TRANSPLANT: Primary | ICD-10-CM

## 2019-01-25 DIAGNOSIS — Z94.0 KIDNEY TRANSPLANTED: ICD-10-CM

## 2019-01-25 DIAGNOSIS — Z94.0 STATUS POST KIDNEY TRANSPLANT: ICD-10-CM

## 2019-01-25 DIAGNOSIS — E83.42 HYPOMAGNESEMIA: ICD-10-CM

## 2019-01-25 RX ORDER — TACROLIMUS 0.5 MG/1
1.5 CAPSULE ORAL 2 TIMES DAILY
Qty: 540 CAPSULE | Refills: 3 | Status: SHIPPED | OUTPATIENT
Start: 2019-01-25 | End: 2019-07-03

## 2019-01-28 ENCOUNTER — HOME INFUSION (PRE-WILLOW HOME INFUSION) (OUTPATIENT)
Dept: PHARMACY | Facility: CLINIC | Age: 29
End: 2019-01-28

## 2019-01-29 NOTE — PROGRESS NOTES
This is a recent snapshot of the patient's [facility] medical record.  For current drug dose and complete information and questions, call [phone] or In [facility] pool, fv home infusion ([id number])  CSN Number:  [id number]

## 2019-01-31 ENCOUNTER — TELEPHONE (OUTPATIENT)
Dept: SURGERY | Facility: CLINIC | Age: 29
End: 2019-01-31

## 2019-01-31 DIAGNOSIS — K51.011 ULCERATIVE PANCOLITIS WITH RECTAL BLEEDING (H): ICD-10-CM

## 2019-01-31 DIAGNOSIS — D50.9 ANEMIA, IRON DEFICIENCY: Primary | ICD-10-CM

## 2019-01-31 NOTE — TELEPHONE ENCOUNTER
Called patient and left voicemail message and informed her that she will need to have iron studies completed. Patient can report to any [place] lab to have these labs drawn. If patient has any questions in regards to this she was given my direct number to call or asked to my chart me.

## 2019-02-01 ENCOUNTER — APPOINTMENT (OUTPATIENT)
Dept: LAB | Facility: CLINIC | Age: 29
End: 2019-02-01
Attending: INTERNAL MEDICINE
Payer: COMMERCIAL

## 2019-02-01 ENCOUNTER — TELEPHONE (OUTPATIENT)
Dept: PHARMACY | Facility: CLINIC | Age: 29
End: 2019-02-01

## 2019-02-01 NOTE — TELEPHONE ENCOUNTER
Referred by Dr. Otilio Mar on 10/6/2017    Hgb has been stable since 7/26/2018 with no intervention.    Patient meets criteria for discharge from Anemia Clinic with at least 12 weeks without iron or MICKEY therapy.    Anemia Latest Ref Rng & Units 7/26/2018 8/23/2018 9/20/2018 11/1/2018 12/19/2018 12/19/2018 1/10/2019   Hemoglobin 11.7 - 15.7 g/dL 12.1 12.5 12.5 12.5 11.7 11.4(L) 10.8(L)   IV Iron Dose - - - - - - - -   TSAT 15 - 46 % 30 55(H) - 43 - - -   Ferritin 12 - 150 ng/mL 216(H) 226(H) - 216(H) - - -       Anemia labs discontinued, therapy plans cancelled, removed from active patient list, and referring provider notified.    Jaylyn Smith RN  Anemia Clinic  Ph [phone]  Fax [phone]

## 2019-02-09 ENCOUNTER — HOME INFUSION (PRE-WILLOW HOME INFUSION) (OUTPATIENT)
Dept: PHARMACY | Facility: CLINIC | Age: 29
End: 2019-02-09

## 2019-02-11 NOTE — PROGRESS NOTES
This is a recent snapshot of the patient's [facility] medical record.  For current drug dose and complete information and questions, call [phone] or In [facility] pool, fv home infusion ([id number])  CSN Number:  [id number]

## 2019-02-20 ENCOUNTER — HOME INFUSION (PRE-WILLOW HOME INFUSION) (OUTPATIENT)
Dept: PHARMACY | Facility: CLINIC | Age: 29
End: 2019-02-20

## 2019-02-21 ENCOUNTER — DOCUMENTATION ONLY (OUTPATIENT)
Dept: GASTROENTEROLOGY | Facility: CLINIC | Age: 29
End: 2019-02-21

## 2019-02-21 ENCOUNTER — HOME INFUSION (PRE-WILLOW HOME INFUSION) (OUTPATIENT)
Dept: PHARMACY | Facility: CLINIC | Age: 29
End: 2019-02-21

## 2019-02-21 NOTE — PHARMACY
Skilled Nurse visit in the home to administer Entyvio 300 mg IV. Current weight  139 lbs. PIV placed left ac, x 1 attempt.   Infusion completed without complication or reaction @ 1535H.     Diana ALLRED RN  [phone]  [email or web address]

## 2019-02-21 NOTE — PROGRESS NOTES
Received incoming letter from mail for approval of Entyvio for 2/1/19-7/30/19; has been scanned into chart.

## 2019-02-21 NOTE — PROGRESS NOTES
This is a recent snapshot of the patient's [facility] medical record.  For current drug dose and complete information and questions, call [phone] or In [facility] pool, fv home infusion ([id number])  CSN Number:  [id number]

## 2019-02-22 NOTE — PROGRESS NOTES
This is a recent snapshot of the patient's [facility] medical record.  For current drug dose and complete information and questions, call [phone] or In [facility] pool, fv home infusion ([id number])  CSN Number:  [id number]

## 2019-03-01 ENCOUNTER — APPOINTMENT (OUTPATIENT)
Dept: LAB | Facility: CLINIC | Age: 29
End: 2019-03-01
Attending: INTERNAL MEDICINE
Payer: COMMERCIAL

## 2019-04-05 ENCOUNTER — TELEPHONE (OUTPATIENT)
Dept: GASTROENTEROLOGY | Facility: CLINIC | Age: 29
End: 2019-04-05

## 2019-04-05 ENCOUNTER — HOME INFUSION (PRE-WILLOW HOME INFUSION) (OUTPATIENT)
Dept: PHARMACY | Facility: CLINIC | Age: 29
End: 2019-04-05

## 2019-04-05 DIAGNOSIS — K51.011 ULCERATIVE PANCOLITIS WITH RECTAL BLEEDING (H): Primary | ICD-10-CM

## 2019-04-05 NOTE — TELEPHONE ENCOUNTER
[facility] Call Center    Phone Message    May a detailed message be left on voicemail: yes    Reason for Call: Order(s): Other:   Reason for requested: Entyvio therapy plan update   Date needed: ASAP  Provider name: Franki BATES  Comments: Per Paulette needing updated orders, verbal orders are fine too. Please call Paulette if further questions     Action Taken: Message routed to:  [facility] ([facility] SHAKILA

## 2019-04-05 NOTE — PROGRESS NOTES
Patient therapy plan updated due to plan expiration. Patient continues on medication per last clinic encounter. Patient updated standing lab orders placed in chart

## 2019-04-08 ENCOUNTER — HOME INFUSION (PRE-WILLOW HOME INFUSION) (OUTPATIENT)
Dept: PHARMACY | Facility: CLINIC | Age: 29
End: 2019-04-08

## 2019-04-08 DIAGNOSIS — T86.10 COMPLICATIONS, KIDNEY TRANSPLANT: ICD-10-CM

## 2019-04-08 DIAGNOSIS — Z94.0 KIDNEY TRANSPLANTED: ICD-10-CM

## 2019-04-08 DIAGNOSIS — Z48.298 AFTERCARE FOLLOWING ORGAN TRANSPLANT: ICD-10-CM

## 2019-04-08 LAB
ALBUMIN SERPL-MCNC: 3.9 G/DL (ref 3.4–5)
ALP SERPL-CCNC: 53 U/L (ref 40–150)
ALT SERPL W P-5'-P-CCNC: 15 U/L (ref 0–50)
AST SERPL W P-5'-P-CCNC: 16 U/L (ref 0–45)
BASOPHILS # BLD AUTO: 0 10E9/L (ref 0–0.2)
BASOPHILS NFR BLD AUTO: 0.4 %
BILIRUB DIRECT SERPL-MCNC: 0.1 MG/DL (ref 0–0.2)
BILIRUB SERPL-MCNC: 0.6 MG/DL (ref 0.2–1.3)
CRP SERPL-MCNC: 5 MG/L (ref 0–8)
DIFFERENTIAL METHOD BLD: ABNORMAL
EOSINOPHIL # BLD AUTO: 0.1 10E9/L (ref 0–0.7)
EOSINOPHIL NFR BLD AUTO: 1.9 %
ERYTHROCYTE [DISTWIDTH] IN BLOOD BY AUTOMATED COUNT: 12.3 % (ref 10–15)
ERYTHROCYTE [SEDIMENTATION RATE] IN BLOOD BY WESTERGREN METHOD: 28 MM/H (ref 0–20)
HCT VFR BLD AUTO: 36.5 % (ref 35–47)
HGB BLD-MCNC: 11.3 G/DL (ref 11.7–15.7)
IMM GRANULOCYTES # BLD: 0 10E9/L (ref 0–0.4)
IMM GRANULOCYTES NFR BLD: 0.3 %
LYMPHOCYTES # BLD AUTO: 1.3 10E9/L (ref 0.8–5.3)
LYMPHOCYTES NFR BLD AUTO: 19.1 %
MCH RBC QN AUTO: 29.6 PG (ref 26.5–33)
MCHC RBC AUTO-ENTMCNC: 31 G/DL (ref 31.5–36.5)
MCV RBC AUTO: 96 FL (ref 78–100)
MONOCYTES # BLD AUTO: 0.5 10E9/L (ref 0–1.3)
MONOCYTES NFR BLD AUTO: 7.6 %
NEUTROPHILS # BLD AUTO: 4.9 10E9/L (ref 1.6–8.3)
NEUTROPHILS NFR BLD AUTO: 70.7 %
NRBC # BLD AUTO: 0 10*3/UL
NRBC BLD AUTO-RTO: 0 /100
PLATELET # BLD AUTO: 273 10E9/L (ref 150–450)
PROT SERPL-MCNC: 7.6 G/DL (ref 6.8–8.8)
RBC # BLD AUTO: 3.82 10E12/L (ref 3.8–5.2)
WBC # BLD AUTO: 7 10E9/L (ref 4–11)

## 2019-04-08 PROCEDURE — 85025 COMPLETE CBC W/AUTO DIFF WBC: CPT | Performed by: INTERNAL MEDICINE

## 2019-04-08 PROCEDURE — 80076 HEPATIC FUNCTION PANEL: CPT | Performed by: INTERNAL MEDICINE

## 2019-04-08 PROCEDURE — 85652 RBC SED RATE AUTOMATED: CPT | Performed by: INTERNAL MEDICINE

## 2019-04-08 PROCEDURE — 86140 C-REACTIVE PROTEIN: CPT | Performed by: INTERNAL MEDICINE

## 2019-04-08 NOTE — PROGRESS NOTES
This is a recent snapshot of the patient's [facility] medical record.  For current drug dose and complete information and questions, call [phone] or In [facility] pool, fv home infusion ([id number])  CSN Number:  [id number]

## 2019-04-09 ENCOUNTER — HOME INFUSION (PRE-WILLOW HOME INFUSION) (OUTPATIENT)
Dept: PHARMACY | Facility: CLINIC | Age: 29
End: 2019-04-09

## 2019-04-09 NOTE — PROGRESS NOTES
This is a recent snapshot of the patient's [facility] medical record.  For current drug dose and complete information and questions, call [phone] or In [facility] pool, fv home infusion ([id number])  CSN Number:  [id number]

## 2019-04-09 NOTE — PROGRESS NOTES
Skilled Nurse visit in the home on 4/8/2019 to administer Entyvio. Current weight 128#. PIV placed L AC, one  attempt.  Pre medicated with: none. Labs drawn: CBC d/p, hepatic panel, CRP and Sed Rate. Infusion completed without complication or reaction. Pt reports no symptoms related to her ulcerative pancolitis.  Pt denies any SE's related to Entyvio.    Gerri Sage RN  [phone]  [email or web address]

## 2019-05-01 ENCOUNTER — APPOINTMENT (OUTPATIENT)
Dept: LAB | Facility: CLINIC | Age: 29
End: 2019-05-01
Attending: PSYCHIATRY & NEUROLOGY
Payer: COMMERCIAL

## 2019-05-23 ENCOUNTER — HOME INFUSION (PRE-WILLOW HOME INFUSION) (OUTPATIENT)
Dept: PHARMACY | Facility: CLINIC | Age: 29
End: 2019-05-23

## 2019-05-23 NOTE — PHARMACY
Skilled Nurse visit in the home to administer Entyvio 300 mg every 6 weeks. Current weight 128 lbs. PIV placed on left forearm, x1 attempt.   Infusion completed without complication or reaction.       Diana ALLRED RN  [phone]  [email or web address]

## 2019-05-24 NOTE — PROGRESS NOTES
This is a recent snapshot of the patient's [facility] medical record.  For current drug dose and complete information and questions, call [phone] or In Basket pool, fv home infusion ([id number])  CSN Number:  [id number]

## 2019-06-01 ENCOUNTER — APPOINTMENT (OUTPATIENT)
Dept: LAB | Facility: CLINIC | Age: 29
End: 2019-06-01
Attending: PSYCHIATRY & NEUROLOGY
Payer: COMMERCIAL

## 2019-06-26 ENCOUNTER — TELEPHONE (OUTPATIENT)
Dept: TRANSPLANT | Facility: CLINIC | Age: 29
End: 2019-06-26

## 2019-06-26 DIAGNOSIS — Z48.298 AFTERCARE FOLLOWING ORGAN TRANSPLANT: ICD-10-CM

## 2019-06-26 DIAGNOSIS — Z94.0 KIDNEY TRANSPLANTED: Primary | ICD-10-CM

## 2019-06-26 NOTE — TELEPHONE ENCOUNTER
Patient Call: Transplant Lab/Orders  Route to LPN  Post Transplant Days: 1664  When patient is less than 60 days post-transplant, route high priority    Reason for Call: patient is going into lab on 6/27/19 and would like updated or sent in [facility] patient would and order for her EBV and DSA.  Callback needed? If needed

## 2019-06-27 ENCOUNTER — RESULTS ONLY (OUTPATIENT)
Dept: OTHER | Facility: CLINIC | Age: 29
End: 2019-06-27

## 2019-06-27 ENCOUNTER — TELEPHONE (OUTPATIENT)
Dept: TRANSPLANT | Facility: CLINIC | Age: 29
End: 2019-06-27

## 2019-06-27 DIAGNOSIS — Z79.899 IMMUNOSUPPRESSIVE MANAGEMENT ENCOUNTER FOLLOWING KIDNEY TRANSPLANT: ICD-10-CM

## 2019-06-27 DIAGNOSIS — Z94.0 KIDNEY TRANSPLANTED: ICD-10-CM

## 2019-06-27 DIAGNOSIS — Z48.298 AFTERCARE FOLLOWING ORGAN TRANSPLANT: ICD-10-CM

## 2019-06-27 DIAGNOSIS — Z94.0 IMMUNOSUPPRESSIVE MANAGEMENT ENCOUNTER FOLLOWING KIDNEY TRANSPLANT: ICD-10-CM

## 2019-06-27 DIAGNOSIS — D50.9 ANEMIA, IRON DEFICIENCY: ICD-10-CM

## 2019-06-27 DIAGNOSIS — T86.10 COMPLICATIONS, KIDNEY TRANSPLANT: ICD-10-CM

## 2019-06-27 DIAGNOSIS — K51.011 ULCERATIVE PANCOLITIS WITH RECTAL BLEEDING (H): ICD-10-CM

## 2019-06-27 LAB
ALBUMIN SERPL-MCNC: 4.3 G/DL (ref 3.4–5)
ALP SERPL-CCNC: 55 U/L (ref 40–150)
ALT SERPL W P-5'-P-CCNC: 14 U/L (ref 0–50)
ANION GAP SERPL CALCULATED.3IONS-SCNC: 5 MMOL/L (ref 3–14)
AST SERPL W P-5'-P-CCNC: 13 U/L (ref 0–45)
BASOPHILS # BLD AUTO: 0.1 10E9/L (ref 0–0.2)
BASOPHILS NFR BLD AUTO: 0.8 %
BILIRUB DIRECT SERPL-MCNC: 0.2 MG/DL (ref 0–0.2)
BILIRUB SERPL-MCNC: 0.5 MG/DL (ref 0.2–1.3)
BUN SERPL-MCNC: 32 MG/DL (ref 7–30)
CALCIUM SERPL-MCNC: 9.8 MG/DL (ref 8.5–10.1)
CHLORIDE SERPL-SCNC: 110 MMOL/L (ref 94–109)
CO2 SERPL-SCNC: 26 MMOL/L (ref 20–32)
CREAT SERPL-MCNC: 1.3 MG/DL (ref 0.52–1.04)
CREAT UR-MCNC: 209 MG/DL
CRP SERPL-MCNC: <2.9 MG/L (ref 0–8)
DIFFERENTIAL METHOD BLD: ABNORMAL
EOSINOPHIL # BLD AUTO: 0.1 10E9/L (ref 0–0.7)
EOSINOPHIL NFR BLD AUTO: 2.1 %
ERYTHROCYTE [DISTWIDTH] IN BLOOD BY AUTOMATED COUNT: 11.9 % (ref 10–15)
ERYTHROCYTE [SEDIMENTATION RATE] IN BLOOD BY WESTERGREN METHOD: 32 MM/H (ref 0–20)
FERRITIN SERPL-MCNC: 233 NG/ML (ref 12–150)
GFR SERPL CREATININE-BSD FRML MDRD: 55 ML/MIN/{1.73_M2}
GLUCOSE SERPL-MCNC: 102 MG/DL (ref 70–99)
HCT VFR BLD AUTO: 36.8 % (ref 35–47)
HGB BLD-MCNC: 11.4 G/DL (ref 11.7–15.7)
IMM GRANULOCYTES # BLD: 0 10E9/L (ref 0–0.4)
IMM GRANULOCYTES NFR BLD: 0.3 %
IRON SATN MFR SERPL: 34 % (ref 15–46)
IRON SERPL-MCNC: 99 UG/DL (ref 35–180)
LYMPHOCYTES # BLD AUTO: 1.4 10E9/L (ref 0.8–5.3)
LYMPHOCYTES NFR BLD AUTO: 21.5 %
MCH RBC QN AUTO: 30.4 PG (ref 26.5–33)
MCHC RBC AUTO-ENTMCNC: 31 G/DL (ref 31.5–36.5)
MCV RBC AUTO: 98 FL (ref 78–100)
MONOCYTES # BLD AUTO: 0.4 10E9/L (ref 0–1.3)
MONOCYTES NFR BLD AUTO: 6.3 %
NEUTROPHILS # BLD AUTO: 4.6 10E9/L (ref 1.6–8.3)
NEUTROPHILS NFR BLD AUTO: 69 %
NRBC # BLD AUTO: 0 10*3/UL
NRBC BLD AUTO-RTO: 0 /100
PLATELET # BLD AUTO: 233 10E9/L (ref 150–450)
POTASSIUM SERPL-SCNC: 4 MMOL/L (ref 3.4–5.3)
PROT SERPL-MCNC: 8.1 G/DL (ref 6.8–8.8)
PROT UR-MCNC: 1.74 G/L
PROT/CREAT 24H UR: 0.83 G/G CR (ref 0–0.2)
RBC # BLD AUTO: 3.75 10E12/L (ref 3.8–5.2)
SODIUM SERPL-SCNC: 140 MMOL/L (ref 133–144)
TACROLIMUS BLD-MCNC: 4.3 UG/L (ref 5–15)
TIBC SERPL-MCNC: 288 UG/DL (ref 240–430)
TME LAST DOSE: ABNORMAL H
WBC # BLD AUTO: 6.7 10E9/L (ref 4–11)

## 2019-06-27 NOTE — TELEPHONE ENCOUNTER
Issue:    Cr up slightly at 1.3  Urine protein up slightly at 0.83  Tac level is 4.3 - near a 10 hour trough.    DSA and EBV still pending    Plan:    RNCC left detailed VM regarding labs above. Asked for call back to check in.  Plan will be to hydrate and repeat bmp and urine protein.  Increase tac to 2 mg bid from 1.5 mg bid, recheck tac.

## 2019-06-28 LAB
BKV DNA # SPEC NAA+PROBE: NORMAL COPIES/ML
BKV DNA SPEC NAA+PROBE-LOG#: NORMAL LOG COPIES/ML
DONOR IDENTIFICATION: NORMAL
DQA1*05: 6923
DQB2: 5246
DSA COMMENTS: NORMAL
DSA PRESENT: YES
DSA TEST METHOD: NORMAL
EBV DNA # SPEC NAA+PROBE: 507 {COPIES}/ML
EBV DNA SPEC NAA+PROBE-LOG#: 2.7 {LOG_COPIES}/ML
ORGAN: NORMAL
SA1 CELL: NORMAL
SA1 COMMENTS: NORMAL
SA1 HI RISK ABY: NORMAL
SA1 MOD RISK ABY: NORMAL
SA1 TEST METHOD: NORMAL
SA2 CELL: NORMAL
SA2 COMMENTS: NORMAL
SA2 HI RISK ABY UA: NORMAL
SA2 MOD RISK ABY: NORMAL
SA2 TEST METHOD: NORMAL
SPECIMEN SOURCE: NORMAL
UNACCEPTABLE ANTIGEN: NORMAL
UNOS CPRA: 84

## 2019-07-01 NOTE — TELEPHONE ENCOUNTER
Pt states she gets her labs done after her morning routine- pt notes that she is dehydrated slightly due to exercise. She feels great in regards to her creatine.     Tacro was low- pt is okay with taking more if needed as well as increasing her prograf if needed.     Pt is wondering about jennifer DSA Levels      Please connect with pt as soon as possible, pt is best reached via [employer] during day hours due to work.

## 2019-07-03 ENCOUNTER — MYC MEDICAL ADVICE (OUTPATIENT)
Dept: TRANSPLANT | Facility: CLINIC | Age: 29
End: 2019-07-03

## 2019-07-03 DIAGNOSIS — Z94.0 STATUS POST KIDNEY TRANSPLANT: ICD-10-CM

## 2019-07-03 DIAGNOSIS — Z94.0 KIDNEY REPLACED BY TRANSPLANT: ICD-10-CM

## 2019-07-03 DIAGNOSIS — Z94.0 KIDNEY TRANSPLANTED: Primary | ICD-10-CM

## 2019-07-03 DIAGNOSIS — Z94.0 KIDNEY TRANSPLANTED: ICD-10-CM

## 2019-07-03 DIAGNOSIS — Z48.298 AFTERCARE FOLLOWING ORGAN TRANSPLANT: ICD-10-CM

## 2019-07-03 DIAGNOSIS — E83.42 HYPOMAGNESEMIA: ICD-10-CM

## 2019-07-03 DIAGNOSIS — D84.9 IMMUNOSUPPRESSED STATUS (H): ICD-10-CM

## 2019-07-03 RX ORDER — TACROLIMUS 0.5 MG/1
2 CAPSULE ORAL 2 TIMES DAILY
Qty: 720 CAPSULE | Refills: 3 | Status: CANCELLED | OUTPATIENT
Start: 2019-07-03

## 2019-07-03 RX ORDER — MYCOPHENOLATE MOFETIL 250 MG/1
500 CAPSULE ORAL 2 TIMES DAILY
Qty: 360 CAPSULE | Refills: 3 | Status: SHIPPED | OUTPATIENT
Start: 2019-07-03 | End: 2019-07-11

## 2019-07-03 RX ORDER — TACROLIMUS 0.5 MG/1
2 CAPSULE ORAL 2 TIMES DAILY
Qty: 720 CAPSULE | Refills: 3 | Status: SHIPPED | OUTPATIENT
Start: 2019-07-03 | End: 2020-03-23

## 2019-07-04 ENCOUNTER — HOME INFUSION (PRE-WILLOW HOME INFUSION) (OUTPATIENT)
Dept: PHARMACY | Facility: CLINIC | Age: 29
End: 2019-07-04

## 2019-07-04 ENCOUNTER — MEDICAL CORRESPONDENCE (OUTPATIENT)
Dept: HEALTH INFORMATION MANAGEMENT | Facility: CLINIC | Age: 29
End: 2019-07-04

## 2019-07-04 LAB
ALBUMIN SERPL-MCNC: 3.8 G/DL (ref 3.4–5)
ALP SERPL-CCNC: 48 U/L (ref 40–150)
ALT SERPL W P-5'-P-CCNC: 14 U/L (ref 0–50)
AST SERPL W P-5'-P-CCNC: 14 U/L (ref 0–45)
BASOPHILS # BLD AUTO: 0 10E9/L (ref 0–0.2)
BASOPHILS NFR BLD AUTO: 0.4 %
BILIRUB DIRECT SERPL-MCNC: <0.1 MG/DL (ref 0–0.2)
BILIRUB SERPL-MCNC: 0.5 MG/DL (ref 0.2–1.3)
CRP SERPL-MCNC: <2.9 MG/L (ref 0–8)
DIFFERENTIAL METHOD BLD: ABNORMAL
EOSINOPHIL # BLD AUTO: 0.1 10E9/L (ref 0–0.7)
EOSINOPHIL NFR BLD AUTO: 2.5 %
ERYTHROCYTE [DISTWIDTH] IN BLOOD BY AUTOMATED COUNT: 12.3 % (ref 10–15)
ERYTHROCYTE [SEDIMENTATION RATE] IN BLOOD BY WESTERGREN METHOD: 27 MM/H (ref 0–20)
HCT VFR BLD AUTO: 32.5 % (ref 35–47)
HGB BLD-MCNC: 10.3 G/DL (ref 11.7–15.7)
IMM GRANULOCYTES # BLD: 0 10E9/L (ref 0–0.4)
IMM GRANULOCYTES NFR BLD: 0.2 %
LYMPHOCYTES # BLD AUTO: 1.5 10E9/L (ref 0.8–5.3)
LYMPHOCYTES NFR BLD AUTO: 26.4 %
MCH RBC QN AUTO: 30.2 PG (ref 26.5–33)
MCHC RBC AUTO-ENTMCNC: 31.7 G/DL (ref 31.5–36.5)
MCV RBC AUTO: 95 FL (ref 78–100)
MONOCYTES # BLD AUTO: 0.3 10E9/L (ref 0–1.3)
MONOCYTES NFR BLD AUTO: 6 %
NEUTROPHILS # BLD AUTO: 3.6 10E9/L (ref 1.6–8.3)
NEUTROPHILS NFR BLD AUTO: 64.5 %
NRBC # BLD AUTO: 0 10*3/UL
NRBC BLD AUTO-RTO: 0 /100
PLATELET # BLD AUTO: 247 10E9/L (ref 150–450)
PROT SERPL-MCNC: 7.3 G/DL (ref 6.8–8.8)
RBC # BLD AUTO: 3.41 10E12/L (ref 3.8–5.2)
WBC # BLD AUTO: 5.5 10E9/L (ref 4–11)

## 2019-07-04 PROCEDURE — 85025 COMPLETE CBC W/AUTO DIFF WBC: CPT | Performed by: INTERNAL MEDICINE

## 2019-07-04 PROCEDURE — 80076 HEPATIC FUNCTION PANEL: CPT | Performed by: INTERNAL MEDICINE

## 2019-07-04 PROCEDURE — 86140 C-REACTIVE PROTEIN: CPT | Performed by: INTERNAL MEDICINE

## 2019-07-04 PROCEDURE — 85652 RBC SED RATE AUTOMATED: CPT | Performed by: INTERNAL MEDICINE

## 2019-07-04 NOTE — PHARMACY
Skilled Nurse visit in the home to administer Entyvio 300mg in 250 mL NS via IV gravity over 30 minutes every 6 weeks. Current weight 126 lb. PIV placed 24g L AC, 1 attempt.  Pre medicated with none. Labs drawn hepatic panel, CBC d/p, ESR, CRP @ 0815. Infusion completed without complication or reaction. Pt reports mild abdominal bloating and discomfort x2 days prior to therapy.    Rupert Tony  RN, BSN, OCN   [facility]   [facility], [facility].   [address] [place] [id number]   [email or web address]  [email or web address]   Office: [phone]  Fax: [phone]   Connect with [facility] on social media.

## 2019-07-05 NOTE — PROGRESS NOTES
This is a recent snapshot of the patient's [facility] medical record.  For current drug dose and complete information and questions, call [phone] or In Basket pool, fv home infusion ([id number])  CSN Number:  [id number]

## 2019-07-11 DIAGNOSIS — Z94.0 STATUS POST KIDNEY TRANSPLANT: ICD-10-CM

## 2019-07-11 DIAGNOSIS — Z94.0 KIDNEY REPLACED BY TRANSPLANT: ICD-10-CM

## 2019-07-11 DIAGNOSIS — Z94.0 KIDNEY TRANSPLANTED: ICD-10-CM

## 2019-07-11 RX ORDER — MYCOPHENOLATE MOFETIL 250 MG/1
500 CAPSULE ORAL 2 TIMES DAILY
Qty: 360 CAPSULE | Refills: 3 | Status: SHIPPED | OUTPATIENT
Start: 2019-07-11 | End: 2020-07-20

## 2019-07-18 DIAGNOSIS — T86.10 COMPLICATIONS, KIDNEY TRANSPLANT: ICD-10-CM

## 2019-07-18 DIAGNOSIS — Z94.0 IMMUNOSUPPRESSIVE MANAGEMENT ENCOUNTER FOLLOWING KIDNEY TRANSPLANT: ICD-10-CM

## 2019-07-18 DIAGNOSIS — Z79.899 IMMUNOSUPPRESSIVE MANAGEMENT ENCOUNTER FOLLOWING KIDNEY TRANSPLANT: ICD-10-CM

## 2019-07-18 DIAGNOSIS — D84.9 IMMUNOSUPPRESSED STATUS (H): ICD-10-CM

## 2019-07-18 DIAGNOSIS — Z94.0 KIDNEY TRANSPLANTED: ICD-10-CM

## 2019-07-18 DIAGNOSIS — Z48.298 AFTERCARE FOLLOWING ORGAN TRANSPLANT: ICD-10-CM

## 2019-07-18 LAB
ANION GAP SERPL CALCULATED.3IONS-SCNC: 3 MMOL/L (ref 3–14)
BUN SERPL-MCNC: 28 MG/DL (ref 7–30)
CALCIUM SERPL-MCNC: 9.4 MG/DL (ref 8.5–10.1)
CHLORIDE SERPL-SCNC: 110 MMOL/L (ref 94–109)
CO2 SERPL-SCNC: 27 MMOL/L (ref 20–32)
CREAT SERPL-MCNC: 1.47 MG/DL (ref 0.52–1.04)
CREAT UR-MCNC: 228 MG/DL
ERYTHROCYTE [DISTWIDTH] IN BLOOD BY AUTOMATED COUNT: 12.1 % (ref 10–15)
GFR SERPL CREATININE-BSD FRML MDRD: 48 ML/MIN/{1.73_M2}
GLUCOSE SERPL-MCNC: 107 MG/DL (ref 70–99)
HCT VFR BLD AUTO: 34.3 % (ref 35–47)
HGB BLD-MCNC: 10.9 G/DL (ref 11.7–15.7)
MCH RBC QN AUTO: 30.8 PG (ref 26.5–33)
MCHC RBC AUTO-ENTMCNC: 31.8 G/DL (ref 31.5–36.5)
MCV RBC AUTO: 97 FL (ref 78–100)
PLATELET # BLD AUTO: 236 10E9/L (ref 150–450)
POTASSIUM SERPL-SCNC: 4.6 MMOL/L (ref 3.4–5.3)
PROT UR-MCNC: 0.56 G/L
PROT/CREAT 24H UR: 0.25 G/G CR (ref 0–0.2)
RBC # BLD AUTO: 3.54 10E12/L (ref 3.8–5.2)
SODIUM SERPL-SCNC: 140 MMOL/L (ref 133–144)
TACROLIMUS BLD-MCNC: 6.8 UG/L (ref 5–15)
TME LAST DOSE: NORMAL H
WBC # BLD AUTO: 6.5 10E9/L (ref 4–11)

## 2019-07-18 NOTE — RESULT ENCOUNTER NOTE
Cr sent to Dr. Mar for review.   Repeat creatinine still elevated. She denies any dehydration aside from exercising right before labs.   Please advise on Cr elevation.

## 2019-07-19 ENCOUNTER — TELEPHONE (OUTPATIENT)
Dept: TRANSPLANT | Facility: CLINIC | Age: 29
End: 2019-07-19

## 2019-07-19 DIAGNOSIS — Z94.0 KIDNEY REPLACED BY TRANSPLANT: Primary | ICD-10-CM

## 2019-07-19 LAB
EBV DNA # SPEC NAA+PROBE: 1221 {COPIES}/ML
EBV DNA SPEC NAA+PROBE-LOG#: 3.1 {LOG_COPIES}/ML

## 2019-07-19 NOTE — TELEPHONE ENCOUNTER
ISSUE:   Cr 1.47  Tac 6.8 (appears to be less than 12 hour trough)    PLAN:  Otilio Mar MD Lavelle Peterson, Meghan M, RN         Elevated serum creatinine, possibly due to slightly higher tacrolimus level.  Transplant coordinator to adjust tacrolimus dose and recheck labs.  If creatinine remains elevated, would consider kidney transplant biopsy.      LPN TASK:   Call pt and verify current tac dose and if level was a good 12 hour trough. Any diarrhea? New meds?   If level accurate, DECREASE tacrolimus to 2/1.5. If not accurate, stay on the same dose.   Repeat BMP and tacrolimus level in 1 week.

## 2019-07-22 NOTE — TELEPHONE ENCOUNTER
Call placed to patient. No answer. Voice message left with instructions listed below. Will try back.

## 2019-08-01 ENCOUNTER — APPOINTMENT (OUTPATIENT)
Dept: LAB | Facility: CLINIC | Age: 29
End: 2019-08-01
Attending: INTERNAL MEDICINE
Payer: COMMERCIAL

## 2019-08-15 ENCOUNTER — HOME INFUSION (PRE-WILLOW HOME INFUSION) (OUTPATIENT)
Dept: PHARMACY | Facility: CLINIC | Age: 29
End: 2019-08-15

## 2019-08-19 NOTE — PROGRESS NOTES
This is a recent snapshot of the patient's [facility] medical record.  For current drug dose and complete information and questions, call [phone] or In Basket pool, fv home infusion ([id number])  CSN Number:  [id number]

## 2019-09-11 DIAGNOSIS — Z94.0 KIDNEY TRANSPLANTED: ICD-10-CM

## 2019-09-11 DIAGNOSIS — Z94.0 STATUS POST KIDNEY TRANSPLANT: ICD-10-CM

## 2019-09-11 DIAGNOSIS — E83.42 HYPOMAGNESEMIA: ICD-10-CM

## 2019-09-11 DIAGNOSIS — Z94.0 KIDNEY REPLACED BY TRANSPLANT: ICD-10-CM

## 2019-09-11 RX ORDER — MAGNESIUM OXIDE 400 MG/1
400 TABLET ORAL DAILY
Qty: 90 TABLET | Refills: 3 | Status: SHIPPED | OUTPATIENT
Start: 2019-09-11 | End: 2020-07-21

## 2019-09-12 DIAGNOSIS — Z94.0 KIDNEY TRANSPLANTED: ICD-10-CM

## 2019-09-12 DIAGNOSIS — T86.10 COMPLICATIONS, KIDNEY TRANSPLANT: ICD-10-CM

## 2019-09-12 DIAGNOSIS — Z94.0 IMMUNOSUPPRESSIVE MANAGEMENT ENCOUNTER FOLLOWING KIDNEY TRANSPLANT: ICD-10-CM

## 2019-09-12 DIAGNOSIS — Z94.0 KIDNEY REPLACED BY TRANSPLANT: ICD-10-CM

## 2019-09-12 DIAGNOSIS — Z48.298 AFTERCARE FOLLOWING ORGAN TRANSPLANT: ICD-10-CM

## 2019-09-12 DIAGNOSIS — Z79.899 IMMUNOSUPPRESSIVE MANAGEMENT ENCOUNTER FOLLOWING KIDNEY TRANSPLANT: ICD-10-CM

## 2019-09-12 LAB
ANION GAP SERPL CALCULATED.3IONS-SCNC: 7 MMOL/L (ref 3–14)
BUN SERPL-MCNC: 34 MG/DL (ref 7–30)
CALCIUM SERPL-MCNC: 9.9 MG/DL (ref 8.5–10.1)
CHLORIDE SERPL-SCNC: 105 MMOL/L (ref 94–109)
CO2 SERPL-SCNC: 26 MMOL/L (ref 20–32)
CREAT SERPL-MCNC: 1.5 MG/DL (ref 0.52–1.04)
ERYTHROCYTE [DISTWIDTH] IN BLOOD BY AUTOMATED COUNT: 12.3 % (ref 10–15)
GFR SERPL CREATININE-BSD FRML MDRD: 47 ML/MIN/{1.73_M2}
GLUCOSE SERPL-MCNC: 99 MG/DL (ref 70–99)
HCT VFR BLD AUTO: 36.4 % (ref 35–47)
HGB BLD-MCNC: 11.6 G/DL (ref 11.7–15.7)
MCH RBC QN AUTO: 30.8 PG (ref 26.5–33)
MCHC RBC AUTO-ENTMCNC: 31.9 G/DL (ref 31.5–36.5)
MCV RBC AUTO: 97 FL (ref 78–100)
PLATELET # BLD AUTO: 258 10E9/L (ref 150–450)
POTASSIUM SERPL-SCNC: 4 MMOL/L (ref 3.4–5.3)
RBC # BLD AUTO: 3.77 10E12/L (ref 3.8–5.2)
SODIUM SERPL-SCNC: 139 MMOL/L (ref 133–144)
TACROLIMUS BLD-MCNC: 4.2 UG/L (ref 5–15)
TME LAST DOSE: ABNORMAL H
WBC # BLD AUTO: 7.3 10E9/L (ref 4–11)

## 2019-09-26 ENCOUNTER — MEDICAL CORRESPONDENCE (OUTPATIENT)
Dept: HEALTH INFORMATION MANAGEMENT | Facility: CLINIC | Age: 29
End: 2019-09-26

## 2019-09-26 ENCOUNTER — HOME INFUSION (PRE-WILLOW HOME INFUSION) (OUTPATIENT)
Dept: PHARMACY | Facility: CLINIC | Age: 29
End: 2019-09-26

## 2019-09-26 LAB
ALBUMIN SERPL-MCNC: 4.2 G/DL (ref 3.4–5)
ALP SERPL-CCNC: 45 U/L (ref 40–150)
ALT SERPL W P-5'-P-CCNC: 20 U/L (ref 0–50)
AST SERPL W P-5'-P-CCNC: 23 U/L (ref 0–45)
BASOPHILS # BLD AUTO: 0 10E9/L (ref 0–0.2)
BASOPHILS NFR BLD AUTO: 0.5 %
BILIRUB DIRECT SERPL-MCNC: 0.2 MG/DL (ref 0–0.2)
BILIRUB SERPL-MCNC: 0.7 MG/DL (ref 0.2–1.3)
CRP SERPL-MCNC: <2.9 MG/L (ref 0–8)
DIFFERENTIAL METHOD BLD: ABNORMAL
EOSINOPHIL # BLD AUTO: 0.1 10E9/L (ref 0–0.7)
EOSINOPHIL NFR BLD AUTO: 1.6 %
ERYTHROCYTE [DISTWIDTH] IN BLOOD BY AUTOMATED COUNT: 12.7 % (ref 10–15)
ERYTHROCYTE [SEDIMENTATION RATE] IN BLOOD BY WESTERGREN METHOD: 24 MM/H (ref 0–20)
HCT VFR BLD AUTO: 35.4 % (ref 35–47)
HGB BLD-MCNC: 11.2 G/DL (ref 11.7–15.7)
IMM GRANULOCYTES # BLD: 0 10E9/L (ref 0–0.4)
IMM GRANULOCYTES NFR BLD: 0.1 %
LYMPHOCYTES # BLD AUTO: 1.5 10E9/L (ref 0.8–5.3)
LYMPHOCYTES NFR BLD AUTO: 20.8 %
MCH RBC QN AUTO: 30.2 PG (ref 26.5–33)
MCHC RBC AUTO-ENTMCNC: 31.6 G/DL (ref 31.5–36.5)
MCV RBC AUTO: 95 FL (ref 78–100)
MONOCYTES # BLD AUTO: 0.5 10E9/L (ref 0–1.3)
MONOCYTES NFR BLD AUTO: 6.7 %
NEUTROPHILS # BLD AUTO: 5.1 10E9/L (ref 1.6–8.3)
NEUTROPHILS NFR BLD AUTO: 70.3 %
NRBC # BLD AUTO: 0 10*3/UL
NRBC BLD AUTO-RTO: 0 /100
PLATELET # BLD AUTO: 269 10E9/L (ref 150–450)
PROT SERPL-MCNC: 7.9 G/DL (ref 6.8–8.8)
RBC # BLD AUTO: 3.71 10E12/L (ref 3.8–5.2)
WBC # BLD AUTO: 7.3 10E9/L (ref 4–11)

## 2019-09-26 PROCEDURE — 85025 COMPLETE CBC W/AUTO DIFF WBC: CPT | Performed by: INTERNAL MEDICINE

## 2019-09-26 PROCEDURE — 80076 HEPATIC FUNCTION PANEL: CPT | Performed by: INTERNAL MEDICINE

## 2019-09-26 PROCEDURE — 85652 RBC SED RATE AUTOMATED: CPT | Performed by: INTERNAL MEDICINE

## 2019-09-26 PROCEDURE — 86140 C-REACTIVE PROTEIN: CPT | Performed by: INTERNAL MEDICINE

## 2019-09-27 DIAGNOSIS — Z94.0 KIDNEY REPLACED BY TRANSPLANT: ICD-10-CM

## 2019-09-27 DIAGNOSIS — Z48.298 AFTERCARE FOLLOWING ORGAN TRANSPLANT: Primary | ICD-10-CM

## 2019-09-27 DIAGNOSIS — Z79.899 ENCOUNTER FOR LONG-TERM CURRENT USE OF MEDICATION: ICD-10-CM

## 2019-09-27 NOTE — PROGRESS NOTES
This is a recent snapshot of the patient's [facility] medical record.  For current drug dose and complete information and questions, call [phone] or In Basket pool, fv home infusion ([id number])  CSN Number:  [id number]

## 2019-10-01 ENCOUNTER — APPOINTMENT (OUTPATIENT)
Dept: LAB | Facility: CLINIC | Age: 29
End: 2019-10-01
Attending: INTERNAL MEDICINE
Payer: COMMERCIAL

## 2019-10-11 ENCOUNTER — TELEPHONE (OUTPATIENT)
Dept: GASTROENTEROLOGY | Facility: CLINIC | Age: 29
End: 2019-10-11

## 2019-10-11 NOTE — TELEPHONE ENCOUNTER
----- Message from Anni Cruz RN sent at 10/10/2019  3:26 PM CDT -----  Can you please call the patient and have her scheduled with allison for a follow up, she hasn't been seen in clinic for a bit    Thanks    Anni

## 2019-10-29 ENCOUNTER — DOCUMENTATION ONLY (OUTPATIENT)
Dept: CARE COORDINATION | Facility: CLINIC | Age: 29
End: 2019-10-29

## 2019-10-31 DIAGNOSIS — Z79.899 ENCOUNTER FOR LONG-TERM CURRENT USE OF MEDICATION: ICD-10-CM

## 2019-10-31 DIAGNOSIS — Z48.298 AFTERCARE FOLLOWING ORGAN TRANSPLANT: ICD-10-CM

## 2019-10-31 DIAGNOSIS — Z94.0 KIDNEY REPLACED BY TRANSPLANT: ICD-10-CM

## 2019-10-31 LAB
ANION GAP SERPL CALCULATED.3IONS-SCNC: 6 MMOL/L (ref 3–14)
BUN SERPL-MCNC: 30 MG/DL (ref 7–30)
CALCIUM SERPL-MCNC: 10.2 MG/DL (ref 8.5–10.1)
CHLORIDE SERPL-SCNC: 103 MMOL/L (ref 94–109)
CO2 SERPL-SCNC: 28 MMOL/L (ref 20–32)
CREAT SERPL-MCNC: 1.66 MG/DL (ref 0.52–1.04)
ERYTHROCYTE [DISTWIDTH] IN BLOOD BY AUTOMATED COUNT: 12.2 % (ref 10–15)
GFR SERPL CREATININE-BSD FRML MDRD: 41 ML/MIN/{1.73_M2}
GLUCOSE SERPL-MCNC: 96 MG/DL (ref 70–99)
HCT VFR BLD AUTO: 37.6 % (ref 35–47)
HGB BLD-MCNC: 11.9 G/DL (ref 11.7–15.7)
MCH RBC QN AUTO: 30.2 PG (ref 26.5–33)
MCHC RBC AUTO-ENTMCNC: 31.6 G/DL (ref 31.5–36.5)
MCV RBC AUTO: 95 FL (ref 78–100)
PLATELET # BLD AUTO: 280 10E9/L (ref 150–450)
POTASSIUM SERPL-SCNC: 3.8 MMOL/L (ref 3.4–5.3)
RBC # BLD AUTO: 3.94 10E12/L (ref 3.8–5.2)
SODIUM SERPL-SCNC: 138 MMOL/L (ref 133–144)
TACROLIMUS BLD-MCNC: 4.9 UG/L (ref 5–15)
TME LAST DOSE: ABNORMAL H
WBC # BLD AUTO: 8.9 10E9/L (ref 4–11)

## 2019-11-01 ENCOUNTER — APPOINTMENT (OUTPATIENT)
Dept: LAB | Facility: CLINIC | Age: 29
End: 2019-11-01
Attending: INTERNAL MEDICINE
Payer: COMMERCIAL

## 2019-11-01 LAB
EBV DNA # SPEC NAA+PROBE: 8191 {COPIES}/ML
EBV DNA SPEC NAA+PROBE-LOG#: 3.9 {LOG_COPIES}/ML

## 2019-11-04 ENCOUNTER — TELEPHONE (OUTPATIENT)
Dept: TRANSPLANT | Facility: CLINIC | Age: 29
End: 2019-11-04

## 2019-11-04 DIAGNOSIS — Z94.0 KIDNEY TRANSPLANTED: Primary | ICD-10-CM

## 2019-11-04 NOTE — TELEPHONE ENCOUNTER
Transplant Coordinator Renal Biopsy Communication    Call placed to Caity Horan to discuss indication for kidney transplant biopsy per Dr. Mar.     Indication for transplant renal biopsy: Elevated Cr   Laterality: right  Date of biopsy: 11/20    Patient location within 70 miles of [facility]: Yes.  If no, must stay overnight locally. Pt verbalizes staying.     Caityewelina Caicedodale's medication list was reviewed.   Anticoagulant: none   Ibuprofen: No.  Fish Oil:  No.  Medications held:      Recent blood pressure readings are WNL or not applicable. Instructed to take medication, especially blood pressure medications, before [facility] at 0600 day of procedure.     Procedure expectations and duration of stay discussed. Expressed pt can expect a phone call from LPN/MA to confirm biopsy date/time/location/directions/review of medications.   Patient verbalized understanding they will need to arrive by 6 a.m. on 11/20 and plan to stay 4-5 hours post biopsy for monitoring. Pt has no additional questions at this time. Transplant Office phone number given to pt for future questions.      Caity Horan to repeat labs on 11/11 before planning for biopsy.     Ping Back RN  Kidney/Pancreas Transplant Coordinator  [phone] option 5

## 2019-11-07 ENCOUNTER — HOME INFUSION (PRE-WILLOW HOME INFUSION) (OUTPATIENT)
Dept: PHARMACY | Facility: CLINIC | Age: 29
End: 2019-11-07

## 2019-11-08 NOTE — PROGRESS NOTES
This is a recent snapshot of the patient's [facility] medical record.  For current drug dose and complete information and questions, call [phone] or In Basket pool, fv home infusion ([id number])  CSN Number:  [id number]

## 2019-11-11 ENCOUNTER — RESULTS ONLY (OUTPATIENT)
Dept: OTHER | Facility: CLINIC | Age: 29
End: 2019-11-11

## 2019-11-11 DIAGNOSIS — Z94.0 KIDNEY TRANSPLANTED: ICD-10-CM

## 2019-11-11 DIAGNOSIS — Z48.298 AFTERCARE FOLLOWING ORGAN TRANSPLANT: ICD-10-CM

## 2019-11-11 DIAGNOSIS — Z94.0 KIDNEY REPLACED BY TRANSPLANT: ICD-10-CM

## 2019-11-11 DIAGNOSIS — Z79.899 ENCOUNTER FOR LONG-TERM CURRENT USE OF MEDICATION: ICD-10-CM

## 2019-11-11 LAB
ANION GAP SERPL CALCULATED.3IONS-SCNC: 4 MMOL/L (ref 3–14)
BUN SERPL-MCNC: 33 MG/DL (ref 7–30)
CALCIUM SERPL-MCNC: 8.7 MG/DL (ref 8.5–10.1)
CHLORIDE SERPL-SCNC: 108 MMOL/L (ref 94–109)
CO2 SERPL-SCNC: 26 MMOL/L (ref 20–32)
CREAT SERPL-MCNC: 1.07 MG/DL (ref 0.52–1.04)
CREAT UR-MCNC: 39 MG/DL
ERYTHROCYTE [DISTWIDTH] IN BLOOD BY AUTOMATED COUNT: 12.3 % (ref 10–15)
GFR SERPL CREATININE-BSD FRML MDRD: 70 ML/MIN/{1.73_M2}
GLUCOSE SERPL-MCNC: 93 MG/DL (ref 70–99)
HCT VFR BLD AUTO: 33.8 % (ref 35–47)
HGB BLD-MCNC: 10.7 G/DL (ref 11.7–15.7)
MCH RBC QN AUTO: 30.7 PG (ref 26.5–33)
MCHC RBC AUTO-ENTMCNC: 31.7 G/DL (ref 31.5–36.5)
MCV RBC AUTO: 97 FL (ref 78–100)
PLATELET # BLD AUTO: 217 10E9/L (ref 150–450)
POTASSIUM SERPL-SCNC: 4.4 MMOL/L (ref 3.4–5.3)
PROT UR-MCNC: 0.07 G/L
PROT/CREAT 24H UR: 0.17 G/G CR (ref 0–0.2)
RBC # BLD AUTO: 3.49 10E12/L (ref 3.8–5.2)
SODIUM SERPL-SCNC: 139 MMOL/L (ref 133–144)
TACROLIMUS BLD-MCNC: 4.4 UG/L (ref 5–15)
TME LAST DOSE: ABNORMAL H
WBC # BLD AUTO: 5.2 10E9/L (ref 4–11)

## 2019-11-12 LAB
DONOR IDENTIFICATION: NORMAL
DQA1*05: 6842
DQB2: 4179
DSA COMMENTS: NORMAL
DSA PRESENT: YES
DSA TEST METHOD: NORMAL
ORGAN: NORMAL
SA1 CELL: NORMAL
SA1 COMMENTS: NORMAL
SA1 HI RISK ABY: NORMAL
SA1 MOD RISK ABY: NORMAL
SA1 TEST METHOD: NORMAL
SA2 CELL: NORMAL
SA2 COMMENTS: NORMAL
SA2 HI RISK ABY UA: NORMAL
SA2 MOD RISK ABY: NORMAL
SA2 TEST METHOD: NORMAL
UNACCEPTABLE ANTIGEN: NORMAL
UNOS CPRA: 84

## 2019-11-25 NOTE — TELEPHONE ENCOUNTER
Cr decreased, no plan for biopsy. [facility] will complete labs in January again.

## 2019-12-09 ENCOUNTER — TELEPHONE (OUTPATIENT)
Dept: GASTROENTEROLOGY | Facility: CLINIC | Age: 29
End: 2019-12-09

## 2019-12-09 NOTE — TELEPHONE ENCOUNTER
Called and left message for patient reminding of appointment scheduled on 12/11/19 at 0740 with [facility]. Patient to arrive 15 min early. To reschedule or cancel patient to call [phone].    YARA Aragon

## 2019-12-11 ENCOUNTER — OFFICE VISIT (OUTPATIENT)
Dept: GASTROENTEROLOGY | Facility: CLINIC | Age: 29
End: 2019-12-11
Payer: COMMERCIAL

## 2019-12-11 VITALS
BODY MASS INDEX: 23.2 KG/M2 | TEMPERATURE: 98.5 F | SYSTOLIC BLOOD PRESSURE: 112 MMHG | HEIGHT: 63 IN | DIASTOLIC BLOOD PRESSURE: 59 MMHG | HEART RATE: 74 BPM | WEIGHT: 130.9 LBS | OXYGEN SATURATION: 99 %

## 2019-12-11 DIAGNOSIS — K51.00 ULCERATIVE PANCOLITIS WITHOUT COMPLICATION (H): Primary | ICD-10-CM

## 2019-12-11 ASSESSMENT — MIFFLIN-ST. JEOR: SCORE: 1287.89

## 2019-12-11 ASSESSMENT — PAIN SCALES - GENERAL: PAINLEVEL: NO PAIN (0)

## 2019-12-11 NOTE — NURSING NOTE
"Chief Complaint   Patient presents with     RECHECK     follow up        Vitals:    12/11/19 0741   BP: 112/59   Pulse: 74   Temp: 98.5  F (36.9  C)   TempSrc: Oral   SpO2: 99%   Weight: 59.4 kg (130 lb 14.4 oz)   Height: 1.6 m (5' 3\")       Body mass index is 23.19 kg/m .    Aishwarya Okeefe CMA    "

## 2019-12-11 NOTE — PROGRESS NOTES
"[facility] UC FOLLOW UP      PATIENT: Caity Horan    MRN: [id number]    Date of Birth 1990    Tel: [phone] (home)     PCP: Ritu Little     HPI: Ms. Horan is a 29 year old year old female here for follow up for UC. She had great improvement with prednisone taper and initiation of Vedolizumab on 4/19/18. Iron deficiency anemia improved with iron infusions and healing of UC.  Had increase in EBV viremia, and Dr. Mar recommended decreasing prednisone taper and to follow EBV PCR closely. No changes from ID perspective.     UC history  Spring 2012 -- graduated college, had a lot of life stressors. Experienced severe urgency with incontinence. Some blood in the stool and diarrhea alternating with constipation. Spontaneously resolved after 2 months.   End of Dec 2016 - April 2017: intermittent blood in stool, \"major constipation\" and weight gain, no urgency.    October 2017 -- was found to be anemic (attributed to kidney disease) and had iron infusions.  Sep -Dec 2017 Blood with well formed stool. This continued to worsen.   Worst stretch was mid-Feb to march: weight loss, >8 loose stools per day with blood.  April - now: much improved, no abdominal pain and urgency resolved.    In the past had upper abdominal bloating/pain and LLQ pain. No n/v.     Macroscopic extent of disease (most recent) E3    Constitutional symptoms:  Fever NO  Weight loss YES (in the past -- lost 12 lb since October), now stable    Other GI symptoms present: none  Total number of IBD surgeries (except perianal): 0    Current IBD Medications:  Vedolizumab every 6 weeks    Current medications: MMF, tacrolimus, Mg, B12     Past IBD Medications:   None    Interval history 10/2018:  Completed prednisone taper in July and has continued with Entyvio every 8 weeks. No recurrence of symptoms.    Current UC symptoms  Bowel frequency in day 1-2, brown well-formed   Bowel frequency in night 0     Urgency of defecation NO  Blood " in stool none   General well being 8 (feeling well)  Extracolonic features (multiple select): none     4-6 weeks after kidney transplant had significant hair loss. Took about a year to fully recover and is improving.  8/23/18 shows adequate Entyvio level 41.7 (8 week trough), no change in dose or interval.  Last Entyvio dose was 9/20/18 (2.5 weeks ago)    Interval history 12/11/19:  Feel well. Notices occasionally one week prior to infusion, abd is swollen, tender and bloated (won't do core exercises during that time) and concerns for constipation. Wonders if this is related to inflammation.    Current UC symptoms  Bowel frequency in day 2 formed   Bowel frequency in night 0     Urgency of defecation none  Blood in stool none  General well being well  Extracolonic features (multiple select): None    Next dose is next week (Thursday)    Past Medical History:   Diagnosis Date     Anemia in stage 5 chronic kidney disease (H)      Anemia in stage 5 chronic kidney disease (H) 10/27/2014     ESRD (end stage renal disease) on dialysis (H)      HTN (hypertension) 10/27/2014     Hypertension 10/2014     IgA nephropathy     biopsy proven        Past Surgical History:   Procedure Laterality Date     COLONOSCOPY N/A 3/12/2018    Procedure: COMBINED COLONOSCOPY, SINGLE OR MULTIPLE BIOPSY/POLYPECTOMY BY BIOPSY;  EGD/Colonoscopy ;  Surgeon: Kory Massey MD;  Location: [facility] GI     COLONOSCOPY N/A 9/10/2018    Procedure: COMBINED COLONOSCOPY, SINGLE OR MULTIPLE BIOPSY/POLYPECTOMY BY BIOPSY;  Colonoscopy;  Surgeon: Yenifer Doan MD;  [facility]     EXTRACTION(S) DENTAL       PERCUTANEOUS BIOPSY KIDNEY Right 12/19/2018    Procedure: Right Kidney Biopsy;  Surgeon: Otilio Mar MD;  [facility]     TRANSPLANT KIDNEY RECIPIENT LIVING RELATED N/A 12/5/2014    Procedure: TRANSPLANT KIDNEY RECIPIENT LIVING RELATED;  Surgeon: Dale Middleton MD;  Location: [facility] OR       Social History     Tobacco Use     Smoking  status: Never Smoker     Smokeless tobacco: Never Used   Substance Use Topics     Alcohol use: Yes     Alcohol/week: 3.0 - 9.0 standard drinks     Types: 1 - 3 Glasses of wine, 1 - 3 Cans of beer, 1 - 3 Shots of liquor per week     Comment: 1-3X/month       Family History   Problem Relation Age of Onset     Kidney Disease No family hx of    Negative for IBD. Dad had psoriasis.   Grandfather with BCC. Grandma has several skin lesions removed (does not recall the diagnosis)    Allergies   Allergen Reactions     Amoxicillin Rash        Outpatient Encounter Medications as of 12/11/2019   Medication Sig Dispense Refill     Cholecalciferol (VITAMIN D) 2000 units tablet Take 2,000 Units by mouth daily 100 tablet 3     Cyanocobalamin (B-12) 1000 MCG TBCR Take 1,000 mcg by mouth daily 90 tablet 3     magnesium oxide (MAG-OX) 400 MG tablet Take 1 tablet (400 mg) by mouth daily 90 tablet 3     mycophenolate (GENERIC EQUIVALENT) 250 MG capsule Take 2 capsules (500 mg) by mouth 2 times daily 360 capsule 3     tacrolimus (GENERIC EQUIVALENT) 0.5 MG capsule Take 4 capsules (2 mg) by mouth 2 times daily (Patient taking differently: Take 2 mg by mouth 3 times daily ) 720 capsule 3     UNABLE TO FIND MEDICATION NAME: Biotin       Vedolizumab (ENTYVIO IV)        No facility-administered encounter medications on file as of 12/11/2019.       NSAID  NO    Review of Systems  Complete 10 System ROS performed. All are negative except as documented below, in the HPI, or in patient questionnaire from today's visit.    1) Constitutional: No fevers, chills, night sweats or malaise, weight loss or gain  2) Skin: No rash  3) Pulmonary: No wheeze, SOB, cough, sputum or hemoptysis  4) Cardiovascular: No Chest pain or palpitations  5) Genitourinary: No blood in urine or dysuria  6) Endocrine: No increased sweating, hunger, thirst or thyroid problems  7) Hematologic: No bruising and easy bleeding  8) Musculoskeletal: no new pain in joints or  "limitation in ROM  9) Neurologic: No dizziness, paresthesias or weakness or falls  10) Psychiatric:  not depressed/anxious, no sleep problems    PHYSICAL EXAM  Vitals: /59   Pulse 74   Temp 98.5  F (36.9  C) (Oral)   Ht 1.6 m (5' 3\")   Wt 59.4 kg (130 lb 14.4 oz)   SpO2 99%   BMI 23.19 kg/m      No Pain (0)     Eyes: Sclera anicteric/injected  Ears/nose/mouth/throat: Normal oropharynx without ulcers or exudate, mucus membranes moist, hearing intact  Neck: supple, thyroid normal size  CV: No edema  Respiratory: Unlabored breathing  Lymph: No axillary, submandibular, supraclavicular or inguinal lymphadenopathy  Abd: Nondistended, +bs, no hepatosplenomegaly, nontender, no peritoneal signs  Skin: warm, perfused, no jaundice. Appears tan.   Psych: Normal affect  MSK: Normal gait    DATA:  Reviewed in detail past documentation, medications and prior workup available in electronic health records or through outside records.    PERTINENT STUDIES:  Most recent CBC:  WBC   Date Value Ref Range Status   11/11/2019 5.2 4.0 - 11.0 10e9/L Final   ]  Hemoglobin   Date Value Ref Range Status   11/11/2019 10.7 (L) 11.7 - 15.7 g/dL Final   ]   Platelet Count   Date Value Ref Range Status   11/11/2019 217 150 - 450 10e9/L Final       Most recent coag:  INR   Date Value Ref Range Status   12/19/2018 1.00 0.86 - 1.14 Final       Most recent hepatic panel:  AST   Date Value Ref Range Status   09/26/2019 23 0 - 45 U/L Final     ALT   Date Value Ref Range Status   09/26/2019 20 0 - 50 U/L Final     No results found for: BILICONJ   Bilirubin Total   Date Value Ref Range Status   09/26/2019 0.7 0.2 - 1.3 mg/dL Final     Albumin   Date Value Ref Range Status   09/26/2019 4.2 3.4 - 5.0 g/dL Final     Alkaline Phosphatase   Date Value Ref Range Status   09/26/2019 45 40 - 150 U/L Final       Most recent creatinine:  Creatinine   Date Value Ref Range Status   11/11/2019 1.07 (H) 0.52 - 1.04 mg/dL Final     Endoscopy:   cscope 3/2018 " showed Mcbride 3 colitis involving the rectum to hepatic flexure with sparing of the AC. Normal TI. Biopsies showed moderate chronic active colitis with cryptitis and crypt abscess formation.     Cscope 9/2018 showed Mcbride 0. Biopsies show mild chronic active colitis ascending and transverse, crypt architectural distortion and descending sigmoid and rectum without active inflammation.  Imaging:  CT c/a/p on 7/2017 (indication EBV viremia): no bowel wall thickening. SB appeared normal.     IMPRESSION:  Ms. Horan is a 29 year old year old with diagnosed moderate to severe E3 ulcerative colitis 3/2018, who achieved symptomatic remission on prednisone taper and initiation of vedolizumab induction. She continues to do well and is off prednisone and continues on vedolizumab monotherapy.    She is concerned about possible active symptoms one week prior to infusions on occasion, however based on symptom report, seems more consistent with possible constipation. I have ordered a fecal calprotectin to be completed when this occurs to see if any active inflammation may be contributing. If this is the case, consider decreasing interval of vedolizumab.    # Ulcerative colitis, severe, E3, now in clinical remission  # Immunosuppressed state (tacrolimus and Cellcept)  # Hair loss without alopecia    PLAN:  --Continue vedolizumab (8/23/18 shows adequate Entyvio level 41.7 (8 week trough), no change in dose or interval)  --Blood work with infusions  --Fecal calprotectin (order placed and can be obtained per above)    IBD Health Care Maintenance:  Vaccinations:  -- Influenza (every year): Last given 2019  -- TdaP (every 10 years): Last given 2017  -- Pneumococcal Pneumonia (once then every 5 years): Last given 2017    One time confirmation of immunity or serologies:  -- Hepatitis A (serologies or immunizations): Not documented  -- Hepatitis B (serologies or immunizations): 2001/2002, immune per serologies  -- Varicella: had chickenpox as  a child  -- MMR:had all immunization as a child  -- HPV (all aged 18-26): 2007/2008  -- Meningococcal meningitis (all patients at risk for meningitis): 2007   -- Due to the immunosuppression in this patient, I would not advise administration of live vaccines such as varicella/VZV, intranasal influenza, MMR, or yellow fever vaccine (if travelling).      Cancer Screening:  Colon cancer screening:  Given pancolitis colonoscopy every 2-3 years recommended after 8 years of disease.  Dysplasia screening is recommended 2026, will require colonoscopy for mucosal healing on vedolizumab.    Cervical cancer screening: Annual due to immunosupression    Skin cancer screening: Annual visual exam of skin by dermatologist since patient is immunocompromised    Bone mineral density screening   -- Recommend all patients supplement with calcium and vitamin D  -- DEXA scan ordered given >3 months steroid use     Depression Screening:  -- Over the last month, have you felt down, depressed, or hopeless? No  -- Over the last month, have you felt little interest or pleasure doing things? No    Misc:  -- Avoid tobacco use  -- Avoid NSAIDs as there is potentially a 25% chance of causing an IBD flare    Immunization History   Administered Date(s) Administered     Influenza Vaccine IM 18-49 Yrs, RIV3 10/08/2018     Influenza Vaccine IM > 6 months Valent IIV4 12/13/2016, 10/03/2017     Pneumo Conj 13-V (2010&after) 11/01/2017     Pneumococcal 23 valent 11/10/2014     TDAP Vaccine (Boostrix) 11/01/2017      Follow up in 6 months    Rupert Dsouza PA-C  Division of Gastroenterology, Hepatology and Nutrition  [facility]         Answers for HPI/ROS submitted by the patient on 12/11/2019   General Symptoms: No  Skin Symptoms: No  HENT Symptoms: No  EYE SYMPTOMS: No  HEART SYMPTOMS: No  LUNG SYMPTOMS: No  INTESTINAL SYMPTOMS: No  URINARY SYMPTOMS: No  GYNECOLOGIC SYMPTOMS: No  BREAST SYMPTOMS: No  SKELETAL SYMPTOMS: No  BLOOD SYMPTOMS:  No  NERVOUS SYSTEM SYMPTOMS: No  MENTAL HEALTH SYMPTOMS: No

## 2019-12-11 NOTE — LETTER
"12/11/2019       RE: Caity Horan  [address]  [address]  [facility] [address]     Dear Colleague,    Thank you for referring your patient, Caity Horan, to the [facility] GASTROENTEROLOGY [facility] IBD CLINIC at [facility]. Please see a copy of my visit note below.    [facility] UC FOLLOW UP    PATIENT: Caity Horan    MRN: [id number]    Date of Birth 1990    Tel: [phone] (home)     PCP: Ritu Little     HPI: Ms. Horan is a 29 year old year old female here for follow up for UC. She had great improvement with prednisone taper and initiation of Vedolizumab on 4/19/18. Iron deficiency anemia improved with iron infusions and healing of UC.  Had increase in EBV viremia, and Dr. Mar recommended decreasing prednisone taper and to follow EBV PCR closely. No changes from ID perspective.     UC history  Spring 2012 -- graduated college, had a lot of life stressors. Experienced severe urgency with incontinence. Some blood in the stool and diarrhea alternating with constipation. Spontaneously resolved after 2 months.   End of Dec 2016 - April 2017: intermittent blood in stool, \"major constipation\" and weight gain, no urgency.    October 2017 -- was found to be anemic (attributed to kidney disease) and had iron infusions.  Sep -Dec 2017 Blood with well formed stool. This continued to worsen.   Worst stretch was mid-Feb to march: weight loss, >8 loose stools per day with blood.  April - now: much improved, no abdominal pain and urgency resolved.    In the past had upper abdominal bloating/pain and LLQ pain. No n/v.     Macroscopic extent of disease (most recent) E3    Constitutional symptoms:  Fever NO  Weight loss YES (in the past -- lost 12 lb since October), now stable    Other GI symptoms present: none  Total number of IBD surgeries (except perianal): 0    Current IBD Medications:  Vedolizumab every 6 weeks    Current medications: MMF, tacrolimus, Mg, B12 "     Past IBD Medications:   None    Interval history 10/2018:  Completed prednisone taper in July and has continued with Entyvio every 8 weeks. No recurrence of symptoms.    Current UC symptoms  Bowel frequency in day 1-2, brown well-formed   Bowel frequency in night 0     Urgency of defecation NO  Blood in stool none   General well being 8 (feeling well)  Extracolonic features (multiple select): none     4-6 weeks after kidney transplant had significant hair loss. Took about a year to fully recover and is improving.  8/23/18 shows adequate Entyvio level 41.7 (8 week trough), no change in dose or interval.  Last Entyvio dose was 9/20/18 (2.5 weeks ago)    Interval history 12/11/19:  Feel well. Notices occasionally one week prior to infusion, abd is swollen, tender and bloated (won't do core exercises during that time) and concerns for constipation. Wonders if this is related to inflammation.    Current UC symptoms  Bowel frequency in day 2 formed   Bowel frequency in night 0     Urgency of defecation none  Blood in stool none  General well being well  Extracolonic features (multiple select): None    Next dose is next week (Thursday)    Past Medical History:   Diagnosis Date     Anemia in stage 5 chronic kidney disease (H)      Anemia in stage 5 chronic kidney disease (H) 10/27/2014     ESRD (end stage renal disease) on dialysis (H)      HTN (hypertension) 10/27/2014     Hypertension 10/2014     IgA nephropathy     biopsy proven        Past Surgical History:   Procedure Laterality Date     COLONOSCOPY N/A 3/12/2018    Procedure: COMBINED COLONOSCOPY, SINGLE OR MULTIPLE BIOPSY/POLYPECTOMY BY BIOPSY;  EGD/Colonoscopy ;  Surgeon: Kory Massey MD;  Location: [facility] GI     COLONOSCOPY N/A 9/10/2018    Procedure: COMBINED COLONOSCOPY, SINGLE OR MULTIPLE BIOPSY/POLYPECTOMY BY BIOPSY;  Colonoscopy;  Surgeon: Yenifer Doan MD;  [facility]     EXTRACTION(S) DENTAL       PERCUTANEOUS BIOPSY KIDNEY Right 12/19/2018     Procedure: Right Kidney Biopsy;  Surgeon: Otilio Mar MD;  [facility]     TRANSPLANT KIDNEY RECIPIENT LIVING RELATED N/A 12/5/2014    Procedure: TRANSPLANT KIDNEY RECIPIENT LIVING RELATED;  Surgeon: Dale Middleton MD;  Location: [facility] OR       Social History     Tobacco Use     Smoking status: Never Smoker     Smokeless tobacco: Never Used   Substance Use Topics     Alcohol use: Yes     Alcohol/week: 3.0 - 9.0 standard drinks     Types: 1 - 3 Glasses of wine, 1 - 3 Cans of beer, 1 - 3 Shots of liquor per week     Comment: 1-3X/month       Family History   Problem Relation Age of Onset     Kidney Disease No family hx of    Negative for IBD. Dad had psoriasis.   Grandfather with BCC. Grandma has several skin lesions removed (does not recall the diagnosis)    Allergies   Allergen Reactions     Amoxicillin Rash        Outpatient Encounter Medications as of 12/11/2019   Medication Sig Dispense Refill     Cholecalciferol (VITAMIN D) 2000 units tablet Take 2,000 Units by mouth daily 100 tablet 3     Cyanocobalamin (B-12) 1000 MCG TBCR Take 1,000 mcg by mouth daily 90 tablet 3     magnesium oxide (MAG-OX) 400 MG tablet Take 1 tablet (400 mg) by mouth daily 90 tablet 3     mycophenolate (GENERIC EQUIVALENT) 250 MG capsule Take 2 capsules (500 mg) by mouth 2 times daily 360 capsule 3     tacrolimus (GENERIC EQUIVALENT) 0.5 MG capsule Take 4 capsules (2 mg) by mouth 2 times daily (Patient taking differently: Take 2 mg by mouth 3 times daily ) 720 capsule 3     UNABLE TO FIND MEDICATION NAME: Biotin       Vedolizumab (ENTYVIO IV)        No facility-administered encounter medications on file as of 12/11/2019.       NSAID  NO    Review of Systems  Complete 10 System ROS performed. All are negative except as documented below, in the HPI, or in patient questionnaire from today's visit.    1) Constitutional: No fevers, chills, night sweats or malaise, weight loss or gain  2) Skin: No rash  3) Pulmonary: No wheeze, SOB,  "cough, sputum or hemoptysis  4) Cardiovascular: No Chest pain or palpitations  5) Genitourinary: No blood in urine or dysuria  6) Endocrine: No increased sweating, hunger, thirst or thyroid problems  7) Hematologic: No bruising and easy bleeding  8) Musculoskeletal: no new pain in joints or limitation in ROM  9) Neurologic: No dizziness, paresthesias or weakness or falls  10) Psychiatric:  not depressed/anxious, no sleep problems    PHYSICAL EXAM  Vitals: /59   Pulse 74   Temp 98.5  F (36.9  C) (Oral)   Ht 1.6 m (5' 3\")   Wt 59.4 kg (130 lb 14.4 oz)   SpO2 99%   BMI 23.19 kg/m       No Pain (0)     Eyes: Sclera anicteric/injected  Ears/nose/mouth/throat: Normal oropharynx without ulcers or exudate, mucus membranes moist, hearing intact  Neck: supple, thyroid normal size  CV: No edema  Respiratory: Unlabored breathing  Lymph: No axillary, submandibular, supraclavicular or inguinal lymphadenopathy  Abd: Nondistended, +bs, no hepatosplenomegaly, nontender, no peritoneal signs  Skin: warm, perfused, no jaundice. Appears tan.   Psych: Normal affect  MSK: Normal gait    DATA:  Reviewed in detail past documentation, medications and prior workup available in electronic health records or through outside records.    PERTINENT STUDIES:  Most recent CBC:  WBC   Date Value Ref Range Status   11/11/2019 5.2 4.0 - 11.0 10e9/L Final   ]  Hemoglobin   Date Value Ref Range Status   11/11/2019 10.7 (L) 11.7 - 15.7 g/dL Final   ]   Platelet Count   Date Value Ref Range Status   11/11/2019 217 150 - 450 10e9/L Final       Most recent coag:  INR   Date Value Ref Range Status   12/19/2018 1.00 0.86 - 1.14 Final       Most recent hepatic panel:  AST   Date Value Ref Range Status   09/26/2019 23 0 - 45 U/L Final     ALT   Date Value Ref Range Status   09/26/2019 20 0 - 50 U/L Final     No results found for: BILICONJ   Bilirubin Total   Date Value Ref Range Status   09/26/2019 0.7 0.2 - 1.3 mg/dL Final     Albumin   Date Value " Ref Range Status   09/26/2019 4.2 3.4 - 5.0 g/dL Final     Alkaline Phosphatase   Date Value Ref Range Status   09/26/2019 45 40 - 150 U/L Final       Most recent creatinine:  Creatinine   Date Value Ref Range Status   11/11/2019 1.07 (H) 0.52 - 1.04 mg/dL Final     Endoscopy:   cscope 3/2018 showed Mcbride 3 colitis involving the rectum to hepatic flexure with sparing of the AC. Normal TI. Biopsies showed moderate chronic active colitis with cryptitis and crypt abscess formation.     Cscope 9/2018 showed Mcbride 0. Biopsies show mild chronic active colitis ascending and transverse, crypt architectural distortion and descending sigmoid and rectum without active inflammation.  Imaging:  CT c/a/p on 7/2017 (indication EBV viremia): no bowel wall thickening. SB appeared normal.     IMPRESSION:  Ms. Horan is a 29 year old year old with diagnosed moderate to severe E3 ulcerative colitis 3/2018, who achieved symptomatic remission on prednisone taper and initiation of vedolizumab induction. She continues to do well and is off prednisone and continues on vedolizumab monotherapy.    She is concerned about possible active symptoms one week prior to infusions on occasion, however based on symptom report, seems more consistent with possible constipation. I have ordered a fecal calprotectin to be completed when this occurs to see if any active inflammation may be contributing. If this is the case, consider decreasing interval of vedolizumab.    # Ulcerative colitis, severe, E3, now in clinical remission  # Immunosuppressed state (tacrolimus and Cellcept)  # Hair loss without alopecia    PLAN:  --Continue vedolizumab (8/23/18 shows adequate Entyvio level 41.7 (8 week trough), no change in dose or interval)  --Blood work with infusions  --Fecal calprotectin (order placed and can be obtained per above)    IBD Health Care Maintenance:  Vaccinations:  -- Influenza (every year): Last given 2019  -- TdaP (every 10 years): Last given  2017  -- Pneumococcal Pneumonia (once then every 5 years): Last given 2017    One time confirmation of immunity or serologies:  -- Hepatitis A (serologies or immunizations): Not documented  -- Hepatitis B (serologies or immunizations): 2001/2002, immune per serologies  -- Varicella: had chickenpox as a child  -- MMR:had all immunization as a child  -- HPV (all aged 18-26): 2007/2008  -- Meningococcal meningitis (all patients at risk for meningitis): 2007   -- Due to the immunosuppression in this patient, I would not advise administration of live vaccines such as varicella/VZV, intranasal influenza, MMR, or yellow fever vaccine (if travelling).      Cancer Screening:  Colon cancer screening:  Given pancolitis colonoscopy every 2-3 years recommended after 8 years of disease.  Dysplasia screening is recommended 2026, will require colonoscopy for mucosal healing on vedolizumab.    Cervical cancer screening: Annual due to immunosupression    Skin cancer screening: Annual visual exam of skin by dermatologist since patient is immunocompromised    Bone mineral density screening   -- Recommend all patients supplement with calcium and vitamin D  -- DEXA scan ordered given >3 months steroid use     Depression Screening:  -- Over the last month, have you felt down, depressed, or hopeless? No  -- Over the last month, have you felt little interest or pleasure doing things? No    Misc:  -- Avoid tobacco use  -- Avoid NSAIDs as there is potentially a 25% chance of causing an IBD flare    Immunization History   Administered Date(s) Administered     Influenza Vaccine IM 18-49 Yrs, RIV3 10/08/2018     Influenza Vaccine IM > 6 months Valent IIV4 12/13/2016, 10/03/2017     Pneumo Conj 13-V (2010&after) 11/01/2017     Pneumococcal 23 valent 11/10/2014     TDAP Vaccine (Boostrix) 11/01/2017      Follow up in 6 months    Rupert Dsouza PA-C  Division of Gastroenterology, Hepatology and Nutrition  [facility]

## 2019-12-11 NOTE — PATIENT INSTRUCTIONS
It was a pleasure taking care of you today.  I've included a brief summary of our discussion and care plan from today's visit below.  Please review this information with your primary care provider.  ______________________________________________________________________    My recommendations are summarized as follows:    -- Continue Entyvio every 6 weeks  -- Stool sample when you feel like you may have active inflammation  -- Labs with your infusions  -- Next endoscopic assessment: 2026  -- Patient with IBD we recommend supplementation vitamin D 1000 units daily and calcium 500 mg twice daily.  -- Vaccines/immunizations to be updated: Recommend yearly flu shot, pneumonia vaccines (Prevnar 13 then 8 weeks later Pneumovax 23 then 5 years later Pneumovax 23), tetanus every 10 years.  -- Yearly Dermatology visit for skin check while on immunosuppressive therapy. Can call [phone] to schedule.  -- Yearly pap smear while on immunosuppressive therapy  -- No NSAIDs (ibuprofen, or anything containing ibuprofen)       For additional resources about inflammatory bowel disease visit [email or web address]      Return to GI Clinic in 6 months to review your progress.    ______________________________________________________________________    Who do I call with any questions after my visit?  Please be in touch if there are any further questions that arise following today's visit.  There are multiple ways to contact your gastroenterology care team.        During business hours, you may reach a Gastroenterology nurse at [phone], option [phone].       To schedule or reschedule an appointment, please call [phone].       You can always send a secure message through [employer].  [employer] messages are answered by your nurse or doctor typically within 24 hours.  Please allow extra time on weekends and holidays.        For urgent/emergent questions after business hours, you may reach the on-call GI Fellow by  contacting the [facility]  at [phone].      In order for your refill to be processed in a timely fashion, it is your responsibility to ensure you follow the recommendations from your provider regarding your laboratory studies and follow up appointments.       How will I get the results of any tests ordered?    You will receive all of your results.  If you have signed up for [email or web address], any tests ordered at your visit will be available to you after your physician reviews them.  Typically this takes 1-2 weeks.  If there are urgent results that require a change in your care plan, your physician or nurse will call you to discuss the next steps.      What is [employer]?  [employer] is a secure way for you to access all of your healthcare records from the [facility].  It is a web based computer program, so you can sign on to it from any location.  It also allows you to send secure messages to your care team.  I recommend signing up for [employer] access if you have not already done so and are comfortable with using a computer.      How to I schedule a follow-up visit?  If you did not schedule a follow-up visit today, please call [phone] to schedule a follow-up office visit.        Sincerely,    Rupert Dsouza PA-C  [facility]  Division of Gastroenterology

## 2019-12-19 ENCOUNTER — HOME INFUSION (PRE-WILLOW HOME INFUSION) (OUTPATIENT)
Dept: PHARMACY | Facility: CLINIC | Age: 29
End: 2019-12-19

## 2019-12-19 ENCOUNTER — MEDICAL CORRESPONDENCE (OUTPATIENT)
Dept: HEALTH INFORMATION MANAGEMENT | Facility: CLINIC | Age: 29
End: 2019-12-19

## 2019-12-19 LAB
ALBUMIN SERPL-MCNC: 4.1 G/DL (ref 3.4–5)
ALP SERPL-CCNC: 48 U/L (ref 40–150)
ALT SERPL W P-5'-P-CCNC: 22 U/L (ref 0–50)
AST SERPL W P-5'-P-CCNC: 18 U/L (ref 0–45)
BASOPHILS # BLD AUTO: 0.1 10E9/L (ref 0–0.2)
BASOPHILS NFR BLD AUTO: 0.8 %
BILIRUB DIRECT SERPL-MCNC: 0.1 MG/DL (ref 0–0.2)
BILIRUB SERPL-MCNC: 0.4 MG/DL (ref 0.2–1.3)
CRP SERPL-MCNC: <2.9 MG/L (ref 0–8)
DIFFERENTIAL METHOD BLD: ABNORMAL
EOSINOPHIL # BLD AUTO: 0.1 10E9/L (ref 0–0.7)
EOSINOPHIL NFR BLD AUTO: 2.2 %
ERYTHROCYTE [DISTWIDTH] IN BLOOD BY AUTOMATED COUNT: 12.4 % (ref 10–15)
ERYTHROCYTE [SEDIMENTATION RATE] IN BLOOD BY WESTERGREN METHOD: 29 MM/H (ref 0–20)
HCT VFR BLD AUTO: 34.4 % (ref 35–47)
HGB BLD-MCNC: 10.8 G/DL (ref 11.7–15.7)
IMM GRANULOCYTES # BLD: 0 10E9/L (ref 0–0.4)
IMM GRANULOCYTES NFR BLD: 0.2 %
LYMPHOCYTES # BLD AUTO: 1.4 10E9/L (ref 0.8–5.3)
LYMPHOCYTES NFR BLD AUTO: 22.1 %
MCH RBC QN AUTO: 30.4 PG (ref 26.5–33)
MCHC RBC AUTO-ENTMCNC: 31.4 G/DL (ref 31.5–36.5)
MCV RBC AUTO: 97 FL (ref 78–100)
MONOCYTES # BLD AUTO: 0.4 10E9/L (ref 0–1.3)
MONOCYTES NFR BLD AUTO: 6.6 %
NEUTROPHILS # BLD AUTO: 4.4 10E9/L (ref 1.6–8.3)
NEUTROPHILS NFR BLD AUTO: 68.1 %
NRBC # BLD AUTO: 0 10*3/UL
NRBC BLD AUTO-RTO: 0 /100
PLATELET # BLD AUTO: 290 10E9/L (ref 150–450)
PROT SERPL-MCNC: 7.8 G/DL (ref 6.8–8.8)
RBC # BLD AUTO: 3.55 10E12/L (ref 3.8–5.2)
WBC # BLD AUTO: 6.5 10E9/L (ref 4–11)

## 2019-12-19 PROCEDURE — 80076 HEPATIC FUNCTION PANEL: CPT | Performed by: INTERNAL MEDICINE

## 2019-12-19 PROCEDURE — 86140 C-REACTIVE PROTEIN: CPT | Performed by: INTERNAL MEDICINE

## 2019-12-19 PROCEDURE — 85025 COMPLETE CBC W/AUTO DIFF WBC: CPT | Performed by: INTERNAL MEDICINE

## 2019-12-19 PROCEDURE — 85652 RBC SED RATE AUTOMATED: CPT | Performed by: INTERNAL MEDICINE

## 2019-12-19 NOTE — PROGRESS NOTES
Skilled Nurse visit in the patient's home to administer Verdolizumab (Entyvio) 300mg. Patient denies changes in eye site, thinking, balance or strength. No recent elevated temperature, fever, chills, or cough. VSSA, patient denies decrease in appetite, unexplained weight loss or fatigue.  No other new onset medical symptoms noted or reported.  Current weight 122lb.  PIV placed RAC, x1 attempt. No pre medications ordered. Labs drawn per orders: CBC d/p, ESR, CRPI, Hepatic panel. Infusion completed without complication or reaction. Pt reports therapy is effective in managing symptoms related to ulcerative pancolitis.     ELAINE PolkN RN  Field Clinician  [facility]   [address] [place] [address]  [email or web address]  [email or web address]   Cell: [phone] Fax [phone]

## 2020-01-01 ENCOUNTER — APPOINTMENT (OUTPATIENT)
Dept: LAB | Facility: CLINIC | Age: 30
End: 2020-01-01
Attending: INTERNAL MEDICINE
Payer: COMMERCIAL

## 2020-01-28 ENCOUNTER — HOME INFUSION (PRE-WILLOW HOME INFUSION) (OUTPATIENT)
Dept: PHARMACY | Facility: CLINIC | Age: 30
End: 2020-01-28

## 2020-01-29 NOTE — PROGRESS NOTES
This is a recent snapshot of the patient's [facility] medical record.  For current drug dose and complete information and questions, call [phone] or In Basket pool, fv home infusion ([id number])  CSN Number:  [id number]

## 2020-02-01 ENCOUNTER — APPOINTMENT (OUTPATIENT)
Dept: LAB | Facility: CLINIC | Age: 30
End: 2020-02-01
Attending: INTERNAL MEDICINE
Payer: COMMERCIAL

## 2020-02-03 ENCOUNTER — HOME INFUSION (PRE-WILLOW HOME INFUSION) (OUTPATIENT)
Dept: PHARMACY | Facility: CLINIC | Age: 30
End: 2020-02-03

## 2020-02-04 NOTE — PROGRESS NOTES
This is a recent snapshot of the patient's [facility] medical record.  For current drug dose and complete information and questions, call [phone] or In [facility] pool, fv home infusion ([id number])  CSN Number:  [id number]

## 2020-03-10 ENCOUNTER — HEALTH MAINTENANCE LETTER (OUTPATIENT)
Age: 30
End: 2020-03-10

## 2020-03-12 ENCOUNTER — PATIENT OUTREACH (OUTPATIENT)
Dept: GASTROENTEROLOGY | Facility: CLINIC | Age: 30
End: 2020-03-12

## 2020-03-12 ENCOUNTER — HOME INFUSION (PRE-WILLOW HOME INFUSION) (OUTPATIENT)
Dept: PHARMACY | Facility: CLINIC | Age: 30
End: 2020-03-12

## 2020-03-12 NOTE — PROGRESS NOTES
Received a call from [facility] as patient would like to delay her infusions for a week due to her upcoming vacation.      Spoke to the patient and patient is able to  Delay the infusions.  [facility] called and updated on the patient plan

## 2020-03-13 NOTE — PROGRESS NOTES
This is a recent snapshot of the patient's [facility] medical record.  For current drug dose and complete information and questions, call [phone] or In Basket pool, fv home infusion ([id number])  CSN Number:  [id number]

## 2020-03-22 DIAGNOSIS — E83.42 HYPOMAGNESEMIA: ICD-10-CM

## 2020-03-22 DIAGNOSIS — Z94.0 KIDNEY REPLACED BY TRANSPLANT: Primary | ICD-10-CM

## 2020-03-22 DIAGNOSIS — Z94.0 STATUS POST KIDNEY TRANSPLANT: ICD-10-CM

## 2020-03-22 DIAGNOSIS — Z94.0 KIDNEY TRANSPLANTED: ICD-10-CM

## 2020-03-23 RX ORDER — TACROLIMUS 0.5 MG/1
2 CAPSULE ORAL 3 TIMES DAILY
Qty: 360 CAPSULE | Refills: 0 | Status: SHIPPED | OUTPATIENT
Start: 2020-03-23 | End: 2020-04-21

## 2020-03-24 ENCOUNTER — HOME INFUSION (PRE-WILLOW HOME INFUSION) (OUTPATIENT)
Dept: PHARMACY | Facility: CLINIC | Age: 30
End: 2020-03-24

## 2020-03-25 ENCOUNTER — HOME INFUSION (PRE-WILLOW HOME INFUSION) (OUTPATIENT)
Dept: PHARMACY | Facility: CLINIC | Age: 30
End: 2020-03-25

## 2020-03-25 NOTE — PROGRESS NOTES
This is a recent snapshot of the patient's [facility] medical record.  For current drug dose and complete information and questions, call [phone] or In Basket pool, fv home infusion ([id number])  CSN Number:  [id number]

## 2020-03-26 ENCOUNTER — HOME INFUSION (PRE-WILLOW HOME INFUSION) (OUTPATIENT)
Dept: PHARMACY | Facility: CLINIC | Age: 30
End: 2020-03-26

## 2020-03-26 DIAGNOSIS — Z48.298 AFTERCARE FOLLOWING ORGAN TRANSPLANT: ICD-10-CM

## 2020-03-26 DIAGNOSIS — Z79.899 ENCOUNTER FOR LONG-TERM CURRENT USE OF MEDICATION: ICD-10-CM

## 2020-03-26 DIAGNOSIS — Z94.0 KIDNEY REPLACED BY TRANSPLANT: ICD-10-CM

## 2020-03-26 LAB
ALBUMIN SERPL-MCNC: 4.1 G/DL (ref 3.4–5)
ALP SERPL-CCNC: 46 U/L (ref 40–150)
ALT SERPL W P-5'-P-CCNC: 19 U/L (ref 0–50)
AST SERPL W P-5'-P-CCNC: 17 U/L (ref 0–45)
BASOPHILS # BLD AUTO: 0 10E9/L (ref 0–0.2)
BASOPHILS NFR BLD AUTO: 0.5 %
BILIRUB DIRECT SERPL-MCNC: 0.1 MG/DL (ref 0–0.2)
BILIRUB SERPL-MCNC: 0.4 MG/DL (ref 0.2–1.3)
CRP SERPL-MCNC: <2.9 MG/L (ref 0–8)
DIFFERENTIAL METHOD BLD: ABNORMAL
EOSINOPHIL # BLD AUTO: 0.1 10E9/L (ref 0–0.7)
EOSINOPHIL NFR BLD AUTO: 2.1 %
ERYTHROCYTE [DISTWIDTH] IN BLOOD BY AUTOMATED COUNT: 12.6 % (ref 10–15)
ERYTHROCYTE [SEDIMENTATION RATE] IN BLOOD BY WESTERGREN METHOD: 22 MM/H (ref 0–20)
HCT VFR BLD AUTO: 34 % (ref 35–47)
HGB BLD-MCNC: 10.9 G/DL (ref 11.7–15.7)
IMM GRANULOCYTES # BLD: 0 10E9/L (ref 0–0.4)
IMM GRANULOCYTES NFR BLD: 0.2 %
LYMPHOCYTES # BLD AUTO: 1.6 10E9/L (ref 0.8–5.3)
LYMPHOCYTES NFR BLD AUTO: 27.7 %
MCH RBC QN AUTO: 30.3 PG (ref 26.5–33)
MCHC RBC AUTO-ENTMCNC: 32.1 G/DL (ref 31.5–36.5)
MCV RBC AUTO: 94 FL (ref 78–100)
MONOCYTES # BLD AUTO: 0.3 10E9/L (ref 0–1.3)
MONOCYTES NFR BLD AUTO: 5.4 %
NEUTROPHILS # BLD AUTO: 3.7 10E9/L (ref 1.6–8.3)
NEUTROPHILS NFR BLD AUTO: 64.1 %
NRBC # BLD AUTO: 0 10*3/UL
NRBC BLD AUTO-RTO: 0 /100
PLATELET # BLD AUTO: 252 10E9/L (ref 150–450)
PROT SERPL-MCNC: 7.2 G/DL (ref 6.8–8.8)
RBC # BLD AUTO: 3.6 10E12/L (ref 3.8–5.2)
WBC # BLD AUTO: 5.7 10E9/L (ref 4–11)

## 2020-03-26 PROCEDURE — 87799 DETECT AGENT NOS DNA QUANT: CPT | Performed by: INTERNAL MEDICINE

## 2020-03-26 PROCEDURE — 80076 HEPATIC FUNCTION PANEL: CPT | Performed by: INTERNAL MEDICINE

## 2020-03-26 PROCEDURE — 86140 C-REACTIVE PROTEIN: CPT | Performed by: INTERNAL MEDICINE

## 2020-03-26 PROCEDURE — 85025 COMPLETE CBC W/AUTO DIFF WBC: CPT | Performed by: INTERNAL MEDICINE

## 2020-03-26 PROCEDURE — 85652 RBC SED RATE AUTOMATED: CPT | Performed by: INTERNAL MEDICINE

## 2020-03-26 PROCEDURE — 80048 BASIC METABOLIC PNL TOTAL CA: CPT | Performed by: INTERNAL MEDICINE

## 2020-03-26 NOTE — PROGRESS NOTES
This is a recent snapshot of the patient's [facility] medical record.  For current drug dose and complete information and questions, call [phone] or In Basket pool, fv home infusion ([id number])  CSN Number:  [id number]

## 2020-03-27 DIAGNOSIS — Z79.899 ENCOUNTER FOR LONG-TERM CURRENT USE OF MEDICATION: ICD-10-CM

## 2020-03-27 DIAGNOSIS — Z94.0 KIDNEY REPLACED BY TRANSPLANT: ICD-10-CM

## 2020-03-27 DIAGNOSIS — Z48.298 AFTERCARE FOLLOWING ORGAN TRANSPLANT: ICD-10-CM

## 2020-03-27 LAB
ANION GAP SERPL CALCULATED.3IONS-SCNC: 12 MMOL/L (ref 3–14)
BUN SERPL-MCNC: 28 MG/DL (ref 7–30)
CALCIUM SERPL-MCNC: 9.4 MG/DL (ref 8.5–10.1)
CHLORIDE SERPL-SCNC: 110 MMOL/L (ref 94–109)
CO2 SERPL-SCNC: 20 MMOL/L (ref 20–32)
CREAT SERPL-MCNC: 1.35 MG/DL (ref 0.52–1.04)
GFR SERPL CREATININE-BSD FRML MDRD: 53 ML/MIN/{1.73_M2}
GLUCOSE SERPL-MCNC: 71 MG/DL (ref 70–99)
POTASSIUM SERPL-SCNC: 4.4 MMOL/L (ref 3.4–5.3)
SODIUM SERPL-SCNC: 142 MMOL/L (ref 133–144)

## 2020-03-30 LAB
EBV DNA # SPEC NAA+PROBE: 2042 {COPIES}/ML
EBV DNA SPEC NAA+PROBE-LOG#: 3.3 {LOG_COPIES}/ML

## 2020-03-31 NOTE — PROGRESS NOTES
This is a recent snapshot of the patient's [facility] medical record.  For current drug dose and complete information and questions, call [phone] or In Basket pool, fv home infusion ([id number])  CSN Number:  [id number]

## 2020-04-01 ENCOUNTER — APPOINTMENT (OUTPATIENT)
Dept: LAB | Facility: CLINIC | Age: 30
End: 2020-04-01
Attending: INTERNAL MEDICINE
Payer: COMMERCIAL

## 2020-04-15 ENCOUNTER — HOME INFUSION (PRE-WILLOW HOME INFUSION) (OUTPATIENT)
Dept: PHARMACY | Facility: CLINIC | Age: 30
End: 2020-04-15

## 2020-04-15 ENCOUNTER — PATIENT OUTREACH (OUTPATIENT)
Dept: GASTROENTEROLOGY | Facility: CLINIC | Age: 30
End: 2020-04-15

## 2020-04-15 DIAGNOSIS — K51.011 ULCERATIVE PANCOLITIS WITH RECTAL BLEEDING (H): Primary | ICD-10-CM

## 2020-04-16 NOTE — PROGRESS NOTES
Patient therapy plan updated due to plan expiration. Patient continues on medication per last clinic encounter. Patient standing order labs also updated

## 2020-04-16 NOTE — PROGRESS NOTES
This is a recent snapshot of the patient's [facility] medical record.  For current drug dose and complete information and questions, call [phone] or In Basket pool, fv home infusion ([id number])  CSN Number:  [id number]

## 2020-04-21 ENCOUNTER — HOME INFUSION (PRE-WILLOW HOME INFUSION) (OUTPATIENT)
Dept: PHARMACY | Facility: CLINIC | Age: 30
End: 2020-04-21

## 2020-04-21 DIAGNOSIS — E83.42 HYPOMAGNESEMIA: ICD-10-CM

## 2020-04-21 DIAGNOSIS — Z94.0 KIDNEY TRANSPLANTED: ICD-10-CM

## 2020-04-21 DIAGNOSIS — Z94.0 KIDNEY REPLACED BY TRANSPLANT: Primary | ICD-10-CM

## 2020-04-21 DIAGNOSIS — Z94.0 STATUS POST KIDNEY TRANSPLANT: ICD-10-CM

## 2020-04-21 RX ORDER — TACROLIMUS 0.5 MG/1
2 CAPSULE ORAL 3 TIMES DAILY
Qty: 360 CAPSULE | Refills: 0 | Status: SHIPPED | OUTPATIENT
Start: 2020-04-21 | End: 2020-06-15

## 2020-04-23 NOTE — PROGRESS NOTES
This is a recent snapshot of the patient's [facility] medical record.  For current drug dose and complete information and questions, call [phone] or In Basket pool, fv home infusion ([id number])  CSN Number:  [id number]

## 2020-05-01 ENCOUNTER — APPOINTMENT (OUTPATIENT)
Dept: LAB | Facility: CLINIC | Age: 30
End: 2020-05-01
Attending: INTERNAL MEDICINE
Payer: COMMERCIAL

## 2020-05-06 ENCOUNTER — HOME INFUSION (PRE-WILLOW HOME INFUSION) (OUTPATIENT)
Dept: PHARMACY | Facility: CLINIC | Age: 30
End: 2020-05-06

## 2020-05-07 ENCOUNTER — HOME INFUSION (PRE-WILLOW HOME INFUSION) (OUTPATIENT)
Dept: PHARMACY | Facility: CLINIC | Age: 30
End: 2020-05-07

## 2020-05-07 NOTE — PROGRESS NOTES
This is a recent snapshot of the patient's [facility] medical record.  For current drug dose and complete information and questions, call [phone] or In Basket pool, fv home infusion ([id number])  CSN Number:  [id number]

## 2020-05-08 NOTE — PROGRESS NOTES
This is a recent snapshot of the patient's [facility] medical record.  For current drug dose and complete information and questions, call [phone] or In Basket pool, fv home infusion ([id number])  CSN Number:  [id number]

## 2020-05-11 ENCOUNTER — PATIENT OUTREACH (OUTPATIENT)
Dept: GASTROENTEROLOGY | Facility: CLINIC | Age: 30
End: 2020-05-11

## 2020-05-11 NOTE — PROGRESS NOTES
Called patient and she states that she remains in remission at this time and does not feel a clinic visit is necessary.  Will contact the patient in the fall to schedule a return appointment.

## 2020-05-13 NOTE — PHARMACY
Skilled Nurse visit in the  patient home to administer Entyvio 300 mg  Lot # [id number] Exp: Nov 2022.  No recent elevated temperature, fever, chills, productive cough, coughing for 3 weeks or longer or hemoptysis, abnormal vital signs, night sweats, chest pain. No  decrease in appetite, unexplained weight loss or fatigue.  No other new onset medical symptoms.  Current weight 124#.  PIV placed R ac with one attempt.No premeds given and no labs drawn. Infusion completed without complication or reaction. Pt reports therapy is keeping her UC in remission.

## 2020-06-15 ENCOUNTER — HOME INFUSION (PRE-WILLOW HOME INFUSION) (OUTPATIENT)
Dept: PHARMACY | Facility: CLINIC | Age: 30
End: 2020-06-15

## 2020-06-15 DIAGNOSIS — Z94.0 KIDNEY TRANSPLANTED: Primary | ICD-10-CM

## 2020-06-15 DIAGNOSIS — E83.42 HYPOMAGNESEMIA: ICD-10-CM

## 2020-06-15 DIAGNOSIS — Z94.0 KIDNEY REPLACED BY TRANSPLANT: ICD-10-CM

## 2020-06-15 DIAGNOSIS — Z94.0 STATUS POST KIDNEY TRANSPLANT: ICD-10-CM

## 2020-06-15 RX ORDER — TACROLIMUS 0.5 MG/1
1.5 CAPSULE ORAL 2 TIMES DAILY
Qty: 180 CAPSULE | Refills: 3 | Status: SHIPPED | OUTPATIENT
Start: 2020-06-15 | End: 2020-06-22

## 2020-06-16 ENCOUNTER — HOME INFUSION (PRE-WILLOW HOME INFUSION) (OUTPATIENT)
Dept: PHARMACY | Facility: CLINIC | Age: 30
End: 2020-06-16

## 2020-06-16 NOTE — PROGRESS NOTES
This is a recent snapshot of the patient's [facility] medical record.  For current drug dose and complete information and questions, call [phone] or In Basket pool, fv home infusion ([id number])  CSN Number:  [id number]

## 2020-06-17 ENCOUNTER — HOME INFUSION (PRE-WILLOW HOME INFUSION) (OUTPATIENT)
Dept: PHARMACY | Facility: CLINIC | Age: 30
End: 2020-06-17

## 2020-06-17 NOTE — PROGRESS NOTES
This is a recent snapshot of the patient's [facility] medical record.  For current drug dose and complete information and questions, call [phone] or In Basket pool, fv home infusion ([id number])  CSN Number:  [id number]

## 2020-06-18 ENCOUNTER — MEDICAL CORRESPONDENCE (OUTPATIENT)
Dept: HEALTH INFORMATION MANAGEMENT | Facility: CLINIC | Age: 30
End: 2020-06-18

## 2020-06-18 LAB
ALBUMIN SERPL-MCNC: 3.9 G/DL (ref 3.4–5)
ALP SERPL-CCNC: 46 U/L (ref 40–150)
ALT SERPL W P-5'-P-CCNC: 25 U/L (ref 0–50)
ANION GAP SERPL CALCULATED.3IONS-SCNC: 6 MMOL/L (ref 3–14)
AST SERPL W P-5'-P-CCNC: 28 U/L (ref 0–45)
BASOPHILS # BLD AUTO: 0 10E9/L (ref 0–0.2)
BASOPHILS NFR BLD AUTO: 0.5 %
BILIRUB DIRECT SERPL-MCNC: 0.1 MG/DL (ref 0–0.2)
BILIRUB SERPL-MCNC: 0.8 MG/DL (ref 0.2–1.3)
BUN SERPL-MCNC: 22 MG/DL (ref 7–30)
CALCIUM SERPL-MCNC: 9.2 MG/DL (ref 8.5–10.1)
CHLORIDE SERPL-SCNC: 109 MMOL/L (ref 94–109)
CO2 SERPL-SCNC: 24 MMOL/L (ref 20–32)
CREAT SERPL-MCNC: 1.03 MG/DL (ref 0.52–1.04)
CREAT UR-MCNC: 56 MG/DL
CRP SERPL-MCNC: <2.9 MG/L (ref 0–8)
DIFFERENTIAL METHOD BLD: ABNORMAL
EOSINOPHIL # BLD AUTO: 0.1 10E9/L (ref 0–0.7)
EOSINOPHIL NFR BLD AUTO: 1 %
ERYTHROCYTE [DISTWIDTH] IN BLOOD BY AUTOMATED COUNT: 12.1 % (ref 10–15)
ERYTHROCYTE [SEDIMENTATION RATE] IN BLOOD BY WESTERGREN METHOD: 25 MM/H (ref 0–20)
GFR SERPL CREATININE-BSD FRML MDRD: 73 ML/MIN/{1.73_M2}
GLUCOSE SERPL-MCNC: 86 MG/DL (ref 70–99)
HCT VFR BLD AUTO: 31.4 % (ref 35–47)
HGB BLD-MCNC: 10.3 G/DL (ref 11.7–15.7)
IMM GRANULOCYTES # BLD: 0 10E9/L (ref 0–0.4)
IMM GRANULOCYTES NFR BLD: 0.2 %
LYMPHOCYTES # BLD AUTO: 1.7 10E9/L (ref 0.8–5.3)
LYMPHOCYTES NFR BLD AUTO: 28.4 %
MCH RBC QN AUTO: 30.7 PG (ref 26.5–33)
MCHC RBC AUTO-ENTMCNC: 32.8 G/DL (ref 31.5–36.5)
MCV RBC AUTO: 94 FL (ref 78–100)
MONOCYTES # BLD AUTO: 0.3 10E9/L (ref 0–1.3)
MONOCYTES NFR BLD AUTO: 5.3 %
NEUTROPHILS # BLD AUTO: 3.7 10E9/L (ref 1.6–8.3)
NEUTROPHILS NFR BLD AUTO: 64.6 %
NRBC # BLD AUTO: 0 10*3/UL
NRBC BLD AUTO-RTO: 0 /100
PLATELET # BLD AUTO: 224 10E9/L (ref 150–450)
POTASSIUM SERPL-SCNC: 4.4 MMOL/L (ref 3.4–5.3)
PROT SERPL-MCNC: 7.7 G/DL (ref 6.8–8.8)
PROT UR-MCNC: 0.68 G/L
PROT/CREAT 24H UR: 1.23 G/G CR (ref 0–0.2)
RBC # BLD AUTO: 3.36 10E12/L (ref 3.8–5.2)
SODIUM SERPL-SCNC: 139 MMOL/L (ref 133–144)
TACROLIMUS BLD-MCNC: 3.8 UG/L (ref 5–15)
TME LAST DOSE: ABNORMAL H
WBC # BLD AUTO: 5.8 10E9/L (ref 4–11)

## 2020-06-18 PROCEDURE — 85652 RBC SED RATE AUTOMATED: CPT | Performed by: INTERNAL MEDICINE

## 2020-06-18 PROCEDURE — 85025 COMPLETE CBC W/AUTO DIFF WBC: CPT | Performed by: INTERNAL MEDICINE

## 2020-06-18 PROCEDURE — 80197 ASSAY OF TACROLIMUS: CPT | Performed by: INTERNAL MEDICINE

## 2020-06-18 PROCEDURE — 84156 ASSAY OF PROTEIN URINE: CPT | Performed by: INTERNAL MEDICINE

## 2020-06-18 PROCEDURE — 86140 C-REACTIVE PROTEIN: CPT | Performed by: INTERNAL MEDICINE

## 2020-06-18 PROCEDURE — 87799 DETECT AGENT NOS DNA QUANT: CPT | Performed by: INTERNAL MEDICINE

## 2020-06-18 PROCEDURE — 82248 BILIRUBIN DIRECT: CPT | Performed by: INTERNAL MEDICINE

## 2020-06-18 PROCEDURE — 80053 COMPREHEN METABOLIC PANEL: CPT | Performed by: INTERNAL MEDICINE

## 2020-06-18 NOTE — PROGRESS NOTES
This is a recent snapshot of the patient's [facility] medical record.  For current drug dose and complete information and questions, call [phone] or In Basket pool, fv home infusion ([id number])  CSN Number:  [id number]

## 2020-06-19 LAB
EBV DNA # SPEC NAA+PROBE: 3406 {COPIES}/ML
EBV DNA SPEC NAA+PROBE-LOG#: 3.5 {LOG_COPIES}/ML

## 2020-06-22 ENCOUNTER — TELEPHONE (OUTPATIENT)
Dept: TRANSPLANT | Facility: CLINIC | Age: 30
End: 2020-06-22

## 2020-06-22 DIAGNOSIS — Z94.0 KIDNEY TRANSPLANTED: ICD-10-CM

## 2020-06-22 DIAGNOSIS — E83.42 HYPOMAGNESEMIA: ICD-10-CM

## 2020-06-22 DIAGNOSIS — Z94.0 KIDNEY REPLACED BY TRANSPLANT: ICD-10-CM

## 2020-06-22 DIAGNOSIS — Z94.0 STATUS POST KIDNEY TRANSPLANT: ICD-10-CM

## 2020-06-22 RX ORDER — TACROLIMUS 0.5 MG/1
1.5 CAPSULE ORAL 2 TIMES DAILY
Qty: 30 CAPSULE | Refills: 0 | Status: SHIPPED | OUTPATIENT
Start: 2020-06-22 | End: 2020-06-22

## 2020-06-22 RX ORDER — TACROLIMUS 0.5 MG/1
1.5 CAPSULE ORAL 2 TIMES DAILY
Qty: 180 CAPSULE | Refills: 1 | Status: SHIPPED | OUTPATIENT
Start: 2020-06-22 | End: 2020-07-20

## 2020-06-22 NOTE — TELEPHONE ENCOUNTER
Patient Call: Voicemail  Date/Time: 6/22/20 @ 2:17 PM  Reason for call: Patient called to see the status of her script and when she can pick it up.

## 2020-07-20 ENCOUNTER — TELEPHONE (OUTPATIENT)
Dept: TRANSPLANT | Facility: CLINIC | Age: 30
End: 2020-07-20

## 2020-07-20 DIAGNOSIS — Z94.0 KIDNEY REPLACED BY TRANSPLANT: ICD-10-CM

## 2020-07-20 DIAGNOSIS — Z94.0 KIDNEY TRANSPLANTED: Primary | ICD-10-CM

## 2020-07-20 DIAGNOSIS — Z94.0 STATUS POST KIDNEY TRANSPLANT: ICD-10-CM

## 2020-07-20 DIAGNOSIS — E83.42 HYPOMAGNESEMIA: ICD-10-CM

## 2020-07-20 RX ORDER — MYCOPHENOLATE MOFETIL 250 MG/1
500 CAPSULE ORAL 2 TIMES DAILY
Qty: 120 CAPSULE | Refills: 0 | Status: SHIPPED | OUTPATIENT
Start: 2020-07-20 | End: 2020-07-21

## 2020-07-20 RX ORDER — TACROLIMUS 0.5 MG/1
1.5 CAPSULE ORAL 2 TIMES DAILY
Qty: 180 CAPSULE | Refills: 0 | Status: SHIPPED | OUTPATIENT
Start: 2020-07-20 | End: 2020-07-21

## 2020-07-20 NOTE — TELEPHONE ENCOUNTER
Provider Call: Medication Refill  Route to LPN  Pharmacy Name: [facility]  Pharmacy Location: [facility]  Name of Medication: need all new Rx for the following sent to this pharmacy: Vit D 3, Tac and MMF   When will the patient be out of this medication?: Less than 3 days (Route high priority)  Callback needed? No

## 2020-07-20 NOTE — TELEPHONE ENCOUNTER
Spoke with the patient and confirmed renal appointments on 9/28/2020.  Informed patient an itinerary can be accessed on [facility], and will be sent via mail.

## 2020-07-21 DIAGNOSIS — E53.8 B12 DEFICIENCY DUE TO DIET: ICD-10-CM

## 2020-07-21 DIAGNOSIS — E55.9 HYPOVITAMINOSIS D: ICD-10-CM

## 2020-07-21 DIAGNOSIS — Z94.0 KIDNEY REPLACED BY TRANSPLANT: Primary | ICD-10-CM

## 2020-07-21 DIAGNOSIS — Z94.0 STATUS POST KIDNEY TRANSPLANT: ICD-10-CM

## 2020-07-21 DIAGNOSIS — Z94.0 KIDNEY TRANSPLANTED: ICD-10-CM

## 2020-07-21 DIAGNOSIS — E83.42 HYPOMAGNESEMIA: ICD-10-CM

## 2020-07-22 RX ORDER — TACROLIMUS 0.5 MG/1
1.5 CAPSULE ORAL 2 TIMES DAILY
Qty: 540 CAPSULE | Refills: 3 | Status: SHIPPED | OUTPATIENT
Start: 2020-07-22 | End: 2021-05-14

## 2020-07-22 RX ORDER — MYCOPHENOLATE MOFETIL 250 MG/1
500 CAPSULE ORAL 2 TIMES DAILY
Qty: 360 CAPSULE | Refills: 3 | Status: SHIPPED | OUTPATIENT
Start: 2020-07-22 | End: 2023-03-10

## 2020-07-22 RX ORDER — MAGNESIUM OXIDE 400 MG/1
400 TABLET ORAL DAILY
Qty: 90 TABLET | Refills: 3 | Status: ON HOLD | OUTPATIENT
Start: 2020-07-22 | End: 2023-02-03

## 2020-07-22 RX ORDER — CHOLECALCIFEROL (VITAMIN D3) 50 MCG
50 TABLET ORAL DAILY
Qty: 100 TABLET | Refills: 3 | Status: SHIPPED | OUTPATIENT
Start: 2020-07-22 | End: 2023-02-23

## 2020-07-29 ENCOUNTER — HOME INFUSION (PRE-WILLOW HOME INFUSION) (OUTPATIENT)
Dept: PHARMACY | Facility: CLINIC | Age: 30
End: 2020-07-29

## 2020-07-30 ENCOUNTER — MEDICAL CORRESPONDENCE (OUTPATIENT)
Dept: HEALTH INFORMATION MANAGEMENT | Facility: CLINIC | Age: 30
End: 2020-07-30

## 2020-07-30 ENCOUNTER — DOCUMENTATION ONLY (OUTPATIENT)
Dept: PHARMACY | Facility: CLINIC | Age: 30
End: 2020-07-30

## 2020-07-30 ENCOUNTER — HOME INFUSION (PRE-WILLOW HOME INFUSION) (OUTPATIENT)
Dept: PHARMACY | Facility: CLINIC | Age: 30
End: 2020-07-30

## 2020-07-30 LAB
ALBUMIN SERPL-MCNC: 4.1 G/DL (ref 3.4–5)
ALP SERPL-CCNC: 50 U/L (ref 40–150)
ALT SERPL W P-5'-P-CCNC: 23 U/L (ref 0–50)
ANION GAP SERPL CALCULATED.3IONS-SCNC: 8 MMOL/L (ref 3–14)
AST SERPL W P-5'-P-CCNC: 22 U/L (ref 0–45)
BILIRUB SERPL-MCNC: 0.6 MG/DL (ref 0.2–1.3)
BUN SERPL-MCNC: 26 MG/DL (ref 7–30)
CALCIUM SERPL-MCNC: 9.4 MG/DL (ref 8.5–10.1)
CHLORIDE SERPL-SCNC: 109 MMOL/L (ref 94–109)
CO2 SERPL-SCNC: 24 MMOL/L (ref 20–32)
CREAT SERPL-MCNC: 1.37 MG/DL (ref 0.52–1.04)
ERYTHROCYTE [DISTWIDTH] IN BLOOD BY AUTOMATED COUNT: 12 % (ref 10–15)
GFR SERPL CREATININE-BSD FRML MDRD: 52 ML/MIN/{1.73_M2}
GLUCOSE SERPL-MCNC: 93 MG/DL (ref 70–99)
HCT VFR BLD AUTO: 30.8 % (ref 35–47)
HGB BLD-MCNC: 9.9 G/DL (ref 11.7–15.7)
MCH RBC QN AUTO: 30.3 PG (ref 26.5–33)
MCHC RBC AUTO-ENTMCNC: 32.1 G/DL (ref 31.5–36.5)
MCV RBC AUTO: 94 FL (ref 78–100)
PLATELET # BLD AUTO: 255 10E9/L (ref 150–450)
POTASSIUM SERPL-SCNC: 4.2 MMOL/L (ref 3.4–5.3)
PROT SERPL-MCNC: 7.8 G/DL (ref 6.8–8.8)
RBC # BLD AUTO: 3.27 10E12/L (ref 3.8–5.2)
SODIUM SERPL-SCNC: 141 MMOL/L (ref 133–144)
TACROLIMUS BLD-MCNC: 5.8 UG/L (ref 5–15)
TME LAST DOSE: NORMAL H
WBC # BLD AUTO: 6.7 10E9/L (ref 4–11)

## 2020-07-30 PROCEDURE — 80197 ASSAY OF TACROLIMUS: CPT | Performed by: INTERNAL MEDICINE

## 2020-07-30 PROCEDURE — 87799 DETECT AGENT NOS DNA QUANT: CPT | Performed by: INTERNAL MEDICINE

## 2020-07-30 PROCEDURE — 85027 COMPLETE CBC AUTOMATED: CPT | Performed by: INTERNAL MEDICINE

## 2020-07-30 PROCEDURE — 80053 COMPREHEN METABOLIC PANEL: CPT | Performed by: INTERNAL MEDICINE

## 2020-07-30 NOTE — PROGRESS NOTES
Skilled Nurse visit in the  patient home to administer Entyvio (vedolizumab) 300 mg IV via gravity over 30 minutes every 6 weeks .  No recent elevated temperature, fever, chills, productive cough, coughing for 3 weeks or longer or hemoptysis, abnormal vital signs, night sweats, chest pain. No  decrease in your appetite, unexplained weight loss or fatigue.  No other new onset medical symptoms.  Current weight 122 lbs. PIV placed right forearm, 1 attempt/s. Labs drawn hemogram with platlets, EBC/PCR QT, Tacrolimus level.  . Infusion completed without complication or reaction. Pt reports therapy is effective  in managing symptoms related to therapy.

## 2020-07-30 NOTE — PROGRESS NOTES
This is a recent snapshot of the patient's [facility] medical record.  For current drug dose and complete information and questions, call [phone] or In Basket pool, fv home infusion ([id number])  CSN Number:  [id number]

## 2020-07-31 LAB
EBV DNA # SPEC NAA+PROBE: 2250 {COPIES}/ML
EBV DNA SPEC NAA+PROBE-LOG#: 3.4 {LOG_COPIES}/ML

## 2020-07-31 NOTE — PROGRESS NOTES
This is a recent snapshot of the patient's [facility] medical record.  For current drug dose and complete information and questions, call [phone] or In Basket pool, fv home infusion ([id number])  CSN Number:  [id number]

## 2020-08-01 ENCOUNTER — APPOINTMENT (OUTPATIENT)
Dept: LAB | Facility: CLINIC | Age: 30
End: 2020-08-01
Attending: INTERNAL MEDICINE
Payer: COMMERCIAL

## 2020-09-09 ENCOUNTER — HOME INFUSION (PRE-WILLOW HOME INFUSION) (OUTPATIENT)
Dept: PHARMACY | Facility: CLINIC | Age: 30
End: 2020-09-09

## 2020-09-10 ENCOUNTER — HOME INFUSION (PRE-WILLOW HOME INFUSION) (OUTPATIENT)
Dept: PHARMACY | Facility: CLINIC | Age: 30
End: 2020-09-10

## 2020-09-10 DIAGNOSIS — Z94.0 KIDNEY REPLACED BY TRANSPLANT: ICD-10-CM

## 2020-09-10 DIAGNOSIS — Z79.899 ENCOUNTER FOR LONG-TERM CURRENT USE OF MEDICATION: ICD-10-CM

## 2020-09-10 DIAGNOSIS — Z48.298 AFTERCARE FOLLOWING ORGAN TRANSPLANT: ICD-10-CM

## 2020-09-10 LAB
ALBUMIN SERPL-MCNC: 3.7 G/DL (ref 3.4–5)
ALP SERPL-CCNC: 47 U/L (ref 40–150)
ALT SERPL W P-5'-P-CCNC: 18 U/L (ref 0–50)
ANION GAP SERPL CALCULATED.3IONS-SCNC: 9 MMOL/L (ref 3–14)
AST SERPL W P-5'-P-CCNC: 17 U/L (ref 0–45)
BASOPHILS # BLD AUTO: 0 10E9/L (ref 0–0.2)
BASOPHILS NFR BLD AUTO: 0.5 %
BILIRUB DIRECT SERPL-MCNC: <0.1 MG/DL (ref 0–0.2)
BILIRUB SERPL-MCNC: 0.4 MG/DL (ref 0.2–1.3)
BUN SERPL-MCNC: 34 MG/DL (ref 7–30)
CALCIUM SERPL-MCNC: 9.1 MG/DL (ref 8.5–10.1)
CHLORIDE SERPL-SCNC: 109 MMOL/L (ref 94–109)
CO2 SERPL-SCNC: 21 MMOL/L (ref 20–32)
CREAT SERPL-MCNC: 1.19 MG/DL (ref 0.52–1.04)
CRP SERPL-MCNC: <2.9 MG/L (ref 0–8)
DIFFERENTIAL METHOD BLD: ABNORMAL
EOSINOPHIL # BLD AUTO: 0.1 10E9/L (ref 0–0.7)
EOSINOPHIL NFR BLD AUTO: 1.5 %
ERYTHROCYTE [DISTWIDTH] IN BLOOD BY AUTOMATED COUNT: 12.5 % (ref 10–15)
ERYTHROCYTE [SEDIMENTATION RATE] IN BLOOD BY WESTERGREN METHOD: 28 MM/H (ref 0–20)
GFR SERPL CREATININE-BSD FRML MDRD: 61 ML/MIN/{1.73_M2}
GLUCOSE SERPL-MCNC: 90 MG/DL (ref 70–99)
HCT VFR BLD AUTO: 32.7 % (ref 35–47)
HGB BLD-MCNC: 10.4 G/DL (ref 11.7–15.7)
IMM GRANULOCYTES # BLD: 0 10E9/L (ref 0–0.4)
IMM GRANULOCYTES NFR BLD: 0.2 %
LYMPHOCYTES # BLD AUTO: 2.2 10E9/L (ref 0.8–5.3)
LYMPHOCYTES NFR BLD AUTO: 33.9 %
MCH RBC QN AUTO: 30.6 PG (ref 26.5–33)
MCHC RBC AUTO-ENTMCNC: 31.8 G/DL (ref 31.5–36.5)
MCV RBC AUTO: 96 FL (ref 78–100)
MONOCYTES # BLD AUTO: 0.4 10E9/L (ref 0–1.3)
MONOCYTES NFR BLD AUTO: 5.4 %
NEUTROPHILS # BLD AUTO: 3.8 10E9/L (ref 1.6–8.3)
NEUTROPHILS NFR BLD AUTO: 58.5 %
NRBC # BLD AUTO: 0 10*3/UL
NRBC BLD AUTO-RTO: 0 /100
PLATELET # BLD AUTO: 237 10E9/L (ref 150–450)
POTASSIUM SERPL-SCNC: 4.4 MMOL/L (ref 3.4–5.3)
PROT SERPL-MCNC: 7.1 G/DL (ref 6.8–8.8)
RBC # BLD AUTO: 3.4 10E12/L (ref 3.8–5.2)
SODIUM SERPL-SCNC: 139 MMOL/L (ref 133–144)
WBC # BLD AUTO: 6.5 10E9/L (ref 4–11)

## 2020-09-10 PROCEDURE — 80048 BASIC METABOLIC PNL TOTAL CA: CPT | Performed by: INTERNAL MEDICINE

## 2020-09-10 PROCEDURE — 80076 HEPATIC FUNCTION PANEL: CPT | Performed by: INTERNAL MEDICINE

## 2020-09-10 PROCEDURE — 86140 C-REACTIVE PROTEIN: CPT | Performed by: INTERNAL MEDICINE

## 2020-09-10 PROCEDURE — 85652 RBC SED RATE AUTOMATED: CPT | Performed by: INTERNAL MEDICINE

## 2020-09-10 PROCEDURE — 85025 COMPLETE CBC W/AUTO DIFF WBC: CPT | Performed by: INTERNAL MEDICINE

## 2020-09-10 NOTE — PROGRESS NOTES
This is a recent snapshot of the patient's [facility] medical record.  For current drug dose and complete information and questions, call [phone] or In Basket pool, fv home infusion ([id number])  CSN Number:  [id number]

## 2020-09-11 ENCOUNTER — HOME INFUSION (PRE-WILLOW HOME INFUSION) (OUTPATIENT)
Dept: PHARMACY | Facility: CLINIC | Age: 30
End: 2020-09-11

## 2020-09-11 NOTE — PROGRESS NOTES
This is a recent snapshot of the patient's [facility] medical record.  For current drug dose and complete information and questions, call [phone] or In Basket pool, fv home infusion ([id number])  CSN Number:  [id number]

## 2020-09-14 NOTE — PROGRESS NOTES
This is a recent snapshot of the patient's [facility] medical record.  For current drug dose and complete information and questions, call [phone] or In Basket pool, fv home infusion ([id number])  CSN Number:  [id number]

## 2020-09-28 ENCOUNTER — VIRTUAL VISIT (OUTPATIENT)
Dept: NEPHROLOGY | Facility: CLINIC | Age: 30
End: 2020-09-28
Attending: INTERNAL MEDICINE
Payer: COMMERCIAL

## 2020-09-28 DIAGNOSIS — Z12.83 SKIN CANCER SCREENING: ICD-10-CM

## 2020-09-28 DIAGNOSIS — E55.9 HYPOVITAMINOSIS D: ICD-10-CM

## 2020-09-28 DIAGNOSIS — D84.9 IMMUNOSUPPRESSION (H): ICD-10-CM

## 2020-09-28 DIAGNOSIS — Z94.0 STATUS POST KIDNEY TRANSPLANT: Primary | ICD-10-CM

## 2020-09-28 DIAGNOSIS — B27.00 EBV (EPSTEIN-BARR VIRUS) VIREMIA: ICD-10-CM

## 2020-09-28 DIAGNOSIS — E53.8 B12 DEFICIENCY DUE TO DIET: ICD-10-CM

## 2020-09-28 ASSESSMENT — PAIN SCALES - GENERAL: PAINLEVEL: NO PAIN (0)

## 2020-09-28 NOTE — PROGRESS NOTES
"Caity Horan is a 30 year old female who is being evaluated via a billable video visit.      The patient has been notified of following:     \"This video visit will be conducted via a call between you and your physician/provider. We have found that certain health care needs can be provided without the need for an in-person physical exam.  This service lets us provide the care you need with a video conversation.  If a prescription is necessary we can send it directly to your pharmacy.  If lab work is needed we can place an order for that and you can then stop by our lab to have the test done at a later time.    Video visits are billed at different rates depending on your insurance coverage.  Please reach out to your insurance provider with any questions.    If during the course of the call the physician/provider feels a video visit is not appropriate, you will not be charged for this service.\"    Patient has given verbal consent for Video visit? Yes  How would you like to obtain your AVS? Mail a copy  If you are dropped from the video visit, the video invite should be resent to: Send to e-mail at: [email or web address]  Will anyone else be joining your video visit? No        Video-Visit Details    Type of service:  Video Visit    Video Start Time: 2:06 PM  Video End Time: 2:14 PM    Originating Location (pt. Location): Home    Distant Location (provider location):  [facility] NEPHROLOGY     Platform used for Video Visit: Doug Arora MD           CHRONIC TRANSPLANT NEPHROLOGY VISIT    Assessment and Plan:   # LDKT:    - Baseline Cr ~ 1.0-1.2; Stable   - Proteinuria: Normal 0.07 g / g on 11/1/18   - Date of DSA last checked: 12/13/16 Latest DSA: No   - BK Viremia: No; 11/1/18   - Kidney Tx Biopsy: No    # Immunosuppression: Tacrolimus immediate release (goal  4-6) and Mycophenolate mofetil (goal  1-3.5)   - Changes: No    # Hypertension: Controlled; Goal BP: < 130/80   - Changes: No     # ulcerative " colitis :no recent flares, on vedolizumab, no diarrhea/nausea/vomiting      # Mineral Bone Disorder:    - Secondary renal hyperparathyroidism; PTH level is: Not checked recently   - Vitamin D; level is: Low; cholecalciferol 2000 units daily.    - Calcium; level is: Normal   - Phosphorus; level is: Not checked recently     # EBV viremia: 2250 in July downtrending, CT c/a/p neg 2017, continue to monitor     # flu shot: pending , plans to schedule    # Ca screening: up to date  Pap smear    # Skin Cancer Risk:    - Discussed sun protection and recommend regular follow up with Dermatology.    # Medical Compliance: Yes    Follow-up: Return in about 1 year (around 11/2/2019).     # Transplant History:  Etiology of kidney failure: IgA nephropathy  Tx: LDKT  Transplant: 12/5/2014 (Kidney)  Donor Type: Living Donor Class: Standard Criteria Donor  Significant changes in immunosuppression: None  Significant transplant-related complications: EBV viremia    Transplant Office Phone Number: [phone]    Assessment and plan was discussed with the patient and they voiced understanding and agreement.    Chief Complaint   Caity Horan is a 28 year old female with a history of end stage renal disease secondary to IgA nephropathy, status-post LDKT completed on 12/5/2014, who presents for follow-up.    History of Present Illness:  Ms. Mckenzie feels good overall. She reports no new complaints. She reports her ulcerative colitis has been under control without any recent flares; no nausea/vomiting/diarrea/abdominal pain. Her BP is well controlled, most recent reading at the infusion clinic ~112/59. She denies any fatigue, fevers, chills, night sweats, or enlarged lymph nodes and her weight has been stable.  She plans to schedule her flu shot soon.      Recent Hospitalizations:  [x] No [] Yes    New Medical Issues: [x] No [] Yes    Decreased energy: [x] No [] Yes    Chest pain or SOB with exertion:  [x] No [] Yes    Appetite change or  weight change: [x] No [] Yes    Nausea, vomiting or diarrhea:  [x] No [] Yes    Fever, sweats or chills: [x] No [] Yes    Leg swelling: [x] No [] Yes      Review of Systems:   A comprehensive review of systems was obtained and negative, except as noted in the HPI or past medical history.     Active Medications:      Cholecalciferol (VITAMIN D) 2000 units tablet, Take 2,000 Units by mouth daily, Disp: 100 tablet, Rfl: 3     Cyanocobalamin (B-12) 1000 MCG TBCR, Take 1,000 mcg by mouth daily, Disp: 90 tablet, Rfl: 3     magnesium oxide (MAG-OX) 400 MG tablet, Take 1 tablet (400 mg) by mouth daily, Disp: 90 tablet, Rfl: 3     mycophenolate (GENERIC EQUIVALENT) 250 MG capsule, Take 1 capsule (250 mg) by mouth 2 times daily, Disp: 60 capsule, Rfl: 11     tacrolimus (GENERIC EQUIVALENT) 0.5 MG capsule, Take 3 capsules (1.5 mg) by mouth 2 times daily, Disp: 180 capsule, Rfl: 6     UNABLE TO FIND, MEDICATION NAME: Biotin, Disp: , Rfl:      Vedolizumab (ENTYVIO IV), , Disp: , Rfl:      [DISCONTINUED] mycophenolate (GENERIC EQUIVALENT) 250 MG capsule, Take 2 capsules (500 mg) by mouth 2 times daily, Disp: 120 capsule, Rfl: 11      Allergies:   Amoxicillin      Active Medical Problems:  IgA nephropathy  Status post kidney transplant  Immunosuppressed status  Hypomagnesemia  EBV viremia  Anemia, iron deficiency  Vitamin B12 deficiency  Ulcerative pancolitis with rectal bleeding     Social History:   The patient has never smoked. Endorses alcohol use, 1-3 times per month.    Physical Exam:   There were no vitals taken for this visit.   Wt Readings from Last 4 Encounters:   12/11/19 59.4 kg (130 lb 14.4 oz)   01/10/19 63.2 kg (139 lb 6.4 oz)   11/15/18 61.6 kg (135 lb 12.8 oz)   11/02/18 59.9 kg (132 lb)     No  Exam (video visit)    Data:   Last Comprehensive Metabolic Panel:  Sodium   Date Value Ref Range Status   09/10/2020 139 133 - 144 mmol/L Final     Potassium   Date Value Ref Range Status   09/10/2020 4.4 3.4 - 5.3 mmol/L  Final     Chloride   Date Value Ref Range Status   09/10/2020 109 94 - 109 mmol/L Final     Carbon Dioxide   Date Value Ref Range Status   09/10/2020 21 20 - 32 mmol/L Final     Anion Gap   Date Value Ref Range Status   09/10/2020 9 3 - 14 mmol/L Final     Glucose   Date Value Ref Range Status   09/10/2020 90 70 - 99 mg/dL Final     Urea Nitrogen   Date Value Ref Range Status   09/10/2020 34 (H) 7 - 30 mg/dL Final     Creatinine   Date Value Ref Range Status   09/10/2020 1.19 (H) 0.52 - 1.04 mg/dL Final     GFR Estimate   Date Value Ref Range Status   09/10/2020 61 >60 mL/min/[1.73_m2] Final     Comment:     Non  GFR Calc  Starting 12/18/2018, serum creatinine based estimated GFR (eGFR) will be   calculated using the Chronic Kidney Disease Epidemiology Collaboration   (CKD-EPI) equation.       Calcium   Date Value Ref Range Status   09/10/2020 9.1 8.5 - 10.1 mg/dL Final     CBC RESULTS:   Recent Labs   Lab Test 09/10/20  1040   WBC 6.5   RBC 3.40*   HGB 10.4*   HCT 32.7*   MCV 96   MCH 30.6   MCHC 31.8   RDW 12.5

## 2020-09-28 NOTE — LETTER
"9/28/2020       RE: Caity Horan  [address]  [address]  [place] [place] [address]     Dear Colleague,    Thank you for referring your patient, Caity Horan, to the [facility] NEPHROLOGY at [facility]. Please see a copy of my visit note below.    Caity Horan is a 30 year old female who is being evaluated via a billable video visit.      The patient has been notified of following:     \"This video visit will be conducted via a call between you and your physician/provider. We have found that certain health care needs can be provided without the need for an in-person physical exam.  This service lets us provide the care you need with a video conversation.  If a prescription is necessary we can send it directly to your pharmacy.  If lab work is needed we can place an order for that and you can then stop by our lab to have the test done at a later time.    Video visits are billed at different rates depending on your insurance coverage.  Please reach out to your insurance provider with any questions.    If during the course of the call the physician/provider feels a video visit is not appropriate, you will not be charged for this service.\"    Patient has given verbal consent for Video visit? Yes  How would you like to obtain your AVS? Mail a copy  If you are dropped from the video visit, the video invite should be resent to: Send to e-mail at: [email or web address]  Will anyone else be joining your video visit? No        Video-Visit Details    Type of service:  Video Visit    Video Start Time: 2:06 PM  Video End Time: 2:14 PM    Originating Location (pt. Location): Home    Distant Location (provider location):  [facility] NEPHROLOGY     Platform used for Video Visit: Doug Arora MD           CHRONIC TRANSPLANT NEPHROLOGY VISIT    Assessment and Plan:   # LDKT:    - Baseline Cr ~ 1.0-1.2; Stable   - Proteinuria: Normal 0.07 g / g on 11/1/18   - Date of DSA last " checked: 12/13/16 Latest DSA: No   - BK Viremia: No; 11/1/18   - Kidney Tx Biopsy: No    # Immunosuppression: Tacrolimus immediate release (goal  4-6) and Mycophenolate mofetil (goal  1-3.5)   - Changes: No    # Hypertension: Controlled; Goal BP: < 130/80   - Changes: No     # ulcerative colitis :no recent flares, on vedolizumab, no diarrhea/nausea/vomiting      # Mineral Bone Disorder:    - Secondary renal hyperparathyroidism; PTH level is: Not checked recently   - Vitamin D; level is: Low; cholecalciferol 2000 units daily.    - Calcium; level is: Normal   - Phosphorus; level is: Not checked recently     # EBV viremia: 2250 in July downtrending, CT c/a/p neg 2017, continue to monitor     # flu shot: pending , plans to schedule    # Ca screening: up to date  Pap smear    # Skin Cancer Risk:    - Discussed sun protection and recommend regular follow up with Dermatology.    # Medical Compliance: Yes    Follow-up: Return in about 1 year (around 11/2/2019).     # Transplant History:  Etiology of kidney failure: IgA nephropathy  Tx: LDKT  Transplant: 12/5/2014 (Kidney)  Donor Type: Living Donor Class: Standard Criteria Donor  Significant changes in immunosuppression: None  Significant transplant-related complications: EBV viremia    Transplant Office Phone Number: [phone]    Assessment and plan was discussed with the patient and they voiced understanding and agreement.    Chief Complaint   Caity Horan is a 28 year old female with a history of end stage renal disease secondary to IgA nephropathy, status-post LDKT completed on 12/5/2014, who presents for follow-up.    History of Present Illness:  Ms. Mckenzie feels good overall. She reports no new complaints. She reports her ulcerative colitis has been under control without any recent flares; no nausea/vomiting/diarrea/abdominal pain. Her BP is well controlled, most recent reading at the infusion clinic ~112/59. She denies any fatigue, fevers, chills, night sweats, or  enlarged lymph nodes and her weight has been stable.  She plans to schedule her flu shot soon.      Recent Hospitalizations:  [x] No [] Yes    New Medical Issues: [x] No [] Yes    Decreased energy: [x] No [] Yes    Chest pain or SOB with exertion:  [x] No [] Yes    Appetite change or weight change: [x] No [] Yes    Nausea, vomiting or diarrhea:  [x] No [] Yes    Fever, sweats or chills: [x] No [] Yes    Leg swelling: [x] No [] Yes      Review of Systems:   A comprehensive review of systems was obtained and negative, except as noted in the HPI or past medical history.     Active Medications:      Cholecalciferol (VITAMIN D) 2000 units tablet, Take 2,000 Units by mouth daily, Disp: 100 tablet, Rfl: 3     Cyanocobalamin (B-12) 1000 MCG TBCR, Take 1,000 mcg by mouth daily, Disp: 90 tablet, Rfl: 3     magnesium oxide (MAG-OX) 400 MG tablet, Take 1 tablet (400 mg) by mouth daily, Disp: 90 tablet, Rfl: 3     mycophenolate (GENERIC EQUIVALENT) 250 MG capsule, Take 1 capsule (250 mg) by mouth 2 times daily, Disp: 60 capsule, Rfl: 11     tacrolimus (GENERIC EQUIVALENT) 0.5 MG capsule, Take 3 capsules (1.5 mg) by mouth 2 times daily, Disp: 180 capsule, Rfl: 6     UNABLE TO FIND, MEDICATION NAME: Biotin, Disp: , Rfl:      Vedolizumab (ENTYVIO IV), , Disp: , Rfl:      [DISCONTINUED] mycophenolate (GENERIC EQUIVALENT) 250 MG capsule, Take 2 capsules (500 mg) by mouth 2 times daily, Disp: 120 capsule, Rfl: 11      Allergies:   Amoxicillin      Active Medical Problems:  IgA nephropathy  Status post kidney transplant  Immunosuppressed status  Hypomagnesemia  EBV viremia  Anemia, iron deficiency  Vitamin B12 deficiency  Ulcerative pancolitis with rectal bleeding     Social History:   The patient has never smoked. Endorses alcohol use, 1-3 times per month.    Physical Exam:   There were no vitals taken for this visit.   Wt Readings from Last 4 Encounters:   12/11/19 59.4 kg (130 lb 14.4 oz)   01/10/19 63.2 kg (139 lb 6.4 oz)    11/15/18 61.6 kg (135 lb 12.8 oz)   11/02/18 59.9 kg (132 lb)     No  Exam (video visit)    Data:   Last Comprehensive Metabolic Panel:  Sodium   Date Value Ref Range Status   09/10/2020 139 133 - 144 mmol/L Final     Potassium   Date Value Ref Range Status   09/10/2020 4.4 3.4 - 5.3 mmol/L Final     Chloride   Date Value Ref Range Status   09/10/2020 109 94 - 109 mmol/L Final     Carbon Dioxide   Date Value Ref Range Status   09/10/2020 21 20 - 32 mmol/L Final     Anion Gap   Date Value Ref Range Status   09/10/2020 9 3 - 14 mmol/L Final     Glucose   Date Value Ref Range Status   09/10/2020 90 70 - 99 mg/dL Final     Urea Nitrogen   Date Value Ref Range Status   09/10/2020 34 (H) 7 - 30 mg/dL Final     Creatinine   Date Value Ref Range Status   09/10/2020 1.19 (H) 0.52 - 1.04 mg/dL Final     GFR Estimate   Date Value Ref Range Status   09/10/2020 61 >60 mL/min/[1.73_m2] Final     Comment:     Non  GFR Calc  Starting 12/18/2018, serum creatinine based estimated GFR (eGFR) will be   calculated using the Chronic Kidney Disease Epidemiology Collaboration   (CKD-EPI) equation.       Calcium   Date Value Ref Range Status   09/10/2020 9.1 8.5 - 10.1 mg/dL Final     CBC RESULTS:   Recent Labs   Lab Test 09/10/20  1040   WBC 6.5   RBC 3.40*   HGB 10.4*   HCT 32.7*   MCV 96   MCH 30.6   MCHC 31.8   RDW 12.5                Again, thank you for allowing me to participate in the care of your patient.      Sincerely,    Kidney/Pancreas Recipient

## 2020-10-22 ENCOUNTER — DOCUMENTATION ONLY (OUTPATIENT)
Dept: PHARMACY | Facility: CLINIC | Age: 30
End: 2020-10-22

## 2020-10-22 ENCOUNTER — HOME INFUSION (PRE-WILLOW HOME INFUSION) (OUTPATIENT)
Dept: PHARMACY | Facility: CLINIC | Age: 30
End: 2020-10-22

## 2020-10-22 ENCOUNTER — MEDICAL CORRESPONDENCE (OUTPATIENT)
Dept: HEALTH INFORMATION MANAGEMENT | Facility: CLINIC | Age: 30
End: 2020-10-22

## 2020-10-22 LAB
ANION GAP SERPL CALCULATED.3IONS-SCNC: 8 MMOL/L (ref 3–14)
BUN SERPL-MCNC: 35 MG/DL (ref 7–30)
CALCIUM SERPL-MCNC: 9.2 MG/DL (ref 8.5–10.1)
CHLORIDE SERPL-SCNC: 106 MMOL/L (ref 94–109)
CO2 SERPL-SCNC: 25 MMOL/L (ref 20–32)
CREAT SERPL-MCNC: 1.29 MG/DL (ref 0.52–1.04)
ERYTHROCYTE [DISTWIDTH] IN BLOOD BY AUTOMATED COUNT: 12.1 % (ref 10–15)
GFR SERPL CREATININE-BSD FRML MDRD: 55 ML/MIN/{1.73_M2}
GLUCOSE SERPL-MCNC: 107 MG/DL (ref 70–99)
HCT VFR BLD AUTO: 29.9 % (ref 35–47)
HGB BLD-MCNC: 9.7 G/DL (ref 11.7–15.7)
MCH RBC QN AUTO: 30.6 PG (ref 26.5–33)
MCHC RBC AUTO-ENTMCNC: 32.4 G/DL (ref 31.5–36.5)
MCV RBC AUTO: 94 FL (ref 78–100)
PLATELET # BLD AUTO: 224 10E9/L (ref 150–450)
POTASSIUM SERPL-SCNC: 4.2 MMOL/L (ref 3.4–5.3)
RBC # BLD AUTO: 3.17 10E12/L (ref 3.8–5.2)
SODIUM SERPL-SCNC: 139 MMOL/L (ref 133–144)
TACROLIMUS BLD-MCNC: 12.4 UG/L (ref 5–15)
TME LAST DOSE: NORMAL H
WBC # BLD AUTO: 6.6 10E9/L (ref 4–11)

## 2020-10-22 PROCEDURE — 85027 COMPLETE CBC AUTOMATED: CPT | Performed by: INTERNAL MEDICINE

## 2020-10-22 PROCEDURE — 87799 DETECT AGENT NOS DNA QUANT: CPT | Performed by: INTERNAL MEDICINE

## 2020-10-22 PROCEDURE — 80197 ASSAY OF TACROLIMUS: CPT | Performed by: INTERNAL MEDICINE

## 2020-10-22 PROCEDURE — 80048 BASIC METABOLIC PNL TOTAL CA: CPT | Performed by: INTERNAL MEDICINE

## 2020-10-22 NOTE — PROGRESS NOTES
Skilled Nurse visit in the  patient home to administer Entyvio.  No recent elevated temperature, fever, chills, productive cough, coughing for 3 weeks or longer or hemoptysis, abnormal vital signs, night sweats, chest pain. No  decrease in your appetite, unexplained weight loss or fatigue.  No other new onset medical symptoms.  Current weight 122 lb.  PIV placed in L AC, 1 attempt/s. Labs drawn Hemogram with platelet, CMP, /Tacrolimus/Prograf level, EBV PCR QT . Infusion completed without complication or reaction. Pt reports therapy is effective in managing symptoms related to therapy.    Nina Mcneal RN  [facility]  [email or web address]  [phone]

## 2020-10-23 ENCOUNTER — HOME INFUSION (PRE-WILLOW HOME INFUSION) (OUTPATIENT)
Dept: PHARMACY | Facility: CLINIC | Age: 30
End: 2020-10-23

## 2020-10-23 LAB
EBV DNA # SPEC NAA+PROBE: 3064 {COPIES}/ML
EBV DNA SPEC NAA+PROBE-LOG#: 3.5 {LOG_COPIES}/ML

## 2020-10-23 NOTE — PROGRESS NOTES
This is a recent snapshot of the patient's [facility] medical record.  For current drug dose and complete information and questions, call [phone] or In Basket pool, fv home infusion ([id number])  CSN Number:  [id number]

## 2020-10-26 NOTE — PROGRESS NOTES
This is a recent snapshot of the patient's [facility] medical record.  For current drug dose and complete information and questions, call [phone] or In Basket pool, fv home infusion ([id number])  CSN Number:  [id number]

## 2020-11-01 ENCOUNTER — APPOINTMENT (OUTPATIENT)
Dept: LAB | Facility: CLINIC | Age: 30
End: 2020-11-01
Attending: INTERNAL MEDICINE
Payer: COMMERCIAL

## 2020-12-01 ENCOUNTER — APPOINTMENT (OUTPATIENT)
Dept: LAB | Facility: CLINIC | Age: 30
End: 2020-12-01
Attending: INTERNAL MEDICINE
Payer: COMMERCIAL

## 2020-12-03 ENCOUNTER — DOCUMENTATION ONLY (OUTPATIENT)
Dept: PHARMACY | Facility: CLINIC | Age: 30
End: 2020-12-03

## 2020-12-03 ENCOUNTER — TELEPHONE (OUTPATIENT)
Dept: TRANSPLANT | Facility: CLINIC | Age: 30
End: 2020-12-03

## 2020-12-03 ENCOUNTER — HOME INFUSION (PRE-WILLOW HOME INFUSION) (OUTPATIENT)
Dept: PHARMACY | Facility: CLINIC | Age: 30
End: 2020-12-03

## 2020-12-03 DIAGNOSIS — Z48.298 AFTERCARE FOLLOWING ORGAN TRANSPLANT: ICD-10-CM

## 2020-12-03 DIAGNOSIS — Z94.0 KIDNEY REPLACED BY TRANSPLANT: ICD-10-CM

## 2020-12-03 DIAGNOSIS — Z79.899 ENCOUNTER FOR LONG-TERM CURRENT USE OF MEDICATION: ICD-10-CM

## 2020-12-03 LAB
ALBUMIN SERPL-MCNC: 3.8 G/DL (ref 3.4–5)
ALP SERPL-CCNC: 51 U/L (ref 40–150)
ALT SERPL W P-5'-P-CCNC: 24 U/L (ref 0–50)
ANION GAP SERPL CALCULATED.3IONS-SCNC: 7 MMOL/L (ref 3–14)
AST SERPL W P-5'-P-CCNC: 20 U/L (ref 0–45)
BASOPHILS # BLD AUTO: 0 10E9/L (ref 0–0.2)
BASOPHILS NFR BLD AUTO: 0.5 %
BILIRUB DIRECT SERPL-MCNC: 0.1 MG/DL (ref 0–0.2)
BILIRUB SERPL-MCNC: 0.4 MG/DL (ref 0.2–1.3)
BUN SERPL-MCNC: 28 MG/DL (ref 7–30)
CALCIUM SERPL-MCNC: 8.6 MG/DL (ref 8.5–10.1)
CHLORIDE SERPL-SCNC: 108 MMOL/L (ref 94–109)
CO2 SERPL-SCNC: 23 MMOL/L (ref 20–32)
CREAT SERPL-MCNC: 1.11 MG/DL (ref 0.52–1.04)
CREAT UR-MCNC: 43 MG/DL
CRP SERPL-MCNC: <2.9 MG/L (ref 0–8)
DIFFERENTIAL METHOD BLD: ABNORMAL
EOSINOPHIL # BLD AUTO: 0.1 10E9/L (ref 0–0.7)
EOSINOPHIL NFR BLD AUTO: 1.8 %
ERYTHROCYTE [DISTWIDTH] IN BLOOD BY AUTOMATED COUNT: 11.9 % (ref 10–15)
ERYTHROCYTE [SEDIMENTATION RATE] IN BLOOD BY WESTERGREN METHOD: 27 MM/H (ref 0–20)
GFR SERPL CREATININE-BSD FRML MDRD: 66 ML/MIN/{1.73_M2}
GLUCOSE SERPL-MCNC: 99 MG/DL (ref 70–99)
HCT VFR BLD AUTO: 30 % (ref 35–47)
HGB BLD-MCNC: 9.8 G/DL (ref 11.7–15.7)
IMM GRANULOCYTES # BLD: 0 10E9/L (ref 0–0.4)
IMM GRANULOCYTES NFR BLD: 0.2 %
LYMPHOCYTES # BLD AUTO: 1.6 10E9/L (ref 0.8–5.3)
LYMPHOCYTES NFR BLD AUTO: 26 %
MCH RBC QN AUTO: 30.6 PG (ref 26.5–33)
MCHC RBC AUTO-ENTMCNC: 32.7 G/DL (ref 31.5–36.5)
MCV RBC AUTO: 94 FL (ref 78–100)
MONOCYTES # BLD AUTO: 0.4 10E9/L (ref 0–1.3)
MONOCYTES NFR BLD AUTO: 6.5 %
NEUTROPHILS # BLD AUTO: 4.1 10E9/L (ref 1.6–8.3)
NEUTROPHILS NFR BLD AUTO: 65 %
NRBC # BLD AUTO: 0 10*3/UL
NRBC BLD AUTO-RTO: 0 /100
PLATELET # BLD AUTO: 227 10E9/L (ref 150–450)
POTASSIUM SERPL-SCNC: 4.2 MMOL/L (ref 3.4–5.3)
PROT SERPL-MCNC: 7.1 G/DL (ref 6.8–8.8)
PROT UR-MCNC: 0.58 G/L
PROT/CREAT 24H UR: 1.36 G/G CR (ref 0–0.2)
RBC # BLD AUTO: 3.2 10E12/L (ref 3.8–5.2)
SODIUM SERPL-SCNC: 138 MMOL/L (ref 133–144)
TACROLIMUS BLD-MCNC: 4.6 UG/L (ref 5–15)
TME LAST DOSE: ABNORMAL H
WBC # BLD AUTO: 6.3 10E9/L (ref 4–11)

## 2020-12-03 PROCEDURE — 84156 ASSAY OF PROTEIN URINE: CPT | Performed by: INTERNAL MEDICINE

## 2020-12-03 PROCEDURE — 85025 COMPLETE CBC W/AUTO DIFF WBC: CPT | Performed by: INTERNAL MEDICINE

## 2020-12-03 PROCEDURE — 80197 ASSAY OF TACROLIMUS: CPT | Performed by: INTERNAL MEDICINE

## 2020-12-03 PROCEDURE — 86140 C-REACTIVE PROTEIN: CPT | Performed by: INTERNAL MEDICINE

## 2020-12-03 PROCEDURE — 80076 HEPATIC FUNCTION PANEL: CPT | Performed by: INTERNAL MEDICINE

## 2020-12-03 PROCEDURE — 80048 BASIC METABOLIC PNL TOTAL CA: CPT | Performed by: INTERNAL MEDICINE

## 2020-12-03 PROCEDURE — 85652 RBC SED RATE AUTOMATED: CPT | Performed by: INTERNAL MEDICINE

## 2020-12-03 NOTE — TELEPHONE ENCOUNTER
Reviewed UPC of 1.36 with Dr. Mar.     If SBP  > 120, ok to start losartan 12.5 mg. If SBP > 130 25 25 mg every night. Check BMP in 1-2 weeks. Repeat UPC only 6 months.    [employer] message sent.

## 2020-12-03 NOTE — PROGRESS NOTES
Skilled Nurse visit in the  patient home to administer Entyvio.  No recent elevated temperature, fever, chills, productive cough, coughing for 3 weeks or longer or hemoptysis, abnormal vital signs, night sweats, chest pain. No  decrease in your appetite, unexplained weight loss or fatigue.  No other new onset medical symptoms.  Current weight 123.  PIV placed L AC, 1 attempt/s. Labs drawn CBCD, CRPI, Hepatic panel, ESR, UA for protein/creatinine. Infusion completed without complication or reaction. Pt reports therapy is effective in managing symptoms related to therapy.    Nina Mcneal RN  [facility]  [email or web address]  [phone]

## 2020-12-04 NOTE — PROGRESS NOTES
This is a recent snapshot of the patient's [facility] medical record.  For current drug dose and complete information and questions, call [phone] or In Basket pool, fv home infusion ([id number])  CSN Number:  [id number]

## 2020-12-08 NOTE — TELEPHONE ENCOUNTER
Caity Horan would prefer to wait on starting losartan. Will continue to monitor UPC for now. See [email or web address] messages.

## 2020-12-10 NOTE — PATIENT INSTRUCTIONS
It was a pleasure taking care of you today.  I've included a brief summary of our discussion and care plan from today's visit below.  Please review this information with your primary care provider.  ______________________________________________________________________    My recommendations are summarized as follows:    -- Upper endoscopy and colonoscopy to be done in the near future  -- Pending these results we will determine.  -- Labs today    Return to GI Clinic in 4 weeks to review your progress.    ______________________________________________________________________    Who do I call with any questions after my visit?  Please be in touch if there are any further questions that arise following today's visit.  There are multiple ways to contact your gastroenterology care team.        During business hours, you may reach a Gastroenterology nurse at [phone], option 3.       To schedule or reschedule an appointment, please call [phone].       You can always send a secure message through [employer].  [employer] messages are answered by your nurse or doctor typically within 24 hours.  Please allow extra time on weekends and holidays.        For urgent/emergent questions after business hours, you may reach the on-call GI Fellow by contacting the [facility]  at [phone].     How will I get the results of any tests ordered?    You will receive all of your results.  If you have signed up for [employer], any tests ordered at your visit will be available to you after your physician reviews them.  Typically this takes 1-2 weeks.  If there are urgent results that require a change in your care plan, your physician or nurse will call you to discuss the next steps.      What is [employer]?  [employer] is a secure way for you to access all of your healthcare records from the [facility].  It is a web based computer program, so you can sign on to it from any location.  It also allows you to send  secure messages to your care team.  I recommend signing up for [employer] access if you have not already done so and are comfortable with using a computer.      How to I schedule a follow-up visit?  If you did not schedule a follow-up visit today, please call [phone] to schedule a follow-up office visit.        Sincerely,    Rupert Dsouza PA-C  [facility]  Division of Gastroenterology         no

## 2021-01-07 DIAGNOSIS — R80.9 PROTEIN IN URINE: ICD-10-CM

## 2021-01-07 DIAGNOSIS — Z01.30 BLOOD PRESSURE CHECK: ICD-10-CM

## 2021-01-07 DIAGNOSIS — R68.89 BLOOD PRESSURE ALTERATION: ICD-10-CM

## 2021-01-07 DIAGNOSIS — Z94.0 KIDNEY TRANSPLANTED: Primary | ICD-10-CM

## 2021-01-07 RX ORDER — MEDICAL SUPPLY, MISCELLANEOUS
EACH MISCELLANEOUS
Qty: 1 EACH | Refills: 0 | Status: ON HOLD | OUTPATIENT
Start: 2021-01-07 | End: 2023-04-14

## 2021-01-12 DIAGNOSIS — R80.9 PROTEINURIA: ICD-10-CM

## 2021-01-12 DIAGNOSIS — Z94.0 KIDNEY TRANSPLANTED: Primary | ICD-10-CM

## 2021-01-12 RX ORDER — LOSARTAN POTASSIUM 25 MG/1
12.5 TABLET ORAL AT BEDTIME
Qty: 30 TABLET | Refills: 1 | Status: SHIPPED | OUTPATIENT
Start: 2021-01-12 | End: 2021-05-10

## 2021-01-14 ENCOUNTER — DOCUMENTATION ONLY (OUTPATIENT)
Dept: PHARMACY | Facility: CLINIC | Age: 31
End: 2021-01-14

## 2021-01-14 ENCOUNTER — HOME INFUSION (PRE-WILLOW HOME INFUSION) (OUTPATIENT)
Dept: PHARMACY | Facility: CLINIC | Age: 31
End: 2021-01-14

## 2021-01-14 LAB
ALBUMIN SERPL-MCNC: 3.7 G/DL (ref 3.4–5)
ALP SERPL-CCNC: 50 U/L (ref 40–150)
ALT SERPL W P-5'-P-CCNC: 18 U/L (ref 0–50)
ANION GAP SERPL CALCULATED.3IONS-SCNC: 5 MMOL/L (ref 3–14)
AST SERPL W P-5'-P-CCNC: 21 U/L (ref 0–45)
BILIRUB SERPL-MCNC: 0.8 MG/DL (ref 0.2–1.3)
BUN SERPL-MCNC: 21 MG/DL (ref 7–30)
CALCIUM SERPL-MCNC: 9.2 MG/DL (ref 8.5–10.1)
CHLORIDE SERPL-SCNC: 107 MMOL/L (ref 94–109)
CO2 SERPL-SCNC: 28 MMOL/L (ref 20–32)
CREAT SERPL-MCNC: 1.24 MG/DL (ref 0.52–1.04)
CREAT UR-MCNC: 129 MG/DL
GFR SERPL CREATININE-BSD FRML MDRD: 58 ML/MIN/{1.73_M2}
GLUCOSE SERPL-MCNC: 90 MG/DL (ref 70–99)
HGB BLD-MCNC: 10.6 G/DL (ref 11.7–15.7)
PLATELET # BLD AUTO: 279 10E9/L (ref 150–450)
POTASSIUM SERPL-SCNC: 4.2 MMOL/L (ref 3.4–5.3)
PROT SERPL-MCNC: 6.9 G/DL (ref 6.8–8.8)
PROT UR-MCNC: 2.7 G/L
PROT/CREAT 24H UR: 2.09 G/G CR (ref 0–0.2)
SODIUM SERPL-SCNC: 140 MMOL/L (ref 133–144)
TACROLIMUS BLD-MCNC: 10 UG/L (ref 5–15)
TME LAST DOSE: NORMAL H

## 2021-01-14 PROCEDURE — 80053 COMPREHEN METABOLIC PANEL: CPT | Performed by: INTERNAL MEDICINE

## 2021-01-14 PROCEDURE — 85049 AUTOMATED PLATELET COUNT: CPT | Performed by: INTERNAL MEDICINE

## 2021-01-14 PROCEDURE — 87799 DETECT AGENT NOS DNA QUANT: CPT | Performed by: INTERNAL MEDICINE

## 2021-01-14 PROCEDURE — 84156 ASSAY OF PROTEIN URINE: CPT | Performed by: INTERNAL MEDICINE

## 2021-01-14 PROCEDURE — 85018 HEMOGLOBIN: CPT | Performed by: INTERNAL MEDICINE

## 2021-01-14 PROCEDURE — 80197 ASSAY OF TACROLIMUS: CPT | Performed by: INTERNAL MEDICINE

## 2021-01-14 NOTE — PROGRESS NOTES
Skilled Nurse visit in the  patient home to administer Entyvio.  No recent elevated temperature, fever, chills, productive cough, coughing for 3 weeks or longer or hemoptysis, abnormal vital signs, night sweats, chest pain. No  decrease in your appetite, unexplained weight loss or fatigue.  No other new onset medical symptoms.  Current weight 122 lb.  PIV placed R AC, 1 attempt/s. Labs drawn Hemogram with platelet, CMP, /Tacrolimus/Prograf level, EBV PCR QT, Urine for protein/creatinine. Infusion completed without complication or reaction. Pt reports therapy is effective in managing symptoms related to therapy.    Nina Mcneal RN  [facility]  [email or web address]  [phone]

## 2021-01-15 LAB
EBV DNA # SPEC NAA+PROBE: 2213 {COPIES}/ML
EBV DNA SPEC NAA+PROBE-LOG#: 3.3 {LOG_COPIES}/ML

## 2021-01-15 NOTE — PROGRESS NOTES
This is a recent snapshot of the patient's [facility] medical record.  For current drug dose and complete information and questions, call [phone] or In Basket pool, fv home infusion ([id number])  CSN Number:  [id number]

## 2021-02-01 ENCOUNTER — APPOINTMENT (OUTPATIENT)
Dept: LAB | Facility: CLINIC | Age: 31
End: 2021-02-01
Attending: INTERNAL MEDICINE
Payer: COMMERCIAL

## 2021-02-25 ENCOUNTER — DOCUMENTATION ONLY (OUTPATIENT)
Dept: PHARMACY | Facility: CLINIC | Age: 31
End: 2021-02-25

## 2021-02-25 ENCOUNTER — HOME INFUSION (PRE-WILLOW HOME INFUSION) (OUTPATIENT)
Dept: PHARMACY | Facility: CLINIC | Age: 31
End: 2021-02-25

## 2021-02-25 NOTE — PROGRESS NOTES
Skilled Nurse visit in the  patient home to administer Entyvio.  No recent elevated temperature, fever, chills, productive cough, coughing for 3 weeks or longer or hemoptysis, abnormal vital signs, night sweats, chest pain. No  decrease in your appetite, unexplained weight loss or fatigue.  No other new onset medical symptoms.  Current weight 120 lb.  PIV placed R AC, 2 attempt/s. Infusion completed without complication or reaction. Pt reports therapy is effective in managing symptoms related to therapy.    Nina Mcneal RN  [facility]  [email or web address]  [phone]

## 2021-03-01 ENCOUNTER — APPOINTMENT (OUTPATIENT)
Dept: LAB | Facility: CLINIC | Age: 31
End: 2021-03-01
Attending: INTERNAL MEDICINE
Payer: COMMERCIAL

## 2021-03-01 NOTE — PROGRESS NOTES
This is a recent snapshot of the patient's [facility] medical record.  For current drug dose and complete information and questions, call [phone] or In Basket pool, fv home infusion ([id number])  CSN Number:  [id number]

## 2021-03-15 ENCOUNTER — HOME INFUSION (PRE-WILLOW HOME INFUSION) (OUTPATIENT)
Dept: PHARMACY | Facility: CLINIC | Age: 31
End: 2021-03-15

## 2021-03-16 NOTE — PROGRESS NOTES
This is a recent snapshot of the patient's [facility] medical record.  For current drug dose and complete information and questions, call [phone] or In Basket pool, fv home infusion ([id number])  CSN Number:  [id number]

## 2021-03-31 ENCOUNTER — HOME INFUSION (PRE-WILLOW HOME INFUSION) (OUTPATIENT)
Dept: PHARMACY | Facility: CLINIC | Age: 31
End: 2021-03-31

## 2021-04-01 NOTE — PROGRESS NOTES
This is a recent snapshot of the patient's [facility] medical record.  For current drug dose and complete information and questions, call [phone] or In Basket pool, fv home infusion ([id number])  CSN Number:  [id number]

## 2021-04-08 ENCOUNTER — HOME INFUSION (PRE-WILLOW HOME INFUSION) (OUTPATIENT)
Dept: PHARMACY | Facility: CLINIC | Age: 31
End: 2021-04-08

## 2021-04-09 NOTE — PROGRESS NOTES
This is a recent snapshot of the patient's [facility] medical record.  For current drug dose and complete information and questions, call [phone] or In Basket pool, fv home infusion ([id number])  CSN Number:  [id number]

## 2021-04-12 ENCOUNTER — HOME INFUSION (PRE-WILLOW HOME INFUSION) (OUTPATIENT)
Dept: PHARMACY | Facility: CLINIC | Age: 31
End: 2021-04-12

## 2021-04-13 NOTE — PROGRESS NOTES
This is a recent snapshot of the patient's [facility] medical record.  For current drug dose and complete information and questions, call [phone] or In Basket pool, fv home infusion ([id number])  CSN Number:  [id number]

## 2021-04-16 ENCOUNTER — MEDICAL CORRESPONDENCE (OUTPATIENT)
Dept: HEALTH INFORMATION MANAGEMENT | Facility: CLINIC | Age: 31
End: 2021-04-16

## 2021-04-16 ENCOUNTER — HOME INFUSION (PRE-WILLOW HOME INFUSION) (OUTPATIENT)
Dept: PHARMACY | Facility: CLINIC | Age: 31
End: 2021-04-16

## 2021-04-16 LAB
ALBUMIN SERPL-MCNC: 3.3 G/DL (ref 3.4–5)
ALP SERPL-CCNC: 47 U/L (ref 40–150)
ALT SERPL W P-5'-P-CCNC: 14 U/L (ref 0–50)
ANION GAP SERPL CALCULATED.3IONS-SCNC: 6 MMOL/L (ref 3–14)
AST SERPL W P-5'-P-CCNC: 13 U/L (ref 0–45)
BILIRUB DIRECT SERPL-MCNC: 0.1 MG/DL (ref 0–0.2)
BILIRUB SERPL-MCNC: 0.4 MG/DL (ref 0.2–1.3)
BUN SERPL-MCNC: 33 MG/DL (ref 7–30)
CALCIUM SERPL-MCNC: 8.8 MG/DL (ref 8.5–10.1)
CHLORIDE SERPL-SCNC: 109 MMOL/L (ref 94–109)
CO2 SERPL-SCNC: 25 MMOL/L (ref 20–32)
CREAT SERPL-MCNC: 1.31 MG/DL (ref 0.52–1.04)
CRP SERPL-MCNC: <2.9 MG/L (ref 0–8)
GFR SERPL CREATININE-BSD FRML MDRD: 54 ML/MIN/{1.73_M2}
GLUCOSE SERPL-MCNC: 103 MG/DL (ref 70–99)
POTASSIUM SERPL-SCNC: 4 MMOL/L (ref 3.4–5.3)
PROT SERPL-MCNC: 6.7 G/DL (ref 6.8–8.8)
SODIUM SERPL-SCNC: 140 MMOL/L (ref 133–144)
TACROLIMUS BLD-MCNC: 4 UG/L (ref 5–15)
TME LAST DOSE: ABNORMAL H

## 2021-04-16 PROCEDURE — 80197 ASSAY OF TACROLIMUS: CPT

## 2021-04-16 PROCEDURE — 86140 C-REACTIVE PROTEIN: CPT

## 2021-04-16 PROCEDURE — 80053 COMPREHEN METABOLIC PANEL: CPT

## 2021-04-16 PROCEDURE — 82248 BILIRUBIN DIRECT: CPT

## 2021-04-16 PROCEDURE — 87799 DETECT AGENT NOS DNA QUANT: CPT

## 2021-04-19 LAB
EBV DNA # SPEC NAA+PROBE: 1089 {COPIES}/ML
EBV DNA SPEC NAA+PROBE-LOG#: 3 {LOG_COPIES}/ML

## 2021-04-19 NOTE — PROGRESS NOTES
This is a recent snapshot of the patient's [facility] medical record.  For current drug dose and complete information and questions, call [phone] or In Basket pool, fv home infusion ([id number])  CSN Number:  [id number]

## 2021-04-20 ENCOUNTER — HOME INFUSION (PRE-WILLOW HOME INFUSION) (OUTPATIENT)
Dept: PHARMACY | Facility: CLINIC | Age: 31
End: 2021-04-20

## 2021-04-21 NOTE — PROGRESS NOTES
This is a recent snapshot of the patient's [facility] medical record.  For current drug dose and complete information and questions, call [phone] or In Basket pool, fv home infusion ([id number])  CSN Number:  [id number]

## 2021-04-24 ENCOUNTER — HEALTH MAINTENANCE LETTER (OUTPATIENT)
Age: 31
End: 2021-04-24

## 2021-05-01 ENCOUNTER — APPOINTMENT (OUTPATIENT)
Dept: LAB | Facility: CLINIC | Age: 31
End: 2021-05-01
Attending: INTERNAL MEDICINE
Payer: COMMERCIAL

## 2021-05-10 DIAGNOSIS — Z94.0 KIDNEY TRANSPLANTED: ICD-10-CM

## 2021-05-10 DIAGNOSIS — R80.9 PROTEINURIA: ICD-10-CM

## 2021-05-10 RX ORDER — LOSARTAN POTASSIUM 25 MG/1
TABLET ORAL
Qty: 30 TABLET | Refills: 1 | Status: ON HOLD | OUTPATIENT
Start: 2021-05-10 | End: 2022-12-26

## 2021-05-14 ENCOUNTER — TELEPHONE (OUTPATIENT)
Dept: TRANSPLANT | Facility: CLINIC | Age: 31
End: 2021-05-14

## 2021-05-14 DIAGNOSIS — E83.42 HYPOMAGNESEMIA: ICD-10-CM

## 2021-05-14 DIAGNOSIS — E53.8 B12 DEFICIENCY DUE TO DIET: ICD-10-CM

## 2021-05-14 DIAGNOSIS — Z94.0 KIDNEY TRANSPLANTED: ICD-10-CM

## 2021-05-14 DIAGNOSIS — E55.9 HYPOVITAMINOSIS D: ICD-10-CM

## 2021-05-14 DIAGNOSIS — Z94.0 KIDNEY REPLACED BY TRANSPLANT: ICD-10-CM

## 2021-05-14 DIAGNOSIS — Z94.0 STATUS POST KIDNEY TRANSPLANT: ICD-10-CM

## 2021-05-14 RX ORDER — TACROLIMUS 0.5 MG/1
1.5 CAPSULE ORAL 2 TIMES DAILY
Qty: 540 CAPSULE | Refills: 3 | Status: ON HOLD | OUTPATIENT
Start: 2021-05-14 | End: 2022-12-26

## 2021-05-14 NOTE — TELEPHONE ENCOUNTER
Provider Call: Medication Refill  Route to LPN      tacrolimus (GENERIC EQUIVALENT) 0.5 MG capsule    Capsule -- [place], [place] - [address] Phone:  [phone]   Fax:  [phone]          When will the patient be out of this medication?: Less than 3 days (Route high priority)  Callback needed? Yes    Return Call Needed  Same as documented in contacts section  When to return call?: Same day: Route High Priority

## 2021-05-19 ENCOUNTER — HOME INFUSION (PRE-WILLOW HOME INFUSION) (OUTPATIENT)
Dept: PHARMACY | Facility: CLINIC | Age: 31
End: 2021-05-19

## 2021-05-21 ENCOUNTER — DOCUMENTATION ONLY (OUTPATIENT)
Dept: PHARMACY | Facility: CLINIC | Age: 31
End: 2021-05-21

## 2021-05-21 ENCOUNTER — HOME INFUSION (PRE-WILLOW HOME INFUSION) (OUTPATIENT)
Dept: PHARMACY | Facility: CLINIC | Age: 31
End: 2021-05-21

## 2021-05-21 NOTE — PROGRESS NOTES
This is a recent snapshot of the patient's [facility] medical record.  For current drug dose and complete information and questions, call [phone] or In Basket pool, fv home infusion ([id number])  CSN Number:  [id number]

## 2021-05-21 NOTE — PROGRESS NOTES
Skilled Nurse visit in the  patient home to administer Entyvio.  No recent elevated temperature, fever, chills, productive cough, coughing for 3 weeks or longer or hemoptysis, abnormal vital signs, night sweats, chest pain. No  decrease in your appetite, unexplained weight loss or fatigue.  No other new onset medical symptoms.  Current weight 126 lb.  PIV placed R AC, 1 attempt/s. Infusion completed without complication or reaction. Pt reports therapy is typically effective in managing symptoms related to therapy, however she did not feel like she had relief from her symptoms after her last infusion.      Nina Mcneal RN  [facility]  [email or web address]  [phone]

## 2021-05-24 ENCOUNTER — RECORDS - HEALTHEAST (OUTPATIENT)
Dept: ADMINISTRATIVE | Facility: CLINIC | Age: 31
End: 2021-05-24

## 2021-05-25 ENCOUNTER — RECORDS - HEALTHEAST (OUTPATIENT)
Dept: ADMINISTRATIVE | Facility: CLINIC | Age: 31
End: 2021-05-25

## 2021-06-01 ENCOUNTER — APPOINTMENT (OUTPATIENT)
Dept: LAB | Facility: CLINIC | Age: 31
End: 2021-06-01
Attending: INTERNAL MEDICINE
Payer: COMMERCIAL

## 2021-06-01 NOTE — PROGRESS NOTES
This is a recent snapshot of the patient's [facility] medical record.  For current drug dose and complete information and questions, call [phone] or In Basket pool, fv home infusion ([id number])  CSN Number:  [id number]

## 2021-06-28 ENCOUNTER — TRANSFERRED RECORDS (OUTPATIENT)
Dept: HEALTH INFORMATION MANAGEMENT | Facility: CLINIC | Age: 31
End: 2021-06-28

## 2021-06-28 DIAGNOSIS — Z94.0 STATUS POST KIDNEY TRANSPLANT: ICD-10-CM

## 2021-06-28 DIAGNOSIS — E55.9 HYPOVITAMINOSIS D: ICD-10-CM

## 2021-06-28 DIAGNOSIS — E83.42 HYPOMAGNESEMIA: ICD-10-CM

## 2021-06-28 DIAGNOSIS — Z94.0 KIDNEY REPLACED BY TRANSPLANT: ICD-10-CM

## 2021-06-28 DIAGNOSIS — E53.8 B12 DEFICIENCY DUE TO DIET: ICD-10-CM

## 2021-06-28 DIAGNOSIS — Z94.0 KIDNEY TRANSPLANTED: ICD-10-CM

## 2021-07-01 ENCOUNTER — APPOINTMENT (OUTPATIENT)
Dept: LAB | Facility: CLINIC | Age: 31
End: 2021-07-01
Attending: INTERNAL MEDICINE
Payer: COMMERCIAL

## 2021-07-02 ENCOUNTER — HOME INFUSION (PRE-WILLOW HOME INFUSION) (OUTPATIENT)
Dept: PHARMACY | Facility: CLINIC | Age: 31
End: 2021-07-02

## 2021-07-02 ENCOUNTER — DOCUMENTATION ONLY (OUTPATIENT)
Dept: PHARMACY | Facility: CLINIC | Age: 31
End: 2021-07-02

## 2021-07-02 NOTE — PROGRESS NOTES
Skilled Nurse visit in the  patient home to administer Entyvio.  No recent elevated temperature, fever, chills, productive cough, coughing for 3 weeks or longer or hemoptysis, abnormal vital signs, night sweats, chest pain. No  decrease in your appetite, unexplained weight loss or fatigue.  No other new onset medical symptoms.  Current weight 122.  PIV placed L AC, 1 attempt/s. Infusion completed without complication or reaction. Pt reports therapy is effective in managing symptoms related to therapy.      Nina Mcneal RN  [facility]  [email or web address]  [phone]

## 2021-07-06 ENCOUNTER — ALLIED HEALTH/NURSE VISIT (OUTPATIENT)
Dept: PHARMACY | Facility: CLINIC | Age: 31
End: 2021-07-06

## 2021-07-06 NOTE — PROGRESS NOTES
This is a recent snapshot of the patient's [facility] medical record.  For current drug dose and complete information and questions, call [phone] or In Basket pool, fv home infusion ([id number])  CSN Number:  [id number]

## 2021-07-09 NOTE — PROGRESS NOTES
This is a recent snapshot of the patient's [facility] medical record.  For current drug dose and complete information and questions, call [phone] or In Basket pool, fv home infusion ([id number])  CSN Number:  [id number]

## 2021-07-14 ENCOUNTER — HOME INFUSION (PRE-WILLOW HOME INFUSION) (OUTPATIENT)
Dept: PHARMACY | Facility: CLINIC | Age: 31
End: 2021-07-14

## 2021-07-20 ENCOUNTER — HOME INFUSION (PRE-WILLOW HOME INFUSION) (OUTPATIENT)
Dept: PHARMACY | Facility: CLINIC | Age: 31
End: 2021-07-20

## 2021-07-22 ENCOUNTER — HOME INFUSION (PRE-WILLOW HOME INFUSION) (OUTPATIENT)
Dept: PHARMACY | Facility: CLINIC | Age: 31
End: 2021-07-22

## 2021-07-22 NOTE — PROGRESS NOTES
This is a recent snapshot of the patient's [facility] medical record.  For current drug dose and complete information and questions, call [phone] or In Basket pool, fv home infusion ([id number])  CSN Number:  [id number]

## 2021-07-23 ENCOUNTER — MYC MEDICAL ADVICE (OUTPATIENT)
Dept: GASTROENTEROLOGY | Facility: CLINIC | Age: 31
End: 2021-07-23

## 2021-07-23 ENCOUNTER — MYC MEDICAL ADVICE (OUTPATIENT)
Dept: TRANSPLANT | Facility: CLINIC | Age: 31
End: 2021-07-23

## 2021-07-23 NOTE — PROGRESS NOTES
This is a recent snapshot of the patient's [facility] medical record.  For current drug dose and complete information and questions, call [phone] or In Basket pool, fv home infusion ([id number])  CSN Number:  [id number]

## 2021-07-23 NOTE — LETTER
PHYSICIAN ORDERS: Referral       DATE & TIME ISSUED: 2021 2:54 PM  PATIENT NAME: Caity Horan   : 1990     PATIENT ADDRESS:   [address]   [address] [place] [address] MR# [if applicable]: [id number]     DIAGNOSIS:  Kidney transplant  ICD-10 CODE: Z94.0     Caity Horan received a kidney transplant at the [facility] on 2014. Please accept this as a referral to transfer care to your center. Please contact medical records department for patient records ([phone]). Patient can be reached at [phone].    Any questions please call: Solid Organ Transplant Office [phone], option 5      .

## 2021-07-23 NOTE — LETTER
"9/28/2020       RE: Caity Horan  [address]   [address]   [place] [place] [address]     Dear Colleague,    Thank you for referring your patient, Caity Horan, to the [facility] NEPHROLOG[facility] at [facility]. Please see a copy of my visit note below.    Caity Horan is a 30 year old female who is being evaluated via a billable video visit.      The patient has been notified of following:     \"This video visit will be conducted via a call between you and your physician/provider. We have found that certain health care needs can be provided without the need for an in-person physical exam.  This service lets us provide the care you need with a video conversation.  If a prescription is necessary we can send it directly to your pharmacy.  If lab work is needed we can place an order for that and you can then stop by our lab to have the test done at a later time.    Video visits are billed at different rates depending on your insurance coverage.  Please reach out to your insurance provider with any questions.    If during the course of the call the physician/provider feels a video visit is not appropriate, you will not be charged for this service.\"    Patient has given verbal consent for Video visit? Yes  How would you like to obtain your AVS? Mail a copy  If you are dropped from the video visit, the video invite should be resent to: Send to e-mail at: [email or web address]  Will anyone else be joining your video visit? No        Video-Visit Details    Type of service:  Video Visit    Video Start Time: 2:06 PM  Video End Time: 2:14 PM    Originating Location (pt. Location): Home    Distant Location (provider location):  [facility]     Platform used for Video Visit: Doug Arora MD           CHRONIC TRANSPLANT NEPHROLOGY VISIT    Assessment and Plan:   # LDKT:    - Baseline Cr ~ 1.0-1.2; Stable   - Proteinuria: Normal 0.07 g / g on 11/1/18   - Date of DSA last checked: " 12/13/16 Latest DSA: No   - BK Viremia: No; 11/1/18   - Kidney Tx Biopsy: No    # Immunosuppression: Tacrolimus immediate release (goal  4-6) and Mycophenolate mofetil (goal  1-3.5)   - Changes: No    # Hypertension: Controlled; Goal BP: < 130/80   - Changes: No     # ulcerative colitis :no recent flares, on vedolizumab, no diarrhea/nausea/vomiting      # Mineral Bone Disorder:    - Secondary renal hyperparathyroidism; PTH level is: Not checked recently   - Vitamin D; level is: Low; cholecalciferol 2000 units daily.    - Calcium; level is: Normal   - Phosphorus; level is: Not checked recently     # EBV viremia: 2250 in July downtrending, CT c/a/p neg 2017, continue to monitor     # flu shot: pending , plans to schedule    # Ca screening: up to date  Pap smear    # Skin Cancer Risk:    - Discussed sun protection and recommend regular follow up with Dermatology.    # Medical Compliance: Yes    Follow-up: Return in about 1 year (around 11/2/2019).     # Transplant History:  Etiology of kidney failure: IgA nephropathy  Tx: LDKT  Transplant: 12/5/2014 (Kidney)  Donor Type: Living Donor Class: Standard Criteria Donor  Significant changes in immunosuppression: None  Significant transplant-related complications: EBV viremia    Transplant Office Phone Number: [phone]    Assessment and plan was discussed with the patient and they voiced understanding and agreement.    Chief Complaint   Caity Horan is a 28 year old female with a history of end stage renal disease secondary to IgA nephropathy, status-post LDKT completed on 12/5/2014, who presents for follow-up.    History of Present Illness:  Ms. Mckenzie feels good overall. She reports no new complaints. She reports her ulcerative colitis has been under control without any recent flares; no nausea/vomiting/diarrea/abdominal pain. Her BP is well controlled, most recent reading at the infusion clinic ~112/59. She denies any fatigue, fevers, chills, night sweats, or enlarged  lymph nodes and her weight has been stable.  She plans to schedule her flu shot soon.      Recent Hospitalizations:  [x] No [] Yes    New Medical Issues: [x] No [] Yes    Decreased energy: [x] No [] Yes    Chest pain or SOB with exertion:  [x] No [] Yes    Appetite change or weight change: [x] No [] Yes    Nausea, vomiting or diarrhea:  [x] No [] Yes    Fever, sweats or chills: [x] No [] Yes    Leg swelling: [x] No [] Yes      Review of Systems:   A comprehensive review of systems was obtained and negative, except as noted in the HPI or past medical history.     Active Medications:      Cholecalciferol (VITAMIN D) 2000 units tablet, Take 2,000 Units by mouth daily, Disp: 100 tablet, Rfl: 3     Cyanocobalamin (B-12) 1000 MCG TBCR, Take 1,000 mcg by mouth daily, Disp: 90 tablet, Rfl: 3     magnesium oxide (MAG-OX) 400 MG tablet, Take 1 tablet (400 mg) by mouth daily, Disp: 90 tablet, Rfl: 3     mycophenolate (GENERIC EQUIVALENT) 250 MG capsule, Take 1 capsule (250 mg) by mouth 2 times daily, Disp: 60 capsule, Rfl: 11     tacrolimus (GENERIC EQUIVALENT) 0.5 MG capsule, Take 3 capsules (1.5 mg) by mouth 2 times daily, Disp: 180 capsule, Rfl: 6     UNABLE TO FIND, MEDICATION NAME: Biotin, Disp: , Rfl:      Vedolizumab (ENTYVIO IV), , Disp: , Rfl:      [DISCONTINUED] mycophenolate (GENERIC EQUIVALENT) 250 MG capsule, Take 2 capsules (500 mg) by mouth 2 times daily, Disp: 120 capsule, Rfl: 11      Allergies:   Amoxicillin      Active Medical Problems:  IgA nephropathy  Status post kidney transplant  Immunosuppressed status  Hypomagnesemia  EBV viremia  Anemia, iron deficiency  Vitamin B12 deficiency  Ulcerative pancolitis with rectal bleeding     Social History:   The patient has never smoked. Endorses alcohol use, 1-3 times per month.    Physical Exam:   There were no vitals taken for this visit.   Wt Readings from Last 4 Encounters:   12/11/19 59.4 kg (130 lb 14.4 oz)   01/10/19 63.2 kg (139 lb 6.4 oz)   11/15/18 61.6 kg  (135 lb 12.8 oz)   11/02/18 59.9 kg (132 lb)     No  Exam (video visit)    Data:   Last Comprehensive Metabolic Panel:  Sodium   Date Value Ref Range Status   09/10/2020 139 133 - 144 mmol/L Final     Potassium   Date Value Ref Range Status   09/10/2020 4.4 3.4 - 5.3 mmol/L Final     Chloride   Date Value Ref Range Status   09/10/2020 109 94 - 109 mmol/L Final     Carbon Dioxide   Date Value Ref Range Status   09/10/2020 21 20 - 32 mmol/L Final     Anion Gap   Date Value Ref Range Status   09/10/2020 9 3 - 14 mmol/L Final     Glucose   Date Value Ref Range Status   09/10/2020 90 70 - 99 mg/dL Final     Urea Nitrogen   Date Value Ref Range Status   09/10/2020 34 (H) 7 - 30 mg/dL Final     Creatinine   Date Value Ref Range Status   09/10/2020 1.19 (H) 0.52 - 1.04 mg/dL Final     GFR Estimate   Date Value Ref Range Status   09/10/2020 61 >60 mL/min/[1.73_m2] Final     Comment:     Non  GFR Calc  Starting 12/18/2018, serum creatinine based estimated GFR (eGFR) will be   calculated using the Chronic Kidney Disease Epidemiology Collaboration   (CKD-EPI) equation.       Calcium   Date Value Ref Range Status   09/10/2020 9.1 8.5 - 10.1 mg/dL Final     CBC RESULTS:   Recent Labs   Lab Test 09/10/20  1040   WBC 6.5   RBC 3.40*   HGB 10.4*   HCT 32.7*   MCV 96   MCH 30.6   MCHC 31.8   RDW 12.5                Again, thank you for allowing me to participate in the care of your patient.      Sincerely,    Kidney/Pancreas Recipient

## 2021-07-28 ENCOUNTER — TELEPHONE (OUTPATIENT)
Dept: TRANSPLANT | Facility: CLINIC | Age: 31
End: 2021-07-28

## 2021-07-28 RX ORDER — EPINEPHRINE 1 MG/ML
0.3 INJECTION, SOLUTION, CONCENTRATE INTRAVENOUS EVERY 5 MIN PRN
Status: CANCELLED | OUTPATIENT
Start: 2021-07-28

## 2021-07-28 RX ORDER — ALBUTEROL SULFATE 0.83 MG/ML
2.5 SOLUTION RESPIRATORY (INHALATION)
Status: CANCELLED | OUTPATIENT
Start: 2021-07-28

## 2021-07-28 RX ORDER — ALBUTEROL SULFATE 90 UG/1
1-2 AEROSOL, METERED RESPIRATORY (INHALATION)
Status: CANCELLED
Start: 2021-07-28

## 2021-07-28 RX ORDER — METHYLPREDNISOLONE SODIUM SUCCINATE 125 MG/2ML
125 INJECTION, POWDER, LYOPHILIZED, FOR SOLUTION INTRAMUSCULAR; INTRAVENOUS
Status: CANCELLED
Start: 2021-07-28

## 2021-07-28 RX ORDER — MEPERIDINE HYDROCHLORIDE 25 MG/ML
25 INJECTION INTRAMUSCULAR; INTRAVENOUS; SUBCUTANEOUS EVERY 30 MIN PRN
Status: CANCELLED | OUTPATIENT
Start: 2021-07-28

## 2021-07-28 RX ORDER — DIPHENHYDRAMINE HYDROCHLORIDE 50 MG/ML
50 INJECTION INTRAMUSCULAR; INTRAVENOUS
Status: CANCELLED
Start: 2021-07-28

## 2021-07-28 RX ORDER — NALOXONE HYDROCHLORIDE 0.4 MG/ML
0.2 INJECTION, SOLUTION INTRAMUSCULAR; INTRAVENOUS; SUBCUTANEOUS
Status: CANCELLED | OUTPATIENT
Start: 2021-07-28

## 2021-07-29 NOTE — PROGRESS NOTES
This is a recent snapshot of the patient's [facility] medical record.  For current drug dose and complete information and questions, call [phone] or In Basket pool, fv home infusion ([id number])  CSN Number:  [id number]

## 2021-08-13 NOTE — TELEPHONE ENCOUNTER
Caity requested referral for  Med for transfer of care be re-sent. Referral letter, Med sheet, and last transplant nephrology visit notes re-sent on 8/13/21; Updated with new patient address and contact info.    Carlie Serrato RN, BSN  Solid Organ Transplant, Post Kidney and Pancreas  Transplant Care Coordinator  [phone]

## 2021-09-27 NOTE — NURSING NOTE
"Oncology Rooming Note    December 1, 2017 1:11 PM   Caity Horan is a 27 year old female who presents for:    Chief Complaint   Patient presents with     Oncology Clinic Visit     Return:Anemia, iron deficiency     Initial Vitals: /71  Pulse 65  Temp 98.3  F (36.8  C) (Oral)  Resp 16  Ht 1.575 m (5' 2\")  Wt 62.6 kg (137 lb 14.4 oz)  LMP 11/08/2017  SpO2 95%  BMI 25.22 kg/m2 Estimated body mass index is 25.22 kg/(m^2) as calculated from the following:    Height as of this encounter: 1.575 m (5' 2\").    Weight as of this encounter: 62.6 kg (137 lb 14.4 oz). Body surface area is 1.65 meters squared.  No Pain (0) Comment: Data Unavailable   Patient's last menstrual period was 11/08/2017.  Allergies reviewed: Yes  Medications reviewed: Yes    Medications: Medication refills not needed today.  Pharmacy name entered into [employer]:    [facility] [address] [place] [address] - [facility]SE[facility]INE FARR AT [facility] - [place], [place] - [address]    Clinical concerns: Pt. received flu shot on 10/03/2017.       7 minutes for nursing intake (face to face time)     YARA Stevens      " Cyclosporine Pregnancy And Lactation Text: This medication is Pregnancy Category C and it isn't know if it is safe during pregnancy. This medication is excreted in breast milk.

## 2021-10-03 ENCOUNTER — HEALTH MAINTENANCE LETTER (OUTPATIENT)
Age: 31
End: 2021-10-03

## 2022-01-10 ENCOUNTER — TELEPHONE (OUTPATIENT)
Dept: TRANSPLANT | Facility: CLINIC | Age: 32
End: 2022-01-10
Payer: COMMERCIAL

## 2022-01-10 NOTE — TELEPHONE ENCOUNTER
Call returned to SHALONDA King from [facility]. Allosure was 5.8. DSA positive. 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 Treatment recommended is Tocilizumab or pulse steroids. Caity is refusing treatment so far. Dr. Curry is requesting to review plan with [facility]  physician.     Dr. Curry is transplant nephrologist (cell [phone]).

## 2022-01-10 NOTE — TELEPHONE ENCOUNTER
Provider Call: General  Route to LPN    Reason for call: Call from [facility]  Has questions and advice - they want to review with our center     Call back needed? Yes    Return Call Needed  Same as documented in contacts section  When to return call?: Greater than one day: Route standard priority

## 2022-01-11 NOTE — TELEPHONE ENCOUNTER
Dr. Mar reviewed POC with Dr. Curry. [facility] available for further questions in the future.

## 2022-03-31 PROBLEM — T86.11 ACUTE REJECTION OF KIDNEY TRANSPLANT: Status: ACTIVE | Noted: 2021-11-17

## 2022-05-15 ENCOUNTER — HEALTH MAINTENANCE LETTER (OUTPATIENT)
Age: 32
End: 2022-05-15

## 2022-05-26 DIAGNOSIS — Z94.0 KIDNEY TRANSPLANTED: Primary | ICD-10-CM

## 2022-05-31 NOTE — TELEPHONE ENCOUNTER
Follow-up with anemia management service:    LM for Caity reminding her that she is due for her monthly Hgb, ferritin and iron labs  Has appt 01/10/2019 for infusion at [facility]. EDUARDO    Anemia Latest Ref Rng & Units 2018   Hemoglobin 11.7 - 15.7 g/dL 9.4(L) 9.1(L) 10.7(L) 12.1 12.5 12.5 12.5   IV Iron Dose - - 750mg 750mg - - - -   TSAT 15 - 46 % 5(L) - - 30 55(H) - 43   Ferritin 12 - 150 ng/mL 7(L) - - 216(H) 226(H) - 216(H)       Orders needed to be renewed (for next follow-up date) in EPIC: Hgb, ferritin and iron standing lab orders   Med order expires: N/A   Lab orders : 10/10/2019 EDUARDO    Follow-up call date: 01/10/2019    [facility]  Anemia Clinic  [phone]  Reviewed 2018   Anemia Management Service  Abida Song,FemiD, Princess HarringtonPavithra Smith RN  Phone: [phone]  Fax: [phone]     77.7

## 2022-08-02 NOTE — PROGRESS NOTES
[place], discontinuing Entyvio plan as the patient is no longer receiving therapy through the digestive health clinic at the [facility].

## 2022-09-10 ENCOUNTER — HEALTH MAINTENANCE LETTER (OUTPATIENT)
Age: 32
End: 2022-09-10

## 2022-11-09 ENCOUNTER — TELEPHONE (OUTPATIENT)
Dept: TRANSPLANT | Facility: CLINIC | Age: 32
End: 2022-11-09

## 2022-11-09 DIAGNOSIS — N18.9 CHRONIC KIDNEY DISEASE: ICD-10-CM

## 2022-11-09 DIAGNOSIS — Z94.0 KIDNEY REPLACED BY TRANSPLANT: Primary | ICD-10-CM

## 2022-11-14 ENCOUNTER — REFERRAL (OUTPATIENT)
Dept: TRANSPLANT | Facility: CLINIC | Age: 32
End: 2022-11-14

## 2022-11-14 ENCOUNTER — TELEPHONE (OUTPATIENT)
Dept: TRANSPLANT | Facility: CLINIC | Age: 32
End: 2022-11-14

## 2022-11-14 VITALS — BODY MASS INDEX: 24.63 KG/M2 | HEIGHT: 63 IN | WEIGHT: 139 LBS

## 2022-11-14 DIAGNOSIS — N18.4 CHRONIC KIDNEY DISEASE, STAGE IV (SEVERE) (H): ICD-10-CM

## 2022-11-14 DIAGNOSIS — Z01.818 PRE-TRANSPLANT EVALUATION FOR KIDNEY TRANSPLANT: ICD-10-CM

## 2022-11-14 DIAGNOSIS — N02.B9 IGA NEPHROPATHY: ICD-10-CM

## 2022-11-14 DIAGNOSIS — Z76.82 ORGAN TRANSPLANT CANDIDATE: ICD-10-CM

## 2022-11-14 NOTE — TELEPHONE ENCOUNTER
PCP: No PCP   Referring Provider: Dr. Barrera  Nephrologist- Merrill Burnett MD  Referring Diagnosis: CKD   Kidney Transplanted 2014, IgA nephropathy    Is patient under the age of 65? yes  Is patient diabetic? no  Is patient on insulin? no  Was patient offered a pancreas transplant referral? no    Is patient in a group home/assisted living? no  Does patient have a guardian? no    Referral intake process completed.  Patient is aware that after financial approval is received, medical records will be requested.   Patient confirmed for a callback from transplant coordinator on November 22, 2022. (within 2 weeks)  Tentative evaluation date TBD- patient wants to speak with coordinator first (within 4 weeks) if appointment is virtual, does patient have capabilities of setting this up?     Confirmed coordinator will discuss evaluation process in more detail at the time of their call.   Patient is aware of the need to arrange age appropriate cancer screening, vaccinations, and dental care.  Reminded patient to complete questionnaire, complete medical records release, and review packet prior to evaluation visit .  Assessed patient for special needs (ie-wheelchair, assistance, guardian, and ):  none   Patient instructed to call [phone] with questions.     Patient gave verbal consent during intake call to obtain medical records and documents outside of [facility]:  yes

## 2022-11-14 NOTE — LETTER
Caity Horan  [address]  [address]  [place] [address]                November 21, 2022      Basim Carolina,     It was a pleasure to speak with you over the phone today!  As we discussed, I am sending this letter to review the pre transplant information we covered.     A  from our Office has already sent your schedule to you in your My Chart for your pre-kidney transplant evaluation on Monday December 5, 2022. Your appointments will be at the [facility] at [address] [place] [address]. For parking options, please park in the open lot across the street from the front door of our Clinic. Otherwise, enter the [email or web address] plaza from [facility] and attendants can assist you based on your needs.  parking is available for those with limited mobility M-F from 7 a.m. to 5 p.m. Please do bring up to 2 family members or friends to this appointment day to help listen to the patient education and to help ask questions that are important to you. You can eat/ drink normally on this day. There is a coffee shop on street level for you to purchase food at. Also, do take all of your prescribed medications as ordered on this day. Upon completion of your appointments I will compile the outcomes and have your results reviewed at the Transplant Team Selection Committee on Wednesday December 14, 2022  This is a medical review meeting only and so you will not be asked to attend. I will call you within a few days after this meeting to inform you of the outcomes and to assist in making arrangements for completion of your evaluation. I will also send you a summary letter after our telephone conversation.     You will  receive an email from Mela in our Transplant Office a few days prior to December 5, 2022 which will contain a Receipt of Information consent and patient educational materials. Please do read/ electronically sign the Receipt of Information and read the educational materials  prior to your evaluation appointments on December 5, 2022.     Please complete your pre kidney transplant education on [facility]. This is an online website that our Transplant Program uses to house a lot of patient education for our Program. To find [facility]plant Place you can google search for it or click on this link : [email or web address].  Your first step is to register as a new user, then pick your settings: adult/kidney/English. Next notice the education is divided into 3 sections: pre transplant/transplant/post transplant. In the pre transplant section please view pre kidney eval parts 1 and 2 video sets.  Please complete these videos prior to your appointments as this will give you good background to then speak with the providers. Your family and friends are also welcomed to access this site as well.     Additional transplant resources are as follows:   www.UNOS.org  UNOS, or United Network of Organ Sharing, is the national organization in our Country that maintains all of the organ wait lists as well as is responsible for the rules and regulations about organ allocation. I would recommend looking at the Transplant Living section as this area is created just for patients.   www.SRTR.org  SRTR, or the Scientific Registry for Transplant Recipients is a national data base that all Transplant Centers report their success and failure rates to for all organ transplant types performed at their Center. The results are public knowledge and do provide a good perspective of organ transplant.     If the transplant providers tell you at your appointments you can have live donors  register with our Program to initiate their evaluations, please provide this registration site [email or web address].  The donor will receive a detailed email response back with information and next steps specific to their situation. Donors can also call our Office and ask to speak with a live donor coordinator in the  event of questions at [phone].     Please let me know of any questions or concerns!   Alana Lopez, RN, BSN  Pre Kidney Pancreas Transplant Coordinator   [facility]  Solid Organ Transplant Care   [address] [place] [address] [address]  [place] [address]  [email or web address]   Office Number: [phone] Direct Number: [phone]   Fax Number: [phone]  Employed by [facility]

## 2022-11-14 NOTE — LETTER
November 15, 2022      Caity Horan  [address]  [address]  [place] [address]      Dear Caity,    Thank you for your interest in the Transplant Center at [facility]. We look forward to being a part of your care team and assisting you through the transplant process.    As we discussed, your transplant coordinator is Alana Lopez (Kidney).  You may call your coordinator at any time with questions or concerns.  Your first scheduled call will be on November 22, 2022.  If this needs to change, call [phone].    Please complete the following.    1. Fill out and return the enclosed forms    Authorization for Electronic Communication    Authorization to Discuss Protected Health Information    Authorization for Release of Protected Health Information    2. Sign up for:    [employer], access to your electronic medical record (see enclosed pamphlet)    [employer], a transplant education website    You can use these tools to learn more about your transplant, communicate with your care team, and track your medical details      Sincerely,      Solid Organ Transplant  [facility]    cc: Referring Physician PCP Care Team

## 2022-11-15 ENCOUNTER — TRANSFERRED RECORDS (OUTPATIENT)
Dept: HEALTH INFORMATION MANAGEMENT | Facility: CLINIC | Age: 32
End: 2022-11-15

## 2022-11-15 NOTE — TELEPHONE ENCOUNTER
Per patient's request, I called her today and spoke with her person to person. Patient wanting to know if she can do her pre transplant evaluation in [place] and just come here when it is time for her actual transplant. I responded that I believe she will need to attend a pre transplant evaluation here in person but I will ask Transplant Nephrologist and get back to her. Patient expressed excellent understanding of this. Patient also wanted to explain she would like to have a transplant prior to starting on dialysis, I responded that this is always a goal for all patients and will certainly do so.

## 2022-11-21 NOTE — TELEPHONE ENCOUNTER
Reviewed patient's chart today for purpose of pre kidney transplant evaluation planning. Patient has ESRD from a failing kidney transplant. Labs 11/02/2022 creatinine 4.57 and GFR of 12. S/P LRD kidney transplant at our Center on 12/05/2014 from her biological sister. Current immuno is tacrolimus and mycophenolate. Primary kidney disease is IgA nephropathy. Allograft kidney biopsy on 10/28/21 showed chronic active antibody-mediated rejection and recurrent IgA nephropathy. Allograft kidney biopsy on 12/19/2018 showed recurrent IgA nephropathy and no evidence of acute cellular rejection. Positive for EBV viremia in 2021. Patient also has ulcerative colitis, treatment is Vedolizumab infusion 300mg every 6 weeks. Colposcopy done March 24, 2022 biopsy done, path in [place]. No history of smoking, prior alcohol use, no recreational drug use. BMI about 25. All okay to proceed with pre kidney transplant evaluation.     Contacted patient and introduced myself as their Transplant Coordinator, also introduced the role of the Transplant Coordinator in the transplant process.  Explained the purpose of this call including reviewing next steps and answering questions.  Patient confirmed she would like to proceed with kidney evaluation now. Patient stating her ulcerative colitis is in good control as long as she stays on her Vedolizumab every 6 weeks. I did review with pt that her PRA level was 84 when last done here in 2019, explained this will make it hard to find match with a kidney donor.   Confirmed Referring Provider, Dialysis Center, and Primary Care Physician. Notified patient of the importance of continued communication with referring providers and primary care physicians.    Reviewed components of transplant evaluation process including necessary appointments, tests, and procedures.    Answered questions for patient regarding evaluation, provided my name and contact information and requested they call with any additional  questions.    Determined that patient would like additional information regarding transplant by:     Drop Down choices: Mail, Email, Aries, Phone Call   Encourage Aries   Pt confirmed she will attend pre kidney transplant evaluation on Dec 5, 2022 and confirmed she has already received her schedule in her My Chart. Instructed pt on use of [facility] and to view pre kidney eval parts 1 and 2. Explained Mela in our Office will send her an email a few days prior to her eval, instructed her to electronically sign Receipt of Info and to read educational materials prior to eval. Instructed pt on use of www.[facility].org and www.[facility].org. Patient expressed excellent understanding of all and was in good agreement with the plan.     Smart set orders into Epic today for pre kidney transplant eval.     Pre kidney transplant education letter sent via My Chart.

## 2022-11-28 ENCOUNTER — TELEPHONE (OUTPATIENT)
Dept: TRANSPLANT | Facility: CLINIC | Age: 32
End: 2022-11-28

## 2022-11-28 NOTE — TELEPHONE ENCOUNTER
Called patient today, reviewed we will draw new HLA labs on 12/05/2022. Explained virtual crossmatch not done yet between herself and donor. Reviewed recommendations from Dr. Mar / Derick regarding COVID booster that it is up to her if she receives it or not. Pt expressed very good understanding of all and was in good agreement with the plan,

## 2022-12-04 LAB
A1 AG RBC QL: POSITIVE
ABO/RH(D): NORMAL
ANTIBODY SCREEN: NEGATIVE
ANTIBODY SCREEN: NEGATIVE
ANTIBODY TITER IGM SCREEN: NEGATIVE
B IGG TITR SERPL: 8 {TITER}
B IGM TITR SERPL: 8 {TITER}
SPECIMEN EXPIRATION DATE: NORMAL

## 2022-12-05 ENCOUNTER — OFFICE VISIT (OUTPATIENT)
Dept: TRANSPLANT | Facility: CLINIC | Age: 32
End: 2022-12-05
Attending: NURSE PRACTITIONER
Payer: COMMERCIAL

## 2022-12-05 ENCOUNTER — DOCUMENTATION ONLY (OUTPATIENT)
Dept: TRANSPLANT | Facility: CLINIC | Age: 32
End: 2022-12-05

## 2022-12-05 ENCOUNTER — HOSPITAL ENCOUNTER (OUTPATIENT)
Dept: CARDIOLOGY | Facility: CLINIC | Age: 32
Discharge: HOME | End: 2022-12-05
Attending: NURSE PRACTITIONER
Payer: COMMERCIAL

## 2022-12-05 ENCOUNTER — APPOINTMENT (OUTPATIENT)
Dept: TRANSPLANT | Facility: CLINIC | Age: 32
End: 2022-12-05
Attending: INTERNAL MEDICINE
Payer: COMMERCIAL

## 2022-12-05 ENCOUNTER — LAB (OUTPATIENT)
Dept: LAB | Facility: CLINIC | Age: 32
End: 2022-12-05
Payer: COMMERCIAL

## 2022-12-05 ENCOUNTER — TELEPHONE (OUTPATIENT)
Dept: TRANSPLANT | Facility: CLINIC | Age: 32
End: 2022-12-05

## 2022-12-05 ENCOUNTER — ANCILLARY PROCEDURE (OUTPATIENT)
Dept: GENERAL RADIOLOGY | Facility: CLINIC | Age: 32
End: 2022-12-05
Attending: NURSE PRACTITIONER
Payer: COMMERCIAL

## 2022-12-05 ENCOUNTER — LAB (OUTPATIENT)
Dept: LAB | Facility: CLINIC | Age: 32
End: 2022-12-05
Attending: NURSE PRACTITIONER
Payer: COMMERCIAL

## 2022-12-05 VITALS
WEIGHT: 144.3 LBS | HEIGHT: 63 IN | DIASTOLIC BLOOD PRESSURE: 120 MMHG | SYSTOLIC BLOOD PRESSURE: 164 MMHG | HEART RATE: 92 BPM | BODY MASS INDEX: 25.57 KG/M2 | OXYGEN SATURATION: 100 %

## 2022-12-05 DIAGNOSIS — Z01.818 PRE-TRANSPLANT EVALUATION FOR KIDNEY TRANSPLANT: ICD-10-CM

## 2022-12-05 DIAGNOSIS — Z76.82 ORGAN TRANSPLANT CANDIDATE: ICD-10-CM

## 2022-12-05 DIAGNOSIS — N18.6 ESRD (END STAGE RENAL DISEASE) (H): ICD-10-CM

## 2022-12-05 DIAGNOSIS — E55.9 VITAMIN D DEFICIENCY: ICD-10-CM

## 2022-12-05 DIAGNOSIS — N02.B9 IGA NEPHROPATHY: ICD-10-CM

## 2022-12-05 DIAGNOSIS — N18.4 CHRONIC KIDNEY DISEASE, STAGE IV (SEVERE) (H): ICD-10-CM

## 2022-12-05 DIAGNOSIS — Z94.0 KIDNEY REPLACED BY TRANSPLANT: ICD-10-CM

## 2022-12-05 DIAGNOSIS — N18.4 CHRONIC KIDNEY DISEASE, STAGE IV (SEVERE) (H): Primary | ICD-10-CM

## 2022-12-05 DIAGNOSIS — Z94.0 KIDNEY REPLACED BY TRANSPLANT: Primary | ICD-10-CM

## 2022-12-05 LAB
ALBUMIN SERPL BCG-MCNC: 3.8 G/DL (ref 3.5–5.2)
ALBUMIN UR-MCNC: 300 MG/DL
ALP SERPL-CCNC: 74 U/L (ref 35–104)
ALT SERPL W P-5'-P-CCNC: 10 U/L (ref 10–35)
ANION GAP SERPL CALCULATED.3IONS-SCNC: 13 MMOL/L (ref 7–15)
APPEARANCE UR: CLEAR
APTT PPP: 32 SECONDS (ref 22–38)
AST SERPL W P-5'-P-CCNC: 20 U/L (ref 10–35)
BACTERIA #/AREA URNS HPF: ABNORMAL /HPF
BASOPHILS # BLD AUTO: 0 10E3/UL (ref 0–0.2)
BASOPHILS NFR BLD AUTO: 1 %
BILIRUB SERPL-MCNC: 0.7 MG/DL
BILIRUB UR QL STRIP: NEGATIVE
BUN SERPL-MCNC: 76.4 MG/DL (ref 6–20)
CALCIUM SERPL-MCNC: 8.9 MG/DL (ref 8.6–10)
CHLORIDE SERPL-SCNC: 104 MMOL/L (ref 98–107)
COLOR UR AUTO: ABNORMAL
CREAT SERPL-MCNC: 8.84 MG/DL (ref 0.51–0.95)
DEPRECATED HCO3 PLAS-SCNC: 22 MMOL/L (ref 22–29)
EOSINOPHIL # BLD AUTO: 0.2 10E3/UL (ref 0–0.7)
EOSINOPHIL NFR BLD AUTO: 2 %
ERYTHROCYTE [DISTWIDTH] IN BLOOD BY AUTOMATED COUNT: 15.3 % (ref 10–15)
GFR SERPL CREATININE-BSD FRML MDRD: 6 ML/MIN/1.73M2
GLUCOSE SERPL-MCNC: 122 MG/DL (ref 70–99)
GLUCOSE UR STRIP-MCNC: 50 MG/DL
HBV CORE AB SERPL QL IA: NONREACTIVE
HBV SURFACE AB SERPL IA-ACNC: 141.76 M[IU]/ML
HBV SURFACE AB SERPL IA-ACNC: REACTIVE M[IU]/ML
HBV SURFACE AG SERPL QL IA: NONREACTIVE
HCT VFR BLD AUTO: 20.3 % (ref 35–47)
HCV AB SERPL QL IA: NONREACTIVE
HGB BLD-MCNC: 6.6 G/DL (ref 11.7–15.7)
HGB UR QL STRIP: ABNORMAL
HIV 1+2 AB+HIV1 P24 AG SERPL QL IA: NONREACTIVE
HYALINE CASTS: 3 /LPF
IMM GRANULOCYTES # BLD: 0 10E3/UL
IMM GRANULOCYTES NFR BLD: 0 %
INR PPP: 1.07 (ref 0.85–1.15)
KETONES UR STRIP-MCNC: NEGATIVE MG/DL
LEUKOCYTE ESTERASE UR QL STRIP: NEGATIVE
LVEF ECHO: NORMAL
LYMPHOCYTES # BLD AUTO: 1.2 10E3/UL (ref 0.8–5.3)
LYMPHOCYTES NFR BLD AUTO: 19 %
MCH RBC QN AUTO: 28.8 PG (ref 26.5–33)
MCHC RBC AUTO-ENTMCNC: 32.5 G/DL (ref 31.5–36.5)
MCV RBC AUTO: 89 FL (ref 78–100)
MONOCYTES # BLD AUTO: 0.5 10E3/UL (ref 0–1.3)
MONOCYTES NFR BLD AUTO: 7 %
NEUTROPHILS # BLD AUTO: 4.5 10E3/UL (ref 1.6–8.3)
NEUTROPHILS NFR BLD AUTO: 71 %
NITRATE UR QL: NEGATIVE
NRBC # BLD AUTO: 0 10E3/UL
NRBC BLD AUTO-RTO: 0 /100
PH UR STRIP: 7.5 [PH] (ref 5–7)
PLATELET # BLD AUTO: 100 10E3/UL (ref 150–450)
POTASSIUM SERPL-SCNC: 5 MMOL/L (ref 3.4–5.3)
PROT SERPL-MCNC: 6.1 G/DL (ref 6.4–8.3)
RBC # BLD AUTO: 2.29 10E6/UL (ref 3.8–5.2)
RBC URINE: 4 /HPF
SODIUM SERPL-SCNC: 139 MMOL/L (ref 136–145)
SP GR UR STRIP: 1.01 (ref 1–1.03)
SQUAMOUS EPITHELIAL: 1 /HPF
T PALLIDUM AB SER QL: NONREACTIVE
UROBILINOGEN UR STRIP-MCNC: NORMAL MG/DL
WBC # BLD AUTO: 6.3 10E3/UL (ref 4–11)
WBC URINE: 8 /HPF

## 2022-12-05 PROCEDURE — 93306 TTE W/DOPPLER COMPLETE: CPT

## 2022-12-05 PROCEDURE — 80053 COMPREHEN METABOLIC PANEL: CPT

## 2022-12-05 PROCEDURE — 81378 HLA I & II TYPING HR: CPT

## 2022-12-05 PROCEDURE — 85610 PROTHROMBIN TIME: CPT

## 2022-12-05 PROCEDURE — 87799 DETECT AGENT NOS DNA QUANT: CPT

## 2022-12-05 PROCEDURE — 999N001093 HLA VIRTUAL CROSSMATCH (VXM), LIVING DONOR

## 2022-12-05 PROCEDURE — 99417 PROLNG OP E/M EACH 15 MIN: CPT

## 2022-12-05 PROCEDURE — 36415 COLL VENOUS BLD VENIPUNCTURE: CPT

## 2022-12-05 PROCEDURE — 86481 TB AG RESPONSE T-CELL SUSP: CPT

## 2022-12-05 PROCEDURE — 86832 HLA CLASS I HIGH DEFIN QUAL: CPT

## 2022-12-05 PROCEDURE — 86901 BLOOD TYPING SEROLOGIC RH(D): CPT

## 2022-12-05 PROCEDURE — 86147 CARDIOLIPIN ANTIBODY EA IG: CPT

## 2022-12-05 PROCEDURE — 86833 HLA CLASS II HIGH DEFIN QUAL: CPT

## 2022-12-05 PROCEDURE — 86803 HEPATITIS C AB TEST: CPT

## 2022-12-05 PROCEDURE — G0463 HOSPITAL OUTPT CLINIC VISIT: HCPCS

## 2022-12-05 PROCEDURE — 85730 THROMBOPLASTIN TIME PARTIAL: CPT

## 2022-12-05 PROCEDURE — 86886 COOMBS TEST INDIRECT TITER: CPT

## 2022-12-05 PROCEDURE — 99215 OFFICE O/P EST HI 40 MIN: CPT

## 2022-12-05 PROCEDURE — 81001 URINALYSIS AUTO W/SCOPE: CPT

## 2022-12-05 PROCEDURE — 86706 HEP B SURFACE ANTIBODY: CPT

## 2022-12-05 PROCEDURE — 86644 CMV ANTIBODY: CPT

## 2022-12-05 PROCEDURE — 86665 EPSTEIN-BARR CAPSID VCA: CPT

## 2022-12-05 PROCEDURE — 86850 RBC ANTIBODY SCREEN: CPT

## 2022-12-05 PROCEDURE — 87340 HEPATITIS B SURFACE AG IA: CPT

## 2022-12-05 PROCEDURE — 93306 TTE W/DOPPLER COMPLETE: CPT | Mod: 26 | Performed by: INTERNAL MEDICINE

## 2022-12-05 PROCEDURE — 85025 COMPLETE CBC W/AUTO DIFF WBC: CPT

## 2022-12-05 PROCEDURE — 86704 HEP B CORE ANTIBODY TOTAL: CPT

## 2022-12-05 PROCEDURE — 99205 OFFICE O/P NEW HI 60 MIN: CPT | Performed by: SURGERY

## 2022-12-05 PROCEDURE — 85390 FIBRINOLYSINS SCREEN I&R: CPT | Mod: 26 | Performed by: PATHOLOGY

## 2022-12-05 PROCEDURE — 999N001093 HLA VIRTUAL CROSSMATCH (VXM), LIVING DONOR: Performed by: INTERNAL MEDICINE

## 2022-12-05 PROCEDURE — 71046 X-RAY EXAM CHEST 2 VIEWS: CPT | Performed by: RADIOLOGY

## 2022-12-05 PROCEDURE — 86787 VARICELLA-ZOSTER ANTIBODY: CPT

## 2022-12-05 PROCEDURE — 86780 TREPONEMA PALLIDUM: CPT

## 2022-12-05 PROCEDURE — 86905 BLOOD TYPING RBC ANTIGENS: CPT

## 2022-12-05 PROCEDURE — 85670 THROMBIN TIME PLASMA: CPT

## 2022-12-05 RX ORDER — CALCIUM CARBONATE 500 MG/1
2 TABLET, CHEWABLE ORAL 3 TIMES DAILY
Status: ON HOLD | COMMUNITY
Start: 2022-11-29 | End: 2023-04-20

## 2022-12-05 RX ORDER — SODIUM BICARBONATE 650 MG/1
TABLET ORAL
Status: ON HOLD | COMMUNITY
Start: 2022-11-29 | End: 2022-12-26

## 2022-12-05 NOTE — LETTER
Date:December 15, 2022      Patient was self referred, no letter generated. Do not send.        [facility] Health Information

## 2022-12-05 NOTE — LETTER
12/5/2022         RE: Caity Horan  [address]  [address]  [place] [facility] [address]        Dear Colleague,    Thank you for referring your patient, Caity Horan, to the [facility]. Please see a copy of my visit note below.    TRANSPLANT NEPHROLOGY RECIPIENT EVALUATION NOTE    Assessment and Plan:  # Kidney Transplant Evaluation: Patient is a good candidate overall. Benefits of a living donor transplant were discussed.    # LDKT (2014): failing from IgA recurrence /chronic antibody medicated rejection. GFR 7.  When ready, she would likely benefit from another kidney transplant.  ABO-A, CPRA 84%.  Mother is reportedly being evaluated to donate.    # Cardiac Risk: stress ECHO pending.     # PAD Screening: per surgery     # UC: diagnosed in 2018. Since on Entyvio q 6 weeks with stability, however, recently flared with multiple loose/watery stools daily. Followed by [facility] (Dr. Wilkins) and has a colonoscopy scheduled on 12/16. Will need to have a few months a stable disease before active listing.     # EBV viremia:  low load last in 4/2021. Will update.     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 Follows closely with local gyn, last seen in 3/2022 with colp     # Health Maintenance: PAP: Up to date (H/o ASCUS HPV + 16/18 (H/o LEEP). Dental UTD. Derm Not UTD.     Discussed the risks and benefits of a transplant, including the risk of surgery and immunosuppression medications.  Patient's overall evaluation will be discussed in the Transplant Program's regular meeting with a final recommendation on the patients suitability for transplant to be made at that time.    Pending completion of the full evaluation, patient presently appears to be enough of an acceptable kidney transplant recipient candidate to have any potential kidney donors start the evaluation process.      Evaluation:  Caity Horan was seen in consultation at the request of Dr. Ivy Sepulveda for evaluation as a potential  kidney transplant recipient.    Reason for Visit:  Caity Horan is a 32 year old female with failing LDKT (2014), who presents for kidney transplant evaluation.    History of Present Illness:  Caity Horan is a 32-year-old with history of IgA nephropathy (dx: 2014) s/p LDKT (12/2014) at [facility]. She was was doing well at her last transplant neph appt here in 2018 with a baseline sCr of 1.1-1.2. She moved to [place] the summer of 2021 and has been following with [facility]. Acute viral illness in July to August 2021 (possibly COVID?). Creatinine up to 1.5.  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 Recurrent IgA nephropathy with mesangial and endocapillary hypercellularity present. Focal global glomerulosclerosis. Mild patchy tubular atrophy and interstitial fibrosis. Moderate arteriosclerosis and arterial hyalinosis. She was treated with treated with  mg twice daily, tacrolimus goal 6-8, and tocilizumab x 6 doses of 8 mg/kg every 4 weeks. GFRs now ranging  7-12 and her mom is being evaluated to donate. Continues to take Tacrolimus and MMF for IS.              Kidney Disease Hx:               Kidney Disease Dx: IgA nephropathy s/p failing LDKT (2014)       Biopsy Proven: Yes         On Dialysis: No       Primary Nephrologist: Dr. Burnett        H/o Kidney Stones: No       H/o Recurrent/Frequent UTI: No         Diabetic Hx: None           Cardiac/Vascular Disease Risk Factors:        Cardiac Risk Factors: Hypertension and CKD       Known CAD: No       Known PAD/Caludication Symptoms: No       Known Heart Failure: No       Arrhythmia: No       Pulmonary Hypertension: No       Valvular Disease: No       Other: None         Viral Serology Status       CMV IgG Antibody: Unknown       EBV IgG Antibody: Positive         Volume Status/Weight:        Volume status:  Mildly hypervolemic       Weight:  Acceptable BMI       BMI: Body mass index is 25.56 kg/m .         Functional Capacity/Frailty:   Single and lives in [place]. Works as a user experience strategist. Enjoy intense exercise including CrossFit, weightlifting and other exercise every day x 2 hours, along with downhill skiing in the winter. But not able to exercise for the past few weeks with declining kidney function.       Fatigue/Decreased Energy: [] No [x] Yes    Chest Pain or SOB with Exertion: [x] No [] Yes    Significant Weight Change: [x] No [] Yes    Nausea, Vomiting or Diarrhea: [] No [x] Yes UC flared   Fever, Sweats or Chills:  [x] No [] Yes    Leg Swelling [] No [x] Yes        History of Cancer: None      Allergy Testing Questions:   Medication that caused a reaction Penicillin (Amoxicillin, Amoxicillin with clavulanic acid, Dicloxacillin)   Antibiotics used that didn't give an allergic reaction?  Penicillin (Amoxicillin, Amoxicillin with clavulanic acid, Dicloxacillin)    COVID Vaccination Up To Date: Yes    Potential Living Kidney Donors: Yes , mother     Review of Systems:  A comprehensive review of systems was obtained and negative, except as noted in the HPI or PMH.    Past Medical History:   Medical record was reviewed and PMH was discussed with patient and noted below.  Past Medical History:   Diagnosis Date     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     Anemia in stage 5 chronic kidney disease (H)      Anemia in stage 5 chronic kidney disease (H) 10/27/2014     ESRD (end stage renal disease) on dialysis (H)      HTN (hypertension) 10/27/2014     Hypertension 10/2014     IgA nephropathy     biopsy proven       Past Social History:   Past Surgical History:   Procedure Laterality Date     BIOPSY  2021    renal     COLONOSCOPY N/A 03/12/2018    Procedure: COMBINED COLONOSCOPY, SINGLE OR MULTIPLE BIOPSY/POLYPECTOMY BY BIOPSY;  EGD/Colonoscopy ;  Surgeon: Kory Massey MD;  [facility]      COLONOSCOPY N/A 09/10/2018    Procedure: COMBINED COLONOSCOPY, SINGLE OR MULTIPLE BIOPSY/POLYPECTOMY BY BIOPSY;  Colonoscopy;  Surgeon: Yenifer Doan MD;  Location: [facility]     EXTRACTION(S) DENTAL       PERCUTANEOUS BIOPSY KIDNEY Right 2018    Procedure: Right Kidney Biopsy;  Surgeon: Otilio Mar MD;  Location: [facility]     TRANSPLANT KIDNEY RECIPIENT LIVING RELATED N/A 2014    Procedure: TRANSPLANT KIDNEY RECIPIENT LIVING RELATED;  Surgeon: Dale Middleton MD;  Location: [facility] OR     WISDOM TOOTH EXTRACTION Bilateral      Personal history of bleeding or anesthesia problems: No    Family History:  Family History   Problem Relation Age of Onset     Kidney Disease No family hx of      No Known Problems Mother      Alcoholism Father          in early 50s       Personal History:   Social History     Socioeconomic History     Marital status: Single     Spouse name: Not on file     Number of children: Not on file     Years of education: 16     Highest education level: Not on file   Occupational History     Occupation: advertising     Employer: [employer]   Tobacco Use     Smoking status: Never     Smokeless tobacco: Never   Substance and Sexual Activity     Alcohol use: Not Currently     Alcohol/week: 3.0 - 9.0 standard drinks     Types: 1 - 3 Glasses of wine, 1 - 3 Cans of beer, 1 - 3 Shots of liquor per week     Drug use: No     Sexual activity: Never   Other Topics Concern      Service Not Asked     Blood Transfusions No     Caffeine Concern Not Asked     Occupational Exposure Not Asked     Hobby Hazards Not Asked     Sleep Concern Not Asked     Stress Concern Not Asked     Weight Concern Not Asked     Special Diet Not Asked     Back Care Not Asked     Exercise Not Asked     Bike Helmet Not Asked     Seat Belt Yes     Self-Exams Not Asked   Social History Narrative     Not on file     Social Determinants of Health     Financial Resource Strain: Not on file   Food  "Insecurity: Not on file   Transportation Needs: Not on file   Physical Activity: Not on file   Stress: Not on file   Social Connections: Not on file   Intimate Partner Violence: Not on file   Housing Stability: Not on file       Allergies:  Allergies   Allergen Reactions     Bumetanide Other (See Comments)     Causes paralysis     Amoxicillin Rash       Medications:  Current Outpatient Medications   Medication Sig     Blood Pressure Monitoring (B-D ASSURE BPM/AUTO ARM CUFF) MISC Monitor blood pressure and pulse twice daily     CALCIUM ANTACID 500 MG chewable tablet Take by mouth 3 times daily     Cyanocobalamin (B-12) 1000 MCG TBCR TAKE ONE TABLET BY MOUTH EVERY DAY     levonorgestrel (MIRENA) 20 MCG/DAY IUD 1 each by Intrauterine route     losartan (COZAAR) 25 MG tablet TAKE 1/2 TABLET BY MOUTH AT BEDTIME (Patient taking differently: 25 mg)     magnesium oxide (MAG-OX) 400 MG tablet Take 1 tablet (400 mg) by mouth daily     mycophenolate (GENERIC EQUIVALENT) 250 MG capsule Take 2 capsules (500 mg) by mouth 2 times daily     sodium bicarbonate 650 MG tablet      tacrolimus (GENERIC EQUIVALENT) 0.5 MG capsule Take 3 capsules (1.5 mg) by mouth 2 times daily (Patient taking differently: Take 3 mg by mouth 2 times daily)     UNABLE TO FIND MEDICATION NAME: Biotin     Vedolizumab (ENTYVIO IV)      vitamin D3 (CHOLECALCIFEROL) 50 mcg (2000 units) tablet Take 1 tablet (50 mcg) by mouth daily     No current facility-administered medications for this visit.       Vitals:  Ht 1.6 m (5' 3\")   Wt 65.5 kg (144 lb 4.8 oz)   BMI 25.56 kg/m      Exam:  GENERAL APPEARANCE: alert and no distress  HENT: mouth without ulcers or lesions  LYMPHATICS: no cervical or supraclavicular nodes  RESP: lungs clear to auscultation - no rales, rhonchi or wheezes  CV: regular rhythm, normal rate, no rub, no murmur  FEMORAL PULSES: normal  EDEMA: no LE edema bilaterally  ABDOMEN: soft, nondistended, nontender, bowel sounds normal  MS: extremities " normal - no gross deformities noted, no evidence of inflammation in joints, no muscle tenderness  SKIN: no rash    Results:   No results found for this or any previous visit (from the past 336 hour(s)).    Review of prior external note(s) from - CareEverywhere information from [facility] reviewed  I spent a total of 299 minutes on the day of the visit.   Time spent doing chart review, history and exam, documentation and further activities per the note        Again, thank you for allowing me to participate in the care of your patient.        Sincerely,        No name on file

## 2022-12-05 NOTE — PROGRESS NOTES
.  Transplant Surgery Consult Note     Medical record number: [id number]  YOB: 1990,   Consult requested   By Dr Burnett for evaluation of kidney re- transplant candidacy.    Assessment and Recommendations:Ms. Horan appears to be a good candidate for kidney transplantation and has a good understanding of the risks and benefits of this approach to the management of renal failure. 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 'However virtual is positive  WIll need to go onto KPD    Pt is anxious to avoid dialysis  Wants transplant within a month  I explained this was not a viable option and we need to complete w/u and go through KPD which will take a few months before she gets a transplant  She became very anxious and tearful when she heard this.   Took some time to explain the priorities of finding the right kidney for her in the interest of long term function  Also, she requested her old tx kidney be removed. I advised against this as there is no medical indication.   She states she has an undesirable bulge and would like it removed.   I counseled her the first priority is to get a functioning kidney in renae once we are out a few months we could discuss a tx nephrectomy of the old kidney if she still feels that way. She was tearful again and kept insisting that she should be getting the tx in a month. I went through the discussion of priorities again and she seemed a bit calm after that.     I think it will help to see a psychiatrist to see if she will be a candidate for treatment of anxiety.     Risks of the surgical procedure including but not limited to the rare risk of mortality discussed in detail. Patient verbalized good understanding and had several pertinent questions  which were answered satisfactorily.     Immunosuppressive regimen, management and long term risks discussed in detail. Changes, when applicable made as per orders.        Transplant order: Ms. Horan has Chronic renal failure due to chronic amr whose condition is not expected to resolve, is expected to progress, and is expected to continue to develop related comorbid conditions.  Recommend he be considered as a candidate for kidney.  Cardiology consult for cardiac risk stratification to be ordered: No  CT abdomen and pelvis without contrast to be ordered for assessment of vascular targets: No  Transplant listing labs ordered to include HLA, ABOx2, Cr, etc.  Dietician consult ordered: Yes  Social work consult ordered: Yes  Imaging reports reviewed:  yes  Radiology images reviewed:yes  Recipient suitable to move forward with work up of living donors:  Yes      The majority of our visit was spent in counselling, discussing the medical and surgical risks of kidney transplantation. We discussed approximate wait time and how that is influenced by issues such as blood type and sensitization (PRA) and access to a living donor. I contrasted potential waiting time for living vs  donor kidneys from  normal (0-85%) or higher (%) kidney donor profile index (KDPI) donors and their associated outcomes. I would not recommend this individual to consider kidneys from high KDPI donors. The reason for this decision is best summarized as: multiple potential living donors. Potential surgical complications of kidney transplantation include bleeding, superficial or deep wound complications (infection, hernia, lymphocele), ureteral anastomotic failure (leak or stenosis), graft thrombosis, need for reoperation and other issues such as cardiac complications, pneumonia, deep venous thrombosis, pulmonary embolism, post transplant diabetes and death. The potential for recurrent disease or need for retransplantation was also  addressed. We discussed the possible need for ureteral stent (and subsequent removal), and the utility of protocol biopsy and laboratory studies to evaluate for rejection or recurrent disease. We discussed the risk of graft rejection, our center's average graft and patient survival rates, immunosuppression protocols, as well as the potential opportunity to participate in clinical trials.  We also discussed the average length of stay, recovery process, and posttransplant lab and monitoring protocol.  I emphasized the need for strict immunosuppression medication adherence and the potential for complications of immunosuppression such as skin cancer or lymphoma, as well as a very low but not zero risk of donor-derived disease transmission risks (infection, cancer). Ms. Horan asked good questions and her candidacy will be reviewed at our Multidisciplinary Selection Committee. Thank you for the opportunity to participate in Ms. Horan's care.      Total time: 60 minutes  Counselling time: 45 minutes    .    ---------------------------------------------------------------------------------------------------    HPI: Ms. Horan has Chronic renal failure due to chronic AMR. The patient is non-diabetic.       The patient is not on dialysis.    Has potential kidney donors:  Yes .  Interested in participation in paired exchange if a donor is willing: Yes     The patient has the following pertinent history:       No    Yes  Dialysis:    []      [] via:       Blood Transfusion                  []      []  Number of units:   Most recently:  Pregnancy:    []      [] Number:       Previous Transplant:  []      [] Details:    Cancer    []      [] Comment:   Kidney stones   []      [] Comment:      Recurrent infections  []      []  Type:                  Bladder dysfunction  []      [] Cause:    Claudication   []      [] Distance:    Previous Amputation  []      [] Cause:     Chronic anticoagulation  []      [] Indication:   Giancarlo's  Witness  []      []      Past Medical History:   Diagnosis Date     Acute rejection of kidney transplant 2021     Anemia in stage 5 chronic kidney disease (H)      Anemia in stage 5 chronic kidney disease (H) 10/27/2014     ESRD (end stage renal disease) on dialysis (H)      HTN (hypertension) 10/27/2014     Hypertension 10/2014     IgA nephropathy     biopsy proven     Past Surgical History:   Procedure Laterality Date     BIOPSY      renal     COLONOSCOPY N/A 2018    Procedure: COMBINED COLONOSCOPY, SINGLE OR MULTIPLE BIOPSY/POLYPECTOMY BY BIOPSY;  EGD/Colonoscopy ;  Surgeon: Kory Massey MD;  Location: [facility]     COLONOSCOPY N/A 09/10/2018    Procedure: COMBINED COLONOSCOPY, SINGLE OR MULTIPLE BIOPSY/POLYPECTOMY BY BIOPSY;  Colonoscopy;  Surgeon: Yenifer Doan MD;  Location: [facility]     EXTRACTION(S) DENTAL       PERCUTANEOUS BIOPSY KIDNEY Right 2018    Procedure: Right Kidney Biopsy;  Surgeon: Otilio Mar MD;  Location: [facility]     TRANSPLANT KIDNEY RECIPIENT LIVING RELATED N/A 2014    Procedure: TRANSPLANT KIDNEY RECIPIENT LIVING RELATED;  Surgeon: Dale Middleton MD;  Location: [facility]     WISDOM TOOTH EXTRACTION Bilateral      Family History   Problem Relation Age of Onset     No Known Problems Mother      Alcoholism Father          in early 50s     Prostate Cancer Paternal Grandmother      Kidney Disease No family hx of      Social History     Socioeconomic History     Marital status: Single     Spouse name: Not on file     Number of children: Not on file     Years of education: 16     Highest education level: Not on file   Occupational History     Occupation: advertising     Employer: [employer]   Tobacco Use     Smoking status: Never     Smokeless tobacco: Never   Substance and Sexual Activity     Alcohol use: Not Currently     Alcohol/week: 3.0 - 9.0 standard drinks     Types: 1 - 3 Glasses of wine, 1 - 3 Cans of beer, 1 - 3 Shots of liquor per week      Drug use: No     Sexual activity: Never   Other Topics Concern      Service Not Asked     Blood Transfusions No     Caffeine Concern Not Asked     Occupational Exposure Not Asked     Hobby Hazards Not Asked     Sleep Concern Not Asked     Stress Concern Not Asked     Weight Concern Not Asked     Special Diet Not Asked     Back Care Not Asked     Exercise Not Asked     Bike Helmet Not Asked     Seat Belt Yes     Self-Exams Not Asked   Social History Narrative     Not on file     Social Determinants of Health     Financial Resource Strain: Not on file   Food Insecurity: Not on file   Transportation Needs: Not on file   Physical Activity: Not on file   Stress: Not on file   Social Connections: Not on file   Intimate Partner Violence: Not on file   Housing Stability: Not on file       ROS:   CONSTITUTIONAL:  No fevers or chills  EYES: negative for icterus  ENT:  negative for hearing loss, tinnitus and sore throat  RESPIRATORY:  negative for cough, sputum, dyspnea  CARDIOVASCULAR:  negative for chest pain Fatigue  GASTROINTESTINAL:  negative for nausea, vomiting, diarrhea or constipation  GENITOURINARY:  negative for incontinence, dysuria, bladder emptying problems  HEME:  No easy bruising  INTEGUMENT:  negative for rash and pruritus  NEURO:  Negative for headache, seizure disorder  Allergies:   Allergies   Allergen Reactions     Bumetanide Other (See Comments)     Causes paralysis     Amoxicillin Rash     Medications:  Prescription Medications as of 12/5/2022       Rx Number Disp Refills Start End Last Dispensed Date Next Fill Date Owning Pharmacy    Blood Pressure Monitoring (B-D ASSURE BPM/AUTO ARM CUFF) MISC  1 each 0 1/7/2021    Capsule -- [place], [place] - [address]    Sig: Monitor blood pressure and pulse twice daily    Class: E-Prescribe    CALCIUM ANTACID 500 MG chewable tablet    11/29/2022        Sig: Take by mouth 3 times daily    Class: Historical    Route:  Oral    Cyanocobalamin (B-12) 1000 MCG TBCR  90 tablet 3 2021    Capsule -- [address]    Sig: TAKE ONE TABLET BY MOUTH EVERY DAY    Class: E-Prescribe    levonorgestrel (MIRENA) 20 MCG/DAY IUD            Si each by Intrauterine route    Class: Historical    Route: Intrauterine    losartan (COZAAR) 25 MG tablet  30 tablet 1 5/10/2021    Capsule -- [address]    Sig: TAKE 1/2 TABLET BY MOUTH AT BEDTIME    Class: E-Prescribe    Notes to Pharmacy: Patient is requesting a refill on their Losartan 25 mg. Patient is out of refills. Thank you    magnesium oxide (MAG-OX) 400 MG tablet  90 tablet 3 2020    Capsule -- [address]    Sig: Take 1 tablet (400 mg) by mouth daily    Class: E-Prescribe    Route: Oral    mycophenolate (GENERIC EQUIVALENT) 250 MG capsule  360 capsule 3 2020    Capsule -- [address]    Sig: Take 2 capsules (500 mg) by mouth 2 times daily    Class: E-Prescribe    Route: Oral    sodium bicarbonate 650 MG tablet    2022        Class: Historical    tacrolimus (GENERIC EQUIVALENT) 0.5 MG capsule  540 capsule 3 2021    Capsule -- [address]    Sig: Take 3 capsules (1.5 mg) by mouth 2 times daily    Class: E-Prescribe    Notes to Pharmacy: TXP DT 2014 (Kidney) TXP Dischg DT 2014 DX Kidney replaced by transplant Z94.0 TX Center [facility] ([place], [place])    Route: Oral    UNABLE TO FIND            Sig: MEDICATION NAME: Biotin    Class: Historical    Vedolizumab (ENTYVIO IV)            Class: Historical    Route: Intravenous    vitamin D3 (CHOLECALCIFEROL) 50 mcg (2000 units) tablet  100 tablet 3 2020    Capsule -- [address]    Sig: Take 1 tablet (50 mcg) by mouth daily    Class: E-Prescribe    Route: Oral        Exam:     There were no vitals taken for this visit.  Appearance: in no apparent distress.   Skin: normal  Eyes:  no redness or discharge.  Sclera anicteric  Head and Neck: Normal, no rashes or jaundice  Respiratory: easy respirations, no audible wheezing.  Abdomen: flat, Surgical scars consistent with history     Psychiatric: Normal mood and affect    Diagnostics:   No results found for this or any previous visit (from the past 672 hour(s)).  [facility] cPRA   Date Value Ref Range Status   11/11/2019 84  Final

## 2022-12-05 NOTE — PROGRESS NOTES
Kidney Transplant Evaluation - 12/5/2022  Patient attended appointments alone  Patient completed AM appointments with Nephrology SRINIVASAN, Surgery, SW. Dietitian appointment deferred due to provider unavailability. Frailty testing canceled per Surgery.  Time and location of PM appointments reviewed with patient.  Echocardiogram canceled per Surgery.  Patient instructed next contact from Transplant Coordinator will be following Selection Committee  Patient stated understanding  Patient states Receipt of Information for Organ Transplant Recipient form signed via [employer]  KDPI form deferred, living donation planned.  Patient states she has watched [facility] Pre Kidney Transplant videos 1&2.      Summary    Team s concerns/comments:   1) Cardiac risk assessment  2) PAD assessment  3) Ulcerative Colitis  4) Anxiety  5) TANMAY 3, H/o HPV  6) Health maintenance    Candidacy category: Anxiety    Action/Plan:   1) EKG, Stress Echocardiogram scheduled today.  Echocardiogram canceled.  2) No imaging per Surgery  3) Colonoscopy scheduled 12/16/22. Clearance per GI  4) SW input, mental health  F/U  5) Follows wi local GYN.  6) UTD    Expected Selection Meeting Discussion: 12/14/22

## 2022-12-05 NOTE — TELEPHONE ENCOUNTER
DATE:  12/5/2022     TIME OF RECEIPT FROM LAB:  12:45 pm     ORDERING PROVIDER: Zaid    LAB TEST:  Hgb     LAB VALUE:  6.6    RESULTS GIVEN WITH READ-BACK TO (PROVIDER):    Tena Cheema    TIME LAB VALUE REPORTED TO PROVIDER:   1:11 pm

## 2022-12-05 NOTE — PROGRESS NOTES
Psychosocial Assessment  Patient Name/ Age: Caity Horan 32 year old   Medical Record #: [id number]  Duration of Interview:     50  min  Process:   Face-to-Face Interview                (counseling < 50%)   Present at Appointment: Caity        :AYLA Marte, [facility] Date:  December 5, 2022        Type of transplant: Kidney    Donor type:   Caity indicated her mother and uncle have expressed interest in being a donor.   Cadaver, parent and relative   Prior Transplants:    Yes    12/5/2014 LRT Kidney Status of Transplant:  Transplanted kidney started to fail 2022       Current Living Situation    Location:   [facility] [address]  [facility] [address]  With Whom: lives alone       Family/ Social Support:    Caity's mother lives in [place], [place] and one sister [place], [place].   Available, helpful   Committed relationship:   Single   Other supports:   Friends Available, helpful       Activities/ Functional Ability    Current level: Ambulatory and independent with ADL's     Transportation Drives self       Vocational/Employment/Financial     Employment  [email or web address]   Full time   Job Description  User Experience Dept      Income   Salary/wages   Insurance  [employer] through her employer.    Caity indicated her employer insurance will be changing as of 1/1/2023 to [employer]    At this time, patient can afford medication costs:  Yes  Private Insurance       Medical Status    Current mode of treatment for ESRD Kidney Transplant, currently not on medication.   Complications - Non diabetic        Behavioral    Tobacco Use No Chemical Dependency No    Caity indicated she will have one glass of wine a month.     Psychiatric Impairment No  Caity indicated she has an issues with situational anxiety.  She has tried medication in the past but did not believe it was of assistance.  She indicated her anxiety is high currently as medical appointments is one of her triggers for her anxiety.  Caity indicated she  believes she handles her anxiety and there is no need for any intervention.    Reading ability Good  Education Level: Bachelors Degree Recent Legal History Yes                         Coping Style/Strategies: Caity indicated when undr stress she will do cross fit, skiing, hike or any outdoor activity.   Ability to Adhere to Complex Medical Regime: Yes     Adherence History: Caity indicated she will usually follow her physician recommendations.        Education  _X_ Medicare  _X_ Rehabilitation  _X_ Donor issues  _X_ Community resources  _X_ Post discharge housing  _X_ Financial resources  _X_ Medical insurance options  _X_ Psych adjustment  _X_ Family adjustment  _X_ Health Care Directive - Yes, Will Provide Psychosocial Risks of Transplant Reviewed and Discussed:  _X_ Increased stress related to emotional,            family, social, employment or financial           situation  _X_ Affect on work and/or disability benefits  _X_ Affect on future life insurance  _X_ Transplant outcome expectations may           not be met  _X_ Mental Health Risks: anxiety,           depression, PTSD, guilt, grief and           chronic fatigue     Notable Items:   Caity was open regarding her anxiety and indicated several times it was situational.  Writer did not feel during this assessment she was over anxious.  Writer learned Caity became more anxious with other providers.       Final Evaluation/Assessment   Patient seemed to process information well. Appeared well informed, motivated and able to follow post transplant requirements. Behavior was appropriate during interview. Has adequate income and insurance coverage. Adequate social support. No major contraindications noted for transplant.  At this time patient appears to understand the risks and benefits of transplant.      Recommendation  Acceptable    Selection Criteria Met:  Plan for support Yes   Chemical Dependence Yes   Smoking Yes   Mental Health Yes   Adequate Finances  Yes    Signature: AYLA Marte, [facility]   Title: Clinical

## 2022-12-05 NOTE — PROGRESS NOTES
TRANSPLANT NEPHROLOGY RECIPIENT EVALUATION NOTE    Assessment and Plan:  # Kidney Transplant Evaluation: Patient is a good candidate overall. Benefits of a living donor transplant were discussed.    # LDKT (2014): failing from IgA recurrence /chronic antibody medicated rejection. GFR 7.  When ready, she would likely benefit from another kidney transplant.  ABO-A, CPRA 84%.  Mother is reportedly being evaluated to donate.    # Cardiac Risk: stress ECHO pending.     # PAD Screening: per surgery     # UC: diagnosed in 2018. Since on Entyvio q 6 weeks with stability, however, recently flared with multiple loose/watery stools daily. Followed by [facility] (Dr. Wilkins) and has a colonoscopy scheduled on 12/16. Will need to have a few months a stable disease before active listing.     # EBV viremia:  low load last in 4/2021. Will update.     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 Follows closely with local gyn, last seen in 3/2022 with colp     # Health Maintenance: PAP: Up to date (H/o ASCUS HPV + 16/18 (H/o LEEP). Dental UTD. Derm Not UTD.     Discussed the risks and benefits of a transplant, including the risk of surgery and immunosuppression medications.  Patient's overall evaluation will be discussed in the Transplant Program's regular meeting with a final recommendation on the patients suitability for transplant to be made at that time.    Pending completion of the full evaluation, patient presently appears to be enough of an acceptable kidney transplant recipient candidate to have any potential kidney donors start the evaluation process.      Evaluation:  Caity Horan was seen in consultation at the request of Dr. Ivy Sepulveda for evaluation as a potential kidney transplant recipient.    Reason for Visit:  Caity Horan is a 32 year old female with failing LDKT (2014), who presents for kidney transplant evaluation.    History of Present Illness:  Caity Horan is a 32-year-old with history of IgA nephropathy  (dx: 2014) s/p LDKT (12/2014) at [facility]. She was was doing well at her last transplant neph appt here in 2018 with a baseline sCr of 1.1-1.2. She moved to [place] the summer of 2021 and has been following with [facility]. Acute viral illness in July to August 2021 (possibly COVID?). Creatinine up to 1.5.  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 Recurrent IgA nephropathy with mesangial and endocapillary hypercellularity present. Focal global glomerulosclerosis. Mild patchy tubular atrophy and interstitial fibrosis. Moderate arteriosclerosis and arterial hyalinosis. She was treated with treated with  mg twice daily, tacrolimus goal 6-8, and tocilizumab x 6 doses of 8 mg/kg every 4 weeks. GFRs now ranging  7-12 and her mom is being evaluated to donate. Continues to take Tacrolimus and MMF for IS.              Kidney Disease Hx:               Kidney Disease Dx: IgA nephropathy s/p failing LDKT (2014)       Biopsy Proven: Yes         On Dialysis: No       Primary Nephrologist: Dr. Burnett        H/o Kidney Stones: No       H/o Recurrent/Frequent UTI: No         Diabetic Hx: None           Cardiac/Vascular Disease Risk Factors:        Cardiac Risk Factors: Hypertension and CKD       Known CAD: No       Known PAD/Caludication Symptoms: No       Known Heart Failure: No       Arrhythmia: No       Pulmonary Hypertension: No       Valvular Disease: No       Other: None         Viral Serology Status       CMV IgG Antibody: Unknown       EBV IgG Antibody: Positive         Volume Status/Weight:        Volume status: Mildly hypervolemic       Weight:  Acceptable BMI       BMI: Body mass index is 25.56 kg/m .         Functional Capacity/Frailty:   Single and lives in [place]. Works as a user experience strategist. Enjoy intense exercise including CrossFit, weightlifting  and other exercise every day x 2 hours, along with downhill skiing in the winter. But not able to exercise for the past few weeks with declining kidney function.       Fatigue/Decreased Energy: [] No [x] Yes    Chest Pain or SOB with Exertion: [x] No [] Yes    Significant Weight Change: [x] No [] Yes    Nausea, Vomiting or Diarrhea: [] No [x] Yes UC flared   Fever, Sweats or Chills:  [x] No [] Yes    Leg Swelling [] No [x] Yes        History of Cancer: None      Allergy Testing Questions:   Medication that caused a reaction Penicillin (Amoxicillin, Amoxicillin with clavulanic acid, Dicloxacillin)   Antibiotics used that didn't give an allergic reaction?  Penicillin (Amoxicillin, Amoxicillin with clavulanic acid, Dicloxacillin)    COVID Vaccination Up To Date: Yes    Potential Living Kidney Donors: Yes , mother     Review of Systems:  A comprehensive review of systems was obtained and negative, except as noted in the HPI or PMH.    Past Medical History:   Medical record was reviewed and PMH was discussed with patient and noted below.  Past Medical History:   Diagnosis Date     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     Anemia in stage 5 chronic kidney disease (H)      Anemia in stage 5 chronic kidney disease (H) 10/27/2014     ESRD (end stage renal disease) on dialysis (H)      HTN (hypertension) 10/27/2014     Hypertension 10/2014     IgA nephropathy     biopsy proven       Past Social History:   Past Surgical History:   Procedure Laterality Date     BIOPSY  2021    renal     COLONOSCOPY N/A 03/12/2018    Procedure: COMBINED COLONOSCOPY, SINGLE OR MULTIPLE BIOPSY/POLYPECTOMY BY BIOPSY;  EGD/Colonoscopy ;  Surgeon: Kory Massey MD;  [facility]     COLONOSCOPY N/A 09/10/2018    Procedure: COMBINED COLONOSCOPY, SINGLE OR MULTIPLE BIOPSY/POLYPECTOMY BY BIOPSY;  Colonoscopy;  Surgeon: Yenifer Doan MD;  [facility]     EXTRACTION(S) DENTAL       PERCUTANEOUS BIOPSY KIDNEY Right 12/19/2018     Procedure: Right Kidney Biopsy;  Surgeon: Otilio Mar MD;  [facility]     TRANSPLANT KIDNEY RECIPIENT LIVING RELATED N/A 2014    Procedure: TRANSPLANT KIDNEY RECIPIENT LIVING RELATED;  Surgeon: Dale Middleton MD;  Location: [facility]     WISDOM TOOTH EXTRACTION Bilateral      Personal history of bleeding or anesthesia problems: No    Family History:  Family History   Problem Relation Age of Onset     Kidney Disease No family hx of      No Known Problems Mother      Alcoholism Father          in early 50s       Personal History:   Social History     Socioeconomic History     Marital status: Single     Spouse name: Not on file     Number of children: Not on file     Years of education: 16     Highest education level: Not on file   Occupational History     Occupation: advertising     Employer: [employer]   Tobacco Use     Smoking status: Never     Smokeless tobacco: Never   Substance and Sexual Activity     Alcohol use: Not Currently     Alcohol/week: 3.0 - 9.0 standard drinks     Types: 1 - 3 Glasses of wine, 1 - 3 Cans of beer, 1 - 3 Shots of liquor per week     Drug use: No     Sexual activity: Never   Other Topics Concern      Service Not Asked     Blood Transfusions No     Caffeine Concern Not Asked     Occupational Exposure Not Asked     Hobby Hazards Not Asked     Sleep Concern Not Asked     Stress Concern Not Asked     Weight Concern Not Asked     Special Diet Not Asked     Back Care Not Asked     Exercise Not Asked     Bike Helmet Not Asked     Seat Belt Yes     Self-Exams Not Asked   Social History Narrative     Not on file     Social Determinants of Health     Financial Resource Strain: Not on file   Food Insecurity: Not on file   Transportation Needs: Not on file   Physical Activity: Not on file   Stress: Not on file   Social Connections: Not on file   Intimate Partner Violence: Not on file   Housing Stability: Not on file       Allergies:  Allergies   Allergen  "Reactions     Bumetanide Other (See Comments)     Causes paralysis     Amoxicillin Rash       Medications:  Current Outpatient Medications   Medication Sig     Blood Pressure Monitoring (B-D ASSURE BPM/AUTO ARM CUFF) MISC Monitor blood pressure and pulse twice daily     CALCIUM ANTACID 500 MG chewable tablet Take by mouth 3 times daily     Cyanocobalamin (B-12) 1000 MCG TBCR TAKE ONE TABLET BY MOUTH EVERY DAY     levonorgestrel (MIRENA) 20 MCG/DAY IUD 1 each by Intrauterine route     losartan (COZAAR) 25 MG tablet TAKE 1/2 TABLET BY MOUTH AT BEDTIME (Patient taking differently: 25 mg)     magnesium oxide (MAG-OX) 400 MG tablet Take 1 tablet (400 mg) by mouth daily     mycophenolate (GENERIC EQUIVALENT) 250 MG capsule Take 2 capsules (500 mg) by mouth 2 times daily     sodium bicarbonate 650 MG tablet      tacrolimus (GENERIC EQUIVALENT) 0.5 MG capsule Take 3 capsules (1.5 mg) by mouth 2 times daily (Patient taking differently: Take 3 mg by mouth 2 times daily)     UNABLE TO FIND MEDICATION NAME: Biotin     Vedolizumab (ENTYVIO IV)      vitamin D3 (CHOLECALCIFEROL) 50 mcg (2000 units) tablet Take 1 tablet (50 mcg) by mouth daily     No current facility-administered medications for this visit.       Vitals:  Ht 1.6 m (5' 3\")   Wt 65.5 kg (144 lb 4.8 oz)   BMI 25.56 kg/m      Exam:  GENERAL APPEARANCE: alert and no distress  HENT: mouth without ulcers or lesions  LYMPHATICS: no cervical or supraclavicular nodes  RESP: lungs clear to auscultation - no rales, rhonchi or wheezes  CV: regular rhythm, normal rate, no rub, no murmur  FEMORAL PULSES: normal  EDEMA: no LE edema bilaterally  ABDOMEN: soft, nondistended, nontender, bowel sounds normal  MS: extremities normal - no gross deformities noted, no evidence of inflammation in joints, no muscle tenderness  SKIN: no rash    Results:   No results found for this or any previous visit (from the past 336 hour(s)).    Review of prior external note(s) from - CareEverywhere " information from [facility] reviewed  I spent a total of 299 minutes on the day of the visit.   Time spent doing chart review, history and exam, documentation and further activities per the note

## 2022-12-05 NOTE — LETTER
12/5/2022         RE: Caity Horan  [address]  [address]  [facility] [address]        Dear Colleague,    Thank you for referring your patient, Caity Horan, to the [facility]. Please see a copy of my visit note below.    .  Transplant Surgery Consult Note     Medical record number: [id number]  YOB: 1990,   Consult requested   By Dr Burnett for evaluation of kidney re- transplant candidacy.    Assessment and Recommendations:Ms. Horan appears to be a good candidate for kidney transplantation and has a good understanding of the risks and benefits of this approach to the management of renal failure. 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 'However virtual is positive  WIll need to go onto KPD    Pt is anxious to avoid dialysis  Wants transplant within a month  I explained this was not a viable option and we need to complete w/u and go through KPD which will take a few months before she gets a transplant  She became very anxious and tearful when she heard this.   Took some time to explain the priorities of finding the right kidney for her in the interest of long term function  Also, she requested her old tx kidney be removed. I advised against this as there is no medical indication.   She states she has an undesirable bulge and would like it removed.   I counseled her the first priority is to get a functioning kidney in renae once we are out a few months we could discuss a tx nephrectomy of the old kidney if she still feels that way. She was tearful again and kept insisting that she should be getting the tx in a month. I went through the discussion of priorities again and she seemed a bit calm after that.     I think it will help to see a  psychiatrist to see if she will be a candidate for treatment of anxiety.     Risks of the surgical procedure including but not limited to the rare risk of mortality discussed in detail. Patient verbalized good understanding and had several pertinent questions which were answered satisfactorily.     Immunosuppressive regimen, management and long term risks discussed in detail. Changes, when applicable made as per orders.        Transplant order: Ms. Horan has Chronic renal failure due to chronic amr whose condition is not expected to resolve, is expected to progress, and is expected to continue to develop related comorbid conditions.  Recommend he be considered as a candidate for kidney.  Cardiology consult for cardiac risk stratification to be ordered: No  CT abdomen and pelvis without contrast to be ordered for assessment of vascular targets: No  Transplant listing labs ordered to include HLA, ABOx2, Cr, etc.  Dietician consult ordered: Yes  Social work consult ordered: Yes  Imaging reports reviewed:  yes  Radiology images reviewed:yes  Recipient suitable to move forward with work up of living donors:  Yes      The majority of our visit was spent in counselling, discussing the medical and surgical risks of kidney transplantation. We discussed approximate wait time and how that is influenced by issues such as blood type and sensitization (PRA) and access to a living donor. I contrasted potential waiting time for living vs  donor kidneys from  normal (0-85%) or higher (%) kidney donor profile index (KDPI) donors and their associated outcomes. I would not recommend this individual to consider kidneys from high KDPI donors. The reason for this decision is best summarized as: multiple potential living donors. Potential surgical complications of kidney transplantation include bleeding, superficial or deep wound complications (infection, hernia, lymphocele), ureteral anastomotic failure (leak or stenosis),  graft thrombosis, need for reoperation and other issues such as cardiac complications, pneumonia, deep venous thrombosis, pulmonary embolism, post transplant diabetes and death. The potential for recurrent disease or need for retransplantation was also addressed. We discussed the possible need for ureteral stent (and subsequent removal), and the utility of protocol biopsy and laboratory studies to evaluate for rejection or recurrent disease. We discussed the risk of graft rejection, our center's average graft and patient survival rates, immunosuppression protocols, as well as the potential opportunity to participate in clinical trials.  We also discussed the average length of stay, recovery process, and posttransplant lab and monitoring protocol.  I emphasized the need for strict immunosuppression medication adherence and the potential for complications of immunosuppression such as skin cancer or lymphoma, as well as a very low but not zero risk of donor-derived disease transmission risks (infection, cancer). Ms. Horan asked good questions and her candidacy will be reviewed at our Multidisciplinary Selection Committee. Thank you for the opportunity to participate in Ms. Horan's care.      Total time: 60 minutes  Counselling time: 45 minutes    .    ---------------------------------------------------------------------------------------------------    HPI: Ms. Horan has Chronic renal failure due to chronic AMR. The patient is non-diabetic.       The patient is not on dialysis.    Has potential kidney donors:  Yes .  Interested in participation in paired exchange if a donor is willing: Yes     The patient has the following pertinent history:       No    Yes  Dialysis:    []      [] via:       Blood Transfusion                  []      []  Number of units:   Most recently:  Pregnancy:    []      [] Number:       Previous Transplant:  []      [] Details:    Cancer    []      [] Comment:   Kidney stones   []       [] Comment:      Recurrent infections  []      []  Type:                  Bladder dysfunction  []      [] Cause:    Claudication   []      [] Distance:    Previous Amputation  []      [] Cause:     Chronic anticoagulation  []      [] Indication:   Cheondoism  []      []      Past Medical History:   Diagnosis Date     Acute rejection of kidney transplant 2021     Anemia in stage 5 chronic kidney disease (H)      Anemia in stage 5 chronic kidney disease (H) 10/27/2014     ESRD (end stage renal disease) on dialysis (H)      HTN (hypertension) 10/27/2014     Hypertension 10/2014     IgA nephropathy     biopsy proven     Past Surgical History:   Procedure Laterality Date     BIOPSY      renal     COLONOSCOPY N/A 2018    Procedure: COMBINED COLONOSCOPY, SINGLE OR MULTIPLE BIOPSY/POLYPECTOMY BY BIOPSY;  EGD/Colonoscopy ;  Surgeon: Kory Massey MD;  Location: [facility]     COLONOSCOPY N/A 09/10/2018    Procedure: COMBINED COLONOSCOPY, SINGLE OR MULTIPLE BIOPSY/POLYPECTOMY BY BIOPSY;  Colonoscopy;  Surgeon: Yenifer Doan MD;  [facility]     EXTRACTION(S) DENTAL       PERCUTANEOUS BIOPSY KIDNEY Right 2018    Procedure: Right Kidney Biopsy;  Surgeon: Otilio Mar MD;  [facility]     TRANSPLANT KIDNEY RECIPIENT LIVING RELATED N/A 2014    Procedure: TRANSPLANT KIDNEY RECIPIENT LIVING RELATED;  Surgeon: Dale Middleton MD;  Location: [facility]     WISDOM TOOTH EXTRACTION Bilateral      Family History   Problem Relation Age of Onset     No Known Problems Mother      Alcoholism Father          in early 50s     Prostate Cancer Paternal Grandmother      Kidney Disease No family hx of      Social History     Socioeconomic History     Marital status: Single     Spouse name: Not on file     Number of children: Not on file     Years of education: 16     Highest education level: Not on file   Occupational History     Occupation: advertising     Employer: [employer]    Tobacco Use     Smoking status: Never     Smokeless tobacco: Never   Substance and Sexual Activity     Alcohol use: Not Currently     Alcohol/week: 3.0 - 9.0 standard drinks     Types: 1 - 3 Glasses of wine, 1 - 3 Cans of beer, 1 - 3 Shots of liquor per week     Drug use: No     Sexual activity: Never   Other Topics Concern      Service Not Asked     Blood Transfusions No     Caffeine Concern Not Asked     Occupational Exposure Not Asked     Hobby Hazards Not Asked     Sleep Concern Not Asked     Stress Concern Not Asked     Weight Concern Not Asked     Special Diet Not Asked     Back Care Not Asked     Exercise Not Asked     Bike Helmet Not Asked     Seat Belt Yes     Self-Exams Not Asked   Social History Narrative     Not on file     Social Determinants of Health     Financial Resource Strain: Not on file   Food Insecurity: Not on file   Transportation Needs: Not on file   Physical Activity: Not on file   Stress: Not on file   Social Connections: Not on file   Intimate Partner Violence: Not on file   Housing Stability: Not on file       ROS:   CONSTITUTIONAL:  No fevers or chills  EYES: negative for icterus  ENT:  negative for hearing loss, tinnitus and sore throat  RESPIRATORY:  negative for cough, sputum, dyspnea  CARDIOVASCULAR:  negative for chest pain Fatigue  GASTROINTESTINAL:  negative for nausea, vomiting, diarrhea or constipation  GENITOURINARY:  negative for incontinence, dysuria, bladder emptying problems  HEME:  No easy bruising  INTEGUMENT:  negative for rash and pruritus  NEURO:  Negative for headache, seizure disorder  Allergies:   Allergies   Allergen Reactions     Bumetanide Other (See Comments)     Causes paralysis     Amoxicillin Rash     Medications:  Prescription Medications as of 12/5/2022       Rx Number Disp Refills Start End Last Dispensed Date Next Fill Date Owning Pharmacy    Blood Pressure Monitoring (B-D ASSURE BPM/AUTO ARM CUFF) MISC  1 each 0 1/7/2021    Capsule --  [address]    Sig: Monitor blood pressure and pulse twice daily    Class: E-Prescribe    CALCIUM ANTACID 500 MG chewable tablet    2022        Sig: Take by mouth 3 times daily    Class: Historical    Route: Oral    Cyanocobalamin (B-12) 1000 MCG TBCR  90 tablet 3 2021    Capsule -- [address]    Sig: TAKE ONE TABLET BY MOUTH EVERY DAY    Class: E-Prescribe    levonorgestrel (MIRENA) 20 MCG/DAY IUD            Si each by Intrauterine route    Class: Historical    Route: Intrauterine    losartan (COZAAR) 25 MG tablet  30 tablet 1 5/10/2021    Capsule -- [address]    Sig: TAKE 1/2 TABLET BY MOUTH AT BEDTIME    Class: E-Prescribe    Notes to Pharmacy: Patient is requesting a refill on their Losartan 25 mg. Patient is out of refills. Thank you    magnesium oxide (MAG-OX) 400 MG tablet  90 tablet 3 2020    Capsule -- [address]    Sig: Take 1 tablet (400 mg) by mouth daily    Class: E-Prescribe    Route: Oral    mycophenolate (GENERIC EQUIVALENT) 250 MG capsule  360 capsule 3 2020    Capsule -- [address]    Sig: Take 2 capsules (500 mg) by mouth 2 times daily    Class: E-Prescribe    Route: Oral    sodium bicarbonate 650 MG tablet    2022        Class: Historical    tacrolimus (GENERIC EQUIVALENT) 0.5 MG capsule  540 capsule 3 2021    Capsule -- [address]    Sig: Take 3 capsules (1.5 mg) by mouth 2 times daily    Class: E-Prescribe    Notes to Pharmacy: TXP DT 2014 (Kidney) TXP Dischg DT 2014 DX Kidney replaced by transplant Z94.0 TX [facility] ([place], [place])    Route: Oral    UNABLE TO FIND            Sig:  MEDICATION NAME: Biotin    Class: Historical    Vedolizumab (ENTYVIO IV)            Class: Historical    Route: Intravenous    vitamin D3 (CHOLECALCIFEROL) 50 mcg (2000 units) tablet  100 tablet 3 7/22/2020    Capsule -- [place], [place] - [address]    Sig: Take 1 tablet (50 mcg) by mouth daily    Class: E-Prescribe    Route: Oral        Exam:     There were no vitals taken for this visit.  Appearance: in no apparent distress.   Skin: normal  Eyes:  no redness or discharge.  Sclera anicteric  Head and Neck: Normal, no rashes or jaundice  Respiratory: easy respirations, no audible wheezing.  Abdomen: flat, Surgical scars consistent with history     Psychiatric: Normal mood and affect    Diagnostics:   No results found for this or any previous visit (from the past 672 hour(s)).  UNOS cPRA   Date Value Ref Range Status   11/11/2019 84  Final       Again, thank you for allowing me to participate in the care of your patient.        Sincerely,        Ivy Sepulveda MD

## 2022-12-05 NOTE — TELEPHONE ENCOUNTER
ISSUE: HGB: 6.6, down from 8.0 on 11/30    Patient reports abdominal pain, denies bleeding.  States she got food poisoning last Thursday 12/1.  N/V/D 21/-21/3    Denies Chest pain or SOB    States she will not present to ED as she has flight back to [place] tomorrow    Last darbepoetin last Wednesday, receiving every 2 weeks    BP this morning :167/118 prior to BP med  HR:90s    Type and screen added to lab draw from this morning.    Patient scheduled for stress echo today at 1430,  Okay per Dr Mar to proceed.        Otilio Mar MD Sveiven, Tena, RN  Let's try to avoid a transfusion.  Would increase frequency and/or dose of her darbepoetin, but she should talk to her Nephrologist there about it.     If her Nephrologist wants to call me, that is fine.     Aquilino        OUTCOME:    Patient v/u of speaking with local nephrologist early next week regarding increasing frequency and or dose of Darbepoetin.  Reviewed with patient when to present to ED,  Any signs of bleeding, SOB, chest pain,  Increase in weakness or dizziness.     Tena Brennan RN   Transplant Coordinator  [phone]

## 2022-12-06 LAB
ATRIAL RATE - MUSE: 95 BPM
CARDIOLIPIN IGG SER IA-ACNC: <2 GPL-U/ML
CARDIOLIPIN IGG SER IA-ACNC: NEGATIVE
CARDIOLIPIN IGM SER IA-ACNC: 2.7 MPL-U/ML
CARDIOLIPIN IGM SER IA-ACNC: NEGATIVE
CMV IGG SERPL IA-ACNC: <0.2 U/ML
CMV IGG SERPL IA-ACNC: NORMAL
DIASTOLIC BLOOD PRESSURE - MUSE: NORMAL MMHG
DRVVT SCREEN RATIO: 0.93
EBV DNA # SPEC NAA+PROBE: <500 COPIES/ML
EBV DNA SPEC NAA+PROBE-LOG#: <2.7 {LOG_COPIES}/ML
EBV VCA IGG SER IA-ACNC: >750 U/ML
EBV VCA IGG SER IA-ACNC: POSITIVE
GAMMA INTERFERON BACKGROUND BLD IA-ACNC: 0.04 IU/ML
INTERPRETATION ECG - MUSE: NORMAL
LA PPP-IMP: NEGATIVE
LUPUS INTERPRETATION: NORMAL
M TB IFN-G BLD-IMP: NEGATIVE
M TB IFN-G CD4+ BCKGRND COR BLD-ACNC: 4.74 IU/ML
MITOGEN IGNF BCKGRD COR BLD-ACNC: 0.01 IU/ML
MITOGEN IGNF BCKGRD COR BLD-ACNC: 0.01 IU/ML
P AXIS - MUSE: 49 DEGREES
PR INTERVAL - MUSE: 136 MS
PTT RATIO: 1.18
QRS DURATION - MUSE: 72 MS
QT - MUSE: 366 MS
QTC - MUSE: 459 MS
QUANTIFERON MITOGEN: 4.78 IU/ML
QUANTIFERON NIL TUBE: 0.04 IU/ML
QUANTIFERON TB1 TUBE: 0.05 IU/ML
QUANTIFERON TB2 TUBE: 0.05
R AXIS - MUSE: 63 DEGREES
SA 1 CELL: NORMAL
SA 1 TEST METHOD: NORMAL
SA 2 CELL: NORMAL
SA 2 TEST METHOD: NORMAL
SA1 HI RISK ABY: NORMAL
SA1 MOD RISK ABY: NORMAL
SA2 HI RISK ABY: NORMAL
SA2 MOD RISK ABY: NORMAL
SYSTOLIC BLOOD PRESSURE - MUSE: NORMAL MMHG
T AXIS - MUSE: 45 DEGREES
THROMBIN TIME: 17.7 SECONDS (ref 13–19)
UNACCEPTABLE ANTIGENS: NORMAL
UNOS CPRA: 84
VENTRICULAR RATE- MUSE: 95 BPM
VZV IGG SER QL IA: 1179 INDEX
VZV IGG SER QL IA: POSITIVE
ZZZSA 1  COMMENTS: NORMAL
ZZZSA 2 COMMENTS: NORMAL

## 2022-12-08 ENCOUNTER — LAB (OUTPATIENT)
Dept: LAB | Facility: CLINIC | Age: 32
End: 2022-12-08
Payer: COMMERCIAL

## 2022-12-08 DIAGNOSIS — Z76.82 ORGAN TRANSPLANT CANDIDATE: Primary | ICD-10-CM

## 2022-12-08 DIAGNOSIS — Z76.82 ORGAN TRANSPLANT CANDIDATE: ICD-10-CM

## 2022-12-09 LAB
A*: NORMAL
A*LOCUS SEROLOGIC EQUIVALENT: 24
A*LOCUS: NORMAL
A*SEROLOGIC EQUIVALENT: 33
ABTEST METHOD: NORMAL
B*: NORMAL
B*LOCUS SEROLOGIC EQUIVALENT: 35
B*LOCUS: NORMAL
B*SEROLOGIC EQUIVALENT: 49
BW-1: NORMAL
BW-2: NORMAL
C*: NORMAL
C*LOCUS SEROLOGIC EQUIVALENT: 4
C*LOCUS: NORMAL
C*SEROLOGIC EQUIVALENT: 7
CROSSMATCHDATEVXM: NORMAL
DONOR VXM: NORMAL
DPA1*: NORMAL
DPB1*: NORMAL
DQA1*: NORMAL
DQA1*LOCUS: NORMAL
DQB1*: NORMAL
DQB1*LOCUS SEROLOGIC EQUIVALENT: 8
DQB1*LOCUS: NORMAL
DQB1*SEROLOGIC EQUIVALENT: 6
DRB1*: NORMAL
DRB1*LOCUS SEROLOGIC EQUIVALENT: 4
DRB1*LOCUS: NORMAL
DRB1*SEROLOGIC EQUIVALENT: 13
DRB3*LOCUS SEROLOGIC EQUIVALENT: 52
DRB3*LOCUS: NORMAL
DRB4*: NORMAL
DRB4*SEROLOGIC EQUIVALENT: 53
DRSSO TEST METHOD: NORMAL
DSA VXM B1: NORMAL
DSA VXM B2: NORMAL
DSA VXMT1: NORMAL
DSA VXMT2: NORMAL
RESULT VXM B1: NORMAL
RESULT VXM B2: NORMAL
RESULT VXM T1: NORMAL
RESULT VXM T2: NORMAL
SERUM DATE VXM B1: NORMAL
SERUM DATE VXM B2: NORMAL
SERUM DATE VXM T1: NORMAL
SERUM DATE VXM T2: NORMAL
ZZZCOMMENT VXMB1: NORMAL
ZZZCOMMENT VXMB2: NORMAL
ZZZCOMMENT VXMT1: NORMAL
ZZZCOMMENT VXMT2: NORMAL

## 2022-12-14 PROBLEM — K51.90 ULCERATIVE COLITIS (H): Status: ACTIVE | Noted: 2022-12-14

## 2022-12-14 PROBLEM — E55.9 VITAMIN D DEFICIENCY: Status: ACTIVE | Noted: 2022-12-14

## 2022-12-15 ENCOUNTER — LAB (OUTPATIENT)
Dept: LAB | Facility: CLINIC | Age: 32
End: 2022-12-15
Payer: COMMERCIAL

## 2022-12-15 DIAGNOSIS — N18.4 CHRONIC KIDNEY DISEASE, STAGE IV (SEVERE) (H): Primary | ICD-10-CM

## 2022-12-15 DIAGNOSIS — N18.4 CHRONIC KIDNEY DISEASE, STAGE IV (SEVERE) (H): ICD-10-CM

## 2022-12-15 PROCEDURE — 999N001093 HLA VIRTUAL CROSSMATCH (VXM), LIVING DONOR

## 2022-12-19 ENCOUNTER — TELEPHONE (OUTPATIENT)
Dept: TRANSPLANT | Facility: CLINIC | Age: 32
End: 2022-12-19

## 2022-12-19 ENCOUNTER — TRANSFERRED RECORDS (OUTPATIENT)
Dept: HEALTH INFORMATION MANAGEMENT | Facility: CLINIC | Age: 32
End: 2022-12-19

## 2022-12-19 NOTE — LETTER
12/19/22        Caity Horan  [address]  [address]  [facility] [address]        Dear Caity,    It was a pleasure to see you recently for consideration of kidney transplantation. Your pre-transplant evaluation results were reviewed at our Multidisciplinary Selection Committee on 12/14/2022. The Committee is requesting the following items are completed before determining your candidacy:    1. Optimize blood pressure management as you are presently hypertensive.   2. Stress echocardiogram to assess your heart function.   3. Ulcerative colitis will need to be consistently stable for 3 months before you can proceed with a kidney transplant.   4. Dermatologist appointment for skin screening due to your chronic immunosuppression.   5. Continued follow-up with GYN for your abnormal PAP smear.   6. Psychiatrist appointment for assessment of anxiety.   7. Dental work needs to remain up to date.   8. Vaccinations need to be up to date for: COVID 19, hepatitis B and pneumovax.   9. KDPI > 85 % consent form needs to be signed. This will be emailed to your. You have already signed the Receipt of Information consent form.   10. Review of [facility]plant [facility] education over the phone with myself.     I will talk with you this week about how to get the above items scheduled.     Upon completion of item # 9 and 10 you will be eligible to be added onto the kidney transplant waitlist on INACTIVE status while the remaining pending items are completed. Upon successful completion of all the above items, your results will be reviewed at the Multidisciplinary Selection Committee for approval. When approved, you will be eligible to be changed to ACTIVE status on the waitlist as well as to proceed with a live donor kidney transplant in the event you have an approved live donor.     Please continue to have any potential live kidney donors register online at [facility] to initiate their evaluations through our program's living donor  screening tool at [email or web address]. Please note that potential donors will get an e-mail response once they submit their form within 1-2 business days. Potential donors may call our Main Office Number at [phone] and ask to speak with a donor coordinator if they have questions about the process. You will be notified by your transplant coordinator if a live donor has been approved for donation.     For any questions, please contact myself at the Transplant Office Main Number at [phone] or at my Direct Number at [phone].      Sincerely,  Alana Lopez, RN, BSN  Pre Kidney Pancreas Transplant Coordinator   [facility]  Solid Organ Transplant Care   [address] [place] [address] [address]  [place] [address]  [email or web address]   Office Number: [phone] Direct Number: [phone]   Fax Number: [phone]  Employed by [facility]     CC's: Dr. Merrill Burnett, Dr. Otilio Mar

## 2022-12-19 NOTE — TELEPHONE ENCOUNTER
Please call Sujey Rn  # [phone] [facility].  Sujey wanted to discuss patient and if there are any records we may need faxed over prior to Caity's appt., here.

## 2022-12-19 NOTE — TELEPHONE ENCOUNTER
Called pt today and reached her  LM I am calling to review outcome of her transplant evaluation and that I will be sending her a summary letter today in her My Chart.     Generated Transplant Evaluation Summary Letter today in Epic - electronically routed to pt/ providers.

## 2022-12-20 ENCOUNTER — DOCUMENTATION ONLY (OUTPATIENT)
Dept: NEPHROLOGY | Facility: CLINIC | Age: 32
End: 2022-12-20

## 2022-12-20 ENCOUNTER — TELEPHONE (OUTPATIENT)
Dept: TRANSPLANT | Facility: CLINIC | Age: 32
End: 2022-12-20

## 2022-12-20 DIAGNOSIS — N18.4 CHRONIC KIDNEY DISEASE, STAGE IV (SEVERE) (H): ICD-10-CM

## 2022-12-20 DIAGNOSIS — N18.5 CKD (CHRONIC KIDNEY DISEASE) STAGE 5, GFR LESS THAN 15 ML/MIN (H): Primary | ICD-10-CM

## 2022-12-20 DIAGNOSIS — Z94.0 STATUS POST KIDNEY TRANSPLANT: ICD-10-CM

## 2022-12-20 DIAGNOSIS — N02.B9 IGA NEPHROPATHY: Primary | ICD-10-CM

## 2022-12-20 NOTE — PROGRESS NOTES
Was notified that patient was having some medical issues.  Attempted to call patient at ~ 10 am concerning her abnormal labs and medical care, but unable to reach her.  Reviewed medical records in [facility].  Patient presently has a hemoglobin of 5.4 gm/dl and creatinine ~ 11 mg/dl.  She is symptomatic with heart racing, not feeling well and high blood pressure, per the records.  Her kidney transplant care team at the [facility] contacted the patient and had recommended that the patient go to the ER, but patient refused.  They reviewed the urgency and potential risks to the patient.  Patient also refused a blood transfusion.  Patient told the [facility] team that she was driving to [place], which seems fairly unsafe in her present medical condition.  Discussed this with Dr. Sepulveda and patient's pretransplant coordinator.  The team will continue to try and contact the patient, but has been unable to reach her as of this time.  If she travels to [place] against medical advice, will work on arranging hospital admission, would recommend blood transfusion and likely dialysis access to initiate dialysis.  However, this care plan is preliminary since we have been unable to reach the patient and assess her medical needs.  Ideally, she would stay in [place] to receive care safely and become stable prior to coming back to [place].    Otilio Mar MD

## 2022-12-20 NOTE — TELEPHONE ENCOUNTER
I attempted to call pt today after Dr. Mar did and also reached her VM. LM that we have reviewed her labs from yesterday and are in agreement with Dr. Burnett's recommendations especially if she is symptomatic. Added that I also want to review the outcome of her transplant evaluation with her. Asked pt to return my call as soon as possible.

## 2022-12-20 NOTE — TELEPHONE ENCOUNTER
Spoke with pt today at 3:35 pm when she returned my call. Difficult call due to poor reception, fading in and out. Pt stating that she is in the car with her sister who is doing the driving. States they are near [facility] and are spending the night there and driving the rest of the way to [place] tomorrow. Pt stating she is aware of the risks with her low hgb but feels the same as she has for the last 1 1/2 weeks just is getting more swollen so she knows she needs to start on dialysis. Pt stating she just wants to get to [place] to be seen and start dialysis. Pt stating she will go to ER if something acute happens. I did instruct pt that she has a lab appt at 12:30 at our [facility], video appt with Dr. More Cardona at 1:30 pm and hospital admission here for tomorrow at 4:00 pm. Pt stating she won't be able to make the 12:30 pm lab appt but will attend appt virtually with Dr. Cardona and is agreeable to being admitted at 4:00 pm. Briefly reviewed Transplant Evaluation Summary Letter 12/19/2022, pt explained that she has already completed most of it except needs one more hep B vaccination so just need to obtain the records. I explained to her that it is good she has already completed the required items and I am happy to obtain these records. Pt expressed very good understanding of all and was in good agreement with the plan. Pt seemed calm during this call, did not seem short of breath or in any distress.

## 2022-12-21 ENCOUNTER — TELEPHONE (OUTPATIENT)
Dept: INTERVENTIONAL RADIOLOGY/VASCULAR | Facility: CLINIC | Age: 32
End: 2022-12-21

## 2022-12-21 ENCOUNTER — APPOINTMENT (OUTPATIENT)
Dept: GENERAL RADIOLOGY | Facility: CLINIC | Age: 32
DRG: 673 | End: 2022-12-21
Attending: SURGERY
Payer: COMMERCIAL

## 2022-12-21 ENCOUNTER — HOSPITAL ENCOUNTER (OUTPATIENT)
Facility: CLINIC | Age: 32
End: 2022-12-21
Admitting: RADIOLOGY
Payer: COMMERCIAL

## 2022-12-21 ENCOUNTER — TELEPHONE (OUTPATIENT)
Dept: TRANSPLANT | Facility: CLINIC | Age: 32
End: 2022-12-21

## 2022-12-21 ENCOUNTER — VIRTUAL VISIT (OUTPATIENT)
Dept: NEPHROLOGY | Facility: CLINIC | Age: 32
DRG: 673 | End: 2022-12-21
Attending: INTERNAL MEDICINE
Payer: COMMERCIAL

## 2022-12-21 ENCOUNTER — DOCUMENTATION ONLY (OUTPATIENT)
Dept: NEPHROLOGY | Facility: CLINIC | Age: 32
End: 2022-12-21

## 2022-12-21 ENCOUNTER — APPOINTMENT (OUTPATIENT)
Dept: ULTRASOUND IMAGING | Facility: CLINIC | Age: 32
DRG: 673 | End: 2022-12-21
Attending: SURGERY
Payer: COMMERCIAL

## 2022-12-21 ENCOUNTER — HOSPITAL ENCOUNTER (INPATIENT)
Facility: CLINIC | Age: 32
LOS: 5 days | Discharge: HOME | DRG: 673 | End: 2022-12-26
Attending: SURGERY | Admitting: SURGERY
Payer: COMMERCIAL

## 2022-12-21 DIAGNOSIS — Z99.2 ESRD (END STAGE RENAL DISEASE) ON DIALYSIS (H): ICD-10-CM

## 2022-12-21 DIAGNOSIS — N18.5 CKD (CHRONIC KIDNEY DISEASE) STAGE 5, GFR LESS THAN 15 ML/MIN (H): Primary | ICD-10-CM

## 2022-12-21 DIAGNOSIS — N18.6 ESRD (END STAGE RENAL DISEASE) ON DIALYSIS (H): ICD-10-CM

## 2022-12-21 DIAGNOSIS — N02.B9 IGA NEPHROPATHY: ICD-10-CM

## 2022-12-21 DIAGNOSIS — T86.11 ACUTE REJECTION OF KIDNEY TRANSPLANT: ICD-10-CM

## 2022-12-21 DIAGNOSIS — E55.9 VITAMIN D DEFICIENCY: ICD-10-CM

## 2022-12-21 DIAGNOSIS — E53.8 VITAMIN B12 DEFICIENCY: ICD-10-CM

## 2022-12-21 DIAGNOSIS — K51.919 ULCERATIVE COLITIS WITH COMPLICATION, UNSPECIFIED LOCATION (H): ICD-10-CM

## 2022-12-21 DIAGNOSIS — E87.20 METABOLIC ACIDOSIS: ICD-10-CM

## 2022-12-21 DIAGNOSIS — K51.011 ULCERATIVE PANCOLITIS WITH RECTAL BLEEDING (H): ICD-10-CM

## 2022-12-21 DIAGNOSIS — N18.6 ESRD (END STAGE RENAL DISEASE) (H): Primary | ICD-10-CM

## 2022-12-21 DIAGNOSIS — D50.9 IRON DEFICIENCY ANEMIA, UNSPECIFIED IRON DEFICIENCY ANEMIA TYPE: ICD-10-CM

## 2022-12-21 DIAGNOSIS — B27.00 EBV (EPSTEIN-BARR VIRUS) VIREMIA: ICD-10-CM

## 2022-12-21 DIAGNOSIS — Z94.0 KIDNEY REPLACED BY TRANSPLANT: Primary | ICD-10-CM

## 2022-12-21 DIAGNOSIS — E83.42 HYPOMAGNESEMIA: ICD-10-CM

## 2022-12-21 DIAGNOSIS — Z99.2 DIALYSIS PATIENT (H): ICD-10-CM

## 2022-12-21 DIAGNOSIS — Z48.298 AFTERCARE FOLLOWING ORGAN TRANSPLANT: ICD-10-CM

## 2022-12-21 DIAGNOSIS — Z94.0 STATUS POST KIDNEY TRANSPLANT: ICD-10-CM

## 2022-12-21 DIAGNOSIS — N18.4 CHRONIC KIDNEY DISEASE, STAGE IV (SEVERE) (H): ICD-10-CM

## 2022-12-21 DIAGNOSIS — D84.9 IMMUNOSUPPRESSED STATUS (H): ICD-10-CM

## 2022-12-21 LAB
ANION GAP SERPL CALCULATED.3IONS-SCNC: 22 MMOL/L (ref 7–15)
BUN SERPL-MCNC: 121 MG/DL (ref 6–20)
CALCIUM SERPL-MCNC: 8.9 MG/DL (ref 8.6–10)
CHLORIDE SERPL-SCNC: 102 MMOL/L (ref 98–107)
CREAT SERPL-MCNC: 11.7 MG/DL (ref 0.51–0.95)
DEPRECATED HCO3 PLAS-SCNC: 17 MMOL/L (ref 22–29)
ERYTHROCYTE [DISTWIDTH] IN BLOOD BY AUTOMATED COUNT: 18.7 % (ref 10–15)
GFR SERPL CREATININE-BSD FRML MDRD: 4 ML/MIN/1.73M2
GLUCOSE BLDC GLUCOMTR-MCNC: 116 MG/DL (ref 70–99)
GLUCOSE SERPL-MCNC: 130 MG/DL (ref 70–99)
HCT VFR BLD AUTO: 18.5 % (ref 35–47)
HGB BLD-MCNC: 5.6 G/DL (ref 11.7–15.7)
INR PPP: 1.07 (ref 0.85–1.15)
MAGNESIUM SERPL-MCNC: 2.5 MG/DL (ref 1.7–2.3)
MCH RBC QN AUTO: 28.4 PG (ref 26.5–33)
MCHC RBC AUTO-ENTMCNC: 30.3 G/DL (ref 31.5–36.5)
MCV RBC AUTO: 94 FL (ref 78–100)
PHOSPHATE SERPL-MCNC: 6.8 MG/DL (ref 2.5–4.5)
PLATELET # BLD AUTO: 163 10E3/UL (ref 150–450)
POTASSIUM SERPL-SCNC: 4.9 MMOL/L (ref 3.4–5.3)
RBC # BLD AUTO: 1.97 10E6/UL (ref 3.8–5.2)
SODIUM SERPL-SCNC: 141 MMOL/L (ref 136–145)
WBC # BLD AUTO: 8.4 10E3/UL (ref 4–11)

## 2022-12-21 PROCEDURE — 82310 ASSAY OF CALCIUM: CPT

## 2022-12-21 PROCEDURE — 76776 US EXAM K TRANSPL W/DOPPLER: CPT | Mod: 26 | Performed by: RADIOLOGY

## 2022-12-21 PROCEDURE — 93005 ELECTROCARDIOGRAM TRACING: CPT

## 2022-12-21 PROCEDURE — 99443 PR PHYSICIAN TELEPHONE EVALUATION 21-30 MIN: CPT | Mod: 95 | Performed by: INTERNAL MEDICINE

## 2022-12-21 PROCEDURE — 36415 COLL VENOUS BLD VENIPUNCTURE: CPT

## 2022-12-21 PROCEDURE — 85027 COMPLETE CBC AUTOMATED: CPT

## 2022-12-21 PROCEDURE — 120N000011 HC R&B TRANSPLANT UMMC

## 2022-12-21 PROCEDURE — 84100 ASSAY OF PHOSPHORUS: CPT

## 2022-12-21 PROCEDURE — 83735 ASSAY OF MAGNESIUM: CPT

## 2022-12-21 PROCEDURE — 71045 X-RAY EXAM CHEST 1 VIEW: CPT

## 2022-12-21 PROCEDURE — 71045 X-RAY EXAM CHEST 1 VIEW: CPT | Mod: 26 | Performed by: RADIOLOGY

## 2022-12-21 PROCEDURE — 999N000128 HC STATISTIC PERIPHERAL IV START W/O US GUIDANCE

## 2022-12-21 PROCEDURE — 85610 PROTHROMBIN TIME: CPT

## 2022-12-21 PROCEDURE — 93010 ELECTROCARDIOGRAM REPORT: CPT | Performed by: INTERNAL MEDICINE

## 2022-12-21 PROCEDURE — 76776 US EXAM K TRANSPL W/DOPPLER: CPT

## 2022-12-21 RX ORDER — LIDOCAINE 40 MG/G
CREAM TOPICAL
Status: DISCONTINUED | OUTPATIENT
Start: 2022-12-21 | End: 2022-12-26 | Stop reason: HOSPADM

## 2022-12-21 RX ORDER — FUROSEMIDE 10 MG/ML
100 INJECTION INTRAMUSCULAR; INTRAVENOUS ONCE
Status: COMPLETED | OUTPATIENT
Start: 2022-12-22 | End: 2022-12-22

## 2022-12-21 RX ORDER — LABETALOL HYDROCHLORIDE 5 MG/ML
20 INJECTION, SOLUTION INTRAVENOUS EVERY 6 HOURS PRN
Status: DISCONTINUED | OUTPATIENT
Start: 2022-12-21 | End: 2022-12-25

## 2022-12-21 RX ORDER — MYCOPHENOLATE MOFETIL 250 MG/1
500 CAPSULE ORAL 2 TIMES DAILY
Status: DISCONTINUED | OUTPATIENT
Start: 2022-12-22 | End: 2022-12-26 | Stop reason: HOSPADM

## 2022-12-21 RX ORDER — DIPHENHYDRAMINE HCL 25 MG
25 CAPSULE ORAL ONCE
Status: COMPLETED | OUTPATIENT
Start: 2022-12-22 | End: 2022-12-22

## 2022-12-21 RX ORDER — TACROLIMUS 1 MG/1
3 CAPSULE ORAL 2 TIMES DAILY
Status: DISCONTINUED | OUTPATIENT
Start: 2022-12-22 | End: 2022-12-26 | Stop reason: HOSPADM

## 2022-12-21 ASSESSMENT — ACTIVITIES OF DAILY LIVING (ADL)
ADLS_ACUITY_SCORE: 35
ADLS_ACUITY_SCORE: 18

## 2022-12-21 NOTE — PROGRESS NOTES
Nephrology  12/21/2022    Assessment and Plan:  # CKD 5:  Initial disease IgA nephropathy, s/p LDKT 12/5/14, now with failing allograft   IS: Tacrolimus Dose: 3mg/3mg (12/7/22)  MMF Dose: 500mg/500mg (12/7/22)  - eGFR ~5 with uremia, low appetite, advised on initiating dialysis. Recommend placement at [facility] - prefers MWF 3rd shift  - I asked CKD [facility] to fax referral - has had Hep B and quant gold on 12/5/2022    # hypervolemia - + 35 lbs, with orthopnea and significant shortness of breath - advised to go to Emergency department, would consider IV diuretics and make plan for initiation of dialysis    # Hypertension:   Amlodipine 10 mg daily   Losartan 25 mg  trialed diuretic but had tachycardia and felt her edema worsened so that was stopped  - BP meds can be adjusted in hospital    # Anemia in chronic renal disease:   - Hgb: 5.4 on 12/19 from 5.9 on 12/14. Had been 8 11/30/2022 and 9.6 in mid November, has significantly worsened essentially from 9's to 5's in ~3 weeks.  - received darbepoetin 100 mcg on 12/19/2022, has been on about 6 months  - taking cyanocobalamine 1000 mcg daily - B12 level 8/11/2021 was 1,422 pg/ml, nothing more recent found  - iron levels acceptable 4/27/2022 with iron sat of 31 and ferritin of 110  - may need IV iron, recommend to re-check iron levels  - she prefers to avoid transfusion to minimize any sensitization, already has PRA 85. I did recommend to Caity that if hemoglobin less than 5 she definitely get at least 1 unit blood, depending on degree of symptoms, may be OK to hold off on transfusion. Possibly some of this is dilutional.    # Electrolytes:   Needs potassium check    # Mineral Bone Disorder:   Cholecalciferol 25 mcg daily    # ulcerative colitis  - vedolizumab 300 mg last given 12/14/2022  - colonoscopy was deferred Dec 2022 due to current acute issues     Assessment and plan was discussed with patient and she voiced her understanding and  agreement.    More Cardona MD  [facility]  Department of Medicine  Division of Nephrology and Hypertension  [facility]  tray [employer] Web Console     Reason for Visit:  Caity Horan is a 32 year old female with failing renal allograft, who presents for follow up.    HPI:  Caity Horan is a 32 year old with history of IgA nephropathy, s/p LDKT in 2014. Was doing well, moved out to [place] a couple years ago but in the last year or so has worsening kidney function. 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 Kidney function has continued to decline and she has started evaluation for second transplant. In the last several weeks, things have significantly worsened.    Worsening fluid retention with shortness of breath and orthopnea- up 35 lbs with edema in face, fluid in torso, edema of legs.  Poor appetite and poor energy. Can not do much, mostly sedentary now.  Has ulcerative colitis and recently has not had colitis sx, no hematochezia.  Has had menstral bleeding since 12/6 and hasn't stopped- about two pads per day.      ROS:   A comprehensive review of systems was obtained and negative, except as noted in the HPI or PMH.    Active Medical Problems:  Patient Active Problem List   Diagnosis     Metabolic acidosis     IgA nephropathy     Kidney replaced by transplant     Immunosuppressed status (H)     Hypomagnesemia     Aftercare following organ transplant     EBV (Nicole-Barr virus) viremia     Anemia, iron deficiency     Vitamin B12 deficiency     Ulcerative pancolitis with rectal bleeding (H)     Acute rejection of kidney transplant     Ulcerative colitis (H)     Vitamin D deficiency     PMH:   Medical record was reviewed and PMH was discussed with patient and noted below.  Past Medical History:   Diagnosis Date     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     Anemia in stage 5 chronic kidney disease  (H)      Anemia in stage 5 chronic kidney disease (H) 10/27/2014     ESRD (end stage renal disease) on dialysis (H)      HTN (hypertension) 10/27/2014     Hypertension 10/2014     Hypomagnesemia      IgA nephropathy     biopsy proven     Metabolic acidosis      Ulcerative colitis (H)      Vitamin D deficiency      PSH:   Past Surgical History:   Procedure Laterality Date     BIOPSY      renal     COLONOSCOPY N/A 2018    Procedure: COMBINED COLONOSCOPY, SINGLE OR MULTIPLE BIOPSY/POLYPECTOMY BY BIOPSY;  EGD/Colonoscopy ;  Surgeon: Kory Massey MD;  Location: [facility] GI     COLONOSCOPY N/A 09/10/2018    Procedure: COMBINED COLONOSCOPY, SINGLE OR MULTIPLE BIOPSY/POLYPECTOMY BY BIOPSY;  Colonoscopy;  Surgeon: Yenifer Doan MD;  Location: [facility] OR     EXTRACTION(S) DENTAL       PERCUTANEOUS BIOPSY KIDNEY Right 2018    Procedure: Right Kidney Biopsy;  Surgeon: Otilio Mar MD;  Location: [facility] OR     TRANSPLANT KIDNEY RECIPIENT LIVING RELATED N/A 2014    Procedure: TRANSPLANT KIDNEY RECIPIENT LIVING RELATED;  Surgeon: Dale Middleton MD;  Location: [facility] OR     WISDOM TOOTH EXTRACTION Bilateral        Family Hx:   Family History   Problem Relation Age of Onset     No Known Problems Mother      Alcoholism Father          in early 50s     Prostate Cancer Paternal Grandmother      Kidney Disease No family hx of      Personal Hx:   Social History     Tobacco Use     Smoking status: Never     Smokeless tobacco: Never   Substance Use Topics     Alcohol use: Not Currently     Alcohol/week: 3.0 - 9.0 standard drinks     Types: 1 - 3 Glasses of wine, 1 - 3 Cans of beer, 1 - 3 Shots of liquor per week       Allergies:  Allergies   Allergen Reactions     Bumetanide Other (See Comments)     Causes paralysis     Amoxicillin Rash       Medications:  Current Outpatient Medications   Medication Sig     Blood Pressure Monitoring (B-D ASSURE BPM/AUTO ARM CUFF) MISC Monitor blood pressure and pulse twice  daily     CALCIUM ANTACID 500 MG chewable tablet Take by mouth 3 times daily     Cyanocobalamin (B-12) 1000 MCG TBCR TAKE ONE TABLET BY MOUTH EVERY DAY     levonorgestrel (MIRENA) 20 MCG/DAY IUD 1 each by Intrauterine route     losartan (COZAAR) 25 MG tablet TAKE 1/2 TABLET BY MOUTH AT BEDTIME (Patient taking differently: 25 mg)     magnesium oxide (MAG-OX) 400 MG tablet Take 1 tablet (400 mg) by mouth daily     mycophenolate (GENERIC EQUIVALENT) 250 MG capsule Take 2 capsules (500 mg) by mouth 2 times daily     sodium bicarbonate 650 MG tablet      tacrolimus (GENERIC EQUIVALENT) 0.5 MG capsule Take 3 capsules (1.5 mg) by mouth 2 times daily (Patient taking differently: Take 3 mg by mouth 2 times daily)     UNABLE TO FIND MEDICATION NAME: Biotin     Vedolizumab (ENTYVIO IV)      vitamin D3 (CHOLECALCIFEROL) 50 mcg (2000 units) tablet Take 1 tablet (50 mcg) by mouth daily     No current facility-administered medications for this visit.      Vitals:  There were no vitals taken for this visit.    Exam:  Telephone visit  No conversational dyspnea    LABS:   CMP  Recent Labs   Lab Test 12/05/22 1212 04/16/21  1005 01/14/21  1200 12/03/20  0915 10/22/20  1105    140 140 138 139   POTASSIUM 5.0 4.0 4.2 4.2 4.2   CHLORIDE 104 109 107 108 106   CO2 22 25 28 23 25   ANIONGAP 13 6 5 7 8   * 103* 90 99 107*   BUN 76.4* 33* 21 28 35*   CR 8.84* 1.31* 1.24* 1.11* 1.29*   GFRESTIMATED 6* 54* 58* 66 55*   GFRESTBLACK  --  63 67 77 64   MICAH 8.9 8.8 9.2 8.6 9.2     Recent Labs   Lab Test 12/05/22  1212 04/16/21  1005 01/14/21  1200 12/03/20  0915   BILITOTAL 0.7 0.4 0.8 0.4   ALKPHOS 74 47 50 51   ALT 10 14 18 24   AST 20 13 21 20     CBC  Recent Labs   Lab Test 12/05/22  1212 01/14/21  1200 12/03/20  0915 10/22/20  1105 09/10/20  1040   HGB 6.6* 10.6* 9.8* 9.7* 10.4*   WBC 6.3  --  6.3 6.6 6.5   RBC 2.29*  --  3.20* 3.17* 3.40*   HCT 20.3*  --  30.0* 29.9* 32.7*   MCV 89  --  94 94 96   MCH 28.8  --  30.6 30.6 30.6    MCHC 32.5  --  32.7 32.4 31.8   RDW 15.3*  --  11.9 12.1 12.5   * 279 227 224 237     URINE STUDIES  Recent Labs   Lab Test 12/05/22  1200 12/19/18  0615 01/05/15  1135 12/10/14  0940   COLOR Light Yellow Yellow Yellow Yellow   APPEARANCE Clear Clear Clear Clear   URINEGLC 50* Negative Negative Negative   URINEBILI Negative Negative Negative Negative   URINEKETONE Negative Negative Negative Negative   SG 1.012 1.019 1.015 1.014   UBLD Moderate* Negative Negative Negative   URINEPH 7.5* 5.0 5.5 6.0   PROTEIN 300* Negative Negative 10*   NITRITE Negative Negative Negative Negative   LEUKEST Negative Negative Negative Negative   RBCU 4* 1 1 1   WBCU 8* 2 2 <1     Recent Labs   Lab Test 01/14/21  1200 12/03/20  0915 06/18/20  0950 11/11/19  0718   UTPG 2.09* 1.36* 1.23* 0.17     PTH  Recent Labs   Lab Test 10/27/14  0850   PTHI 297*     IRON STUDIES  Recent Labs   Lab Test 06/27/19  0742 11/01/18  0737 08/23/18  0743   IRON 99 113 131    262 236*   IRONSAT 34 43 55*   FRANNY 233* 216* 226*         More Cardona MD

## 2022-12-21 NOTE — TELEPHONE ENCOUNTER
JULIA called Caity to screen for NLDAC assistance for any living donors that come forward for her. She is over income for this program.     Angelique Florez [facility] CareSW, CCTSW   Living Donor   [facility], [facility], [facility]  Direct: [phone]  E-Mail: [email or web address]

## 2022-12-21 NOTE — LETTER
12/21/2022       RE: Caity Horan  [address]  [address]  [facility] [address]     Dear Colleague,    Thank you for referring your patient, Caity Horan, to the [facility] [place] at [facility]. Please see a copy of my visit note below.    Nephrology  12/21/2022    Assessment and Plan:  # CKD 5:  Initial disease IgA nephropathy, s/p LDKT 12/5/14, now with failing allograft   IS: Tacrolimus Dose: 3mg/3mg (12/7/22)  MMF Dose: 500mg/500mg (12/7/22)  - eGFR ~5 with uremia, low appetite, advised on initiating dialysis. Recommend placement at [facility] - prefers MW 3rd shift  - I asked CKD [facility] to fax referral - has had Hep B and quant gold on 12/5/2022    # hypervolemia - + 35 lbs, with orthopnea and significant shortness of breath - advised to go to Emergency department, would consider IV diuretics and make plan for initiation of dialysis    # Hypertension:   Amlodipine 10 mg daily   Losartan 25 mg  trialed diuretic but had tachycardia and felt her edema worsened so that was stopped  - BP meds can be adjusted in hospital    # Anemia in chronic renal disease:   - Hgb: 5.4 on 12/19 from 5.9 on 12/14. Had been 8 11/30/2022 and 9.6 in mid November, has significantly worsened essentially from 9's to 5's in ~3 weeks.  - received darbepoetin 100 mcg on 12/19/2022, has been on about 6 months  - taking cyanocobalamine 1000 mcg daily - B12 level 8/11/2021 was 1,422 pg/ml, nothing more recent found  - iron levels acceptable 4/27/2022 with iron sat of 31 and ferritin of 110  - may need IV iron, recommend to re-check iron levels  - she prefers to avoid transfusion to minimize any sensitization, already has PRA 85. I did recommend to Caity that if hemoglobin less than 5 she definitely get at least 1 unit blood, depending on degree of symptoms, may be OK to hold off on transfusion. Possibly some of this is dilutional.    # Electrolytes:    Needs potassium check    # Mineral Bone Disorder:   Cholecalciferol 25 mcg daily    # ulcerative colitis  - vedolizumab 300 mg last given 12/14/2022  - colonoscopy was deferred Dec 2022 due to current acute issues     Assessment and plan was discussed with patient and she voiced her understanding and agreement.    More Cardona MD  [facility]  Department of Medicine  Division of Nephrology and Hypertension  [facility]  tray  Vocera Web Console     Reason for Visit:  Caity Horan is a 32 year old female with failing renal allograft, who presents for follow up.    HPI:  Caity Horan is a 32 year old with history of IgA nephropathy, s/p LDKT in 2014. Was doing well, moved out to [place] a couple years ago but in the last year or so has worsening kidney function. 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 Kidney function has continued to decline and she has started evaluation for second transplant. In the last several weeks, things have significantly worsened.    Worsening fluid retention with shortness of breath and orthopnea- up 35 lbs with edema in face, fluid in torso, edema of legs.  Poor appetite and poor energy. Can not do much, mostly sedentary now.  Has ulcerative colitis and recently has not had colitis sx, no hematochezia.  Has had menstral bleeding since 12/6 and hasn't stopped- about two pads per day.      ROS:   A comprehensive review of systems was obtained and negative, except as noted in the HPI or PMH.    Active Medical Problems:  Patient Active Problem List   Diagnosis     Metabolic acidosis     IgA nephropathy     Kidney replaced by transplant     Immunosuppressed status (H)     Hypomagnesemia     Aftercare following organ transplant     EBV (Nicole-Barr virus) viremia     Anemia, iron deficiency     Vitamin B12 deficiency     Ulcerative pancolitis with rectal bleeding (H)     Acute  rejection of kidney transplant     Ulcerative colitis (H)     Vitamin D deficiency     PMH:   Medical record was reviewed and PMH was discussed with patient and noted below.  Past Medical History:   Diagnosis Date     Acute rejection of kidney transplant 2021     Anemia in stage 5 chronic kidney disease (H)      Anemia in stage 5 chronic kidney disease (H) 10/27/2014     ESRD (end stage renal disease) on dialysis (H)      HTN (hypertension) 10/27/2014     Hypertension 10/2014     Hypomagnesemia      IgA nephropathy     biopsy proven     Metabolic acidosis      Ulcerative colitis (H)      Vitamin D deficiency      PSH:   Past Surgical History:   Procedure Laterality Date     BIOPSY      renal     COLONOSCOPY N/A 2018    Procedure: COMBINED COLONOSCOPY, SINGLE OR MULTIPLE BIOPSY/POLYPECTOMY BY BIOPSY;  EGD/Colonoscopy ;  Surgeon: Kory Massey MD;  Location: [facility]     COLONOSCOPY N/A 09/10/2018    Procedure: COMBINED COLONOSCOPY, SINGLE OR MULTIPLE BIOPSY/POLYPECTOMY BY BIOPSY;  Colonoscopy;  Surgeon: Yenifer Doan MD;  Location: [facility] OR     EXTRACTION(S) DENTAL       PERCUTANEOUS BIOPSY KIDNEY Right 2018    Procedure: Right Kidney Biopsy;  Surgeon: Otilio Mar MD;  Location: [facility] OR     TRANSPLANT KIDNEY RECIPIENT LIVING RELATED N/A 2014    Procedure: TRANSPLANT KIDNEY RECIPIENT LIVING RELATED;  Surgeon: Dale Middleton MD;  Location: [facility] OR     WISDOM TOOTH EXTRACTION Bilateral        Family Hx:   Family History   Problem Relation Age of Onset     No Known Problems Mother      Alcoholism Father          in early 50s     Prostate Cancer Paternal Grandmother      Kidney Disease No family hx of      Personal Hx:   Social History     Tobacco Use     Smoking status: Never     Smokeless tobacco: Never   Substance Use Topics     Alcohol use: Not Currently     Alcohol/week: 3.0 - 9.0 standard drinks     Types: 1 - 3 Glasses of wine, 1 - 3 Cans of beer, 1 - 3 Shots of liquor  per week       Allergies:  Allergies   Allergen Reactions     Bumetanide Other (See Comments)     Causes paralysis     Amoxicillin Rash       Medications:  Current Outpatient Medications   Medication Sig     Blood Pressure Monitoring (B-D ASSURE BPM/AUTO ARM CUFF) MISC Monitor blood pressure and pulse twice daily     CALCIUM ANTACID 500 MG chewable tablet Take by mouth 3 times daily     Cyanocobalamin (B-12) 1000 MCG TBCR TAKE ONE TABLET BY MOUTH EVERY DAY     levonorgestrel (MIRENA) 20 MCG/DAY IUD 1 each by Intrauterine route     losartan (COZAAR) 25 MG tablet TAKE 1/2 TABLET BY MOUTH AT BEDTIME (Patient taking differently: 25 mg)     magnesium oxide (MAG-OX) 400 MG tablet Take 1 tablet (400 mg) by mouth daily     mycophenolate (GENERIC EQUIVALENT) 250 MG capsule Take 2 capsules (500 mg) by mouth 2 times daily     sodium bicarbonate 650 MG tablet      tacrolimus (GENERIC EQUIVALENT) 0.5 MG capsule Take 3 capsules (1.5 mg) by mouth 2 times daily (Patient taking differently: Take 3 mg by mouth 2 times daily)     UNABLE TO FIND MEDICATION NAME: Biotin     Vedolizumab (ENTYVIO IV)      vitamin D3 (CHOLECALCIFEROL) 50 mcg (2000 units) tablet Take 1 tablet (50 mcg) by mouth daily     No current facility-administered medications for this visit.      Vitals:  There were no vitals taken for this visit.    Exam:  Telephone visit  No conversational dyspnea    LABS:   CMP  Recent Labs   Lab Test 12/05/22 1212 04/16/21  1005 01/14/21  1200 12/03/20  0915 10/22/20  1105    140 140 138 139   POTASSIUM 5.0 4.0 4.2 4.2 4.2   CHLORIDE 104 109 107 108 106   CO2 22 25 28 23 25   ANIONGAP 13 6 5 7 8   * 103* 90 99 107*   BUN 76.4* 33* 21 28 35*   CR 8.84* 1.31* 1.24* 1.11* 1.29*   GFRESTIMATED 6* 54* 58* 66 55*   GFRESTBLACK  --  63 67 77 64   MICAH 8.9 8.8 9.2 8.6 9.2     Recent Labs   Lab Test 12/05/22  1212 04/16/21  1005 01/14/21  1200 12/03/20  0915   BILITOTAL 0.7 0.4 0.8 0.4   ALKPHOS 74 47 50 51   ALT 10 14 18 24    AST 20 13 21 20     CBC  Recent Labs   Lab Test 12/05/22  1212 01/14/21  1200 12/03/20  0915 10/22/20  1105 09/10/20  1040   HGB 6.6* 10.6* 9.8* 9.7* 10.4*   WBC 6.3  --  6.3 6.6 6.5   RBC 2.29*  --  3.20* 3.17* 3.40*   HCT 20.3*  --  30.0* 29.9* 32.7*   MCV 89  --  94 94 96   MCH 28.8  --  30.6 30.6 30.6   MCHC 32.5  --  32.7 32.4 31.8   RDW 15.3*  --  11.9 12.1 12.5   * 279 227 224 237     URINE STUDIES  Recent Labs   Lab Test 12/05/22  1200 12/19/18  0615 01/05/15  1135 12/10/14  0940   COLOR Light Yellow Yellow Yellow Yellow   APPEARANCE Clear Clear Clear Clear   URINEGLC 50* Negative Negative Negative   URINEBILI Negative Negative Negative Negative   URINEKETONE Negative Negative Negative Negative   SG 1.012 1.019 1.015 1.014   UBLD Moderate* Negative Negative Negative   URINEPH 7.5* 5.0 5.5 6.0   PROTEIN 300* Negative Negative 10*   NITRITE Negative Negative Negative Negative   LEUKEST Negative Negative Negative Negative   RBCU 4* 1 1 1   WBCU 8* 2 2 <1     Recent Labs   Lab Test 01/14/21  1200 12/03/20  0915 06/18/20  0950 11/11/19  0718   UTPG 2.09* 1.36* 1.23* 0.17     PTH  Recent Labs   Lab Test 10/27/14  0850   PTHI 297*     IRON STUDIES  Recent Labs   Lab Test 06/27/19  0742 11/01/18  0737 08/23/18  0743   IRON 99 113 131    262 236*   IRONSAT 34 43 55*   FRANNY 233* 216* 226*         More Cardona MD

## 2022-12-21 NOTE — TELEPHONE ENCOUNTER
Interventional Radiology chart review.  Patient scheduled for CVC tunneled placement on12/22/22,  Noted pending admission for today (12/21/22) at 4 pm at [facility].  Patient will be inpatient for planned procedure on 12/22/22.  No further action required for pre-call nurse.      Documented H&P physical exam noted in patient chart dated 12/05/2022.

## 2022-12-21 NOTE — PROGRESS NOTES
Received message from Dr. Cardona. Caity saw her today and instead of initiating dialysis outpatient, she will need to be admitted due to her volume overload. She asked that we still start process of dialysis set up at [facility] unit. Writer faxed all clinicals, lab,  and admission paperwork for [address] [facility] unit. Once she is admitted and has a RN CM, will update them with where writer is at in the process of setting up outpatient dialysis.

## 2022-12-22 ENCOUNTER — APPOINTMENT (OUTPATIENT)
Dept: INTERVENTIONAL RADIOLOGY/VASCULAR | Facility: CLINIC | Age: 32
DRG: 673 | End: 2022-12-22
Attending: NURSE PRACTITIONER
Payer: COMMERCIAL

## 2022-12-22 LAB
ABO/RH(D): NORMAL
ALBUMIN SERPL BCG-MCNC: 3.3 G/DL (ref 3.5–5.2)
ALP SERPL-CCNC: 51 U/L (ref 35–104)
ALT SERPL W P-5'-P-CCNC: 11 U/L (ref 10–35)
ANION GAP SERPL CALCULATED.3IONS-SCNC: 20 MMOL/L (ref 7–15)
ANTIBODY SCREEN: NEGATIVE
AST SERPL W P-5'-P-CCNC: 23 U/L (ref 10–35)
ATRIAL RATE - MUSE: 89 BPM
BILIRUB DIRECT SERPL-MCNC: <0.2 MG/DL (ref 0–0.3)
BILIRUB SERPL-MCNC: 0.5 MG/DL
BUN SERPL-MCNC: 121 MG/DL (ref 6–20)
CALCIUM SERPL-MCNC: 8.7 MG/DL (ref 8.6–10)
CHLORIDE SERPL-SCNC: 103 MMOL/L (ref 98–107)
CREAT SERPL-MCNC: 12 MG/DL (ref 0.51–0.95)
CROSSMATCHDATEVXM: NORMAL
DEPRECATED HCO3 PLAS-SCNC: 18 MMOL/L (ref 22–29)
DIASTOLIC BLOOD PRESSURE - MUSE: NORMAL MMHG
DONOR VXM: NORMAL
DSA VXM B1: NORMAL
DSA VXM B2: NORMAL
DSA VXMT1: NORMAL
DSA VXMT2: NORMAL
ERYTHROCYTE [DISTWIDTH] IN BLOOD BY AUTOMATED COUNT: 18.3 % (ref 10–15)
FERRITIN SERPL-MCNC: 361 NG/ML (ref 6–175)
GFR SERPL CREATININE-BSD FRML MDRD: 4 ML/MIN/1.73M2
GLUCOSE SERPL-MCNC: 101 MG/DL (ref 70–99)
HCG SERPL QL: NEGATIVE
HCT VFR BLD AUTO: 16.4 % (ref 35–47)
HGB BLD-MCNC: 5 G/DL (ref 11.7–15.7)
INTERPRETATION ECG - MUSE: NORMAL
IRON BINDING CAPACITY (ROCHE): 223 UG/DL (ref 240–430)
IRON SATN MFR SERPL: 36 % (ref 15–46)
IRON SERPL-MCNC: 80 UG/DL (ref 37–145)
MCH RBC QN AUTO: 28.9 PG (ref 26.5–33)
MCHC RBC AUTO-ENTMCNC: 30.5 G/DL (ref 31.5–36.5)
MCV RBC AUTO: 95 FL (ref 78–100)
P AXIS - MUSE: 60 DEGREES
PLATELET # BLD AUTO: 110 10E3/UL (ref 150–450)
POTASSIUM SERPL-SCNC: 5.1 MMOL/L (ref 3.4–5.3)
PR INTERVAL - MUSE: 134 MS
PROT SERPL-MCNC: 5.3 G/DL (ref 6.4–8.3)
QRS DURATION - MUSE: 78 MS
QT - MUSE: 368 MS
QTC - MUSE: 447 MS
R AXIS - MUSE: 63 DEGREES
RBC # BLD AUTO: 1.73 10E6/UL (ref 3.8–5.2)
RESULT VXM B1: NORMAL
RESULT VXM B2: NORMAL
RESULT VXM T1: NORMAL
RESULT VXM T2: NORMAL
RETICS # AUTO: 0.12 10E6/UL (ref 0.03–0.1)
RETICS/RBC NFR AUTO: 6.7 % (ref 0.5–2)
SARS-COV-2 RNA RESP QL NAA+PROBE: NEGATIVE
SERUM DATE VXM B1: NORMAL
SERUM DATE VXM B2: NORMAL
SERUM DATE VXM T1: NORMAL
SERUM DATE VXM T2: NORMAL
SODIUM SERPL-SCNC: 141 MMOL/L (ref 136–145)
SPECIMEN EXPIRATION DATE: NORMAL
SYSTOLIC BLOOD PRESSURE - MUSE: NORMAL MMHG
T AXIS - MUSE: 43 DEGREES
VENTRICULAR RATE- MUSE: 89 BPM
WBC # BLD AUTO: 6.6 10E3/UL (ref 4–11)
ZZZCOMMENT VXMB1: NORMAL
ZZZCOMMENT VXMB2: NORMAL
ZZZCOMMENT VXMT1: NORMAL
ZZZCOMMENT VXMT2: NORMAL

## 2022-12-22 PROCEDURE — 250N000011 HC RX IP 250 OP 636: Performed by: PHYSICIAN ASSISTANT

## 2022-12-22 PROCEDURE — 272N000602 HC WOUND GLUE CR1

## 2022-12-22 PROCEDURE — 90937 HEMODIALYSIS REPEATED EVAL: CPT

## 2022-12-22 PROCEDURE — 86850 RBC ANTIBODY SCREEN: CPT | Performed by: NURSE PRACTITIONER

## 2022-12-22 PROCEDURE — 02H633Z INSERTION OF INFUSION DEVICE INTO RIGHT ATRIUM, PERCUTANEOUS APPROACH: ICD-10-PCS | Performed by: RADIOLOGY

## 2022-12-22 PROCEDURE — 86923 COMPATIBILITY TEST ELECTRIC: CPT | Performed by: STUDENT IN AN ORGANIZED HEALTH CARE EDUCATION/TRAINING PROGRAM

## 2022-12-22 PROCEDURE — 80048 BASIC METABOLIC PNL TOTAL CA: CPT

## 2022-12-22 PROCEDURE — 36415 COLL VENOUS BLD VENIPUNCTURE: CPT | Performed by: NURSE PRACTITIONER

## 2022-12-22 PROCEDURE — 86923 COMPATIBILITY TEST ELECTRIC: CPT

## 2022-12-22 PROCEDURE — 250N000011 HC RX IP 250 OP 636: Performed by: NURSE PRACTITIONER

## 2022-12-22 PROCEDURE — 85045 AUTOMATED RETICULOCYTE COUNT: CPT | Performed by: NURSE PRACTITIONER

## 2022-12-22 PROCEDURE — 250N000011 HC RX IP 250 OP 636

## 2022-12-22 PROCEDURE — 250N000013 HC RX MED GY IP 250 OP 250 PS 637

## 2022-12-22 PROCEDURE — 83550 IRON BINDING TEST: CPT | Performed by: NURSE PRACTITIONER

## 2022-12-22 PROCEDURE — 5A1D70Z PERFORMANCE OF URINARY FILTRATION, INTERMITTENT, LESS THAN 6 HOURS PER DAY: ICD-10-PCS | Performed by: INTERNAL MEDICINE

## 2022-12-22 PROCEDURE — 80076 HEPATIC FUNCTION PANEL: CPT | Performed by: STUDENT IN AN ORGANIZED HEALTH CARE EDUCATION/TRAINING PROGRAM

## 2022-12-22 PROCEDURE — 258N000003 HC RX IP 258 OP 636: Performed by: INTERNAL MEDICINE

## 2022-12-22 PROCEDURE — 99223 1ST HOSP IP/OBS HIGH 75: CPT | Mod: GC | Performed by: STUDENT IN AN ORGANIZED HEALTH CARE EDUCATION/TRAINING PROGRAM

## 2022-12-22 PROCEDURE — 99152 MOD SED SAME PHYS/QHP 5/>YRS: CPT | Mod: GC | Performed by: RADIOLOGY

## 2022-12-22 PROCEDURE — 76937 US GUIDE VASCULAR ACCESS: CPT | Mod: 26 | Performed by: RADIOLOGY

## 2022-12-22 PROCEDURE — 0JH60XZ INSERTION OF TUNNELED VASCULAR ACCESS DEVICE INTO CHEST SUBCUTANEOUS TISSUE AND FASCIA, OPEN APPROACH: ICD-10-PCS | Performed by: RADIOLOGY

## 2022-12-22 PROCEDURE — 250N000009 HC RX 250: Performed by: PHYSICIAN ASSISTANT

## 2022-12-22 PROCEDURE — 82728 ASSAY OF FERRITIN: CPT | Performed by: NURSE PRACTITIONER

## 2022-12-22 PROCEDURE — 86901 BLOOD TYPING SEROLOGIC RH(D): CPT | Performed by: NURSE PRACTITIONER

## 2022-12-22 PROCEDURE — 120N000011 HC R&B TRANSPLANT UMMC

## 2022-12-22 PROCEDURE — 36415 COLL VENOUS BLD VENIPUNCTURE: CPT

## 2022-12-22 PROCEDURE — 77001 FLUOROGUIDE FOR VEIN DEVICE: CPT | Mod: 26 | Performed by: RADIOLOGY

## 2022-12-22 PROCEDURE — 84703 CHORIONIC GONADOTROPIN ASSAY: CPT | Performed by: NURSE PRACTITIONER

## 2022-12-22 PROCEDURE — 250N000012 HC RX MED GY IP 250 OP 636 PS 637

## 2022-12-22 PROCEDURE — 250N000013 HC RX MED GY IP 250 OP 250 PS 637: Performed by: STUDENT IN AN ORGANIZED HEALTH CARE EDUCATION/TRAINING PROGRAM

## 2022-12-22 PROCEDURE — 99223 1ST HOSP IP/OBS HIGH 75: CPT | Performed by: INTERNAL MEDICINE

## 2022-12-22 PROCEDURE — 272N000504 HC NEEDLE CR4

## 2022-12-22 PROCEDURE — C1750 CATH, HEMODIALYSIS,LONG-TERM: HCPCS

## 2022-12-22 PROCEDURE — 250N000011 HC RX IP 250 OP 636: Performed by: RADIOLOGY

## 2022-12-22 PROCEDURE — U0005 INFEC AGEN DETEC AMPLI PROBE: HCPCS

## 2022-12-22 PROCEDURE — 90935 HEMODIALYSIS ONE EVALUATION: CPT | Performed by: INTERNAL MEDICINE

## 2022-12-22 PROCEDURE — C1769 GUIDE WIRE: HCPCS

## 2022-12-22 PROCEDURE — 85048 AUTOMATED LEUKOCYTE COUNT: CPT

## 2022-12-22 PROCEDURE — 85027 COMPLETE CBC AUTOMATED: CPT

## 2022-12-22 PROCEDURE — 36558 INSERT TUNNELED CV CATH: CPT | Mod: GC | Performed by: RADIOLOGY

## 2022-12-22 PROCEDURE — 99152 MOD SED SAME PHYS/QHP 5/>YRS: CPT

## 2022-12-22 RX ORDER — NALOXONE HYDROCHLORIDE 0.4 MG/ML
0.4 INJECTION, SOLUTION INTRAMUSCULAR; INTRAVENOUS; SUBCUTANEOUS
Status: DISCONTINUED | OUTPATIENT
Start: 2022-12-22 | End: 2022-12-25

## 2022-12-22 RX ORDER — HEPARIN SODIUM 1000 [USP'U]/ML
3 INJECTION, SOLUTION INTRAVENOUS; SUBCUTANEOUS ONCE
Status: COMPLETED | OUTPATIENT
Start: 2022-12-22 | End: 2022-12-22

## 2022-12-22 RX ORDER — LANOLIN ALCOHOL/MO/W.PET/CERES
1000 CREAM (GRAM) TOPICAL DAILY
Status: DISCONTINUED | OUTPATIENT
Start: 2022-12-22 | End: 2022-12-26 | Stop reason: HOSPADM

## 2022-12-22 RX ORDER — METOLAZONE 10 MG/1
10 TABLET ORAL ONCE
Status: COMPLETED | OUTPATIENT
Start: 2022-12-22 | End: 2022-12-22

## 2022-12-22 RX ORDER — VITAMIN B COMPLEX
50 TABLET ORAL DAILY
Status: DISCONTINUED | OUTPATIENT
Start: 2022-12-22 | End: 2022-12-26 | Stop reason: HOSPADM

## 2022-12-22 RX ORDER — NALOXONE HYDROCHLORIDE 0.4 MG/ML
0.2 INJECTION, SOLUTION INTRAMUSCULAR; INTRAVENOUS; SUBCUTANEOUS
Status: DISCONTINUED | OUTPATIENT
Start: 2022-12-22 | End: 2022-12-25

## 2022-12-22 RX ORDER — ACETAMINOPHEN 325 MG/1
650 TABLET ORAL EVERY 6 HOURS PRN
Status: DISCONTINUED | OUTPATIENT
Start: 2022-12-22 | End: 2022-12-26 | Stop reason: HOSPADM

## 2022-12-22 RX ORDER — FENTANYL CITRATE 50 UG/ML
25-50 INJECTION, SOLUTION INTRAMUSCULAR; INTRAVENOUS EVERY 5 MIN PRN
Status: DISCONTINUED | OUTPATIENT
Start: 2022-12-22 | End: 2022-12-22

## 2022-12-22 RX ORDER — FUROSEMIDE 10 MG/ML
100 INJECTION INTRAMUSCULAR; INTRAVENOUS ONCE
Status: COMPLETED | OUTPATIENT
Start: 2022-12-22 | End: 2022-12-22

## 2022-12-22 RX ORDER — CLINDAMYCIN PHOSPHATE 900 MG/50ML
900 INJECTION, SOLUTION INTRAVENOUS
Status: DISCONTINUED | OUTPATIENT
Start: 2022-12-22 | End: 2022-12-22

## 2022-12-22 RX ORDER — FLUMAZENIL 0.1 MG/ML
0.2 INJECTION, SOLUTION INTRAVENOUS
Status: DISCONTINUED | OUTPATIENT
Start: 2022-12-22 | End: 2022-12-22

## 2022-12-22 RX ORDER — AMLODIPINE BESYLATE 10 MG/1
10 TABLET ORAL DAILY
Status: DISCONTINUED | OUTPATIENT
Start: 2022-12-22 | End: 2022-12-24

## 2022-12-22 RX ADMIN — FENTANYL CITRATE 50 MCG: 50 INJECTION, SOLUTION INTRAMUSCULAR; INTRAVENOUS at 14:39

## 2022-12-22 RX ADMIN — MIDAZOLAM 1 MG: 1 INJECTION INTRAMUSCULAR; INTRAVENOUS at 14:31

## 2022-12-22 RX ADMIN — METOLAZONE 10 MG: 10 TABLET ORAL at 00:43

## 2022-12-22 RX ADMIN — TACROLIMUS 3 MG: 1 CAPSULE ORAL at 00:05

## 2022-12-22 RX ADMIN — MIDAZOLAM 1 MG: 1 INJECTION INTRAMUSCULAR; INTRAVENOUS at 14:38

## 2022-12-22 RX ADMIN — FUROSEMIDE 100 MG: 10 INJECTION, SOLUTION INTRAMUSCULAR; INTRAVENOUS at 00:29

## 2022-12-22 RX ADMIN — ACETAMINOPHEN 650 MG: 325 TABLET, FILM COATED ORAL at 23:42

## 2022-12-22 RX ADMIN — Medication 50 MCG: at 08:39

## 2022-12-22 RX ADMIN — SODIUM CHLORIDE 300 ML: 9 INJECTION, SOLUTION INTRAVENOUS at 15:24

## 2022-12-22 RX ADMIN — AMLODIPINE BESYLATE 10 MG: 10 TABLET ORAL at 18:50

## 2022-12-22 RX ADMIN — FUROSEMIDE 100 MG: 10 INJECTION, SOLUTION INTRAMUSCULAR; INTRAVENOUS at 05:28

## 2022-12-22 RX ADMIN — TACROLIMUS 3 MG: 1 CAPSULE ORAL at 18:06

## 2022-12-22 RX ADMIN — FENTANYL CITRATE 50 MCG: 50 INJECTION, SOLUTION INTRAMUSCULAR; INTRAVENOUS at 14:32

## 2022-12-22 RX ADMIN — LIDOCAINE HYDROCHLORIDE 10 ML: 10 INJECTION, SOLUTION EPIDURAL; INFILTRATION; INTRACAUDAL; PERINEURAL at 15:01

## 2022-12-22 RX ADMIN — DIPHENHYDRAMINE HYDROCHLORIDE 25 MG: 25 CAPSULE ORAL at 00:05

## 2022-12-22 RX ADMIN — SODIUM CHLORIDE 250 ML: 9 INJECTION, SOLUTION INTRAVENOUS at 15:24

## 2022-12-22 RX ADMIN — MYCOPHENOLATE MOFETIL 500 MG: 250 CAPSULE ORAL at 18:06

## 2022-12-22 RX ADMIN — Medication: at 15:25

## 2022-12-22 RX ADMIN — MYCOPHENOLATE MOFETIL 500 MG: 250 CAPSULE ORAL at 08:39

## 2022-12-22 RX ADMIN — TACROLIMUS 3 MG: 1 CAPSULE ORAL at 08:40

## 2022-12-22 RX ADMIN — HEPARIN SODIUM 3000 UNITS: 1000 INJECTION INTRAVENOUS; SUBCUTANEOUS at 15:03

## 2022-12-22 RX ADMIN — HEPARIN SODIUM 3000 UNITS: 1000 INJECTION INTRAVENOUS; SUBCUTANEOUS at 15:02

## 2022-12-22 RX ADMIN — CLINDAMYCIN IN 5 PERCENT DEXTROSE 900 MG: 18 INJECTION, SOLUTION INTRAVENOUS at 14:16

## 2022-12-22 RX ADMIN — MYCOPHENOLATE MOFETIL 500 MG: 250 CAPSULE ORAL at 00:05

## 2022-12-22 RX ADMIN — Medication 1000 MCG: at 08:40

## 2022-12-22 ASSESSMENT — ACTIVITIES OF DAILY LIVING (ADL)
ADLS_ACUITY_SCORE: 18
DEPENDENT_IADLS:: INDEPENDENT
ADLS_ACUITY_SCORE: 18

## 2022-12-22 NOTE — PROGRESS NOTES
Admitted/transferred from: Home  Time of arrival on unit 2020  2 RN full  skin assessment completed by Peri VERGARA & Марина CASAREZ  Skin assessment finding: skin intact, no problems   Interventions/actions: skin interventions none     Will continue to monitor.

## 2022-12-22 NOTE — PROGRESS NOTES
Spoke to Ibis RN CM- inpatient at [place] ([phone]). Relayed information from [facility] admissions that a [facility] 2nd shift spot has been held for Caity. Financial clearance still pending.  Writer did relay Ibis's number to Jody at [facility] admissions to get in touch with her with further outpatient dialysis set up.

## 2022-12-22 NOTE — H&P
[facility]    History and Physical - Medicine Service, MAROON TEAM 2       Date of Admission:  12/21/2022    Assessment & Plan      Caity Horan is a 32 year old female admitted on 12/21/2022. 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     CKD Stage 5  Hypervolemia  Patient with known chronic antibody mediated rejection and IgA nephropathy recurrence. Patient with weight increase of ~35 lbs with associated dyspnea on exertion and orthopnea. Was seen in the nephrology clinic on 12/1/22 and was advised to come to the ED for IV diuretics and initiation of dialysis. CXR revealing pulmonary edema. Notably patient has experienced muscle weakness and paralysis with bumex in the past, so she was given 100mg IV lasix x2 with 10mg IV metolazone. Is/Os not documented, though patient endorses minimal response to IV diuretics. She was seen in clinic earlier this month by both transplant surgery and transplant nephrology to initiate workup for second transplant; will discuss further planning regarding this with nephrology.  - s/p 100mg IV Lasix x2 and metolazone 10mg IV  - continue tacrolimus 3mg BID and MMF 500mg BID  - tunneled dialysis catheter being placed with plan to initiate dialysis afterwards  - hold anticoagulation  - monitor electrolytes  - will discuss transplant planning with transplant nephrology and transplant surgery    Vaginal bleeding  Epistaxis  Normocytic anemia in the setting of chronic kidney disease  Thrombocytopenia  Significant worsening of hemoglobin in the past month, suspected to be in the setting of worsening CKD vs vaginal bleeding. Could be a dilutional component in the setting of significant volume  overload, but suspect this is not playing as large of a role. Suspect vaginal bleeding is in the setting of hyperuremia. Patient has also recently begun having epistaxis. Iron studies within normal limits.   - received darbepoetin 100 mcg on 12/19/22 (has been on it for 6 months)  - transfuse if Hgb<5 per nephrology, have been avoiding until this time to minimize sensitization. Patient's PRA is 85  - PTA cyanocobalamin 1000mcg daily. Last B12 level was on 8/11/21 and was 1422    Hypertension  Patient with elevated pressures on this admission (150s/100s-110s). PTA losartan and amlodipine being held. Will continue holding anti-hypertensive treatment in the setting of initiation of dialysis.  - holding PTA losartan and amlodipine    Mild Jaundiced Appearance  - will check hepatic panel    Ulcerative Colitis  Patient has not had symptoms recently. Denies hematochezia.   - vedolizumab 300 mg last given 12/14/2022  - colonoscopy was deferred Dec 2022 due to current acute issues     Diet: NPO for Medical/Clinical Reasons Except for: Meds, Ice Chips    DVT Prophylaxis: holding per nephrology in the setting of severe anemia  [place]arton Catheter: Not present  Fluids: none  Central Lines: None  Cardiac Monitoring: ACTIVE order. Indication: Electrolyte Imbalance (24 hours)- Magnesium <1.3 mg/ml; Potassium < =2.8 or > 5.5 mg/ml  Code Status: Full Code      Clinically Significant Risk Factors Present on Admission             # Anion Gap Metabolic Acidosis: Highest Anion Gap = 22 mmol/L in last 2 days, will monitor and treat as appropriate    # Thrombocytopenia: Lowest platelets = 110 in last 2 days, will monitor for bleeding  # Acute Kidney Injury, unspecified: based on a >150% or 0.3 mg/dL increase in last creatinine compared to past 90 day average, will monitor renal function  # Hypertension: home medication list includes antihypertensive(s)      # Overweight: Estimated body mass index is 28.86 kg/m  as calculated from the  "following:    Height as of this encounter: 1.6 m (5' 3\").    Weight as of this encounter: 73.9 kg (162 lb 14.7 oz).           Disposition Plan      Expected Discharge Date: 12/25/2022      Destination: home          The patient's care was discussed with the Attending Physician, Dr. Yanes.    Rachelle Jasmine MD  Medicine Service, [address]  Securely message with the Vocera Web Console (learn more here)  Text page via [facility] Paging/Directory   Please see signed in provider for up to date coverage information    ______________________________________________________________________    Chief Complaint   Weight gain, shortness of breath, worsening kidney function.    History is obtained from the patient    History of Present Illness   Caity Horan is a 32 year old female with history of IgA nephropathy, s/p LDKT in 2014, Ulcerative colitis diagnosed in 2018 (on Entyvio injections q6 weeks), and HTN on amlodipine and losartan who is presented for worsening kidney function and volume overload.    She had been doing well since her transplant. She moved from [place] to [place] in the past few years and has been receiving care at the [facility]. Unfortunately in the last year, her kidney function has been worsening. Her baseline creatinine was 1-1.5 until early 2022 when her creatinine began slowly rising; it is now 12. 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 Due to worsening kidney function and volume overload, she returned to [place] for further care and she has started evaluation for a second transplant.     The patient reports worsening volume overload over the past several weeks, with weight gain of 35-40 lbs. She states that her baseline weight is 120 lbs. She also had increased fatigue and shortness of breath with strenuous exercise. She is asymptomatic when " walking, however she typically does Crossfit and other strenuous exercise and feels dyspneic with these activities. She also feels more short of breath when she lies flat. She denies chest pain, palpitations, cough.     The patient reports that she does make urine (up to 4-5 time daily). She cannot quantify, but she thinks she makes a decent amount. It is clear to yellow in color. She denies hematuria. She has been experiencing vaginal bleeding since the beginning of December, however - enough to use 2 pads daily. She also notes nosebleeds for the past few nights. Notably, her hemoglobin levels have also dropped. Her hemoglobin has been in the range of 7-11 for the majority of 2022 with an acute change this month. Hemoglobin was 8 on 11/30/22 and subsequently was 5.9 on 12/14/22. At present, her hemoglobin is 5.    She denies fevers, chills, chest pain, cough, abdominal pain, nausea, vomiting.     Review of Systems    The 10 point Review of Systems is negative other than noted in the HPI or here.     Past Medical History    I have reviewed this patient's medical history and updated it with pertinent information if needed.   Past Medical History:   Diagnosis Date     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     Anemia in stage 5 chronic kidney disease (H)      Anemia in stage 5 chronic kidney disease (H) 10/27/2014     ESRD (end stage renal disease) on dialysis (H)      HTN (hypertension) 10/27/2014     Hypertension 10/2014     Hypomagnesemia      IgA nephropathy     biopsy proven     Metabolic acidosis      Ulcerative colitis (H)      Vitamin D deficiency         Past Surgical History   I have reviewed this patient's surgical history and updated it with pertinent information if needed.  Past Surgical History:   Procedure Laterality Date     BIOPSY  2021    renal     COLONOSCOPY N/A 03/12/2018    Procedure: COMBINED COLONOSCOPY, SINGLE OR MULTIPLE BIOPSY/POLYPECTOMY BY BIOPSY;  EGD/Colonoscopy ;  Surgeon: de  Kory Mejía MD;  Location: [facility] GI     COLONOSCOPY N/A 09/10/2018    Procedure: COMBINED COLONOSCOPY, SINGLE OR MULTIPLE BIOPSY/POLYPECTOMY BY BIOPSY;  Colonoscopy;  Surgeon: Yenifer Doan MD;  Location: [facility] OR     EXTRACTION(S) DENTAL       PERCUTANEOUS BIOPSY KIDNEY Right 2018    Procedure: Right Kidney Biopsy;  Surgeon: Otilio Mar MD;  Location: [facility]     TRANSPLANT KIDNEY RECIPIENT LIVING RELATED N/A 2014    Procedure: TRANSPLANT KIDNEY RECIPIENT LIVING RELATED;  Surgeon: Dale Middleton MD;  Location: [facility] OR     WISDOM TOOTH EXTRACTION Bilateral         Social History   I have reviewed this patient's social history and updated it with pertinent information if needed. Caity Horan  reports that she has never smoked. She has never used smokeless tobacco. She reports that she does not currently use alcohol after a past usage of about 3.0 - 9.0 standard drinks per week. She reports that she does not use drugs.    Family History   I have reviewed this patient's family history and updated it with pertinent information if needed.  Family History   Problem Relation Age of Onset     No Known Problems Mother      Alcoholism Father          in early 50s     Prostate Cancer Paternal Grandmother      Kidney Disease No family hx of        Prior to Admission Medications   Prior to Admission Medications   Prescriptions Last Dose Informant Patient Reported? Taking?   Blood Pressure Monitoring (B-D ASSURE BPM/AUTO ARM CUFF) MISC   No No   Sig: Monitor blood pressure and pulse twice daily   CALCIUM ANTACID 500 MG chewable tablet   Yes No   Sig: Take by mouth 3 times daily   Cyanocobalamin (B-12) 1000 MCG TBCR   No No   Sig: TAKE ONE TABLET BY MOUTH EVERY DAY   UNABLE TO FIND   Yes No   Sig: MEDICATION NAME: Biotin   Vedolizumab (ENTYVIO IV)  Self Yes No   levonorgestrel (MIRENA) 20 MCG/DAY IUD   Yes No   Si each by Intrauterine route   losartan (COZAAR) 25 MG tablet   No No   Sig: TAKE 1/2  TABLET BY MOUTH AT BEDTIME   Patient taking differently: 25 mg   magnesium oxide (MAG-OX) 400 MG tablet   No No   Sig: Take 1 tablet (400 mg) by mouth daily   mycophenolate (GENERIC EQUIVALENT) 250 MG capsule   No No   Sig: Take 2 capsules (500 mg) by mouth 2 times daily   sodium bicarbonate 650 MG tablet   Yes No   tacrolimus (GENERIC EQUIVALENT) 0.5 MG capsule   No No   Sig: Take 3 capsules (1.5 mg) by mouth 2 times daily   Patient taking differently: Take 3 mg by mouth 2 times daily   vitamin D3 (CHOLECALCIFEROL) 50 mcg (2000 units) tablet   No No   Sig: Take 1 tablet (50 mcg) by mouth daily      Facility-Administered Medications: None     Allergies   Allergies   Allergen Reactions     Bumetanide Other (See Comments)     Causes paralysis     Amoxicillin Rash       Physical Exam   Vital Signs: Temp: 98.5  F (36.9  C) Temp src: Oral BP: (!) 157/110 Pulse: 84   Resp: 18 SpO2: 94 % O2 Device: None (Room air)    Weight: 162 lbs 14.72 oz    General Appearance: patient is in no acute distress, resting comfortably in bed  HEENT: EOMI, PERLL, normocephalic, MMM  Respiratory: mild crackles in lung bases bilaterally. No increased work of breathing.  Cardiovascular: tachycardic with regular rhythm, normal S1 and S2, no murmurs/rubs/gallops  GI: soft, nontender, nondistended  Genitourinary: no flank pain  Skin: patient appears mildly jaundiced. No rashes or lesions.   Musculoskeletal: No joint swelling or tenderness. 2+ bilateral lower extremity edema to knees  Neurologic: AAOx3, CN II-XII grossly intact without focal neuro deficits.  Psychiatric: patient intermittently tearful & frustrated about process for repeat kidney transplant    Data   Data reviewed today: I reviewed all medications, new labs and imaging results over the last 24 hours. I personally reviewed no images or EKG's today.    Recent Labs   Lab 12/22/22  0732 12/22/22  0608 12/21/22  2152 12/21/22  2045   WBC 6.6  --   --  8.4   HGB 5.0*  --   --  5.6*   MCV  95  --   --  94   *  --   --  163   INR  --   --   --  1.07   NA  --  141  --  141   POTASSIUM  --  5.1  --  4.9   CHLORIDE  --  103  --  102   CO2  --  18*  --  17*   BUN  --  121.0*  --  121.0*   CR  --  12.00*  --  11.70*   ANIONGAP  --  20*  --  22*   MICAH  --  8.7  --  8.9   GLC  --  101* 116* 130*     Recent Results (from the past 24 hour(s))   XR Chest Port 1 View    Narrative    EXAM: XR CHEST PORT 1 VIEW  12/21/2022 8:52 PM     HISTORY:  Concern for volume overload in dialysis patient       COMPARISON:  Chest x-ray 12/5/2022 and CT chest and pelvis 7/25/2017.    FINDINGS:   AP supine portable chest. There is a small/moderate right pleural  effusion. No significant left pleural effusion. Trachea is midline.  Mild interval perihilar hazy opacities with increased interstitial  prominence diffusely. Stable cardiomediastinal silhouette. No  pneumothorax. Unremarkable limited upper abdomen radiographically. No  acute osseous abnormality.      Impression    IMPRESSION:   1. Interval increase in interstitial prominence diffusely with  perihilar haziness, likely relating to pulmonary edema in this  dialysis patient.  2. New small right pleural effusion with associated atelectasis. No  significant left pleural effusion.    I have personally reviewed the examination and initial interpretation  and I agree with the findings.    GISELE YOUNG MD         SYSTEM ID:  [id number]   US Renal Transplant with Doppler    Narrative    EXAMINATION: US RENAL TRANSPLANT WITH DOPPLER  12/21/2022 9:09 PM      CLINICAL HISTORY: Hx LDKT in 2014 currently on dialysis.    COMPARISON: Ultrasound 12/19/2018.       FINDINGS:   There is a right lower quadrant renal transplant which measures 12.0.  The transplant kidney demonstrates mild cortical echotexture  heterogeneity otherwise with normal cortical thickness without  hydronephrosis. No peritransplant fluid collection identified. There  is no urinary tract dilation seen.     The  urinary bladder is somewhat decompressed. No visualized  abnormality.     The arcuate artery resistive indices are 0.65, 0.63, and 0.63.   The renal artery anastomosis peak systolic velocity is 180 cm/s. There  are no abnormal waveforms in the renal artery.   The renal vein is patent.   The artery and vein are patent above and below the anastomosis.    Small volume ascites within the right upper quadrant and right lower  quadrant.      Impression    IMPRESSION:     1. Mild echogenic cortices suggesting medical renal disease.  2. No hydronephrosis.  3. Normal doppler evaluation of the renal transplant.         I have personally reviewed the examination and initial interpretation  and I agree with the findings.    GISELE YOUNG MD         SYSTEM ID:  [id number]

## 2022-12-22 NOTE — PLAN OF CARE
"BP (!) 157/110 (BP Location: Left arm)   Pulse 84   Temp 98.5  F (36.9  C) (Oral)   Resp 18   Ht 1.6 m (5' 3\")   Wt 73.9 kg (162 lb 14.7 oz)   SpO2 94%   BMI 28.86 kg/m       Reason for Admission: Pt. admitted from home for volum eoverload & worsening kidney function. Pt. oriented to call light & unit routines. 2 RN skin check done, admission profile, PCS done. Call light in reach.   Neuro: Pt. alert & Ox4  Behavior: Pt. pleasant & cooperative with cares.   Activity: Pt. up ad matthias.  Vital: Pt. hypertensive 179/117 & recheck 155/102 after sched. Losartan, AOVSS on RA  LDAs: Right PIV SL  Cardiac: Hypertension. On tele., NSR  Respiratory: LS clear  GI/: Pt. voided 450cc, 1 stool overnight.  Skin: BLE +2 edema.  Pain/Nausea: Pt. denies pain or nausea.  Diet: NPO  Labs/Imaging: Hgb 5.6 last evening & cross-cover aware & spoke with fellow & no orders to treat.   Plan: Possible HD line placement today. Continue to follow POC & notify MD with change in status.                           "

## 2022-12-22 NOTE — PROGRESS NOTES
Patient is 33-year-old female with past medical history of IgA nephropathy status post LD KT in 2014.  She has been having worsening kidney function.  Biopsy showed chronic active antibody mediated rejection and evidence of recurrence IgA.  Patient drove from [facility] 20 hours.  She is severely volume overloaded.  She has gained 35 pounds.  According to the surgeons, patient was ambulating in the hospital, not requiring any oxygen, and she is comfortably lying flat.  She has lower extremity edema.  Chest x-ray shows congestion.  Patient reports having muscle weakness and paralysis due to Bumex in the past.  Lasix 200 mg was recommended.  However, the surgeon did not feel comfortable given her this dose because of concern for reaction.  She was willing to try 100 mg of Lasix with 10 metolazone followed by another 100 of Lasix if no reaction is observed.  Her hemoglobin on presentation is 5.6.  Low hemoglobin is partly due to delusional effect of volume overload.  If she responds to diuretics her hemoglobin may improve.  Consider blood transfusion only if hemoglobin is less than 5.  Hold off any anticoagulation.  She will need tunneled catheter for HD.  She will be dialyzed once the HD catheter is in place.  There is no emergent hemodialysis need at this time.

## 2022-12-22 NOTE — IR NOTE
Patient Name: Caity Horan  Medical Record Number: [id number]  Today's Date: 12/22/2022    Procedure: Tunneled dialysis catheter placement  Proceduralist: Dr TAMERA Bernal, Dr TAMERA Miramontes    Sedation start time: 1432  Sedation end time: 1506  Sedation medications administered: 3 mg versed, 150 mcg fentanyl  Total sedation time: 24 min  Sedation Notes: none    Procedure start time: 1441  Procedure end time: 1506    Report given to: Rupetr POLLOCK  & Km POLLOCK Dialysis  : none    Other Notes: Pt arrived to IR room 5 from . Consent reviewed, pt confirmed. Pt denies any questions or concerns regarding procedure. Pt positioned supine and monitored per protocol. Right neck and chest sites cleansed and dressed per protocol. Pt tolerated procedure without any noted complications. Pt transferred back to  via dialysis.

## 2022-12-22 NOTE — H&P
"  [facility] [place]    Transplant Surgery  History and Physical    Caity Horan  : 1990  MRN # [id number]    ADMIT DATE: 2022    PCP: No Ref-Primary, Physician    CHIEF COMPLAINT: Volume overload     ASSESSMENT & PLAN: Caity Horan is a 32 year old female with history of IgA nephropathy, s/p LDKT in , Ulcerative colitis diagnosed in 2018 (on Entyvio injections q6 weeks), and HTN on amlodipine and losartan who is now presenting due to worsening kidney function, transplant consideration, and dialysis for volume overload.     - Continue PTA MMF, Tacrolimus   - Volume overload: nephrology consulted; CXR with interstitial prominence suggestive of pulmonary edema, small right pleural effusion    - 100 mg IV lasix given w/ benadryl given previous history of paralysis with bumex    - 10 mg IV metolazone    - Will repeat 100 mg IV lasix per nephrology recs if tolerates  - Declining Renal function s/p LDKT in :   - K 4.9, Mg 2.5, Phos 6.8.    - Bun 121, Cr 11.7, eGFR 4   - Follow up with nephrology regarding plan for HD; needs tunneled dialysis catheter placement prior   - Hypertension: Hold PTA losartan and amlodipine   - Anemia: Hgb 5.6    - Per nephrology hold AC at this time   - Some component might be dilutional, although having ongoing vaginal bleeding per patient; could consider bleeding possibly 2/2 platelet dysfunction from hyperuricemia    - Transfuse if Hgb <5 per nephrology    - Last darbepoetin injection 2022  - FEN: NPO, sips and chips       DISPO:  Kidney Transplant team   CODE STATUS:  Full     This plan was discussed with fellow on call, Dr. Tahir Cabral.   - - - - - - - - - - - - - - - - - -  Baldomero Doan MD PGY1  General Surgery Resident     See [facility] for on-call pager information: [facility] Paging/Directory   \"SURGERY TRANSPLANT ABDOMINAL KIDNEY/PANCREAS/LIVER/LIVING DONOR/[facility]/ \"      HPI: Caity Horan is a 32 year old female with history of " IgA nephropathy, s/p LDKT in 2014, Ulcerative colitis diagnosed in 2018 (on Entyvio injections q6 weeks), and HTN on amlodipine and losartan who was doing well after her transplant and recently moved out to [place] a couple years ago but in the last year or so has had worsening kidney function.  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 Kidney function has continued to decline and she has started evaluation for second transplant.     In the last several weeks, things have significantly worsened. Her GFR dipped to <20 in October of this year and since then, patient has had issues with volume overload and a weight gain of 38 pounds. Patient now states she has been increasingly fatigued and short of breath with strenuous exercise although she is able to walk her dog every day and climb a flight of stairs without issues. She uses 2 pillows under her bed to sleep and feels more short of breath when laying down but is unsure if that is due to her anxiety or volume overload. She denies lightheadedness or dizziness at this time, but states it can occur when standing. She denies chest pain or palpitations. She continues to make urine every day and believes her urine output is adequate, clear-yellow in color. She denies hematuria but endorses ongoing vaginal bleeding; she had a period that started 4 weeks ago and has not remitted. Of note, her hemoglobin levels have also been low over the last couple months and prompted further evaluation. Workup on 12/5/2022 revealed a Hgb of 6.5. Today, Hgb is 5.6. Per patient, her doctors have been trying to avoid transfusion given her rising PRA levels in anticipation for transplantation. She is also receiving darbepoetin injections, last dose on 12/19. She has intermittent episodes of constipation and diarrhea but nothing concerning since she started her Entyvio for UC in 2018. She endorses swelling in her legs  that make it painful to walk.     She has previously gotten hemodialysis prior to her transplant in 2014 for about 4 weeks but nothing since the transplantation. She was recently evaluated by Dr. Cardona from nephrology who recommended initiating dialysis. However, due to her weight gain and worsening shortness of breath, she was advised to come to the hospital for diuretics and plans for dialysis d/t c/f for volume overload.       ROS:   CONSTITUTIONAL: Denies fever, chills, endorses fatigue, +38 pound weight gain   HEAD: Denies headache.  EENT: Denies vision changes, hearing changes, dysphagia, or sore throat.   NECK: Denies lymphadenopathy.   CV:Denies chest pain, palpitations, endorses shortness of breath with exertion and orthopnea.   PULMONARY: endorses shortness of breath, denies cough.   GI: Denies nausea, vomiting, diarrhea, and abdominal pain. Bowel movements are regular.   : Denies urinary alterations, dysuria, urinary frequency, hematuria, and abnormal drainage.   EXT: Endorses significant lower extremity edema.   SKIN: Denies abnormal rashes or lesions.   MUSCULOSKELETAL: endorses generalized body weakness and fatigue  NEUROLOGIC: Endorses occasional lightheadedness and dizziness, Denies paresthesias.  HEMATOLOGICAL: ongoing vaginal bleeding, anemic   PSYCHIATRIC: Endorses some anxiety.     PMH:  Past Medical History:   Diagnosis Date     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     Anemia in stage 5 chronic kidney disease (H)      Anemia in stage 5 chronic kidney disease (H) 10/27/2014     ESRD (end stage renal disease) on dialysis (H)      HTN (hypertension) 10/27/2014     Hypertension 10/2014     Hypomagnesemia      IgA nephropathy     biopsy proven     Metabolic acidosis      Ulcerative colitis (H)      Vitamin D deficiency        PSH:  Past Surgical History:   Procedure Laterality Date     BIOPSY  2021    renal     COLONOSCOPY N/A 03/12/2018    Procedure: COMBINED COLONOSCOPY, SINGLE OR  MULTIPLE BIOPSY/POLYPECTOMY BY BIOPSY;  EGD/Colonoscopy ;  Surgeon: Kory Massey MD;  Location: [facility]     COLONOSCOPY N/A 09/10/2018    Procedure: COMBINED COLONOSCOPY, SINGLE OR MULTIPLE BIOPSY/POLYPECTOMY BY BIOPSY;  Colonoscopy;  Surgeon: Yenifer Doan MD;  [facility]     EXTRACTION(S) DENTAL       PERCUTANEOUS BIOPSY KIDNEY Right 2018    Procedure: Right Kidney Biopsy;  Surgeon: Otilio Mar MD;  [facility]     TRANSPLANT KIDNEY RECIPIENT LIVING RELATED N/A 2014    Procedure: TRANSPLANT KIDNEY RECIPIENT LIVING RELATED;  Surgeon: Dale Middleton MD;  Location: [facility] OR     WISDOM TOOTH EXTRACTION Bilateral        MEDICATIONS:  Prior to Admission Medications   Prescriptions Last Dose Informant Patient Reported? Taking?   Blood Pressure Monitoring (B-D ASSURE BPM/AUTO ARM CUFF) MISC   No No   Sig: Monitor blood pressure and pulse twice daily   CALCIUM ANTACID 500 MG chewable tablet   Yes No   Sig: Take by mouth 3 times daily   Cyanocobalamin (B-12) 1000 MCG TBCR   No No   Sig: TAKE ONE TABLET BY MOUTH EVERY DAY   UNABLE TO FIND   Yes No   Sig: MEDICATION NAME: Biotin   Vedolizumab (ENTYVIO IV)  Self Yes No   levonorgestrel (MIRENA) 20 MCG/DAY IUD   Yes No   Si each by Intrauterine route   losartan (COZAAR) 25 MG tablet   No No   Sig: TAKE 1/2 TABLET BY MOUTH AT BEDTIME   Patient taking differently: 25 mg   magnesium oxide (MAG-OX) 400 MG tablet   No No   Sig: Take 1 tablet (400 mg) by mouth daily   mycophenolate (GENERIC EQUIVALENT) 250 MG capsule   No No   Sig: Take 2 capsules (500 mg) by mouth 2 times daily   sodium bicarbonate 650 MG tablet   Yes No   tacrolimus (GENERIC EQUIVALENT) 0.5 MG capsule   No No   Sig: Take 3 capsules (1.5 mg) by mouth 2 times daily   Patient taking differently: Take 3 mg by mouth 2 times daily   vitamin D3 (CHOLECALCIFEROL) 50 mcg (2000 units) tablet   No No   Sig: Take 1 tablet (50 mcg) by mouth daily      Facility-Administered Medications:  "None        ALLERGIES:     Allergies   Allergen Reactions     Bumetanide Other (See Comments)     Causes paralysis     Amoxicillin Rash       FAMILY HISTORY:  Family History   Problem Relation Age of Onset     No Known Problems Mother      Alcoholism Father          in early 50s     Prostate Cancer Paternal Grandmother      Kidney Disease No family hx of        SOCIAL HISTORY:  Social History     Socioeconomic History     Marital status: Single     Spouse name: Not on file     Number of children: Not on file     Years of education: 16     Highest education level: Not on file   Occupational History     Occupation: advertising     Employer: [employer]   Tobacco Use     Smoking status: Never     Smokeless tobacco: Never   Substance and Sexual Activity     Alcohol use: Not Currently     Alcohol/week: 3.0 - 9.0 standard drinks     Types: 1 - 3 Glasses of wine, 1 - 3 Cans of beer, 1 - 3 Shots of liquor per week     Drug use: No     Sexual activity: Never   Other Topics Concern      Service Not Asked     Blood Transfusions No     Caffeine Concern Not Asked     Occupational Exposure Not Asked     Hobby Hazards Not Asked     Sleep Concern Not Asked     Stress Concern Not Asked     Weight Concern Not Asked     Special Diet Not Asked     Back Care Not Asked     Exercise Not Asked     Bike Helmet Not Asked     Seat Belt Yes     Self-Exams Not Asked   Social History Narrative     Not on file     Social Determinants of Health     Financial Resource Strain: Not on file   Food Insecurity: Not on file   Transportation Needs: Not on file   Physical Activity: Not on file   Stress: Not on file   Social Connections: Not on file   Intimate Partner Violence: Not on file   Housing Stability: Not on file       PHYSICAL EXAM:  Blood pressure (!) 179/117, pulse 108, temperature 98.4  F (36.9  C), temperature source Oral, resp. rate 18, height 1.6 m (5' 3\"), weight 75.3 kg (166 lb), SpO2 97 %.  GENERAL: Appears alert " and oriented times three.   HEENT: Eye symmetrical and free of discharge bilaterally. Mucous membranes moist and without lesions.  NECK: Supple and without lymphadenopathy.  CV: Intermittently tachycardic, S1S2 present without murmur, rub, or gallop.   RESPIRATORY: Respirations regular, even, and unlabored. Lungs CTA throughout. Mild inspiratory crackles appreciated.  GI: Soft and non distended. No tenderness, rebound, guarding. RLQ scar well healed.   EXTREMITIES: 2+ pretibial edema bilaterally. 2+ bilateral pedal pulses.   NEUROLOGIC: Alert and orientated x 3. CN II-XII grossly intact. No focal deficits.   MUSCULOSKELETAL: No joint swelling or tenderness.   SKIN: No jaundice. No rashes or lesions.     LABS:  Results for orders placed or performed during the hospital encounter of 12/21/22 (from the past 24 hour(s))   Basic metabolic panel   Result Value Ref Range    Sodium 141 136 - 145 mmol/L    Potassium 4.9 3.4 - 5.3 mmol/L    Chloride 102 98 - 107 mmol/L    Carbon Dioxide (CO2) 17 (L) 22 - 29 mmol/L    Anion Gap 22 (H) 7 - 15 mmol/L    Urea Nitrogen 121.0 (H) 6.0 - 20.0 mg/dL    Creatinine 11.70 (H) 0.51 - 0.95 mg/dL    Calcium 8.9 8.6 - 10.0 mg/dL    Glucose 130 (H) 70 - 99 mg/dL    GFR Estimate 4 (L) >60 mL/min/1.73m2   Magnesium   Result Value Ref Range    Magnesium 2.5 (H) 1.7 - 2.3 mg/dL   Phosphorus   Result Value Ref Range    Phosphorus 6.8 (H) 2.5 - 4.5 mg/dL   CBC with platelets   Result Value Ref Range    WBC Count 8.4 4.0 - 11.0 10e3/uL    RBC Count 1.97 (L) 3.80 - 5.20 10e6/uL    Hemoglobin 5.6 (LL) 11.7 - 15.7 g/dL    Hematocrit 18.5 (L) 35.0 - 47.0 %    MCV 94 78 - 100 fL    MCH 28.4 26.5 - 33.0 pg    MCHC 30.3 (L) 31.5 - 36.5 g/dL    RDW 18.7 (H) 10.0 - 15.0 %    Platelet Count 163 150 - 450 10e3/uL   INR   Result Value Ref Range    INR 1.07 0.85 - 1.15   XR Chest Port 1 View    Narrative    EXAM: XR CHEST PORT 1 VIEW  12/21/2022 8:52 PM     HISTORY:  Concern for volume overload in dialysis  patient       COMPARISON:  Chest x-ray 12/5/2022 and CT chest and pelvis 7/25/2017.    FINDINGS:   AP supine portable chest. There is a small/moderate right pleural  effusion. No significant left pleural effusion. Trachea is midline.  Mild interval perihilar hazy opacities with increased interstitial  prominence diffusely. Stable cardiomediastinal silhouette. No  pneumothorax. Unremarkable limited upper abdomen radiographically. No  acute osseous abnormality.      Impression    IMPRESSION:   1. Interval increase in interstitial prominence diffusely with  perihilar haziness, likely relating to pulmonary edema in this  dialysis patient.  2. New small right pleural effusion with associated atelectasis. No  significant left pleural effusion.    I have personally reviewed the examination and initial interpretation  and I agree with the findings.    GISELE YOUNG MD         SYSTEM ID:  [id number]   US Renal Transplant with Doppler    Narrative    EXAMINATION: US RENAL TRANSPLANT WITH DOPPLER  12/21/2022 9:09 PM      CLINICAL HISTORY: Hx LDKT in 2014 currently on dialysis.    COMPARISON: Ultrasound 12/19/2018.       FINDINGS:   There is a right lower quadrant renal transplant which measures 12.0.  The transplant kidney demonstrates mild cortical echotexture  heterogeneity otherwise with normal cortical thickness without  hydronephrosis. No peritransplant fluid collection identified. There  is no urinary tract dilation seen.     The urinary bladder is somewhat decompressed. No visualized  abnormality.     The arcuate artery resistive indices are 0.65, 0.63, and 0.63.   The renal artery anastomosis peak systolic velocity is 180 cm/s. There  are no abnormal waveforms in the renal artery.   The renal vein is patent.   The artery and vein are patent above and below the anastomosis.    Small volume ascites within the right upper quadrant and right lower  quadrant.      Impression    IMPRESSION:     1. Mild echogenic cortices  suggesting medical renal disease.  2. No hydronephrosis.  3. Normal doppler evaluation of the renal transplant.         I have personally reviewed the examination and initial interpretation  and I agree with the findings.    GISELE YOUNG MD         SYSTEM ID:  [id number]   Glucose by meter   Result Value Ref Range    GLUCOSE BY METER POCT 116 (H) 70 - 99 mg/dL   EKG 12-lead, complete   Result Value Ref Range    Systolic Blood Pressure  mmHg    Diastolic Blood Pressure  mmHg    Ventricular Rate 89 BPM    Atrial Rate 89 BPM    MO Interval 134 ms    QRS Duration 78 ms     ms    QTc 447 ms    P Axis 60 degrees    R AXIS 63 degrees    T Axis 43 degrees    Interpretation ECG       Sinus rhythm  Low voltage QRS  Borderline ECG  When compared with ECG of 05-DEC-2022 13:46,  No significant change was found         I have reviewed history, examined patient and discussed plan with the fellow/resident/SRINIVASAN.    I concur with the findings in this note.    Time spent on admission activities: 45 minutes.

## 2022-12-22 NOTE — PROVIDER NOTIFICATION
Surgery cross-cover notified of pt's Hgb 5.6 & will discuss with fellow.    MD stated pt. will not get blood transfusion tonight.

## 2022-12-22 NOTE — PROCEDURES
[facility]    Procedure: IR Procedure Note    Date/Time: 12/22/2022 3:14 PM  Performed by: Ovi Miramontes MD  Authorized by: Abran Bernal MD       UNIVERSAL PROTOCOL   Site Marked: NA  Prior Images Obtained and Reviewed:  Yes  Required items: Required blood products, implants, devices and special equipment available    Patient identity confirmed:  Verbally with patient, arm band, provided demographic data and hospital-assigned identification number  Patient was reevaluated immediately before administering moderate or deep sedation or anesthesia  Confirmation Checklist:  Patient's identity using two indicators, relevant allergies, procedure was appropriate and matched the consent or emergent situation and correct equipment/implants were available  Time out: Immediately prior to the procedure a time out was called    Universal Protocol: the Joint Commission Universal Protocol was followed    Preparation: Patient was prepped and draped in usual sterile fashion       ANESTHESIA    Anesthesia: Local infiltration  Local Anesthetic:  Lidocaine 1% without epinephrine      SEDATION  Patient Sedated: Yes    Sedation:  Fentanyl and midazolam  Vital signs: Vital signs monitored during sedation    See dictated procedure note for full details.  Findings: RIJ CVC tunnel Placement    Specimens: none    Complications: None    Condition: Stable    Plan: RIJ CVC tunnel Placement      PROCEDURE  Describe Procedure: RIJ CVC tunnel Placement  Patient Tolerance:  Patient tolerated the procedure well with no immediate complications  Length of time physician/provider present for 1:1 monitoring during sedation: 60

## 2022-12-22 NOTE — PRE-PROCEDURE
GENERAL PRE-PROCEDURE:   Procedure:  TCVC  Date/Time:  12/22/2022 2:14 PM    Verbal consent obtained?: Yes    Written consent obtained?: Yes    Risks and benefits: Risks, benefits and alternatives were discussed    Consent given by:  Patient  Patient states understanding of procedure being performed: Yes    Patient's understanding of procedure matches consent: Yes    Procedure consent matches procedure scheduled: Yes    Expected level of sedation:  Moderate  Appropriately NPO:  Yes  Mallampati  :  Grade 2- soft palate, base of uvula, tonsillar pillars, and portion of posterior pharyngeal wall visible  Lungs:  Lungs clear with good breath sounds bilaterally  Heart:  Normal heart sounds with tachycardia  History & Physical reviewed:  History and physical reviewed and no updates needed  Statement of review:  I have reviewed the lab findings, diagnostic data, medications, and the plan for sedation

## 2022-12-22 NOTE — PLAN OF CARE
"RN [id number]:    BP (!) 160/111 (BP Location: Left arm)   Pulse 85   Temp 98.8  F (37.1  C) (Oral)   Resp 18   Ht 1.6 m (5' 3\")   Wt 73.9 kg (162 lb 14.7 oz)   SpO2 100%   BMI 28.86 kg/m      Pain/Nausea: denies nausea. Patient declines need for intervention for her generalized pain  Mobility: independent  Diet: renal diet  Labs: reviewed, hgb 5.0 on recheck, team aware  LDAs: PIV SL, CVC  Skin/incisions: intact  Neuro: AOX4  Respiratory: WNL on room air  Cardiac: hypertensive, asymptomatic  GI/: voids spontaneously without difficulty. BM overnight  New Changes: patient had CVC placed and then went to HD this afternoon  Plan: Continue with plan of care  "

## 2022-12-22 NOTE — DISCHARGE INSTRUCTIONS
Outpatient Dialysis Placement   [facility]  [address] [place] [address]  Phone: [phone]  Fax: [phone]  Monday, Wednesday, Friday 4:15 p.m. chair time.    First outpatient dialysis run scheduled for Monday 12/26/22.  Patient will need to arrive at 3:45 p.m. to complete admission paperwork.  Patient will need to bring ID and insurance cards.

## 2022-12-22 NOTE — PROGRESS NOTES
"SUBJECTIVE:  Caity Horan reports SOB without cough. Denies CP. Reporting Volume overload with uptrending weight (EDW 128lbs) with generalized fatigue, malaise, poor po intake, and vaginal bleeding. Notes a history of UC but denies current hematochezia/melena/abdominal pain.     OBJECTIVE:  BP Readings from Last 1 Encounters:   12/22/22 (!) 157/107     Pulse Readings from Last 1 Encounters:   12/22/22 85     Wt Readings from Last 1 Encounters:   12/22/22 73.9 kg (162 lb 14.7 oz)     Ht Readings from Last 1 Encounters:   12/21/22 1.6 m (5' 3\")     Estimated body mass index is 28.86 kg/m  as calculated from the following:    Height as of this encounter: 1.6 m (5' 3\").    Weight as of this encounter: 73.9 kg (162 lb 14.7 oz).    Temp Readings from Last 1 Encounters:   12/22/22 98.4  F (36.9  C) (Oral)       Gen: NAD, tearful  Resp: RA, diminished BS in bases  CV: RRR  Ext: gen non pitting edema  Neuro: AOX3     Latest Reference Range & Units Most Recent   Sodium 136 - 145 mmol/L 141  12/22/22 06:08   Potassium 3.4 - 5.3 mmol/L 5.1  12/22/22 06:08   Chloride 98 - 107 mmol/L 103  12/22/22 06:08   Carbon Dioxide (CO2) 22 - 29 mmol/L 18 (L)  12/22/22 06:08   Urea Nitrogen 6.0 - 20.0 mg/dL 121.0 (H)  12/22/22 06:08   Creatinine 0.51 - 0.95 mg/dL 12.00 (H)  12/22/22 06:08   GFR Estimate >60 mL/min/1.73m2 4 (L)  12/22/22 06:08   Calcium 8.6 - 10.0 mg/dL 8.7  12/22/22 06:08   Anion Gap 7 - 15 mmol/L 20 (H)  12/22/22 06:08   Magnesium 1.7 - 2.3 mg/dL 2.5 (H)  12/21/22 20:45   Phosphorus 2.5 - 4.5 mg/dL 6.8 (H)  12/21/22 20:45   Albumin 3.5 - 5.2 g/dL 3.8  12/5/22 12:12   Protein Total 6.4 - 8.3 g/dL 6.1 (L)  12/5/22 12:12   Alkaline Phosphatase 35 - 104 U/L 74  12/5/22 12:12   ALT 10 - 35 U/L 10  12/5/22 12:12   AST 10 - 35 U/L 20  12/5/22 12:12   Bilirubin Total <=1.2 mg/dL 0.7  12/5/22 12:12   Cardiolipin IgG Seema Negative  Negative  12/5/22 12:12   Cardiolipin IgM Seema Negative  Negative  12/5/22 12:12   Glucose 70 - 99 mg/dL " 101 (H)  12/22/22 06:08   HCG Qualitative Serum Negative  Negative  12/22/22 09:56   Iron 37 - 145 ug/dL 80  12/22/22 06:08   Iron Binding Cap 240 - 430 ug/dL 288  6/27/19 07:42   Iron Binding Capacity 240 - 430 ug/dL 223 (L)  12/22/22 06:08   Iron Sat Index 15 - 46 % 36  12/22/22 06:08   Cardiolipin Seema IgG Instrument Value <10.0 GPL-U/mL <2.0  12/5/22 12:12   Cardiolipin Seema IgM Instrument Value <10.0 MPL-U/mL 2.7  12/5/22 12:12   Glucose 70 - 99 mg/dL 103 (H)  4/16/21 10:05   GLUCOSE BY METER POCT 70 - 99 mg/dL 116 (H)  12/21/22 21:52   Base Deficit Art mmol/L Incorrect specimen type   NOTIFIED BY GREGORIA THOMAS 12.5.14 1645 DB    (C)  12/5/14 16:17   WBC 4.0 - 11.0 10e3/uL 6.6  12/22/22 07:32   Hemoglobin 11.7 - 15.7 g/dL 5.0 (LL)  12/22/22 07:32   Hematocrit 35.0 - 47.0 % 16.4 (L)  12/22/22 07:32   Platelet Count 150 - 450 10e3/uL 110 (L)  12/22/22 07:32   RBC Count 3.80 - 5.20 10e6/uL 1.73 (L)  12/22/22 07:32   MCV 78 - 100 fL 95  12/22/22 07:32   MCH 26.5 - 33.0 pg 28.9  12/22/22 07:32   MCHC 31.5 - 36.5 g/dL 30.5 (L)  12/22/22 07:32   RDW 10.0 - 15.0 % 18.3 (H)  12/22/22 07:32   Diff Method  Automated Method  12/3/20 09:15   % Neutrophils % 71  12/5/22 12:12   % Lymphocytes % 19  12/5/22 12:12   % Monocytes % 7  12/5/22 12:12   % Eosinophils % 2  12/5/22 12:12   % Basophils % 1  12/5/22 12:12   Absolute Basophils 0.0 - 0.2 10e3/uL 0.0  12/5/22 12:12   Absolute Neutrophil 1.6 - 8.3 10e9/L 4.1  12/3/20 09:15   Absolute Lymphocytes 0.8 - 5.3 10e9/L 1.6  12/3/20 09:15   Absolute Eosinophils 0.0 - 0.7 10e3/uL 0.2  12/5/22 12:12   Absolute Basophils 0.0 - 0.2 10e9/L 0.0  12/3/20 09:15   Absolute Immature Granulocytes <=0.4 10e3/uL 0.0  12/5/22 12:12   Absolute Lymphocytes 0.8 - 5.3 10e3/uL 1.2  12/5/22 12:12   Absolute Monocytes 0.0 - 1.3 10e3/uL 0.5  12/5/22 12:12   % Immature Granulocytes % 0  12/5/22 12:12   Absolute Neutrophils 1.6 - 8.3 10e3/uL 4.5  12/5/22 12:12   Absolute NRBCs 10e3/uL 0.0  12/5/22 12:12    NRBCs per 100 WBC <1 /100 0  12/5/22 12:12   % Retic 0.5 - 2.0 % 6.7 (H)  12/22/22 09:56   Absolute Retic 0.025 - 0.095 10e6/uL 0.118 (H)  12/22/22 09:56   Sed Rate 0 - 20 mm/h 27 (H)  12/3/20 09:15   INR 0.85 - 1.15  1.07  12/21/22 20:45   PTT 22 - 38 Seconds 32  12/5/22 12:12   Thrombin Time 13.0 - 19.0 Seconds 17.7  12/5/22 12:12   LUPUS ANTICOAGULANT PANEL  Rpt  12/5/22 12:12   ABO/Rh(D)  A POS  12/22/22 07:32   Antibody Screen Negative  Negative  12/22/22 07:32   SPECIMEN EXPIRATION DATE  [id number]  12/22/22 07:32   (LL): Data is critically low  (L): Data is abnormally low  (H): Data is abnormally high  (C): Corrected  Rpt: View report in Results Review for more information    ASSESSMENT/PLAN:   Caity Horan is a 32 year old female with PMH significant for 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 Received 100mg of lasix x2 with only 450cc output.  -Consult IR who will place a tunnelled line in for HD. Pt is aware and agreeable to line, and initiation of dialysis  -Anemia of chronic kidney disease/Acute blood loss- on Weekly Epogen x3 weeks, last 12/19.   -Discussed Hgb remains at 5 today in setting of active vaginal bleeding. An iron panel, retic, ferritin, Type and screen were added to labs. I recommend the transfusion of 1 unit of PRBCs. Pt. has concerns with her elevated PRA and need for re-transplant. I advised her the risk is greater than benefit at this time and would advise to receive transfusion. She understands the risk, and at this time would like to avoid transfusion from the blood bank. She would like to evaluate the possibility of receiving direct donation transfusion from her organ donor. She filled out the form and notified her donor of above request which she advises me agrees with plan. The Physician order form for direct donation was filled out and faxed back to [facility] F #[phone].(T # [phone]).  -Discussed with  Medicine, Dr. Hyde who is agreeable to resume care. Pt is aware of plan and agrees to above plan. At this time Transplant surgery will sign off/ sign over care to medicine.      Rosalie Dao, ALEJANDRO  #[id number]

## 2022-12-22 NOTE — PROVIDER NOTIFICATION
Cross-cover paged: TOMAS Horan on 7A  Pt's ortho. BPs: lyin/107        sittin/115        standin/113    B. Can you please d/c BG checks? Pt. isn't diabetic & says she's never needed insulin.   Thanks, Peri #[id number]    MD d/c'd BG checks

## 2022-12-22 NOTE — CONSULTS
"    Interventional Radiology Consult Service Note    Patient is on IR schedule 12/22 for a TCVC placement for HD.   Labs WNL for procedure. Hcg pending. COVID neg.    Orders for NPO and antibiotics have been entered.   Consent will be done prior to procedure.     Please contact the IR charge RN at [phone] for estimated time of procedure.     Case discussed with Dr. Bonilla from IR and Rosalie Dao NP. This is a 32 year old female with history of IgA nephropathy, s/p LDKT in 2014, Ulcerative colitis diagnosed in 2018 (on Entyvio injections q6 weeks), and HTN on amlodipine and losartan who is now presenting due to worsening kidney function, re-transplant consideration, and dialysis for volume overload. Pt needs to initiate HD and IR is consulted for TCVC placement. Last HD line was in the [facility] in 2014.    Expected date of discharge: TBD    Vitals:   BP (!) 157/107 (BP Location: Left arm)   Pulse 85   Temp 98.4  F (36.9  C) (Oral)   Resp 16   Ht 1.6 m (5' 3\")   Wt 73.9 kg (162 lb 14.7 oz)   SpO2 95%   BMI 28.86 kg/m      Pertinent Labs:     Lab Results   Component Value Date    WBC 6.6 12/22/2022    WBC 8.4 12/21/2022    WBC 6.3 12/05/2022    WBC 6.3 12/03/2020    WBC 6.6 10/22/2020    WBC 6.5 09/10/2020       Lab Results   Component Value Date    HGB 5.0 12/22/2022    HGB 5.6 12/21/2022    HGB 6.6 12/05/2022    HGB 10.6 01/14/2021    HGB 9.8 12/03/2020    HGB 9.7 10/22/2020       Lab Results   Component Value Date     12/22/2022     12/21/2022     12/05/2022     01/14/2021     12/03/2020     10/22/2020       Lab Results   Component Value Date    INR 1.07 12/21/2022    INR 1.00 12/19/2018    PTT 32 12/05/2022    PTT 49 (H) 12/05/2014       Lab Results   Component Value Date    POTASSIUM 5.1 12/22/2022    POTASSIUM 4.0 04/16/2021        TANYA Rosas CNP  Interventional Radiology  Pager: [phone]    "

## 2022-12-22 NOTE — CONSULTS
Care Management Initial Consult    General Information  Assessment completed with: Patient, Care Team Member, -chart review,    Type of CM/SW Visit: Initial Assessment    Primary Care Provider verified and updated as needed:  (Pt does not have a primary care provider - will address with transplant team)   Readmission within the last 30 days:        Reason for Consult: discharge planning (new OP HD)  Advance Care Planning:            Communication Assessment  Patient's communication style: spoken language (English or Bilingual)    Hearing Difficulty or Deaf: no   Wear Glasses or Blind: no    Cognitive  Cognitive/Neuro/Behavioral: WDL                      Living Environment:   People in home: alone (Per pt she has family that will be coming to stay with her off.  pt plans to remain locally for at least a couple of months while awaiting living donor transplant)     Current living Arrangements:        Able to return to prior arrangements: yes       Family/Social Support:  Care provided by: self  Provides care for: no one  Marital Status: Single   (Family)          Description of Support System: Supportive, Involved    Support Assessment: Adequate family and caregiver support    Current Resources:   Patient receiving home care services: No     Community Resources: None  Equipment currently used at home: none  Supplies currently used at home: None    Employment/Financial:  Employment Status: employed full-time        Financial Concerns: No concerns identified (Pt works full time and is able to work remotely..)           Lifestyle & Psychosocial Needs:  Social Determinants of Health     Tobacco Use: Low Risk      Smoking Tobacco Use: Never     Smokeless Tobacco Use: Never     Passive Exposure: Not on file   Alcohol Use: Not on file   Financial Resource Strain: Not on file   Food Insecurity: Not on file   Transportation Needs: Not on file   Physical Activity: Not on file   Stress: Not on file   Social Connections: Not on  file   Intimate Partner Violence: Not on file   Depression: Not at risk     PHQ-2 Score: 0   Housing Stability: Not on file       Functional Status:  Prior to admission patient needed assistance:   Dependent ADLs:: Independent  Dependent IADLs:: Independent       Mental Health Status:  Mental Health Status: No Current Concerns (Situational given stress of current situation but pt notes no concerns)       Chemical Dependency Status:  Chemical Dependency Status: No Current Concerns             Values/Beliefs:  Spiritual, Cultural Beliefs, Temple Practices, Values that affect care:                 Additional Information:  CMA for new OP HD placement.  See initial RNCC note for details.    Outpatient Dialysis Placement Confirmed:  [facility]  [address] [place] [address]  Phone: [phone]  Fax: [phone]  Monday, Wednesday, Friday 4:15 p.m. chair time.    First outpatient dialysis run scheduled for Monday 12/26/22.  Patient will need to arrive at 3:45 p.m. to complete admission paperwork.  Patient will need to bring ID and insurance cards.      Met with pt.  Reviewed above information.       48domain message/update sent to Dr. Amador. Renetta 2.    Discharge orders updated.    Ibis Valderrama RN BSN, PHN, ACM-RN  7A RN Care Coordinator  Phone: [phone]  Pager [phone]    To contact the weekend RNCC  [place] (0800 - 1630) Saturday and Sunday    Units: 4A, 4C, 4E, 5A and 5B- Pager 1: [phone]    Units: 6A, 6B, 6C, 6D- Pager 2: [phone]    Units: 7A, 7B, 7C, 7D, and 5C-Pager 3: [phone]    [facility] ([id number]) Saturday and Sunday    Units: 5 Ortho, 8A, 10 ICU, & Pediatric Units-Pager 4: [phone]    12/22/2022 3:54 PM

## 2022-12-22 NOTE — PROGRESS NOTES
Care Management Follow Up    Length of Stay (days): 1    Expected Discharge Date: 12/22/2022     Concerns to be Addressed:     OP HD  Patient plan of care discussed at interdisciplinary rounds: Yes    Anticipated Discharge Disposition: TBD      Anticipated Discharge Services:  OP HD  Education Provided on the Discharge Plan:  TBD  Patient/Family in Agreement with the Plan:  Yes    Referrals Placed by CM/SW:  OP HD  Additional Information:  Noted per chart review that OP Coordinator initiated referral to:     [facility] central admissions   # [phone]  Fax # [phone]     OP HD unit requested:  [facility]  [address] [place] [address]  Phone: [phone]  Fax: [phone]    Writer spoke with [facility] who confirmed initial clinical received. Evy will need access information, H  & P and dialysis run (once completed) all other labs, documentation has been received.      Above clinical faxed.  Writer will f/u with pt to complete CMA.  Pt status reviewed with [facility]LE[facility]AN[facility].  Pt will transfer to Medicine Service today.     Ibis Valderrama RN BSN, PHN, ACM-RN  7A RN Care Coordinator  Phone: [phone]  Pager [phone]    To contact the weekend RNCC  [place] (0800 - 1630) Saturday and Sunday    Units: 4A, 4C, 4E, 5A and 5B- Pager 1: [phone]    Units: 6A, 6B, 6C, 6D- Pager 2: [phone]    Units: 7A, 7B, 7C, 7D, and 5C-Pager 3: [phone]    [facility] ([id number]) Saturday and Sunday    Units: 5 Ortho, 8A, 10 ICU, & Pediatric Units-Pager 4: [phone]    12/22/2022 9:21 AM

## 2022-12-23 LAB
ANION GAP SERPL CALCULATED.3IONS-SCNC: 14 MMOL/L (ref 7–15)
BUN SERPL-MCNC: 89 MG/DL (ref 6–20)
CALCIUM SERPL-MCNC: 8.5 MG/DL (ref 8.6–10)
CHLORIDE SERPL-SCNC: 102 MMOL/L (ref 98–107)
CREAT SERPL-MCNC: 9.05 MG/DL (ref 0.51–0.95)
DEPRECATED HCO3 PLAS-SCNC: 21 MMOL/L (ref 22–29)
ERYTHROCYTE [DISTWIDTH] IN BLOOD BY AUTOMATED COUNT: 18.6 % (ref 10–15)
GFR SERPL CREATININE-BSD FRML MDRD: 5 ML/MIN/1.73M2
GLUCOSE SERPL-MCNC: 112 MG/DL (ref 70–99)
HCT VFR BLD AUTO: 16.6 % (ref 35–47)
HGB BLD-MCNC: 5 G/DL (ref 11.7–15.7)
MCH RBC QN AUTO: 28.4 PG (ref 26.5–33)
MCHC RBC AUTO-ENTMCNC: 30.1 G/DL (ref 31.5–36.5)
MCV RBC AUTO: 94 FL (ref 78–100)
PLATELET # BLD AUTO: 132 10E3/UL (ref 150–450)
POTASSIUM SERPL-SCNC: 4.5 MMOL/L (ref 3.4–5.3)
RBC # BLD AUTO: 1.76 10E6/UL (ref 3.8–5.2)
SODIUM SERPL-SCNC: 137 MMOL/L (ref 136–145)
WBC # BLD AUTO: 6.5 10E3/UL (ref 4–11)

## 2022-12-23 PROCEDURE — 250N000013 HC RX MED GY IP 250 OP 250 PS 637

## 2022-12-23 PROCEDURE — 99233 SBSQ HOSP IP/OBS HIGH 50: CPT | Mod: GC | Performed by: STUDENT IN AN ORGANIZED HEALTH CARE EDUCATION/TRAINING PROGRAM

## 2022-12-23 PROCEDURE — 99233 SBSQ HOSP IP/OBS HIGH 50: CPT | Mod: GC | Performed by: INTERNAL MEDICINE

## 2022-12-23 PROCEDURE — 250N000012 HC RX MED GY IP 250 OP 636 PS 637

## 2022-12-23 PROCEDURE — 250N000011 HC RX IP 250 OP 636

## 2022-12-23 PROCEDURE — 120N000011 HC R&B TRANSPLANT UMMC

## 2022-12-23 PROCEDURE — 258N000003 HC RX IP 258 OP 636: Performed by: STUDENT IN AN ORGANIZED HEALTH CARE EDUCATION/TRAINING PROGRAM

## 2022-12-23 PROCEDURE — 85027 COMPLETE CBC AUTOMATED: CPT

## 2022-12-23 PROCEDURE — 36415 COLL VENOUS BLD VENIPUNCTURE: CPT

## 2022-12-23 PROCEDURE — 90937 HEMODIALYSIS REPEATED EVAL: CPT

## 2022-12-23 PROCEDURE — 250N000013 HC RX MED GY IP 250 OP 250 PS 637: Performed by: STUDENT IN AN ORGANIZED HEALTH CARE EDUCATION/TRAINING PROGRAM

## 2022-12-23 PROCEDURE — 80048 BASIC METABOLIC PNL TOTAL CA: CPT

## 2022-12-23 RX ORDER — ONDANSETRON 4 MG/1
4 TABLET, ORALLY DISINTEGRATING ORAL EVERY 8 HOURS PRN
Status: DISCONTINUED | OUTPATIENT
Start: 2022-12-23 | End: 2022-12-26 | Stop reason: HOSPADM

## 2022-12-23 RX ADMIN — MYCOPHENOLATE MOFETIL 500 MG: 250 CAPSULE ORAL at 08:17

## 2022-12-23 RX ADMIN — TACROLIMUS 3 MG: 1 CAPSULE ORAL at 08:16

## 2022-12-23 RX ADMIN — MYCOPHENOLATE MOFETIL 500 MG: 250 CAPSULE ORAL at 18:02

## 2022-12-23 RX ADMIN — ACETAMINOPHEN 650 MG: 325 TABLET, FILM COATED ORAL at 18:32

## 2022-12-23 RX ADMIN — Medication: at 16:08

## 2022-12-23 RX ADMIN — ONDANSETRON 4 MG: 4 TABLET, ORALLY DISINTEGRATING ORAL at 19:33

## 2022-12-23 RX ADMIN — AMLODIPINE BESYLATE 10 MG: 10 TABLET ORAL at 08:16

## 2022-12-23 RX ADMIN — Medication 50 MCG: at 08:16

## 2022-12-23 RX ADMIN — ONDANSETRON 4 MG: 4 TABLET, ORALLY DISINTEGRATING ORAL at 10:38

## 2022-12-23 RX ADMIN — SODIUM CHLORIDE 250 ML: 9 INJECTION, SOLUTION INTRAVENOUS at 16:06

## 2022-12-23 RX ADMIN — OXYCODONE HYDROCHLORIDE 2.5 MG: 5 TABLET ORAL at 00:34

## 2022-12-23 RX ADMIN — SODIUM CHLORIDE 300 ML: 9 INJECTION, SOLUTION INTRAVENOUS at 16:08

## 2022-12-23 RX ADMIN — Medication 1000 MCG: at 08:16

## 2022-12-23 RX ADMIN — TACROLIMUS 3 MG: 1 CAPSULE ORAL at 18:02

## 2022-12-23 ASSESSMENT — ACTIVITIES OF DAILY LIVING (ADL)
ADLS_ACUITY_SCORE: 18

## 2022-12-23 NOTE — PLAN OF CARE
"BP (!) 166/100   Pulse 92   Temp 99.1  F (37.3  C) (Oral)   Resp 17   Ht 1.6 m (5' 3\")   Wt 73.9 kg (162 lb 14.7 oz)   SpO2 92%   BMI 28.86 kg/m      Shift: 7a-730p  VS: hypertensive this shift, tachy at times on RA, afebrile. PRN labetalol orders but none given this shift as parameters weren't met.   Cardiac: HTN, tachy, HRR. Telemetry discontinued by MD. CMS intact  Neuro: Aox4, calm and cooperative  BG: none  Labs: Creat 9.05, Hgb 5.0-MD notified with no order for transfusion given unless Hgb is under 5.0.   Respiratory: WDL, maintains oxygen saturation > 90% on RA  Pain/Nausea/PRN: Chest pain at CVC site noted to be 4-6/10 on verbal scale. No pain medications given today. Did report nausea this AM, PRN Zofran given with relief reported.   Diet: Renal diet but appetite is diminished.   LDA: PIV SL, Right HD CVC hep locked.   GI/: Continent of bowel and bladder. Voids urine without difficulty with UOP noted to be adequate and yellow straw colored. LBM was 12/23/22 per patient and normal for her.   Skin: Right CVC, trace BLE edema noted.   Mobility: UAL.  Plan: Continue to monitor, had dialysis today.     Will give report to oncoming nurse. Pt left in stable condition, care relinquished at this time.     "

## 2022-12-23 NOTE — PLAN OF CARE
"BP (!) 160/109 (BP Location: Left arm)   Pulse 93   Temp 98.7  F (37.1  C) (Oral)   Resp 20   Ht 1.6 m (5' 3\")   Wt 73.9 kg (162 lb 14.7 oz)   SpO2 95%   BMI 28.86 kg/m      Assumed care from 2300 to 0730  Neuro: a&ox4.     Respiratory: Lungs clear. Sats>94% RA.   Cardiac: Pulse a times Tachy but now regular. Diastolic B/P elevated.   GI/: Bowel sounds present x4. LBM 12/22. Voiding adequately to bathroom independly.    Skin/Drains/Incisions: R DL CVC placed for dialysis.   Lines: RPIV SL.   Pain: Rated pain 9/10 and one prn oxy and scheduled tylenol given and somewhat effective.   Diet: Renal.   Labs: Hgb 5.0 and team is aware.   Electrolytes Replacement: n/a.   Activity Level: Up ad matthias.   Plan: Continue to manage pain and monitor Hgb.     "

## 2022-12-23 NOTE — CONSULTS
[facility]  Transplant Nephrology Consult  Date of Admission:  12/21/2022  Today's Date: 12/22/2022  Requesting physician: Siomara Yanes MD    Recommendations:  - discussed with patient that with current Hb-5, ongoing bleed, the risks of not receiving PRBC to prevent sensitization for future transplant outweigh the benefits. She agreed to proceed with prbc during HD. 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 Recurrent IgA nephropathy with mesangial and endocapillary hypercellularity present. Focal global glomerulosclerosis. Mild patchy tubular atrophy and interstitial fibrosis. Moderate arteriosclerosis and arterial hyalinosis. She was treated with treated with  mg twice daily, tacrolimus goal 6-8, and tocilizumab x 6 doses of 8 mg/kg every 4 weeks      # Immunosuppression: Tacrolimus immediate release (goal 4-6) and Mycophenolate mofetil (dose 500 mg every 12 hours)   - Changes: Not at this time, 12 hr FK trough tomorrow    # Infection Prophylaxis:   - PJP: None cd4>200 in 2017    # Hypertension: poor control with  failing allograft and fluid overload   - Volume status: fluid overload EDW ~ tbd   - Changes: UF during HD, on amlodipine 10 mg po every day, could titrate meds if uncontrolled post UF    # Anemia in Chronic Renal Disease: critical anemia, blood loss, c disease, EAS resistance, etc   - added iron studies, haptoglobin, ldh, smear   - PRBC   - seen by GYN for menorrhagia and symptoms attributed to renal disease/uremia per patient report, could use  DDAVP as well if persists    # Mineral Bone Disorder:    - update MBD labs    # Ulcerative colitis:   - diagnosed in 2018. Since on Entyvio q 6 weeks with stability, however, recently flared with multiple loose/watery  stools daily. Followed by [facility] (Dr. Wilkins) and has a colonoscopy pending    # Transplant History:  Etiology of Kidney Failure: recurrent UgA nephropathy, cABMR  Tx: LDKT  Transplant: 12/5/2014 (Kidney)  Crossmatch at time of Tx: negative  Significant changes in immunosuppression: None  Significant transplant-related complications: cABMR recurrent IgA, secondary FSGS    Recommendations were communicated to the primary team verbally.        Darian Arora MD  Pager: [phone]    REASON FOR CONSULT   Failing renal transplant, immunosuppression management    History of Present Illness   Caity Horan is a 32 year old female with history of ESKD 2/2 IgA nephropathy s/p LDKT (12/2014) complicated by rapid decline in GFR this year with biopsy proven cABMR and recurrent IgA admitted for uremic symptoms, critical anemia and HD initiation.    Caity was advised to go to the nearest ED given critical anemia Hb-5 and with ongoing uremic symptoms, fluid overload. She decided to come to [place] for further management and was directly admitted overnight for PRBC, HD line placement, and HD initiation. She reports ongoing symptoms: +fatigue, weight gain, leg swelling, nausea, decreased appetite, insomnia, tremors, ongoing menstrual bleed, denies any melena or BRBPR. She is  very anxious about the sensitization risks with prcbs and would like to avoid if possible. Discussed the risks and advised to proceed with prbc & HD initiation. She was agreeable however insisted that she receives blood from her sister who donated her kidney to avoid sensitization risks. She is currently on IS: FK 3/3, /500, no dialysis access. She has 7 potential donors, 2 evaluated and asked if her other potential donors could undergo expedited evaluation.    Review of Systems       The 10 point Review of Systems is negative other than noted in the HPI or here.     Past Medical History    I have reviewed this patient's medical history and updated it with pertinent information if needed.   Past Medical History:   Diagnosis Date     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     Anemia in stage 5 chronic kidney disease (H)      Anemia in stage 5 chronic kidney disease (H) 10/27/2014     ESRD (end stage renal disease) on dialysis (H)      HTN (hypertension) 10/27/2014     Hypertension 10/2014     Hypomagnesemia      IgA nephropathy     biopsy proven     Metabolic acidosis      Ulcerative colitis (H)      Vitamin D deficiency        Past Surgical History   I have reviewed this patient's surgical history and updated it with pertinent information if needed.  Past Surgical History:   Procedure Laterality Date     BIOPSY  2021    renal     COLONOSCOPY N/A 03/12/2018    Procedure: COMBINED COLONOSCOPY, SINGLE OR MULTIPLE BIOPSY/POLYPECTOMY BY BIOPSY;  EGD/Colonoscopy ;  Surgeon: Kory Massey MD;  Location:  GI     COLONOSCOPY N/A 09/10/2018    Procedure: COMBINED COLONOSCOPY, SINGLE OR MULTIPLE BIOPSY/POLYPECTOMY BY BIOPSY;  Colonoscopy;  Surgeon: Yenifer Doan MD;  Location: [facility] OR     EXTRACTION(S) DENTAL       PERCUTANEOUS BIOPSY KIDNEY Right 12/19/2018    Procedure: Right Kidney Biopsy;  Surgeon: Otilio Mar MD;  Location: [facility] OR     TRANSPLANT KIDNEY RECIPIENT LIVING RELATED N/A  "2014    Procedure: TRANSPLANT KIDNEY RECIPIENT LIVING RELATED;  Surgeon: Dale Middleton MD;  Location: [facility] OR     WISDOM TOOTH EXTRACTION Bilateral        Family History   I have reviewed this patient's family history and updated it with pertinent information if needed.   Family History   Problem Relation Age of Onset     No Known Problems Mother      Alcoholism Father          in early 50s     Prostate Cancer Paternal Grandmother      Kidney Disease No family hx of        Social History   I have reviewed this patient's social history and updated it with pertinent information if needed. Caity Horan  reports that she has never smoked. She has never used smokeless tobacco. She reports that she does not currently use alcohol after a past usage of about 3.0 - 9.0 standard drinks per week. She reports that she does not use drugs.    Allergies   Allergies   Allergen Reactions     Bumetanide Other (See Comments)     Causes paralysis     Amoxicillin Rash     Prior to Admission Medications     amLODIPine  10 mg Oral Daily     cyanocobalamin  1,000 mcg Oral Daily     mycophenolate  500 mg Oral BID     sodium chloride (PF)  3 mL Intracatheter Q8H     tacrolimus  3 mg Oral BID     vitamin D3  50 mcg Oral Daily         Physical Exam   Temp  Av.6  F (37  C)  Min: 98.4  F (36.9  C)  Max: 99.1  F (37.3  C)      Pulse  Av.9  Min: 77  Max: 108 Resp  Av.6  Min: 16  Max: 22  SpO2  Av.8 %  Min: 90 %  Max: 100 %     BP (!) 160/111 (BP Location: Left arm)   Pulse 85   Temp 98.8  F (37.1  C) (Oral)   Resp 18   Ht 1.6 m (5' 3\")   Wt 73.9 kg (162 lb 14.7 oz)   SpO2 100%   BMI 28.86 kg/m     Date 22 0700 - 22 0659   Shift 6707-1993 6703-0470 4666-2214 24 Hour Total   INTAKE   Other  0  0   Shift Total(mL/kg)  0(0)  0(0)   OUTPUT   Urine 300   300   Other  1000  1000   Shift Total(mL/kg) 300(4.06) 1000(13.53)  1300(17.59)   Weight (kg) 73.9 73.9 73.9 73.9      Admit Weight: 75.3 kg (166 " lb)     GENERAL APPEARANCE: alert and no distress  HENT: mouth without ulcers or lesions  LYMPHATICS: no cervical or supraclavicular nodes  RESP: lungs clear to auscultation - no rales, rhonchi or wheezes  CV: regular rhythm, normal rate, no rub, no murmur  EDEMA: no LE edema bilaterally  ABDOMEN: soft, nondistended, nontender, bowel sounds normal  MS: extremities normal - no gross deformities noted, no evidence of inflammation in joints, no muscle tenderness  SKIN: no rash    Data   CMP  Recent Labs   Lab 12/22/22  0608 12/21/22 2152 12/21/22 2045     --  141   POTASSIUM 5.1  --  4.9   CHLORIDE 103  --  102   CO2 18*  --  17*   ANIONGAP 20*  --  22*   * 116* 130*   .0*  --  121.0*   CR 12.00*  --  11.70*   GFRESTIMATED 4*  --  4*   MICAH 8.7  --  8.9   MAG  --   --  2.5*   PHOS  --   --  6.8*   PROTTOTAL 5.3*  --   --    ALBUMIN 3.3*  --   --    BILITOTAL 0.5  --   --    ALKPHOS 51  --   --    AST 23  --   --    ALT 11  --   --      CBC  Recent Labs   Lab 12/22/22  0732 12/21/22 2045   HGB 5.0* 5.6*   WBC 6.6 8.4   RBC 1.73* 1.97*   HCT 16.4* 18.5*   MCV 95 94   MCH 28.9 28.4   MCHC 30.5* 30.3*   RDW 18.3* 18.7*   * 163     INR  Recent Labs   Lab 12/21/22  2045   INR 1.07     ABGNo lab results found in last 7 days.   Urine Studies  Recent Labs   Lab Test 12/05/22  1200 12/19/18  0615 01/05/15  1135 12/10/14  0940   COLOR Light Yellow Yellow Yellow Yellow   APPEARANCE Clear Clear Clear Clear   URINEGLC 50* Negative Negative Negative   URINEBILI Negative Negative Negative Negative   URINEKETONE Negative Negative Negative Negative   SG 1.012 1.019 1.015 1.014   UBLD Moderate* Negative Negative Negative   URINEPH 7.5* 5.0 5.5 6.0   PROTEIN 300* Negative Negative 10*   NITRITE Negative Negative Negative Negative   LEUKEST Negative Negative Negative Negative   RBCU 4* 1 1 1   WBCU 8* 2 2 <1     Recent Labs   Lab Test 01/14/21  1200 12/03/20  0915 06/18/20  0950 11/11/19  0718 07/18/19  0809  06/27/19  0746 12/19/18  0615 11/01/18  0745 02/01/18  0656 07/19/17  0727 12/13/16  0747 05/19/16  0801 12/08/15  1210 06/30/15  0822 01/30/15  0821 01/05/15  1135 12/10/14  0940 10/27/14  0846   UTPG 2.09* 1.36* 1.23* 0.17 0.25* 0.83* 0.12 0.07 0.05 0.10 0.10 0.07 0.08 0.06 0.09 0.12 0.41* 6.86*     PTH  Recent Labs   Lab Test 10/27/14  0850   PTHI 297*     Iron Studies  Recent Labs   Lab Test 12/22/22  0608 06/27/19  0742 11/01/18  0737 08/23/18  0743 07/26/18  0830 05/17/18  0740 05/03/18  1320 03/29/18  0729 02/01/18  0702 12/01/17  1417 10/12/17  0744 07/25/17  1648 01/26/15  0804 01/15/15  0830 11/03/14  0939 10/27/14  0850   IRON 80 99 113 131 73 19* 19* 29* 36 112 14* 21* 68 74 93 82   * 288 262 236* 242 369 321 210* 291 274 423 467* 307 281 370 346   IRONSAT 36 34 43 55* 30 5* 6* 14* 12* 41 3* 4* 22 26 25 24   FRANNY 361* 233* 216* 226* 216* 7* 13 90 14 277* 3* 3* 159*  --  180* 94       IMAGING:  All imaging studies reviewed by me.

## 2022-12-23 NOTE — PROGRESS NOTES
Yessica soto team,   Pt complaining of pain rated 9/10 and said Tylenol I gave won't help and wants stronger pain med. She said the pain is preventing from sleeping.

## 2022-12-23 NOTE — PROGRESS NOTES
HEMODIALYSIS TREATMENT NOTE    Date: 12/22/2022  Time: 5.45 PM    Data:  Pre Wt:73.9kg     Desired Wt:72.9kg    Post Wt: 72.9kg (estimated)   Weight change: 1.0  kg  Ultrafiltration - Post Run Net Total Removed (mL): 1000 mL  Vascular Access Status: CVC  patent  Dialyzer Rinse: Streaked, Light  Total Blood Volume Processed: 30.89 L   Total Dialysis (Treatment) Time: 2.0   Dialysate Bath: K 2, Ca 2.5  Heparin: None    Lab:   No    Assessment / Interventions: First day HD via RCVC that was inserted  At  with okay to use. Both lines with good blood back,  per prescription.  Due epogen was not given because it was discontinued in MAR.  During treatment DBP > 100, pt refused tab Amlodipine on empty stomach & primary team informed.  Pt completed her treatment well, 1.0kg UF removed, blood rinsed back, CVC saline locked, handoff report given & pt transferred back to unit in a stable condition.        Plan:    Per Renal Team.

## 2022-12-23 NOTE — PLAN OF CARE
"VS: BP (!) 153/101 (BP Location: Left arm)   Pulse 100   Temp 99.1  F (37.3  C) (Oral)   Resp 18   Ht 1.6 m (5' 3\")   Wt 73.9 kg (162 lb 14.7 oz)   SpO2 95%   BMI 28.86 kg/m        Neuro: alert and oriented X4   Cardio: systolic 150's tachy 100's   Respiratory: RA   GI/: continent of bowel and bladder LBM was today no bleeding noted per the pt.   Skin: intact has BLE edema, pale   Diet:   Renal diet and was able to tolerate it   Labs: last HGB- 5.0 and Cr- 12.00 recheck in the AM   BG: none    LDA:  PIV SL, CVC is intact   Mobility:  UAL   Pain: denied pain   PRN medications: none   Changes: none no bleeding was noted band aid on the right shoulder is intact.   Plan of Care:  dialysis for tomorrow           "

## 2022-12-23 NOTE — PROGRESS NOTES
[facility]    Progress Note - Medicine Service, CARA TEAM 2       Date of Admission:  12/21/2022    Assessment & Plan   Caity Horan is a 32 year old female admitted on 12/21/2022. 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     Changes today:  - planning for another run of HD on 12/23  - transfuse pRBCs if Hgb<5  - resume PTA amlodipine. Hold PTA losartan for now, but can use PRN labetalol if SBP>180 or DBP>120     CKD Stage 5  Hypervolemia  Patient with known chronic antibody mediated rejection and IgA nephropathy recurrence. Patient with weight increase of ~35 lbs with associated dyspnea on exertion and orthopnea. Was seen in the nephrology clinic on 12/1/22 and was advised to come to the ED for IV diuretics and initiation of dialysis. CXR revealing pulmonary edema. Notably patient has experienced muscle weakness and paralysis with bumex in the past, so she was given 100mg IV lasix x2 with 10mg IV metolazone. Is/Os not documented, though patient endorses minimal response to IV diuretics. She was seen in clinic earlier this month by both transplant surgery and transplant nephrology to initiate workup for second transplant; will discuss further planning regarding this with nephrology. Had tunneled dialysis catheter placed and received first round of dialysis on 12/22; plan for another round on 12/23. Patient has outpatient HD set up starting on 12/26.   - s/p 100mg IV Lasix x2 and metolazone 10mg IV  - continue tacrolimus 3mg BID and MMF 500mg BID   - tacro level pending  - s/p HD on 12/22; plan for another run on 12/23  - hold anticoagulation  - monitor electrolytes  - will discuss transplant planning with  transplant nephrology and transplant surgery     Vaginal bleeding  Epistaxis  Normocytic anemia in the setting of chronic kidney disease  Thrombocytopenia  Significant worsening of hemoglobin in the past month, suspected to be in the setting of worsening CKD vs vaginal bleeding. Could be a dilutional component in the setting of significant volume overload, but suspect this is not playing as large of a role. Suspect vaginal bleeding is in the setting of hyperuremia. Patient has also recently begun having epistaxis. Iron studies within normal limits.   - received darbepoetin 100 mcg on 12/19/22 (has been on it for 6 months)  - transfuse if Hgb<5 per nephrology, have been avoiding until this time to minimize sensitization. Patient's PRA is 85  - PTA cyanocobalamin 1000mcg daily. Last B12 level was on 8/11/21 and was 1422     Hypertension  Patient with elevated pressures on this admission (150s/100s-110s). PTA amlodipine restarted. Per nephrology, will hold PTA losartan until patient has repeated dialysis. Will plan to restart on discharge. Until then, PRN labetalol available for DBP>120 or SBP>180  - PTA amlodipine 10mg daily  - holding PTA losartan for now  - PTA labetalol 20mg available for SBP>180 or DBP>120     Ulcerative Colitis  Patient has not had symptoms recently. Denies hematochezia.   - vedolizumab 300 mg last given 12/14/2022  - colonoscopy was deferred Dec 2022 due to current acute issues     Diet: Renal Diet (dialysis)    DVT Prophylaxis: holding in the setting of severe anemia  [place]arton Catheter: Not present  Fluids: none  Central Lines: PRESENT  CVC Double Lumen Right Internal jugular Tunneled-Site Assessment: WDL  Cardiac Monitoring: None  Code Status: Full Code      Disposition Plan      Expected Discharge Date: 12/25/2022      Destination: home (Staying locally for at least a couple months while waiting for living donor kidney transplant)  Discharge Comments: admitted for urgent dialysis, first HD run  "12/22. May discharge early next week once stable (and stable Hb) and outpt HD plan (outpt HD already set up)        The patient's care was discussed with the Attending Physician, Dr. Yanes.    Rachelle Jasmine MD  Medicine Service, [address]  Securely message with the Vocera Web Console (learn more here)  Text page via [facility] Paging/Directory   Please see signed in provider for up to date coverage information      Clinically Significant Risk Factors             # Anion Gap Metabolic Acidosis: Highest Anion Gap = 22 mmol/L in last 2 days, will monitor and treat as appropriate  # Hypoalbuminemia: Lowest albumin = 3.3 g/dL at 12/22/2022  6:08 AM, will monitor as appropriate   # Thrombocytopenia: Lowest platelets = 110 in last 2 days, will monitor for bleeding         # Overweight: Estimated body mass index is 28.86 kg/m  as calculated from the following:    Height as of this encounter: 1.6 m (5' 3\").    Weight as of this encounter: 73.9 kg (162 lb 14.7 oz)., PRESENT ON ADMISSION         ______________________________________________________________________    Interval History   Patient with some nausea; given zofran. States that she threw up her breakfast, including medications that she took before breakfast. Still does not feel good in the setting of significant extra volume. Denies fevers, chills, chest pain, abdominal pain, diarrhea. Ongoing vaginal bleeding.    Data reviewed today: I reviewed all medications, new labs and imaging results over the last 24 hours. .    Physical Exam   Vital Signs: Temp: 99.1  F (37.3  C) Temp src: Oral BP: (!) 162/103 Pulse: 91   Resp: 20 SpO2: 93 % O2 Device: None (Room air) Oxygen Delivery: 2 LPM  Weight: 162 lbs 14.72 oz  General Appearance: patient is in no acute distress, resting comfortably in bed  HEENT: EOMI, PERLL, normocephalic, MMM  Respiratory: mild crackles in lung bases bilaterally. No increased work of " breathing.  Cardiovascular: tachycardic with regular rhythm, normal S1 and S2, no murmurs/rubs/gallops  GI: soft, nontender, nondistended  Genitourinary: no flank pain  Skin:  No rashes or lesions.   Musculoskeletal: No joint swelling or tenderness. 2+ bilateral lower extremity edema to knees  Neurologic: AAOx3, CN II-XII grossly intact without focal neuro deficits.  Psychiatric: patient intermittently tearful & frustrated about process for repeat kidney transplant    Data   Recent Labs   Lab 12/23/22  0558 12/22/22  0732 12/22/22  0608 12/21/22  2152 12/21/22  2045   WBC 6.5 6.6  --   --  8.4   HGB 5.0* 5.0*  --   --  5.6*   MCV 94 95  --   --  94   * 110*  --   --  163   INR  --   --   --   --  1.07     --  141  --  141   POTASSIUM 4.5  --  5.1  --  4.9   CHLORIDE 102  --  103  --  102   CO2 21*  --  18*  --  17*   BUN 89.0*  --  121.0*  --  121.0*   CR 9.05*  --  12.00*  --  11.70*   ANIONGAP 14  --  20*  --  22*   MICAH 8.5*  --  8.7  --  8.9   *  --  101* 116* 130*   ALBUMIN  --   --  3.3*  --   --    PROTTOTAL  --   --  5.3*  --   --    BILITOTAL  --   --  0.5  --   --    ALKPHOS  --   --  51  --   --    ALT  --   --  11  --   --    AST  --   --  23  --   --      Recent Results (from the past 24 hour(s))   IR CVC Tunnel Placement > 5 Yrs of Age    Narrative    Procedures essentially:  1. Ultrasound guidance for venous access  2. Placement centrally inserted catheter (age > 5 yrs.) tunneled, no  port, no pump  3. Fluoroscopic guidance placement    History: Need for hemodialysis     Comparisons: 12/21/2022    Operators: TAMERA Bernal MD    Fellow: Ovi Miramontes    Medications:   1. 150mcg Fentanyl  2. 3 mg Versed  3. 10 cc lidocaine    Moderate sedation administered by the IR nurse at the supervision of  the attending. Vital signs and oxygenation continuously monitored. The  patient remained stable throughout the procedure.    Sedation time: 34 minutes    Fluoroscopy time: 2.4  min    Findings/procedure:     Prior to the procedure, both verbal and written informed consent  obtained from the patient.     Limited jugular ultrasound documented jugular vein patency. The right  neck and upper chest prepped and draped in the usual sterile fashion.  Buffered 1% Lidocaine used for local analgesia.    Under ultrasound guidance, right internal jugular venotomy made with a  micropuncture needle. Image documenting the vein within the vein  saved.    Micropuncture needle exchanged over guidewire for the micropuncture  sheath under fluoroscopic guidance. Catheter length measured with the  0.018 guidewire. Micropuncture sheath saline locked.     23 cm tip to cuff 14.5 F catheter subcutaneously tunneled from the  right anterior chest to the right internal jugular venotomy site.  Micropuncture sheath exchanged over guidewire for the peel-away  sheath. Guidewire removed. Under fluoroscopic guidance, the catheter  placed through a peel-away sheath and positioned with its tip in the  right atrium. Both lumens flushed and aspirated adequately. Each lumen  heparin locked with 2.5 cc heparin solution. 2-0 nylon catheter  retaining suture and sterile dressing applied. Right internal jugular  venotomy site closed with Dermabond. No immediate complication.      Impression    Impression:  Uncomplicated image guided placement of right IJ tunneled central  venous catheter for dialysis. 23 cm tip to cuff 14.5 F catheter   tunneled catheter placed under fluoroscopic guidance with the tip in  the right atrium. Both lumens flushed, heparin locked and ready for  immediate use.

## 2022-12-24 LAB
ANION GAP SERPL CALCULATED.3IONS-SCNC: 14 MMOL/L (ref 7–15)
BLD PROD TYP BPU: NORMAL
BLOOD COMPONENT TYPE: NORMAL
BUN SERPL-MCNC: 54 MG/DL (ref 6–20)
CALCIUM SERPL-MCNC: 7.8 MG/DL (ref 8.6–10)
CHLORIDE SERPL-SCNC: 101 MMOL/L (ref 98–107)
CODING SYSTEM: NORMAL
CREAT SERPL-MCNC: 6.94 MG/DL (ref 0.51–0.95)
CROSSMATCH: NORMAL
DEPRECATED HCO3 PLAS-SCNC: 23 MMOL/L (ref 22–29)
ERYTHROCYTE [DISTWIDTH] IN BLOOD BY AUTOMATED COUNT: 18.9 % (ref 10–15)
GFR SERPL CREATININE-BSD FRML MDRD: 7 ML/MIN/1.73M2
GLUCOSE SERPL-MCNC: 103 MG/DL (ref 70–99)
HCT VFR BLD AUTO: 15 % (ref 35–47)
HGB BLD-MCNC: 4.5 G/DL (ref 11.7–15.7)
HGB BLD-MCNC: 4.6 G/DL (ref 11.7–15.7)
ISSUE DATE AND TIME: NORMAL
MCH RBC QN AUTO: 28 PG (ref 26.5–33)
MCHC RBC AUTO-ENTMCNC: 30 G/DL (ref 31.5–36.5)
MCV RBC AUTO: 93 FL (ref 78–100)
PLATELET # BLD AUTO: 103 10E3/UL (ref 150–450)
POTASSIUM SERPL-SCNC: 4.3 MMOL/L (ref 3.4–5.3)
RBC # BLD AUTO: 1.61 10E6/UL (ref 3.8–5.2)
SODIUM SERPL-SCNC: 138 MMOL/L (ref 136–145)
TACROLIMUS BLD-MCNC: 4.5 UG/L (ref 5–15)
TME LAST DOSE: ABNORMAL H
TME LAST DOSE: ABNORMAL H
UNIT ABO/RH: NORMAL
UNIT NUMBER: NORMAL
UNIT STATUS: NORMAL
UNIT TYPE ISBT: 6200
WBC # BLD AUTO: 5.6 10E3/UL (ref 4–11)

## 2022-12-24 PROCEDURE — 36415 COLL VENOUS BLD VENIPUNCTURE: CPT | Performed by: INTERNAL MEDICINE

## 2022-12-24 PROCEDURE — 250N000013 HC RX MED GY IP 250 OP 250 PS 637

## 2022-12-24 PROCEDURE — 85027 COMPLETE CBC AUTOMATED: CPT

## 2022-12-24 PROCEDURE — 258N000003 HC RX IP 258 OP 636: Performed by: STUDENT IN AN ORGANIZED HEALTH CARE EDUCATION/TRAINING PROGRAM

## 2022-12-24 PROCEDURE — 90935 HEMODIALYSIS ONE EVALUATION: CPT | Performed by: INTERNAL MEDICINE

## 2022-12-24 PROCEDURE — 250N000011 HC RX IP 250 OP 636

## 2022-12-24 PROCEDURE — 80197 ASSAY OF TACROLIMUS: CPT | Performed by: STUDENT IN AN ORGANIZED HEALTH CARE EDUCATION/TRAINING PROGRAM

## 2022-12-24 PROCEDURE — 250N000013 HC RX MED GY IP 250 OP 250 PS 637: Performed by: STUDENT IN AN ORGANIZED HEALTH CARE EDUCATION/TRAINING PROGRAM

## 2022-12-24 PROCEDURE — 120N000011 HC R&B TRANSPLANT UMMC

## 2022-12-24 PROCEDURE — 99232 SBSQ HOSP IP/OBS MODERATE 35: CPT | Mod: GC | Performed by: STUDENT IN AN ORGANIZED HEALTH CARE EDUCATION/TRAINING PROGRAM

## 2022-12-24 PROCEDURE — 82310 ASSAY OF CALCIUM: CPT

## 2022-12-24 PROCEDURE — 36415 COLL VENOUS BLD VENIPUNCTURE: CPT

## 2022-12-24 PROCEDURE — 85018 HEMOGLOBIN: CPT | Performed by: INTERNAL MEDICINE

## 2022-12-24 PROCEDURE — 82374 ASSAY BLOOD CARBON DIOXIDE: CPT

## 2022-12-24 PROCEDURE — P9016 RBC LEUKOCYTES REDUCED: HCPCS

## 2022-12-24 PROCEDURE — 90937 HEMODIALYSIS REPEATED EVAL: CPT

## 2022-12-24 PROCEDURE — 250N000012 HC RX MED GY IP 250 OP 636 PS 637

## 2022-12-24 RX ORDER — AMLODIPINE BESYLATE 5 MG/1
5 TABLET ORAL DAILY
Status: DISCONTINUED | OUTPATIENT
Start: 2022-12-24 | End: 2022-12-26 | Stop reason: HOSPADM

## 2022-12-24 RX ORDER — AMLODIPINE BESYLATE 5 MG/1
5 TABLET ORAL DAILY
Status: DISCONTINUED | OUTPATIENT
Start: 2022-12-25 | End: 2022-12-24

## 2022-12-24 RX ADMIN — TACROLIMUS 3 MG: 1 CAPSULE ORAL at 18:16

## 2022-12-24 RX ADMIN — Medication: at 07:45

## 2022-12-24 RX ADMIN — MYCOPHENOLATE MOFETIL 500 MG: 250 CAPSULE ORAL at 08:07

## 2022-12-24 RX ADMIN — TACROLIMUS 3 MG: 1 CAPSULE ORAL at 08:06

## 2022-12-24 RX ADMIN — Medication 50 MCG: at 08:09

## 2022-12-24 RX ADMIN — SODIUM CHLORIDE 250 ML: 9 INJECTION, SOLUTION INTRAVENOUS at 07:44

## 2022-12-24 RX ADMIN — LABETALOL HYDROCHLORIDE 20 MG: 5 INJECTION, SOLUTION INTRAVENOUS at 15:03

## 2022-12-24 RX ADMIN — MYCOPHENOLATE MOFETIL 500 MG: 250 CAPSULE ORAL at 18:16

## 2022-12-24 RX ADMIN — Medication 1000 MCG: at 08:10

## 2022-12-24 RX ADMIN — ACETAMINOPHEN 650 MG: 325 TABLET, FILM COATED ORAL at 08:58

## 2022-12-24 RX ADMIN — ONDANSETRON 4 MG: 4 TABLET, ORALLY DISINTEGRATING ORAL at 10:00

## 2022-12-24 RX ADMIN — AMLODIPINE BESYLATE 5 MG: 5 TABLET ORAL at 13:42

## 2022-12-24 RX ADMIN — SODIUM CHLORIDE 300 ML: 9 INJECTION, SOLUTION INTRAVENOUS at 07:43

## 2022-12-24 ASSESSMENT — ACTIVITIES OF DAILY LIVING (ADL)
ADLS_ACUITY_SCORE: 18

## 2022-12-24 NOTE — CARE PLAN
"Shift: 1900-0730  BP (!) 148/103 (BP Location: Right arm)   Pulse 80   Temp 98.4  F (36.9  C) (Oral)   Resp 21   Ht 1.6 m (5' 3\")   Wt 73.9 kg (162 lb 14.7 oz)   SpO2 96%   BMI 28.86 kg/m       VS: Hypertensive /103. on RA  BG- none  Labs-  critical hemoglobin of 4.5. Team notified.   Pain/Nausea/PRN'S- oral PRN Zofran given once, with relif  Diet- renal diet  LDA- Left PIV SL, right CVC dialysis line.   Gtt/IVF- none  GI/- voided x2  Activity- up ad matthias,   Plan- Dialysis today, cont. W/ plan of care  "

## 2022-12-24 NOTE — PROGRESS NOTES
Paged team about /119. Do you want prn labetalol given?     Prn labetalol given per MD. Plan for oncoming nurse to recheck BP.

## 2022-12-24 NOTE — PROGRESS NOTES
HEMODIALYSIS TREATMENT NOTE    Date: 12/24/2022  Time: 12:03 PM    Data:  Pre Wt:     Desired Wt:   kg   Post Wt:    Weight change: 2  kg  Ultrafiltration - Post Run Net Total Removed (mL): 2000 mL  Vascular Access Status: patent  Dialyzer Rinse: Streaked, Light  Total Blood Volume Processed: 90.47 L Liters  Total Dialysis (Treatment) Time: 4 Hours    Lab:   Yes    Interventions:  Tylenol  Zofran  RBC tranfusion    Assessment:  Pt ran for 4 hours on a K3/ Ca 3 bath with a /  and 2 L pulled. Pt complaint of a headache, tylenol admin per MAR with relief. About half way through tx pt complaint of nausea and vomiting, Zofran admin per MAR with relief 100 NS admin, and UF off. UF reduced to 2 L with no issues. RBC transfusion admin with no reactions. Critical lab Hg 4.6 at end of tx, Nephrology at bedside no changes, PCN notified. Pt alert and oriented x4. Pt had large soft stool x 2. Report given to PCN     Plan:    Per renal team

## 2022-12-24 NOTE — PROGRESS NOTES
HEMODIALYSIS TREATMENT NOTE    Date: 12/23/2022  Time: 7:09 PM    Data:  Pre Wt: 72.9 kg     Desired Wt: 70.7 kg   Post Wt:  70.7 kg (estimated)  Weight change: 2.2 kg  Ultrafiltration - Post Run Net Total Removed (mL): 2200 mL  Vascular Access Status: CVC (RTDC)/ patent  Dialyzer Rinse: Streaked, Light  Total Blood Volume Processed: 43.98 L   Total Dialysis (Treatment) Time: 2.5 hours  Dialysate Bath: K 2, Ca 2.5  Heparin: None    Lab:   No    Interventions:  650 mg Tylenol given for c/o headache near end of run    Assessment:  2nd iHD for ESRD patient with failing renal allograft of LDKT and fluid overload. Alert and oriented x 4. Able to make needs known. Repositioning independently. Calm and cooperative with cares. Denied SOB and nausea. Moderate generalized edema. Hypertensive throughout and intermittently tachycardic. Did not meet parameters for PRN Labetalol. C/o headache near end of run. Tolerated well otherwise.      Plan:    HD again tomorrow AM @ 0740.

## 2022-12-24 NOTE — PROGRESS NOTES
[facility]    Progress Note - Medicine Service, MAROON TEAM 2       Date of Admission:  12/21/2022    Assessment & Plan   Caity Horan is a 32 year old female admitted on 12/21/2022. 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     Changes today:  - repeat dialysis today and likely again on 12/25  - one unit of pRBCs transfused for hgb <5  - decrease PTA amlodipine to 5mg.  Hold PTA losartan for now, but can use PRN labetalol if SBP>180 or DBP>120     CKD Stage 5  Hypervolemia  Patient with known chronic antibody mediated rejection and IgA nephropathy recurrence. Patient with weight increase of ~35 lbs with associated dyspnea on exertion and orthopnea. Was seen in the nephrology clinic on 12/1/22 and was advised to come to the ED for IV diuretics and initiation of dialysis. CXR revealing pulmonary edema. Notably patient has experienced muscle weakness and paralysis with bumex in the past, so she was given 100mg IV lasix x2 with 10mg IV metolazone. Is/Os not documented, though patient endorses minimal response to IV diuretics. She was seen in clinic earlier this month by both transplant surgery and transplant nephrology to initiate workup for second transplant; will discuss further planning regarding this with nephrology. Had tunneled dialysis catheter placed and has received dialysis on 12/22, 12/23, 12/24. Likely another run on 12/25 before discharging. Patient has outpatient HD set up starting on 12/26.   - s/p 100mg IV Lasix x2 and metolazone 10mg IV  - continue tacrolimus 3mg BID and MMF 500mg BID   - tacro level pending  - hold anticoagulation  - monitor electrolytes  - will discuss transplant planning with  transplant nephrology and transplant surgery     Vaginal bleeding  Epistaxis  Normocytic anemia in the setting of chronic kidney disease  Thrombocytopenia  Significant worsening of hemoglobin in the past month, suspected to be in the setting of worsening CKD vs vaginal bleeding. Could be a dilutional component in the setting of significant volume overload, but suspect this is not playing as large of a role. Suspect vaginal bleeding is in the setting of hyperuremia. Patient has also recently begun having epistaxis. Iron studies within normal limits.   - received darbepoetin 100 mcg on 12/19/22 (has been on it for 6 months)  - transfuse if Hgb<5 per nephrology, have been avoiding until this time to minimize sensitization. Patient's PRA is 85   - patient received one unit on 12/24. Goal to maintain hgb>5  - PTA cyanocobalamin 1000mcg daily. Last B12 level was on 8/11/21 and was 1422     Hypertension  Patient with elevated pressures on this admission (160s/100s-110s). PTA amlodipine restarted. Per nephrology, will hold PTA losartan until patient has repeated dialysis. Will plan to restart on discharge. Until then, PRN labetalol available for DBP>120 or SBP>180  - PTA amlodipine 5mg daily (initially prescribed as 10mg but patient states she only takes 5mg)  - holding PTA losartan for now  - PTA labetalol 20mg available for SBP>180 or DBP>120     Ulcerative Colitis  Patient has not had symptoms recently. Denies hematochezia.   - vedolizumab 300 mg last given 12/14/2022  - colonoscopy was deferred Dec 2022 due to current acute issues     Diet: Renal Diet (dialysis)    DVT Prophylaxis: holding in the setting of severe anemia  Wharton Catheter: Not present  Fluids: none  Central Lines: PRESENT  CVC Double Lumen Right Internal jugular Tunneled-Site Assessment: WDL  Cardiac Monitoring: None  Code Status: Full Code      Disposition Plan     Expected Discharge Date: 12/25/2022      Destination: home (Staying locally for at least a  "couple months while waiting for living donor kidney transplant)  Discharge Comments: admitted for urgent dialysis, first HD run 12/22. May discharge early next week once stable (and stable Hb) and outpt HD plan (outpt HD already set up)        The patient's care was discussed with the Attending Physician, Dr. Yanes.    Rachelle Jasmine MD  Medicine Service, [address]  Securely message with the Vocera Web Console (learn more here)  Text page via [facility] Paging/Directory   Please see signed in provider for up to date coverage information      Clinically Significant Risk Factors              # Hypoalbuminemia: Lowest albumin = 3.3 g/dL at 12/22/2022  6:08 AM, will monitor as appropriate   # Thrombocytopenia: Lowest platelets = 103 in last 2 days, will monitor for bleeding         # Overweight: Estimated body mass index is 28.86 kg/m  as calculated from the following:    Height as of this encounter: 1.6 m (5' 3\").    Weight as of this encounter: 73.9 kg (162 lb 14.7 oz)., PRESENT ON ADMISSION         ______________________________________________________________________    Interval History   Still does not feel good in the setting of significant extra volume. Denies fevers, chills, chest pain, abdominal pain, diarrhea. Agreeable to one unit of pRBC transfusion today. Nephrology team has been in contact with blood center and blood bank regarding patient's request for her sister to be her blood donor. Paperwork in process. Likely will not be completed for another few days.    Data reviewed today: I reviewed all medications, new labs and imaging results over the last 24 hours. .    Physical Exam   Vital Signs: Temp: 98  F (36.7  C) Temp src: Oral BP: (!) 175/119 Pulse: 84   Resp: 23 SpO2: 97 % O2 Device: Nasal cannula    Weight: 162 lbs 14.72 oz  General Appearance: patient is in no acute distress, resting comfortably in bed  HEENT: EOMI, PERLL, normocephalic, " MMM  Respiratory: mild crackles in lung bases bilaterally. No increased work of breathing.  Cardiovascular: regular rate and rhythm, normal S1 and S2, no murmurs/rubs/gallops  GI: soft, nontender, nondistended  Genitourinary: no flank pain  Skin:  No rashes or lesions.   Musculoskeletal: No joint swelling or tenderness. 1+ bilateral lower extremity edema to knees  Neurologic: AAOx3, CN II-XII grossly intact without focal neuro deficits.  Psychiatric: appropriate mood and affect  Data   Recent Labs   Lab 12/24/22  0548 12/23/22  0558 12/22/22  0732 12/22/22  0608 12/21/22  2152 12/21/22  2045   WBC 5.6 6.5 6.6  --   --  8.4   HGB 4.5* 5.0* 5.0*  --   --  5.6*   MCV 93 94 95  --   --  94   * 132* 110*  --   --  163   INR  --   --   --   --   --  1.07    137  --  141  --  141   POTASSIUM 4.3 4.5  --  5.1  --  4.9   CHLORIDE 101 102  --  103  --  102   CO2 23 21*  --  18*  --  17*   BUN 54.0* 89.0*  --  121.0*  --  121.0*   CR 6.94* 9.05*  --  12.00*  --  11.70*   ANIONGAP 14 14  --  20*  --  22*   MICAH 7.8* 8.5*  --  8.7  --  8.9   * 112*  --  101*   < > 130*   ALBUMIN  --   --   --  3.3*  --   --    PROTTOTAL  --   --   --  5.3*  --   --    BILITOTAL  --   --   --  0.5  --   --    ALKPHOS  --   --   --  51  --   --    ALT  --   --   --  11  --   --    AST  --   --   --  23  --   --     < > = values in this interval not displayed.     No results found for this or any previous visit (from the past 24 hour(s)).

## 2022-12-24 NOTE — PROGRESS NOTES
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 Initiating HD tody 2/3 session   - Baseline Creatinine: ~ 1.3 in 2021 up to 8--12 now,   - Proteinuria: +++ on UA   - Date DSA Last Checked:several high titer class II dSA to DQA/DQB in 2018    Will do another run tomorrow.  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 Recurrent IgA nephropathy with mesangial and endocapillary hypercellularity present. Focal global glomerulosclerosis. Mild patchy tubular atrophy and interstitial fibrosis. Moderate arteriosclerosis and arterial hyalinosis. She was treated with treated with  mg twice daily, tacrolimus goal 6-8, and tocilizumab x 6 doses of 8 mg/kg every 4 weeks   # Immunosuppression: Tacrolimus immediate release (goal 4-6) and Mycophenolate mofetil (dose 500 mg every 12 hours)   - Changes: Not at this time, 12 hr FK trough tomorrow   # Infection Prophylaxis:   - PJP: None cd4>200 in 2017  # Hypertension: poor control with failing allograft and fluid overload              - Volume status: fluid overload           EDW ~ tbd reports it was 125 pound before she started gaining weight              - Changes: UF during HD, on amlodipine 10 mg po every day, could titrate meds if uncontrolled post UF   -Will try to remove more fluid today    # Anemia in Chronic Renal Disease likely 2/2 heavy menses due to uremia: critical anemia, blood loss, c disease, EAS resistance, etc              - added iron studies, haptoglobin, ldh, smear              - PRBC              - seen by GYN for menorrhagia and symptoms attributed to renal disease/uremia per patient report, could use  DDAVP as well if persists      # Ulcerative colitis:    "           - diagnosed in 2018. Since on Entyvio q 6 weeks with stability, however, recently flared with multiple loose/watery       stools daily. Followed by [facility] (Dr. Wilkins) and has a colonoscopy pending    Recommendations were communicated to primary team via notes    Seen and discussed with Dr. Angela Xiong MD   Division of Renal Disease and Hypertension  [facility]  [facility] Web Console    Interval History :   Nursing and provider notes from last 24 hours reviewed.  In the last 24 hours Caity Horan reported heavy menses last night.Refusing transfusion waiting for her sister to give blood to her.   I explained to her with low hb she is at risk of decreased blood supply to heart which can put a stress on heart and could lead to MI. She could also become short of breath with low Hb and could lead to death.She reports she understand that  Review of Systems:   I reviewed the following systems:  GI: Normal appetite. No  nausea or vomiting or diarrhea.   Neuro:  No confusion  Constitutional:  No fever or chills  CV: No dyspnea or edema.  No chest pain.    Physical Exam:   I/O last 3 completed shifts:  In: -   Out: 400 [Urine:150; Emesis/NG output:250]   BP (!) 146/95 (BP Location: Right arm)   Pulse 89   Temp 98.5  F (36.9  C) (Oral)   Resp 21   Ht 1.6 m (5' 3\")   Wt 73.9 kg (162 lb 14.7 oz)   SpO2 91%   BMI 28.86 kg/m       GENERAL APPEARANCE: Not in distress  EYES:  No scleral icterus, pupils equal  PULM: lungs clear to auscultation bilaterally, equal air movement, no clubbing  CV: Patient has regular rhythm, normal rate, no rub     -JVD -ve     -edema +1  GI: soft, non tender, non distended, bowel sounds are present  INTEGUMENT: no cyanosis, no rash  NEURO:  no asterixis   Access RIJ    Labs:   All labs reviewed by me  Electrolytes/Renal - Recent Labs   Lab Test 12/23/22  0558 12/22/22  0608 12/21/22  2152 12/21/22  2045 03/23/18  0856 03/01/18  1415 07/19/17  0722 05/24/17  0725 " 03/09/15  0821 03/02/15  0810 02/25/15  0915    141  --  141   < >  --    < > 141   < > 142 139   POTASSIUM 4.5 5.1  --  4.9   < >  --    < > 4.8   < > 4.4 4.4   CHLORIDE 102 103  --  102   < >  --    < > 108   < > 111* 110*   CO2 21* 18*  --  17*   < >  --    < > 26   < > 24 20   BUN 89.0* 121.0*  --  121.0*   < >  --    < > 16   < > 15 21   CR 9.05* 12.00*  --  11.70*   < >  --    < > 1.09*   < > 0.95 0.93   * 101* 116* 130*   < >  --    < > 98   < > 99 93   MICAH 8.5* 8.7  --  8.9   < >  --    < > 8.6   < > 9.3 9.4   MAG  --   --   --  2.5*  --  1.7  --  1.6   < > 1.5* 1.6   PHOS  --   --   --  6.8*  --   --   --   --   --  2.8 3.5    < > = values in this interval not displayed.       CBC -   Recent Labs   Lab Test 12/23/22  0558 12/22/22  0732 12/21/22  2045   WBC 6.5 6.6 8.4   HGB 5.0* 5.0* 5.6*   * 110* 163       LFTs -   Recent Labs   Lab Test 12/22/22  0608 12/05/22  1212 04/16/21  1005   ALKPHOS 51 74 47   BILITOTAL 0.5 0.7 0.4   ALT 11 10 14   AST 23 20 13   PROTTOTAL 5.3* 6.1* 6.7*   ALBUMIN 3.3* 3.8 3.3*       Iron Panel -   Recent Labs   Lab Test 12/22/22  0608 06/27/19  0742 11/01/18  0737   IRON 80 99 113   IRONSAT 36 34 43   FRANNY 361* 233* 216*         Imaging:  All imaging studies reviewed by me.     Current Medications:    amLODIPine  10 mg Oral Daily     cyanocobalamin  1,000 mcg Oral Daily     mycophenolate  500 mg Oral BID     sodium chloride (PF)  3 mL Intracatheter Q8H     tacrolimus  3 mg Oral BID     vitamin D3  50 mcg Oral Daily       Deuce Xiong MD

## 2022-12-24 NOTE — PROGRESS NOTES
"BP (!) 165/107 (BP Location: Right arm)   Pulse 91   Temp 98.5  F (36.9  C) (Oral)   Resp 17   Ht 1.6 m (5' 3\")   Wt 73.9 kg (162 lb 14.7 oz)   SpO2 93%   BMI 28.86 kg/m        [id number]  Hypertensive (scheduled BP and prn labetalol given x1), OVSS on RA. A+Ox4. Family at bedside, supportive. No blood sugar checks. Hemo 4.5 with morning labs, team aware. 1 unit of RBC given in dialysis. Recheck with morning labs. Denies pain or nausea. Renal diet. L PIV SL. R CVC SL. Pt had dialysis today. 4 hour run, 2L off. Oliguric, on HD. 2 BMs this shift. Skin intact. Up ad matthias. Will continue to monitor and notify team with changes.           "

## 2022-12-25 LAB
ANION GAP SERPL CALCULATED.3IONS-SCNC: 12 MMOL/L (ref 7–15)
BASOPHILS # BLD AUTO: 0 10E3/UL (ref 0–0.2)
BASOPHILS NFR BLD AUTO: 0 %
BLD PROD TYP BPU: NORMAL
BLD PROD TYP BPU: NORMAL
BLOOD COMPONENT TYPE: NORMAL
BLOOD COMPONENT TYPE: NORMAL
BUN SERPL-MCNC: 25.4 MG/DL (ref 6–20)
CALCIUM SERPL-MCNC: 8.2 MG/DL (ref 8.6–10)
CHLORIDE SERPL-SCNC: 101 MMOL/L (ref 98–107)
CODING SYSTEM: NORMAL
CODING SYSTEM: NORMAL
CREAT SERPL-MCNC: 4.57 MG/DL (ref 0.51–0.95)
CROSSMATCH: NORMAL
CROSSMATCH: NORMAL
DEPRECATED HCO3 PLAS-SCNC: 24 MMOL/L (ref 22–29)
EOSINOPHIL # BLD AUTO: 0.2 10E3/UL (ref 0–0.7)
EOSINOPHIL NFR BLD AUTO: 3 %
ERYTHROCYTE [DISTWIDTH] IN BLOOD BY AUTOMATED COUNT: 18 % (ref 10–15)
ERYTHROCYTE [DISTWIDTH] IN BLOOD BY AUTOMATED COUNT: 18.1 % (ref 10–15)
GFR SERPL CREATININE-BSD FRML MDRD: 12 ML/MIN/1.73M2
GLUCOSE SERPL-MCNC: 98 MG/DL (ref 70–99)
HCT VFR BLD AUTO: 17.6 % (ref 35–47)
HCT VFR BLD AUTO: 19.6 % (ref 35–47)
HGB BLD-MCNC: 5.3 G/DL (ref 11.7–15.7)
HGB BLD-MCNC: 6.1 G/DL (ref 11.7–15.7)
IMM GRANULOCYTES # BLD: 0 10E3/UL
IMM GRANULOCYTES NFR BLD: 0 %
ISSUE DATE AND TIME: NORMAL
ISSUE DATE AND TIME: NORMAL
LDH SERPL L TO P-CCNC: 618 U/L (ref 0–250)
LYMPHOCYTES # BLD AUTO: 1.2 10E3/UL (ref 0.8–5.3)
LYMPHOCYTES NFR BLD AUTO: 23 %
MCH RBC QN AUTO: 27.6 PG (ref 26.5–33)
MCH RBC QN AUTO: 28.9 PG (ref 26.5–33)
MCHC RBC AUTO-ENTMCNC: 30.1 G/DL (ref 31.5–36.5)
MCHC RBC AUTO-ENTMCNC: 31.1 G/DL (ref 31.5–36.5)
MCV RBC AUTO: 92 FL (ref 78–100)
MCV RBC AUTO: 93 FL (ref 78–100)
MONOCYTES # BLD AUTO: 0.5 10E3/UL (ref 0–1.3)
MONOCYTES NFR BLD AUTO: 10 %
NEUTROPHILS # BLD AUTO: 3.2 10E3/UL (ref 1.6–8.3)
NEUTROPHILS NFR BLD AUTO: 64 %
NRBC # BLD AUTO: 0 10E3/UL
NRBC BLD AUTO-RTO: 0 /100
PLAT MORPH BLD: NORMAL
PLATELET # BLD AUTO: 107 10E3/UL (ref 150–450)
PLATELET # BLD AUTO: 119 10E3/UL (ref 150–450)
POTASSIUM SERPL-SCNC: 4.1 MMOL/L (ref 3.4–5.3)
RBC # BLD AUTO: 1.92 10E6/UL (ref 3.8–5.2)
RBC # BLD AUTO: 2.11 10E6/UL (ref 3.8–5.2)
RBC MORPH BLD: NORMAL
RETICS # AUTO: 0.14 10E6/UL (ref 0.03–0.1)
RETICS/RBC NFR AUTO: 6.7 % (ref 0.5–2)
SODIUM SERPL-SCNC: 137 MMOL/L (ref 136–145)
UNIT ABO/RH: NORMAL
UNIT ABO/RH: NORMAL
UNIT NUMBER: NORMAL
UNIT NUMBER: NORMAL
UNIT STATUS: NORMAL
UNIT STATUS: NORMAL
UNIT TYPE ISBT: 600
UNIT TYPE ISBT: 6200
WBC # BLD AUTO: 5.1 10E3/UL (ref 4–11)
WBC # BLD AUTO: 5.7 10E3/UL (ref 4–11)

## 2022-12-25 PROCEDURE — 250N000013 HC RX MED GY IP 250 OP 250 PS 637

## 2022-12-25 PROCEDURE — 99233 SBSQ HOSP IP/OBS HIGH 50: CPT | Mod: GC | Performed by: STUDENT IN AN ORGANIZED HEALTH CARE EDUCATION/TRAINING PROGRAM

## 2022-12-25 PROCEDURE — 90937 HEMODIALYSIS REPEATED EVAL: CPT

## 2022-12-25 PROCEDURE — 258N000003 HC RX IP 258 OP 636: Performed by: STUDENT IN AN ORGANIZED HEALTH CARE EDUCATION/TRAINING PROGRAM

## 2022-12-25 PROCEDURE — 36415 COLL VENOUS BLD VENIPUNCTURE: CPT

## 2022-12-25 PROCEDURE — 634N000001 HC RX 634

## 2022-12-25 PROCEDURE — 99207 BLOOD MORPHOLOGY PATHOLOGIST REVIEW: CPT | Performed by: STUDENT IN AN ORGANIZED HEALTH CARE EDUCATION/TRAINING PROGRAM

## 2022-12-25 PROCEDURE — 36415 COLL VENOUS BLD VENIPUNCTURE: CPT | Performed by: STUDENT IN AN ORGANIZED HEALTH CARE EDUCATION/TRAINING PROGRAM

## 2022-12-25 PROCEDURE — 83615 LACTATE (LD) (LDH) ENZYME: CPT | Performed by: STUDENT IN AN ORGANIZED HEALTH CARE EDUCATION/TRAINING PROGRAM

## 2022-12-25 PROCEDURE — 120N000011 HC R&B TRANSPLANT UMMC

## 2022-12-25 PROCEDURE — 250N000013 HC RX MED GY IP 250 OP 250 PS 637: Performed by: STUDENT IN AN ORGANIZED HEALTH CARE EDUCATION/TRAINING PROGRAM

## 2022-12-25 PROCEDURE — 85027 COMPLETE CBC AUTOMATED: CPT | Performed by: STUDENT IN AN ORGANIZED HEALTH CARE EDUCATION/TRAINING PROGRAM

## 2022-12-25 PROCEDURE — P9016 RBC LEUKOCYTES REDUCED: HCPCS | Performed by: STUDENT IN AN ORGANIZED HEALTH CARE EDUCATION/TRAINING PROGRAM

## 2022-12-25 PROCEDURE — 90935 HEMODIALYSIS ONE EVALUATION: CPT | Performed by: INTERNAL MEDICINE

## 2022-12-25 PROCEDURE — 250N000012 HC RX MED GY IP 250 OP 636 PS 637

## 2022-12-25 PROCEDURE — 85027 COMPLETE CBC AUTOMATED: CPT

## 2022-12-25 PROCEDURE — 82310 ASSAY OF CALCIUM: CPT

## 2022-12-25 PROCEDURE — 83010 ASSAY OF HAPTOGLOBIN QUANT: CPT | Performed by: STUDENT IN AN ORGANIZED HEALTH CARE EDUCATION/TRAINING PROGRAM

## 2022-12-25 PROCEDURE — 85045 AUTOMATED RETICULOCYTE COUNT: CPT | Performed by: STUDENT IN AN ORGANIZED HEALTH CARE EDUCATION/TRAINING PROGRAM

## 2022-12-25 RX ORDER — LABETALOL HYDROCHLORIDE 5 MG/ML
20 INJECTION, SOLUTION INTRAVENOUS EVERY 6 HOURS PRN
Status: DISCONTINUED | OUTPATIENT
Start: 2022-12-25 | End: 2022-12-26 | Stop reason: HOSPADM

## 2022-12-25 RX ORDER — LOSARTAN POTASSIUM 25 MG/1
25 TABLET ORAL DAILY
Status: DISCONTINUED | OUTPATIENT
Start: 2022-12-25 | End: 2022-12-26 | Stop reason: HOSPADM

## 2022-12-25 RX ADMIN — EPOETIN ALFA-EPBX 6000 UNITS: 10000 INJECTION, SOLUTION INTRAVENOUS; SUBCUTANEOUS at 12:49

## 2022-12-25 RX ADMIN — LOSARTAN POTASSIUM 25 MG: 25 TABLET, FILM COATED ORAL at 17:29

## 2022-12-25 RX ADMIN — MYCOPHENOLATE MOFETIL 500 MG: 250 CAPSULE ORAL at 08:28

## 2022-12-25 RX ADMIN — Medication: at 11:25

## 2022-12-25 RX ADMIN — SODIUM CHLORIDE 300 ML: 9 INJECTION, SOLUTION INTRAVENOUS at 11:24

## 2022-12-25 RX ADMIN — AMLODIPINE BESYLATE 5 MG: 5 TABLET ORAL at 08:27

## 2022-12-25 RX ADMIN — MYCOPHENOLATE MOFETIL 500 MG: 250 CAPSULE ORAL at 17:29

## 2022-12-25 RX ADMIN — TACROLIMUS 3 MG: 1 CAPSULE ORAL at 08:28

## 2022-12-25 RX ADMIN — TACROLIMUS 3 MG: 1 CAPSULE ORAL at 17:29

## 2022-12-25 RX ADMIN — Medication 50 MCG: at 08:27

## 2022-12-25 RX ADMIN — SODIUM CHLORIDE 250 ML: 9 INJECTION, SOLUTION INTRAVENOUS at 11:25

## 2022-12-25 RX ADMIN — ACETAMINOPHEN 650 MG: 325 TABLET, FILM COATED ORAL at 08:27

## 2022-12-25 RX ADMIN — Medication 1000 MCG: at 08:27

## 2022-12-25 ASSESSMENT — ACTIVITIES OF DAILY LIVING (ADL)
ADLS_ACUITY_SCORE: 18

## 2022-12-25 NOTE — PROGRESS NOTES
RN[facility] paged that the pt would like a new dialysis center. I called the pt, she would like to go to the dialysis center that Dr Cardona goes to. I called LM at the below;  [facility]  [address] [place] [address]  Phone: [phone]  And asked that they call back to inform if that doctor is there and if not, what location she is at.  Yessenia Chaparro, RN, BSN, PHN  Weekend/Holiday [facility] Pager [phone]

## 2022-12-25 NOTE — PROVIDER NOTIFICATION
Lab called at 10:35am and reported Hemoglobin of 6.1 at 10:35am. YAKOV Pinto was notified at 11:09am

## 2022-12-25 NOTE — PROVIDER NOTIFICATION
/126, HR 81. YAKOV Pinto MD was notified. Per Dialysis request- do not give prn Labetalol. Pt will get hemodialysis today

## 2022-12-25 NOTE — PLAN OF CARE
Hypertensive, but within parameter. MD's made aware. Had dialysis today. Returned back to 7A at 2:10pm. Got 2 units of blood transfusion and Epoetin during dialysis. Voiding adequate amount. Report BM today. Up in room independently

## 2022-12-25 NOTE — PROGRESS NOTES
HEMODIALYSIS TREATMENT NOTE    Date: 12/25/2022  Time: 1:35 PM    Data:  Pre Wt:   67.1 kg  Desired Wt: 64.1  kg   Post Wt:  64.7 kg  Weight change:  2.4 kg  Ultrafiltration - Post Run Net Total Removed (mL): 2400 mL  Vascular Access Status: patent  Dialyzer Rinse: Streaked, Light  Total Blood Volume Processed: 0 L Liters  Total Dialysis (Treatment) Time: 2 Hours    Lab:   No    Interventions:  2 unit RBC transfusion admin  Epoetin admin per MAR    Assessment:  Pt ran for 2 hours for an ultrafiltration run with 2.4 L pulled. Epoetin admin per MAR. 2 units of RBC transfusion admin with no transfusion reaction.  Pt hypertensive throughout tx with no complaints. Pt rinsed back, CVC NS locked, and caps changed. CVC dressing C/D/I with no abnormal findings. Pt on laptop during tx. Pt declined to order lunch tray. Pt alert and oriented x4. Report given to PCN     Plan:    Per renal team

## 2022-12-25 NOTE — PLAN OF CARE
"BP (!) 159/109 (BP Location: Left arm)   Pulse 82   Temp 98.5  F (36.9  C) (Oral)   Resp 20   Ht 1.6 m (5' 3\")   Wt 73.9 kg (162 lb 14.7 oz)   SpO2 97%   BMI 28.86 kg/m      Shift: 2552-9119  Isolation Status: NA  Diet: Renal Diet  LDA: Chest HD line.  Infusion(s): NA  VS: HTN SBPs in the 150s-160s, DBPs in the 90's-100's. Labetalol ordered for SBP>180 and DBP>120  Pain/Nausea/PRN: No reported pain or nausea.  Lab: Hg of 4.6.  BG: NA  Neuro: A&O x4. Calls appropriately.  Behaviors: Calm, cooperative, and pleasant  Respiratory: Regular depth and pattern; unlabored; expansion symmetrical; breath sounds clear and equal bilaterally; no cough  Cardiac: Regular rhythm, S1, S2; no reported chest pain  GI/: LBM: 12/24. Oliguric. No UOP overnight. on HD  Skin: No noted skin issues.  Mobility: UAL  Plan: - one unit of pRBCs transfused for hgb <5  - decrease PTA amlodipine to 5mg.  Hold PTA losartan for now, but can use PRN labetalol if SBP>180 or DBP>120      "

## 2022-12-25 NOTE — PLAN OF CARE
[id number]. Hypertensive, PRN labetelol given and BP came down. OVSS on RA. HD line intact. PIV SL. LBM 12/24. Emesis x1 during dialysis. Denies nausea on unit. Voiding w/ hat in toilet. Skin WDL. UAL in room. Plan for dialysis tomorrow, 12/25, with follow up plan for outpatient dialysis. Continue to watch hgb & pressures. Update team with any changes.    Sujey Montoya RN, on 12/24/22 at 6:54 PM

## 2022-12-25 NOTE — PROGRESS NOTES
Per charge RN, pt is a potential discharge for today, however she has dialysis this morning, so discharge would not be until later in the day.  Yessenia Chaparro, RN, BSN, PHN  Weekend/Holiday [facility] Pager [phone].

## 2022-12-25 NOTE — PROGRESS NOTES
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 Recurrent IgA nephropathy with mesangial and endocapillary hypercellularity present. Focal global glomerulosclerosis. Mild patchy tubular atrophy and interstitial fibrosis. Moderate arteriosclerosis and arterial hyalinosis. She was treated with treated with  mg twice daily, tacrolimus goal 6-8, and tocilizumab x 6 doses of 8 mg/kg every 4 weeks   # Immunosuppression: Tacrolimus immediate release (goal 4-6) and Mycophenolate mofetil (dose 500 mg every 12 hours)   - Changes: Not at this time, 12 hr FK trough tomorrow   # Infection Prophylaxis:   - PJP: None cd4>200 in 2017  # Hypertension: poor control with failing allograft and fluid overload              - Volume status: fluid overload           EDW ~ tbd reports it was 125 pound before she started gaining weight              - Changes: UF during HD, on amlodipine 10 mg po every day, could titrate meds if uncontrolled post UF   -Will do UF tomorrow for 2 hour    # Anemia in Chronic Renal Disease likely 2/2 heavy menses due to uremia: critical anemia, blood loss, c disease, EAS resistance, etc.hb of 4.5.Patient had concern about antibody formation  Talk to the patient that she is on immunosuppressed she is less likely to develop antibodies.Also told patient that increase in PRA would not  "mean that she is not a candidate for transplant. It will take sometime till her sister blood can be given and even 1 PRBC would not be enough. I had Dr.El Pinon talk to her who address all her concern and she agress for transfusion              - add, haptoglobin, ldh, smear              - PRBC              - seen by GYN for menorrhagia and symptoms attributed to renal disease/uremia per patient report, could use  DDAVP as well if persists   -Hb tomorrow and possible transfusion tomrorwo      # Ulcerative colitis:              - diagnosed in 2018. Since on Entyvio q 6 weeks with stability, however, recently flared with multiple loose/watery       stools daily. Followed by [facility] (Dr. Wilkins) and has a colonoscopy pending    Recommendations were communicated to primary team via notes    Seen and discussed with Dr. Angela Xiong MD   Division of Renal Disease and Hypertension  [facility]  myairmail  Vocera Web Console    Interval History :   Nursing and provider notes from last 24 hours reviewed.  In the last 24 hours Caity Horan refusing blood product initially but than was convinced and got 1 PRBC I explained to her that she can get MI if she does not get blood and can result in death. Dr.El Pinon was contacted and address all the concern by patient   Review of Systems:   I reviewed the following systems:  GI: Normal appetite. No  nausea or vomiting or diarrhea.   Neuro:  No confusion  Constitutional:  No fever or chills  CV: No dyspnea or edema.  No chest pain.    Physical Exam:   I/O last 3 completed shifts:  In: 300   Out: 2300 [Urine:300; Other:2000]   BP (!) 168/116   Pulse 96   Temp 98  F (36.7  C) (Oral)   Resp 23   Ht 1.6 m (5' 3\")   Wt 73.9 kg (162 lb 14.7 oz)   SpO2 97%   BMI 28.86 kg/m       GENERAL APPEARANCE: Not in distress  EYES:  No scleral icterus, pupils equal  PULM: lungs clear to auscultation bilaterally, equal air movement, no clubbing  CV: Patient has regular rhythm, normal " rate, no rub     -JVD -ve     -edema +1  GI: soft, non tender, non distended, bowel sounds are present  INTEGUMENT: no cyanosis, no rash  NEURO:  no asterixis   Access [facility]    Labs:   All labs reviewed by me  Electrolytes/Renal -   Recent Labs   Lab Test 12/24/22  0548 12/23/22  0558 12/22/22  0608 12/21/22  2152 12/21/22  2045 03/23/18  0856 03/01/18  1415 07/19/17  0722 05/24/17  0725 03/09/15  0821 03/02/15  0810 02/25/15  0915    137 141  --  141   < >  --    < > 141   < > 142 139   POTASSIUM 4.3 4.5 5.1  --  4.9   < >  --    < > 4.8   < > 4.4 4.4   CHLORIDE 101 102 103  --  102   < >  --    < > 108   < > 111* 110*   CO2 23 21* 18*  --  17*   < >  --    < > 26   < > 24 20   BUN 54.0* 89.0* 121.0*  --  121.0*   < >  --    < > 16   < > 15 21   CR 6.94* 9.05* 12.00*  --  11.70*   < >  --    < > 1.09*   < > 0.95 0.93   * 112* 101*   < > 130*   < >  --    < > 98   < > 99 93   MICAH 7.8* 8.5* 8.7  --  8.9   < >  --    < > 8.6   < > 9.3 9.4   MAG  --   --   --   --  2.5*  --  1.7  --  1.6   < > 1.5* 1.6   PHOS  --   --   --   --  6.8*  --   --   --   --   --  2.8 3.5    < > = values in this interval not displayed.       CBC -   Recent Labs   Lab Test 12/24/22  0917 12/24/22  0548 12/23/22  0558 12/22/22  0732   WBC  --  5.6 6.5 6.6   HGB 4.6* 4.5* 5.0* 5.0*   PLT  --  103* 132* 110*       LFTs -   Recent Labs   Lab Test 12/22/22  0608 12/05/22  1212 04/16/21  1005   ALKPHOS 51 74 47   BILITOTAL 0.5 0.7 0.4   ALT 11 10 14   AST 23 20 13   PROTTOTAL 5.3* 6.1* 6.7*   ALBUMIN 3.3* 3.8 3.3*       Iron Panel -   Recent Labs   Lab Test 12/22/22  0608 06/27/19  0742 11/01/18  0737   IRON 80 99 113   IRONSAT 36 34 43   FRANNY 361* 233* 216*         Imaging:  All imaging studies reviewed by me.     Current Medications:    amLODIPine  5 mg Oral Daily     cyanocobalamin  1,000 mcg Oral Daily     mycophenolate  500 mg Oral BID     sodium chloride (PF)  3 mL Intracatheter Q8H     tacrolimus  3 mg Oral BID     vitamin D3  50  mcg Oral Daily       Deuce Xiong MD

## 2022-12-26 VITALS
RESPIRATION RATE: 25 BRPM | HEART RATE: 77 BPM | TEMPERATURE: 98.8 F | SYSTOLIC BLOOD PRESSURE: 177 MMHG | DIASTOLIC BLOOD PRESSURE: 117 MMHG | WEIGHT: 147.93 LBS | OXYGEN SATURATION: 99 % | BODY MASS INDEX: 26.21 KG/M2 | HEIGHT: 63 IN

## 2022-12-26 LAB
ANION GAP SERPL CALCULATED.3IONS-SCNC: 13 MMOL/L (ref 7–15)
BUN SERPL-MCNC: 37.2 MG/DL (ref 6–20)
CALCIUM SERPL-MCNC: 8.7 MG/DL (ref 8.6–10)
CHLORIDE SERPL-SCNC: 102 MMOL/L (ref 98–107)
CREAT SERPL-MCNC: 6.38 MG/DL (ref 0.51–0.95)
DAT POLY: NEGATIVE
DEPRECATED HCO3 PLAS-SCNC: 21 MMOL/L (ref 22–29)
ERYTHROCYTE [DISTWIDTH] IN BLOOD BY AUTOMATED COUNT: 17 % (ref 10–15)
GFR SERPL CREATININE-BSD FRML MDRD: 8 ML/MIN/1.73M2
GLUCOSE SERPL-MCNC: 96 MG/DL (ref 70–99)
HAPTOGLOB SERPL-MCNC: <3 MG/DL (ref 32–197)
HCT VFR BLD AUTO: 25.1 % (ref 35–47)
HGB BLD-MCNC: 7.8 G/DL (ref 11.7–15.7)
MCH RBC QN AUTO: 28.5 PG (ref 26.5–33)
MCHC RBC AUTO-ENTMCNC: 31.1 G/DL (ref 31.5–36.5)
MCV RBC AUTO: 92 FL (ref 78–100)
PATH REPORT.COMMENTS IMP SPEC: NORMAL
PATH REPORT.COMMENTS IMP SPEC: NORMAL
PATH REPORT.FINAL DX SPEC: NORMAL
PATH REPORT.MICROSCOPIC SPEC OTHER STN: NORMAL
PATH REPORT.MICROSCOPIC SPEC OTHER STN: NORMAL
PATH REPORT.RELEVANT HX SPEC: NORMAL
PLATELET # BLD AUTO: 121 10E3/UL (ref 150–450)
POTASSIUM SERPL-SCNC: 4.1 MMOL/L (ref 3.4–5.3)
RBC # BLD AUTO: 2.74 10E6/UL (ref 3.8–5.2)
SODIUM SERPL-SCNC: 136 MMOL/L (ref 136–145)
SPECIMEN EXPIRATION DATE: NORMAL
TACROLIMUS BLD-MCNC: 4.2 UG/L (ref 5–15)
TME LAST DOSE: ABNORMAL H
TME LAST DOSE: ABNORMAL H
WBC # BLD AUTO: 6.8 10E3/UL (ref 4–11)

## 2022-12-26 PROCEDURE — 80197 ASSAY OF TACROLIMUS: CPT | Performed by: STUDENT IN AN ORGANIZED HEALTH CARE EDUCATION/TRAINING PROGRAM

## 2022-12-26 PROCEDURE — 258N000003 HC RX IP 258 OP 636: Performed by: STUDENT IN AN ORGANIZED HEALTH CARE EDUCATION/TRAINING PROGRAM

## 2022-12-26 PROCEDURE — 85027 COMPLETE CBC AUTOMATED: CPT

## 2022-12-26 PROCEDURE — 250N000013 HC RX MED GY IP 250 OP 250 PS 637

## 2022-12-26 PROCEDURE — 99232 SBSQ HOSP IP/OBS MODERATE 35: CPT | Mod: GC | Performed by: STUDENT IN AN ORGANIZED HEALTH CARE EDUCATION/TRAINING PROGRAM

## 2022-12-26 PROCEDURE — 90935 HEMODIALYSIS ONE EVALUATION: CPT | Performed by: INTERNAL MEDICINE

## 2022-12-26 PROCEDURE — 90937 HEMODIALYSIS REPEATED EVAL: CPT

## 2022-12-26 PROCEDURE — 80048 BASIC METABOLIC PNL TOTAL CA: CPT

## 2022-12-26 PROCEDURE — 36415 COLL VENOUS BLD VENIPUNCTURE: CPT | Performed by: STUDENT IN AN ORGANIZED HEALTH CARE EDUCATION/TRAINING PROGRAM

## 2022-12-26 PROCEDURE — 86880 COOMBS TEST DIRECT: CPT | Performed by: STUDENT IN AN ORGANIZED HEALTH CARE EDUCATION/TRAINING PROGRAM

## 2022-12-26 PROCEDURE — 250N000012 HC RX MED GY IP 250 OP 636 PS 637

## 2022-12-26 RX ORDER — TACROLIMUS 1 MG/1
3 CAPSULE ORAL 2 TIMES DAILY
Qty: 180 CAPSULE | Refills: 0 | Status: SHIPPED | OUTPATIENT
Start: 2022-12-26 | End: 2023-01-25

## 2022-12-26 RX ORDER — AMLODIPINE BESYLATE 5 MG/1
5 TABLET ORAL DAILY
Qty: 30 TABLET | Refills: 0 | Status: SHIPPED | OUTPATIENT
Start: 2022-12-26 | End: 2023-01-27

## 2022-12-26 RX ORDER — LOSARTAN POTASSIUM 25 MG/1
25 TABLET ORAL DAILY
Qty: 30 TABLET | Refills: 0 | Status: SHIPPED | OUTPATIENT
Start: 2022-12-26 | End: 2023-01-03

## 2022-12-26 RX ADMIN — MYCOPHENOLATE MOFETIL 500 MG: 250 CAPSULE ORAL at 08:17

## 2022-12-26 RX ADMIN — AMLODIPINE BESYLATE 5 MG: 5 TABLET ORAL at 08:17

## 2022-12-26 RX ADMIN — Medication 1000 MCG: at 08:17

## 2022-12-26 RX ADMIN — SODIUM CHLORIDE 300 ML: 9 INJECTION, SOLUTION INTRAVENOUS at 08:44

## 2022-12-26 RX ADMIN — Medication 50 MCG: at 08:17

## 2022-12-26 RX ADMIN — Medication: at 08:45

## 2022-12-26 RX ADMIN — TACROLIMUS 3 MG: 1 CAPSULE ORAL at 08:16

## 2022-12-26 RX ADMIN — SODIUM CHLORIDE 250 ML: 9 INJECTION, SOLUTION INTRAVENOUS at 08:45

## 2022-12-26 ASSESSMENT — ACTIVITIES OF DAILY LIVING (ADL)
ADLS_ACUITY_SCORE: 18

## 2022-12-26 NOTE — DISCHARGE SUMMARY
Patient discharged to home at 2:10 PM via ambulation. Accompanied by mother. Discharge instructions reviewed with patientand Mom, opportunity offered to ask questions. Pt. was given times of last dose for all discharge medications in writing on discharge medication sheets. Prescriptions sent to patients preferred pharmacy.  will follow up with Pt. about her dialysis. Pt. had no further questions at the time of discharge and no unmet needs were identified. All belongings sent with patient.    Lorie Galindo RN

## 2022-12-26 NOTE — PROGRESS NOTES
Care Management Discharge Note    Discharge Date: 12/26/2022  Discharge Disposition: Home   Discharge Services: Dialysis Services  Discharge DME: None  Discharge Transportation: family or friend will provide, car, drives self  Education Provided on the Discharge Plan:  yes  Persons Notified of Discharge Plans: yes  Patient/Family in Agreement with the Plan: yes    Handoff Referral Completed: Yes    Additional Information:  RNCC was notified by 7A charge nurse that pt has refused OP Dialysis with [facility]. RNCC cannot confirm with pt due to being at dialysis.    At 2pm, RNCC was notified by 7A charge RN that pt is back on the floor and is planning on discharging soon. RNCC stopped in and spoke with pt at bedside regarding OP HD. Pt states she wanted to work with Dr. Cardona and would go to which ever location provider is at. Pt states weekend RNCC had already started working on transferring her to [place] because that is where the provider is but have not heard back yet. Pt is in agreement with going to [facility] until transferto [facility] is set up.    RNCC called [facility] and confirmed that pt is set up for Wednesday appointment at 4:15pm. [facility] RN asked pt to show up around 3-3:30pm due to needing paperwork signed. [facility] RN stated Dr. Cardona does round at their location and did not have a primary location.     RNCC called [facility] admissions to try to transfer referral to [place] location per pt request. Admissions representative stated no current chair open in [place] but pt can be put on wait list to transfer to [facility] when there is a chair. Admissions representative stated RNCC must call [facility] directly as each facility sets up their own wait list. RNCC placed call to [facility] regarding transfer and waitlist but had to leave multiple messages. RNCC left message for pt regarding situation and to call back with any questions.     Thom Kaplan, RN BSN  7C RN Care Coordinator   Ph: [phone]  Pager:  [phone]

## 2022-12-26 NOTE — PROGRESS NOTES
HEMODIALYSIS TREATMENT NOTE    Date: 12/26/2022  Time: 11:54 AM    Data:  Pre Wt:   67.1kg    Desired Wt:64.1   kg   Post Wt:  64.1 kg  Weight change-3.0:   kg  Ultrafiltration - Post Run Net Total Removed (mL): 3000 mL  Vascular Access Status: patent  Dialyzer Rinse: Clear  Total Blood Volume Processed: 69.45 L Liters  Total Dialysis (Treatment) Time: 3 Hours    Lab:        Interventions:  3 hours of  HD with 3000 mls pulled with no complications.     Assessment:  ESRD patient in for her regular HD run VSS A+O     Plan:    Per renal team

## 2022-12-26 NOTE — DISCHARGE SUMMARY
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 Follow-up and recommended labs and tests      Follow up with primary care provider, Physician No Ref-Primary, within 7   days for hospital follow- up.  The following labs/tests are recommended:   hemoglobin, electrolytes, kidney function.      Appointments on [place] and/or [facility] (with [facility] or [facility]   provider or service). Call [phone] if you haven't heard regarding   these appointments within 7 days of discharge.           Unresulted Labs Ordered in the Past 30 Days of this Admission     Date and Time Order Name Status Description    12/26/2022  2:31 PM Direct antiglobulin test, adult In process     12/26/2022  2:26 PM Direct antiglobulin test In process     12/25/2022 10:03 AM Bld morphology pathology review In process       These results will be followed up by patient's PCP (referral placed).    Discharge Disposition   Discharged to home  Condition at discharge: [facility]    Hospital Course   Caity Horan is a 32 year old female admitted on 12/21/2022. She has a history of CKD 2/2 IgA nephropathy s/p LDKT in 2014, HTN, ulcerative colitis admitted with worsening kidney function and hypervolemia in the setting of chronic antibody mediated rejection and recurrent IgA, admitted for dialysis administration and further evaluation for second  kidney transplant. Problems addressed during this hospitalized are listed below:    CKD Stage 5 2/2 IgA nephropathy s/p LDKT in 2014, now on HD  Hypervolemia  Patient with known chronic antibody mediated rejection and IgA nephropathy recurrence. Patient with weight increase of ~35 lbs with associated dyspnea on exertion and orthopnea. Was seen in the nephrology clinic on 12/1/22 and was advised to come to the ED for IV diuretics and initiation of dialysis. CXR revealing pulmonary edema. Notably patient has experienced muscle weakness and paralysis with bumex in the past, so she was given 100mg IV lasix x2 with 10mg IV metolazone. Is/Os not documented, though patient endorses minimal response to IV diuretics. She was seen in clinic earlier this month by both transplant surgery and transplant nephrology to initiate workup for second transplant; this will be continued as an outpatient. Had tunneled dialysis catheter placed and has received dialysis on 12/22 - 12/26. Patient has outpatient HD set up starting on 12/28.   - continue tacrolimus 3mg BID and MMF 500mg BID  - RNCC to follow up with patient regarding patient's desire to change dialysis locations  - MWF dialysis as an outpatient  - patient follows with Dr. Cardona  - patient to follow up as an outpatient for continued evaluation of second kidney transplant    Vaginal bleeding  Epistaxis  Normocytic anemia in the setting of chronic kidney disease  Thrombocytopenia  Elevated LDH, low haptoglobin  Significant worsening of hemoglobin in the past month, suspected to be in the setting of worsening CKD vs vaginal bleeding. HgB 5.6 on admission. Could be a dilutional component in the setting of significant volume overload, but suspect this is not playing as large of a role. Suspect vaginal bleeding is in the setting of hyperuremia. Patient has also recently begun having epistaxis. Iron studies within normal limits. Patient received one dose of epo while hospitalized.  Also received a total of 3 units of pRBCs. HgB 7.8 at the time of discharge. Elevated LDH and low haptoglobin concerning for hemolysis, however after speaking to hematology team, suspicion was low, especially in the setting of recent blood transfusion. Recommend patient's PCP follow up CBC, CMP, haptoglobin, LDH, and smear.   - patient's PCP to follow up on labs, as above  - continue epo infusions with dialysis  - continue blood transfusions as needed per transplant nephrologist  - PTA cyanocobalamin 1000mcg daily. Last B12 level was on 8/11/21 and was 1422    Hypertension in the setting of ESRD  Patient with elevated pressures on this admission (160-170ss/100s-110s). PTA amlodipine restarted. Were initially holding PTA losartan per nephrology but after further discussion with nephrology, was restarted on 12/26. Patient's pressures remained elevated, even after dialysis. Were unable to reach inpatient nephrology team for further recommendations.   - PTA amlodipine 5mg daily (initially prescribed as 10mg but patient states she only takes 5mg)  - PTA losartan 25mg daily  - defer to transplant nephrology team for adjustments to blood pressure as outpatient     Ulcerative Colitis  Patient has not had symptoms recently. Denies hematochezia. Sent referral for patient to resume care with her previous GI provider, Dr. Dsouza.   - vedolizumab 300 mg last given 12/14/2022  - colonoscopy was deferred Dec 2022 due to current acute issues    Consultations This Hospital Stay   NURSING TO CONSULT FOR VASCULAR ACCESS CARE IP CONSULT  NEPHROLOGY GENERAL ADULT IP CONSULT  INTERVENTIONAL RADIOLOGY ADULT/PEDS IP CONSULT  CARE MANAGEMENT / SOCIAL WORK IP CONSULT    Code Status   Full Code       The patient was discussed with Dr. Joaquín Jasmine MD  [address] [address]  Phone:  [phone]  ______________________________________________________________________    Physical Exam   Vital Signs: Temp: 98.8  F (37.1  C) Temp src: Oral BP: (!) 177/117 Pulse: 77   Resp: 25 SpO2: 99 % O2 Device: None (Room air)    Weight: 147 lbs 14.86 oz  General Appearance: patient is in no acute distress, resting comfortably in bed  HEENT: EOMI, PERLL, normocephalic, MMM  Respiratory: mild crackles in lung bases bilaterally. No increased work of breathing.  Cardiovascular: regular rate and rhythm, normal S1 and S2, no murmurs/rubs/gallops  GI: soft, nontender, nondistended  Genitourinary: no flank pain  Skin:  No rashes or lesions.   Musculoskeletal: No joint swelling or tenderness. 1+ bilateral lower extremity edema to mid-shin  Neurologic: AAOx3, CN II-XII grossly intact without focal neuro deficits.  Psychiatric: appropriate mood and affect      Primary Care Physician   Physician No Ref-Primary    Discharge Orders      Adult GI  Referral - Consult Only      Primary Care Referral      Activity    Your activity upon discharge: activity as tolerated     Adult [facility] Follow-up and recommended labs and tests    Follow up with primary care provider, Physician No Ref-Primary, within 7 days for hospital follow- up.  The following labs/tests are recommended: hemoglobin, electrolytes, kidney function.      Appointments on [place] and/or [facility] (with [facility] or [facility] provider or service). Call [phone] if you haven't heard regarding these appointments within 7 days of discharge.     Reason for your hospital stay    Dear Caity Horan,    Your were hospitalized at [facility] with kidney failure. Over your hospitalization you were treated with dialysis, blood pressure medications, and blood transfusions and are now ready to be discharged home.      If you continue dialysis you should continue to improve. However, if you develop fever, shortness of breath, light  headedness, chest pain or a significant increase in bleeding, please seek medical attention.    We are suggesting the following medication changes:  - continue all of your previously scheduled medications    Please get the following tests done:  - recommend that your complete blood count, comprehensive metabolic panel, haptoglobin, and LDH are checked within the next week by your PCP. Would also     Please attend the following appointments after discharge:  - follow up with Dr. Cardona  - establish care with a primary care provider (referral placed; you should receive a call)  - re-establish care with your previous GI provider (referral placed; they should reach out to you)    It was a pleasure meeting you. Thank you for allowing me and my team the privilege of caring for you.    Your hospital unit at the time of discharge is 7A so if you have any questions please call the hospital at [phone] and ask to talk to a nurse on 7A.     Diet    Follow this diet upon discharge: return to your previous diet.       Significant Results and Procedures      Results for orders placed or performed during the hospital encounter of 12/21/22   XR Chest Port 1 View    Narrative    EXAM: XR CHEST PORT 1 VIEW  12/21/2022 8:52 PM     HISTORY:  Concern for volume overload in dialysis patient       COMPARISON:  Chest x-ray 12/5/2022 and CT chest and pelvis 7/25/2017.    FINDINGS:   AP supine portable chest. There is a small/moderate right pleural  effusion. No significant left pleural effusion. Trachea is midline.  Mild interval perihilar hazy opacities with increased interstitial  prominence diffusely. Stable cardiomediastinal silhouette. No  pneumothorax. Unremarkable limited upper abdomen radiographically. No  acute osseous abnormality.      Impression    IMPRESSION:   1. Interval increase in interstitial prominence diffusely with  perihilar haziness, likely relating to pulmonary edema in this  dialysis patient.  2. New small  right pleural effusion with associated atelectasis. No  significant left pleural effusion.    I have personally reviewed the examination and initial interpretation  and I agree with the findings.    GISELE YOUNG MD         SYSTEM ID:  [id number]   US Renal Transplant with Doppler    Narrative    EXAMINATION: US RENAL TRANSPLANT WITH DOPPLER  12/21/2022 9:09 PM      CLINICAL HISTORY: Hx LDKT in 2014 currently on dialysis.    COMPARISON: Ultrasound 12/19/2018.       FINDINGS:   There is a right lower quadrant renal transplant which measures 12.0.  The transplant kidney demonstrates mild cortical echotexture  heterogeneity otherwise with normal cortical thickness without  hydronephrosis. No peritransplant fluid collection identified. There  is no urinary tract dilation seen.     The urinary bladder is somewhat decompressed. No visualized  abnormality.     The arcuate artery resistive indices are 0.65, 0.63, and 0.63.   The renal artery anastomosis peak systolic velocity is 180 cm/s. There  are no abnormal waveforms in the renal artery.   The renal vein is patent.   The artery and vein are patent above and below the anastomosis.    Small volume ascites within the right upper quadrant and right lower  quadrant.      Impression    IMPRESSION:     1. Mild echogenic cortices suggesting medical renal disease.  2. No hydronephrosis.  3. Normal doppler evaluation of the renal transplant.         I have personally reviewed the examination and initial interpretation  and I agree with the findings.    GISELE YOUNG MD         SYSTEM ID:  [id number]   IR CVC Tunnel Placement > 5 Yrs of Age    Narrative    Procedures 12/22/2022:  1. Ultrasound guidance for venous access  2. Placement centrally inserted catheter (age > 5 yrs.) tunneled, no  port, no pump  3. Fluoroscopic guidance placement    History: Need for hemodialysis     Comparisons: 12/21/2022    Operators: TAMERA BATES I, ALEJANDRO MOHAN MD, attest that I was present for all  critical  portions of the procedure and was immediately available to provide  guidance and assistance during the remainder of the procedure.     Fellow: Ovi Miramontes    Medications:   1. 150mcg Fentanyl  2. 3 mg Versed  3. 10 cc lidocaine    Moderate sedation administered by the IR nurse at the supervision of  the attending. Vital signs and oxygenation continuously monitored. The  patient remained stable throughout the procedure.    Sedation time: 34 minutes    Fluoroscopy time: 2.4 min    Findings/procedure:     Prior to the procedure, both verbal and written informed consent  obtained from the patient.     Limited jugular ultrasound documented jugular vein patency. The right  neck and upper chest prepped and draped in the usual sterile fashion.  Buffered 1% Lidocaine used for local analgesia.    Under ultrasound guidance, right internal jugular venotomy made with a  micropuncture needle. Image documenting the vein within the vein  saved.    Micropuncture needle exchanged over guidewire for the micropuncture  sheath under fluoroscopic guidance. Catheter length measured with the  0.018 guidewire. Micropuncture sheath saline locked.     23 cm tip to cuff 14.5 F catheter subcutaneously tunneled from the  right anterior chest to the right internal jugular venotomy site.  Micropuncture sheath exchanged over guidewire for the peel-away  sheath. Guidewire removed. Under fluoroscopic guidance, the catheter  placed through a peel-away sheath and positioned with its tip in the  right atrium. Both lumens flushed and aspirated adequately. Each lumen  heparin locked with 2.5 cc heparin solution. 2-0 nylon catheter  retaining suture and sterile dressing applied. Right internal jugular  venotomy site closed with Dermabond. No immediate complication.      Impression    Impression:  Uncomplicated image guided placement of right IJ tunneled central  venous catheter for dialysis. 23 cm tip to cuff 14.5 F Gluidepath  tunneled  catheter placed under fluoroscopic guidance with the tip in  the right atrium. Both lumens flushed, heparin locked and ready for  immediate use.    I have personally reviewed the examination and initial interpretation  and I agree with the findings.    ALEJANDRO MOHAN MD         SYSTEM ID:  [id number]       Discharge Medications   Discharge Medication List as of 12/26/2022  1:48 PM      START taking these medications    Details   amLODIPine (NORVASC) 5 MG tablet Take 1 tablet (5 mg) by mouth daily for 30 days, Disp-30 tablet, R-0, E-Prescribe         CONTINUE these medications which have CHANGED    Details   losartan (COZAAR) 25 MG tablet Take 1 tablet (25 mg) by mouth daily for 30 days, Disp-30 tablet, R-0, E-Prescribe      tacrolimus (GENERIC EQUIVALENT) 1 MG capsule Take 3 capsules (3 mg) by mouth 2 times daily for 30 days, Disp-180 capsule, R-0, E-Prescribe         CONTINUE these medications which have NOT CHANGED    Details   Blood Pressure Monitoring (B-D ASSURE BPM/AUTO ARM CUFF) MISC Monitor blood pressure and pulse twice daily, Disp-1 each, R-0, E-Prescribe      CALCIUM ANTACID 500 MG chewable tablet Take by mouth 3 times daily, TOREY, Historical      Cyanocobalamin (B-12) 1000 MCG TBCR TAKE ONE TABLET BY MOUTH EVERY DAY, Disp-90 tablet, R-3, E-Prescribe      levonorgestrel (MIRENA) 20 MCG/DAY IUD 1 each by Intrauterine routeHistorical      magnesium oxide (MAG-OX) 400 MG tablet Take 1 tablet (400 mg) by mouth daily, Disp-90 tablet,R-3, E-Prescribe      mycophenolate (GENERIC EQUIVALENT) 250 MG capsule Take 2 capsules (500 mg) by mouth 2 times daily, Disp-360 capsule,R-3, E-Prescribe      Vedolizumab (ENTYVIO IV) Historical      vitamin D3 (CHOLECALCIFEROL) 50 mcg (2000 units) tablet Take 1 tablet (50 mcg) by mouth daily, Disp-100 tablet,R-3, E-Prescribe         STOP taking these medications       sodium bicarbonate 650 MG tablet Comments:   Reason for Stopping:         tacrolimus (GENERIC EQUIVALENT) 0.5 MG  capsule Comments:   Reason for Stopping:         UNABLE TO FIND Comments:   Reason for Stopping:             Allergies   Allergies   Allergen Reactions     Bumetanide Other (See Comments)     Causes paralysis     Amoxicillin Rash

## 2022-12-26 NOTE — DISCHARGE SUMMARY
Dialysis Discharge Summary Brief    [facility]  Division of Nephrology  Nephrology Discharge Dialysis Orders  Ph: [phone]  Fax: [phone]    Caity oHran  MRN: [id number]  YOB: 1990    [facility] Dialysis Unit: [facility]  Primary Nephrologist: Dr. Cardona    Date of Admission: 12/21/2022  Date of Discharge: 12/26/2022  Discharge Diagnosis:    ICD-10-CM    1. Kidney replaced by transplant  Z94.0 tacrolimus (GENERIC EQUIVALENT) 1 MG capsule     Primary Care Referral     CANCELED: Primary Care Referral      2. Dialysis patient (H)  Z99.2 Primary Care Referral     CANCELED: Primary Care Referral      3. ESRD (end stage renal disease) on dialysis (H)  N18.6 amLODIPine (NORVASC) 5 MG tablet    Z99.2 losartan (COZAAR) 25 MG tablet     Primary Care Referral     CANCELED: Primary Care Referral      4. Vitamin D deficiency  E55.9 Primary Care Referral     CANCELED: Primary Care Referral      5. Ulcerative colitis with complication, unspecified location (H)  K51.919 Adult GI  Referral - Consult Only     Primary Care Referral     CANCELED: Primary Care Referral      6. Acute rejection of kidney transplant  T86.11 Primary Care Referral     CANCELED: Primary Care Referral      7. Ulcerative pancolitis with rectal bleeding (H)  K51.011 Primary Care Referral     CANCELED: Primary Care Referral      8. Vitamin B12 deficiency  E53.8 Primary Care Referral     CANCELED: Primary Care Referral      9. Iron deficiency anemia, unspecified iron deficiency anemia type  D50.9 Primary Care Referral     CANCELED: Primary Care Referral      10. EBV (Nicole-Barr virus) viremia  B27.00 Primary Care Referral     CANCELED: Primary Care Referral      11. Aftercare following organ transplant  Z48.298 Primary Care Referral     CANCELED: Primary Care Referral      12. Hypomagnesemia  E83.42 Primary Care Referral     CANCELED: Primary Care Referral      13. Immunosuppressed status (H)  D84.9  Primary Care Referral     CANCELED: Primary Care Referral      14. IgA nephropathy  N02.8 Primary Care Referral     CANCELED: Primary Care Referral      15. Metabolic acidosis  E87.20 Primary Care Referral     CANCELED: Primary Care Referral          [x] New initiation, new dialysis orders will be faxed.    [] Resume all previous dialysis orders with exception as noted below    New Orders (if not applicable put NA):  Estimated Dry Weight TBD   Dialysis Duration 180 min   Dialysis Access TDC   Antibiotics (dose per dialysis, end date) NA           Labs to be drawn at dialysis HGB   Other major changes to dialysis prescription (e.g. Dialysate bath, heparin, blood flow rate, etc)   Pt has failing LDKT. She has presented with with hypervolemia. She has gained 35lb prior to presentation. UF should continue until achieving DW.    Medication changes (also fax the unit a copy of the discharge summary)         NA      Name of physician completing this form: Lissa Simms MD, MD

## 2022-12-26 NOTE — PLAN OF CARE
"BP (!) 163/114 (BP Location: Left arm)   Pulse 81   Temp 98.3  F (36.8  C) (Oral)   Resp 16   Ht 1.6 m (5' 3\")   Wt 67.1 kg (147 lb 14.9 oz)   SpO2 95%   BMI 26.20 kg/m      Shift: 8899-8617  Isolation Status: NA  Diet: Renal Diet  LDA: Chest HD line.  Infusion(s): NA  VS: HTN SBPs in the 150s-160s, DBPs in the 90's-100's. Labetalol ordered for SBP>180 and DBP>120  Pain/Nausea/PRN: No reported pain or nausea.  Lab: Hg of 7.8.  BG: NA  Neuro: A&O x4. Calls appropriately.  Behaviors: Calm, cooperative, and pleasant  Respiratory: Regular depth and pattern; unlabored; expansion symmetrical; breath sounds clear and equal bilaterally; no cough  Cardiac: Regular rhythm, S1, S2; no reported chest pain  GI/: LBM: 12/24. Oliguric. No UOP overnight. on HD  Skin: No noted skin issues.  Mobility: UAL  Plan: Pending placement in an acceptable dialysis facility  "

## 2022-12-26 NOTE — PROGRESS NOTES
This is a hemodialysis visit note. This patient was seen and examined while on hemodialysis and is tolerating the treatment procedure fairly well. Please ensure that all medications are adjusted based on the patient's kidney function. Professional oversight of the patient's dialysis care, access care, and dialysis related co-morbidities were addressed. The plan of care was discussed with the nursing staff. We will continue to follow along.    She can be discharged from a nephrology perspective once outpatient dialysis arrangements are in place. Indication for dialysis is ESRD

## 2022-12-28 ENCOUNTER — VIRTUAL VISIT (OUTPATIENT)
Dept: GASTROENTEROLOGY | Facility: CLINIC | Age: 32
End: 2022-12-28
Payer: COMMERCIAL

## 2022-12-28 ENCOUNTER — TELEPHONE (OUTPATIENT)
Dept: TRANSPLANT | Facility: CLINIC | Age: 32
End: 2022-12-28

## 2022-12-28 ENCOUNTER — TELEPHONE (OUTPATIENT)
Dept: GASTROENTEROLOGY | Facility: CLINIC | Age: 32
End: 2022-12-28

## 2022-12-28 DIAGNOSIS — K51.011 ULCERATIVE PANCOLITIS WITH RECTAL BLEEDING (H): Primary | ICD-10-CM

## 2022-12-28 DIAGNOSIS — K51.919 ULCERATIVE COLITIS WITH COMPLICATION, UNSPECIFIED LOCATION (H): ICD-10-CM

## 2022-12-28 PROCEDURE — 99203 OFFICE O/P NEW LOW 30 MIN: CPT | Mod: 95 | Performed by: PHYSICIAN ASSISTANT

## 2022-12-28 NOTE — TELEPHONE ENCOUNTER
----- Message from Rupert Dsouza PA-C sent at 12/28/2022  8:17 AM CST -----  Juno Mock!  Can we get her entyvio infusions going here again? She has been in [place] but now in [place] until she can get her kidney transplant.    Thank you!!    Rupert

## 2022-12-28 NOTE — LETTER
"    12/28/2022         RE: Caity Horan  [address]  [address]  [facility] [address]        Dear Colleague,    Thank you for referring your patient, Caity Horan, to the [facility] [place]. Please see a copy of my visit note below.    [facility] UC FOLLOW UP      PATIENT: Caity Horan    MRN: [id number]    Date of Birth 1990    Tel: [phone] (home)     PCP: Ritu Little     HPI: Ms. Horan is a 32 year old year old female here for follow up for UC. She had great improvement with prednisone taper and initiation of Vedolizumab on 4/19/18. Iron deficiency anemia improved with iron infusions and healing of UC.  Had increase in EBV viremia, and Dr. Mar recommended decreasing prednisone taper and to follow EBV PCR closely. No changes from ID perspective.     UC history  Spring 2012 -- graduated college, had a lot of life stressors. Experienced severe urgency with incontinence. Some blood in the stool and diarrhea alternating with constipation. Spontaneously resolved after 2 months.   End of Dec 2016 - April 2017: intermittent blood in stool, \"major constipation\" and weight gain, no urgency.    October 2017 -- was found to be anemic (attributed to kidney disease) and had iron infusions.  Sep -Dec 2017 Blood with well formed stool. This continued to worsen.   Worst stretch was mid-Feb to march: weight loss, >8 loose stools per day with blood.  April - now: much improved, no abdominal pain and urgency resolved.    In the past had upper abdominal bloating/pain and LLQ pain. No n/v.     Macroscopic extent of disease (most recent) E3    Constitutional symptoms:  Fever NO  Weight loss YES (in the past -- lost 12 lb since October), now stable    Other GI symptoms present: none  Total number of IBD surgeries (except perianal): 0    Current IBD Medications:  Vedolizumab every 6 weeks    Current medications: MMF, tacrolimus, Mg, B12     Past IBD Medications: "   None    Interval history 10/2018:  Completed prednisone taper in July and has continued with Entyvio every 8 weeks. No recurrence of symptoms.    Current UC symptoms  Bowel frequency in day 1-2, brown well-formed   Bowel frequency in night 0     Urgency of defecation NO  Blood in stool none   General well being 8 (feeling well)  Extracolonic features (multiple select): none     4-6 weeks after kidney transplant had significant hair loss. Took about a year to fully recover and is improving.  8/23/18 shows adequate Entyvio level 41.7 (8 week trough), no change in dose or interval.  Last Entyvio dose was 9/20/18 (2.5 weeks ago)    Interval history 12/11/19:  Feel well. Notices occasionally one week prior to infusion, abd is swollen, tender and bloated (won't do core exercises during that time) and concerns for constipation. Wonders if this is related to inflammation.    Current UC symptoms  Bowel frequency in day 2 formed   Bowel frequency in night 0     Urgency of defecation none  Blood in stool none  General well being well  Extracolonic features (multiple select): None    Next dose is next week (Thursday)    Interval history, 12/28/22  Recently hospitalized due to CKD Stage 5 2/2 IgA nephropathy s/p LDKT in 2014, now with chronic antibody-mediated rejection and recurrent IgA nephropathy on HD and Hypervolemia.  She is awaiting a repeat kidney transplant and in [place] while this is in process. Despite this, she has felt well from a GI standpoint. She has been managed in [place] with a GI provider who renewed her entyvio, however she is back in [place].  Currently having 1-2 BM daily, no blood in the stool or urgency. No nighttime stool.  Living in [place] until she gets her transplant. She is on MMF and Tacrolimus for transplant meds.     HBI  General well-being very well = 0  Abdominal pain None = 0  Number of liquid stools per day 0  Abdominal mass None = 0  Current Complications none    Past Medical History:   Diagnosis Date      Acute rejection of kidney transplant 2021     Anemia in stage 5 chronic kidney disease (H)      Anemia in stage 5 chronic kidney disease (H) 10/27/2014     ESRD (end stage renal disease) on dialysis (H)      HTN (hypertension) 10/27/2014     Hypertension 10/2014     Hypomagnesemia      IgA nephropathy     biopsy proven     Metabolic acidosis      Ulcerative colitis (H)      Vitamin D deficiency         Past Surgical History:   Procedure Laterality Date     BIOPSY      renal     COLONOSCOPY N/A 2018    Procedure: COMBINED COLONOSCOPY, SINGLE OR MULTIPLE BIOPSY/POLYPECTOMY BY BIOPSY;  EGD/Colonoscopy ;  Surgeon: Kory Massey MD;  Location: [facility]     COLONOSCOPY N/A 09/10/2018    Procedure: COMBINED COLONOSCOPY, SINGLE OR MULTIPLE BIOPSY/POLYPECTOMY BY BIOPSY;  Colonoscopy;  Surgeon: Yenifer Doan MD;  [facility]     EXTRACTION(S) DENTAL       IR CVC TUNNEL PLACEMENT > 5 YRS OF AGE  2022     PERCUTANEOUS BIOPSY KIDNEY Right 2018    Procedure: Right Kidney Biopsy;  Surgeon: Otilio Mar MD;  [facility]     TRANSPLANT KIDNEY RECIPIENT LIVING RELATED N/A 2014    Procedure: TRANSPLANT KIDNEY RECIPIENT LIVING RELATED;  Surgeon: Dale Middleton MD;  Location: [facility] OR     WISDOM TOOTH EXTRACTION Bilateral        Social History     Tobacco Use     Smoking status: Never     Smokeless tobacco: Never   Substance Use Topics     Alcohol use: Not Currently     Alcohol/week: 3.0 - 9.0 standard drinks     Types: 1 - 3 Glasses of wine, 1 - 3 Cans of beer, 1 - 3 Shots of liquor per week       Family History   Problem Relation Age of Onset     No Known Problems Mother      Alcoholism Father          in early 50s     Prostate Cancer Paternal Grandmother      Kidney Disease No family hx of    Negative for IBD. Dad had psoriasis.   Grandfather with BCC. Grandma has several skin lesions removed (does not recall the diagnosis)    Allergies   Allergen Reactions      Bumetanide Other (See Comments)     Causes paralysis     Amoxicillin Rash        Outpatient Encounter Medications as of 12/28/2022   Medication Sig Dispense Refill     amLODIPine (NORVASC) 5 MG tablet Take 1 tablet (5 mg) by mouth daily for 30 days 30 tablet 0     Blood Pressure Monitoring (B-D ASSURE BPM/AUTO ARM CUFF) MISC Monitor blood pressure and pulse twice daily 1 each 0     CALCIUM ANTACID 500 MG chewable tablet Take by mouth 3 times daily       Cyanocobalamin (B-12) 1000 MCG TBCR TAKE ONE TABLET BY MOUTH EVERY DAY 90 tablet 3     levonorgestrel (MIRENA) 20 MCG/DAY IUD 1 each by Intrauterine route       losartan (COZAAR) 25 MG tablet Take 1 tablet (25 mg) by mouth daily for 30 days 30 tablet 0     magnesium oxide (MAG-OX) 400 MG tablet Take 1 tablet (400 mg) by mouth daily 90 tablet 3     mycophenolate (GENERIC EQUIVALENT) 250 MG capsule Take 2 capsules (500 mg) by mouth 2 times daily 360 capsule 3     tacrolimus (GENERIC EQUIVALENT) 1 MG capsule Take 3 capsules (3 mg) by mouth 2 times daily for 30 days 180 capsule 0     Vedolizumab (ENTYVIO IV)        vitamin D3 (CHOLECALCIFEROL) 50 mcg (2000 units) tablet Take 1 tablet (50 mcg) by mouth daily 100 tablet 3     No facility-administered encounter medications on file as of 12/28/2022.      NSAID  NO    Review of Systems  Complete 10 System ROS performed. All are negative except as documented below, in the HPI, or in patient questionnaire from today's visit.    1) Constitutional: No fevers, chills, night sweats or malaise, weight loss or gain  2) Skin: No rash  3) Pulmonary: No wheeze, SOB, cough, sputum or hemoptysis  4) Cardiovascular: No Chest pain or palpitations  5) Genitourinary: No blood in urine or dysuria  6) Endocrine: No increased sweating, hunger, thirst or thyroid problems  7) Hematologic: No bruising and easy bleeding  8) Musculoskeletal: no new pain in joints or limitation in ROM  9) Neurologic: No dizziness, paresthesias or weakness or  falls  10) Psychiatric:  not depressed/anxious, no sleep problems    PHYSICAL EXAM  Vitals: There were no vitals taken for this visit.    No Pain (0)     Constitutional - general appearance is well and in no acute distress. Body habitus normal  Eyes - No redness or discharge  Respiratory - No cough, unlabored breathing  Musculoskeletal - range of motion intact: Neck and arms  Skin - No discoloration or lesions  Neurological - No tremors, headaches  Psychiatric - No anxiety, alert & oriented    DATA:  Reviewed in detail past documentation, medications and prior workup available in electronic health records or through outside records.    PERTINENT STUDIES:  Most recent CBC:  WBC   Date Value Ref Range Status   12/03/2020 6.3 4.0 - 11.0 10e9/L Final     WBC Count   Date Value Ref Range Status   12/26/2022 6.8 4.0 - 11.0 10e3/uL Final   ]  Hemoglobin   Date Value Ref Range Status   12/26/2022 7.8 (L) 11.7 - 15.7 g/dL Final   01/14/2021 10.6 (L) 11.7 - 15.7 g/dL Final   ]   Platelet Count   Date Value Ref Range Status   12/26/2022 121 (L) 150 - 450 10e3/uL Final   01/14/2021 279 150 - 450 10e9/L Final       Most recent coag:  INR   Date Value Ref Range Status   12/21/2022 1.07 0.85 - 1.15 Final   12/19/2018 1.00 0.86 - 1.14 Final       Most recent hepatic panel:  AST   Date Value Ref Range Status   12/22/2022 23 10 - 35 U/L Final   04/16/2021 13 0 - 45 U/L Final     ALT   Date Value Ref Range Status   12/22/2022 11 10 - 35 U/L Final   04/16/2021 14 0 - 50 U/L Final     No results found for: BILICONJ   Bilirubin Total   Date Value Ref Range Status   12/22/2022 0.5 <=1.2 mg/dL Final   04/16/2021 0.4 0.2 - 1.3 mg/dL Final     Albumin   Date Value Ref Range Status   12/22/2022 3.3 (L) 3.5 - 5.2 g/dL Final   04/16/2021 3.3 (L) 3.4 - 5.0 g/dL Final     Alkaline Phosphatase   Date Value Ref Range Status   12/22/2022 51 35 - 104 U/L Final   04/16/2021 47 40 - 150 U/L Final       Most recent creatinine:  Creatinine   Date Value  Ref Range Status   12/26/2022 6.38 (H) 0.51 - 0.95 mg/dL Final   04/16/2021 1.31 (H) 0.52 - 1.04 mg/dL Final     Endoscopy:   cscope 3/2018 showed Mcbride 3 colitis involving the rectum to hepatic flexure with sparing of the AC. Normal TI. Biopsies showed moderate chronic active colitis with cryptitis and crypt abscess formation.     Cscope 9/2018 showed Mcbride 0. Biopsies show mild chronic active colitis ascending and transverse, crypt architectural distortion and descending sigmoid and rectum without active inflammation.  Imaging:  CT c/a/p on 7/2017 (indication EBV viremia): no bowel wall thickening. SB appeared normal.     IMPRESSION:  Ms. Horan is a 32 year old year old with diagnosed moderate to severe E3 ulcerative colitis 3/2018, who achieved symptomatic remission on prednisone taper and initiation of vedolizumab induction. She continues on vedolizumab monotherapy.    She is awaiting a repeat kidney transplant. She remains stable from an IBD standpoint on vedolizumab every 6 weeks.     # Ulcerative colitis, severe, E3, now in clinical remission  # Immunosuppressed state (tacrolimus and Cellcept)  # Hair loss without alopecia    PLAN:  --Continue vedolizumab (8/23/18 shows adequate Entyvio level 41.7 (8 week trough), no change in dose or interval)  --Blood work with infusions  --Fecal calprotectin to trend (order placed). If elevated would then consider a colonoscopy, otherwise she would not be due until 2026 for repeat endoscopic assessment.     IBD Health Care Maintenance:  Vaccinations:  -- Influenza (every year): Last given 2019  -- TdaP (every 10 years): Last given 2017  -- Pneumococcal Pneumonia (once then every 5 years): Last given 2017    One time confirmation of immunity or serologies:  -- Hepatitis A (serologies or immunizations): Not documented  -- Hepatitis B (serologies or immunizations): 2001/2002, immune per serologies  -- Varicella: had chickenpox as a child  -- MMR:had all immunization as a  child  -- HPV (all aged 18-26): 2007/2008  -- Meningococcal meningitis (all patients at risk for meningitis): 2007   -- Due to the immunosuppression in this patient, I would not advise administration of live vaccines such as varicella/VZV, intranasal influenza, MMR, or yellow fever vaccine (if travelling).      Cancer Screening:  Colon cancer screening:  Given pancolitis colonoscopy every 2-3 years recommended after 8 years of disease.  Dysplasia screening is recommended 2026, will require colonoscopy for mucosal healing on vedolizumab.    Cervical cancer screening: Annual due to immunosupression    Skin cancer screening: Annual visual exam of skin by dermatologist since patient is immunocompromised    Bone mineral density screening   -- Recommend all patients supplement with calcium and vitamin D  -- DEXA scan ordered given >3 months steroid use     Depression Screening:  -- Over the last month, have you felt down, depressed, or hopeless? No  -- Over the last month, have you felt little interest or pleasure doing things? No    Misc:  -- Avoid tobacco use  -- Avoid NSAIDs as there is potentially a 25% chance of causing an IBD flare    Immunization History   Administered Date(s) Administered     COVID-19 Vaccine 12+ (Pfizer) 10/06/2021, 10/27/2021     HPV Quadrivalent 08/06/2007, 10/08/2007, 02/19/2008     Influenza Vaccine >6 months (Alfuria,Fluzone) 12/13/2016, 10/03/2017     Influenza Vaccine IM 18-49 Yrs, RIV3 10/08/2018     Meningococcal (Menomune ) 08/06/2007     Pneumo Conj 13-V (2010&after) 11/01/2017, 08/11/2021     Pneumococcal 23 valent 11/10/2014     TDAP Vaccine (Boostrix) 11/01/2017     Tdap (Adacel,Boostrix) 08/06/2007        Follow up in 6 months      Rupert Dsouza PA-C  Division of Gastroenterology, Hepatology and Nutrition  [facility]

## 2022-12-28 NOTE — LETTER
12/28/22        Caity Hroan  [address]  [address]  [facility] [address]        Dear Caity,    Your pre-transplant evaluation results were reviewed at our Multidisciplinary Selection Committee 12/28/2022. The committee is requesting the following items are completed before determining your candidacy:    1. ***    For any questions, please contact the Transplant Office at [phone].      Sincerely,      Solid Organ Transplant  [facility], [facility]

## 2022-12-28 NOTE — TELEPHONE ENCOUNTER
Called patient to obtain information in order to coordinate Entyvio infusions here in [place]. No answer; left voicemail with request for call to be returned. Will follow-up with [employer] message as well.

## 2022-12-28 NOTE — PATIENT INSTRUCTIONS
It was a pleasure taking care of you today.  I've included a brief summary of our discussion and care plan from today's visit below.  Please review this information with your primary care provider.  ______________________________________________________________________    My recommendations are summarized as follows:    -- Continue entyvio every 6 weeks   -- Labs with infusions  -- Next endoscopic assessment: pending stool sample   -- Patient with IBD we recommend supplementation vitamin D 1000 units daily and calcium 500 mg twice daily.  -- No NSAIDs (ibuprofen, or anything containing ibuprofen)       For additional resources about inflammatory bowel disease visit [email or web address]      Return to GI Clinic in 6 months to review your progress.    ______________________________________________________________________    How do I schedule labs, imaging studies, or procedures that were ordered in clinic today?     Labs: To schedule lab appointment at the [facility], use my chart or call [phone]. If you have a [facility] lab closer to home where you are regularly seen you can give them a call.     Procedures: If a colonoscopy, upper endoscopy, breath test, esophageal manometry, or pH impedence was ordered today, our endoscopy team will call you to schedule this. If you have not heard from our endoscopy team within a week, please call [phone] to schedule.     Imaging Studies: If you were scheduled for a CT scan, X-ray, MRI, ultrasound, HIDA scan or other imaging study, please call [phone] to have this scheduled.     Referral: If a referral to another specialty was ordered, expect a phone call or follow instructions above. If you have not heard from anyone regarding your referral in a week, please call our clinic to check the status.     Who do I call with any questions after my visit?  Please be in touch if there are any further questions that arise following today's  visit.  There are multiple ways to contact your gastroenterology care team.      During business hours, you may reach a Gastroenterology nurse at [phone]    To schedule or reschedule an appointment, please call [phone].     You can always send a secure message through [employer].  [employer] messages are answered by your nurse or doctor typically within 24 hours.  Please allow extra time on weekends and holidays.      For urgent/emergent questions after business hours, you may reach the on-call GI Fellow by contacting the [facility] at [phone].     How will I get the results of any tests ordered?    You will receive all of your results.  If you have signed up for [employer], any tests ordered at your visit will be available to you after your physician reviews them.  Typically this takes 1-2 weeks.  If there are urgent results that require a change in your care plan, your physician or nurse will call you to discuss the next steps.      What is [employer]?  [employer] is a secure way for you to access all of your healthcare records from the [facility].  It is a web based computer program, so you can sign on to it from any location.  It also allows you to send secure messages to your care team.  I recommend signing up for [employer] access if you have not already done so and are comfortable with using a computer.         Sincerely,    Rupert Dsouza PA-C  [facility]  Division of Gastroenterology

## 2022-12-28 NOTE — PROGRESS NOTES
"Caity is a 32 year old who is being evaluated via a billable video visit.      Patient skipped QNRS.     How would you like to obtain your AVS? MyChart  If the video visit is dropped, the invitation should be resent by: Send to e-mail at: [email or web address]  Will anyone else be joining your video visit? No        Video-Visit Details    Type of service:  Video Visit     Originating Location (pt. Location): Home    Distant Location (provider location):  Off-site  Platform used for Video Visit: Ogin     Start 0804  Stop 0818    [facility] UC FOLLOW UP      PATIENT: Caity Horan    MRN: [id number]    Date of Birth 1990    Tel: [phone] (home)     PCP: Ritu Little     HPI: Ms. Horan is a 32 year old year old female here for follow up for UC. She had great improvement with prednisone taper and initiation of Vedolizumab on 4/19/18. Iron deficiency anemia improved with iron infusions and healing of UC.  Had increase in EBV viremia, and Dr. Mar recommended decreasing prednisone taper and to follow EBV PCR closely. No changes from ID perspective.     UC history  Spring 2012 -- graduated college, had a lot of life stressors. Experienced severe urgency with incontinence. Some blood in the stool and diarrhea alternating with constipation. Spontaneously resolved after 2 months.   End of Dec 2016 - April 2017: intermittent blood in stool, \"major constipation\" and weight gain, no urgency.    October 2017 -- was found to be anemic (attributed to kidney disease) and had iron infusions.  Sep -Dec 2017 Blood with well formed stool. This continued to worsen.   Worst stretch was mid-Feb to march: weight loss, >8 loose stools per day with blood.  April - now: much improved, no abdominal pain and urgency resolved.    In the past had upper abdominal bloating/pain and LLQ pain. No n/v.     Macroscopic extent of disease (most recent) E3    Constitutional symptoms:  Fever NO  Weight loss YES (in the past -- " lost 12 lb since October), now stable    Other GI symptoms present: none  Total number of IBD surgeries (except perianal): 0    Current IBD Medications:  Vedolizumab every 6 weeks    Current medications: MMF, tacrolimus, Mg, B12     Past IBD Medications:   None    Interval history 10/2018:  Completed prednisone taper in July and has continued with Entyvio every 8 weeks. No recurrence of symptoms.    Current UC symptoms  Bowel frequency in day 1-2, brown well-formed   Bowel frequency in night 0     Urgency of defecation NO  Blood in stool none   General well being 8 (feeling well)  Extracolonic features (multiple select): none     4-6 weeks after kidney transplant had significant hair loss. Took about a year to fully recover and is improving.  8/23/18 shows adequate Entyvio level 41.7 (8 week trough), no change in dose or interval.  Last Entyvio dose was 9/20/18 (2.5 weeks ago)    Interval history 12/11/19:  Feel well. Notices occasionally one week prior to infusion, abd is swollen, tender and bloated (won't do core exercises during that time) and concerns for constipation. Wonders if this is related to inflammation.    Current UC symptoms  Bowel frequency in day 2 formed   Bowel frequency in night 0     Urgency of defecation none  Blood in stool none  General well being well  Extracolonic features (multiple select): None    Next dose is next week (Thursday)    Interval history, 12/28/22  Recently hospitalized due to CKD Stage 5 2/2 IgA nephropathy s/p LDKT in 2014, now with chronic antibody-mediated rejection and recurrent IgA nephropathy on HD and Hypervolemia.  She is awaiting a repeat kidney transplant and in [place] while this is in process. Despite this, she has felt well from a GI standpoint. She has been managed in [place] with a GI provider who renewed her entyvio, however she is back in [place].  Currently having 1-2 BM daily, no blood in the stool or urgency. No nighttime stool.  Living in [place] until she gets her  transplant. She is on MMF and Tacrolimus for transplant meds.     HBI  General well-being very well = 0  Abdominal pain None = 0  Number of liquid stools per day 0  Abdominal mass None = 0  Current Complications none    Past Medical History:   Diagnosis Date     Acute rejection of kidney transplant 2021     Anemia in stage 5 chronic kidney disease (H)      Anemia in stage 5 chronic kidney disease (H) 10/27/2014     ESRD (end stage renal disease) on dialysis (H)      HTN (hypertension) 10/27/2014     Hypertension 10/2014     Hypomagnesemia      IgA nephropathy     biopsy proven     Metabolic acidosis      Ulcerative colitis (H)      Vitamin D deficiency         Past Surgical History:   Procedure Laterality Date     BIOPSY      renal     COLONOSCOPY N/A 2018    Procedure: COMBINED COLONOSCOPY, SINGLE OR MULTIPLE BIOPSY/POLYPECTOMY BY BIOPSY;  EGD/Colonoscopy ;  Surgeon: Kory Massey MD;  Location: [facility]     COLONOSCOPY N/A 09/10/2018    Procedure: COMBINED COLONOSCOPY, SINGLE OR MULTIPLE BIOPSY/POLYPECTOMY BY BIOPSY;  Colonoscopy;  Surgeon: Yenifer Doan MD;  [facility]     EXTRACTION(S) DENTAL       IR CVC TUNNEL PLACEMENT > 5 YRS OF AGE  2022     PERCUTANEOUS BIOPSY KIDNEY Right 2018    Procedure: Right Kidney Biopsy;  Surgeon: Otilio Mar MD;  [facility]     TRANSPLANT KIDNEY RECIPIENT LIVING RELATED N/A 2014    Procedure: TRANSPLANT KIDNEY RECIPIENT LIVING RELATED;  Surgeon: Dale Middleton MD;  Location: [facility] OR     WISDOM TOOTH EXTRACTION Bilateral        Social History     Tobacco Use     Smoking status: Never     Smokeless tobacco: Never   Substance Use Topics     Alcohol use: Not Currently     Alcohol/week: 3.0 - 9.0 standard drinks     Types: 1 - 3 Glasses of wine, 1 - 3 Cans of beer, 1 - 3 Shots of liquor per week       Family History   Problem Relation Age of Onset     No Known Problems Mother      Alcoholism Father          in early 50s      Prostate Cancer Paternal Grandmother      Kidney Disease No family hx of    Negative for IBD. Dad had psoriasis.   Grandfather with BCC. Grandma has several skin lesions removed (does not recall the diagnosis)    Allergies   Allergen Reactions     Bumetanide Other (See Comments)     Causes paralysis     Amoxicillin Rash        Outpatient Encounter Medications as of 12/28/2022   Medication Sig Dispense Refill     amLODIPine (NORVASC) 5 MG tablet Take 1 tablet (5 mg) by mouth daily for 30 days 30 tablet 0     Blood Pressure Monitoring (B-D ASSURE BPM/AUTO ARM CUFF) MISC Monitor blood pressure and pulse twice daily 1 each 0     CALCIUM ANTACID 500 MG chewable tablet Take by mouth 3 times daily       Cyanocobalamin (B-12) 1000 MCG TBCR TAKE ONE TABLET BY MOUTH EVERY DAY 90 tablet 3     levonorgestrel (MIRENA) 20 MCG/DAY IUD 1 each by Intrauterine route       losartan (COZAAR) 25 MG tablet Take 1 tablet (25 mg) by mouth daily for 30 days 30 tablet 0     magnesium oxide (MAG-OX) 400 MG tablet Take 1 tablet (400 mg) by mouth daily 90 tablet 3     mycophenolate (GENERIC EQUIVALENT) 250 MG capsule Take 2 capsules (500 mg) by mouth 2 times daily 360 capsule 3     tacrolimus (GENERIC EQUIVALENT) 1 MG capsule Take 3 capsules (3 mg) by mouth 2 times daily for 30 days 180 capsule 0     Vedolizumab (ENTYVIO IV)        vitamin D3 (CHOLECALCIFEROL) 50 mcg (2000 units) tablet Take 1 tablet (50 mcg) by mouth daily 100 tablet 3     No facility-administered encounter medications on file as of 12/28/2022.      NSAID  NO    Review of Systems  Complete 10 System ROS performed. All are negative except as documented below, in the HPI, or in patient questionnaire from today's visit.    1) Constitutional: No fevers, chills, night sweats or malaise, weight loss or gain  2) Skin: No rash  3) Pulmonary: No wheeze, SOB, cough, sputum or hemoptysis  4) Cardiovascular: No Chest pain or palpitations  5) Genitourinary: No blood in urine or  dysuria  6) Endocrine: No increased sweating, hunger, thirst or thyroid problems  7) Hematologic: No bruising and easy bleeding  8) Musculoskeletal: no new pain in joints or limitation in ROM  9) Neurologic: No dizziness, paresthesias or weakness or falls  10) Psychiatric:  not depressed/anxious, no sleep problems    PHYSICAL EXAM  Vitals: There were no vitals taken for this visit.    No Pain (0)     Constitutional - general appearance is well and in no acute distress. Body habitus normal  Eyes - No redness or discharge  Respiratory - No cough, unlabored breathing  Musculoskeletal - range of motion intact: Neck and arms  Skin - No discoloration or lesions  Neurological - No tremors, headaches  Psychiatric - No anxiety, alert & oriented    DATA:  Reviewed in detail past documentation, medications and prior workup available in electronic health records or through outside records.    PERTINENT STUDIES:  Most recent CBC:  WBC   Date Value Ref Range Status   12/03/2020 6.3 4.0 - 11.0 10e9/L Final     WBC Count   Date Value Ref Range Status   12/26/2022 6.8 4.0 - 11.0 10e3/uL Final   ]  Hemoglobin   Date Value Ref Range Status   12/26/2022 7.8 (L) 11.7 - 15.7 g/dL Final   01/14/2021 10.6 (L) 11.7 - 15.7 g/dL Final   ]   Platelet Count   Date Value Ref Range Status   12/26/2022 121 (L) 150 - 450 10e3/uL Final   01/14/2021 279 150 - 450 10e9/L Final       Most recent coag:  INR   Date Value Ref Range Status   12/21/2022 1.07 0.85 - 1.15 Final   12/19/2018 1.00 0.86 - 1.14 Final       Most recent hepatic panel:  AST   Date Value Ref Range Status   12/22/2022 23 10 - 35 U/L Final   04/16/2021 13 0 - 45 U/L Final     ALT   Date Value Ref Range Status   12/22/2022 11 10 - 35 U/L Final   04/16/2021 14 0 - 50 U/L Final     No results found for: BILICONJ   Bilirubin Total   Date Value Ref Range Status   12/22/2022 0.5 <=1.2 mg/dL Final   04/16/2021 0.4 0.2 - 1.3 mg/dL Final     Albumin   Date Value Ref Range Status   12/22/2022  3.3 (L) 3.5 - 5.2 g/dL Final   04/16/2021 3.3 (L) 3.4 - 5.0 g/dL Final     Alkaline Phosphatase   Date Value Ref Range Status   12/22/2022 51 35 - 104 U/L Final   04/16/2021 47 40 - 150 U/L Final       Most recent creatinine:  Creatinine   Date Value Ref Range Status   12/26/2022 6.38 (H) 0.51 - 0.95 mg/dL Final   04/16/2021 1.31 (H) 0.52 - 1.04 mg/dL Final     Endoscopy:   cscope 3/2018 showed Mcbride 3 colitis involving the rectum to hepatic flexure with sparing of the AC. Normal TI. Biopsies showed moderate chronic active colitis with cryptitis and crypt abscess formation.     Cscope 9/2018 showed Mcbride 0. Biopsies show mild chronic active colitis ascending and transverse, crypt architectural distortion and descending sigmoid and rectum without active inflammation.  Imaging:  CT c/a/p on 7/2017 (indication EBV viremia): no bowel wall thickening. SB appeared normal.     IMPRESSION:  Ms. Horan is a 32 year old year old with diagnosed moderate to severe E3 ulcerative colitis 3/2018, who achieved symptomatic remission on prednisone taper and initiation of vedolizumab induction. She continues on vedolizumab monotherapy.    She is awaiting a repeat kidney transplant. She remains stable from an IBD standpoint on vedolizumab every 6 weeks.     # Ulcerative colitis, severe, E3, now in clinical remission  # Immunosuppressed state (tacrolimus and Cellcept)  # Hair loss without alopecia    PLAN:  --Continue vedolizumab (8/23/18 shows adequate Entyvio level 41.7 (8 week trough), no change in dose or interval)  --Blood work with infusions  --Fecal calprotectin to trend (order placed). If elevated would then consider a colonoscopy, otherwise she would not be due until 2026 for repeat endoscopic assessment.     IBD Health Care Maintenance:  Vaccinations:  -- Influenza (every year): Last given 2019  -- TdaP (every 10 years): Last given 2017  -- Pneumococcal Pneumonia (once then every 5 years): Last given 2017    One time  confirmation of immunity or serologies:  -- Hepatitis A (serologies or immunizations): Not documented  -- Hepatitis B (serologies or immunizations): 2001/2002, immune per serologies  -- Varicella: had chickenpox as a child  -- MMR:had all immunization as a child  -- HPV (all aged 18-26): 2007/2008  -- Meningococcal meningitis (all patients at risk for meningitis): 2007   -- Due to the immunosuppression in this patient, I would not advise administration of live vaccines such as varicella/VZV, intranasal influenza, MMR, or yellow fever vaccine (if travelling).      Cancer Screening:  Colon cancer screening:  Given pancolitis colonoscopy every 2-3 years recommended after 8 years of symptom onset.  Since symptoms started on 2012, Dysplasia screening is recommended now (last scope was 2018).     Cervical cancer screening: Annual due to immunosupression    Skin cancer screening: Annual visual exam of skin by dermatologist since patient is immunocompromised    Bone mineral density screening   -- Recommend all patients supplement with calcium and vitamin D  -- DEXA scan ordered given >3 months steroid use     Depression Screening:  -- Over the last month, have you felt down, depressed, or hopeless? No  -- Over the last month, have you felt little interest or pleasure doing things? No    Misc:  -- Avoid tobacco use  -- Avoid NSAIDs as there is potentially a 25% chance of causing an IBD flare    Immunization History   Administered Date(s) Administered     COVID-19 Vaccine 12+ (Pfizer) 10/06/2021, 10/27/2021     HPV Quadrivalent 08/06/2007, 10/08/2007, 02/19/2008     Influenza Vaccine >6 months (Alfuria,Fluzone) 12/13/2016, 10/03/2017     Influenza Vaccine IM 18-49 Yrs, RIV3 10/08/2018     Meningococcal (Menomune ) 08/06/2007     Pneumo Conj 13-V (2010&after) 11/01/2017, 08/11/2021     Pneumococcal 23 valent 11/10/2014     TDAP Vaccine (Boostrix) 11/01/2017     Tdap (Adacel,Boostrix) 08/06/2007      Follow up in 6  months    Rupert Dsouza PA-C  Division of Gastroenterology, Hepatology and Nutrition  [facility]

## 2022-12-29 ENCOUNTER — DOCUMENTATION ONLY (OUTPATIENT)
Dept: TRANSPLANT | Facility: CLINIC | Age: 32
End: 2022-12-29

## 2022-12-29 ENCOUNTER — DOCUMENTATION ONLY (OUTPATIENT)
Dept: GASTROENTEROLOGY | Facility: CLINIC | Age: 32
End: 2022-12-29

## 2022-12-29 ENCOUNTER — TELEPHONE (OUTPATIENT)
Dept: TRANSPLANT | Facility: CLINIC | Age: 32
End: 2022-12-29

## 2022-12-29 ENCOUNTER — TELEPHONE (OUTPATIENT)
Dept: GASTROENTEROLOGY | Facility: CLINIC | Age: 32
End: 2022-12-29

## 2022-12-29 RX ORDER — HEPARIN SODIUM (PORCINE) LOCK FLUSH IV SOLN 100 UNIT/ML 100 UNIT/ML
5 SOLUTION INTRAVENOUS
Status: CANCELLED | OUTPATIENT
Start: 2022-12-29

## 2022-12-29 RX ORDER — MEPERIDINE HYDROCHLORIDE 25 MG/ML
25 INJECTION INTRAMUSCULAR; INTRAVENOUS; SUBCUTANEOUS EVERY 30 MIN PRN
Status: CANCELLED | OUTPATIENT
Start: 2022-12-29

## 2022-12-29 RX ORDER — METHYLPREDNISOLONE SODIUM SUCCINATE 125 MG/2ML
125 INJECTION, POWDER, LYOPHILIZED, FOR SOLUTION INTRAMUSCULAR; INTRAVENOUS
Status: CANCELLED
Start: 2022-12-29

## 2022-12-29 RX ORDER — DIPHENHYDRAMINE HYDROCHLORIDE 50 MG/ML
50 INJECTION INTRAMUSCULAR; INTRAVENOUS
Status: CANCELLED
Start: 2022-12-29

## 2022-12-29 RX ORDER — EPINEPHRINE 1 MG/ML
0.3 INJECTION, SOLUTION, CONCENTRATE INTRAVENOUS EVERY 5 MIN PRN
Status: CANCELLED | OUTPATIENT
Start: 2022-12-29

## 2022-12-29 RX ORDER — HEPARIN SODIUM,PORCINE 10 UNIT/ML
5 VIAL (ML) INTRAVENOUS
Status: CANCELLED | OUTPATIENT
Start: 2022-12-29

## 2022-12-29 RX ORDER — ALBUTEROL SULFATE 90 UG/1
1-2 AEROSOL, METERED RESPIRATORY (INHALATION)
Status: CANCELLED
Start: 2022-12-29

## 2022-12-29 RX ORDER — ALBUTEROL SULFATE 0.83 MG/ML
2.5 SOLUTION RESPIRATORY (INHALATION)
Status: CANCELLED | OUTPATIENT
Start: 2022-12-29

## 2022-12-29 NOTE — TELEPHONE ENCOUNTER
Called patient this afternoon and reached her ALEXEY ESCOBEDO that I am calling to review her transplant evaluation status with her. Instructed her to return my call.

## 2022-12-29 NOTE — TELEPHONE ENCOUNTER
Have not heard back yet from pt. Called pt again today and reached her VM, asked her to return my call to review her pre transplant evaluation status.     Spoke with pt today at length when she returned my call. When I asked her, pt stating she is feeling well today and is up and around. We reviewed the [facility] standard education, please see my separate note for documentation of this. I also reviewed her evaluation status as follows.    1. Needs to have follow up lab work done for CBC, CMP, haptoglobin, LDH, and smear, as recommended on hospital discharge summary 12/26/2022, recommended to complete with PCP. Pt stating she will not be establishing care here with a PCP as she is planning to move back to [place]. I explained will see if nephrology can order then.   2. Recommended she completes a colonoscopy.  Pt stating she just saw GI provider yesterday, Rupert TIAN, who said her UC is fine and so she is not doing colonoscopy now. Pt explained last colonoscopy was in 2018.   3. Recommend she completes stress echocardiogram. Pt stating her heart is fine and she does not need this.   4. Recommend dermatology appt for skin screening. Pt stating her last Derm appt was about 1 1/2 years ago, skin was fine so she is not doing this now.   5. Recommend GYN appt to follow up on vaginal bleeding. Pt stating she saw GYN 09/15/2022 in [place] so doesn't want to see again now.   6. Need blood pressure management optimized. Instructed her to work with Dr. Cardona for this.   7. Recommend sees psychiatrist for assessment of anxiety. Pt is not going to do this.     Pt had questions today about live donor evaluations and timing of donors getting approved. Explained I will have Dain Chapin RN call her about this today.     Explained I will share her responses as above to recommended appts with Transplant Team and get back to her. Pt expressed excellent understanding of all and was in good agreement with the plan.

## 2022-12-29 NOTE — TELEPHONE ENCOUNTER
Called Caity with a request from her coordinator, Alana Lopez. Call went to , left message with direct line.

## 2022-12-29 NOTE — PROGRESS NOTES
Kidney Transplant Evaluation - 12/5/2022  Caity Hroan confirmed she has viewed pre kidney eval parts 1 and 2 on the [facility] website. Pt stating she does not have any questions at this time.   Content reviewed:    Living Donation and how to access that program. Pt confirmed good understanding.     Paired exchange. Pt confirmed good understanding.    Kidney Donor Profile Index (KDPI). Reviewed, pt will sign with a no response. Will email consent for her to sign.    Waiting list issues (right to decline without penalty, high PHS risk donors, what to expect when called with an offer)    Hospital experience, length of stay, need to stay locally post-discharge (2-4 weeks). Pt currently living in [place].     Surgical options (with pictures)                             Post-surgery lifting and driving restrictions. Cannot lift > 10 pounds till 8-10 weeks post op.    Post-transplant routines, frequency of lab work and clinic visits    Need to stay locally post-discharge (2-4 weeks)    Role of Transplant Coordinator    Participants were informed of the benefits of transplant as well as potential risks such as infection, cancer, and death.  The need for total adherence with immunosuppression medications and following transplant regimens was stressed.  The overall evaluation/approval/listing process was reviewed.        The patient was provided with the following documents:  What You Need to Know About a Kidney Transplant  Adult Kidney Transplant - A Guide for Patients  SRTR Data Sheet - Kidney  Brochure - Kidney Allocation  Brochure - Multiple Listing and Waiting Time Transfer  What Every Patient Needs to Know (UNOS)  UNOS Facts and Figures  Finding a Donor    Pt expressed excellent understanding of all.

## 2022-12-29 NOTE — PROGRESS NOTES
Called [facility] to request that they fax over the PT's Entyvio therapy plan ASAP. Provided fax number and direct dial.     The PT will be here for several months while she awaits a kidney transplant.    PCP Information:   Dr. Gregoria Wilkins - [facility] [email or web address]  [facility]    [address] [place] [address] [phone]     SK

## 2022-12-29 NOTE — TELEPHONE ENCOUNTER
[facility] about 6 months (around 6/28/2023) with Rupert Dsouza or Dr. Doan.

## 2022-12-29 NOTE — TELEPHONE ENCOUNTER
Therapy plan placed for Entyvio every 6 weeks. Patient is next due to infuse on 1/25/23. She prefers to infuse on Thursdays at [facility] to help accommodate her dialysis schedule. Will notify infusion finance team to initiate a new PA. Orders placed for standing labs. Quantiferon gold was negative 3 weeks ago. Hepatitis B serologies from this month indicate immunity.

## 2022-12-30 ENCOUNTER — TELEPHONE (OUTPATIENT)
Dept: TRANSPLANT | Facility: CLINIC | Age: 32
End: 2022-12-30

## 2022-12-30 DIAGNOSIS — Z99.2 ESRD ON DIALYSIS (H): Primary | ICD-10-CM

## 2022-12-30 DIAGNOSIS — D63.1 ANEMIA IN ESRD (END-STAGE RENAL DISEASE) (H): ICD-10-CM

## 2022-12-30 DIAGNOSIS — N18.6 ANEMIA IN ESRD (END-STAGE RENAL DISEASE) (H): ICD-10-CM

## 2022-12-30 DIAGNOSIS — N18.6 ESRD ON DIALYSIS (H): Primary | ICD-10-CM

## 2022-12-30 NOTE — TELEPHONE ENCOUNTER
Contacted Caity per request of Alana, her transplant Coordinator.    Reviewed plan of care from Alana's note yesterday to see if Caity has an understanding of next steps. Caity verbalized understanding. She hopes her case will be discussed on 1/4/23.     Caity expressed concern that she does not have a clear idea of what the donor process consists of so we went over the events of a living donor kidney workup and matching process.     Caity thanked me for calling and I gave her my direct line and email if she had any other questions about the processes.    We did schedule her pending lab appt for  Monday 1/2/23 at 8:15. I will touch base with Alana to see if there are any other labs needed on 1/2.

## 2023-01-02 ENCOUNTER — TELEPHONE (OUTPATIENT)
Dept: TRANSPLANT | Facility: CLINIC | Age: 33
End: 2023-01-02

## 2023-01-02 ENCOUNTER — LAB (OUTPATIENT)
Dept: LAB | Facility: CLINIC | Age: 33
End: 2023-01-02
Attending: INTERNAL MEDICINE
Payer: COMMERCIAL

## 2023-01-02 DIAGNOSIS — N18.6 ANEMIA IN ESRD (END-STAGE RENAL DISEASE) (H): ICD-10-CM

## 2023-01-02 DIAGNOSIS — N18.4 CHRONIC KIDNEY DISEASE, STAGE IV (SEVERE) (H): ICD-10-CM

## 2023-01-02 DIAGNOSIS — N18.6 ESRD (END STAGE RENAL DISEASE) (H): Primary | ICD-10-CM

## 2023-01-02 DIAGNOSIS — N18.6 ESRD ON DIALYSIS (H): ICD-10-CM

## 2023-01-02 DIAGNOSIS — Z94.0 STATUS POST KIDNEY TRANSPLANT: ICD-10-CM

## 2023-01-02 DIAGNOSIS — Z99.2 ESRD ON DIALYSIS (H): ICD-10-CM

## 2023-01-02 DIAGNOSIS — D63.1 ANEMIA IN ESRD (END-STAGE RENAL DISEASE) (H): ICD-10-CM

## 2023-01-02 DIAGNOSIS — N02.B9 IGA NEPHROPATHY: ICD-10-CM

## 2023-01-02 LAB
ALBUMIN SERPL BCG-MCNC: 4.1 G/DL (ref 3.5–5.2)
ALP SERPL-CCNC: 57 U/L (ref 35–104)
ALT SERPL W P-5'-P-CCNC: 5 U/L (ref 10–35)
ANION GAP SERPL CALCULATED.3IONS-SCNC: 13 MMOL/L (ref 7–15)
APTT PPP: 33 SECONDS (ref 22–38)
AST SERPL W P-5'-P-CCNC: 20 U/L (ref 10–35)
BASOPHILS # BLD AUTO: 0 10E3/UL (ref 0–0.2)
BASOPHILS NFR BLD AUTO: 1 %
BILIRUB SERPL-MCNC: 1 MG/DL
BUN SERPL-MCNC: 54.1 MG/DL (ref 6–20)
CALCIUM SERPL-MCNC: 9.3 MG/DL (ref 8.6–10)
CHLORIDE SERPL-SCNC: 100 MMOL/L (ref 98–107)
CREAT SERPL-MCNC: 8.29 MG/DL (ref 0.51–0.95)
DEPRECATED HCO3 PLAS-SCNC: 25 MMOL/L (ref 22–29)
EOSINOPHIL # BLD AUTO: 0.2 10E3/UL (ref 0–0.7)
EOSINOPHIL NFR BLD AUTO: 3 %
ERYTHROCYTE [DISTWIDTH] IN BLOOD BY AUTOMATED COUNT: 16.3 % (ref 10–15)
GFR SERPL CREATININE-BSD FRML MDRD: 6 ML/MIN/1.73M2
GLUCOSE SERPL-MCNC: 106 MG/DL (ref 70–99)
HAPTOGLOB SERPL-MCNC: <3 MG/DL (ref 32–197)
HCT VFR BLD AUTO: 23.6 % (ref 35–47)
HGB BLD-MCNC: 7.6 G/DL (ref 11.7–15.7)
IMM GRANULOCYTES # BLD: 0 10E3/UL
IMM GRANULOCYTES NFR BLD: 0 %
INR PPP: 1.12 (ref 0.85–1.15)
LDH SERPL L TO P-CCNC: 662 U/L (ref 0–250)
LYMPHOCYTES # BLD AUTO: 1.6 10E3/UL (ref 0.8–5.3)
LYMPHOCYTES NFR BLD AUTO: 25 %
MCH RBC QN AUTO: 28.5 PG (ref 26.5–33)
MCHC RBC AUTO-ENTMCNC: 32.2 G/DL (ref 31.5–36.5)
MCV RBC AUTO: 88 FL (ref 78–100)
MONOCYTES # BLD AUTO: 0.4 10E3/UL (ref 0–1.3)
MONOCYTES NFR BLD AUTO: 7 %
NEUTROPHILS # BLD AUTO: 4 10E3/UL (ref 1.6–8.3)
NEUTROPHILS NFR BLD AUTO: 64 %
NRBC # BLD AUTO: 0 10E3/UL
NRBC BLD AUTO-RTO: 0 /100
PATH REPORT.COMMENTS IMP SPEC: NORMAL
PATH REPORT.COMMENTS IMP SPEC: NORMAL
PATH REPORT.FINAL DX SPEC: NORMAL
PATH REPORT.MICROSCOPIC SPEC OTHER STN: NORMAL
PATH REPORT.MICROSCOPIC SPEC OTHER STN: NORMAL
PATH REPORT.RELEVANT HX SPEC: NORMAL
PLATELET # BLD AUTO: 128 10E3/UL (ref 150–450)
POTASSIUM SERPL-SCNC: 4.4 MMOL/L (ref 3.4–5.3)
PROT SERPL-MCNC: 6.2 G/DL (ref 6.4–8.3)
RBC # BLD AUTO: 2.67 10E6/UL (ref 3.8–5.2)
RETICS # AUTO: 0.12 10E6/UL (ref 0.03–0.1)
RETICS/RBC NFR AUTO: 4.3 % (ref 0.5–2)
SODIUM SERPL-SCNC: 138 MMOL/L (ref 136–145)
THROMBIN TIME: 17.2 SECONDS (ref 13–19)
WBC # BLD AUTO: 6.3 10E3/UL (ref 4–11)

## 2023-01-02 PROCEDURE — 36415 COLL VENOUS BLD VENIPUNCTURE: CPT | Performed by: PATHOLOGY

## 2023-01-02 PROCEDURE — 85060 BLOOD SMEAR INTERPRETATION: CPT | Performed by: PATHOLOGY

## 2023-01-02 PROCEDURE — 99000 SPECIMEN HANDLING OFFICE-LAB: CPT | Performed by: PATHOLOGY

## 2023-01-02 PROCEDURE — 85670 THROMBIN TIME PLASMA: CPT | Mod: 90 | Performed by: PATHOLOGY

## 2023-01-02 PROCEDURE — 83010 ASSAY OF HAPTOGLOBIN QUANT: CPT | Mod: 90 | Performed by: PATHOLOGY

## 2023-01-02 PROCEDURE — 85730 THROMBOPLASTIN TIME PARTIAL: CPT | Performed by: PATHOLOGY

## 2023-01-02 PROCEDURE — 85025 COMPLETE CBC W/AUTO DIFF WBC: CPT | Performed by: PATHOLOGY

## 2023-01-02 PROCEDURE — 85610 PROTHROMBIN TIME: CPT | Performed by: PATHOLOGY

## 2023-01-02 PROCEDURE — 83615 LACTATE (LD) (LDH) ENZYME: CPT | Mod: 90 | Performed by: PATHOLOGY

## 2023-01-02 PROCEDURE — 80053 COMPREHEN METABOLIC PANEL: CPT | Performed by: PATHOLOGY

## 2023-01-02 PROCEDURE — 85045 AUTOMATED RETICULOCYTE COUNT: CPT | Performed by: PATHOLOGY

## 2023-01-02 NOTE — TELEPHONE ENCOUNTER
Called patient today and spoke with her. Confirmed with pt that her status will be reviewed again at this week's Selection Committee. Pt stating today she had a good conversation with Dain last week about donor evaluation process and does not have any questions at this time. Pt expressed very good understanding of all and was in good agreement with the plan.

## 2023-01-04 LAB
CROSSMATCHDATEVXM: NORMAL
DONOR VXM: NORMAL
DSA VXM B1: NORMAL
DSA VXM B2: NORMAL
DSA VXMT1: NORMAL
DSA VXMT2: NORMAL
RESULT VXM B1: NORMAL
RESULT VXM B2: NORMAL
RESULT VXM T1: NORMAL
RESULT VXM T2: NORMAL
SERUM DATE VXM B1: NORMAL
SERUM DATE VXM B2: NORMAL
SERUM DATE VXM T1: NORMAL
SERUM DATE VXM T2: NORMAL
ZZZCOMMENT VXMB1: NORMAL
ZZZCOMMENT VXMB2: NORMAL
ZZZCOMMENT VXMT1: NORMAL
ZZZCOMMENT VXMT2: NORMAL

## 2023-01-12 DIAGNOSIS — Z76.82 AWAITING ORGAN TRANSPLANT: Primary | ICD-10-CM

## 2023-01-13 ENCOUNTER — LAB (OUTPATIENT)
Dept: LAB | Facility: CLINIC | Age: 33
End: 2023-01-13
Payer: COMMERCIAL

## 2023-01-13 DIAGNOSIS — Z76.82 AWAITING ORGAN TRANSPLANT: ICD-10-CM

## 2023-01-13 PROCEDURE — 999N001093 HLA VIRTUAL CROSSMATCH (VXM), LIVING DONOR

## 2023-01-17 ENCOUNTER — MYC MEDICAL ADVICE (OUTPATIENT)
Dept: GASTROENTEROLOGY | Facility: CLINIC | Age: 33
End: 2023-01-17
Payer: COMMERCIAL

## 2023-01-17 ENCOUNTER — APPOINTMENT (OUTPATIENT)
Dept: URBAN - METROPOLITAN AREA CLINIC 258 | Age: 33
Setting detail: DERMATOLOGY
End: 2023-01-17

## 2023-01-17 VITALS — HEIGHT: 63 IN | WEIGHT: 122 LBS

## 2023-01-17 DIAGNOSIS — B07.8 OTHER VIRAL WARTS: ICD-10-CM

## 2023-01-17 PROCEDURE — OTHER COUNSELING: OTHER

## 2023-01-17 PROCEDURE — OTHER LIQUID NITROGEN: OTHER

## 2023-01-17 PROCEDURE — 17111 DESTRUCT LESION 15 OR MORE: CPT

## 2023-01-17 PROCEDURE — OTHER CANTHARIDIN: OTHER

## 2023-01-17 PROCEDURE — OTHER MIPS QUALITY: OTHER

## 2023-01-17 ASSESSMENT — LOCATION SIMPLE DESCRIPTION DERM
LOCATION SIMPLE: PLANTAR SURFACE OF RIGHT 2ND TOE
LOCATION SIMPLE: PLANTAR SURFACE OF LEFT 2ND TOE
LOCATION SIMPLE: RIGHT PLANTAR SURFACE
LOCATION SIMPLE: PLANTAR SURFACE OF LEFT 3RD TOE
LOCATION SIMPLE: PLANTAR SURFACE OF LEFT 1ST TOE
LOCATION SIMPLE: LEFT PLANTAR SURFACE

## 2023-01-17 ASSESSMENT — LOCATION ZONE DERM
LOCATION ZONE: FEET
LOCATION ZONE: TOE

## 2023-01-17 ASSESSMENT — LOCATION DETAILED DESCRIPTION DERM
LOCATION DETAILED: TIP OF LEFT 1ST TOE
LOCATION DETAILED: RIGHT PLANTAR FOREFOOT OVERLYING 2ND METATARSAL
LOCATION DETAILED: LEFT MEDIAL PLANTAR 2ND TOE
LOCATION DETAILED: LEFT MEDIAL PLANTAR 1ST TOE
LOCATION DETAILED: TIP OF LEFT 2ND TOE
LOCATION DETAILED: RIGHT MEDIAL PLANTAR 2ND TOE
LOCATION DETAILED: LEFT PLANTAR FOREFOOT OVERLYING 2ND METATARSAL
LOCATION DETAILED: LEFT PLANTAR FOREFOOT OVERLYING 5TH METATARSAL
LOCATION DETAILED: LEFT PLANTAR FOREFOOT OVERLYING 3RD METATARSAL
LOCATION DETAILED: LEFT PLANTAR FOREFOOT OVERLYING 4TH METATARSAL
LOCATION DETAILED: RIGHT PLANTAR FOREFOOT OVERLYING 5TH METATARSAL
LOCATION DETAILED: TIP OF LEFT 3RD TOE
LOCATION DETAILED: TIP OF RIGHT 2ND TOE

## 2023-01-17 NOTE — HPI: WARTS (VERRUCA)
Is This A New Presentation, Or A Follow-Up?: Warts
Additional History: Patient reports being in kidney failure. She reports that she had warts in the past due to her being immunosuppressive.

## 2023-01-17 NOTE — PROCEDURE: LIQUID NITROGEN
Spray Paint Text: The liquid nitrogen was applied to the skin utilizing a spray paint frosting technique.
Consent: The patient's consent was obtained including but not limited to risks of crusting, scabbing, blistering, scarring, darker or lighter pigmentary change, recurrence, incomplete removal and infection.
Pared With?: 15 blade
Show Topical Anesthesia Variable?: Yes
Detail Level: Detailed
Add 52 Modifier (Optional): no
Post-Care Instructions: I reviewed with the patient in detail post-care instructions. Patient is to wear sunprotection, and avoid picking at any of the treated lesions. Pt may apply Vaseline to crusted or scabbing areas.
Medical Necessity Information: It is in your best interest to select a reason for this procedure from the list below. All of these items fulfill various CMS LCD requirements except the new and changing color options.
Medical Necessity Clause: This procedure was medically necessary because the lesions that were treated were:

## 2023-01-17 NOTE — PROCEDURE: CANTHARIDIN
Canthacur Duration Text (Please Remove Duration From Postcare): The patient was instructed to leave the Canthacur on for 6-8 hours and then wash the area well with soap and water.
Include Z78.9 (Other Specified Conditions Influencing Health Status) As An Associated Diagnosis?: No
Cantharone Forte Duration Text (Please Remove Duration From Postcare): The patient was instructed to leave the Cantharone Forte on for 6-8 hours and then wash the area well with soap and water.
Cantharone Plus Duration Text (Please Remove Duration From Postcare): The patient was instructed to leave the Cantharone Plus on for 6-8 hours and then wash the area well with soap and water.
Post-Care Instructions: I reviewed with the patient in detail post-care instructions. The patient understands that the treated areas should be washed off 6 to 8 hours after application.
Medical Necessity Information: It is in your best interest to select a reason for this procedure from the list below. All of these items fulfill various CMS LCD requirements except the new and changing color options.
Detail Level: Detailed
Consent: The patient's consent was obtained including but not limited to risks of crusting, scabbing, scarring, blistering, darker or lighter pigmentary change, recurrence, incomplete removal and infection.
Cantharone Duration Text (Please Remove Duration From Postcare): The patient was instructed to leave the Cantharone on for 6-8 hours and then wash the area well with soap and water.
Strength: Mikaela
Canthacur Ps Duration Text (Please Remove Duration From Postcare): The patient was instructed to leave the Canthacur PS on for 6-8 hours and then wash the area well with soap and water.
Curette Text: Prior to application of cantharidin the lesions were lightly pared with a curette.
Medical Necessity Clause: This procedure was medically necessary because the lesions that were treated were:

## 2023-01-18 NOTE — CONFIDENTIAL NOTE
Received update from infusion finance this morning that the Entyvio infusions have been approved. Sent [employer] message to [facility] scheduling and [facility] nurses asking to get the patient scheduled. Sent the patient [employer] message with update and encouraged her to call and schedule an appt asap. Next infusion due 1/25/23.

## 2023-01-19 ENCOUNTER — TELEPHONE (OUTPATIENT)
Dept: TRANSPLANT | Facility: CLINIC | Age: 33
End: 2023-01-19
Payer: COMMERCIAL

## 2023-01-19 NOTE — TELEPHONE ENCOUNTER
Called patient today and spoke with her. Pt stating she is stable, dialysis is going okay and she doesn't have any new health concerns. Pt stating she is very frustrated she does not have an approved live donor. Tearful today.  Pt confirmed she is available this afternoon from 2:30 pm to 4:00 pm for a call from Dr. Mar.  I also suggested that Dain RN calls her about live donor evaluation process to touch base on this. I did let pt know she does not have any approved live donors at this time.  Pt very agreeable to talk with Dr. Mar and Dain.  Pt agreeable to call me with any questions or concerns.

## 2023-01-23 DIAGNOSIS — K51.011 ULCERATIVE PANCOLITIS WITH RECTAL BLEEDING (H): Primary | ICD-10-CM

## 2023-01-23 DIAGNOSIS — K51.00 ULCERATIVE PANCOLITIS WITHOUT COMPLICATION (H): ICD-10-CM

## 2023-01-23 DIAGNOSIS — K51.919 ULCERATIVE COLITIS WITH COMPLICATION, UNSPECIFIED LOCATION (H): ICD-10-CM

## 2023-01-23 NOTE — PROGRESS NOTES
Spoke with Dr. Mar. We agreed that given her symptoms started back in 2012, she would require dysplasia screening with colonoscopy every 2-3 years. Due now given last scope was 2018. She will require this before transplant and I will place order for priority now as if she does not have it done in the near future she may decompensate after discussion with Dr. Mar.      Rupert Dsouza PA-C  Division of Gastroenterology, Hepatology and Nutrition  [facility]

## 2023-01-24 ENCOUNTER — TELEPHONE (OUTPATIENT)
Dept: TRANSPLANT | Facility: CLINIC | Age: 33
End: 2023-01-24
Payer: COMMERCIAL

## 2023-01-24 DIAGNOSIS — N18.6 ESRD (END STAGE RENAL DISEASE) (H): Primary | ICD-10-CM

## 2023-01-24 DIAGNOSIS — Z01.818 ENCOUNTER FOR PRE-TRANSPLANT EVALUATION FOR KIDNEY TRANSPLANT: ICD-10-CM

## 2023-01-24 NOTE — TELEPHONE ENCOUNTER
Called Caity with request from coordinator. Caity and I talked out her next steps (behavioral health/psychiatry and colonoscopy). She is going to call scheduling to get scheduled.  Caity also wanted an update to see if there are donors moving forward. I assured her that the donor team is actively working on options for Caity.    Will check in with Caiyt next week.

## 2023-01-25 ENCOUNTER — TELEPHONE (OUTPATIENT)
Dept: GASTROENTEROLOGY | Facility: CLINIC | Age: 33
End: 2023-01-25
Payer: COMMERCIAL

## 2023-01-25 ENCOUNTER — TELEPHONE (OUTPATIENT)
Dept: GASTROENTEROLOGY | Facility: CLINIC | Age: 33
End: 2023-01-25

## 2023-01-25 ENCOUNTER — INFUSION THERAPY VISIT (OUTPATIENT)
Dept: INFUSION THERAPY | Facility: CLINIC | Age: 33
End: 2023-01-25
Attending: PHYSICIAN ASSISTANT
Payer: COMMERCIAL

## 2023-01-25 VITALS
SYSTOLIC BLOOD PRESSURE: 126 MMHG | DIASTOLIC BLOOD PRESSURE: 100 MMHG | TEMPERATURE: 98.2 F | WEIGHT: 128.3 LBS | HEART RATE: 74 BPM | RESPIRATION RATE: 16 BRPM | BODY MASS INDEX: 22.73 KG/M2 | OXYGEN SATURATION: 100 %

## 2023-01-25 DIAGNOSIS — K51.919 ULCERATIVE COLITIS WITH COMPLICATION, UNSPECIFIED LOCATION (H): Primary | ICD-10-CM

## 2023-01-25 DIAGNOSIS — K51.011 ULCERATIVE PANCOLITIS WITH RECTAL BLEEDING (H): Primary | ICD-10-CM

## 2023-01-25 LAB
ALBUMIN SERPL BCG-MCNC: 4.3 G/DL (ref 3.5–5.2)
ALP SERPL-CCNC: 67 U/L (ref 35–104)
ALT SERPL W P-5'-P-CCNC: <5 U/L (ref 10–35)
ANION GAP SERPL CALCULATED.3IONS-SCNC: 14 MMOL/L (ref 7–15)
AST SERPL W P-5'-P-CCNC: 14 U/L (ref 10–35)
BASOPHILS # BLD AUTO: 0.1 10E3/UL (ref 0–0.2)
BASOPHILS NFR BLD AUTO: 1 %
BILIRUB DIRECT SERPL-MCNC: <0.2 MG/DL (ref 0–0.3)
BILIRUB SERPL-MCNC: 0.4 MG/DL
BUN SERPL-MCNC: 60 MG/DL (ref 6–20)
CALCIUM SERPL-MCNC: 9.6 MG/DL (ref 8.6–10)
CHLORIDE SERPL-SCNC: 99 MMOL/L (ref 98–107)
CREAT SERPL-MCNC: 8.28 MG/DL (ref 0.51–0.95)
CRP SERPL-MCNC: <3 MG/L
DEPRECATED HCO3 PLAS-SCNC: 25 MMOL/L (ref 22–29)
EOSINOPHIL # BLD AUTO: 0.2 10E3/UL (ref 0–0.7)
EOSINOPHIL NFR BLD AUTO: 2 %
ERYTHROCYTE [DISTWIDTH] IN BLOOD BY AUTOMATED COUNT: 17.2 % (ref 10–15)
ERYTHROCYTE [SEDIMENTATION RATE] IN BLOOD BY WESTERGREN METHOD: 22 MM/HR (ref 0–20)
GFR SERPL CREATININE-BSD FRML MDRD: 6 ML/MIN/1.73M2
GLUCOSE SERPL-MCNC: 111 MG/DL (ref 70–99)
HCT VFR BLD AUTO: 26.9 % (ref 35–47)
HGB BLD-MCNC: 8.5 G/DL (ref 11.7–15.7)
IMM GRANULOCYTES # BLD: 0 10E3/UL
IMM GRANULOCYTES NFR BLD: 0 %
LYMPHOCYTES # BLD AUTO: 1.2 10E3/UL (ref 0.8–5.3)
LYMPHOCYTES NFR BLD AUTO: 14 %
MCH RBC QN AUTO: 29.4 PG (ref 26.5–33)
MCHC RBC AUTO-ENTMCNC: 31.6 G/DL (ref 31.5–36.5)
MCV RBC AUTO: 93 FL (ref 78–100)
MONOCYTES # BLD AUTO: 0.5 10E3/UL (ref 0–1.3)
MONOCYTES NFR BLD AUTO: 6 %
NEUTROPHILS # BLD AUTO: 6.6 10E3/UL (ref 1.6–8.3)
NEUTROPHILS NFR BLD AUTO: 77 %
NRBC # BLD AUTO: 0 10E3/UL
NRBC BLD AUTO-RTO: 0 /100
PLATELET # BLD AUTO: 253 10E3/UL (ref 150–450)
POTASSIUM SERPL-SCNC: 4.6 MMOL/L (ref 3.4–5.3)
PROT SERPL-MCNC: 6.8 G/DL (ref 6.4–8.3)
RBC # BLD AUTO: 2.89 10E6/UL (ref 3.8–5.2)
SODIUM SERPL-SCNC: 138 MMOL/L (ref 136–145)
WBC # BLD AUTO: 8.5 10E3/UL (ref 4–11)

## 2023-01-25 PROCEDURE — 86140 C-REACTIVE PROTEIN: CPT | Performed by: PHYSICIAN ASSISTANT

## 2023-01-25 PROCEDURE — 85025 COMPLETE CBC W/AUTO DIFF WBC: CPT | Performed by: PHYSICIAN ASSISTANT

## 2023-01-25 PROCEDURE — 250N000011 HC RX IP 250 OP 636: Performed by: PHYSICIAN ASSISTANT

## 2023-01-25 PROCEDURE — 80053 COMPREHEN METABOLIC PANEL: CPT | Performed by: PHYSICIAN ASSISTANT

## 2023-01-25 PROCEDURE — 36415 COLL VENOUS BLD VENIPUNCTURE: CPT | Performed by: PHYSICIAN ASSISTANT

## 2023-01-25 PROCEDURE — 258N000003 HC RX IP 258 OP 636: Performed by: PHYSICIAN ASSISTANT

## 2023-01-25 PROCEDURE — 82248 BILIRUBIN DIRECT: CPT | Performed by: PHYSICIAN ASSISTANT

## 2023-01-25 PROCEDURE — 85652 RBC SED RATE AUTOMATED: CPT | Performed by: PHYSICIAN ASSISTANT

## 2023-01-25 PROCEDURE — 96365 THER/PROPH/DIAG IV INF INIT: CPT

## 2023-01-25 RX ORDER — BISACODYL 5 MG
TABLET, DELAYED RELEASE (ENTERIC COATED) ORAL
Qty: 4 TABLET | Refills: 0 | Status: ON HOLD | OUTPATIENT
Start: 2023-01-25 | End: 2023-04-14

## 2023-01-25 RX ORDER — HEPARIN SODIUM (PORCINE) LOCK FLUSH IV SOLN 100 UNIT/ML 100 UNIT/ML
5 SOLUTION INTRAVENOUS
Status: CANCELLED | OUTPATIENT
Start: 2023-02-22

## 2023-01-25 RX ORDER — ALBUTEROL SULFATE 90 UG/1
1-2 AEROSOL, METERED RESPIRATORY (INHALATION)
Status: CANCELLED
Start: 2023-02-22

## 2023-01-25 RX ORDER — DIPHENHYDRAMINE HYDROCHLORIDE 50 MG/ML
50 INJECTION INTRAMUSCULAR; INTRAVENOUS
Status: CANCELLED
Start: 2023-02-22

## 2023-01-25 RX ORDER — EPINEPHRINE 1 MG/ML
0.3 INJECTION, SOLUTION INTRAMUSCULAR; SUBCUTANEOUS EVERY 5 MIN PRN
Status: CANCELLED | OUTPATIENT
Start: 2023-02-22

## 2023-01-25 RX ORDER — HEPARIN SODIUM,PORCINE 10 UNIT/ML
5 VIAL (ML) INTRAVENOUS
Status: CANCELLED | OUTPATIENT
Start: 2023-02-22

## 2023-01-25 RX ORDER — ALBUTEROL SULFATE 0.83 MG/ML
2.5 SOLUTION RESPIRATORY (INHALATION)
Status: CANCELLED | OUTPATIENT
Start: 2023-02-22

## 2023-01-25 RX ORDER — ONDANSETRON 4 MG/1
TABLET, FILM COATED ORAL
Qty: 3 TABLET | Refills: 0 | Status: ON HOLD | OUTPATIENT
Start: 2023-01-25 | End: 2023-04-14

## 2023-01-25 RX ORDER — METHYLPREDNISOLONE SODIUM SUCCINATE 125 MG/2ML
125 INJECTION, POWDER, LYOPHILIZED, FOR SOLUTION INTRAMUSCULAR; INTRAVENOUS
Status: CANCELLED
Start: 2023-02-22

## 2023-01-25 RX ORDER — MEPERIDINE HYDROCHLORIDE 25 MG/ML
25 INJECTION INTRAMUSCULAR; INTRAVENOUS; SUBCUTANEOUS EVERY 30 MIN PRN
Status: CANCELLED | OUTPATIENT
Start: 2023-02-22

## 2023-01-25 RX ADMIN — VEDOLIZUMAB 300 MG: 300 INJECTION, POWDER, LYOPHILIZED, FOR SOLUTION INTRAVENOUS at 12:46

## 2023-01-25 NOTE — TELEPHONE ENCOUNTER
Attempted to contact patient regarding upcoming Colonoscopy  procedure on 2/3/23 for pre assessment questions. No answer.     Left message to return call to [phone] #4    Discuss Covid policy and designated  policy.    Pre op exam scheduled: N/A    Arrival time: 1015. Procedure time: 1115    Facility location: [facility]; [address] [place] [id number]    Sedation type: Conscious sedation     Anticoagulants: No    Electronic implanted devices? No    Diabetic? No    Indication for procedure: UC    Bowel prep recommendation: Standard Golytely  - fabian stated in her scheduling questionnaire that she has concerns about the amount of prep that has to be drank. Sent in zofran in addition to prep. Please discuss.    Prep instructions sent via [employer]. Bowel prep script sent to      [facility] -- [place], [place] - [address]      Jaylyn Mane RN  Endoscopy Procedure Pre Assessment RN

## 2023-01-25 NOTE — TELEPHONE ENCOUNTER
Screening Questions  BLUE  KIND OF PREP RED  LOCATION [review exclusion criteria] GREEN  SEDATION TYPE        Yes Are you active on mychart?       [place] Ordering/Referring Provider?        HP What type of coverage do you have?      N Have you had a positive covid test in the last 14 days?     21.6 1. BMI  [BMI 40+ - review exclusion criteria]    Yes  2. Are you able to give consent for your medical care? [IF NO,RN REVIEW]          N  3. Are you taking any prescription pain medications on a routine schedule   (ex narcotics: tramadol, oxycodone, roxicodone, oxycontin,  and percocet)?        NA  3a. EXTENDED PREP What kind of prescription?     N 4. Do you have any chemical dependencies such as alcohol, street drugs, or methadone?        **If yes 3- 5 , please schedule with MAC sedation.**          IF YES TO ANY 6 - 10 - HOSPITAL SETTING ONLY.     N 6.   Do you need assistance transferring?     N 7.   Have you had a heart or lung transplant?    Yes 8.   Are you currently on dialysis?   N 9.   Do you use daily home oxygen?   N 10. Do you take nitroglycerin?   10a. NA If yes, how often?     11. [FEMALES]  N Are you currently pregnant?    11a. NA If yes, how many weeks? [ Greater than 12 weeks, OR NEEDED]    N 12. Do you have Pulmonary Hypertension? *NEED PAC APPT AT [facility]*     N-but does dialysis catheter 13. [review exclusion criteria]  Do you have any implantable devices in your body (pacemaker, defib, LVAD)?    N 14. In the past 6 months, have you had any heart related issues including cardiomyopathy or heart attack?     14a. N If yes, did it require cardiac stenting if so when?     N 15. Have you had a stroke or Transient ischemic attack (TIA - aka  mini stroke ) within 6 months?      N 16. Do you have mod to severe Obstructive Sleep Apnea?  [Hospital only]    N 17. Do you have SEVERE AND UNCONTROLLED asthma? *NEED PAC APPT AT [facility]*     N 18. Are you currently taking any blood thinners?     18a. If yes, inform  "patient to \"follow up w/ ordering provider for bridging instructions.\"    N 19. Do you take the medication Phentermine?    19a. If yes, \"Hold for 7 days before procedure.  Please consult your prescribing provider if you have questions about holding this medication.\"     Yes-had kidney transplant in the past  20. Do you have chronic kidney disease?      N  21. Do you have a diagnosis of diabetes?     N  22. On a regular basis do you go 3-5 days between bowel movements?      23. Preferred LOCAL Pharmacy for Pre Prescription    [ LIST ONLY ONE PHARMACY]          [facility] -- [place], [place] - [address]        - CLOSING REMINDERS -    Informed patient they will need an adult    Cannot take any type of public or medical transportation alone    Conscious Sedation- Needs  for 6 hours after the procedure       MAC/General-Needs  for 24 hours after procedure    Pre-Procedure Covid test to be completed [[facility] PCR Testing Required]    Confirmed Nurse will call to complete assessment       - SCHEDULING DETAILS -  Yes Hospital Setting Required? If yes, what is the exclusion?: Dialysis   [place]  Surgeon    2/3/23  Date of Procedure  Lower Endoscopy [Colonoscopy]  Type of Procedure Scheduled  [facility]- [facility] [place], [place]-If you answer yes to questions #8, #20, #21Which Colonoscopy Prep was Sent?   Patient does have concerns with prep and the amount she needs to drink.  Advised her to discuss with RN  CS Sedation Type     NO PAC / Pre-op Required                 "

## 2023-01-25 NOTE — LETTER
Date:January 26, 2023      Provider requested that no letter be sent. Do not send.       [facility]

## 2023-01-25 NOTE — PROGRESS NOTES
Nursing Note  Caity Horan presents today [facility] for:   Chief Complaint   Patient presents with     Infusion     Entyvio     During today's [facility] appointment, orders from Rupert Dsouza were completed.  Frequency: every 6 weeks    Progress note:  Patient identification verified by name and date of birth.  Assessment completed.  Vitals recorded in Doc Flowsheets.  Patient was provided with education regarding medication/procedure and possible side effects.  Patient verbalized understanding.     present during visit today: Not Applicable.    Treatment Conditions: ~~~ NOTE: If the patient answers yes to any of the questions below, hold the infusion and contact ordering provider or on-call provider.    1. Have you recently had an elevated temperature, fever, chills, productive cough, coughing for 3 weeks or longer or hemoptysis, abnormal vital signs, night sweats,  chest pain or have you noticed a decrease in your appetite, unexplained weight loss or fatigue? No  2. Do you have any open wounds or new incisions? No  3. Do you have any recent or upcoming hospitalizations, surgeries or dental procedures? No  4. Do you currently have or recently have had any signs of illness or infection or are you on any antibiotics? No  5. Have you had any new, sudden or worsening abdominal pain? No  6. Have you or anyone in your household received a live vaccination in the past 4 weeks? Please note:  No live vaccines while on biologic/chemotherapy until 6 months after the last treatment.  Patient can receive the flu vaccine (shot only) and the pneumovax.  It is optimal for the patient to get these vaccines mid cycle, but they can be given at any time as long as it is not on the day of the infusion. No  7. Have you recently been diagnosed with any new nervous system diseases (ie. Multiple sclerosis, Guillain Eva, seizures, neurological changes) or cancer  diagnosis? No  8. Are you on any form of radiation or chemotherapy? No  9. Are you pregnant or breast feeding or do you have plans of pregnancy in the future? No  10. Have you been having any signs of worsening depression or suicidal ideations?  (benlysta only) No  11. Have there been any other new onset medical symptoms? No      Premedications: were not ordered.      Infusion length and rate:  infusion given over approximately 30 minutes    Labs: were drawn per orders.     Vascular access: peripheral IV placed today.    Is the next appt scheduled? yes    Post Infusion Assessment:  Patient tolerated infusion without incident.     Discharge Plan:   Follow up plan of care with: ongoing infusions at [facility].  Discharge instructions were reviewed with patient.  Patient/representative verbalized understanding of discharge instructions and all questions answered.  Patient discharged from [facility] in stable condition.    More Tidwell RN    Administrations This Visit     vedolizumab (ENTYVIO) 300 mg in sodium chloride 0.9 % 280 mL infusion     Admin Date  01/25/2023 Action  New Bag Dose  300 mg Rate  560 mL/hr Route  Intravenous Administered By  More Tidwell RN                BP (!) 126/100   Pulse 74   Temp 98.2  F (36.8  C) (Oral)   Resp 16   Wt 58.2 kg (128 lb 4.8 oz)   SpO2 100%   BMI 22.73 kg/m

## 2023-01-25 NOTE — LETTER
1/25/2023         RE: Caity Horan  [address]  [address]  [place] [address]        Dear Colleague,    Thank you for referring your patient, Caity Horan, to the [facility]. Please see a copy of my visit note below.    Nursing Note  Caity Horan presents today [facility] for:   Chief Complaint   Patient presents with     Infusion     Entyvio     During [facility]'s [facility] appointment, orders from Rupert Dsouza were completed.  Frequency: every 6 weeks    Progress note:  Patient identification verified by name and date of birth.  Assessment completed.  Vitals recorded in Doc Flowsheets.  Patient was provided with education regarding medication/procedure and possible side effects.  Patient verbalized understanding.     present during visit today: Not Applicable.    Treatment Conditions: ~~~ NOTE: If the patient answers yes to any of the questions below, hold the infusion and contact ordering provider or on-call provider.    1. Have you recently had an elevated temperature, fever, chills, productive cough, coughing for 3 weeks or longer or hemoptysis, abnormal vital signs, night sweats,  chest pain or have you noticed a decrease in your appetite, unexplained weight loss or fatigue? No  2. Do you have any open wounds or new incisions? No  3. Do you have any recent or upcoming hospitalizations, surgeries or dental procedures? No  4. Do you currently have or recently have had any signs of illness or infection or are you on any antibiotics? No  5. Have you had any new, sudden or worsening abdominal pain? No  6. Have you or anyone in your household received a live vaccination in the past 4 weeks? Please note:  No live vaccines while on biologic/chemotherapy until 6 months after the last treatment.  Patient can receive the flu vaccine (shot only) and the pneumovax.  It is optimal for the patient to  get these vaccines mid cycle, but they can be given at any time as long as it is not on the day of the infusion. No  7. Have you recently been diagnosed with any new nervous system diseases (ie. Multiple sclerosis, Guillain [place], seizures, neurological changes) or cancer diagnosis? No  8. Are you on any form of radiation or chemotherapy? No  9. Are you pregnant or breast feeding or do you have plans of pregnancy in the future? No  10. Have you been having any signs of worsening depression or suicidal ideations?  (benlysta only) No  11. Have there been any other new onset medical symptoms? No      Premedications: were not ordered.      Infusion length and rate:  infusion given over approximately 30 minutes    Labs: were drawn per orders.     Vascular access: peripheral IV placed today.    Is the next appt scheduled? yes    Post Infusion Assessment:  Patient tolerated infusion without incident.     Discharge Plan:   Follow up plan of care with: ongoing infusions at [facility].  Discharge instructions were reviewed with patient.  Patient/representative verbalized understanding of discharge instructions and all questions answered.  Patient discharged from [facility] in stable condition.    More Tidwell RN    Administrations This Visit     vedolizumab (ENTYVIO) 300 mg in sodium chloride 0.9 % 280 mL infusion     Admin Date  01/25/2023 Action  New Bag Dose  300 mg Rate  560 mL/hr Route  Intravenous Administered By  More Tidwell RN                BP (!) 126/100   Pulse 74   Temp 98.2  F (36.8  C) (Oral)   Resp 16   Wt 58.2 kg (128 lb 4.8 oz)   SpO2 100%   BMI 22.73 kg/m          Again, thank you for allowing me to participate in the care of your patient.        Sincerely,        [facility] Nurse

## 2023-01-26 ENCOUNTER — VIRTUAL VISIT (OUTPATIENT)
Dept: BEHAVIORAL HEALTH | Facility: CLINIC | Age: 33
End: 2023-01-26
Payer: COMMERCIAL

## 2023-01-26 DIAGNOSIS — F43.9 TRAUMA AND STRESSOR-RELATED DISORDER: Primary | ICD-10-CM

## 2023-01-26 PROCEDURE — 90834 PSYTX W PT 45 MINUTES: CPT | Mod: GT | Performed by: PSYCHOLOGIST

## 2023-01-26 NOTE — PROGRESS NOTES
"[facility]: Integrated Behavioral Health      PATIENT'S NAME: Caity Horan  PREFERRED NAME: Caity  PRONOUNS: She/Her/Hers   MRN: [id number]  : 1990  ADDRESS: [address]  [address]  [facility] [address]  [facility] NUMBER:  [id number]  DATE OF SERVICE: 23  START TIME: 1:00  END TIME: 1:50  PREFERRED PHONE: [phone]  May we leave a program related message: Yes  SERVICE MODALITY:  Video Visit:      Provider verified identity through the following two step process.  Patient provided:  Patient photo and Patient     Telemedicine Visit: The patient's condition can be safely assessed and treated via synchronous audio and visual telemedicine encounter.      Reason for Telemedicine Visit: Patient convenience (e.g. access to timely appointments / distance to available provider)    Originating Site (Patient Location): Patient's home    Distant Site (Provider Location): Provider Remote Setting- Home Office    Consent:  The patient/guardian has verbally consented to: the potential risks and benefits of telemedicine (video visit) versus in person care; bill my insurance or make self-payment for services provided; and responsibility for payment of non-covered services.     Patient would like the video invitation sent by:  My Chart    Mode of Communication:  Video Conference via Amwell    Distant Location (Provider):  Off-site    As the provider I attest to compliance with applicable laws and regulations related to telemedicine.    UNIVERSAL ADULT Mental Health DIAGNOSTIC ASSESSMENT    Identifying Information:  Patient is a 32 year old,   female.  Patient was referred for an assessment by her SOT care team.  Patient attended the session alone.    Chief Complaint:   The reason for seeking services at this time is: \"Kidney TRX approval\".  The problem(s) began 12/15/22.    Transplant team recommended mental health evaluation. Was living in [place] and moved " back to [place] in December for transplant treatment and evaluation. Moved out to [place] in summer of 2021. [facility] was recommended by transplant team to consult with mental health due to history of previous kidney transplant in  and going through rejection now. Denies history of mental health concerns, however. Denies history of anxiety, depression, or other mental health difficulties. She reports previously being diagnosed with PTSD by a psychiatrist when evaluated by a transplant team in [place], though she disagreed with this diagnosis due to not experiencing symptoms now or in the past consistent with this diagnosis. Per her report today, there is evidence of mild stress pertaining to engaging with transplant again, though her mental health history and reported symptom profile appeared minimal/unremarkable. States having confidence in her ability to cope with going through transplant with exercise, nutrition, and keeping good social support. Denies current use of medication to help with mood. Notes history of taking medication in the past, though states it was not helpful as selective serotonin reuptake inhibitor class medication induced lethargy and depressive-like experiences. Notes support [place] and [place]. States mother and sister both live out of state (mother lives in [place], across the border and sister lives in [place], [place]).     Overall, mental health profile appears stable. Possible mild, understandable adjustment process with rejection and proceeding with transplant. Mental health history is unremarkable. There is also no history of inpatient treatment or SI/SIB.     Patient has not attempted to resolve these concerns in the past.    Social/Family History:  Patient reported they grew up in [place].  They were raised by biological parents.  Parents ; father  in  due to health complications from alcohol use. Reports father and mother   prior to her father's death. Patient reported that their childhood was stable, positive overall.  Patient described their current relationships with family of origin as close with her mother, who lives in [place] and sister who lives in [place].     The patient describes their cultural background as .  Cultural influences and impact on patient's life structure, values, norms, and healthcare: n/a.  Contextual influences on patient's health include: Contextual Factors: Health- Seeking Factors second transplant. These factors will be addressed in the Preliminary Treatment plan. Patient identified their preferred language to be English. Patient reported they does not need the assistance of an  or other support involved in therapy.     Patient reported had no significant delays in developmental tasks.   Patient's highest education level was college graduate. She attended [employer].  Patient identified the following learning problems: none reported.  Modifications will not be used to assist communication in therapy.  Patient reports they are  able to understand written materials.    Patient reported the following relationship history never .  Patient's current relationship status is single.   Patient identified their sexual orientation as heterosexual.  Patient reported having no child(kenney). Patient identified mother; siblings; pets; friends; co-worker as part of their support system.  Patient identified the quality of these relationships as good.    Patient's current living/housing situation involves staying in own home/apartment.  The immediate members of family and household include Steffanie Horan, 29,Sister and they report that housing is stable.     Patient is currently employed fulltime.  Patient reports their finances are obtained through employment. Patient does not identify finances as a current stressor.      Patient reported that they have not been involved with the legal  system. Patient does not report being under probation/ parole/ jurisdiction. They are not under any current court jurisdiction.     Patient's Strengths and Limitations:  Patient identified the following strengths or resources that will help them succeed in treatment: commitment to health and well being, exercise routine, friends / good social support, family support, insight, intelligence, motivation, strong social skills and work ethic. Things that may interfere with the patient's success in treatment include: none identified.     Assessments:  PHQ2:   PHQ-2 ( 1999 [employer]) 1/26/2023 12/28/2022 12/5/2022 12/11/2019 2/27/2018   Q1: Little interest or pleasure in doing things 0 0 0 0 0   Q2: Feeling down, depressed or hopeless 0 0 0 0 0   PHQ-2 Score 0 0 0 0 0   PHQ-2 Total Score (12-17 Years)- Positive if 3 or more points; Administer PHQ-A if positive - 0 0 0 -   Q1: Little interest or pleasure in doing things Not at all - - Not at all -   Q2: Feeling down, depressed or hopeless Not at all - - Not at all -   PHQ-2 Score 0 - - 0 -     [place] Suicide Severity Rating Scale (Lifetime/Recent)  [place] Suicide Severity Rating (Lifetime/Recent) 12/19/2018   Q1 Wished to be Dead (Past Month) no   Q2 Suicidal Thoughts (Past Month) no   Q6 Suicide Behavior (Lifetime) no     [place] Suicide Severity Rating Scale (Short Version)  [place] Suicide Severity Rating (Short Version) 12/19/2018 12/21/2022   Over the past 2 weeks have you felt down, depressed, or hopeless? - no   Over the past 2 weeks have you had thoughts of killing yourself? - no   Have you ever attempted to kill yourself? - no   Q1 Wished to be Dead (Past Month) no -   Q2 Suicidal Thoughts (Past Month) no -   Q6 Suicide Behavior (Lifetime) no -       Personal and Family Medical History:  Patient does report a family history of mental health concerns. Reports father and sister struggle with anxious distress.  Patient reports family history includes Alcoholism in  her father; No Known Problems in her mother; Prostate Cancer in her paternal grandmother.    Patient does report Mental Health Diagnosis and/or Treatment.  Patient Patient reported the following previous diagnoses which include(s): PTSD and situational anxiety.  Patient reported symptoms began following her first kidney transplant in 2014.   Patient has received mental health services in the past: psychiatry with a provider in [place]; completed about three visits. Denies taking current psychotropic medication.  Psychiatric Hospitalizations: None.  Patient denies a history of civil commitment.  Patient is not receiving other mental health services.  These include none.     2014 first kidney transplant. Entered rejection recently. No family history of renal disease.     Patient has had a physical exam to rule out medical causes for current symptoms.  Date of last physical exam was within the past year. Client was encouraged to follow up with PCP if symptoms were to develop. The patient does not have a Primary Care Provider and was encouraged to establish care with a PCP. Patient reports the following current medical concerns:      Patient Active Problem List    Diagnosis Date Noted     Dialysis patient (H) 12/21/2022     Priority: Medium     Ulcerative colitis (H) 12/14/2022     Priority: Medium     Vitamin D deficiency 12/14/2022     Priority: Medium     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     Priority: Medium     Ulcerative pancolitis with rectal bleeding (H) 03/21/2018     Priority: Medium     Vitamin B12 deficiency 03/02/2018     Priority: Medium     Anemia, iron deficiency 10/13/2017     Priority: Medium     EBV (Nicole-Barr virus) viremia 08/15/2017     Priority: Medium     Aftercare following organ transplant 12/18/2016     Priority: Medium     Hypomagnesemia 02/25/2015     Priority: Medium     Immunosuppressed status (H) 12/10/2014     Priority: Medium     Kidney replaced by transplant 12/05/2014      Priority: Medium     Living 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) 12/5/2014       IgA nephropathy 11/23/2014     Priority: Medium     biopsy proven       Metabolic acidosis 10/27/2014     Priority: Medium        Patient reports pain concerns including GI pain.  Patient does not want help addressing pain concerns..   There are significant appetite / nutritional concerns / weight changes. Reports weight is stable, but physical health is affecting diet. Sleep is okay, she reports. Dialysis three days per week for three hours at a time. Patient does not report a history of head injury / trauma / cognitive impairment.      Current Outpatient Medications   Medication     amLODIPine (NORVASC) 5 MG tablet     bisacodyl (DULCOLAX) 5 MG EC tablet     Blood Pressure Monitoring (B-D ASSURE BPM/AUTO ARM CUFF) MISC     CALCIUM ANTACID 500 MG chewable tablet     carvedilol (COREG) 12.5 MG tablet     Cyanocobalamin (B-12) 1000 MCG TBCR     levonorgestrel (MIRENA) 20 MCG/DAY IUD     losartan (COZAAR) 50 MG tablet     magnesium oxide (MAG-OX) 400 MG tablet     mycophenolate (GENERIC EQUIVALENT) 250 MG capsule     ondansetron (ZOFRAN) 4 MG tablet     polyethylene glycol (GOLYTELY) 236 g suspension     Vedolizumab (ENTYVIO IV)     vitamin D3 (CHOLECALCIFEROL) 50 mcg (2000 units) tablet     No current facility-administered medications for this visit.     Medication Adherence:  Patient reports taking prescribed medications as prescribed.    Patient Allergies:    Allergies   Allergen Reactions     Bumetanide Other (See Comments)     Causes paralysis     Amoxicillin Rash       Medical History:    Past Medical History:   Diagnosis Date     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     Anemia in stage 5 chronic kidney disease (H)      Anemia in stage 5 chronic kidney disease (H) 10/27/2014     ESRD (end stage renal disease) on dialysis (H)      HTN (hypertension) 10/27/2014     Hypertension 10/2014     Hypomagnesemia      IgA nephropathy      biopsy proven     Metabolic acidosis      Ulcerative colitis (H)      Vitamin D deficiency          Current Mental Status Exam:   Appearance:  Appropriate    Eye Contact:  Good   Psychomotor:  Normal       Gait / station:  no problem  Attitude / Demeanor: Cooperative  Friendly Pleasant  Speech      Rate / Production: Normal/ Responsive      Volume:  Normal  volume      Language:  intact  Mood:   Anxious   Affect:   Appropriate    Thought Content: Clear   Thought Process: Logical       Associations: No loosening of associations  Insight:   Good   Judgment:  Intact   Orientation:  All  Attention/concentration: Good      Substance Use:  Patient did report a family history of substance use concerns; see medical history section for details.  Patient has not received chemical dependency treatment in the past.  Patient has not ever been to detox.      Patient is not currently receiving any chemical dependency treatment.           Substance History of use Age of first use Date of last use     Pattern and duration of use (include amounts and frequency)   Alcohol used in the past   19 09/30/22 REPORTS SUBSTANCE USE: N/A   Cannabis   never used     REPORTS SUBSTANCE USE: N/A     Amphetamines   never used     REPORTS SUBSTANCE USE: N/A   Cocaine/crack    never used       REPORTS SUBSTANCE USE: N/A   Hallucinogens never used         REPORTS SUBSTANCE USE: N/A   Inhalants never used         REPORTS SUBSTANCE USE: N/A   Heroin never used         REPORTS SUBSTANCE USE: N/A   Other Opiates never used     REPORTS SUBSTANCE USE: N/A   Benzodiazepine   never used     REPORTS SUBSTANCE USE: N/A   Barbiturates never used     REPORTS SUBSTANCE USE: N/A   Over the counter meds never used     REPORTS SUBSTANCE USE: N/A   Caffeine never used     REPORTS SUBSTANCE USE: N/A   Nicotine  never used     REPORTS SUBSTANCE USE: N/A   Other substances not listed above:  Identify:  never used     REPORTS SUBSTANCE USE: N/A     Patient reported the  following problems as a result of their substance use: no problems, not applicable.    Substance Use: No symptoms    Based on the negative CAGE score and clinical interview there  are not indications of drug or alcohol abuse.      Significant Losses / Trauma / Abuse / Neglect Issues:   Patient did not serve in the .  There are indications or report of significant loss, trauma, abuse or neglect issues related to: are no indications and client denies any losses, trauma, abuse, or neglect concerns.  Concerns for possible neglect are not present.     Safety Assessment:   Patient denies current homicidal ideation and behaviors.  Patient denies current self-injurious ideation and behaviors.    Patient denied risk behaviors associated with substance use.  Patient denies any high risk behaviors associated with mental health symptoms.  Patient reports the following current concerns for their personal safety: None.  Patient reports there are not firearms in the house.       History of Safety Concerns:  Patient denied a history of homicidal ideation.     Patient denied a history of personal safety concerns.    Patient denied a history of assaultive behaviors.    Patient denied a history of sexual assault behaviors.     Patient denied a history of risk behaviors associated with substance use.  Patient denies any history of high risk behaviors associated with mental health symptoms.  Patient reports the following protective factors: forward or future oriented thinking; dedication to family or friends; safe and stable environment; regular sleep; effectively controls impulses; regular physical activity; sense of belonging; purpose; secure attachment; help seeking behaviors when distressed; effective problem solving skills; commitment to well being; positive social skills; healthy fear of risky behaviors or pain; financial stability; strong sense of self worth or esteem; sense of personal control or determination.     Risk  Plan:  See Recommendations for Safety and Risk Management Plan    Review of Symptoms per patient report:   Depression: No symptoms  Angie:  No Symptoms  Psychosis: No Symptoms  Anxiety: No Symptoms  Panic:  No symptoms  Post Traumatic Stress Disorder:  No Symptoms   Eating Disorder: No Symptoms  ADD / ADHD:  No symptoms  Conduct Disorder: No symptoms  Autism Spectrum Disorder: No symptoms  Obsessive Compulsive Disorder: No Symptoms    Patient reports the following compulsive behaviors and treatment history: None.      Diagnostic Criteria:   Unspecified Trauma- and Stressor-Related Disorder, Symptoms characteristic of a trauma and stressor related disorder that cause clinically significant distress or impairment in social, occupational, or other important areas of functioning predominate but do not meet the full criteria for any of the disorders in the trauma and stressor related disorders diagnostic class.     Functional Status:  Patient reports the following functional impairments:  health maintenance.     Nonprogrammatic care:  Patient is requesting basic services to address current mental health concerns.    Clinical Summary:  1. Reason for assessment: Routine exam for transplant.  2. Psychosocial, Cultural and Contextual Factors: Hx of previous kidney transplant, 2014.  3. 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  4. Other Diagnoses that is relevant to services: None  5. Provisional Diagnosis: None; possible adjustment distress, though symptoms are minimal.  6. Prognosis: Maintain Current Status / Prevent Deterioration.  7. Likely consequences of symptoms if not treated: Risk is low; possible deterioration of mood as transplant progresses.  8. Client strengths include:  caring, educated, empathetic, employed, goal-focused, insightful, intelligent, motivated, open to learning, open to suggestions / feedback, support of  family, friends and providers, supportive and work history .     Recommendations:     1. Plan for Safety and Risk Management:   Safety and Risk: Recommended that patient call 911 or go to the local ED should there be a change in any of these risk factors..          Report to child / adult protection services was NA.     2. Patient's identified no immediate cultural or diversity factors.     3. Initial Treatment will focus on:    Adjustment Difficulties related to: health concerns.     4. Resources/Service Plan:    services are not indicated.   Modifications to assist communication are not indicated.   Additional disability accommodations are not indicated.      5. Collaboration:   Collaboration / coordination of treatment will be initiated with the following  support professionals: SOT care team.      6.  Referrals:   The following referral(s) will be initiated: None. Next Scheduled Appointment: NA.      A Release of Information has been obtained for the following: None.     Emergency Contact was not obtained.      Clinical Substantiation/medical necessity for the above recommendations:  Patient undergoing second kidney transplant.    7. CAITLYN:    CAITLYN:  Discussed the general effects of drugs and alcohol on health and well-being. Provider gave patient printed information about the  effects of chemical use on their health and well being. Recommendations:  Encouraged patient's idea about abstinence from alcohol for health reasons.     8. Records:   These were reviewed at time of assessment.   Information in this assessment was obtained from the medical record and provided by patient who is a good historian.    Patient will have open access to their mental health medical record.    9.   Interactive Complexity: No      Provider Name/ Credentials:  Saad Monsalve LP  January 26, 2023

## 2023-01-26 NOTE — TELEPHONE ENCOUNTER
Pt returning call     Pt is currently on dialysis on M,W,F schedule.    She is concerned and states that there is no way she can consume that much liquid, she states not even a half gallon.    Pt is requesting suTabs - will message scoping provider.    Pt also has question if she can still go to her Dialysis appointment on Friday at 4pm.    Jaylyn Mane RN on 1/26/2023 at 12:04 PM

## 2023-01-26 NOTE — TELEPHONE ENCOUNTER
Calling pt back to inform her of message below, was unable to leave a message:    Yaakov Loving MD Faust, Jennifer L, RN; P [facility]  Cc: Rupert Dsouza PA-C  No.  There is no reason she can't do low volume prep.  It shouldn't affect her daily volume allowance as it is not absorbed.  She should try low volume prep that is on our protocol before we do anything off-protocol.     She can go to dialysis after her colonoscopy.     I am cc'ing Rupert in the GI clinic who ordered the procedure.       [place]           Previous Messages     ----- Message -----   From: Jaylyn Mane RN   Sent: 1/26/2023  12:11 PM CST   To: Yaakov Villafana MD, *   Subject: SuTab request                                     Hi there,     This Pt has Colonoscopy scheduled on 2/3/23 she is currently on dialysis. She states that there is no was she can consume that much liquid - not even a half gallon.     She is requesting suTabs? Is this an option that you can call in for the patient?     She is also wondering if she is OK to go to her dialysis appointment on Friday after her colonoscopy at 4pm? Would there be any indication that she can't go to this appointment.     Thank you,     Jaylyn Mane, RN on 1/26/2023 at 12:11 PM               Please educate pt that the volume of the prep does not count as her daily allowance for fluid, as all the Golytely prep goes straight into the bowels and will be eliminated into the toilet. It will not be absorbed into her superficial tissue.  The Golytely prep is the correct prep for pts with CKD - the suprep is not recommended as it is too hard on the kidneys.      Pt will still need to go over pre-assessment instructions.    Jaylyn Mane RN on 1/26/2023 at 1:43 PM

## 2023-01-26 NOTE — PATIENT INSTRUCTIONS
Contact Information:  [phone] for scheduling/rescheduling  [phone] for my desktop phone

## 2023-01-27 ENCOUNTER — TELEPHONE (OUTPATIENT)
Dept: TRANSPLANT | Facility: CLINIC | Age: 33
End: 2023-01-27
Payer: COMMERCIAL

## 2023-01-27 DIAGNOSIS — I15.1 HYPERTENSION SECONDARY TO OTHER RENAL DISORDERS: ICD-10-CM

## 2023-01-27 DIAGNOSIS — N18.6 ESRD (END STAGE RENAL DISEASE) ON DIALYSIS (H): ICD-10-CM

## 2023-01-27 DIAGNOSIS — Z99.2 ESRD (END STAGE RENAL DISEASE) ON DIALYSIS (H): ICD-10-CM

## 2023-01-27 RX ORDER — CARVEDILOL 25 MG/1
25 TABLET ORAL 2 TIMES DAILY WITH MEALS
Qty: 180 TABLET | Refills: 3 | Status: SHIPPED | OUTPATIENT
Start: 2023-01-27 | End: 2023-03-17

## 2023-01-27 RX ORDER — LOSARTAN POTASSIUM 25 MG/1
50 TABLET ORAL DAILY
Qty: 90 TABLET | Refills: 3 | Status: SHIPPED | OUTPATIENT
Start: 2023-01-27 | End: 2023-03-17

## 2023-01-27 RX ORDER — AMLODIPINE BESYLATE 5 MG/1
5 TABLET ORAL DAILY
Qty: 90 TABLET | Refills: 3 | Status: SHIPPED | OUTPATIENT
Start: 2023-01-27 | End: 2023-03-17

## 2023-01-27 NOTE — TELEPHONE ENCOUNTER
Second attempt for pre-assessment prior to upcoming colonoscopy     No answer.  Left message to return call [phone] #[phone]    Jaylyn Mane RN  Endoscopy Procedure Pre Assessment RN

## 2023-01-27 NOTE — TELEPHONE ENCOUNTER
Pre assessment questions completed for upcoming Colonoscopy  procedure scheduled on 2/3/23    COVID policy reviewed.     Pre-op scheduled  N/A    Reviewed procedural arrival time 1015, procedure time 1115 and facility location [facility]; [address], [address], [place] [address]    Designated  policy reviewed. Instructed to have someone stay 6 hours post procedure.     Reviewed procedure prep instructions.     Prep instructions sent via mParticle.      Patient verbalized understanding and had no questions or concerns at this time.    Jaylyn Mane RN  Endoscopy Procedure Pre Assessment RN

## 2023-01-27 NOTE — TELEPHONE ENCOUNTER
Called pt today to touch base, reached her VM LM I called. Instructed pt to call me or send me a My Chart message for any questions or concerns she has at this time.

## 2023-01-31 ENCOUNTER — APPOINTMENT (OUTPATIENT)
Dept: URBAN - METROPOLITAN AREA CLINIC 258 | Age: 33
Setting detail: DERMATOLOGY
End: 2023-01-31

## 2023-01-31 DIAGNOSIS — B07.8 OTHER VIRAL WARTS: ICD-10-CM

## 2023-01-31 PROCEDURE — OTHER COUNSELING: OTHER

## 2023-01-31 PROCEDURE — OTHER MIPS QUALITY: OTHER

## 2023-01-31 PROCEDURE — 17111 DESTRUCT LESION 15 OR MORE: CPT

## 2023-01-31 PROCEDURE — OTHER BENIGN DESTRUCTION: OTHER

## 2023-01-31 ASSESSMENT — LOCATION DETAILED DESCRIPTION DERM
LOCATION DETAILED: RIGHT LATERAL PLANTAR 1ST TOE
LOCATION DETAILED: RIGHT MEDIAL PLANTAR 1ST TOE
LOCATION DETAILED: RIGHT MEDIAL PLANTAR 2ND TOE
LOCATION DETAILED: RIGHT PLANTAR FOREFOOT OVERLYING 2ND METATARSAL
LOCATION DETAILED: LEFT LATERAL PLANTAR 2ND TOE
LOCATION DETAILED: LEFT DISTAL PLANTAR 3RD TOE
LOCATION DETAILED: LEFT PLANTAR FOREFOOT OVERLYING 2ND METATARSAL
LOCATION DETAILED: RIGHT PLANTAR FOREFOOT OVERLYING 3RD METATARSAL
LOCATION DETAILED: RIGHT PLANTAR FOREFOOT OVERLYING 5TH METATARSAL
LOCATION DETAILED: TIP OF RIGHT 2ND TOE
LOCATION DETAILED: LEFT PLANTAR FOREFOOT OVERLYING 4TH METATARSAL
LOCATION DETAILED: LEFT MEDIAL PLANTAR 2ND TOE

## 2023-01-31 ASSESSMENT — LOCATION SIMPLE DESCRIPTION DERM
LOCATION SIMPLE: PLANTAR SURFACE OF RIGHT 2ND TOE
LOCATION SIMPLE: LEFT 3RD TOE
LOCATION SIMPLE: PLANTAR SURFACE OF LEFT 2ND TOE
LOCATION SIMPLE: PLANTAR SURFACE OF RIGHT 1ST TOE
LOCATION SIMPLE: RIGHT PLANTAR SURFACE
LOCATION SIMPLE: LEFT PLANTAR SURFACE

## 2023-01-31 ASSESSMENT — LOCATION ZONE DERM
LOCATION ZONE: FEET
LOCATION ZONE: TOE

## 2023-01-31 NOTE — PROCEDURE: BENIGN DESTRUCTION
Add 52 Modifier (Optional): no
Consent: The patient's consent was obtained including but not limited to risks of crusting, scabbing, blistering, scarring, darker or lighter pigmentary change, recurrence, incomplete removal and infection.
Detail Level: Detailed
Medical Necessity Information: It is in your best interest to select a reason for this procedure from the list below. All of these items fulfill various CMS LCD requirements except the new and changing color options.
Medical Necessity Clause: This procedure was medically necessary because the lesions that were treated were:
Post-Care Instructions: I reviewed with the patient in detail post-care instructions. Patient is to wear sunprotection, and avoid picking at any of the treated lesions. Pt may apply Vaseline to crusted or scabbing areas.
Treatment Number (Will Not Render If 0): 0
Anesthesia Volume In Cc: 0.5

## 2023-02-01 DIAGNOSIS — N18.6 ESRD (END STAGE RENAL DISEASE) (H): Primary | ICD-10-CM

## 2023-02-03 ENCOUNTER — HOSPITAL ENCOUNTER (OUTPATIENT)
Facility: CLINIC | Age: 33
Discharge: HOME | End: 2023-02-03
Attending: INTERNAL MEDICINE | Admitting: INTERNAL MEDICINE
Payer: COMMERCIAL

## 2023-02-03 VITALS
DIASTOLIC BLOOD PRESSURE: 76 MMHG | HEIGHT: 63 IN | SYSTOLIC BLOOD PRESSURE: 114 MMHG | RESPIRATION RATE: 16 BRPM | HEART RATE: 60 BPM | OXYGEN SATURATION: 100 % | BODY MASS INDEX: 21.88 KG/M2 | WEIGHT: 123.46 LBS

## 2023-02-03 LAB — COLONOSCOPY: NORMAL

## 2023-02-03 PROCEDURE — 250N000011 HC RX IP 250 OP 636: Performed by: INTERNAL MEDICINE

## 2023-02-03 PROCEDURE — 88305 TISSUE EXAM BY PATHOLOGIST: CPT | Mod: 26 | Performed by: PATHOLOGY

## 2023-02-03 PROCEDURE — 45380 COLONOSCOPY AND BIOPSY: CPT | Performed by: INTERNAL MEDICINE

## 2023-02-03 PROCEDURE — 99153 MOD SED SAME PHYS/QHP EA: CPT | Performed by: INTERNAL MEDICINE

## 2023-02-03 PROCEDURE — 88305 TISSUE EXAM BY PATHOLOGIST: CPT | Mod: TC | Performed by: INTERNAL MEDICINE

## 2023-02-03 PROCEDURE — G0500 MOD SEDAT ENDO SERVICE >5YRS: HCPCS | Performed by: INTERNAL MEDICINE

## 2023-02-03 RX ORDER — DIPHENHYDRAMINE HYDROCHLORIDE 50 MG/ML
INJECTION INTRAMUSCULAR; INTRAVENOUS PRN
Status: DISCONTINUED | OUTPATIENT
Start: 2023-02-03 | End: 2023-02-03 | Stop reason: HOSPADM

## 2023-02-03 RX ORDER — LIDOCAINE 40 MG/G
CREAM TOPICAL
Status: DISCONTINUED | OUTPATIENT
Start: 2023-02-03 | End: 2023-02-03 | Stop reason: HOSPADM

## 2023-02-03 RX ORDER — FENTANYL CITRATE 50 UG/ML
INJECTION, SOLUTION INTRAMUSCULAR; INTRAVENOUS PRN
Status: DISCONTINUED | OUTPATIENT
Start: 2023-02-03 | End: 2023-02-03 | Stop reason: HOSPADM

## 2023-02-03 RX ORDER — ONDANSETRON 2 MG/ML
4 INJECTION INTRAMUSCULAR; INTRAVENOUS
Status: DISCONTINUED | OUTPATIENT
Start: 2023-02-03 | End: 2023-02-03 | Stop reason: HOSPADM

## 2023-02-03 ASSESSMENT — ACTIVITIES OF DAILY LIVING (ADL): ADLS_ACUITY_SCORE: 35

## 2023-02-03 NOTE — H&P
Caity Horan  [id number]  female  32 year old      Reason for procedure/surgery: UC, r/o dysplasia    Patient Active Problem List   Diagnosis     Metabolic acidosis     IgA nephropathy     Kidney replaced by transplant     Immunosuppressed status (H)     Hypomagnesemia     Aftercare following organ transplant     EBV (Nicole-Barr virus) viremia     Anemia, iron deficiency     Vitamin B12 deficiency     Ulcerative pancolitis with rectal bleeding (H)     Acute rejection of kidney transplant     Ulcerative colitis (H)     Vitamin D deficiency     Dialysis patient (H)       Past Surgical History:    Past Surgical History:   Procedure Laterality Date     BIOPSY  2021    renal     COLONOSCOPY N/A 03/12/2018    Procedure: COMBINED COLONOSCOPY, SINGLE OR MULTIPLE BIOPSY/POLYPECTOMY BY BIOPSY;  EGD/Colonoscopy ;  Surgeon: Kory Massey MD;  Location: [facility]     COLONOSCOPY N/A 09/10/2018    Procedure: COMBINED COLONOSCOPY, SINGLE OR MULTIPLE BIOPSY/POLYPECTOMY BY BIOPSY;  Colonoscopy;  Surgeon: Yenifer Doan MD;  [facility]     EXTRACTION(S) DENTAL       IR CVC TUNNEL PLACEMENT > 5 YRS OF AGE  12/22/2022     PERCUTANEOUS BIOPSY KIDNEY Right 12/19/2018    Procedure: Right Kidney Biopsy;  Surgeon: Otilio Mar MD;  [facility]     TRANSPLANT KIDNEY RECIPIENT LIVING RELATED N/A 12/05/2014    Procedure: TRANSPLANT KIDNEY RECIPIENT LIVING RELATED;  Surgeon: Dale Middleton MD;  Location: [facility] OR     WISDOM TOOTH EXTRACTION Bilateral        Past Medical History:   Past Medical History:   Diagnosis Date     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     Anemia in stage 5 chronic kidney disease (H) 10/27/2014     ESRD (end stage renal disease) on dialysis (H)      HTN (hypertension) 10/27/2014     Hypertension 10/2014     IgA nephropathy     biopsy proven     Metabolic acidosis      Ulcerative pancolitis (H) 2012     Vitamin D deficiency        Social History:   Social History     Tobacco Use      "Smoking status: Never     Smokeless tobacco: Never   Substance Use Topics     Alcohol use: Not Currently     Alcohol/week: 3.0 - 9.0 standard drinks     Types: 1 - 3 Glasses of wine, 1 - 3 Cans of beer, 1 - 3 Shots of liquor per week       Family History:   Family History   Problem Relation Age of Onset     No Known Problems Mother      Alcoholism Father          in early 50s     Prostate Cancer Paternal Grandmother      Kidney Disease No family hx of        Allergies:   Allergies   Allergen Reactions     Bumetanide Other (See Comments)     Causes paralysis     Amoxicillin Rash       Active Medications:   No current outpatient medications on file.       Systemic Review:   CONSTITUTIONAL: NEGATIVE for fever, chills, change in weight  ENT/MOUTH: NEGATIVE for ear, mouth and throat problems  RESP: NEGATIVE for significant cough or SOB  CV: NEGATIVE for chest pain, palpitations or peripheral edema    Physical Examination:   Vital Signs: /87   Pulse 70   Resp 16   Ht 1.6 m (5' 3\")   Wt 56 kg (123 lb 7.3 oz)   SpO2 100%   BMI 21.87 kg/m    GENERAL: healthy, alert and no distress  NECK: no adenopathy, no asymmetry, masses, or scars  RESP: lungs clear to auscultation - no rales, rhonchi or wheezes  CV: regular rate and rhythm, normal S1 S2, no S3 or S4, no murmur, click or rub, no peripheral edema and peripheral pulses strong  ABDOMEN: soft, nontender, no hepatosplenomegaly, no masses and bowel sounds normal  MS: no gross musculoskeletal defects noted, no edema    Plan: Appropriate to proceed as scheduled.      Yaakov Huddleston MD  2/3/2023    PCP:  No Ref-Primary, Physician    "

## 2023-02-03 NOTE — OR NURSING
Colonoscopy with biopsies throughout and pt tolerated well with additional sedation and on 2L nc oxygen.

## 2023-02-03 NOTE — LETTER
February 6, 2023      Caity Horan  [address]   [place] [place] [address]        Dear ,    The pathology results returned from the biopsies taken at your recent colonoscopy.    The colon (large bowel) biopsies showed mild inflammation in the first half of your colon.  There was no evidence of cancer or precancerous changes.    I will send a copy of this letter to your GI team, who will advise you if any changes need to be made to your medications.    I will also contact the kidney transplant evaluation team to let them know that you can move forward.    Sincerely,               Yaakov Huddleston MD   [facility] [place], ENDOSCOPY  [address] [facility] [address]  Phone: [phone]

## 2023-02-06 ENCOUNTER — LAB (OUTPATIENT)
Dept: LAB | Facility: CLINIC | Age: 33
End: 2023-02-06
Payer: COMMERCIAL

## 2023-02-06 DIAGNOSIS — N18.6 ESRD (END STAGE RENAL DISEASE) (H): ICD-10-CM

## 2023-02-06 LAB
PATH REPORT.COMMENTS IMP SPEC: NORMAL
PATH REPORT.COMMENTS IMP SPEC: NORMAL
PATH REPORT.FINAL DX SPEC: NORMAL
PATH REPORT.GROSS SPEC: NORMAL
PATH REPORT.MICROSCOPIC SPEC OTHER STN: NORMAL
PATH REPORT.RELEVANT HX SPEC: NORMAL
PHOTO IMAGE: NORMAL

## 2023-02-06 PROCEDURE — 999N001093 HLA VIRTUAL CROSSMATCH (VXM), LIVING DONOR

## 2023-02-07 ENCOUNTER — DOCUMENTATION ONLY (OUTPATIENT)
Dept: TRANSPLANT | Facility: CLINIC | Age: 33
End: 2023-02-07
Payer: COMMERCIAL

## 2023-02-07 NOTE — PROGRESS NOTES
Received notification of failed kidney graft from [facility].  Fail is indicated by resumption of dialysis.  Date of organ failure was reported as 12/22/2022.  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  TIS verfication is complete.

## 2023-02-09 ENCOUNTER — TELEPHONE (OUTPATIENT)
Dept: TRANSPLANT | Facility: CLINIC | Age: 33
End: 2023-02-09
Payer: COMMERCIAL

## 2023-02-09 NOTE — LETTER
23        Caity Horan  [address]  [address]  [place] [address]        Dear Caity,    Your pre-transplant evaluation results were reviewed at our Multidisciplinary Selection Committee on 2023. The Committee has approved you as a candidate for kidney transplant at this time. You can proceed with a live donor kidney transplant as soon as you have an approved live donor.  You will also be added onto the  donor kidney transplant waitlist per routine process.     For any questions, please contact myself at the Transplant Office Main Number at [phone] or at my Direct Number at [phone].      Sincerely,  Alana Lopez, RN, BSN  Pre Kidney Pancreas Transplant Coordinator   [facility]  Solid Organ Transplant Care   [address] [place] [address]  [email or web address]   Office Number: [phone] Direct Number: [phone]   Fax Number: [phone]  Employed by [facility] [employer]     CC's: Dr. Tena Cardnoa,

## 2023-02-09 NOTE — TELEPHONE ENCOUNTER
Called patient today and reached her VMALEXEY that her status was reviewed at yesterday's Selection Committee and that she is now approved to proceed with a kidney transplant. Instructed her to call me back to discuss further. Needs to update PRA now because she received blood transfusions in December. Will confirm her dental work is done.     Generated Transplant Evaluation Summary Letter today in Epic - electronically sent to pt/providers.

## 2023-02-10 ENCOUNTER — APPOINTMENT (OUTPATIENT)
Dept: URBAN - METROPOLITAN AREA CLINIC 258 | Age: 33
Setting detail: DERMATOLOGY
End: 2023-02-10

## 2023-02-10 ENCOUNTER — DOCUMENTATION ONLY (OUTPATIENT)
Dept: TRANSPLANT | Facility: CLINIC | Age: 33
End: 2023-02-10
Payer: COMMERCIAL

## 2023-02-10 DIAGNOSIS — B07.8 OTHER VIRAL WARTS: ICD-10-CM

## 2023-02-10 PROCEDURE — OTHER MIPS QUALITY: OTHER

## 2023-02-10 PROCEDURE — 17111 DESTRUCT LESION 15 OR MORE: CPT | Mod: 79

## 2023-02-10 PROCEDURE — OTHER BENIGN DESTRUCTION: OTHER

## 2023-02-10 PROCEDURE — OTHER COUNSELING: OTHER

## 2023-02-10 ASSESSMENT — LOCATION DETAILED DESCRIPTION DERM
LOCATION DETAILED: LEFT DISTAL PLANTAR 3RD TOE
LOCATION DETAILED: RIGHT PLANTAR FOREFOOT OVERLYING 2ND METATARSAL
LOCATION DETAILED: RIGHT PLANTAR FOREFOOT OVERLYING 5TH METATARSAL
LOCATION DETAILED: RIGHT MEDIAL PLANTAR 1ST TOE
LOCATION DETAILED: TIP OF RIGHT 2ND TOE
LOCATION DETAILED: RIGHT LATERAL PLANTAR 1ST TOE
LOCATION DETAILED: LEFT MEDIAL PLANTAR 2ND TOE
LOCATION DETAILED: RIGHT PLANTAR FOREFOOT OVERLYING 3RD METATARSAL
LOCATION DETAILED: LEFT PLANTAR FOREFOOT OVERLYING 4TH METATARSAL
LOCATION DETAILED: LEFT PLANTAR FOREFOOT OVERLYING 2ND METATARSAL
LOCATION DETAILED: RIGHT MEDIAL PLANTAR 2ND TOE
LOCATION DETAILED: LEFT LATERAL PLANTAR 2ND TOE

## 2023-02-10 ASSESSMENT — LOCATION SIMPLE DESCRIPTION DERM
LOCATION SIMPLE: PLANTAR SURFACE OF LEFT 2ND TOE
LOCATION SIMPLE: LEFT 3RD TOE
LOCATION SIMPLE: PLANTAR SURFACE OF RIGHT 1ST TOE
LOCATION SIMPLE: PLANTAR SURFACE OF RIGHT 2ND TOE
LOCATION SIMPLE: LEFT PLANTAR SURFACE
LOCATION SIMPLE: RIGHT PLANTAR SURFACE

## 2023-02-10 ASSESSMENT — LOCATION ZONE DERM
LOCATION ZONE: FEET
LOCATION ZONE: TOE

## 2023-02-10 NOTE — PROGRESS NOTES
RD completing chart review as part of brief nutrition related kidney txp evaluation, as RD was out ill when pt was in clinic, appt had not yet been rescheduled, yet pt has an approved live donor.     Fried Frailty test was not completed during clinic visit 12/5/22.     H/o kidney txp in 2014. She has been on HD since 12/2022.   Labs from 1/25/23 K 4.6, h/o wnl lab values  Labs from 12/21/22 Phos 6.8, no other phos levels to compare.     Wt Readings from Last 10 Encounters:   02/03/23 56 kg (123 lb 7.3 oz)   01/25/23 58.2 kg (128 lb 4.8 oz)   12/25/22 67.1 kg (147 lb 14.9 oz)   12/05/22 65.5 kg (144 lb 4.8 oz)   11/14/22 63 kg (139 lb)   12/11/19 59.4 kg (130 lb 14.4 oz)   01/10/19 63.2 kg (139 lb 6.4 oz)   11/15/18 61.6 kg (135 lb 12.8 oz)   11/02/18 59.9 kg (132 lb)   10/08/18 60.2 kg (132 lb 11.2 oz)   Called dialysis unit x 2 to get updates from dialysis RD; no answer & voicemail was full.

## 2023-02-10 NOTE — PROCEDURE: BENIGN DESTRUCTION
Render Note In Bullet Format When Appropriate: No
Treatment Number (Will Not Render If 0): 0
Detail Level: Detailed
Anesthesia Volume In Cc: 0.5
Medical Necessity Clause: This procedure was medically necessary because the lesions that were treated were:
Medical Necessity Information: It is in your best interest to select a reason for this procedure from the list below. All of these items fulfill various CMS LCD requirements except the new and changing color options.
Post-Care Instructions: I reviewed with the patient in detail post-care instructions. Patient is to wear sunprotection, and avoid picking at any of the treated lesions. Pt may apply Vaseline to crusted or scabbing areas.
Consent: The patient's consent was obtained including but not limited to risks of crusting, scabbing, blistering, scarring, darker or lighter pigmentary change, recurrence, incomplete removal and infection.

## 2023-02-13 ENCOUNTER — TELEPHONE (OUTPATIENT)
Dept: TRANSPLANT | Facility: CLINIC | Age: 33
End: 2023-02-13
Payer: COMMERCIAL

## 2023-02-13 ENCOUNTER — LAB (OUTPATIENT)
Dept: LAB | Facility: CLINIC | Age: 33
End: 2023-02-13
Payer: COMMERCIAL

## 2023-02-13 DIAGNOSIS — Z76.82 AWAITING ORGAN TRANSPLANT: ICD-10-CM

## 2023-02-13 DIAGNOSIS — Z76.82 AWAITING ORGAN TRANSPLANT: Primary | ICD-10-CM

## 2023-02-13 PROCEDURE — 86832 HLA CLASS I HIGH DEFIN QUAL: CPT | Performed by: TRANSPLANT SURGERY

## 2023-02-13 PROCEDURE — 86833 HLA CLASS II HIGH DEFIN QUAL: CPT | Performed by: TRANSPLANT SURGERY

## 2023-02-13 NOTE — TELEPHONE ENCOUNTER
Generated PRA order today and faxed to dialysis. Called dialysis and asked them to draw at next opportunity.  Also, asked dialysis to fax over 4774 form.     Called pt today, LM I have asked dialysis to draw new PRA sample now and to fax [phone] form here. Explained once I have 0046 form I will proceed to list her. Explained will call her again in a day or 2 when I am listing her. Instructed to call me with questions or concern.

## 2023-02-13 NOTE — LETTER
PHYSICIAN ORDER   ALA/PRA BLOOD    DATE & TIME ISSUED: 2023 11:49 AM  PATIENT NAME: Caity Horan   : 1990     [facility] MR#  [id number]     DIAGNOSIS/ICD-10 CODE: Awaiting Organ transplant [Z76.82}   EXPIRES: (1 YEAR AFTER DATE ISSUED)  EVERY 12 weeks / 3 months   1. Please draw 20ml of blood in red top (plain) tube for Antileukocyte Antibody (ALA or PRA).   2. Label tubes with the patient s name, complete lab slip.         3. Mailers, lab slips with instructions are sent to patient separately.      4. Call the Outreach Lab at [phone] to reorder mailers.       5. Mail blood to (this address is also on the mailers):    IMMUNOLOGY LABORATORY   [facility]   Room [phone] [address]  [address]        Kai Romero MD  Surgical Director, Kidney Transplantation                                                                                                         LLE Cellulitis

## 2023-02-19 LAB
SA 1 CELL: NORMAL
SA 1 TEST METHOD: NORMAL
SA 2 CELL: NORMAL
SA 2 TEST METHOD: NORMAL
SA1 HI RISK ABY: NORMAL
SA1 MOD RISK ABY: NORMAL
SA2 HI RISK ABY: NORMAL
SA2 MOD RISK ABY: NORMAL
UNACCEPTABLE ANTIGENS: NORMAL
UNOS CPRA: 84
ZZZSA 1  COMMENTS: NORMAL
ZZZSA 2 COMMENTS: NORMAL

## 2023-02-21 ENCOUNTER — MYC MEDICAL ADVICE (OUTPATIENT)
Dept: GASTROENTEROLOGY | Facility: CLINIC | Age: 33
End: 2023-02-21
Payer: COMMERCIAL

## 2023-02-21 ENCOUNTER — APPOINTMENT (OUTPATIENT)
Dept: URBAN - METROPOLITAN AREA CLINIC 255 | Age: 33
Setting detail: DERMATOLOGY
End: 2023-02-27

## 2023-02-21 DIAGNOSIS — D84.9 IMMUNODEFICIENCY, UNSPECIFIED: ICD-10-CM

## 2023-02-21 DIAGNOSIS — Z94.0 STATUS POST KIDNEY TRANSPLANT: ICD-10-CM

## 2023-02-21 DIAGNOSIS — E83.42 HYPOMAGNESEMIA: ICD-10-CM

## 2023-02-21 DIAGNOSIS — E53.8 B12 DEFICIENCY DUE TO DIET: ICD-10-CM

## 2023-02-21 DIAGNOSIS — B07.0 PLANTAR WART: ICD-10-CM

## 2023-02-21 DIAGNOSIS — Z94.0 KIDNEY REPLACED BY TRANSPLANT: ICD-10-CM

## 2023-02-21 DIAGNOSIS — Z94.0 KIDNEY TRANSPLANTED: ICD-10-CM

## 2023-02-21 DIAGNOSIS — E55.9 HYPOVITAMINOSIS D: ICD-10-CM

## 2023-02-21 PROCEDURE — OTHER COUNSELING: OTHER

## 2023-02-21 PROCEDURE — OTHER MIPS QUALITY: OTHER

## 2023-02-21 PROCEDURE — OTHER DEFER: OTHER

## 2023-02-21 PROCEDURE — 99214 OFFICE O/P EST MOD 30 MIN: CPT

## 2023-02-21 ASSESSMENT — LOCATION DETAILED DESCRIPTION DERM
LOCATION DETAILED: LEFT MEDIAL PLANTAR 2ND TOE
LOCATION DETAILED: LEFT LATERAL PLANTAR 1ST TOE
LOCATION DETAILED: TIP OF RIGHT 2ND TOE
LOCATION DETAILED: LEFT PLANTAR FOREFOOT OVERLYING 2ND METATARSAL
LOCATION DETAILED: RIGHT PLANTAR FOREFOOT OVERLYING 5TH METATARSAL
LOCATION DETAILED: TIP OF LEFT 3RD TOE
LOCATION DETAILED: LEFT LATERAL PLANTAR MIDFOOT
LOCATION DETAILED: LEFT PLANTAR FOREFOOT OVERLYING 3RD METATARSAL
LOCATION DETAILED: LEFT PLANTAR FOREFOOT OVERLYING 4TH METATARSAL
LOCATION DETAILED: RIGHT PLANTAR FOREFOOT OVERLYING 1ST METATARSAL

## 2023-02-21 ASSESSMENT — LOCATION SIMPLE DESCRIPTION DERM
LOCATION SIMPLE: RIGHT PLANTAR SURFACE
LOCATION SIMPLE: PLANTAR SURFACE OF LEFT 2ND TOE
LOCATION SIMPLE: PLANTAR SURFACE OF RIGHT 2ND TOE
LOCATION SIMPLE: LEFT PLANTAR SURFACE
LOCATION SIMPLE: PLANTAR SURFACE OF LEFT 1ST TOE
LOCATION SIMPLE: PLANTAR SURFACE OF LEFT 3RD TOE

## 2023-02-21 ASSESSMENT — LOCATION ZONE DERM
LOCATION ZONE: FEET
LOCATION ZONE: TOE

## 2023-02-21 NOTE — PROCEDURE: DEFER
Detail Level: Simple
Other Procedure: Bleomycin
Introduction Text (Please End With A Colon): The following procedure was deferred:
X Size Of Lesion In Cm (Optional): 0

## 2023-02-23 RX ORDER — CHOLECALCIFEROL (VITAMIN D3) 50 MCG
50 TABLET ORAL DAILY
Qty: 100 TABLET | Refills: 3 | Status: ON HOLD | OUTPATIENT
Start: 2023-02-23 | End: 2023-04-20

## 2023-03-03 ENCOUNTER — APPOINTMENT (OUTPATIENT)
Dept: URBAN - METROPOLITAN AREA CLINIC 258 | Age: 33
Setting detail: DERMATOLOGY
End: 2023-03-03

## 2023-03-03 VITALS — HEIGHT: 63 IN | WEIGHT: 122 LBS

## 2023-03-03 DIAGNOSIS — B07.8 OTHER VIRAL WARTS: ICD-10-CM

## 2023-03-03 PROCEDURE — OTHER BENIGN DESTRUCTION: OTHER

## 2023-03-03 PROCEDURE — 17110 DESTRUCT B9 LESION 1-14: CPT

## 2023-03-03 PROCEDURE — OTHER COUNSELING: OTHER

## 2023-03-03 PROCEDURE — OTHER MIPS QUALITY: OTHER

## 2023-03-03 ASSESSMENT — LOCATION DETAILED DESCRIPTION DERM
LOCATION DETAILED: LEFT PLANTAR FOREFOOT OVERLYING 2ND METATARSAL
LOCATION DETAILED: LEFT PLANTAR FOREFOOT OVERLYING 4TH METATARSAL
LOCATION DETAILED: TIP OF RIGHT 2ND TOE
LOCATION DETAILED: LEFT LATERAL PLANTAR 2ND TOE
LOCATION DETAILED: LEFT MEDIAL PLANTAR 2ND TOE

## 2023-03-03 ASSESSMENT — LOCATION SIMPLE DESCRIPTION DERM
LOCATION SIMPLE: PLANTAR SURFACE OF LEFT 2ND TOE
LOCATION SIMPLE: PLANTAR SURFACE OF RIGHT 2ND TOE
LOCATION SIMPLE: LEFT PLANTAR SURFACE

## 2023-03-03 ASSESSMENT — LOCATION ZONE DERM
LOCATION ZONE: FEET
LOCATION ZONE: TOE

## 2023-03-03 NOTE — PROCEDURE: BENIGN DESTRUCTION
Render Note In Bullet Format When Appropriate: No
Consent: The patient's consent was obtained including but not limited to risks of crusting, scabbing, blistering, scarring, darker or lighter pigmentary change, recurrence, incomplete removal and infection.
Post-Care Instructions: I reviewed with the patient in detail post-care instructions. Patient is to wear sunprotection, and avoid picking at any of the treated lesions. Pt may apply Vaseline to crusted or scabbing areas.
Duration Of Freeze Thaw-Cycle (Seconds): 5-10
Detail Level: Detailed
Anesthesia Volume In Cc: 0.5
Treatment Number (Will Not Render If 0): 0
Medical Necessity Clause: This procedure was medically necessary because the lesions that were treated were:
Medical Necessity Information: It is in your best interest to select a reason for this procedure from the list below. All of these items fulfill various CMS LCD requirements except the new and changing color options.
Number Of Freeze-Thaw Cycles: 3 freeze-thaw cycles

## 2023-03-07 ENCOUNTER — VIRTUAL VISIT (OUTPATIENT)
Dept: BEHAVIORAL HEALTH | Facility: CLINIC | Age: 33
End: 2023-03-07
Payer: COMMERCIAL

## 2023-03-07 DIAGNOSIS — F43.20 ADJUSTMENT DISORDER, UNSPECIFIED TYPE: Primary | ICD-10-CM

## 2023-03-07 PROCEDURE — 90832 PSYTX W PT 30 MINUTES: CPT | Mod: VID | Performed by: PSYCHOLOGIST

## 2023-03-07 NOTE — LETTER
3/7/2023       RE: Caity Horan  [address]  [address]  [place] [address]     Dear Colleague,    Thank you for referring your patient, Caity Horan, to the [facility] MENTAL HEALTH & ADDICTION SERVICES at [facility]. Please see a copy of my visit note below.    [facility]: Integrated Behavioral Health  March 7, 2023      Behavioral Health Clinician Progress Note    Patient Name: Caity Horan           Service Type: Individual      Service Location:  Virtual Visit      Session Start Time: 10:00  Session End Time: 10:25      Session Length: 16 - 37      Attendees: Patient    Visit Activities (Refresh list every visit): [facility] Only    Service Modality:  Video Visit:      Provider verified identity through the following two step process.  Patient provided:  Patient photo and Patient is known previously to provider    Telemedicine Visit: The patient's condition can be safely assessed and treated via synchronous audio and visual telemedicine encounter.      Reason for Telemedicine Visit: Patient convenience (e.g. access to timely appointments / distance to available provider)    Originating Site (Patient Location): Patient's home    Distant Site (Provider Location): Provider Remote Setting- Home Office    Consent:  The patient/guardian has verbally consented to: the potential risks and benefits of telemedicine (video visit) versus in person care; bill my insurance or make self-payment for services provided; and responsibility for payment of non-covered services.     Patient would like the video invitation sent by:  My Chart    Mode of Communication:  Video Conference via [facility]    Distant Location (Provider):  Off-site    As the provider I attest to compliance with applicable laws and regulations related to telemedicine.      Diagnostic Assessment Date: 1/26/2023  Treatment Plan Review Date: IP; making  goals  See Flowsheets for today's PHQ-9 and KINA-7 results  Previous PHQ-9: No flowsheet data found.  Previous KINA-7: No flowsheet data found.    CHRISTELLE LEVEL:  CHRISTELLE Score (Last Two) 11/28/2022 11/28/2022   CHRISTELLE Raw Score 40 40   Activation Score 100 100   CHRISTELLE Level 4 4       DATA  Extended Session (60+ minutes): No  Interactive Complexity: No  Crisis: No    Treatment Objective(s) Addressed in This Session:  Target Behavior(s): disease management/lifestyle changes      Adjustment Difficulties: will develop coping/problem-solving skills to facilitate more adaptive adjustment    Current Stressors / Issues:  [facility] met briefly with Caity today, mainly to check-in and continue maintenance therapy for adjustment-related concerns pertaining to transplant. Caity states her mood is upbeat and stable. She denies changes to mental health concerns since our last visit. She reports matters with transplant are going well; she is taking care of the needed appointments and is feeling optimistic about two live donors being evaluated further tomorrow. She spoke to the high level of social support she receives from family and friends, citing how she had over twenty people step up to offer to donate. She expressed some concern about how seemingly slow it seemed to get live donors evaluated, though we agreed it is rare for a transplant candidate to have such a volume of willing donors to screen through. Continued to provide supportive therapy and psychoeducation about the transplant process over the course of our session today. We agreed to keep our care plan the same for now, meeting once every six weeks for supportive maintenance therapy.       Progress on Treatment Objective(s) / Homework:  Stable - MAINTENANCE (Working to maintain change, with risk of relapse); Intervened by continuing to positively reinforce healthy behavior choice     Motivational Interviewing    MI Intervention: Expressed Empathy/Understanding, Supported Autonomy,  Collaboration, Evocation, Open-ended questions, Reflections: simple and complex and Reframe     Change Talk Expressed by the Patient: Committment to change Activation    Provider Response to Change Talk: E - Evoked more info from patient about behavior change, A - Affirmed patient's thoughts, decisions, or attempts at behavior change, R - Reflected patient's change talk and S - Summarized patient's change talk statements    Also provided psychoeducation about behavioral health condition, symptoms, and treatment options    Care Plan review completed: No    Medication Review:  No current psychiatric medications prescribed    Medication Compliance:  NA    Changes in Health Issues:   None reported    Chemical Use Review:   Substance Use: Chemical use reviewed, no active concerns identified      Tobacco Use: No current tobacco use.      Assessment: Current Emotional / Mental Status (status of significant symptoms):  Risk status (Self / Other harm or suicidal ideation)  Patient denies a history of suicidal ideation, suicide attempts, self-injurious behavior, homicidal ideation, homicidal behavior and and other safety concerns  Patient denies current fears or concerns for personal safety.  Patient denies current or recent suicidal ideation or behaviors.  Patient denies current or recent homicidal ideation or behaviors.  Patient denies current or recent self injurious behavior or ideation.  Patient denies other safety concerns.  A safety and risk management plan has not been developed at this time, however patient was encouraged to call [place] [id number] should there be a change in any of these risk factors.     [place] Suicide Severity Rating Scale (Lifetime/Recent)  [place] Suicide Severity Rating (Lifetime/Recent) 12/19/2018   Q1 Wished to be Dead (Past Month) no   Q2 Suicidal Thoughts (Past Month) no   Q6 Suicide Behavior (Lifetime) no     [place] Suicide Severity Rating Scale (Short Version)  [place] Suicide  Severity Rating (Short Version) 12/19/2018 12/21/2022 2/3/2023   Over the past 2 weeks have you felt down, depressed, or hopeless? - no no   Over the past 2 weeks have you had thoughts of killing yourself? - no no   Have you ever attempted to kill yourself? - no no   Q1 Wished to be Dead (Past Month) no - -   Q2 Suicidal Thoughts (Past Month) no - -   Q6 Suicide Behavior (Lifetime) no - -       Appearance:   Appropriate   Eye Contact:   Good   Psychomotor Behavior: Normal   Attitude:   Cooperative   Orientation:   All  Speech   Rate / Production: Normal    Volume:  Normal   Mood:    Anxious   Affect:    Appropriate   Thought Content:  Clear   Thought Form:  Coherent  Logical   Insight:    Good     Diagnoses:  1. Adjustment disorder, unspecified type        Collateral Reports Completed:  Routed note to Care Team Member(s)    Plan: (Homework, other):  Patient was given information about behavioral services and encouraged to schedule a follow up appointment with the clinic [facility] as needed, six weeks.  She was also given information about mental health symptoms and treatment options .  CD Recommendations: No indications of CD issues.     Saad Monsalve Psy.D, LP   Behavioral Health Clinician   [facility]           Again, thank you for allowing me to participate in the care of your patient.      Sincerely,    Saad Monsalve

## 2023-03-07 NOTE — PROGRESS NOTES
[facility]: Integrated Behavioral Health  March 7, 2023      Behavioral Health Clinician Progress Note    Patient Name: Caity Horan           Service Type: Individual      Service Location:  Virtual Visit      Session Start Time: 10:00  Session End Time: 10:25      Session Length: 16 - 37      Attendees: Patient    Visit Activities (Refresh list every visit): [facility] Only    Service Modality:  Video Visit:      Provider verified identity through the following two step process.  Patient provided:  Patient photo and Patient is known previously to provider    Telemedicine Visit: The patient's condition can be safely assessed and treated via synchronous audio and visual telemedicine encounter.      Reason for Telemedicine Visit: Patient convenience (e.g. access to timely appointments / distance to available provider)    Originating Site (Patient Location): Patient's home    Distant Site (Provider Location): Provider Remote Setting- Home Office    Consent:  The patient/guardian has verbally consented to: the potential risks and benefits of telemedicine (video visit) versus in person care; bill my insurance or make self-payment for services provided; and responsibility for payment of non-covered services.     Patient would like the video invitation sent by:  My Chart    Mode of Communication:  [facility] Conference [facility]    Distant Location (Provider):  Off-site    As the provider I attest to compliance with applicable laws and regulations related to telemedicine.      Diagnostic Assessment Date: 1/26/2023  Treatment Plan Review Date: IP; making goals  See Flowsheets for today's PHQ-9 and KINA-7 results  Previous PHQ-9: No flowsheet data found.  Previous KINA-7: No flowsheet data found.    CHRISTELLE LEVEL:  CHRISTELLE Score (Last Two) 11/28/2022 11/28/2022   CHRISTELLE Raw Score 40 40   Activation Score 100 100   CHRISTELLE Level 4 4       DATA  Extended Session (60+ minutes): No  Interactive Complexity:  No  Crisis: No    Treatment Objective(s) Addressed in This Session:  Target Behavior(s): disease management/lifestyle changes      Adjustment Difficulties: will develop coping/problem-solving skills to facilitate more adaptive adjustment    Current Stressors / Issues:  [facility] met briefly with Caity today, mainly to check-in and continue maintenance therapy for adjustment-related concerns pertaining to transplant. Caity states her mood is upbeat and stable. She denies changes to mental health concerns since our last visit. She reports matters with transplant are going well; she is taking care of the needed appointments and is feeling optimistic about two live donors being evaluated further tomorrow. She spoke to the high level of social support she receives from family and friends, citing how she had over twenty people step up to offer to donate. She expressed some concern about how seemingly slow it seemed to get live donors evaluated, though we agreed it is rare for a transplant candidate to have such a volume of willing donors to screen through. Continued to provide supportive therapy and psychoeducation about the transplant process over the course of our session today. We agreed to keep our care plan the same for now, meeting once every six weeks for supportive maintenance therapy.       Progress on Treatment Objective(s) / Homework:  Stable - MAINTENANCE (Working to maintain change, with risk of relapse); Intervened by continuing to positively reinforce healthy behavior choice     Motivational Interviewing    MI Intervention: Expressed Empathy/Understanding, Supported Autonomy, Collaboration, Evocation, Open-ended questions, Reflections: simple and complex and Reframe     Change Talk Expressed by the Patient: Committment to change Activation    Provider Response to Change Talk: E - Evoked more info from patient about behavior change, A - Affirmed patient's thoughts, decisions, or attempts at behavior change, R -  Reflected patient's change talk and S - Summarized patient's change talk statements    Also provided psychoeducation about behavioral health condition, symptoms, and treatment options    Care Plan review completed: No    Medication Review:  No current psychiatric medications prescribed    Medication Compliance:  NA    Changes in Health Issues:   None reported    Chemical Use Review:   Substance Use: Chemical use reviewed, no active concerns identified      Tobacco Use: No current tobacco use.      Assessment: Current Emotional / Mental Status (status of significant symptoms):  Risk status (Self / Other harm or suicidal ideation)  Patient denies a history of suicidal ideation, suicide attempts, self-injurious behavior, homicidal ideation, homicidal behavior and and other safety concerns  Patient denies current fears or concerns for personal safety.  Patient denies current or recent suicidal ideation or behaviors.  Patient denies current or recent homicidal ideation or behaviors.  Patient denies current or recent self injurious behavior or ideation.  Patient denies other safety concerns.  A safety and risk management plan has not been developed at this time, however patient was encouraged to call [place] [phone] should there be a change in any of these risk factors.     Imperial Suicide Severity Rating Scale (Lifetime/Recent)  Imperial Suicide Severity Rating (Lifetime/Recent) 12/19/2018   Q1 Wished to be Dead (Past Month) no   Q2 Suicidal Thoughts (Past Month) no   Q6 Suicide Behavior (Lifetime) no     Imperial Suicide Severity Rating Scale (Short Version)  Imperial Suicide Severity Rating (Short Version) 12/19/2018 12/21/2022 2/3/2023   Over the past 2 weeks have you felt down, depressed, or hopeless? - no no   Over the past 2 weeks have you had thoughts of killing yourself? - no no   Have you ever attempted to kill yourself? - no no   Q1 Wished to be Dead (Past Month) no - -   Q2 Suicidal Thoughts (Past Month)  no - -   Q6 Suicide Behavior (Lifetime) no - -       Appearance:   Appropriate   Eye Contact:   Good   Psychomotor Behavior: Normal   Attitude:   Cooperative   Orientation:   All  Speech   Rate / Production: Normal    Volume:  Normal   Mood:    Anxious   Affect:    Appropriate   Thought Content:  Clear   Thought Form:  Coherent  Logical   Insight:    Good     Diagnoses:  1. Adjustment disorder, unspecified type        Collateral Reports Completed:  Routed note to Care Team Member(s)    Plan: (Homework, other):  Patient was given information about behavioral services and encouraged to schedule a follow up appointment with the clinic [facility] as needed, six weeks.  She was also given information about mental health symptoms and treatment options .  CD Recommendations: No indications of CD issues.     Saad Monsalve Psy.D, LP   Behavioral Health Clinician   -[facility]

## 2023-03-07 NOTE — LETTER
Date:March 8, 2023      Provider requested that no letter be sent. Do not send.       [facility]

## 2023-03-08 ENCOUNTER — INFUSION THERAPY VISIT (OUTPATIENT)
Dept: INFUSION THERAPY | Facility: CLINIC | Age: 33
End: 2023-03-08
Attending: PHYSICIAN ASSISTANT
Payer: COMMERCIAL

## 2023-03-08 VITALS
TEMPERATURE: 98.3 F | BODY MASS INDEX: 22.78 KG/M2 | RESPIRATION RATE: 16 BRPM | WEIGHT: 128.6 LBS | OXYGEN SATURATION: 99 % | HEART RATE: 76 BPM | SYSTOLIC BLOOD PRESSURE: 121 MMHG | DIASTOLIC BLOOD PRESSURE: 79 MMHG

## 2023-03-08 DIAGNOSIS — K51.011 ULCERATIVE PANCOLITIS WITH RECTAL BLEEDING (H): Primary | ICD-10-CM

## 2023-03-08 PROCEDURE — 250N000011 HC RX IP 250 OP 636: Performed by: PHYSICIAN ASSISTANT

## 2023-03-08 PROCEDURE — 258N000003 HC RX IP 258 OP 636: Performed by: PHYSICIAN ASSISTANT

## 2023-03-08 PROCEDURE — 96365 THER/PROPH/DIAG IV INF INIT: CPT

## 2023-03-08 RX ORDER — METHYLPREDNISOLONE SODIUM SUCCINATE 125 MG/2ML
125 INJECTION, POWDER, LYOPHILIZED, FOR SOLUTION INTRAMUSCULAR; INTRAVENOUS
Status: CANCELLED
Start: 2023-04-05

## 2023-03-08 RX ORDER — EPINEPHRINE 1 MG/ML
0.3 INJECTION, SOLUTION INTRAMUSCULAR; SUBCUTANEOUS EVERY 5 MIN PRN
Status: CANCELLED | OUTPATIENT
Start: 2023-04-05

## 2023-03-08 RX ORDER — MEPERIDINE HYDROCHLORIDE 25 MG/ML
25 INJECTION INTRAMUSCULAR; INTRAVENOUS; SUBCUTANEOUS EVERY 30 MIN PRN
Status: CANCELLED | OUTPATIENT
Start: 2023-04-05

## 2023-03-08 RX ORDER — ALBUTEROL SULFATE 90 UG/1
1-2 AEROSOL, METERED RESPIRATORY (INHALATION)
Status: CANCELLED
Start: 2023-04-05

## 2023-03-08 RX ORDER — HEPARIN SODIUM (PORCINE) LOCK FLUSH IV SOLN 100 UNIT/ML 100 UNIT/ML
5 SOLUTION INTRAVENOUS
Status: CANCELLED | OUTPATIENT
Start: 2023-04-05

## 2023-03-08 RX ORDER — HEPARIN SODIUM,PORCINE 10 UNIT/ML
5 VIAL (ML) INTRAVENOUS
Status: CANCELLED | OUTPATIENT
Start: 2023-04-05

## 2023-03-08 RX ORDER — DIPHENHYDRAMINE HYDROCHLORIDE 50 MG/ML
50 INJECTION INTRAMUSCULAR; INTRAVENOUS
Status: CANCELLED
Start: 2023-04-05

## 2023-03-08 RX ORDER — ALBUTEROL SULFATE 0.83 MG/ML
2.5 SOLUTION RESPIRATORY (INHALATION)
Status: CANCELLED | OUTPATIENT
Start: 2023-04-05

## 2023-03-08 RX ADMIN — VEDOLIZUMAB 300 MG: 300 INJECTION, POWDER, LYOPHILIZED, FOR SOLUTION INTRAVENOUS at 12:54

## 2023-03-08 NOTE — LETTER
3/8/2023         RE: Caity Horan  [address]  [address]  [place] [address]        Dear Colleague,    Thank you for referring your patient, Caity Horan, to the [facility]. Please see a copy of my visit note below.    Infusion Nursing Note:  Caity Horan presents today for   Chief Complaint   Patient presents with     Infusion     Entyvio      Patient seen by provider today: No   present during visit today: Not Applicable.    Note: Entyvio infused over ~30minutes.    Intravenous Access:  Peripheral IV placed.    Treatment Conditions:  Biological Infusion Checklist:  ~~~ NOTE: If the patient answers yes to any of the questions below, hold the infusion and contact ordering provider or on-call provider.    1. Have you recently had an elevated temperature, fever, chills, productive cough, coughing for 3 weeks or longer or hemoptysis, abnormal vital signs, night sweats,  chest pain or have you noticed a decrease in your appetite, unexplained weight loss or fatigue? No  2. Do you have any open wounds or new incisions? No  3. Do you have any recent or upcoming hospitalizations, surgeries or dental procedures? No  4. Do you currently have or recently have had any signs of illness or infection or are you on any antibiotics? No  5. Have you had any new, sudden or worsening abdominal pain? No  6. Have you or anyone in your household received a live vaccination in the past 4 weeks? Please note:  No live vaccines while on biologic/chemotherapy until 6 months after the last treatment.  Patient can receive the flu vaccine (shot only) and the pneumovax.  It is optimal for the patient to get these vaccines mid cycle, but they can be given at any time as long as it is not on the day of the infusion. No  7. Have you recently been diagnosed with any new nervous system diseases (ie. Multiple sclerosis, Guillain [place], seizures, neurological changes) or cancer  diagnosis? No  8. Are you on any form of radiation or chemotherapy? No  9. Are you pregnant or breast feeding or do you have plans of pregnancy in the future? No  10. Have you been having any signs of worsening depression or suicidal ideations?  (benlysta only) n/a  11. Have there been any other new onset medical symptoms? No    Post Infusion Assessment:  Patient tolerated infusion without incident.  Blood return noted pre and post infusion.  Site patent and intact, free from redness, edema or discomfort.  No evidence of extravasations.  Access discontinued per protocol.    Biologic Infusion Post Education: Call the triage nurse at your clinic or seek medical attention if you have chills and/or temperature greater than or equal to 100.5, uncontrolled nausea/vomiting, diarrhea, constipation, dizziness, shortness of breath, chest pain, heart palpitations, weakness or any other new or concerning symptoms, questions or concerns. You cannot have any live virus vaccines prior to or during treatment or up to 6 months post infusion.  If you have an upcoming surgery, medical procedure or dental procedure during treatment, this should be discussed with your ordering physician and your surgeon/dentist.If you are having any concerning symptom, if you are unsure if you should get your next infusion or wish to speak to a provider before your next infusion, please call your care coordinator or triage nurse at your clinic to notify them so we can adequately serve you.     Discharge Plan:   Discharge instructions reviewed with: Patient.  Patient and/or family verbalized understanding of discharge instructions and all questions answered.  AVS to patient via [employer].  Patient will return in 42 days for next appointment.   Patient discharged in stable condition accompanied by: self.  Departure Mode: Ambulatory.    Administrations This Visit     vedolizumab (ENTYVIO) 300 mg in sodium chloride 0.9 % 280 mL infusion     Admin  Date  03/08/2023 Action  $New Bag Dose  300 mg Rate  560 mL/hr Route  Intravenous Administered By  Meng, Mercedes SALEEM RN                /86   Pulse 77   Temp 98.3  F (36.8  C) (Oral)   Resp 16   Wt 58.3 kg (128 lb 9.6 oz)   SpO2 99%   BMI 22.78 kg/m       MERCEDES DAWSON, MARIS                        Again, thank you for allowing me to participate in the care of your patient.        Sincerely,        Specialty Infusion Nurse

## 2023-03-08 NOTE — PATIENT INSTRUCTIONS
Tosha Carolina,    Thank you for choosing [facility] ([facility]) for your infusion.  The following information is a summary of our appointment as well as important reminders.      EDUCATION POST BIOLOGICAL/CHEMOTHERAPY INFUSION  Call the triage nurse at your clinic or seek medical attention if you have chills and/or temperature greater than or equal to 100.5, uncontrolled nausea/vomiting, diarrhea, constipation, dizziness, shortness of breath, chest pain, heart palpitations, weakness or any other new or concerning symptoms, questions or concerns.  You can not have any live virus vaccines prior to or during treatment or up to 6 months post infusion.  If you have an upcoming surgery, medical procedure or dental procedure during treatment, this should be discussed with your ordering physician and your surgeon/dentist.  If you are having any concerning symptom, if you are unsure if you should get your next infusion or wish to speak to a provider before your next infusion, please call your care coordinator or triage nurse at your clinic to notify them so we can adequately serve you.   We look forward in seeing you on your next appointment here at [facility] ([facility]).  Please don t hesitate to call us at [phone] to reschedule any of your appointments or to speak with one of the [facility] registered nurses.  It was a pleasure taking care of you today.    Sincerely,    [facility]  [facility]  [address] [place] [address]  [id number]  Phone:  [phone]

## 2023-03-08 NOTE — LETTER
Date:March 8, 2023      Provider requested that no letter be sent. Do not send.       [facility]

## 2023-03-08 NOTE — PROGRESS NOTES
Infusion Nursing Note:  Caity Horan presents today for   Chief Complaint   Patient presents with     Infusion     Entyvio      Patient seen by provider today: No   present during visit today: Not Applicable.    Note: Entyvio infused over ~30minutes.    Intravenous Access:  Peripheral IV placed.    Treatment Conditions:  Biological Infusion Checklist:  ~~~ NOTE: If the patient answers yes to any of the questions below, hold the infusion and contact ordering provider or on-call provider.    1. Have you recently had an elevated temperature, fever, chills, productive cough, coughing for 3 weeks or longer or hemoptysis, abnormal vital signs, night sweats,  chest pain or have you noticed a decrease in your appetite, unexplained weight loss or fatigue? No  2. Do you have any open wounds or new incisions? No  3. Do you have any recent or upcoming hospitalizations, surgeries or dental procedures? No  4. Do you currently have or recently have had any signs of illness or infection or are you on any antibiotics? No  5. Have you had any new, sudden or worsening abdominal pain? No  6. Have you or anyone in your household received a live vaccination in the past 4 weeks? Please note:  No live vaccines while on biologic/chemotherapy until 6 months after the last treatment.  Patient can receive the flu vaccine (shot only) and the pneumovax.  It is optimal for the patient to get these vaccines mid cycle, but they can be given at any time as long as it is not on the day of the infusion. No  7. Have you recently been diagnosed with any new nervous system diseases (ie. Multiple sclerosis, Guillain [place], seizures, neurological changes) or cancer diagnosis? No  8. Are you on any form of radiation or chemotherapy? No  9. Are you pregnant or breast feeding or do you have plans of pregnancy in the future? No  10. Have you been having any signs of worsening depression or suicidal ideations?  (benlysta only) n/a  11. Have there  been any other new onset medical symptoms? No    Post Infusion Assessment:  Patient tolerated infusion without incident.  Blood return noted pre and post infusion.  Site patent and intact, free from redness, edema or discomfort.  No evidence of extravasations.  Access discontinued per protocol.    Biologic Infusion Post Education: Call the triage nurse at your clinic or seek medical attention if you have chills and/or temperature greater than or equal to 100.5, uncontrolled nausea/vomiting, diarrhea, constipation, dizziness, shortness of breath, chest pain, heart palpitations, weakness or any other new or concerning symptoms, questions or concerns. You cannot have any live virus vaccines prior to or during treatment or up to 6 months post infusion.  If you have an upcoming surgery, medical procedure or dental procedure during treatment, this should be discussed with your ordering physician and your surgeon/dentist.If you are having any concerning symptom, if you are unsure if you should get your next infusion or wish to speak to a provider before your next infusion, please call your care coordinator or triage nurse at your clinic to notify them so we can adequately serve you.     Discharge Plan:   Discharge instructions reviewed with: Patient.  Patient and/or family verbalized understanding of discharge instructions and all questions answered.  AVS to patient via [employer].  Patient will return in 42 days for next appointment.   Patient discharged in stable condition accompanied by: self.  Departure Mode: Ambulatory.    Administrations This Visit     vedolizumab (ENTYVIO) 300 mg in sodium chloride 0.9 % 280 mL infusion     Admin Date  03/08/2023 Action  $New Bag Dose  300 mg Rate  560 mL/hr Route  Intravenous Administered By  Mercedes Dawson RN                /86   Pulse 77   Temp 98.3  F (36.8  C) (Oral)   Resp 16   Wt 58.3 kg (128 lb 9.6 oz)   SpO2 99%   BMI 22.78 kg/m       MERCEDES DAWSON  RN

## 2023-03-09 ENCOUNTER — TELEPHONE (OUTPATIENT)
Dept: TRANSPLANT | Facility: CLINIC | Age: 33
End: 2023-03-09
Payer: COMMERCIAL

## 2023-03-09 NOTE — ADDENDUM NOTE
Addended by: MARIBEL GUDINO on: 12/17/2018 11:09 AM     Modules accepted: Kai Person    
Mother's Bedside/Non-

## 2023-03-09 NOTE — TELEPHONE ENCOUNTER
Gosper from donor coord pt has an approved live donor. Called pt today and reached ALEXEY ESCOBEDO she has an approved live donor and I will talk with her tomorrow about getting arrangements made.

## 2023-03-10 ENCOUNTER — TELEPHONE (OUTPATIENT)
Dept: TRANSPLANT | Facility: CLINIC | Age: 33
End: 2023-03-10
Payer: COMMERCIAL

## 2023-03-10 ENCOUNTER — TELEPHONE (OUTPATIENT)
Dept: TRANSPLANT | Facility: CLINIC | Age: 33
End: 2023-03-10

## 2023-03-10 ENCOUNTER — DOCUMENTATION ONLY (OUTPATIENT)
Dept: TRANSPLANT | Facility: CLINIC | Age: 33
End: 2023-03-10

## 2023-03-10 NOTE — TELEPHONE ENCOUNTER
"Called pt today to review the Kidney Paired Donation Recipient Consent (revision date: 12/1/2015) and the Advanced Donor Voucher Recipient Consent Version 3.3.     I answered questions regarding typical time waiting for a match offer in KPD and informed them that ultimately waiting time is unknown and it could be 3 to 6 months even with preference in the NKR system as a voucher nye.       Reviewed details pertaining to the Paired Exchange program: National Kidney Registry (NKR).                                 Discussed with patient the matching procedure and logistics of NKR.  Including that the pair/indiviudal cannot choose their match.  Reviewed our program's policy regarding communication between recipient and their matched donor.  Informed pt that only if both parties are willing to exchange identities, we can facilitate that, but must require a waiver.  I reviewed that shipping of the kidney is a process that is monitored closely but is not without risk. In the event that the kidney is damaged or lost that the programs managing the exchange try to repair broken chains but there is no guarantee that they would receive a kidney.   Reviewed with the patient that at any time they can withdraw from the KPD program or refuse a match offer.       Pt was sent docusign via e-mail, and pt signed consent forms for KPD and TIMI.     Reviewed The Advanced Donation Program as follows:       \"ADP\" allows an acceptable (as determined by the transplant center) Intended Donor \"ID\" to donate their kidney via human organ paired donation (more commonly referred to as an exchange or a swap) before their Voucher Nye \"VH\" receives a transplant. Once the ID's donation has occurred, the VH(s) may be activated by their transplant center for matching by the National Kidney Registry (NKR). I understand that my ID would like to participate in the ADP to donate a kidney to provide a voucher for me. This donation gives one Voucher " "Nye an opportunity to receive a living donor kidney through the Standard Voucher option, in which the Voucher Nye is likely to need a kidney transplant within 1 year. I consent to the release of my health, medical, and personal information to the NKR for the purpose of participating in the ADP. I authorize the NKR to disclose, share and use my health, medical and personal information in conducting the ADP, and I waive any and all privacy law claims in the use of this information as part of the ADP. I understand that this form must be completed and returned at least 3 weeks before the ID's scheduled surgery date. I am willing to undergo the identity verification process if I receive a kidney through this program. I understand that this Voucher is non-transferable, and non-assignable. The ADP program is unrelated to the [place]  donor system and participation in the ADP program does not give any wait time points for the VH in the  donor system. Should a voucher ever need to be redeemed, the patient is responsible for the transplant related costs.     We reviewed the circumstances which may prevent pt from receiving a kidney:     _ __ A situation whereby I become medically unable to undergo transplant surgery.  _ _ NKR's inability to find an acceptable compatible donor.  _ __ The NKR may unexpectedly shut down.  _ __ Having a blood type of \"O\" can often delay a match offer for 1 - 2 years or more after activation.  _ __ A sensitization event that increases your NKR cPRA (e.g. blood transfusion, pregnancy, prior transplant, etc.).  _ __ Having an increased NKR cPRA can delay a match offer for 1 - 2 years or more after activation.  _ __ Having an NKR cPRA of = 99% may result in never finding a match.  _ __ Unforeseen circumstances such as an act of nature.  _ _ Your Transplant Center not accepting potential donors, once you are activated for matching.      Pt was sent docusign via e-mail, and pt signed " the NKR Advanced Voucher Recipient Consent Version 3.3.     Patient's activation in NKR is pending their donor's advanced donation. Pt verbalized understanding of information and has no further questions. Encouraged to reach out if questions arise.        Christi Murrieta, RN, BSN, MSN  Kidney Transplant Waitlist Coordinator

## 2023-03-10 NOTE — TELEPHONE ENCOUNTER
Received notification confirming insurance prior auth for kidney transplant.     Called patient this morning and spoke with her explained insurance is approved for proceeding with kidney transplant and Dr. Mar said it is okay to proceed with kidney transplant in regards to receiving Entyvio on 03/08/2023. Pt understands her annual PAP is due 03/25/2023, pt shared her plan is to have her kidney transplant here very soon, remain in [place] for 1 month and return to [place] then. States she will do a PAP smear in [place] with her own provider.  Pt has many questions this morning about Paired Exchange process, requests Dain POLLOCK or Janki POLLOCK to call her - will message Dain about this.  Explained to pt I will list her now on INACTIVE status, will send her a letter in My Chart when listed and she will be transferred to [facility] POLL[facility] on the kidney transplant waitlist for continuation of care. Pt expressed excellent understanding of all and was in good agreement with the plan.     Listed pt today on kidney alone waitlist on INACTIVE status per pt choice. Pt listed with option of hep b core AB positive offers because she is hep B surface AB positive. Generated listing letter today in Epic, electronically routed to pt/ providers.     Handoff message sent to Christi POLLOCK and Mela BLANKENSHIP.

## 2023-03-10 NOTE — TELEPHONE ENCOUNTER
"Caity called when she rec'd new coordinator letter.  Caity wanted to discuss timing for PEP; she has two approved donors and does not understand why she is not Active in paired exchanged.  She is currently listed as Inactive on DD list due to having these donors who are ready to go to surgery.     Caity is a previous recipient and mentioned that there have been a few concerning \"bumps in the road\" in our process this time. She mentioned that two donors ages 58 and 60 were worked up before her younger potential friends, and that she feels she lost 2.5 months of time because of that. She also mentioned she did not gel well with previous Pre K/P coordinator.     Writer did not feel comfortable stating that she would get a transplant within 90 days of being listed in PEP as she was told by , however, did say it would be statistically surprising if she went 6 months without transplant; however no guarantees.     Christi Murrieta is patient's wait list coordinator as of today. She will review all aspects of Caity's chart and status and reach out to her to discuss next steps.     Per Caity, she and both her primary and back up living donors are ready to schedule surgery asao,   "

## 2023-03-10 NOTE — TELEPHONE ENCOUNTER
Called pt to introduce myself as WL coordinator and encouraged pt to stay up on health maintenance and to let us know if insurance changes, contact info updates. Discussed paired exchanged program and consented pt. Hoping to have pt and donor active by Monday. Will touch base when she is active in PEP.   Explained WL protocol for pt's status and when follow up is needed. Gave pt direct information and encouraged to reach out with any questions/concerns.

## 2023-03-10 NOTE — LETTER
March 10, 2023    AMENDED LETTER      Caity Horan  [address] [place] [id number]    RE:  Kidney Transplant Wait List    Dear Caity,     Your transplant team would like to introduce you to your new Transplant Nurse Coordinator, Christi Murrieta, who will be following you while you are on the Kidney Transplant Wait List here at the [facility].    Christi Murrieta is looking forward to working closely with you and your providers, and will provide you with the best possible care while you are waiting for kidney transplant.      You are currently listed as Incative on our Kidney Transplant wait list due to Patient's Choice at this time.     Please feel free to call Christi Murrieta @ [phone] or myself, if you have any questions or concerns, if you have any hospitalizations, infections, significant changes to your health, or you start or change dialysis centers.       Sincerely,    Mela Crews LPN -  Kidney Transplant Wait List  [phone] Phone  [phone] Fax  [email or web address]     CC: Dr. More Cardona   Dialysis Center

## 2023-03-10 NOTE — LETTER
March 10, 2023    Caity Edapooja  [address]  [address]  [facility] [address]      Dear Ms. Horan,    This letter is sent to confirm that you have completed your transplant work-up and you are a candidate in the kidney transplant program at the [facility].  You were placed on the  donor  kidney INACTIVE waitlist on 03/10/2023.  This means you will accumulate waiting time but not receive  donor calls.      Per our routine office workflow, you will now be transferred to the kidney transplant coordinator waitlist team. Your new coordinator is Christi POLLOCK assisted by Mela BLANKENSHIP. Either can be reached by calling our Main Office Number at [phone].     Items we will need from you:      We have received approval from your insurance company for the transplant procedure.  It is critical that you notify us if there is any change in your insurance.  It is also important that you familiarize yourself with the details of your specific insurance policy.  Our patient  is available to assist you if you should have any questions regarding your coverage.      During this waiting period, we may request additional periodic laboratory tests with your primary physician.  It will be your responsibility to remind your physician to forward your results to the Transplant Office.      We need to be kept informed of any changes in your medical condition such as:    o changes in your medications,   o significant changes in your health  o significant infections (such as pneumonia or abscesses)  o blood transfusions  o any condition which requires hospitalization  o any surgery      Remember to complete any routine cancer screening tests required before your transplant.  This includes colonoscopy; prostrate screening for men, and mammogram and gynecologic testing for women, as well as dental work.  Your primary care clinic can assist you with arranging for  these exams.  Remind your caregivers to forward copies of the records and final reports.    We want you to know that our program has physician and surgeon coverage 24 hours a day, 365 days a year. In addition, our transplant surgeons and physicians will not be on call for two or more transplant programs more than 30 miles apart unless the circumstances have been reviewed and approved by the [employer] Organ [employer]aring (UNOS) Membership and Professional Standards Committee (MPSC). Finally, our primary physician and primary surgeons are not designated as the primary surgeon or primary physician at more than 1 transplant hospital. If this coverage changes or there are substantial program changes, you will be notified in writing by letter.     Attached is a letter from UNOS that describes the services and information offered to patients by UNOS and the Organ Procurement and Transplantation Network (OPTN).    We appreciate having had the opportunity to participate in your care.  If you have questions, please feel free to call the Transplant Office at [phone] or [phone].      Sincerely,   [facility]     Kidney Transplant Program    Enclosures: Telephone Contact List, Travel Resources, UNOS Letter, Waitlist Information Update and While You Are Waiting  CC:   Dr. More Cardona, [facility] [place] [facility]                              The Organ Procurement and Transplantation Network  Toll-free patient services line:     Your resource for organ transplant information    If you have a question regarding your own medical care, you always should call your transplant hospital first. However, for general organ transplant-related information, you can call the Organ Procurement and Transplantation Network (OPTN) toll-free patient services line at [phone]- [phone]. Anyone, including potential transplant candidates, candidates, recipients, family members, friends, living donors, and donor family members, can call this  number to:          Talk about organ donation, living donation, the transplant process, the donation process, and transplant policies.    Get a free patient information kit with helpful booklets, waiting list and transplant information, and a list of all transplant hospitals.    Ask questions about the OPTN website (https://optn.transplant.hrsa.gov/), the [employer] for Organ Sharing s (UNOS) website (https://unos.org/), or the UNOS website for living donors and transplant recipients. (https://www.transplantliving.org/).    Learn how the OPTN can help you.    Talk about any concerns that you may have with a transplant hospital.    The [facility] s transplant system, the OPTN, is managed under federal contract by the [employer] Organ Sharing (UNOS), which is a non-profit charitable organization. The OPTN helps create and define organ sharing policies that make the best use of donated organs. This process continuously evaluating new advances and discoveries so policies can be adapted to best serve patients waiting for transplants. To do so, the OPTN works closely with transplant professionals, transplant patients, transplant candidates, donor families, living donors, and the public. All transplant programs and organ procurement organizations throughout the country are OPTN members and are obligated to follow the policies the OPTN creates for allocating organs.    The OPTN also is responsible for:      Providing educational material for patients, the public, and professionals.    Raising awareness of the need for donated organs and tissue.    Coordinating organ procurement, matching, and placement.    Collecting information about every organ transplant and donation that occurs in the [place].    Remember, you should contact your transplant hospital directly if you have questions or concerns about your own medical care including medical records, work-up progress, and test results.    We are not your  transplant hospital, and our staff will not be able to answer questions about your case, so please keep your transplant hospital s phone number handy.    However, while you research your transplant needs and learn as much as you can about transplantation and donation, we welcome your call to our toll-free patient services line at [phone].          Updated 4/1/2019

## 2023-03-10 NOTE — Clinical Note
Please note I have added pt today on kidney alone waitlist on INACTIVE status. Pt is pursing live kidney donor transplant via Paired Exchange and will likely have a match soon to proceed with live kidney donor transplant.  Thanks, Alana

## 2023-03-16 ENCOUNTER — TELEPHONE (OUTPATIENT)
Dept: TRANSPLANT | Facility: CLINIC | Age: 33
End: 2023-03-16
Payer: COMMERCIAL

## 2023-03-16 NOTE — TELEPHONE ENCOUNTER
Called pt to discuss PEP update, there have been low eplet MM on her board that we have discussed. There are no match offers at this time. Will continue to update pt with progress. Pt verbalized understanding of information and has no further questions. Encouraged to reach out if questions arise.

## 2023-03-17 ENCOUNTER — ORGAN (OUTPATIENT)
Dept: TRANSPLANT | Facility: CLINIC | Age: 33
End: 2023-03-17
Payer: COMMERCIAL

## 2023-03-17 ENCOUNTER — APPOINTMENT (OUTPATIENT)
Dept: URBAN - METROPOLITAN AREA CLINIC 258 | Age: 33
Setting detail: DERMATOLOGY
End: 2023-03-17

## 2023-03-17 VITALS — HEIGHT: 63 IN | WEIGHT: 124 LBS

## 2023-03-17 DIAGNOSIS — B07.8 OTHER VIRAL WARTS: ICD-10-CM

## 2023-03-17 DIAGNOSIS — Z94.0 KIDNEY REPLACED BY TRANSPLANT: Primary | ICD-10-CM

## 2023-03-17 DIAGNOSIS — Z76.82 AWAITING ORGAN TRANSPLANT: ICD-10-CM

## 2023-03-17 DIAGNOSIS — Z32.00 ENCOUNTER FOR PREGNANCY TEST: ICD-10-CM

## 2023-03-17 DIAGNOSIS — N18.6 ESRD (END STAGE RENAL DISEASE) (H): ICD-10-CM

## 2023-03-17 DIAGNOSIS — R73.03 PRE-DIABETES: ICD-10-CM

## 2023-03-17 PROCEDURE — OTHER BENIGN DESTRUCTION: OTHER

## 2023-03-17 PROCEDURE — 17110 DESTRUCT B9 LESION 1-14: CPT

## 2023-03-17 PROCEDURE — OTHER MIPS QUALITY: OTHER

## 2023-03-17 PROCEDURE — OTHER COUNSELING: OTHER

## 2023-03-17 RX ORDER — TACROLIMUS 1 MG/1
3 CAPSULE ORAL 2 TIMES DAILY
Qty: 543 CAPSULE | Refills: 3 | Status: ON HOLD | OUTPATIENT
Start: 2023-03-17 | End: 2023-04-20

## 2023-03-17 ASSESSMENT — LOCATION DETAILED DESCRIPTION DERM
LOCATION DETAILED: LEFT PLANTAR FOREFOOT OVERLYING 2ND METATARSAL
LOCATION DETAILED: LEFT PLANTAR FOREFOOT OVERLYING 5TH METATARSAL
LOCATION DETAILED: TIP OF LEFT 3RD TOE
LOCATION DETAILED: LEFT LATERAL PLANTAR 1ST TOE
LOCATION DETAILED: LEFT PLANTAR FOREFOOT OVERLYING 4TH METATARSAL
LOCATION DETAILED: LEFT MEDIAL PLANTAR 1ST TOE
LOCATION DETAILED: LEFT MEDIAL PLANTAR 2ND TOE
LOCATION DETAILED: LEFT PLANTAR FOREFOOT OVERLYING 3RD METATARSAL

## 2023-03-17 ASSESSMENT — LOCATION ZONE DERM
LOCATION ZONE: FEET
LOCATION ZONE: TOE

## 2023-03-17 ASSESSMENT — LOCATION SIMPLE DESCRIPTION DERM
LOCATION SIMPLE: PLANTAR SURFACE OF LEFT 1ST TOE
LOCATION SIMPLE: PLANTAR SURFACE OF LEFT 2ND TOE
LOCATION SIMPLE: LEFT PLANTAR SURFACE
LOCATION SIMPLE: PLANTAR SURFACE OF LEFT 3RD TOE

## 2023-03-17 NOTE — PROCEDURE: BENIGN DESTRUCTION
Treatment Number (Will Not Render If 0): 0
Render Post-Care Instructions In Note?: no
Post-Care Instructions: I reviewed with the patient in detail post-care instructions. Patient is to wear sunprotection, and avoid picking at any of the treated lesions. Pt may apply Vaseline to crusted or scabbing areas.
Detail Level: Detailed
Consent: The patient's consent was obtained including but not limited to risks of crusting, scabbing, blistering, scarring, darker or lighter pigmentary change, recurrence, incomplete removal and infection.
Medical Necessity Clause: This procedure was medically necessary because the lesions that were treated were:
Medical Necessity Information: It is in your best interest to select a reason for this procedure from the list below. All of these items fulfill various CMS LCD requirements except the new and changing color options.
Anesthesia Volume In Cc: 0.5

## 2023-03-27 ENCOUNTER — TELEPHONE (OUTPATIENT)
Dept: TRANSPLANT | Facility: CLINIC | Age: 33
End: 2023-03-27
Payer: COMMERCIAL

## 2023-03-27 NOTE — TELEPHONE ENCOUNTER
Coordinator called pt to go over instructions for upcoming transplant on 4/13/23.    Pt will have pre-op appointments, initial COVID testing and final crossmatch on 4/3/23, and will have CXR and final COVID screen on 4/11/23. Recommended 14 day pre-op self isolation and COVID precautions reviewed.      Reviewed OR check in time at 1200 and OR start time of 1430 . Explained that times are subject to change and will keep them updated with changes.    Reviewed general components of inpatient stay and post-transplant routines, including frequent outpatient appointments x1-2 weeks. Reviewed no driving x 2 weeks and lifting restrictions >10lbs x 6 weeks.    Confirmed PFR has been notified of transplant.    Nephrologist and dialysis were notified of pre-op appointments and transplant dates.      Vaccinations including recommendation for no vaccinations for 6 weeks prior to transplant reviewed. Pt has nothad any vaccinations within the past 6 weeks.     Reviewed that pt has no upcoming procedures, no recent blood transitions, no medications changes, denies dialysis changes.     Pt has received COVID vaccine     Medication list reviewed, pt is not on steroids, blood thinners or hormone replacements.      Current visitor policy reviewed.     Pt verbalized understanding and denied questions.

## 2023-03-29 ENCOUNTER — COMMITTEE REVIEW (OUTPATIENT)
Dept: TRANSPLANT | Facility: CLINIC | Age: 33
End: 2023-03-29
Payer: COMMERCIAL

## 2023-03-29 NOTE — COMMITTEE REVIEW
Abdominal Patient Discussion Note Transplant Coordinator: Christi Murry  Transplant Surgeon:       Referring Physician: More Cardona, Zhang Barrera    Committee Review Members:  Nephrology Zhang Barrera MD, Rukhsana Ahumada PA-C, Reynaldo Clifford, APRN CNP, Darian Arora MD   Nutrition Peri Villatoro, RD   Pharmacy Hamzah Null, [facility]    - Clinical Colettecarlos George, [facility], Beronica Marsh, [facility]   Transplant HANNAH AVILEZ, RN, Yaa Richardson, RN, Eleanor Parkinson, RN, Gladys Roberto, RN, Brennon Long, RN, Beronica Rosa, RN, Christi Alfonso, ALEJANDRO, Christi Murrieta, RN, Radha Mancilla, RN, NATALIE BUTLER, MARIS, Ivy Sepulveda MD   Transplant Surgery Ivy Sepulveda MD       Additional Discussion Notes and Findings: Reviewed pt's surgical case for 3/14/23 LDKT    -Cards- stress test neg  no risk assessment required     -PAD- vessels reviewed and suitable      -UC- diagnosed in 2018 and on Entyvio every 6 weeks, did have a flare back in December and colonoscopy 12/16 which was reviewed with committee in 2/2023 and pt was approved to move forward with transplant      -EBV viremia- low viral load     -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

## 2023-04-02 LAB
ABO/RH(D): NORMAL
ANTIBODY SCREEN: NEGATIVE
SPECIMEN EXPIRATION DATE: NORMAL

## 2023-04-03 ENCOUNTER — OFFICE VISIT (OUTPATIENT)
Dept: TRANSPLANT | Facility: CLINIC | Age: 33
End: 2023-04-03
Attending: SURGERY
Payer: COMMERCIAL

## 2023-04-03 ENCOUNTER — LAB (OUTPATIENT)
Dept: LAB | Facility: CLINIC | Age: 33
End: 2023-04-03
Attending: SURGERY
Payer: COMMERCIAL

## 2023-04-03 VITALS
SYSTOLIC BLOOD PRESSURE: 98 MMHG | OXYGEN SATURATION: 100 % | RESPIRATION RATE: 16 BRPM | HEART RATE: 94 BPM | DIASTOLIC BLOOD PRESSURE: 66 MMHG | TEMPERATURE: 98.2 F | WEIGHT: 130.2 LBS | BODY MASS INDEX: 23.06 KG/M2

## 2023-04-03 DIAGNOSIS — Z32.00 ENCOUNTER FOR PREGNANCY TEST: ICD-10-CM

## 2023-04-03 DIAGNOSIS — N18.6 ESRD (END STAGE RENAL DISEASE) (H): ICD-10-CM

## 2023-04-03 DIAGNOSIS — Z76.82 AWAITING ORGAN TRANSPLANT: ICD-10-CM

## 2023-04-03 DIAGNOSIS — Z94.0 KIDNEY REPLACED BY TRANSPLANT: ICD-10-CM

## 2023-04-03 DIAGNOSIS — R73.03 PRE-DIABETES: ICD-10-CM

## 2023-04-03 DIAGNOSIS — Z32.02 PREGNANCY EXAMINATION OR TEST, NEGATIVE RESULT: ICD-10-CM

## 2023-04-03 LAB
ALBUMIN SERPL BCG-MCNC: 5 G/DL (ref 3.5–5.2)
ALP SERPL-CCNC: 75 U/L (ref 35–104)
ALT SERPL W P-5'-P-CCNC: 9 U/L (ref 10–35)
ANION GAP SERPL CALCULATED.3IONS-SCNC: 16 MMOL/L (ref 7–15)
AST SERPL W P-5'-P-CCNC: 19 U/L (ref 10–35)
BILIRUB SERPL-MCNC: 0.5 MG/DL
BUN SERPL-MCNC: 21 MG/DL (ref 6–20)
CALCIUM SERPL-MCNC: 10.1 MG/DL (ref 8.6–10)
CHLORIDE SERPL-SCNC: 91 MMOL/L (ref 98–107)
CREAT SERPL-MCNC: 6.11 MG/DL (ref 0.51–0.95)
DEPRECATED HCO3 PLAS-SCNC: 30 MMOL/L (ref 22–29)
ERYTHROCYTE [DISTWIDTH] IN BLOOD BY AUTOMATED COUNT: 13.2 % (ref 10–15)
FERRITIN SERPL-MCNC: 902 NG/ML (ref 6–175)
GFR SERPL CREATININE-BSD FRML MDRD: 9 ML/MIN/1.73M2
GLUCOSE SERPL-MCNC: 95 MG/DL (ref 70–99)
HBA1C MFR BLD: 5.3 %
HCG SERPL QL: NEGATIVE
HCT VFR BLD AUTO: 34.2 % (ref 35–47)
HGB BLD-MCNC: 11.1 G/DL (ref 11.7–15.7)
INR PPP: 1.07 (ref 0.85–1.15)
IRON BINDING CAPACITY (ROCHE): 261 UG/DL (ref 240–430)
IRON SATN MFR SERPL: 71 % (ref 15–46)
IRON SERPL-MCNC: 186 UG/DL (ref 37–145)
MCH RBC QN AUTO: 29.8 PG (ref 26.5–33)
MCHC RBC AUTO-ENTMCNC: 32.5 G/DL (ref 31.5–36.5)
MCV RBC AUTO: 92 FL (ref 78–100)
PLATELET # BLD AUTO: 261 10E3/UL (ref 150–450)
POTASSIUM SERPL-SCNC: 3.5 MMOL/L (ref 3.4–5.3)
PROT SERPL-MCNC: 8.4 G/DL (ref 6.4–8.3)
PTH-INTACT SERPL-MCNC: 309 PG/ML (ref 15–65)
RBC # BLD AUTO: 3.72 10E6/UL (ref 3.8–5.2)
SODIUM SERPL-SCNC: 137 MMOL/L (ref 136–145)
WBC # BLD AUTO: 6.4 10E3/UL (ref 4–11)

## 2023-04-03 PROCEDURE — 86901 BLOOD TYPING SEROLOGIC RH(D): CPT | Mod: 90 | Performed by: PATHOLOGY

## 2023-04-03 PROCEDURE — 36415 COLL VENOUS BLD VENIPUNCTURE: CPT | Performed by: PATHOLOGY

## 2023-04-03 PROCEDURE — 99213 OFFICE O/P EST LOW 20 MIN: CPT | Performed by: NURSE PRACTITIONER

## 2023-04-03 PROCEDURE — 82306 VITAMIN D 25 HYDROXY: CPT | Mod: 90 | Performed by: PATHOLOGY

## 2023-04-03 PROCEDURE — U0003 INFECTIOUS AGENT DETECTION BY NUCLEIC ACID (DNA OR RNA); SEVERE ACUTE RESPIRATORY SYNDROME CORONAVIRUS 2 (SARS-COV-2) (CORONAVIRUS DISEASE [COVID-19]), AMPLIFIED PROBE TECHNIQUE, MAKING USE OF HIGH THROUGHPUT TECHNOLOGIES AS DESCRIBED BY CMS-2020-01-R: HCPCS | Mod: 90 | Performed by: PATHOLOGY

## 2023-04-03 PROCEDURE — G0463 HOSPITAL OUTPT CLINIC VISIT: HCPCS | Performed by: SURGERY

## 2023-04-03 PROCEDURE — 86665 EPSTEIN-BARR CAPSID VCA: CPT | Mod: 90 | Performed by: PATHOLOGY

## 2023-04-03 PROCEDURE — 86900 BLOOD TYPING SEROLOGIC ABO: CPT | Mod: 90 | Performed by: PATHOLOGY

## 2023-04-03 PROCEDURE — 80053 COMPREHEN METABOLIC PANEL: CPT | Performed by: PATHOLOGY

## 2023-04-03 PROCEDURE — 86832 HLA CLASS I HIGH DEFIN QUAL: CPT | Performed by: PATHOLOGY

## 2023-04-03 PROCEDURE — 85027 COMPLETE CBC AUTOMATED: CPT | Performed by: PATHOLOGY

## 2023-04-03 PROCEDURE — 86825 HLA X-MATH NON-CYTOTOXIC: CPT | Mod: 90 | Performed by: PATHOLOGY

## 2023-04-03 PROCEDURE — 83540 ASSAY OF IRON: CPT | Performed by: PATHOLOGY

## 2023-04-03 PROCEDURE — 83550 IRON BINDING TEST: CPT | Performed by: PATHOLOGY

## 2023-04-03 PROCEDURE — 86645 CMV ANTIBODY IGM: CPT | Mod: 90 | Performed by: PATHOLOGY

## 2023-04-03 PROCEDURE — 82728 ASSAY OF FERRITIN: CPT | Mod: 90 | Performed by: PATHOLOGY

## 2023-04-03 PROCEDURE — U0005 INFEC AGEN DETEC AMPLI PROBE: HCPCS | Mod: 90 | Performed by: PATHOLOGY

## 2023-04-03 PROCEDURE — 99213 OFFICE O/P EST LOW 20 MIN: CPT | Performed by: SURGERY

## 2023-04-03 PROCEDURE — 86825 HLA X-MATH NON-CYTOTOXIC: CPT | Mod: 91 | Performed by: PATHOLOGY

## 2023-04-03 PROCEDURE — 86833 HLA CLASS II HIGH DEFIN QUAL: CPT | Performed by: PATHOLOGY

## 2023-04-03 PROCEDURE — 83036 HEMOGLOBIN GLYCOSYLATED A1C: CPT | Mod: 90 | Performed by: PATHOLOGY

## 2023-04-03 PROCEDURE — 83970 ASSAY OF PARATHORMONE: CPT | Performed by: PATHOLOGY

## 2023-04-03 PROCEDURE — 85610 PROTHROMBIN TIME: CPT | Mod: 90 | Performed by: PATHOLOGY

## 2023-04-03 PROCEDURE — 84703 CHORIONIC GONADOTROPIN ASSAY: CPT | Performed by: PATHOLOGY

## 2023-04-03 PROCEDURE — 86644 CMV ANTIBODY: CPT | Mod: 90 | Performed by: PATHOLOGY

## 2023-04-03 PROCEDURE — 99000 SPECIMEN HANDLING OFFICE-LAB: CPT | Performed by: PATHOLOGY

## 2023-04-03 PROCEDURE — 86850 RBC ANTIBODY SCREEN: CPT | Mod: 90 | Performed by: PATHOLOGY

## 2023-04-03 ASSESSMENT — PAIN SCALES - GENERAL: PAINLEVEL: NO PAIN (0)

## 2023-04-03 NOTE — PROGRESS NOTES
Transplant Surgery H&P:                           HPI:      Ms. Horan is a 32 year old female who comes to clinic today for preop prior to planned living donor kidney transplantation. The patient was previously reviewed by the multidisciplinary selection committee and found to be medically and psychosocially appropriate for kidney transplantation. Ms. Horan has End stage renal failure due to IgA nephropathy.     The patient is on dialysis.      If on dialysis, modality: HD, via R internal jugular catheter   Dialysis days: Monday, Wednesday, Friday                 YES  NO   Chronic anticoagulation  []      [x] Indication:   Recurrent infections  []      [x]  Type:                  Bladder dysfunction  [x]      [] Cause: CKD  Claudication   []      [x] Distance:    Previous Amputation  []      [x] Cause:       Health events since transplant evaluation: None    Special considerations:  PONV: no  Holiness: No    MEDICAL HISTORY:      Patient Active Problem List    Diagnosis Date Noted     Dialysis patient (H) 12/21/2022     Priority: Medium     Ulcerative colitis (H) 12/14/2022     Priority: Medium     Vitamin D deficiency 12/14/2022     Priority: Medium     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     Priority: Medium     Ulcerative pancolitis with rectal bleeding (H) 03/21/2018     Priority: Medium     Vitamin B12 deficiency 03/02/2018     Priority: Medium     Anemia, iron deficiency 10/13/2017     Priority: Medium     EBV (Nciole-Barr virus) viremia 08/15/2017     Priority: Medium     Aftercare following organ transplant 12/18/2016     Priority: Medium     Hypomagnesemia 02/25/2015     Priority: Medium     Immunosuppressed status (H) 12/10/2014     Priority: Medium     Kidney replaced by transplant 12/05/2014     Priority: Medium     Living 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) 12/5/2014       IgA nephropathy 11/23/2014     Priority: Medium     biopsy proven       Metabolic acidosis 10/27/2014     Priority:  Medium      Past Medical History:   Diagnosis Date     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     Anemia in stage 5 chronic kidney disease (H) 10/27/2014     ESRD (end stage renal disease) on dialysis (H)      HTN (hypertension) 10/27/2014     Hypertension 10/2014     IgA nephropathy     biopsy proven     Metabolic acidosis      Ulcerative pancolitis (H) 2012     Vitamin D deficiency      Past Surgical History:   Procedure Laterality Date     BIOPSY  2021    renal     COLONOSCOPY N/A 03/12/2018    Procedure: COMBINED COLONOSCOPY, SINGLE OR MULTIPLE BIOPSY/POLYPECTOMY BY BIOPSY;  EGD/Colonoscopy ;  Surgeon: Kory Massey MD;  Location: [facility]     COLONOSCOPY N/A 09/10/2018    Procedure: COMBINED COLONOSCOPY, SINGLE OR MULTIPLE BIOPSY/POLYPECTOMY BY BIOPSY;  Colonoscopy;  Surgeon: Yenifer Doan MD;  Location: [facility]     COLONOSCOPY N/A 2/3/2023    Procedure: COLONOSCOPY, WITH BIOPSY;  Surgeon: Yaakov Loving MD;  Location: [facility]     EXTRACTION(S) DENTAL       IR CVC TUNNEL PLACEMENT > 5 YRS OF AGE  12/22/2022     PERCUTANEOUS BIOPSY KIDNEY Right 12/19/2018    Procedure: Right Kidney Biopsy;  Surgeon: Otilio Mar MD;  Location: [facility]     TRANSPLANT KIDNEY RECIPIENT LIVING RELATED N/A 12/05/2014    Procedure: TRANSPLANT KIDNEY RECIPIENT LIVING RELATED;  Surgeon: Dale Middleton MD;  Location: [facility]     WISDOM TOOTH EXTRACTION Bilateral      Current Outpatient Medications   Medication Sig Dispense Refill     bisacodyl (DULCOLAX) 5 MG EC tablet Take 2 tablets at 3 pm the day before your procedure. If your procedure is before 11 am, take 2 additional tablets at 11 pm. If your procedure is after 11 am, take 2 additional tablets at 6 am. For additional instructions refer to your colonoscopy prep instructions. 4 tablet 0     Blood Pressure Monitoring (B-D ASSURE BPM/AUTO ARM CUFF) MISC Monitor blood pressure and pulse twice daily 1 each 0     CALCIUM ANTACID 500 MG chewable tablet Take by  mouth 3 times daily       Cyanocobalamin (B-12) 1000 MCG TBCR TAKE ONE TABLET BY MOUTH EVERY DAY 90 tablet 3     levonorgestrel (MIRENA) 20 MCG/DAY IUD 1 each by Intrauterine route       mycophenolate (GENERIC EQUIVALENT) 250 MG capsule Take 2 capsules (500 mg) by mouth 2 times daily 360 capsule 3     ondansetron (ZOFRAN) 4 MG tablet Take one tablet every six hours for nausea during colonoscopy bowel prepping 3 tablet 0     polyethylene glycol (GOLYTELY) 236 g suspension The night before the exam at 6 pm drink an 8-ounce glass every 15 minutes until the jug is half empty. If you arrive before 11 AM: Drink the other half of the Golytely jug at 11 PM night before procedure. If you arrive after 11 AM: Drink the other half of the Golytely jug at 6 AM day of procedure. For additional instructions refer to your colonoscopy prep instructions. 4000 mL 0     tacrolimus (GENERIC EQUIVALENT) 1 MG capsule Take 3 capsules (3 mg) by mouth 2 times daily 543 capsule 3     Vedolizumab (ENTYVIO IV)        vitamin D3 (CHOLECALCIFEROL) 50 mcg (2000 units) tablet Take 1 tablet (50 mcg) by mouth daily 100 tablet 3     OTC products: biotin  Allergies   Allergen Reactions     Bumetanide Other (See Comments)     Causes paralysis     Amoxicillin Rash      Social History     Tobacco Use     Smoking status: Never     Smokeless tobacco: Never   Vaping Use     Vaping status: Not on file   Substance Use Topics     Alcohol use: Not Currently     Alcohol/week: 3.0 - 9.0 standard drinks of alcohol     Types: 1 - 3 Glasses of wine, 1 - 3 Cans of beer, 1 - 3 Shots of liquor per week     OR  Social History     Socioeconomic History     Marital status: Single     Spouse name: Not on file     Number of children: Not on file     Years of education: 16     Highest education level: Not on file   Occupational History     Occupation: advertising     Employer: [employer]   Tobacco Use     Smoking status: Never     Smokeless tobacco: Never   Vaping Use      Vaping status: Not on file   Substance and Sexual Activity     Alcohol use: Not Currently     Alcohol/week: 3.0 - 9.0 standard drinks of alcohol     Types: 1 - 3 Glasses of wine, 1 - 3 Cans of beer, 1 - 3 Shots of liquor per week     Drug use: No     Sexual activity: Never   Other Topics Concern      Service Not Asked     Blood Transfusions No     Caffeine Concern Not Asked     Occupational Exposure Not Asked     Hobby Hazards Not Asked     Sleep Concern Not Asked     Stress Concern Not Asked     Weight Concern Not Asked     Special Diet Not Asked     Back Care Not Asked     Exercise Not Asked     Bike Helmet Not Asked     Seat Belt Yes     Self-Exams Not Asked   Social History Narrative     Not on file     Social Determinants of Health     Financial Resource Strain: Not on file   Food Insecurity: Not on file   Transportation Needs: Not on file   Physical Activity: Not on file   Stress: Not on file   Social Connections: Not on file   Intimate Partner Violence: Not on file   Housing Stability: Not on file     History   Drug Use No     Family History   Problem Relation Age of Onset     No Known Problems Mother      Alcoholism Father          in early 50s     Prostate Cancer Paternal Grandmother      Kidney Disease No family hx of        REVIEW OF SYSTEMS:        CONSTITUTIONAL: NEGATIVE for fever, chills, change in weight  INTEGUMENTARY/SKIN: NEGATIVE for worrisome rashes, moles or lesions  EYES: NEGATIVE for vision changes or irritation  ENT/MOUTH: NEGATIVE for ear, mouth and throat problems  RESP: NEGATIVE for significant cough or SOB  CV: NEGATIVE for chest pain, palpitations or peripheral edema  GI: NEGATIVE for nausea, abdominal pain, heartburn, or change in bowel habits  : NEGATIVE for frequency, dysuria, or hematuria  MUSCULOSKELETAL: NEGATIVE for significant arthralgias or myalgia  NEURO: NEGATIVE for weakness, dizziness or paresthesias  ENDOCRINE: NEGATIVE for temperature intolerance,  skin/hair changes  HEME: NEGATIVE for bleeding problems  PSYCHIATRIC: NEGATIVE for changes in mood or affect    EXAM:                       Temp:  [98.2  F (36.8  C)] 98.2  F (36.8  C)  Pulse:  [94] 94  Resp:  [16] 16  BP: (98)/(66) 98/66  SpO2:  [100 %] 100 %    GENERAL APPEARANCE: healthy, alert and no distress     EYES: EOMI, PERRL     HENT: ear canals and TM's normal and nose and mouth without ulcers or lesions     NECK: no adenopathy, no asymmetry, masses, or scars and thyroid normal to palpation     RESP: lungs clear to auscultation - no rales, rhonchi or wheezes     CV: regular rates and rhythm, normal S1 S2, no S3 or S4 and no murmur, click or rub     ABDOMEN:  soft, nontender, no HSM or masses and bowel sounds normal     MS: extremities normal- no gross deformities noted, no evidence of inflammation in joints, FROM in all extremities.     SKIN: no suspicious lesions or rashes     NEURO: Normal strength and tone, sensory exam grossly normal, mentation intact and speech normal     PSYCH: mentation appears normal. and affect normal/bright     LYMPHATICS: No cervical adenopathy      DIAGNOSTICS:                EKG: appears normal, NSR  Labs Resulted Today:   Results for orders placed or performed in visit on 04/03/23   hCG Qualitative Pregnancy     Status: Normal   Result Value Ref Range    hCG Serum Qualitative Negative Negative   Parathyroid Hormone Intact     Status: Abnormal   Result Value Ref Range    Parathyroid Hormone Intact 309 (H) 15 - 65 pg/mL    Narrative    This result was obtained with the Roche Elecsys PTH STAT assay.   This reference range differs from PTH assays used in other [facility] laboratories.   Iron and iron binding capacity     Status: Abnormal   Result Value Ref Range    Iron 186 (H) 37 - 145 ug/dL    Iron Binding Capacity 261 240 - 430 ug/dL    Iron Sat Index 71 (H) 15 - 46 %   INR     Status: Normal   Result Value Ref Range    INR 1.07 0.85 - 1.15   Hemoglobin A1c     Status:  Normal   Result Value Ref Range    Hemoglobin A1C 5.3 <5.7 %   Ferritin     Status: Abnormal   Result Value Ref Range    Ferritin 902 (H) 6 - 175 ng/mL   Comprehensive metabolic panel     Status: Abnormal   Result Value Ref Range    Sodium 137 136 - 145 mmol/L    Potassium 3.5 3.4 - 5.3 mmol/L    Chloride 91 (L) 98 - 107 mmol/L    Carbon Dioxide (CO2) 30 (H) 22 - 29 mmol/L    Anion Gap 16 (H) 7 - 15 mmol/L    Urea Nitrogen 21.0 (H) 6.0 - 20.0 mg/dL    Creatinine 6.11 (H) 0.51 - 0.95 mg/dL    Calcium 10.1 (H) 8.6 - 10.0 mg/dL    Glucose 95 70 - 99 mg/dL    Alkaline Phosphatase 75 35 - 104 U/L    AST 19 10 - 35 U/L    ALT 9 (L) 10 - 35 U/L    Protein Total 8.4 (H) 6.4 - 8.3 g/dL    Albumin 5.0 3.5 - 5.2 g/dL    Bilirubin Total 0.5 <=1.2 mg/dL    GFR Estimate 9 (L) >60 mL/min/1.73m2   CBC with platelets     Status: Abnormal   Result Value Ref Range    WBC Count 6.4 4.0 - 11.0 10e3/uL    RBC Count 3.72 (L) 3.80 - 5.20 10e6/uL    Hemoglobin 11.1 (L) 11.7 - 15.7 g/dL    Hematocrit 34.2 (L) 35.0 - 47.0 %    MCV 92 78 - 100 fL    MCH 29.8 26.5 - 33.0 pg    MCHC 32.5 31.5 - 36.5 g/dL    RDW 13.2 10.0 - 15.0 %    Platelet Count 261 150 - 450 10e3/uL   Adult Type and Screen     Status: None   Result Value Ref Range    ABO/RH(D) A POS     Antibody Screen Negative Negative    SPECIMEN EXPIRATION DATE [id number]    HLA Final Crossmatch Recipient     Status: None (In process)    Narrative    The following orders were created for panel order HLA Final Crossmatch Recipient.  Procedure                               Abnormality         Status                     ---------                               -----------         ------                     HLA Final Crossmatch Rec...[[id number]]                      In process                   Please view results for these tests on the individual orders.   ABO/Rh type and screen     Status: None    Narrative    The following orders were created for panel order ABO/Rh type and  screen.  Procedure                               Abnormality         Status                     ---------                               -----------         ------                     Adult Type and Screen[[id number]]                            Final result                 Please view results for these tests on the individual orders.   Results for orders placed or performed in visit on 04/03/23   EKG 12-lead complete w/read - Clinics     Status: None (Preliminary result)   Result Value Ref Range    Systolic Blood Pressure  mmHg    Diastolic Blood Pressure  mmHg    Ventricular Rate 75 BPM    Atrial Rate 75 BPM    MN Interval 126 ms    QRS Duration 86 ms     ms    QTc 471 ms    P Axis 2 degrees    R AXIS 64 degrees    T Axis 52 degrees    Interpretation ECG       Sinus rhythm  Normal ECG  When compared with ECG of 21-DEC-2022 22:18,  No significant change was found       Recent Labs   Lab Test 01/25/23  1221 01/02/23  0829 12/22/22  0608 12/21/22  2045   HGB 8.5* 7.6*   < > 5.6*    128*   < > 163   INR  --  1.12  --  1.07    138   < > 141   POTASSIUM 4.6 4.4   < > 4.9   CR 8.28* 8.29*   < > 11.70*    < > = values in this interval not displayed.     OS cPRA   Date Value Ref Range Status   11/11/2019 84  Final     UNOS CPRA   Date Value Ref Range Status   02/13/2023 84  Final     A POS    ASSESSMENT:                 Ms. Horan is a 32 year old female who is appropriate for elective living donor kidney transplantation with the following pertinent issues:    1. Labs reviewed. Findings requiring additional evaluation before surgery: No  2. EKG (4/3/23): appears normal  3. ECHO (12/5/23); Normal ejection fraction and no significant valvular dysfunction  4. ABO= A POS  5. Paired Exchange case: Yes  6. Outstanding issues: Final COVID screen and CXR pending.     PLAN:                 1. Consent: Done  2. Outstanding issues: Final COVID screening and CXR pending. Will dialyze day before surgery.     Signed  Electronically by: TANYA Helm CNP

## 2023-04-03 NOTE — LETTER
4/3/2023         RE: Ciaty Horan  [address]  [address]  [facility] [address]        Dear Colleague,    Thank you for referring your patient, Caity Horan, to the [facility] TRANSPLANT [facility]. Please see a copy of my visit note below.    Transplant Surgery H&P:                           HPI:      Ms. Horan is a 32 year old female who comes to clinic today for preop prior to planned living donor kidney transplantation. The patient was previously reviewed by the multidisciplinary selection committee and found to be medically and psychosocially appropriate for kidney transplantation. Ms. Horan has End stage renal failure due to IgA nephropathy.     The patient is on dialysis.      If on dialysis, modality: HD, via R internal jugular catheter   Dialysis days: Monday, Wednesday, Friday                 YES  NO   Chronic anticoagulation  []      [x] Indication:   Recurrent infections  []      [x]  Type:                  Bladder dysfunction  [x]      [] Cause: CKD  Claudication   []      [x] Distance:    Previous Amputation  []      [x] Cause:       Health events since transplant evaluation: None    Special considerations:  PONV: no  Caodaism: No    MEDICAL HISTORY:      Patient Active Problem List    Diagnosis Date Noted     Dialysis patient (H) 12/21/2022     Priority: Medium     Ulcerative colitis (H) 12/14/2022     Priority: Medium     Vitamin D deficiency 12/14/2022     Priority: Medium     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     Priority: Medium     Ulcerative pancolitis with rectal bleeding (H) 03/21/2018     Priority: Medium     Vitamin B12 deficiency 03/02/2018     Priority: Medium     Anemia, iron deficiency 10/13/2017     Priority: Medium     EBV (Nicole-Barr virus) viremia 08/15/2017     Priority: Medium     Aftercare following organ transplant 12/18/2016     Priority: Medium     Hypomagnesemia 02/25/2015     Priority: Medium     Immunosuppressed status (H) 12/10/2014      Priority: Medium     Kidney replaced by transplant 12/05/2014     Priority: Medium     Living 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) 12/5/2014       IgA nephropathy 11/23/2014     Priority: Medium     biopsy proven       Metabolic acidosis 10/27/2014     Priority: Medium      Past Medical History:   Diagnosis Date     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     Anemia in stage 5 chronic kidney disease (H) 10/27/2014     ESRD (end stage renal disease) on dialysis (H)      HTN (hypertension) 10/27/2014     Hypertension 10/2014     IgA nephropathy     biopsy proven     Metabolic acidosis      Ulcerative pancolitis (H) 2012     Vitamin D deficiency      Past Surgical History:   Procedure Laterality Date     BIOPSY  2021    renal     COLONOSCOPY N/A 03/12/2018    Procedure: COMBINED COLONOSCOPY, SINGLE OR MULTIPLE BIOPSY/POLYPECTOMY BY BIOPSY;  EGD/Colonoscopy ;  Surgeon: Kory Massey MD;  Location: [facility]     COLONOSCOPY N/A 09/10/2018    Procedure: COMBINED COLONOSCOPY, SINGLE OR MULTIPLE BIOPSY/POLYPECTOMY BY BIOPSY;  Colonoscopy;  Surgeon: Yenifer Doan MD;  [facility]     COLONOSCOPY N/A 2/3/2023    Procedure: COLONOSCOPY, WITH BIOPSY;  Surgeon: Yaakov Loving MD;  Location: [facility]     EXTRACTION(S) DENTAL       IR CVC TUNNEL PLACEMENT > 5 YRS OF AGE  12/22/2022     PERCUTANEOUS BIOPSY KIDNEY Right 12/19/2018    Procedure: Right Kidney Biopsy;  Surgeon: Otilio Mar MD;  Location: [facility]     TRANSPLANT KIDNEY RECIPIENT LIVING RELATED N/A 12/05/2014    Procedure: TRANSPLANT KIDNEY RECIPIENT LIVING RELATED;  Surgeon: Dale Middleton MD;  Location: [facility] OR     WISDOM TOOTH EXTRACTION Bilateral      Current Outpatient Medications   Medication Sig Dispense Refill     bisacodyl (DULCOLAX) 5 MG EC tablet Take 2 tablets at 3 pm the day before your procedure. If your procedure is before 11 am, take 2 additional tablets at 11 pm. If your procedure is after 11 am, take 2 additional tablets at 6 am. For  additional instructions refer to your colonoscopy prep instructions. 4 tablet 0     Blood Pressure Monitoring (B-D ASSURE BPM/AUTO ARM CUFF) MISC Monitor blood pressure and pulse twice daily 1 each 0     CALCIUM ANTACID 500 MG chewable tablet Take by mouth 3 times daily       Cyanocobalamin (B-12) 1000 MCG TBCR TAKE ONE TABLET BY MOUTH EVERY DAY 90 tablet 3     levonorgestrel (MIRENA) 20 MCG/DAY IUD 1 each by Intrauterine route       mycophenolate (GENERIC EQUIVALENT) 250 MG capsule Take 2 capsules (500 mg) by mouth 2 times daily 360 capsule 3     ondansetron (ZOFRAN) 4 MG tablet Take one tablet every six hours for nausea during colonoscopy bowel prepping 3 tablet 0     polyethylene glycol (GOLYTELY) 236 g suspension The night before the exam at 6 pm drink an 8-ounce glass every 15 minutes until the jug is half empty. If you arrive before 11 AM: Drink the other half of the Golytely jug at 11 PM night before procedure. If you arrive after 11 AM: Drink the other half of the Golytely jug at 6 AM day of procedure. For additional instructions refer to your colonoscopy prep instructions. 4000 mL 0     tacrolimus (GENERIC EQUIVALENT) 1 MG capsule Take 3 capsules (3 mg) by mouth 2 times daily 543 capsule 3     Vedolizumab (ENTYVIO IV)        vitamin D3 (CHOLECALCIFEROL) 50 mcg (2000 units) tablet Take 1 tablet (50 mcg) by mouth daily 100 tablet 3     OTC products: biotin  Allergies   Allergen Reactions     Bumetanide Other (See Comments)     Causes paralysis     Amoxicillin Rash      Social History     Tobacco Use     Smoking status: Never     Smokeless tobacco: Never   Vaping Use     Vaping status: Not on file   Substance Use Topics     Alcohol use: Not Currently     Alcohol/week: 3.0 - 9.0 standard drinks of alcohol     Types: 1 - 3 Glasses of wine, 1 - 3 Cans of beer, 1 - 3 Shots of liquor per week     OR  Social History     Socioeconomic History     Marital status: Single     Spouse name: Not on file     Number of  children: Not on file     Years of education: 16     Highest education level: Not on file   Occupational History     Occupation: advertising     Employer: [employer]   Tobacco Use     Smoking status: Never     Smokeless tobacco: Never   Vaping Use     Vaping status: Not on file   Substance and Sexual Activity     Alcohol use: Not Currently     Alcohol/week: 3.0 - 9.0 standard drinks of alcohol     Types: 1 - 3 Glasses of wine, 1 - 3 Cans of beer, 1 - 3 Shots of liquor per week     Drug use: No     Sexual activity: Never   Other Topics Concern      Service Not Asked     Blood Transfusions No     Caffeine Concern Not Asked     Occupational Exposure Not Asked     Hobby Hazards Not Asked     Sleep Concern Not Asked     Stress Concern Not Asked     Weight Concern Not Asked     Special Diet Not Asked     Back Care Not Asked     Exercise Not Asked     Bike Helmet Not Asked     Seat Belt Yes     Self-Exams Not Asked   Social History Narrative     Not on file     Social Determinants of Health     Financial Resource Strain: Not on file   Food Insecurity: Not on file   Transportation Needs: Not on file   Physical Activity: Not on file   Stress: Not on file   Social Connections: Not on file   Intimate Partner Violence: Not on file   Housing Stability: Not on file     History   Drug Use No     Family History   Problem Relation Age of Onset     No Known Problems Mother      Alcoholism Father          in early 50s     Prostate Cancer Paternal Grandmother      Kidney Disease No family hx of        REVIEW OF SYSTEMS:        CONSTITUTIONAL: NEGATIVE for fever, chills, change in weight  INTEGUMENTARY/SKIN: NEGATIVE for worrisome rashes, moles or lesions  EYES: NEGATIVE for vision changes or irritation  ENT/MOUTH: NEGATIVE for ear, mouth and throat problems  RESP: NEGATIVE for significant cough or SOB  CV: NEGATIVE for chest pain, palpitations or peripheral edema  GI: NEGATIVE for nausea, abdominal pain,  heartburn, or change in bowel habits  : NEGATIVE for frequency, dysuria, or hematuria  MUSCULOSKELETAL: NEGATIVE for significant arthralgias or myalgia  NEURO: NEGATIVE for weakness, dizziness or paresthesias  ENDOCRINE: NEGATIVE for temperature intolerance, skin/hair changes  HEME: NEGATIVE for bleeding problems  PSYCHIATRIC: NEGATIVE for changes in mood or affect    EXAM:                       Temp:  [98.2  F (36.8  C)] 98.2  F (36.8  C)  Pulse:  [94] 94  Resp:  [16] 16  BP: (98)/(66) 98/66  SpO2:  [100 %] 100 %    GENERAL APPEARANCE: healthy, alert and no distress     EYES: EOMI, PERRL     HENT: ear canals and TM's normal and nose and mouth without ulcers or lesions     NECK: no adenopathy, no asymmetry, masses, or scars and thyroid normal to palpation     RESP: lungs clear to auscultation - no rales, rhonchi or wheezes     CV: regular rates and rhythm, normal S1 S2, no S3 or S4 and no murmur, click or rub     ABDOMEN:  soft, nontender, no HSM or masses and bowel sounds normal     MS: extremities normal- no gross deformities noted, no evidence of inflammation in joints, FROM in all extremities.     SKIN: no suspicious lesions or rashes     NEURO: Normal strength and tone, sensory exam grossly normal, mentation intact and speech normal     PSYCH: mentation appears normal. and affect normal/bright     LYMPHATICS: No cervical adenopathy      DIAGNOSTICS:                EKG: appears normal, NSR  Labs Resulted Today:   Results for orders placed or performed in visit on 04/03/23   hCG Qualitative Pregnancy     Status: Normal   Result Value Ref Range    hCG Serum Qualitative Negative Negative   Parathyroid Hormone Intact     Status: Abnormal   Result Value Ref Range    Parathyroid Hormone Intact 309 (H) 15 - 65 pg/mL    Narrative    This result was obtained with the Roche Elecsys PTH STAT assay.   This reference range differs from PTH assays used in other [facility] laboratories.   Iron and iron binding  capacity     Status: Abnormal   Result Value Ref Range    Iron 186 (H) 37 - 145 ug/dL    Iron Binding Capacity 261 240 - 430 ug/dL    Iron Sat Index 71 (H) 15 - 46 %   INR     Status: Normal   Result Value Ref Range    INR 1.07 0.85 - 1.15   Hemoglobin A1c     Status: Normal   Result Value Ref Range    Hemoglobin A1C 5.3 <5.7 %   Ferritin     Status: Abnormal   Result Value Ref Range    Ferritin 902 (H) 6 - 175 ng/mL   Comprehensive metabolic panel     Status: Abnormal   Result Value Ref Range    Sodium 137 136 - 145 mmol/L    Potassium 3.5 3.4 - 5.3 mmol/L    Chloride 91 (L) 98 - 107 mmol/L    Carbon Dioxide (CO2) 30 (H) 22 - 29 mmol/L    Anion Gap 16 (H) 7 - 15 mmol/L    Urea Nitrogen 21.0 (H) 6.0 - 20.0 mg/dL    Creatinine 6.11 (H) 0.51 - 0.95 mg/dL    Calcium 10.1 (H) 8.6 - 10.0 mg/dL    Glucose 95 70 - 99 mg/dL    Alkaline Phosphatase 75 35 - 104 U/L    AST 19 10 - 35 U/L    ALT 9 (L) 10 - 35 U/L    Protein Total 8.4 (H) 6.4 - 8.3 g/dL    Albumin 5.0 3.5 - 5.2 g/dL    Bilirubin Total 0.5 <=1.2 mg/dL    GFR Estimate 9 (L) >60 mL/min/1.73m2   CBC with platelets     Status: Abnormal   Result Value Ref Range    WBC Count 6.4 4.0 - 11.0 10e3/uL    RBC Count 3.72 (L) 3.80 - 5.20 10e6/uL    Hemoglobin 11.1 (L) 11.7 - 15.7 g/dL    Hematocrit 34.2 (L) 35.0 - 47.0 %    MCV 92 78 - 100 fL    MCH 29.8 26.5 - 33.0 pg    MCHC 32.5 31.5 - 36.5 g/dL    RDW 13.2 10.0 - 15.0 %    Platelet Count 261 150 - 450 10e3/uL   Adult Type and Screen     Status: None   Result Value Ref Range    ABO/RH(D) A POS     Antibody Screen Negative Negative    SPECIMEN EXPIRATION DATE [id number]    HLA Final Crossmatch Recipient     Status: None (In process)    Narrative    The following orders were created for panel order HLA Final Crossmatch Recipient.  Procedure                               Abnormality         Status                     ---------                               -----------         ------                     HLA Final  Crossmatch Rec...[[id number]]                      In process                   Please view results for these tests on the individual orders.   ABO/Rh type and screen     Status: None    Narrative    The following orders were created for panel order ABO/Rh type and screen.  Procedure                               Abnormality         Status                     ---------                               -----------         ------                     Adult Type and Screen[[id number]]                            Final result                 Please view results for these tests on the individual orders.   Results for orders placed or performed in visit on 04/03/23   EKG 12-lead complete w/read - Clinics     Status: None (Preliminary result)   Result Value Ref Range    Systolic Blood Pressure  mmHg    Diastolic Blood Pressure  mmHg    Ventricular Rate 75 BPM    Atrial Rate 75 BPM    NJ Interval 126 ms    QRS Duration 86 ms     ms    QTc 471 ms    P Axis 2 degrees    R AXIS 64 degrees    T Axis 52 degrees    Interpretation ECG       Sinus rhythm  Normal ECG  When compared with ECG of 21-DEC-2022 22:18,  No significant change was found       Recent Labs   Lab Test 01/25/23  1221 01/02/23  0829 12/22/22  0608 12/21/22  2045   HGB 8.5* 7.6*   < > 5.6*    128*   < > 163   INR  --  1.12  --  1.07    138   < > 141   POTASSIUM 4.6 4.4   < > 4.9   CR 8.28* 8.29*   < > 11.70*    < > = values in this interval not displayed.     [facility] cPRA   Date Value Ref Range Status   11/11/2019 84  Final     OS CPRA   Date Value Ref Range Status   02/13/2023 84  Final     A POS    ASSESSMENT:                 Ms. Horan is a 32 year old female who is appropriate for elective living donor kidney transplantation with the following pertinent issues:    1. Labs reviewed. Findings requiring additional evaluation before surgery: No  2. EKG (4/3/23): appears normal  3. ECHO (12/5/23); Normal ejection fraction and no significant valvular  dysfunction  4. ABO= A POS  5. Paired Exchange case: Yes  6. Outstanding issues: Final COVID screen and CXR pending.     PLAN:                 1. Consent: Done  2. Outstanding issues: Final COVID screening and CXR pending. Will dialyze day before surgery.     Signed Electronically by: TANYA Helm CNP        Again, thank you for allowing me to participate in the care of your patient.        Sincerely,        TANYA Helm CNP

## 2023-04-03 NOTE — LETTER
4/3/2023         RE: Caity Horan  [address]  [address]  [facility] [address]        Dear Colleague,    Thank you for referring your patient, Caity Horan, to the [facility] TRANSPLANT [facility]IN[facility]. Please see a copy of my visit note below.    31 yo Pre-op visit for LD kidney transplant  IgA nephropathy  Dialysis Dec 2022  PRA 84%  3 dose TMG induction  Left sided incision and transplant planned, mirror image the right sided incisiion  Good femoral pulse  Donor - young, age and size matched, low eplet MM, left single single  Refer to H and P for detailed medical info  Risks of the surgical procedure including but not limited to the rare risk of mortality discussed in detail. Patient verbalized good understanding and had several pertinent questions which were answered satisfactorily.   Immunosuppressive regimen, management and long term risks discussed in detail.   Total time: 40 min  Counseling time: 20 min        Again, thank you for allowing me to participate in the care of your patient.        Sincerely,        Ivy Sepulveda MD

## 2023-04-03 NOTE — PROGRESS NOTES
33 yo Pre-op visit for LD kidney transplant  IgA nephropathy  Dialysis Dec 2022  PRA 84%  3 dose TMG induction  Left sided incision and transplant planned, mirror image the right sided incisiion  Good femoral pulse  Donor - young, age and size matched, low eplet MM, left single single  Refer to H and P for detailed medical info  Risks of the surgical procedure including but not limited to the rare risk of mortality discussed in detail. Patient verbalized good understanding and had several pertinent questions which were answered satisfactorily.   Immunosuppressive regimen, management and long term risks discussed in detail.   Total time: 40 min  Counseling time: 20 min

## 2023-04-04 ENCOUNTER — LAB REQUISITION (OUTPATIENT)
Dept: LAB | Facility: CLINIC | Age: 33
End: 2023-04-04
Payer: COMMERCIAL

## 2023-04-04 DIAGNOSIS — Z76.82 ORGAN TRANSPLANT CANDIDATE: ICD-10-CM

## 2023-04-04 DIAGNOSIS — N18.6 ESRD (END STAGE RENAL DISEASE) (H): ICD-10-CM

## 2023-04-04 LAB
CMV IGG SERPL IA-ACNC: <0.2 U/ML
CMV IGG SERPL IA-ACNC: NORMAL
DEPRECATED CALCIDIOL+CALCIFEROL SERPL-MC: 48 UG/L (ref 20–75)
EBV VCA IGG SER IA-ACNC: >750 U/ML
EBV VCA IGG SER IA-ACNC: POSITIVE
SARS-COV-2 RNA RESP QL NAA+PROBE: NEGATIVE

## 2023-04-04 PROCEDURE — 86833 HLA CLASS II HIGH DEFIN QUAL: CPT | Mod: ORL | Performed by: TRANSPLANT SURGERY

## 2023-04-04 PROCEDURE — 86832 HLA CLASS I HIGH DEFIN QUAL: CPT | Mod: ORL | Performed by: TRANSPLANT SURGERY

## 2023-04-05 ENCOUNTER — APPOINTMENT (OUTPATIENT)
Dept: URBAN - METROPOLITAN AREA CLINIC 258 | Age: 33
Setting detail: DERMATOLOGY
End: 2023-04-05

## 2023-04-05 DIAGNOSIS — B07.8 OTHER VIRAL WARTS: ICD-10-CM

## 2023-04-05 LAB
ATRIAL RATE - MUSE: 75 BPM
CMV IGM SERPL IA-ACNC: <8 AU/ML
CMV IGM SERPL IA-ACNC: NEGATIVE
DIASTOLIC BLOOD PRESSURE - MUSE: NORMAL MMHG
EBV VCA IGM SER IA-ACNC: <10 U/ML
EBV VCA IGM SER IA-ACNC: NORMAL
INTERPRETATION ECG - MUSE: NORMAL
P AXIS - MUSE: 2 DEGREES
PR INTERVAL - MUSE: 126 MS
QRS DURATION - MUSE: 86 MS
QT - MUSE: 422 MS
QTC - MUSE: 471 MS
R AXIS - MUSE: 64 DEGREES
SA 1 CELL: NORMAL
SA 1 TEST METHOD: NORMAL
SA 2 CELL: NORMAL
SA 2 TEST METHOD: NORMAL
SA1 HI RISK ABY: NORMAL
SA1 MOD RISK ABY: NORMAL
SA2 HI RISK ABY: NORMAL
SA2 MOD RISK ABY: NORMAL
SYSTOLIC BLOOD PRESSURE - MUSE: NORMAL MMHG
T AXIS - MUSE: 52 DEGREES
VENTRICULAR RATE- MUSE: 75 BPM
ZZZSA 1  COMMENTS: NORMAL
ZZZSA 2 COMMENTS: NORMAL

## 2023-04-05 PROCEDURE — OTHER ADDITIONAL NOTES: OTHER

## 2023-04-05 PROCEDURE — OTHER MIPS QUALITY: OTHER

## 2023-04-05 PROCEDURE — 99212 OFFICE O/P EST SF 10 MIN: CPT

## 2023-04-05 PROCEDURE — OTHER COUNSELING: OTHER

## 2023-04-05 NOTE — PROCEDURE: ADDITIONAL NOTES
Render Risk Assessment In Note?: no
Detail Level: Simple
Additional Notes: Due to patient receiving a kidney transplant next week we elected to do no treatment today due to potential risk of infection. Advised patient to make a one month follow up for May once she is recovered from surgery. At that time we will continue to current course of treatment with pairing, liquid nitrogen, and cantharadin. \\nLeading up to surgery, it was recommended that the patient continue using mediplast and a pummel stone to shave down the warts at home.

## 2023-04-06 LAB
PROTOCOL CUTOFF: NORMAL
SA 1 CELL: NORMAL
SA 1 TEST METHOD: NORMAL
SA 2 CELL: NORMAL
SA 2 TEST METHOD: NORMAL
SA1 HI RISK ABY: NORMAL
SA1 MOD RISK ABY: NORMAL
SA2 HI RISK ABY: NORMAL
SA2 MOD RISK ABY: NORMAL
UNACCEPTABLE ANTIGENS: NORMAL
UNOS CPRA: 82
ZZZSA 1  COMMENTS: NORMAL
ZZZSA 2 COMMENTS: NORMAL

## 2023-04-08 LAB
CELL TYPE ALLO: NORMAL
CELL TYPE AUTO: NORMAL
CHANNELSHIFTALLOB1: -40
CHANNELSHIFTALLOB2: -77
CHANNELSHIFTALLOT1: 9
CHANNELSHIFTALLOT2: -16
CHANNELSHIFTAUTOB1: -46
CHANNELSHIFTAUTOB2: -69
CHANNELSHIFTAUTOT1: 7
CHANNELSHIFTAUTOT2: -15
CROSSMATCHDATEALLO: NORMAL
CROSSMATCHDATEAUTO: NORMAL
DONOR ALLO: NORMAL
DONOR AUTO: NORMAL
DONORCELLDATE ALLO: NORMAL
DONORCELLDATE AUTO: NORMAL
POS CUT OFF ALLO B: >91
POS CUT OFF ALLO T: >58
POS CUT OFF AUTO B: >91
POS CUT OFF AUTO T: >58
RESULT ALLO B1: NORMAL
RESULT ALLO B2: NORMAL
RESULT ALLO T1: NORMAL
RESULT ALLO T2: NORMAL
RESULT AUTO B1: NORMAL
RESULT AUTO B2: NORMAL
RESULT AUTO T1: NORMAL
RESULT AUTO T2: NORMAL
SERUM DATE ALLO B1: NORMAL
SERUM DATE ALLO B2: NORMAL
SERUM DATE ALLO T1: NORMAL
SERUM DATE ALLO T2: NORMAL
SERUM DATE AUTO B1: NORMAL
SERUM DATE AUTO B2: NORMAL
SERUM DATE AUTO T1: NORMAL
SERUM DATE AUTO T2: NORMAL
TESTMETHODALLO: NORMAL
TESTMETHODAUTO: NORMAL
TREATMENT ALLO B1: NORMAL
TREATMENT ALLO B2: NORMAL
TREATMENT ALLO T1: NORMAL
TREATMENT ALLO T2: NORMAL
TREATMENT AUTO B1: NORMAL
TREATMENT AUTO B2: NORMAL
TREATMENT AUTO T1: NORMAL
TREATMENT AUTO T2: NORMAL
ZZZCOMMENT ALLOB2: NORMAL

## 2023-04-11 ENCOUNTER — TELEPHONE (OUTPATIENT)
Dept: TRANSPLANT | Facility: CLINIC | Age: 33
End: 2023-04-11

## 2023-04-11 ENCOUNTER — LAB (OUTPATIENT)
Dept: LAB | Facility: CLINIC | Age: 33
End: 2023-04-11
Attending: SURGERY
Payer: COMMERCIAL

## 2023-04-11 ENCOUNTER — ANCILLARY PROCEDURE (OUTPATIENT)
Dept: GENERAL RADIOLOGY | Facility: CLINIC | Age: 33
End: 2023-04-11
Attending: SURGERY
Payer: COMMERCIAL

## 2023-04-11 DIAGNOSIS — Z32.00 ENCOUNTER FOR PREGNANCY TEST: ICD-10-CM

## 2023-04-11 DIAGNOSIS — N18.6 ESRD (END STAGE RENAL DISEASE) (H): ICD-10-CM

## 2023-04-11 DIAGNOSIS — R73.03 PRE-DIABETES: ICD-10-CM

## 2023-04-11 DIAGNOSIS — Z76.82 AWAITING ORGAN TRANSPLANT: ICD-10-CM

## 2023-04-11 DIAGNOSIS — Z94.0 KIDNEY REPLACED BY TRANSPLANT: ICD-10-CM

## 2023-04-11 DIAGNOSIS — Z76.82 ORGAN TRANSPLANT CANDIDATE: ICD-10-CM

## 2023-04-11 LAB
ALBUMIN SERPL BCG-MCNC: 5.1 G/DL (ref 3.5–5.2)
ALP SERPL-CCNC: 70 U/L (ref 35–104)
ALT SERPL W P-5'-P-CCNC: 15 U/L (ref 10–35)
ANION GAP SERPL CALCULATED.3IONS-SCNC: 19 MMOL/L (ref 7–15)
AST SERPL W P-5'-P-CCNC: 23 U/L (ref 10–35)
BASOPHILS # BLD AUTO: 0 10E3/UL (ref 0–0.2)
BASOPHILS NFR BLD AUTO: 0 %
BILIRUB DIRECT SERPL-MCNC: <0.2 MG/DL (ref 0–0.3)
BILIRUB SERPL-MCNC: 0.6 MG/DL
BUN SERPL-MCNC: 30.3 MG/DL (ref 6–20)
CALCIUM SERPL-MCNC: 10.5 MG/DL (ref 8.6–10)
CHLORIDE SERPL-SCNC: 88 MMOL/L (ref 98–107)
CREAT SERPL-MCNC: 8.3 MG/DL (ref 0.51–0.95)
CRP SERPL-MCNC: <3 MG/L
DEPRECATED HCO3 PLAS-SCNC: 29 MMOL/L (ref 22–29)
EOSINOPHIL # BLD AUTO: 0 10E3/UL (ref 0–0.7)
EOSINOPHIL NFR BLD AUTO: 0 %
ERYTHROCYTE [DISTWIDTH] IN BLOOD BY AUTOMATED COUNT: 13.5 % (ref 10–15)
ERYTHROCYTE [SEDIMENTATION RATE] IN BLOOD BY WESTERGREN METHOD: 23 MM/HR (ref 0–20)
GFR SERPL CREATININE-BSD FRML MDRD: 6 ML/MIN/1.73M2
GLUCOSE SERPL-MCNC: 122 MG/DL (ref 70–99)
HCT VFR BLD AUTO: 37.9 % (ref 35–47)
HGB BLD-MCNC: 12.5 G/DL (ref 11.7–15.7)
IMM GRANULOCYTES # BLD: 0.1 10E3/UL
IMM GRANULOCYTES NFR BLD: 2 %
LYMPHOCYTES # BLD AUTO: 1.6 10E3/UL (ref 0.8–5.3)
LYMPHOCYTES NFR BLD AUTO: 28 %
MCH RBC QN AUTO: 30.3 PG (ref 26.5–33)
MCHC RBC AUTO-ENTMCNC: 33 G/DL (ref 31.5–36.5)
MCV RBC AUTO: 92 FL (ref 78–100)
MONOCYTES # BLD AUTO: 0.7 10E3/UL (ref 0–1.3)
MONOCYTES NFR BLD AUTO: 12 %
NEUTROPHILS # BLD AUTO: 3.3 10E3/UL (ref 1.6–8.3)
NEUTROPHILS NFR BLD AUTO: 58 %
NRBC # BLD AUTO: 0 10E3/UL
NRBC BLD AUTO-RTO: 0 /100
PLATELET # BLD AUTO: 226 10E3/UL (ref 150–450)
POTASSIUM SERPL-SCNC: 3.6 MMOL/L (ref 3.4–5.3)
PROT SERPL-MCNC: 8.6 G/DL (ref 6.4–8.3)
RBC # BLD AUTO: 4.12 10E6/UL (ref 3.8–5.2)
SARS-COV-2 RNA RESP QL NAA+PROBE: NEGATIVE
SODIUM SERPL-SCNC: 136 MMOL/L (ref 136–145)
WBC # BLD AUTO: 5.7 10E3/UL (ref 4–11)

## 2023-04-11 PROCEDURE — U0005 INFEC AGEN DETEC AMPLI PROBE: HCPCS | Mod: 90 | Performed by: PATHOLOGY

## 2023-04-11 PROCEDURE — 36415 COLL VENOUS BLD VENIPUNCTURE: CPT | Performed by: PHYSICIAN ASSISTANT

## 2023-04-11 PROCEDURE — 82248 BILIRUBIN DIRECT: CPT | Performed by: PHYSICIAN ASSISTANT

## 2023-04-11 PROCEDURE — 85652 RBC SED RATE AUTOMATED: CPT | Performed by: PHYSICIAN ASSISTANT

## 2023-04-11 PROCEDURE — 71046 X-RAY EXAM CHEST 2 VIEWS: CPT | Mod: GC | Performed by: RADIOLOGY

## 2023-04-11 PROCEDURE — U0003 INFECTIOUS AGENT DETECTION BY NUCLEIC ACID (DNA OR RNA); SEVERE ACUTE RESPIRATORY SYNDROME CORONAVIRUS 2 (SARS-COV-2) (CORONAVIRUS DISEASE [COVID-19]), AMPLIFIED PROBE TECHNIQUE, MAKING USE OF HIGH THROUGHPUT TECHNOLOGIES AS DESCRIBED BY CMS-2020-01-R: HCPCS | Mod: 90 | Performed by: PATHOLOGY

## 2023-04-11 PROCEDURE — 86140 C-REACTIVE PROTEIN: CPT | Performed by: PHYSICIAN ASSISTANT

## 2023-04-11 PROCEDURE — 85025 COMPLETE CBC W/AUTO DIFF WBC: CPT | Performed by: PHYSICIAN ASSISTANT

## 2023-04-11 PROCEDURE — 80053 COMPREHEN METABOLIC PANEL: CPT | Performed by: PHYSICIAN ASSISTANT

## 2023-04-11 PROCEDURE — 99000 SPECIMEN HANDLING OFFICE-LAB: CPT | Performed by: PATHOLOGY

## 2023-04-11 NOTE — TELEPHONE ENCOUNTER
Waitlist Wrap Up Call    LDKT scheduled for 4/13/23  Reminded pt that check in time is at 1230    Final XM reviewed YES. negative.    COVID STATUS: Negative Date Reviewed: 4/11/23    CHEST XRAY REVIEWED: YES. Date-4/11/223.    PREOP APPTS REVIEWED: Yes    Questions/concerns addressed with pt. Explained that pt will have post coordinator assigned to them post transplant. Reminded pt that if they would like to send letter to donor to contact [facility] when ready. Pt verbalized understanding of information and has no further questions. Encouraged to reach out if questions arise.

## 2023-04-12 ENCOUNTER — ANESTHESIA EVENT (OUTPATIENT)
Dept: SURGERY | Facility: CLINIC | Age: 33
DRG: 652 | End: 2023-04-12
Payer: COMMERCIAL

## 2023-04-13 ENCOUNTER — APPOINTMENT (OUTPATIENT)
Dept: ULTRASOUND IMAGING | Facility: CLINIC | Age: 33
DRG: 652 | End: 2023-04-13
Attending: SURGERY
Payer: COMMERCIAL

## 2023-04-13 ENCOUNTER — ANESTHESIA (OUTPATIENT)
Dept: SURGERY | Facility: CLINIC | Age: 33
DRG: 652 | End: 2023-04-13
Payer: COMMERCIAL

## 2023-04-13 ENCOUNTER — HOSPITAL ENCOUNTER (INPATIENT)
Facility: CLINIC | Age: 33
LOS: 7 days | Discharge: HOME | DRG: 652 | End: 2023-04-20
Attending: SURGERY | Admitting: SURGERY
Payer: COMMERCIAL

## 2023-04-13 ENCOUNTER — APPOINTMENT (OUTPATIENT)
Dept: GENERAL RADIOLOGY | Facility: CLINIC | Age: 33
DRG: 652 | End: 2023-04-13
Attending: SURGERY
Payer: COMMERCIAL

## 2023-04-13 DIAGNOSIS — Z76.82 AWAITING ORGAN TRANSPLANT: ICD-10-CM

## 2023-04-13 DIAGNOSIS — N18.6 ESRD (END STAGE RENAL DISEASE) (H): ICD-10-CM

## 2023-04-13 DIAGNOSIS — K59.03 DRUG-INDUCED CONSTIPATION: ICD-10-CM

## 2023-04-13 DIAGNOSIS — D84.9 IMMUNOSUPPRESSED STATUS (H): Primary | ICD-10-CM

## 2023-04-13 DIAGNOSIS — F41.9 ANXIETY: ICD-10-CM

## 2023-04-13 DIAGNOSIS — R73.03 PRE-DIABETES: ICD-10-CM

## 2023-04-13 DIAGNOSIS — E83.39 HYPOPHOSPHATEMIA: ICD-10-CM

## 2023-04-13 DIAGNOSIS — Z32.00 ENCOUNTER FOR PREGNANCY TEST: ICD-10-CM

## 2023-04-13 DIAGNOSIS — Z94.0 KIDNEY REPLACED BY TRANSPLANT: ICD-10-CM

## 2023-04-13 DIAGNOSIS — E83.42 HYPOMAGNESEMIA: ICD-10-CM

## 2023-04-13 DIAGNOSIS — G89.18 ACUTE POST-OPERATIVE PAIN: ICD-10-CM

## 2023-04-13 LAB
ABO/RH(D): NORMAL
ANION GAP SERPL CALCULATED.3IONS-SCNC: 15 MMOL/L (ref 7–15)
ANTIBODY SCREEN: NEGATIVE
BUN SERPL-MCNC: 29.7 MG/DL (ref 6–20)
CALCIUM SERPL-MCNC: 9.5 MG/DL (ref 8.6–10)
CHLORIDE SERPL-SCNC: 95 MMOL/L (ref 98–107)
CREAT SERPL-MCNC: 7.23 MG/DL (ref 0.51–0.95)
DEPRECATED HCO3 PLAS-SCNC: 23 MMOL/L (ref 22–29)
ERYTHROCYTE [DISTWIDTH] IN BLOOD BY AUTOMATED COUNT: 13.5 % (ref 10–15)
GFR SERPL CREATININE-BSD FRML MDRD: 7 ML/MIN/1.73M2
GLUCOSE BLDC GLUCOMTR-MCNC: 154 MG/DL (ref 70–99)
GLUCOSE BLDC GLUCOMTR-MCNC: 73 MG/DL (ref 70–99)
GLUCOSE SERPL-MCNC: 142 MG/DL (ref 70–99)
HBV CORE AB SERPL QL IA: NONREACTIVE
HBV SURFACE AB SERPL IA-ACNC: 293.32 M[IU]/ML
HBV SURFACE AB SERPL IA-ACNC: REACTIVE M[IU]/ML
HBV SURFACE AG SERPL QL IA: NONREACTIVE
HCT VFR BLD AUTO: 29.4 % (ref 35–47)
HCV AB SERPL QL IA: NONREACTIVE
HGB BLD-MCNC: 9.3 G/DL (ref 11.7–15.7)
HGB BLD-MCNC: 9.3 G/DL (ref 11.7–15.7)
HGB BLD-MCNC: 9.7 G/DL (ref 11.7–15.7)
HIV 1+2 AB+HIV1 P24 AG SERPL QL IA: NONREACTIVE
HOLD SPECIMEN: NORMAL
MAGNESIUM SERPL-MCNC: 1.3 MG/DL (ref 1.7–2.3)
MCH RBC QN AUTO: 30 PG (ref 26.5–33)
MCHC RBC AUTO-ENTMCNC: 31.6 G/DL (ref 31.5–36.5)
MCV RBC AUTO: 95 FL (ref 78–100)
PHOSPHATE SERPL-MCNC: 4.1 MG/DL (ref 2.5–4.5)
PLATELET # BLD AUTO: 144 10E3/UL (ref 150–450)
POTASSIUM SERPL-SCNC: 3.6 MMOL/L (ref 3.4–5.3)
POTASSIUM SERPL-SCNC: 4.1 MMOL/L (ref 3.4–5.3)
POTASSIUM SERPL-SCNC: 4.1 MMOL/L (ref 3.4–5.3)
RADIOLOGIST FLAGS: ABNORMAL
RBC # BLD AUTO: 3.1 10E6/UL (ref 3.8–5.2)
SODIUM SERPL-SCNC: 133 MMOL/L (ref 136–145)
SPECIMEN EXPIRATION DATE: NORMAL
WBC # BLD AUTO: 5.4 10E3/UL (ref 4–11)

## 2023-04-13 PROCEDURE — 258N000003 HC RX IP 258 OP 636: Performed by: SURGERY

## 2023-04-13 PROCEDURE — 250N000009 HC RX 250: Performed by: NURSE ANESTHETIST, CERTIFIED REGISTERED

## 2023-04-13 PROCEDURE — 250N000011 HC RX IP 250 OP 636: Performed by: NURSE PRACTITIONER

## 2023-04-13 PROCEDURE — 120N000011 HC R&B TRANSPLANT UMMC

## 2023-04-13 PROCEDURE — 258N000003 HC RX IP 258 OP 636: Performed by: NURSE ANESTHETIST, CERTIFIED REGISTERED

## 2023-04-13 PROCEDURE — 250N000013 HC RX MED GY IP 250 OP 250 PS 637: Performed by: SURGERY

## 2023-04-13 PROCEDURE — 250N000011 HC RX IP 250 OP 636

## 2023-04-13 PROCEDURE — 250N000013 HC RX MED GY IP 250 OP 250 PS 637: Performed by: STUDENT IN AN ORGANIZED HEALTH CARE EDUCATION/TRAINING PROGRAM

## 2023-04-13 PROCEDURE — 84100 ASSAY OF PHOSPHORUS: CPT | Performed by: SURGERY

## 2023-04-13 PROCEDURE — 250N000012 HC RX MED GY IP 250 OP 636 PS 637: Performed by: SURGERY

## 2023-04-13 PROCEDURE — 250N000025 HC SEVOFLURANE, PER MIN: Performed by: SURGERY

## 2023-04-13 PROCEDURE — 250N000011 HC RX IP 250 OP 636: Performed by: NURSE ANESTHETIST, CERTIFIED REGISTERED

## 2023-04-13 PROCEDURE — 87521 HEPATITIS C PROBE&RVRS TRNSC: CPT | Performed by: NURSE PRACTITIONER

## 2023-04-13 PROCEDURE — 258N000003 HC RX IP 258 OP 636: Performed by: NURSE PRACTITIONER

## 2023-04-13 PROCEDURE — 250N000011 HC RX IP 250 OP 636: Performed by: STUDENT IN AN ORGANIZED HEALTH CARE EDUCATION/TRAINING PROGRAM

## 2023-04-13 PROCEDURE — 999N000141 HC STATISTIC PRE-PROCEDURE NURSING ASSESSMENT: Performed by: SURGERY

## 2023-04-13 PROCEDURE — 50360 RNL ALTRNSPLJ W/O RCP NFRCT: CPT | Mod: LT | Performed by: SURGERY

## 2023-04-13 PROCEDURE — 76776 US EXAM K TRANSPL W/DOPPLER: CPT

## 2023-04-13 PROCEDURE — 36415 COLL VENOUS BLD VENIPUNCTURE: CPT | Performed by: NURSE PRACTITIONER

## 2023-04-13 PROCEDURE — 250N000011 HC RX IP 250 OP 636: Performed by: SURGERY

## 2023-04-13 PROCEDURE — 86803 HEPATITIS C AB TEST: CPT | Performed by: NURSE PRACTITIONER

## 2023-04-13 PROCEDURE — 86704 HEP B CORE ANTIBODY TOTAL: CPT | Performed by: NURSE PRACTITIONER

## 2023-04-13 PROCEDURE — 258N000003 HC RX IP 258 OP 636: Performed by: STUDENT IN AN ORGANIZED HEALTH CARE EDUCATION/TRAINING PROGRAM

## 2023-04-13 PROCEDURE — 84132 ASSAY OF SERUM POTASSIUM: CPT | Performed by: SURGERY

## 2023-04-13 PROCEDURE — 360N000077 HC SURGERY LEVEL 4, PER MIN: Performed by: SURGERY

## 2023-04-13 PROCEDURE — 86850 RBC ANTIBODY SCREEN: CPT | Performed by: NURSE PRACTITIONER

## 2023-04-13 PROCEDURE — 87340 HEPATITIS B SURFACE AG IA: CPT | Performed by: NURSE PRACTITIONER

## 2023-04-13 PROCEDURE — 250N000009 HC RX 250: Performed by: NURSE PRACTITIONER

## 2023-04-13 PROCEDURE — 50325 PREP DONOR RENAL GRAFT: CPT | Mod: LT | Performed by: SURGERY

## 2023-04-13 PROCEDURE — 250N000009 HC RX 250: Performed by: STUDENT IN AN ORGANIZED HEALTH CARE EDUCATION/TRAINING PROGRAM

## 2023-04-13 PROCEDURE — 87389 HIV-1 AG W/HIV-1&-2 AB AG IA: CPT | Performed by: NURSE PRACTITIONER

## 2023-04-13 PROCEDURE — 86790 VIRUS ANTIBODY NOS: CPT | Performed by: NURSE PRACTITIONER

## 2023-04-13 PROCEDURE — 86706 HEP B SURFACE ANTIBODY: CPT | Performed by: NURSE PRACTITIONER

## 2023-04-13 PROCEDURE — 36592 COLLECT BLOOD FROM PICC: CPT | Performed by: SURGERY

## 2023-04-13 PROCEDURE — 85027 COMPLETE CBC AUTOMATED: CPT | Performed by: SURGERY

## 2023-04-13 PROCEDURE — 87535 HIV-1 PROBE&REVERSE TRNSCRPJ: CPT | Performed by: NURSE PRACTITIONER

## 2023-04-13 PROCEDURE — 0TY10Z0 TRANSPLANTATION OF LEFT KIDNEY, ALLOGENEIC, OPEN APPROACH: ICD-10-PCS | Performed by: SURGERY

## 2023-04-13 PROCEDURE — 76776 US EXAM K TRANSPL W/DOPPLER: CPT | Mod: 26 | Performed by: RADIOLOGY

## 2023-04-13 PROCEDURE — 710N000009 HC RECOVERY PHASE 1, LEVEL 1, PER MIN: Performed by: SURGERY

## 2023-04-13 PROCEDURE — 811N000001 HC ACQUISITION KIDNEY LIVING DONOR

## 2023-04-13 PROCEDURE — 80048 BASIC METABOLIC PNL TOTAL CA: CPT | Performed by: SURGERY

## 2023-04-13 PROCEDURE — 83735 ASSAY OF MAGNESIUM: CPT | Performed by: SURGERY

## 2023-04-13 PROCEDURE — 71045 X-RAY EXAM CHEST 1 VIEW: CPT | Mod: 26 | Performed by: RADIOLOGY

## 2023-04-13 PROCEDURE — 258N000002 HC RX IP 258 OP 250: Performed by: SURGERY

## 2023-04-13 PROCEDURE — 999N000065 XR CHEST PORT 1 VIEW

## 2023-04-13 PROCEDURE — 85018 HEMOGLOBIN: CPT | Performed by: SURGERY

## 2023-04-13 PROCEDURE — 370N000017 HC ANESTHESIA TECHNICAL FEE, PER MIN: Performed by: SURGERY

## 2023-04-13 PROCEDURE — 272N000001 HC OR GENERAL SUPPLY STERILE: Performed by: SURGERY

## 2023-04-13 RX ORDER — MAGNESIUM OXIDE 400 MG/1
400 TABLET ORAL
Status: DISCONTINUED | OUTPATIENT
Start: 2023-04-15 | End: 2023-04-19

## 2023-04-13 RX ORDER — BISACODYL 10 MG
10 SUPPOSITORY, RECTAL RECTAL DAILY PRN
Status: DISCONTINUED | OUTPATIENT
Start: 2023-04-13 | End: 2023-04-20 | Stop reason: HOSPADM

## 2023-04-13 RX ORDER — TACROLIMUS 1 MG/1
2 CAPSULE ORAL
Status: DISCONTINUED | OUTPATIENT
Start: 2023-04-14 | End: 2023-04-14

## 2023-04-13 RX ORDER — HYDROMORPHONE HYDROCHLORIDE 1 MG/ML
0.2 INJECTION, SOLUTION INTRAMUSCULAR; INTRAVENOUS; SUBCUTANEOUS
Status: DISCONTINUED | OUTPATIENT
Start: 2023-04-13 | End: 2023-04-14

## 2023-04-13 RX ORDER — NALOXONE HYDROCHLORIDE 0.4 MG/ML
0.4 INJECTION, SOLUTION INTRAMUSCULAR; INTRAVENOUS; SUBCUTANEOUS
Status: DISCONTINUED | OUTPATIENT
Start: 2023-04-13 | End: 2023-04-20 | Stop reason: HOSPADM

## 2023-04-13 RX ORDER — ALBUTEROL SULFATE 0.83 MG/ML
2.5 SOLUTION RESPIRATORY (INHALATION) EVERY 4 HOURS PRN
Status: DISCONTINUED | OUTPATIENT
Start: 2023-04-13 | End: 2023-04-13 | Stop reason: HOSPADM

## 2023-04-13 RX ORDER — ONDANSETRON 2 MG/ML
INJECTION INTRAMUSCULAR; INTRAVENOUS PRN
Status: DISCONTINUED | OUTPATIENT
Start: 2023-04-13 | End: 2023-04-13

## 2023-04-13 RX ORDER — PROPOFOL 10 MG/ML
INJECTION, EMULSION INTRAVENOUS PRN
Status: DISCONTINUED | OUTPATIENT
Start: 2023-04-13 | End: 2023-04-13

## 2023-04-13 RX ORDER — SODIUM CHLORIDE 9 MG/ML
1000 INJECTION, SOLUTION INTRAVENOUS CONTINUOUS PRN
Status: DISCONTINUED | OUTPATIENT
Start: 2023-04-13 | End: 2023-04-14

## 2023-04-13 RX ORDER — ATORVASTATIN CALCIUM 10 MG/1
10 TABLET, FILM COATED ORAL DAILY
Status: DISCONTINUED | OUTPATIENT
Start: 2023-04-14 | End: 2023-04-19

## 2023-04-13 RX ORDER — AMOXICILLIN 250 MG
1 CAPSULE ORAL 2 TIMES DAILY
Status: DISCONTINUED | OUTPATIENT
Start: 2023-04-13 | End: 2023-04-18

## 2023-04-13 RX ORDER — HYDRALAZINE HYDROCHLORIDE 20 MG/ML
2.5-5 INJECTION INTRAMUSCULAR; INTRAVENOUS EVERY 10 MIN PRN
Status: DISCONTINUED | OUTPATIENT
Start: 2023-04-13 | End: 2023-04-13 | Stop reason: HOSPADM

## 2023-04-13 RX ORDER — SODIUM CHLORIDE, SODIUM LACTATE, POTASSIUM CHLORIDE, CALCIUM CHLORIDE 600; 310; 30; 20 MG/100ML; MG/100ML; MG/100ML; MG/100ML
INJECTION, SOLUTION INTRAVENOUS CONTINUOUS PRN
Status: DISCONTINUED | OUTPATIENT
Start: 2023-04-13 | End: 2023-04-13

## 2023-04-13 RX ORDER — ESMOLOL HYDROCHLORIDE 10 MG/ML
INJECTION INTRAVENOUS PRN
Status: DISCONTINUED | OUTPATIENT
Start: 2023-04-13 | End: 2023-04-13

## 2023-04-13 RX ORDER — ACETAMINOPHEN 325 MG/1
650 TABLET ORAL EVERY 4 HOURS PRN
Status: DISCONTINUED | OUTPATIENT
Start: 2023-04-16 | End: 2023-04-20 | Stop reason: HOSPADM

## 2023-04-13 RX ORDER — ONDANSETRON 4 MG/1
4 TABLET, ORALLY DISINTEGRATING ORAL EVERY 30 MIN PRN
Status: DISCONTINUED | OUTPATIENT
Start: 2023-04-13 | End: 2023-04-13 | Stop reason: HOSPADM

## 2023-04-13 RX ORDER — SULFAMETHOXAZOLE AND TRIMETHOPRIM 400; 80 MG/1; MG/1
1 TABLET ORAL DAILY
Status: DISCONTINUED | OUTPATIENT
Start: 2023-04-13 | End: 2023-04-14

## 2023-04-13 RX ORDER — MANNITOL 20 G/100ML
INJECTION, SOLUTION INTRAVENOUS PRN
Status: DISCONTINUED | OUTPATIENT
Start: 2023-04-13 | End: 2023-04-13

## 2023-04-13 RX ORDER — HYDROMORPHONE HCL IN WATER/PF 6 MG/30 ML
0.2 PATIENT CONTROLLED ANALGESIA SYRINGE INTRAVENOUS EVERY 5 MIN PRN
Status: DISCONTINUED | OUTPATIENT
Start: 2023-04-13 | End: 2023-04-13 | Stop reason: HOSPADM

## 2023-04-13 RX ORDER — ONDANSETRON 2 MG/ML
4 INJECTION INTRAMUSCULAR; INTRAVENOUS EVERY 30 MIN PRN
Status: DISCONTINUED | OUTPATIENT
Start: 2023-04-13 | End: 2023-04-13 | Stop reason: HOSPADM

## 2023-04-13 RX ORDER — NALOXONE HYDROCHLORIDE 0.4 MG/ML
0.2 INJECTION, SOLUTION INTRAMUSCULAR; INTRAVENOUS; SUBCUTANEOUS
Status: DISCONTINUED | OUTPATIENT
Start: 2023-04-13 | End: 2023-04-20 | Stop reason: HOSPADM

## 2023-04-13 RX ORDER — HYDROMORPHONE HYDROCHLORIDE 1 MG/ML
0.4 INJECTION, SOLUTION INTRAMUSCULAR; INTRAVENOUS; SUBCUTANEOUS
Status: DISCONTINUED | OUTPATIENT
Start: 2023-04-13 | End: 2023-04-14

## 2023-04-13 RX ORDER — HALOPERIDOL 5 MG/ML
1 INJECTION INTRAMUSCULAR
Status: DISCONTINUED | OUTPATIENT
Start: 2023-04-13 | End: 2023-04-13 | Stop reason: HOSPADM

## 2023-04-13 RX ORDER — ACETAMINOPHEN 325 MG/1
975 TABLET ORAL ONCE
Status: COMPLETED | OUTPATIENT
Start: 2023-04-13 | End: 2023-04-13

## 2023-04-13 RX ORDER — FENTANYL CITRATE 50 UG/ML
25 INJECTION, SOLUTION INTRAMUSCULAR; INTRAVENOUS EVERY 5 MIN PRN
Status: DISCONTINUED | OUTPATIENT
Start: 2023-04-13 | End: 2023-04-13 | Stop reason: HOSPADM

## 2023-04-13 RX ORDER — LIDOCAINE 40 MG/G
CREAM TOPICAL
Status: DISCONTINUED | OUTPATIENT
Start: 2023-04-13 | End: 2023-04-13 | Stop reason: HOSPADM

## 2023-04-13 RX ORDER — LORAZEPAM 2 MG/ML
0.5 INJECTION INTRAMUSCULAR ONCE
Status: COMPLETED | OUTPATIENT
Start: 2023-04-13 | End: 2023-04-13

## 2023-04-13 RX ORDER — VALGANCICLOVIR 450 MG/1
450 TABLET, FILM COATED ORAL
Status: DISCONTINUED | OUTPATIENT
Start: 2023-04-13 | End: 2023-04-15

## 2023-04-13 RX ORDER — CEFUROXIME SODIUM 1.5 G/16ML
1.5 INJECTION, POWDER, FOR SOLUTION INTRAVENOUS SEE ADMIN INSTRUCTIONS
Status: DISCONTINUED | OUTPATIENT
Start: 2023-04-13 | End: 2023-04-13 | Stop reason: HOSPADM

## 2023-04-13 RX ORDER — OXYCODONE HYDROCHLORIDE 10 MG/1
10 TABLET ORAL EVERY 4 HOURS PRN
Status: DISCONTINUED | OUTPATIENT
Start: 2023-04-13 | End: 2023-04-19

## 2023-04-13 RX ORDER — FUROSEMIDE 10 MG/ML
40 INJECTION INTRAMUSCULAR; INTRAVENOUS
Status: DISCONTINUED | OUTPATIENT
Start: 2023-04-13 | End: 2023-04-18

## 2023-04-13 RX ORDER — FENTANYL CITRATE 50 UG/ML
INJECTION, SOLUTION INTRAMUSCULAR; INTRAVENOUS PRN
Status: DISCONTINUED | OUTPATIENT
Start: 2023-04-13 | End: 2023-04-13

## 2023-04-13 RX ORDER — PAPAVERINE HYDROCHLORIDE 30 MG/ML
INJECTION INTRAMUSCULAR; INTRAVENOUS PRN
Status: DISCONTINUED | OUTPATIENT
Start: 2023-04-13 | End: 2023-04-13 | Stop reason: HOSPADM

## 2023-04-13 RX ORDER — MAGNESIUM SULFATE HEPTAHYDRATE 40 MG/ML
2 INJECTION, SOLUTION INTRAVENOUS ONCE
Status: COMPLETED | OUTPATIENT
Start: 2023-04-13 | End: 2023-04-13

## 2023-04-13 RX ORDER — PROCHLORPERAZINE MALEATE 5 MG
10 TABLET ORAL EVERY 6 HOURS PRN
Status: DISCONTINUED | OUTPATIENT
Start: 2023-04-13 | End: 2023-04-20 | Stop reason: HOSPADM

## 2023-04-13 RX ORDER — ONDANSETRON 2 MG/ML
4 INJECTION INTRAMUSCULAR; INTRAVENOUS EVERY 6 HOURS PRN
Status: DISCONTINUED | OUTPATIENT
Start: 2023-04-13 | End: 2023-04-20 | Stop reason: HOSPADM

## 2023-04-13 RX ORDER — LIDOCAINE HYDROCHLORIDE 20 MG/ML
INJECTION, SOLUTION INFILTRATION; PERINEURAL PRN
Status: DISCONTINUED | OUTPATIENT
Start: 2023-04-13 | End: 2023-04-13

## 2023-04-13 RX ORDER — FUROSEMIDE 10 MG/ML
INJECTION INTRAMUSCULAR; INTRAVENOUS PRN
Status: DISCONTINUED | OUTPATIENT
Start: 2023-04-13 | End: 2023-04-13

## 2023-04-13 RX ORDER — OXYCODONE HYDROCHLORIDE 5 MG/1
5 TABLET ORAL EVERY 4 HOURS PRN
Status: DISCONTINUED | OUTPATIENT
Start: 2023-04-13 | End: 2023-04-20

## 2023-04-13 RX ORDER — CALCIUM CHLORIDE 100 MG/ML
INJECTION INTRAVENOUS; INTRAVENTRICULAR PRN
Status: DISCONTINUED | OUTPATIENT
Start: 2023-04-13 | End: 2023-04-13

## 2023-04-13 RX ORDER — SODIUM CHLORIDE, SODIUM LACTATE, POTASSIUM CHLORIDE, CALCIUM CHLORIDE 600; 310; 30; 20 MG/100ML; MG/100ML; MG/100ML; MG/100ML
INJECTION, SOLUTION INTRAVENOUS CONTINUOUS
Status: DISCONTINUED | OUTPATIENT
Start: 2023-04-13 | End: 2023-04-13 | Stop reason: HOSPADM

## 2023-04-13 RX ORDER — GABAPENTIN 100 MG/1
100 CAPSULE ORAL
Status: COMPLETED | OUTPATIENT
Start: 2023-04-13 | End: 2023-04-13

## 2023-04-13 RX ORDER — MYCOPHENOLATE MOFETIL 250 MG/1
750 CAPSULE ORAL
Status: DISCONTINUED | OUTPATIENT
Start: 2023-04-13 | End: 2023-04-20 | Stop reason: HOSPADM

## 2023-04-13 RX ORDER — DOPAMINE HYDROCHLORIDE 160 MG/100ML
INJECTION, SOLUTION INTRAVENOUS CONTINUOUS PRN
Status: DISCONTINUED | OUTPATIENT
Start: 2023-04-13 | End: 2023-04-13

## 2023-04-13 RX ORDER — FENTANYL CITRATE 50 UG/ML
50 INJECTION, SOLUTION INTRAMUSCULAR; INTRAVENOUS EVERY 5 MIN PRN
Status: DISCONTINUED | OUTPATIENT
Start: 2023-04-13 | End: 2023-04-13 | Stop reason: HOSPADM

## 2023-04-13 RX ORDER — HYDROMORPHONE HCL IN WATER/PF 6 MG/30 ML
0.4 PATIENT CONTROLLED ANALGESIA SYRINGE INTRAVENOUS EVERY 5 MIN PRN
Status: DISCONTINUED | OUTPATIENT
Start: 2023-04-13 | End: 2023-04-13 | Stop reason: HOSPADM

## 2023-04-13 RX ORDER — LABETALOL HYDROCHLORIDE 5 MG/ML
10 INJECTION, SOLUTION INTRAVENOUS
Status: DISCONTINUED | OUTPATIENT
Start: 2023-04-13 | End: 2023-04-13 | Stop reason: HOSPADM

## 2023-04-13 RX ORDER — SODIUM CHLORIDE 450 MG/100ML
INJECTION, SOLUTION INTRAVENOUS CONTINUOUS PRN
Status: DISCONTINUED | OUTPATIENT
Start: 2023-04-13 | End: 2023-04-14

## 2023-04-13 RX ORDER — ONDANSETRON 4 MG/1
4 TABLET, ORALLY DISINTEGRATING ORAL EVERY 6 HOURS PRN
Status: DISCONTINUED | OUTPATIENT
Start: 2023-04-13 | End: 2023-04-20 | Stop reason: HOSPADM

## 2023-04-13 RX ORDER — DEXTROSE, SODIUM CHLORIDE, SODIUM LACTATE, POTASSIUM CHLORIDE, AND CALCIUM CHLORIDE 5; .6; .31; .03; .02 G/100ML; G/100ML; G/100ML; G/100ML; G/100ML
INJECTION, SOLUTION INTRAVENOUS CONTINUOUS
Status: DISCONTINUED | OUTPATIENT
Start: 2023-04-13 | End: 2023-04-15

## 2023-04-13 RX ORDER — ACETAMINOPHEN 325 MG/1
975 TABLET ORAL EVERY 8 HOURS
Status: COMPLETED | OUTPATIENT
Start: 2023-04-13 | End: 2023-04-16

## 2023-04-13 RX ORDER — POLYETHYLENE GLYCOL 3350 17 G/17G
17 POWDER, FOR SOLUTION ORAL DAILY
Status: DISCONTINUED | OUTPATIENT
Start: 2023-04-14 | End: 2023-04-18

## 2023-04-13 RX ORDER — CEFUROXIME SODIUM 1.5 G/16ML
1.5 INJECTION, POWDER, FOR SOLUTION INTRAVENOUS ONCE
Status: COMPLETED | OUTPATIENT
Start: 2023-04-13 | End: 2023-04-13

## 2023-04-13 RX ADMIN — FENTANYL CITRATE 50 MCG: 50 INJECTION, SOLUTION INTRAMUSCULAR; INTRAVENOUS at 18:37

## 2023-04-13 RX ADMIN — CALCIUM CHLORIDE 300 MG: 100 INJECTION INTRAVENOUS; INTRAVENTRICULAR at 16:40

## 2023-04-13 RX ADMIN — FUROSEMIDE 60 MG: 10 INJECTION, SOLUTION INTRAVENOUS at 16:52

## 2023-04-13 RX ADMIN — HYDROMORPHONE HYDROCHLORIDE 0.5 MG: 1 INJECTION, SOLUTION INTRAMUSCULAR; INTRAVENOUS; SUBCUTANEOUS at 18:26

## 2023-04-13 RX ADMIN — ACETAMINOPHEN 975 MG: 325 TABLET, FILM COATED ORAL at 20:59

## 2023-04-13 RX ADMIN — PHENYLEPHRINE HYDROCHLORIDE 50 MCG: 10 INJECTION INTRAVENOUS at 15:26

## 2023-04-13 RX ADMIN — HYDROMORPHONE HYDROCHLORIDE 0.2 MG: 1 INJECTION, SOLUTION INTRAMUSCULAR; INTRAVENOUS; SUBCUTANEOUS at 23:30

## 2023-04-13 RX ADMIN — VALGANCICLOVIR 450 MG: 450 TABLET, FILM COATED ORAL at 21:23

## 2023-04-13 RX ADMIN — SUGAMMADEX 200 MG: 100 INJECTION, SOLUTION INTRAVENOUS at 18:07

## 2023-04-13 RX ADMIN — PHENYLEPHRINE HYDROCHLORIDE 50 MCG: 10 INJECTION INTRAVENOUS at 15:46

## 2023-04-13 RX ADMIN — SODIUM CHLORIDE, POTASSIUM CHLORIDE, SODIUM LACTATE AND CALCIUM CHLORIDE: 600; 310; 30; 20 INJECTION, SOLUTION INTRAVENOUS at 13:42

## 2023-04-13 RX ADMIN — FENTANYL CITRATE 50 MCG: 50 INJECTION, SOLUTION INTRAMUSCULAR; INTRAVENOUS at 18:49

## 2023-04-13 RX ADMIN — MIDAZOLAM 2 MG: 1 INJECTION INTRAMUSCULAR; INTRAVENOUS at 13:38

## 2023-04-13 RX ADMIN — PHENYLEPHRINE HYDROCHLORIDE 50 MCG: 10 INJECTION INTRAVENOUS at 15:41

## 2023-04-13 RX ADMIN — PHENYLEPHRINE HYDROCHLORIDE 50 MCG: 10 INJECTION INTRAVENOUS at 14:58

## 2023-04-13 RX ADMIN — SODIUM CHLORIDE, POTASSIUM CHLORIDE, SODIUM LACTATE AND CALCIUM CHLORIDE: 600; 310; 30; 20 INJECTION, SOLUTION INTRAVENOUS at 16:36

## 2023-04-13 RX ADMIN — Medication 10 MG: at 15:48

## 2023-04-13 RX ADMIN — SENNOSIDES AND DOCUSATE SODIUM 1 TABLET: 8.6; 5 TABLET ORAL at 20:59

## 2023-04-13 RX ADMIN — HYDROMORPHONE HYDROCHLORIDE 0.5 MG: 1 INJECTION, SOLUTION INTRAMUSCULAR; INTRAVENOUS; SUBCUTANEOUS at 16:36

## 2023-04-13 RX ADMIN — PROPOFOL 160 MG: 10 INJECTION, EMULSION INTRAVENOUS at 13:49

## 2023-04-13 RX ADMIN — MAGNESIUM SULFATE IN WATER 2 G: 40 INJECTION, SOLUTION INTRAVENOUS at 21:23

## 2023-04-13 RX ADMIN — MYCOPHENOLATE MOFETIL 750 MG: 250 CAPSULE ORAL at 21:23

## 2023-04-13 RX ADMIN — ESMOLOL HYDROCHLORIDE 10 MG: 10 INJECTION, SOLUTION INTRAVENOUS at 16:52

## 2023-04-13 RX ADMIN — Medication 20 MG: at 14:35

## 2023-04-13 RX ADMIN — ACETAMINOPHEN 975 MG: 325 TABLET ORAL at 13:10

## 2023-04-13 RX ADMIN — CEFUROXIME 1.5 G: 1.5 INJECTION, POWDER, FOR SOLUTION INTRAVENOUS at 14:07

## 2023-04-13 RX ADMIN — PROPOFOL 5 MG: 10 INJECTION, EMULSION INTRAVENOUS at 17:32

## 2023-04-13 RX ADMIN — PHENYLEPHRINE HYDROCHLORIDE 100 MCG: 10 INJECTION INTRAVENOUS at 13:55

## 2023-04-13 RX ADMIN — ONDANSETRON 4 MG: 2 INJECTION INTRAMUSCULAR; INTRAVENOUS at 17:34

## 2023-04-13 RX ADMIN — Medication 50 MG: at 13:49

## 2023-04-13 RX ADMIN — PHENYLEPHRINE HYDROCHLORIDE 50 MCG: 10 INJECTION INTRAVENOUS at 15:35

## 2023-04-13 RX ADMIN — FENTANYL CITRATE 50 MCG: 50 INJECTION, SOLUTION INTRAMUSCULAR; INTRAVENOUS at 18:42

## 2023-04-13 RX ADMIN — PHENYLEPHRINE HYDROCHLORIDE 0.2 MCG/KG/MIN: 10 INJECTION INTRAVENOUS at 15:59

## 2023-04-13 RX ADMIN — FUROSEMIDE 40 MG: 10 INJECTION, SOLUTION INTRAVENOUS at 21:00

## 2023-04-13 RX ADMIN — PHENYLEPHRINE HYDROCHLORIDE 100 MCG: 10 INJECTION INTRAVENOUS at 14:00

## 2023-04-13 RX ADMIN — FENTANYL CITRATE 50 MCG: 50 INJECTION, SOLUTION INTRAMUSCULAR; INTRAVENOUS at 16:29

## 2023-04-13 RX ADMIN — SODIUM CHLORIDE 1000 ML: 9 INJECTION, SOLUTION INTRAVENOUS at 19:00

## 2023-04-13 RX ADMIN — ANTI-THYMOCYTE GLOBULIN (RABBIT) 125 MG: 5 INJECTION, POWDER, LYOPHILIZED, FOR SOLUTION INTRAVENOUS at 14:28

## 2023-04-13 RX ADMIN — MANNITOL 25 G: 20 INJECTION, SOLUTION INTRAVENOUS at 16:52

## 2023-04-13 RX ADMIN — SULFAMETHOXAZOLE AND TRIMETHOPRIM 1 TABLET: 400; 80 TABLET ORAL at 21:23

## 2023-04-13 RX ADMIN — FENTANYL CITRATE 50 MCG: 50 INJECTION, SOLUTION INTRAMUSCULAR; INTRAVENOUS at 18:33

## 2023-04-13 RX ADMIN — DOPAMINE HYDROCHLORIDE 2 MCG/KG/MIN: 160 INJECTION, SOLUTION INTRAVENOUS at 16:47

## 2023-04-13 RX ADMIN — SODIUM CHLORIDE 1000 ML: 4.5 INJECTION, SOLUTION INTRAVENOUS at 19:59

## 2023-04-13 RX ADMIN — LORAZEPAM 0.5 MG: 2 INJECTION INTRAMUSCULAR; INTRAVENOUS at 18:52

## 2023-04-13 RX ADMIN — CALCIUM CHLORIDE 200 MG: 100 INJECTION INTRAVENOUS; INTRAVENTRICULAR at 16:43

## 2023-04-13 RX ADMIN — SODIUM CHLORIDE, SODIUM LACTATE, POTASSIUM CHLORIDE, CALCIUM CHLORIDE AND DEXTROSE MONOHYDRATE: 5; 600; 310; 30; 20 INJECTION, SOLUTION INTRAVENOUS at 19:00

## 2023-04-13 RX ADMIN — LIDOCAINE HYDROCHLORIDE 80 MG: 20 INJECTION, SOLUTION INFILTRATION; PERINEURAL at 13:48

## 2023-04-13 RX ADMIN — CALCIUM CHLORIDE 200 MG: 100 INJECTION INTRAVENOUS; INTRAVENTRICULAR at 16:38

## 2023-04-13 RX ADMIN — FENTANYL CITRATE 100 MCG: 50 INJECTION, SOLUTION INTRAMUSCULAR; INTRAVENOUS at 14:35

## 2023-04-13 RX ADMIN — Medication 20 MG: at 16:19

## 2023-04-13 RX ADMIN — SODIUM CHLORIDE 500 MG: 9 INJECTION, SOLUTION INTRAVENOUS at 13:52

## 2023-04-13 RX ADMIN — PHENYLEPHRINE HYDROCHLORIDE 50 MCG: 10 INJECTION INTRAVENOUS at 15:09

## 2023-04-13 RX ADMIN — FENTANYL CITRATE 100 MCG: 50 INJECTION, SOLUTION INTRAMUSCULAR; INTRAVENOUS at 13:48

## 2023-04-13 RX ADMIN — MYCOPHENOLATE MOFETIL 1000 MG: 500 INJECTION, POWDER, LYOPHILIZED, FOR SOLUTION INTRAVENOUS at 14:27

## 2023-04-13 RX ADMIN — PHENYLEPHRINE HYDROCHLORIDE 50 MCG: 10 INJECTION INTRAVENOUS at 15:19

## 2023-04-13 RX ADMIN — GABAPENTIN 100 MG: 100 CAPSULE ORAL at 13:10

## 2023-04-13 ASSESSMENT — ACTIVITIES OF DAILY LIVING (ADL)
ADLS_ACUITY_SCORE: 18
ADLS_ACUITY_SCORE: 18
ADLS_ACUITY_SCORE: 26
ADLS_ACUITY_SCORE: 18

## 2023-04-13 NOTE — LETTER
Transition Communication Hand-off for Care Transitions to Next Level of Care Provider    Name: Caity Horan  : 1990  MRN #: [id number]  Primary Care Provider: Physician No Ref-Primary     Primary Clinic: No address on file     Reason for Hospitalization:  Kidney replaced by transplant [Z94.0]  Awaiting organ transplant [Z76.82]  Pre-diabetes [R73.03]  Encounter for pregnancy test [Z32.00]  ESRD (end stage renal disease) (H) [N18.6]  Admit Date/Time: 2023 12:12 PM  Discharge Date: 23  Payor Source: Payor: [email or web address] / Plan: [employer] OPEN ACCESS / Product Type: HMO /              Reason for Communication Hand-off Referral: Other Continuity of care    Discharge Plan:  Patient staying locally until 23. Plan for outpatient follow up.        Concern for non-adherence with plan of care:   No  Discharge Needs Assessment:  Needs      Flowsheet Row Most Recent Value   Equipment Currently Used at Home none   # of Referrals Placed by CM External Care Coordination            Follow-up specialty is recommended: Yes    Follow-up plan:    Future Appointments   Date Time Provider Department Center   2023  7:15 AM  LAB [facility]   2023  8:00 AM [facility] INFUSION NURSE [facility]   2023  8:30 AM Rosalie Crews APRN CNP [facility]   2023  7:00 AM [facility] INFUSION NURSE [facility]   2023  9:30 AM  LAB [facility]   2023 10:00 AM Christi Alfonso NP [facility]   2023  8:30 AM  LAB [facility]   2023  2:30 PM Ivy Sepulveda MD [facility]   2023  8:45 AM  LAB [facility]   2023  9:30 AM Darian Booker MD [facility]   2023 11:30 AM Christi Alfonso NP [facility]   2023  7:45 AM Cindy Amaro MD [facility]   2023  8:30 AM  LAB [facility]   2023  9:00 AM Darian Booker MD [facility]   2023  9:00 AM [facility]   2023  9:30 AM Zhang Barrera MD [facility]    10/9/2023  9:00 AM  LAB [facility]   10/9/2023  9:30 AM Otilio Mar MD [facility]       Any outstanding tests or procedures:              Key Recommendations:  Please see attached AVS.    Namrata Pappas RN    AVS/Discharge Summary is the source of truth; this is a helpful guide for improved communication of patient story

## 2023-04-13 NOTE — ANESTHESIA PREPROCEDURE EVALUATION
Anesthesia Pre-Procedure Evaluation    Patient: Caity Horan   MRN: [id number] : 1990        Procedure : Procedure(s):  Kidney Transplant Non-Directed Recipient          Past Medical History:   Diagnosis Date     Acute rejection of kidney transplant 2021     Anemia in stage 5 chronic kidney disease (H) 10/27/2014     ESRD (end stage renal disease) on dialysis (H)      HTN (hypertension) 10/27/2014     Hypertension 10/2014     IgA nephropathy     biopsy proven     Metabolic acidosis      Ulcerative pancolitis (H)      Vitamin D deficiency       Past Surgical History:   Procedure Laterality Date     BIOPSY      renal     COLONOSCOPY N/A 2018    Procedure: COMBINED COLONOSCOPY, SINGLE OR MULTIPLE BIOPSY/POLYPECTOMY BY BIOPSY;  EGD/Colonoscopy ;  Surgeon: Kory Massey MD;  Location: [facility]     COLONOSCOPY N/A 09/10/2018    Procedure: COMBINED COLONOSCOPY, SINGLE OR MULTIPLE BIOPSY/POLYPECTOMY BY BIOPSY;  Colonoscopy;  Surgeon: Yenifer Doan MD;  Location: [facility]     COLONOSCOPY N/A 2/3/2023    Procedure: COLONOSCOPY, WITH BIOPSY;  Surgeon: Yaakov Loving MD;  Location: [facility]     EXTRACTION(S) DENTAL       IR CVC TUNNEL PLACEMENT > 5 YRS OF AGE  2022     PERCUTANEOUS BIOPSY KIDNEY Right 2018    Procedure: Right Kidney Biopsy;  Surgeon: Otilio Mar MD;  Location: [facility]     TRANSPLANT KIDNEY RECIPIENT LIVING RELATED N/A 2014    Procedure: TRANSPLANT KIDNEY RECIPIENT LIVING RELATED;  Surgeon: Dale Middleton MD;  Location: [facility]     WISDOM TOOTH EXTRACTION Bilateral       Allergies   Allergen Reactions     Bumetanide Other (See Comments)     Causes paralysis     Amoxicillin Rash      Social History     Tobacco Use     Smoking status: Never     Smokeless tobacco: Never   Vaping Use     Vaping status: Not on file   Substance Use Topics     Alcohol use: Not Currently     Alcohol/week: 3.0 - 9.0 standard drinks of alcohol     Types: 1 - 3 Glasses of wine,  1 - 3 Cans of beer, 1 - 3 Shots of liquor per week      Wt Readings from Last 1 Encounters:   04/03/23 59.1 kg (130 lb 3.2 oz)        Anesthesia Evaluation   Pt has had prior anesthetic. Type: General.    No history of anesthetic complications       ROS/MED HX  ENT/Pulmonary:  - neg pulmonary ROS     Neurologic:  - neg neurologic ROS     Cardiovascular:     (+) hypertension-----Previous cardiac testing   Echo: Date: 12/22 Results:  Stress test not performed due to acute anemia.  Global and regional left ventricular function is normal with an EF of 60-65%.  Mild to moderate concentric wall thickening consistent with left ventricular  hypertrophy is present.  No significant valvular abnormalities present.  Global right ventricular function is normal.  Mild dilatation of the aorta is present.  IVC diameter >2.1 cm collapsing <50% with sniff suggests a high RA pressure  estimated at 15 mmHg or greater.  No pericardial effusion is present.  There is no prior study for direct comparison.  Stress Test: Date: Results:    ECG Reviewed: Date: Results:    Cath: Date: Results:      METS/Exercise Tolerance:     Hematologic:     (+) anemia,     Musculoskeletal:  - neg musculoskeletal ROS     GI/Hepatic:     (+) Inflammatory bowel disease,     Renal/Genitourinary:     (+) renal disease, type: ESRD, Pt requires dialysis, type: Hemodialysis, Pt has history of transplant, date: 2014,     Endo:  - neg endo ROS     Psychiatric/Substance Use:  - neg psychiatric ROS     Infectious Disease:  - neg infectious disease ROS     Malignancy:  - neg malignancy ROS     Other:               OUTSIDE LABS:  CBC:   Lab Results   Component Value Date    WBC 5.7 04/11/2023    WBC 6.4 04/03/2023    HGB 12.5 04/11/2023    HGB 11.1 (L) 04/03/2023    HCT 37.9 04/11/2023    HCT 34.2 (L) 04/03/2023     04/11/2023     04/03/2023     BMP:   Lab Results   Component Value Date     04/11/2023     04/03/2023    POTASSIUM 3.6 04/11/2023     POTASSIUM 3.5 04/03/2023    CHLORIDE 88 (L) 04/11/2023    CHLORIDE 91 (L) 04/03/2023    CO2 29 04/11/2023    CO2 30 (H) 04/03/2023    BUN 30.3 (H) 04/11/2023    BUN 21.0 (H) 04/03/2023    CR 8.30 (H) 04/11/2023    CR 6.11 (H) 04/03/2023     (H) 04/11/2023    GLC 95 04/03/2023     COAGS:   Lab Results   Component Value Date    PTT 33 01/02/2023    INR 1.07 04/03/2023     POC:   Lab Results   Component Value Date    HCG Negative 09/10/2018    HCGS Negative 04/03/2023     HEPATIC:   Lab Results   Component Value Date    ALBUMIN 5.1 04/11/2023    PROTTOTAL 8.6 (H) 04/11/2023    ALT 15 04/11/2023    AST 23 04/11/2023    ALKPHOS 70 04/11/2023    BILITOTAL 0.6 04/11/2023     OTHER:   Lab Results   Component Value Date    PH 7.44 12/05/2014    A1C 5.3 04/03/2023    MICAH 10.5 (H) 04/11/2023    PHOS 6.8 (H) 12/21/2022    MAG 2.5 (H) 12/21/2022    CRP <2.9 04/16/2021    SED 23 (H) 04/11/2023       Anesthesia Plan    ASA Status:  3   NPO Status:  NPO Appropriate    Anesthesia Type: General.     - Airway: ETT   Induction: Intravenous.   Maintenance: Inhalation.   Techniques and Equipment:     - Lines/Monitors: BIS, Central Line     - Blood: T&S     Consents    Anesthesia Plan(s) and associated risks, benefits, and realistic alternatives discussed. Questions answered and patient/representative(s) expressed understanding.    - Discussed:     - Discussed with:  Patient         Postoperative Care    Pain management: IV analgesics, Oral pain medications.   PONV prophylaxis: Ondansetron (or other 5HT-3), Dexamethasone or Solumedrol     Comments:           H&P reviewed: Unable to attach H&P to encounter due to EHR limitations. H&P Update: appropriate H&P reviewed, patient examined. No interval changes since H&P (within 30 days).         Laura Borden MD

## 2023-04-13 NOTE — ANESTHESIA PROCEDURE NOTES
Airway       Patient location during procedure: OR       Procedure Start/Stop Times: 4/13/2023 1:52 PM  Staff -        Other Anesthesia Staff: Guillermo Ulloa       Performed By: SRNAIndications and Patient Condition       Indications for airway management: mar-procedural       Induction type:intravenous       Mask difficulty assessment: 1 - vent by mask    Final Airway Details       Final airway type: endotracheal airway       Successful airway: ETT - single  Endotracheal Airway Details        ETT size (mm): 7.0       Cuffed: yes       Successful intubation technique: direct laryngoscopy       DL Blade Type: Barron 2       Grade View of Cords: 1       Adjucts: stylet       Position: Right       Measured from: lips       Secured at (cm): 22       Bite block used: None    Post intubation assessment        Number of attempts at approach: 1       Number of other approaches attempted: 0       Secured with: pink tape       Ease of procedure: easy       Dentition: Intact and Unchanged    Medication(s) Administered   Medication Administration Time: 4/13/2023 1:52 PM

## 2023-04-13 NOTE — ANESTHESIA CARE TRANSFER NOTE
Patient: Caity Horan    Procedure: Procedure(s):  Kidney Transplant Non-Directed Recipient  Bench kidney       Diagnosis: Kidney replaced by transplant [Z94.0]  Awaiting organ transplant [Z76.82]  Pre-diabetes [R73.03]  Encounter for pregnancy test [Z32.00]  ESRD (end stage renal disease) (H) [N18.6]  Diagnosis Additional Information: No value filed.    Anesthesia Type:   General     Note:    Oropharynx: oropharynx clear of all foreign objects and spontaneously breathing  Level of Consciousness: drowsy  Oxygen Supplementation: face mask  Level of Supplemental Oxygen (L/min / FiO2): 4  Independent Airway: airway patency satisfactory and stable  Dentition: dentition unchanged  Vital Signs Stable: post-procedure vital signs reviewed and stable  Report to RN Given: handoff report given  Patient transferred to: PACU    Handoff Report: Identifed the Patient, Identified the Reponsible Provider, Reviewed the pertinent medical history, Discussed the surgical course, Reviewed Intra-OP anesthesia mangement and issues during anesthesia, Set expectations for post-procedure period and Allowed opportunity for questions and acknowledgement of understanding      Vitals:  Vitals Value Taken Time   /61 04/13/23 1815   Temp     Pulse 111 04/13/23 1815   Resp     SpO2 100 % 04/13/23 1817   Vitals shown include unvalidated device data.    Electronically Signed By: TANYA Rojas CRNA  April 13, 2023  6:17 PM

## 2023-04-14 ENCOUNTER — DOCUMENTATION ONLY (OUTPATIENT)
Dept: TRANSPLANT | Facility: CLINIC | Age: 33
End: 2023-04-14
Payer: COMMERCIAL

## 2023-04-14 ENCOUNTER — TELEPHONE (OUTPATIENT)
Dept: TRANSPLANT | Facility: CLINIC | Age: 33
End: 2023-04-14
Payer: COMMERCIAL

## 2023-04-14 ENCOUNTER — APPOINTMENT (OUTPATIENT)
Dept: ULTRASOUND IMAGING | Facility: CLINIC | Age: 33
DRG: 652 | End: 2023-04-14
Attending: SURGERY
Payer: COMMERCIAL

## 2023-04-14 LAB
ANION GAP SERPL CALCULATED.3IONS-SCNC: 13 MMOL/L (ref 7–15)
BASOPHILS # BLD MANUAL: 0 10E3/UL (ref 0–0.2)
BASOPHILS NFR BLD MANUAL: 0 %
BUN SERPL-MCNC: 23.2 MG/DL (ref 6–20)
CALCIUM SERPL-MCNC: 8.7 MG/DL (ref 8.6–10)
CHLORIDE SERPL-SCNC: 100 MMOL/L (ref 98–107)
CMV DNA SPEC NAA+PROBE-ACNC: NOT DETECTED IU/ML
CREAT SERPL-MCNC: 3.89 MG/DL (ref 0.51–0.95)
DEPRECATED HCO3 PLAS-SCNC: 25 MMOL/L (ref 22–29)
DONOR IDENTIFICATION: NORMAL
DSA COMMENTS: NORMAL
DSA PRESENT: NO
DSA TEST METHOD: NORMAL
EOSINOPHIL # BLD MANUAL: 0 10E3/UL (ref 0–0.7)
EOSINOPHIL NFR BLD MANUAL: 0 %
ERYTHROCYTE [DISTWIDTH] IN BLOOD BY AUTOMATED COUNT: 13.5 % (ref 10–15)
GFR SERPL CREATININE-BSD FRML MDRD: 15 ML/MIN/1.73M2
GLUCOSE SERPL-MCNC: 174 MG/DL (ref 70–99)
HCT VFR BLD AUTO: 28.8 % (ref 35–47)
HGB BLD-MCNC: 8 G/DL (ref 11.7–15.7)
HGB BLD-MCNC: 8.4 G/DL (ref 11.7–15.7)
HGB BLD-MCNC: 8.4 G/DL (ref 11.7–15.7)
HGB BLD-MCNC: 8.8 G/DL (ref 11.7–15.7)
HGB BLD-MCNC: 8.9 G/DL (ref 11.7–15.7)
HGB BLD-MCNC: 9.3 G/DL (ref 11.7–15.7)
LYMPHOCYTES # BLD MANUAL: 0.1 10E3/UL (ref 0.8–5.3)
LYMPHOCYTES NFR BLD MANUAL: 2 %
MAGNESIUM SERPL-MCNC: 2.2 MG/DL (ref 1.7–2.3)
MCH RBC QN AUTO: 30.6 PG (ref 26.5–33)
MCHC RBC AUTO-ENTMCNC: 30.9 G/DL (ref 31.5–36.5)
MCV RBC AUTO: 99 FL (ref 78–100)
MONOCYTES # BLD MANUAL: 0.2 10E3/UL (ref 0–1.3)
MONOCYTES NFR BLD MANUAL: 4 %
NEUTROPHILS # BLD MANUAL: 5.5 10E3/UL (ref 1.6–8.3)
NEUTROPHILS NFR BLD MANUAL: 94 %
ORGAN: NORMAL
PHOSPHATE SERPL-MCNC: 4.6 MG/DL (ref 2.5–4.5)
PLAT MORPH BLD: ABNORMAL
PLATELET # BLD AUTO: 156 10E3/UL (ref 150–450)
POTASSIUM SERPL-SCNC: 3.6 MMOL/L (ref 3.4–5.3)
POTASSIUM SERPL-SCNC: 3.8 MMOL/L (ref 3.4–5.3)
POTASSIUM SERPL-SCNC: 3.9 MMOL/L (ref 3.4–5.3)
POTASSIUM SERPL-SCNC: 4 MMOL/L (ref 3.4–5.3)
POTASSIUM SERPL-SCNC: 4.2 MMOL/L (ref 3.4–5.3)
RBC # BLD AUTO: 2.91 10E6/UL (ref 3.8–5.2)
RBC MORPH BLD: ABNORMAL
SODIUM SERPL-SCNC: 138 MMOL/L (ref 136–145)
WBC # BLD AUTO: 5.8 10E3/UL (ref 4–11)

## 2023-04-14 PROCEDURE — 258N000003 HC RX IP 258 OP 636: Performed by: STUDENT IN AN ORGANIZED HEALTH CARE EDUCATION/TRAINING PROGRAM

## 2023-04-14 PROCEDURE — 250N000012 HC RX MED GY IP 250 OP 636 PS 637: Performed by: NURSE PRACTITIONER

## 2023-04-14 PROCEDURE — 80048 BASIC METABOLIC PNL TOTAL CA: CPT | Performed by: SURGERY

## 2023-04-14 PROCEDURE — 250N000012 HC RX MED GY IP 250 OP 636 PS 637: Performed by: SURGERY

## 2023-04-14 PROCEDURE — 258N000003 HC RX IP 258 OP 636: Performed by: SURGERY

## 2023-04-14 PROCEDURE — 85018 HEMOGLOBIN: CPT | Performed by: SURGERY

## 2023-04-14 PROCEDURE — 83735 ASSAY OF MAGNESIUM: CPT | Performed by: SURGERY

## 2023-04-14 PROCEDURE — 36592 COLLECT BLOOD FROM PICC: CPT | Performed by: SURGERY

## 2023-04-14 PROCEDURE — 250N000013 HC RX MED GY IP 250 OP 250 PS 637: Performed by: SURGERY

## 2023-04-14 PROCEDURE — 258N000003 HC RX IP 258 OP 636

## 2023-04-14 PROCEDURE — 36591 DRAW BLOOD OFF VENOUS DEVICE: CPT

## 2023-04-14 PROCEDURE — 99223 1ST HOSP IP/OBS HIGH 75: CPT | Mod: 24 | Performed by: INTERNAL MEDICINE

## 2023-04-14 PROCEDURE — 258N000003 HC RX IP 258 OP 636: Performed by: NURSE PRACTITIONER

## 2023-04-14 PROCEDURE — 250N000011 HC RX IP 250 OP 636: Performed by: SURGERY

## 2023-04-14 PROCEDURE — 30243S1 TRANSFUSION OF NONAUTOLOGOUS GLOBULIN INTO CENTRAL VEIN, PERCUTANEOUS APPROACH: ICD-10-PCS | Performed by: SURGERY

## 2023-04-14 PROCEDURE — 84132 ASSAY OF SERUM POTASSIUM: CPT

## 2023-04-14 PROCEDURE — 250N000013 HC RX MED GY IP 250 OP 250 PS 637

## 2023-04-14 PROCEDURE — 85027 COMPLETE CBC AUTOMATED: CPT | Performed by: SURGERY

## 2023-04-14 PROCEDURE — 76776 US EXAM K TRANSPL W/DOPPLER: CPT | Mod: 26 | Performed by: RADIOLOGY

## 2023-04-14 PROCEDURE — 84100 ASSAY OF PHOSPHORUS: CPT | Performed by: SURGERY

## 2023-04-14 PROCEDURE — 250N000013 HC RX MED GY IP 250 OP 250 PS 637: Performed by: STUDENT IN AN ORGANIZED HEALTH CARE EDUCATION/TRAINING PROGRAM

## 2023-04-14 PROCEDURE — 84132 ASSAY OF SERUM POTASSIUM: CPT | Performed by: SURGERY

## 2023-04-14 PROCEDURE — 250N000011 HC RX IP 250 OP 636: Performed by: STUDENT IN AN ORGANIZED HEALTH CARE EDUCATION/TRAINING PROGRAM

## 2023-04-14 PROCEDURE — 120N000011 HC R&B TRANSPLANT UMMC

## 2023-04-14 PROCEDURE — 85018 HEMOGLOBIN: CPT

## 2023-04-14 PROCEDURE — 36415 COLL VENOUS BLD VENIPUNCTURE: CPT | Performed by: SURGERY

## 2023-04-14 PROCEDURE — 85007 BL SMEAR W/DIFF WBC COUNT: CPT | Performed by: SURGERY

## 2023-04-14 PROCEDURE — 76776 US EXAM K TRANSPL W/DOPPLER: CPT

## 2023-04-14 RX ORDER — DIPHENHYDRAMINE HCL 25 MG
25-50 CAPSULE ORAL ONCE
Status: COMPLETED | OUTPATIENT
Start: 2023-04-14 | End: 2023-04-14

## 2023-04-14 RX ORDER — CARVEDILOL 12.5 MG/1
12.5 TABLET ORAL 2 TIMES DAILY
Status: ON HOLD | COMMUNITY
End: 2023-04-20

## 2023-04-14 RX ORDER — TACROLIMUS 1 MG/1
4 CAPSULE ORAL
Status: DISCONTINUED | OUTPATIENT
Start: 2023-04-14 | End: 2023-04-17

## 2023-04-14 RX ORDER — NORETHINDRONE ACETATE 5 MG
2.5 TABLET ORAL DAILY
Status: ON HOLD | COMMUNITY
Start: 2023-03-12 | End: 2023-04-20

## 2023-04-14 RX ORDER — CHLORTHALIDONE 25 MG/1
25 TABLET ORAL DAILY
Status: ON HOLD | COMMUNITY
Start: 2023-03-06 | End: 2023-04-20

## 2023-04-14 RX ORDER — DIPHENHYDRAMINE HCL 12.5MG/5ML
25-50 LIQUID (ML) ORAL ONCE
Status: COMPLETED | OUTPATIENT
Start: 2023-04-14 | End: 2023-04-14

## 2023-04-14 RX ORDER — ACETAMINOPHEN 325 MG/1
650 TABLET ORAL ONCE
Status: DISCONTINUED | OUTPATIENT
Start: 2023-04-14 | End: 2023-04-15

## 2023-04-14 RX ORDER — SULFAMETHOXAZOLE AND TRIMETHOPRIM 400; 80 MG/1; MG/1
1 TABLET ORAL
Status: DISCONTINUED | OUTPATIENT
Start: 2023-04-17 | End: 2023-04-15

## 2023-04-14 RX ORDER — HYDROXYZINE HYDROCHLORIDE 25 MG/1
25 TABLET, FILM COATED ORAL 3 TIMES DAILY PRN
Status: DISCONTINUED | OUTPATIENT
Start: 2023-04-14 | End: 2023-04-16

## 2023-04-14 RX ADMIN — POLYETHYLENE GLYCOL 3350 17 G: 17 POWDER, FOR SOLUTION ORAL at 08:03

## 2023-04-14 RX ADMIN — SULFAMETHOXAZOLE AND TRIMETHOPRIM 1 TABLET: 400; 80 TABLET ORAL at 08:03

## 2023-04-14 RX ADMIN — OXYCODONE HYDROCHLORIDE 5 MG: 5 TABLET ORAL at 14:33

## 2023-04-14 RX ADMIN — ACETAMINOPHEN 975 MG: 325 TABLET, FILM COATED ORAL at 10:02

## 2023-04-14 RX ADMIN — METHYLPREDNISOLONE SODIUM SUCCINATE 250 MG: 125 INJECTION INTRAMUSCULAR; INTRAVENOUS at 12:24

## 2023-04-14 RX ADMIN — OXYCODONE HYDROCHLORIDE 5 MG: 5 TABLET ORAL at 07:02

## 2023-04-14 RX ADMIN — TACROLIMUS 4 MG: 1 CAPSULE ORAL at 18:06

## 2023-04-14 RX ADMIN — ATORVASTATIN CALCIUM 10 MG: 10 TABLET, FILM COATED ORAL at 08:03

## 2023-04-14 RX ADMIN — ACETAMINOPHEN 975 MG: 325 TABLET, FILM COATED ORAL at 01:34

## 2023-04-14 RX ADMIN — SODIUM CHLORIDE 1000 ML: 9 INJECTION, SOLUTION INTRAVENOUS at 02:08

## 2023-04-14 RX ADMIN — DIPHENHYDRAMINE HYDROCHLORIDE 25 MG: 25 CAPSULE ORAL at 12:25

## 2023-04-14 RX ADMIN — ACETAMINOPHEN 975 MG: 325 TABLET, FILM COATED ORAL at 18:06

## 2023-04-14 RX ADMIN — SODIUM CHLORIDE, SODIUM LACTATE, POTASSIUM CHLORIDE, CALCIUM CHLORIDE AND DEXTROSE MONOHYDRATE: 5; 600; 310; 30; 20 INJECTION, SOLUTION INTRAVENOUS at 04:59

## 2023-04-14 RX ADMIN — OXYCODONE HYDROCHLORIDE 10 MG: 10 TABLET ORAL at 19:03

## 2023-04-14 RX ADMIN — MYCOPHENOLATE MOFETIL 750 MG: 250 CAPSULE ORAL at 18:06

## 2023-04-14 RX ADMIN — SENNOSIDES AND DOCUSATE SODIUM 1 TABLET: 8.6; 5 TABLET ORAL at 08:03

## 2023-04-14 RX ADMIN — SODIUM CHLORIDE 500 ML: 9 INJECTION, SOLUTION INTRAVENOUS at 22:47

## 2023-04-14 RX ADMIN — TACROLIMUS 2 MG: 1 CAPSULE ORAL at 08:03

## 2023-04-14 RX ADMIN — SODIUM CHLORIDE 1000 ML: 9 INJECTION, SOLUTION INTRAVENOUS at 00:02

## 2023-04-14 RX ADMIN — SENNOSIDES AND DOCUSATE SODIUM 1 TABLET: 8.6; 5 TABLET ORAL at 20:14

## 2023-04-14 RX ADMIN — SODIUM CHLORIDE, SODIUM LACTATE, POTASSIUM CHLORIDE, CALCIUM CHLORIDE AND DEXTROSE MONOHYDRATE: 5; 600; 310; 30; 20 INJECTION, SOLUTION INTRAVENOUS at 14:17

## 2023-04-14 RX ADMIN — MYCOPHENOLATE MOFETIL 750 MG: 250 CAPSULE ORAL at 08:03

## 2023-04-14 RX ADMIN — ANTI-THYMOCYTE GLOBULIN (RABBIT) 125 MG: 5 INJECTION, POWDER, LYOPHILIZED, FOR SOLUTION INTRAVENOUS at 13:15

## 2023-04-14 RX ADMIN — HYDROXYZINE HYDROCHLORIDE 25 MG: 25 TABLET, FILM COATED ORAL at 19:02

## 2023-04-14 RX ADMIN — OXYCODONE HYDROCHLORIDE 5 MG: 5 TABLET ORAL at 03:17

## 2023-04-14 RX ADMIN — SODIUM CHLORIDE 500 ML: 9 INJECTION, SOLUTION INTRAVENOUS at 08:07

## 2023-04-14 ASSESSMENT — ACTIVITIES OF DAILY LIVING (ADL)
ADLS_ACUITY_SCORE: 26
DEPENDENT_IADLS:: INDEPENDENT
ADLS_ACUITY_SCORE: 26

## 2023-04-14 NOTE — PROGRESS NOTES
Admitted/transferred from: PACU  Time of arrival on unit 2000  2 RN full skin assessment completed by TIANA Cabral RN & TO Fletcher RN  Skin assessment finding: issues found sores on bottom left foot and abd incision glued/sutured.   Interventions/actions: skin interventions standard skin care     Will continue to monitor.

## 2023-04-14 NOTE — PROGRESS NOTES
"/73 (BP Location: Left arm)   Pulse 80   Temp 98.5  F (36.9  C) (Oral)   Resp 18   Ht 1.6 m (5' 3\")   Wt 62.5 kg (137 lb 11.2 oz)   SpO2 98%   BMI 24.39 kg/m      Patient A&O x 4 on RA, afebrile. Patient had abdominal incision pain which is manageable with schedule pain medication. Tolerating regular diet with good appetite. Right HD line CDI, Left PIV SL, IJ running IV fluids. No BM at this time and unable to pass gas. [place]arton cath care is completed and is very positional, had a very good urine output. Abdominal insicion approximate, CDI . Walks with assist x1. Will continue with POC and notify MD with changes or concerns.  "

## 2023-04-14 NOTE — PROGRESS NOTES
Transplant Surgery  Inpatient Daily Progress Note  04/14/2023    Assessment & Plan: Caity Horan is a 32 year old White female who has PMH significant for ESRD 2/2 IgA Nephropathy c/b graft failure. She is now POD1 from LDKT on 4/13/22 with Dr. Sepulveda.     Transplant:s/p kidney Postoperative day: 1. Robust UOP overnight on 4/14 that has slowed down as of the morning of 4/14.     - 1L NS bolus on 4/14  - repeat renal US w/ doppler ordered  - thymoglobulin 2mg/kg + solu-medrol 250mg     Immunosuppression management:   - TAC 2mg BID and MMF 750mg BID  - Inducted with Thymo. Received 2 mg/kg for a total of 125mg and solu-medrol 500mg x1  - thymoglobulin 2mg/kg with solumedrol at 250mg on 4/14   - thymogobulin 2mg/kg with solu-medrol 100mg on 4/15  - Bactrim for PJP ppx  - Valcyte for CMV ppx    Nephrology/ Fluid/Electrolytes: LDKT with Dr. Sepulveda on 4/13. Post-op renal U/S w/ doppler without any acute abnormalities nor hydronephrosis   - repeat renal U/S w/ doppler on 4/14  - on D5 + LR @ 100cc/hr   - receiving 1L NS bolus  - rutledge in place with clear colored urine    Neuro/Pain: multimodal pain mgmt     Hematology: Hgb Q4H x 24H post transplant.    Cardiorespiratory: continue PTA Lipitor for HLD    FEN/GI: regular diet    Endocrine: ZAHIDA  Infectious disease: afebrile. Bactrim + valcyte for PJP as well as CMV ppx respectively  ,    WBC   Date Value Ref Range Status   12/03/2020 6.3 4.0 - 11.0 10e9/L Final     WBC Count   Date Value Ref Range Status   04/14/2023 5.8 4.0 - 11.0 10e3/uL Preliminary     Disposition: potential discharge in 2-3 days      SRINIVASAN/Fellow/Resident Provider: Juve Rosales MD, PGY-1  Faculty: Ivy Sepulveda M.D.  _________________________________________________________________  Transplant History: Admitted 4/13/2023 for LDKT.  4/13/2023 (Kidney), 12/5/2014 (Kidney), Postoperative day: 1     Interval History: Pt denies being in any acute distress. She has not passed any flatus nor had  "a BM. Pt reports not getting out of bed yet since being on the floor. 1L NS bolus ordered overnight.     ROS:   A 10-point review of systems was negative except as noted above.    Meds:    acetaminophen  975 mg Oral Q8H     atorvastatin  10 mg Oral Daily     [Held by provider] furosemide  40 mg Intravenous BID     [START ON 4/15/2023] magnesium oxide  400 mg Oral Daily with lunch     mycophenolate  750 mg Oral BID IS     polyethylene glycol  17 g Oral Daily     senna-docusate  1 tablet Oral BID     sodium chloride (PF)  10 mL Intracatheter Q8H     sulfamethoxazole-trimethoprim  1 tablet Oral Daily     tacrolimus  2 mg Oral BID IS     valGANciclovir  450 mg Oral Once per day on Mon Thu       Physical Exam:     Admit Weight: 58.2 kg (128 lb 4.9 oz)    Current vitals:   /65 (BP Location: Left arm)   Pulse 86   Temp 98.2  F (36.8  C) (Oral)   Resp 16   Ht 1.6 m (5' 3\")   Wt 58.2 kg (128 lb 4.9 oz)   SpO2 100%   BMI 22.73 kg/m      CVP (mmHg): 12 mmHg    Vital sign ranges:    Temp: 98.2  F (36.8  C) Temp src: Oral BP: 110/65 Pulse: 86   Resp: 16 SpO2: 100 % O2 Device: None (Room air) Oxygen Delivery: 1 LPM    General Appearance: in no apparent distress.   Skin: warm and dry  Heart: regular rate and normal rhythm  Lungs: no increased WOB on RA  Abdomen: The abdomen is flat, and non-tender   LLQ Incision: C/D/I without surrounding erythema  : rutledge is present with clear colored urine  Extremities: warm and well perfused without edema  Neurologic: CN II-XII grossly intact without focal neuro deficits    Data:   CMP  Recent Labs   Lab 04/14/23  0202 04/13/23  2232 04/13/23  1842 04/13/23  1829 04/13/23  1253 04/11/23  0832   NA  --   --  133*  --   --  136   POTASSIUM 3.8 3.6 4.1  4.1  --   --  3.6   CHLORIDE  --   --  95*  --   --  88*   CO2  --   --  23  --   --  29   GLC  --   --  142* 154*   < > 122*   BUN  --   --  29.7*  --   --  30.3*   CR  --   --  7.23*  --   --  8.30*   GFRESTIMATED  --   --  7*  -- "   --  6*   MICAH  --   --  9.5  --   --  10.5*   MAG  --   --  1.3*  --   --   --    PHOS  --   --  4.1  --   --   --    ALBUMIN  --   --   --   --   --  5.1   BILITOTAL  --   --   --   --   --  0.6   ALKPHOS  --   --   --   --   --  70   AST  --   --   --   --   --  23   ALT  --   --   --   --   --  15    < > = values in this interval not displayed.     CBC  Recent Labs   Lab 04/14/23  0812 04/14/23  0202 04/13/23  2232 04/13/23  1842   HGB 8.9* 9.3*   < > 9.3*  9.3*   WBC 5.8  --   --  5.4     --   --  144*    < > = values in this interval not displayed.     COAGSNo lab results found in last 7 days.    Invalid input(s): XA   Urinalysis  Recent Labs   Lab Test 12/05/22  1200 01/14/21  1200 12/03/20  0915 06/27/19  0746 12/19/18  0615   COLOR Light Yellow  --   --   --  Yellow   APPEARANCE Clear  --   --   --  Clear   URINEGLC 50*  --   --   --  Negative   URINEBILI Negative  --   --   --  Negative   URINEKETONE Negative  --   --   --  Negative   SG 1.012  --   --   --  1.019   UBLD Moderate*  --   --   --  Negative   URINEPH 7.5*  --   --   --  5.0   PROTEIN 300*  --   --   --  Negative   NITRITE Negative  --   --   --  Negative   LEUKEST Negative  --   --   --  Negative   RBCU 4*  --   --   --  1   WBCU 8*  --   --   --  2   UTPG  --  2.09* 1.36*   < > 0.12    < > = values in this interval not displayed.     Virology:  CMV DNA Quantitation Specimen   Date Value Ref Range Status   07/25/2017 Plasma, EDTA anticoagulant  Final     Hepatitis C Antibody   Date Value Ref Range Status   04/13/2023 Nonreactive Nonreactive Final   05/26/2015  NR Final    Nonreactive   Assay performance characteristics have not been established for newborns,   infants, and children       BK Virus Result   Date Value Ref Range Status   06/27/2019 BK Virus DNA Not Detected BKNEG^BK Virus DNA Not Detected copies/mL Final   12/19/2018 BK Virus DNA Not Detected BKNEG^BK Virus DNA Not Detected copies/mL Final   11/01/2018 BK Virus DNA Not  Detected BKNEG^BK Virus DNA Not Detected copies/mL Final   02/01/2018 BK Virus DNA Not Detected BKNEG^BK Virus DNA Not Detected copies/mL Final   07/19/2017 BK Virus DNA Not Detected BKNEG copies/mL Final   12/13/2016 BK Virus DNA Not Detected BKNEG copies/mL Final   05/19/2016 BK Virus DNA Not Detected BKNEG copies/mL Final   10/06/2015 BK Virus DNA Not Detected BKNEG copies/mL Final   08/25/2015 BK Virus DNA Not Detected BKNEG copies/mL Final   06/30/2015 BK Virus DNA Not Detected BKNEG copies/mL Final   04/06/2015 BK Virus DNA Not Detected BKNEG copies/mL Final   02/09/2015 BK Virus DNA Not Detected BKNEG copies/mL Final   02/02/2015 BK Virus DNA Not Detected BKNEG copies/mL Final   01/05/2015 BK Virus DNA Not Detected BKNEG copies/mL Final

## 2023-04-14 NOTE — PROGRESS NOTES
"Post Op Check    04/13/2023    Caity Horan is a 32 year old female with h/o IgA Nephropathy now POD#0 s/p Living donor kidney txp.    Pt reports feeling relatively well. She is having pain that is manageable. Denies SOB, chest pain, or dizziness. Denies nausea/vomiting. Adequate UOP.    /65   Pulse 95   Temp 97.9  F (36.6  C) (Oral)   Resp 17   Ht 1.6 m (5' 3\")   Wt 58.2 kg (128 lb 4.9 oz)   SpO2 99%   BMI 22.73 kg/m      Gen: NAD, resting comfortably  Chest: breathing non-labored on NC   Abdomen: soft, appropriately tender, no distension, incision c/d/i w/ surgical glue overlying.     A/P: No acute post-op issues. Continue plan of care per primary team. Please call with any questions.    Please page if questions,     Herb Gasca MD  Surgical Resident  #[id number]          "

## 2023-04-14 NOTE — PROGRESS NOTES
"CLINICAL NUTRITION SERVICES - ASSESSMENT NOTE     Nutrition Prescription  Malnutrition Status:    Patient does not meet two of the established criteria necessary for diagnosing malnutrition    Recommendations already ordered by Registered Dietitian (RD):  Discharge diet order written.       Future/Additional Recommendations:  If suspect eating poorly, would offer supplements and start on kcal cnts.     Minimize diet restrictions as able due to high calorie/protein needs post-transplant. Oral supplements as needed to help meet nutritional needs.    High-protein food choices with meals to help meet high needs post-transplant over the next 6-8 weeks.    Heat-healthy diet (low saturated fat, low sodium, high fiber) and food safety precautions long term due to immunosuppression regimen post transplant.        REASON FOR ASSESSMENT  Caity Horan is a 32 year old female seen by the dietitian for MD Order- Assess & Educate post-op SOT    S/p LDKT (4/13)    NUTRITION HISTORY  Caity reports she follows a [id number] diet at home. She reports that being on HD made eating more difficult but she eats mostly whole foods and takes a protein powder supplement at home.      CURRENT NUTRITION ORDERS  Diet: Regular  Intake/Tolerance: she reports her appetite is fair and her mom will be bringing her in something to eat today.     LABS  Labs reviewed  (4/14): BUN 23.2 mg/dL (H), CR 3.89 mg/dL (H), phos 4.6 mg/dL (H)      MEDICATIONS  Medications reviewed  Lipitor  Lasix  Miralax  Senokot    ANTHROPOMETRICS  Height: 160 cm (5' 3\")  Most Recent Weight: 58.2 kg (128 lb 4.9 oz)    IBW: 52.3 kg  BMI: Normal BMI  Weight History:   Wt Readings from Last 10 Encounters:   04/13/23 58.2 kg (128 lb 4.9 oz)   04/03/23 59.1 kg (130 lb 3.2 oz)   03/08/23 58.3 kg (128 lb 9.6 oz)   02/03/23 56 kg (123 lb 7.3 oz)   01/25/23 58.2 kg (128 lb 4.8 oz)   12/25/22 67.1 kg (147 lb 14.9 oz)   12/05/22 65.5 kg (144 lb 4.8 oz)   11/14/22 63 kg (139 " lb)   12/11/19 59.4 kg (130 lb 14.4 oz)   01/10/19 63.2 kg (139 lb 6.4 oz)   13.3% weight loss in ~ 3 months. Difficult to assess due to patient was on HD  Dosing Weight: 58 kg (admission weight)    ASSESSED NUTRITION NEEDS:  Estimated Energy Needs: 7340-9987 kcals (30-35 Kcal/Kg)  Justification: increased needs post-op SOT  Estimated Protein Needs:  grams protein (1.3-2 gm/Kg)  Justification: increased needs post op SOT  Estimated Fluid Needs: (25-30 mL/Kg)  Justification: maintenance, or per MD pending fluid status and adequate UOP    PHYSICAL FINDINGS  See malnutrition section below.      MALNUTRITION  % Intake: Decreased intake does not meet criteria  % Weight Loss: difficult to assess with fluid status   Subcutaneous Fat Loss: None observed  Muscle Loss: None observed  Fluid Accumulation/Edema: None noted  Malnutrition Diagnosis: Patient does not meet two of the established criteria necessary for diagnosing malnutrition    NUTRITION DIAGNOSIS:  Food and nutrition-related knowledge deficit r/t length of time since previous post-transplant education AEB patient verbal report, review of chart record, and MD consult for nutrition education.     INTERVENTIONS  Implementation  Nutrition Education:  1. Provided instruction on post-transplant diet with discussion regarding protein sources and high protein needs in acute post-tx phase.  Reviewed recommendations to follow low fat/low sodium diet long term and discussed heart healthy diet tips.  Discussed monitoring of K+/Phos lab values with possible need for adjustment of these in the diet as necessary. Reviewed need for food safety precautions to prevent food borne illness.    2. Provided & reviewed handout: Post-transplant diet guidelines. Patient receptive to information provided. Expected diet compliance is good.     Goals  1. Patient will verbalize understanding of 3 important aspects of post-transplant diet guidelines.   2. PO intake >50% meals  TID.    Monitoring/Evaluation  Progress toward goals will be monitored and evaluated per protocol.    Edel Hendricks, MS/RD/LD  7A/Obs RD pager: [phone]  Weekend/Holiday RD pager: [phone]

## 2023-04-14 NOTE — PROGRESS NOTES
Handoff information     Type of transplant: LDKT  Date of transplant: 4/13/23  Direct/non-direct/PEP- PEP with friend   Transplant history: LDKT 2014   (Why they lost previous tx graft)  Outstanding items for patient: Pt due for pap in April but OBGYN [place]   Pertinent history: ESRD IGA recurrence/chronic antibody mediated rejection, PAD, ulcerative colitis- on entyvio q6 weeks with stability follows with her local GI, EBV viremia, TANMAY 3 h/p HPV +16/18   Barriers to post transplant care: None, [place] and plans to return when medically stable

## 2023-04-14 NOTE — PROGRESS NOTES
"/73 (BP Location: Left arm)   Pulse 78   Temp 97.9  F (36.6  C) (Oral)   Resp 13   Ht 1.6 m (5' 3\")   Wt 58.2 kg (128 lb 4.9 oz)   SpO2 100%   BMI 22.73 kg/m      Shift: [id number]  VS: Soft BPs (90s) on RA, afebrile. Capno, stable.  Neuro: Aox4  Behaviors: Calm/cooperative, anxious.  Labs: K+ and Hgb Q4HR checks. K+ 3.8, Hgb 9.3.  Respiratory: WDL  Cardiac: WDL. On tele, NSR. On CVP monitoring, CVPs 10-12.  Pain/Nausea/PRN: IV dilaudid 0.2 mg @ 2330. Oxy 5 mg @ 0702. Also has Atarax 25 mg PRN for pain/anxiety.  Diet: Regular  LDA: R HD vascular access. L PIV-SL. L triple lumen IJ, running MIV D5LR @ 100 ml/hr. Replacement 1 - 0.9 NS stopped. Replacement 2 - 1/2 NS stopped. Gave 1 L 0.9 NS bolus @ 0200, per provider, d/t low BPs, CVP 13-14 and decreased  ml. Post-bolus: /73, CVP 12, &  ml.  GI/: Urethral catheter UOP 3500 ml.   Skin: Abd incision, sutured/liquid bandage.  Mobility: Up w/ 1A and gait belt. Has not ambulated yet.  Education: Med card and lab book started. Educate importance of IS usage, adequate hydration, and ambulation.  Plan: Continue pain/nausea management and control.     "

## 2023-04-14 NOTE — PROGRESS NOTES
Transplant Admission Psychosocial Assessment    Patient Name: Caity Horan  : 1990  Age: 32 year old  MRN: [id number]  Date of Initial Social Work Evaluation: 2022    Patient underwent non related living kidney transplant.  Met with patient to update psychosocial assessment and provide brief education about SW role while inpatient, as well as expectations/requirements and follow up needs post-transplant. SW also provided education about need for compliance with transplant medications, and explained ESRD Medicare benefits and medication coverage under Medicare part B.  Medicare 2728 forms completed and signed by patient. Of note, she does not plan to take Medicare.     Presenting Information   Living Situation: lives alone in [place]  If not local, plans for short term stay:  Home with mother and sister. A lot of support in [place]  Previous Functional Status: independent   Cultural/Language/Spiritual Considerations: none    Support System  Primary Support Person: mother  Other support:  sister  Plan for support in immediate post-transplant period: home with family locally before going back to the [facility]     Health Care Directive  Decision Maker: self   Alternate Decision Maker: mother   Health Care Directive: Patient considering completing    Mental Health/Coping:   History of Mental Health: Has some situational anxiety   History of Chemical Health: none  Current status: appropriate   Coping: appropriate   Services Needed/Recommended: SW to monitor post discharge.     Financial   Income: wages   Impact of transplant on income: time off of work   Insurance and medication coverage: [employer] through work   Financial concerns: none  Resources needed: none    Education provided by SW: Social Work role inpatient setting, availability of support groups, parking information and encouraged her to reach out with any concerns.     Assessment and recommendations and plan:  SW to remain available for future  needs.     Angelique Florez [facility], CCTSW   Living Donor   [facility], [facility], [facility]  Direct: [phone]  E-Mail: [email or web address]

## 2023-04-14 NOTE — PLAN OF CARE
"/79   Pulse 104   Temp 98.2  F (36.8  C)   Resp 23   Ht 1.6 m (5' 3\")   Wt 62.5 kg (137 lb 11.2 oz)   SpO2 98%   BMI 24.39 kg/m    [id number]  Patient intermittently tachycardic on RA, afebrile. BPs on softer side, improved with 500cc NS bolus 0800. Hemodynamic monitoring discontinued. Abdominal incision pain managed with scheduled tylenol and PRN oxycodone. No complaints of nausea. Tolerating regular diet with fair appetite. PIV saline locked. HD line CDI. IJ running MIVF; replacement fluids discontinued. Thymo completed, tolerated well. Great UOP via rutledge catheter. No BM, patient stating concerns regarding colitis; not an issue at this time. Abdominal incision CDI. Patient is up with SBA. Tolerating IS well. Abdominal US completed. Lab book up to date. Med card in progress. Plan for potential discharge 4/16. Will continue with POC and notify MD with changes or concerns.      "

## 2023-04-14 NOTE — PROGRESS NOTES
CLINICAL NUTRITION SERVICES - DISCHARGE NOTE    Patient s discharge needs assessed and discharge planning has been conducted with the multidisciplinary transplant care team including physicians, pharmacy, social work and transplant coordinator.    Follow up/Monitoring:  Once discharged, place outpatient nutrition consult via the transplant team if nutrition concerns arise.    Edel Hendricks, MS/RD/RUDDY  7A/Obs RD pager: [phone]  Weekend/Holiday RD pager: [phone]

## 2023-04-14 NOTE — CONSULTS
Care Management Initial Consult    General Information  Assessment completed with: Patient, Care Team Member, -chart review,    Type of CM/SW Visit: Initial Assessment    Primary Care Provider verified and updated as needed:  (Per pt she does not have a PCP.  Pt has multiple specialists that pt coordinates her care needs.)   Readmission within the last 30 days:        Reason for Consult: discharge planning  Advance Care Planning:            Communication Assessment  Patient's communication style: spoken language (English or Bilingual)    Hearing Difficulty or Deaf: no   Wear Glasses or Blind: no    Cognitive  Cognitive/Neuro/Behavioral: WDL, all  Level of Consciousness: alert  Arousal Level: opens eyes spontaneously  Orientation: oriented x 4  Mood/Behavior: calm, cooperative, anxious  Best Language: 0 - No aphasia  Speech: clear, spontaneous, logical    Living Environment:   People in home: alone (Staying locally a VRBO - pt mom will be staying with her for the first two weeks post hospitaliation and her sister will assist her after her mom leaves for two weeks.)     Current living Arrangements: apartment      Able to return to prior arrangements: yes       Family/Social Support:  Care provided by: self  Provides care for: no one     Sibling(s), Parent(s)          Description of Support System: Supportive, Involved    Support Assessment: Adequate family and caregiver support    Current Resources:   Patient receiving home care services: No     Community Resources: None  Equipment currently used at home: none  Supplies currently used at home: None    Employment/Financial:  Employment Status:          Financial Concerns: No concerns identified           Lifestyle & Psychosocial Needs:  Social Determinants of Health     Tobacco Use: Low Risk  (4/14/2023)    Patient History      Smoking Tobacco Use: Never      Smokeless Tobacco Use: Never      Passive Exposure: Not on file   Alcohol Use: Not on file   Financial  Resource Strain: Not on file   Food Insecurity: Not on file   Transportation Needs: Not on file   Physical Activity: Not on file   Stress: Not on file   Social Connections: Not on file   Intimate Partner Violence: Not on file   Depression: Not at risk (1/26/2023)    PHQ-2      PHQ-2 Score: 0   Housing Stability: Not on file       Functional Status:  Prior to admission patient needed assistance:   Dependent ADLs:: Independent  Dependent IADLs:: Independent       Mental Health Status: No concerns          Chemical Dependency Status: No concerns      Additional Information:  Patient status reviewed/discussed during care team rounds.  Pt s/p kidney transplant.  Team anticipates possible discharge POD 3 (Sunday).  ATC appointments requested.    Met with pt.  Introduced RNCC role.  Reviewed anticipated plan for ATC appointments.  Per discussion with pt her mother will be staying with her for the first two and half weeks post hospitalization and when her mother leaves pt sister will arrive and plans to stay with pt for two and half weeks. Reviewed anticipated plan for transplant labs M/TH. Pt notes no concerns regarding anticipated plan for discharge.    Ibis Valderrama, RN BSN, PHN, ACM-RN  7A RN Care Coordinator  Phone: [phone]  Pager [phone]    To contact the weekend RNCC  Joy (0800 - 1630) Saturday and Sunday    Units: 4A, 4C, 4E, 5A and 5B- Pager 1: [phone]    Units: 6A, 6B, 6C, 6D- Pager 2: [phone]    Units: 7A, 7B, 7C, 7D, and 5C-Pager 3: [phone]    [facility] ([phone]) Saturday and Sunday    Units: 5 Ortho, 8A, 10 ICU, & Pediatric Units-Pager 4: [phone]    4/14/2023 11:36 AM

## 2023-04-14 NOTE — PLAN OF CARE
Goal Outcome Evaluation:      Plan of Care Reviewed With: patient    Overall Patient Progress: no changeOverall Patient Progress: no change    Outcome Evaluation: encourage oral intake. Provided post transplant nutrition guidelines

## 2023-04-14 NOTE — TELEPHONE ENCOUNTER
A pharmacist spent 15 minutes on the phone providing medication teaching with Caity Horan for discharge with a focus on new medications/dose changes.  The discharge medication list was reviewed with the patient/family and the following points were discussed, as applicable: Name, description, purpose, dose/strength, duration of medications, food/medications to avoid, action to be taken if dose is missed and how to obtain refills.  The patient will be responsible for managing medications. Additionally, the following transplant related education was covered: Purpose of medication card, Medication videos, Timing of medications and day of lab draw considerations , Prescription Insurance  and Discharge process for receiving meds   Patient will  transplant supplies including 7 day pill organizer, thermometer, and BP monitor at the discharge pharmacy along with medications. Just be aware that she didn't sound like she'll use the med box, despite my encouragement. She said she has her own system. Patient chooses to receive medications from  specialty pharmacy.   Clinical Pharmacy Consult:                                                      Transplant Specific:   Date of Transplant: 4/13/2023  Type of Transplant: kidney  First Transplant: no. She said she never missed a dose of immuno's in the 8 years since her first transplant.  History of rejection: no    Immunosuppression Regimen   TAC 2mg qAM & 2mg qPM and MMF 750mg qAM & 750mg qPM  Patient specific goal: 8-10  Most recent level: -, date   Immunosuppressant Levels:    Pt adherent to lab draws: yes  Scr:   Lab Results   Component Value Date    CR 3.89 04/14/2023    CR 1.31 04/16/2021     Side effects: no side effects    Prophylactic Medications  Antibacterial:  Bactrim 400-80mg every Mon, Wed, Fri  Scheduled Discontinue Date: Lifelong    Antifungal: Not needed thus far  Scheduled Discontinue Date: N/A    Antiviral: CrCl 10 to 24 mL/minute: Valcyte 450 mg twice  weekly   Scheduled Discontinue Date: 6 months    Acid Reducer:   Scheduled Reviewed Date:     Thrombosis Prevention:   Scheduled Discontinue Date:     Blood Pressure Management  Frequency of home Blood Pressure checks: twice daily  Most recent home BP: 108/70  Patient Blood pressure goal: <150/90  Patient blood pressure at goal:  yes  Hospitalizations/ER visits since last assessment: 0      Med rec/DUR performed: yes  Med Rec Discrepancies: no    Reminders:    1. Bring to first clinic appt: med box, med card, bp monitor, all medications being taken, and lab book.  2.   MTM pharmacist visit on first clinic appt and if ok, again in 3 to 4 months during follow up appt.  3.   Avoid Grapefruit and Grapefruit juice.   4.   Avoid herbal supplements. If wish to take other medications or supplements, call your coordinator.   5.   Keep lab appts.   .   7.   Make sure you are protecting your skin by wearing long sleeves and applying sunscreen to exposed skin, for any significant time in the sun.     Transplant Coordinator is ?. (not updated in Epic yet)      Berna Mixon RPH

## 2023-04-14 NOTE — PHARMACY-TRANSPLANT NOTE
Adult Kidney Transplant Post Operative Note    31 yo F s/p LDKT 4/14/23 for recurrent IgA/chronic AMR.    PMHx: IgA s/p LDKT 2014, anxiety, Ulcerative colitis, EBV viremia    Planned immunosuppression regimen per kidney high risk transplant protocol:  INDUCTION:  - 4/13 thymoglobulin 2mg/kg IV, methylprednisolone 500mg IV  - 4/14 thymoglobulin 2mg/kg IV, methylprednisolone 250mg IV  - 4/15 thymoglobulin 2mg/kg IV, methylprednisolone 100mg IV    MAINTENANCE:    - Mycophenolate mofetil 750mg BID  - Tacrolimus with goal trough levels of 8-10 mcg/L for first 6 months post-transplant.  - Prednisone 5mg daily indefinitely given hx of AMR and second transplant to be discussed    Opportunistic pathogen prophylaxis includes:  - PJP: trimethoprim/sulfamethoxazole indefinitely   - CMV D+/R-: Valganciclovir for 6 months duration    Ulcerative Colitis  - Vedolizumab 300mg y1zhmve (due 4/19/23) will continue post transplant as the additive immunosuppression is insignificantly minimal     Patient is not enrolled in medication study.    Pharmacy will monitor for medication interactions and immunosuppression levels in conjunction with the team. Medication therapy needs for discharge planning will continue to be addressed throughout the current admission via multidisciplinary rounds and order review.  Pharmacy will make recommendations as appropriate.    Radhika Mcconnell, Pharm.D., BCPS, SHELDONP  Pager [phone]      Addendum:  Amilcar Ray PharmD, SHELDONP, [facility]  Inpatient clinical pharmacist

## 2023-04-14 NOTE — CONSULTS
[facility]  Transplant Nephrology Consult  Date of Admission:  4/13/2023  Today's Date: 04/14/2023  Requesting physician: Ivy Sepulveda MD    Recommendations:  - high risk induction: r-ATG 2mg/kg dose 2/3 today  - tacrolimus adjust dose to 4 mg po bid as she was on 3 mg po bid preTx  -  mg po bid, will monitor tolerance   - due for vedolizumab ~4/22 (please confirm), plan to continue per schedule given selective GI effect and most recent UC flare Dec 2022 with mild chronic active colitis on most recent colonoscopy 2/2022  - check UPC at baseline so can monitor, unclear how much residual renal function on HD since Dec 2022, prior nephrotic range proteinuria~7.9 g/g 10/22 with 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  - check serum BK PCR given prior immunosuppression then monthly per protocol    Assessment & Plan   # LDKT: Trend down   - Baseline Creatinine: ~ tbd   - Proteinuria: obtain UPC at baseline  ;  7.9 g/g prior to transplant in Oct.2022   - Date DSA Last Checked: Not Known      Latest DSA: No DSA at time of transplant   - BK Viremia: No   - Kidney Tx Biopsy: No      # Immunosuppression: Tacrolimus immediate release (goal 8-10) and Mycophenolate mofetil (dose 750 mg every 12 hours)   - Changes: Not at this time    - Induction: high risk    # Infection Prophylaxis:   - PJP: Sulfa/TMP (Bactrim)  - CMV: Valganciclovir (Valcyte)    # Hypertension: Controlled;  Goal BP: < 150/90   - Volume status: Euvolemic  EDW ~ 59 kg   - Changes: No    # Anemia in Chronic Renal Disease: Hgb: Trend down      MICKEY: per dialysis   - Iron studies: Unknown at this time, but checked with dialysis    # Mineral Bone Disorder:   - Secondary renal hyperparathyroidism; PTH level: Unknown at this time, but checked with dialysis        On treatment: per HD  - Vitamin D; level: Unknown at this time, but checked with dialysis        On supplement: No  - Calcium; level:  Normal        On supplement: No  - Phosphorus; level: Normal        On supplement: No    # Electrolytes:   - Potassium; level: Low normal        On supplement: No  - Magnesium; level: Normal        On supplement: No  - Bicarbonate; level: Normal        On supplement: No  - Sodium; level: Normal      # Ulcerative colitis:              - diagnosed in 2018. Last colonoscopy Feb 2022 no dysplasia, mild chronic active colitis, on Entyvio q 6 weeks , due soon ~4/12    # Transplant History:  Etiology of Kidney Kelvin lure: IgA nephropathy, failed 1st LDKTX 2/2 cABMR and recurrent IgA nephropathy  Tx: LDKT via KPD  Crossmatch at time of Tx: negative  Transplant: 4/13/2023 (Kidney), 12/5/2014 (Kidney)    Significant changes in immunosuppression: None  Significant transplant-related complications: None    Recommendations were communicated to the primary team via this note.      Darian Arora MD  Pager: [phone]    REASON FOR CONSULT   Kidney transplant immunosuppression    History of Present Illness   Caity Horan is a 32 year old female with ESKD 2/2 IgA nephropathy, s/p LDKTx 2014 failed in Dec 2022 due to recurrent IgA nephropathy and chronic rejection presenting for LDKTx via paired exchange 4/13.Doing well postop with good UOP and downtrend in Cr. US kidney Tx with adequate flows and no hydronephrosis or mar-nephric fluid collections.  Of note: she has been maintained on tacrolimus 3/3 and /500 pre-transplant.  She denies any nausea, vomiting, diarrhea. Last dose of entyvio 3/8    Review of Systems    The 10 point Review of Systems is negative other than noted in the HPI or here.     Past Medical History    I have reviewed this patient's medical history and updated it with pertinent information if needed.   Past Medical History:   Diagnosis Date     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     Anemia in stage 5 chronic kidney disease (H) 10/27/2014     ESRD (end stage renal disease) on dialysis (H)       HTN (hypertension) 10/27/2014     Hypertension 10/2014     IgA nephropathy     biopsy proven     Metabolic acidosis      Ulcerative pancolitis (H)      Vitamin D deficiency        Past Surgical History   I have reviewed this patient's surgical history and updated it with pertinent information if needed.  Past Surgical History:   Procedure Laterality Date     BENCH KIDNEY  2023    Procedure: Bench kidney;  Surgeon: Ivy Sepulveda MD;  Location: [facility] OR     BIOPSY      renal     COLONOSCOPY N/A 2018    Procedure: COMBINED COLONOSCOPY, SINGLE OR MULTIPLE BIOPSY/POLYPECTOMY BY BIOPSY;  EGD/Colonoscopy ;  Surgeon: Kory Massey MD;  Location: [facility] GI     COLONOSCOPY N/A 09/10/2018    Procedure: COMBINED COLONOSCOPY, SINGLE OR MULTIPLE BIOPSY/POLYPECTOMY BY BIOPSY;  Colonoscopy;  Surgeon: Yenifer Doan MD;  [facility] OR     COLONOSCOPY N/A 2/3/2023    Procedure: COLONOSCOPY, WITH BIOPSY;  Surgeon: Yaakov Loving MD;  Location: [facility] GI     EXTRACTION(S) DENTAL       IR CVC TUNNEL PLACEMENT > 5 YRS OF AGE  2022     PERCUTANEOUS BIOPSY KIDNEY Right 2018    Procedure: Right Kidney Biopsy;  Surgeon: Otilio Mar MD;  Location: [facility] OR     TRANSPLANT KIDNEY RECIPIENT LIVING RELATED N/A 2014    Procedure: TRANSPLANT KIDNEY RECIPIENT LIVING RELATED;  Surgeon: Dale Middleton MD;  Location: [facility] OR     WISDOM TOOTH EXTRACTION Bilateral        Family History   I have reviewed this patient's family history and updated it with pertinent information if needed.   Family History   Problem Relation Age of Onset     No Known Problems Mother      Alcoholism Father          in early 50s     Prostate Cancer Paternal Grandmother      Kidney Disease No family hx of        Social History   I have reviewed this patient's social history and updated it with pertinent information if needed. Caity Horan  reports that she has never smoked. She has never used smokeless tobacco. She  "reports that she does not currently use alcohol after a past usage of about 3.0 - 9.0 standard drinks of alcohol per week. She reports that she does not use drugs.    Allergies   Allergies   Allergen Reactions     Bumetanide Other (See Comments)     Causes paralysis     Amoxicillin Rash     Prior to Admission Medications     acetaminophen  650 mg Oral Once     acetaminophen  975 mg Oral Q8H     atorvastatin  10 mg Oral Daily     [Held by provider] furosemide  40 mg Intravenous BID     [START ON 4/15/2023] magnesium oxide  400 mg Oral Daily with lunch     mycophenolate  750 mg Oral BID IS     polyethylene glycol  17 g Oral Daily     senna-docusate  1 tablet Oral BID     sodium chloride (PF)  10 mL Intracatheter Q8H     [START ON 2023] sulfamethoxazole-trimethoprim  1 tablet Oral Once per day on      tacrolimus  4 mg Oral BID IS     valGANciclovir  450 mg Oral Once per day on        dextrose 5% lactated ringers 100 mL/hr at 23 1300     sodium chloride Stopped (23 1100)     sodium chloride Stopped (23 1300)       Physical Exam   Temp  Av.5  F (36.9  C)  Min: 97.9  F (36.6  C)  Max: 100  F (37.8  C)      Pulse  Av.5  Min: 78  Max: 111 Resp  Avg: 15.3  Min: 7  Max: 26  SpO2  Av.4 %  Min: 97 %  Max: 100 %    CVP (mmHg): 12 mmHgBP 107/75 (BP Location: Left arm)   Pulse 80   Temp 98.5  F (36.9  C) (Oral)   Resp 18   Ht 1.6 m (5' 3\")   Wt 62.5 kg (137 lb 11.2 oz)   SpO2 100%   BMI 24.39 kg/m     Date 23 0700 - 04/15/23 0659   Shift 0932-6030 2909-8422 3136-3302 24 Hour Total   INTAKE   P.O. 1000   1000   I.V. 3238.33   3238.33   IV Piggyback 500   500   Shift Total(mL/kg) 4738.33(75.86)   4738.33(75.86)   OUTPUT   Urine 1310   1310   Shift Total(mL/kg) 1310(20.97)   1310(20.97)   Weight (kg) 62.46 62.46 62.46 62.46      Admit Weight: 58.2 kg (128 lb 4.9 oz)     GENERAL APPEARANCE: alert and no distress  HENT: mouth without ulcers or lesions  LYMPHATICS: no " cervical or supraclavicular nodes  RESP: lungs clear to auscultation - no rales, rhonchi or wheezes  CV: regular rhythm, normal rate, no rub, no murmur  EDEMA: no LE edema bilaterally  ABDOMEN: soft, nondistended, nontender, bowel sounds normal  MS: extremities normal - no gross deformities noted, no evidence of inflammation in joints, no muscle tenderness  SKIN: no rash  Access: TDC    Data   CMP  Recent Labs   Lab 04/14/23  1009 04/14/23  0812 04/14/23  0202 04/13/23  2232 04/13/23  1842 04/13/23  1829 04/13/23  1253 04/11/23  0832   NA  --  138  --   --  133*  --   --  136   POTASSIUM 3.6 3.6  3.6 3.8 3.6 4.1  4.1  --   --  3.6   CHLORIDE  --  100  --   --  95*  --   --  88*   CO2  --  25  --   --  23  --   --  29   ANIONGAP  --  13  --   --  15  --   --  19*   GLC  --  174*  --   --  142* 154* 73 122*   BUN  --  23.2*  --   --  29.7*  --   --  30.3*   CR  --  3.89*  --   --  7.23*  --   --  8.30*   GFRESTIMATED  --  15*  --   --  7*  --   --  6*   MICAH  --  8.7  --   --  9.5  --   --  10.5*   MAG  --  2.2  --   --  1.3*  --   --   --    PHOS  --  4.6*  --   --  4.1  --   --   --    PROTTOTAL  --   --   --   --   --   --   --  8.6*   ALBUMIN  --   --   --   --   --   --   --  5.1   BILITOTAL  --   --   --   --   --   --   --  0.6   ALKPHOS  --   --   --   --   --   --   --  70   AST  --   --   --   --   --   --   --  23   ALT  --   --   --   --   --   --   --  15     CBC  Recent Labs   Lab 04/14/23  1009 04/14/23  0812 04/14/23  0202 04/13/23  2232 04/13/23  1842 04/11/23  0832   HGB 8.4* 8.9* 9.3* 9.7* 9.3*  9.3* 12.5   WBC  --  5.8  --   --  5.4 5.7   RBC  --  2.91*  --   --  3.10* 4.12   HCT  --  28.8*  --   --  29.4* 37.9   MCV  --  99  --   --  95 92   MCH  --  30.6  --   --  30.0 30.3   MCHC  --  30.9*  --   --  31.6 33.0   RDW  --  13.5  --   --  13.5 13.5   PLT  --  156  --   --  144* 226     INRNo lab results found in last 7 days.  ABGNo lab results found in last 7 days.   Urine Studies  Recent Labs    Lab Test 12/05/22  1200 12/19/18  0615   COLOR Light Yellow Yellow   APPEARANCE Clear Clear   URINEGLC 50* Negative   URINEBILI Negative Negative   URINEKETONE Negative Negative   SG 1.012 1.019   UBLD Moderate* Negative   URINEPH 7.5* 5.0   PROTEIN 300* Negative   NITRITE Negative Negative   LEUKEST Negative Negative   RBCU 4* 1   WBCU 8* 2     Recent Labs   Lab Test 01/14/21  1200 12/03/20  0915 06/18/20  0950 11/11/19  0718 07/18/19  0809 06/27/19  0746 12/19/18  0615 11/01/18  0745 02/01/18  0656 07/19/17  0727 12/13/16  0747 05/19/16  0801 12/08/15  1210 06/30/15  0822 01/30/15  0821   UTPG 2.09* 1.36* 1.23* 0.17 0.25* 0.83* 0.12 0.07 0.05 0.10 0.10 0.07 0.08 0.06 0.09     PTH  Recent Labs   Lab Test 04/03/23  1556   PTHI 309*     Iron Studies  Recent Labs   Lab Test 04/03/23  1556 12/22/22  0608 06/27/19  0742 11/01/18  0737 08/23/18  0743 07/26/18  0830 05/17/18  0740 05/03/18  1320 03/29/18  0729 02/01/18  0702 12/01/17  1417 10/12/17  0744 07/25/17  1648   IRON 186* 80 99 113 131 73 19* 19* 29* 36 112 14* 21*    223* 288 262 236* 242 369 321 210* 291 274 423 467*   IRONSAT 71* 36 34 43 55* 30 5* 6* 14* 12* 41 3* 4*   FRANNY 902* 361* 233* 216* 226* 216* 7* 13 90 14 277* 3* 3*       IMAGING:  All imaging studies reviewed by me.

## 2023-04-15 LAB
ANION GAP SERPL CALCULATED.3IONS-SCNC: 7 MMOL/L (ref 7–15)
BASOPHILS # BLD MANUAL: 0 10E3/UL (ref 0–0.2)
BASOPHILS NFR BLD MANUAL: 0 %
BUN SERPL-MCNC: 13.6 MG/DL (ref 6–20)
CALCIUM SERPL-MCNC: 8.5 MG/DL (ref 8.6–10)
CHLORIDE SERPL-SCNC: 109 MMOL/L (ref 98–107)
CREAT SERPL-MCNC: 1.8 MG/DL (ref 0.51–0.95)
DEPRECATED HCO3 PLAS-SCNC: 23 MMOL/L (ref 22–29)
EOSINOPHIL # BLD MANUAL: 0 10E3/UL (ref 0–0.7)
EOSINOPHIL NFR BLD MANUAL: 0 %
ERYTHROCYTE [DISTWIDTH] IN BLOOD BY AUTOMATED COUNT: 13.8 % (ref 10–15)
GFR SERPL CREATININE-BSD FRML MDRD: 38 ML/MIN/1.73M2
GLUCOSE SERPL-MCNC: 160 MG/DL (ref 70–99)
HBV DNA SERPL QL NAA+PROBE: NORMAL
HCT VFR BLD AUTO: 26.2 % (ref 35–47)
HCV RNA SERPL QL NAA+PROBE: NORMAL
HGB BLD-MCNC: 7.5 G/DL (ref 11.7–15.7)
HGB BLD-MCNC: 7.6 G/DL (ref 11.7–15.7)
HGB BLD-MCNC: 7.8 G/DL (ref 11.7–15.7)
HGB BLD-MCNC: 8.4 G/DL (ref 11.7–15.7)
HIV1+2 RNA SERPL QL NAA+PROBE: NORMAL
LYMPHOCYTES # BLD MANUAL: 0.2 10E3/UL (ref 0.8–5.3)
LYMPHOCYTES NFR BLD MANUAL: 4 %
MAGNESIUM SERPL-MCNC: 1.8 MG/DL (ref 1.7–2.3)
MCH RBC QN AUTO: 30.1 PG (ref 26.5–33)
MCHC RBC AUTO-ENTMCNC: 29.8 G/DL (ref 31.5–36.5)
MCV RBC AUTO: 101 FL (ref 78–100)
MONOCYTES # BLD MANUAL: 0.4 10E3/UL (ref 0–1.3)
MONOCYTES NFR BLD MANUAL: 7 %
NEUTROPHILS # BLD MANUAL: 5.2 10E3/UL (ref 1.6–8.3)
NEUTROPHILS NFR BLD MANUAL: 89 %
PHOSPHATE SERPL-MCNC: 2.3 MG/DL (ref 2.5–4.5)
PLAT MORPH BLD: ABNORMAL
PLATELET # BLD AUTO: 148 10E3/UL (ref 150–450)
POTASSIUM SERPL-SCNC: 3.8 MMOL/L (ref 3.4–5.3)
POTASSIUM SERPL-SCNC: 3.8 MMOL/L (ref 3.4–5.3)
POTASSIUM SERPL-SCNC: 3.9 MMOL/L (ref 3.4–5.3)
POTASSIUM SERPL-SCNC: 3.9 MMOL/L (ref 3.4–5.3)
POTASSIUM SERPL-SCNC: 4.1 MMOL/L (ref 3.4–5.3)
RBC # BLD AUTO: 2.59 10E6/UL (ref 3.8–5.2)
RBC MORPH BLD: ABNORMAL
SODIUM SERPL-SCNC: 139 MMOL/L (ref 136–145)
WBC # BLD AUTO: 5.8 10E3/UL (ref 4–11)

## 2023-04-15 PROCEDURE — 250N000013 HC RX MED GY IP 250 OP 250 PS 637: Performed by: PHYSICIAN ASSISTANT

## 2023-04-15 PROCEDURE — 120N000011 HC R&B TRANSPLANT UMMC

## 2023-04-15 PROCEDURE — 258N000003 HC RX IP 258 OP 636: Performed by: PHYSICIAN ASSISTANT

## 2023-04-15 PROCEDURE — 36592 COLLECT BLOOD FROM PICC: CPT

## 2023-04-15 PROCEDURE — 250N000012 HC RX MED GY IP 250 OP 636 PS 637: Performed by: SURGERY

## 2023-04-15 PROCEDURE — 85018 HEMOGLOBIN: CPT

## 2023-04-15 PROCEDURE — 84132 ASSAY OF SERUM POTASSIUM: CPT

## 2023-04-15 PROCEDURE — 85007 BL SMEAR W/DIFF WBC COUNT: CPT | Performed by: SURGERY

## 2023-04-15 PROCEDURE — 250N000011 HC RX IP 250 OP 636: Performed by: PHARMACIST

## 2023-04-15 PROCEDURE — 250N000012 HC RX MED GY IP 250 OP 636 PS 637: Performed by: NURSE PRACTITIONER

## 2023-04-15 PROCEDURE — 85018 HEMOGLOBIN: CPT | Performed by: SURGERY

## 2023-04-15 PROCEDURE — 83735 ASSAY OF MAGNESIUM: CPT | Performed by: SURGERY

## 2023-04-15 PROCEDURE — 36415 COLL VENOUS BLD VENIPUNCTURE: CPT

## 2023-04-15 PROCEDURE — 84100 ASSAY OF PHOSPHORUS: CPT | Performed by: SURGERY

## 2023-04-15 PROCEDURE — 99233 SBSQ HOSP IP/OBS HIGH 50: CPT | Mod: 24 | Performed by: INTERNAL MEDICINE

## 2023-04-15 PROCEDURE — 85027 COMPLETE CBC AUTOMATED: CPT

## 2023-04-15 PROCEDURE — 36592 COLLECT BLOOD FROM PICC: CPT | Performed by: SURGERY

## 2023-04-15 PROCEDURE — 250N000013 HC RX MED GY IP 250 OP 250 PS 637: Performed by: SURGERY

## 2023-04-15 PROCEDURE — 250N000011 HC RX IP 250 OP 636: Performed by: PHYSICIAN ASSISTANT

## 2023-04-15 PROCEDURE — 250N000011 HC RX IP 250 OP 636: Performed by: SURGERY

## 2023-04-15 PROCEDURE — 250N000013 HC RX MED GY IP 250 OP 250 PS 637

## 2023-04-15 RX ORDER — BISACODYL 5 MG
5 TABLET, DELAYED RELEASE (ENTERIC COATED) ORAL DAILY PRN
Status: DISCONTINUED | OUTPATIENT
Start: 2023-04-15 | End: 2023-04-18

## 2023-04-15 RX ORDER — ACETAMINOPHEN 325 MG/1
650 TABLET ORAL ONCE
Status: COMPLETED | OUTPATIENT
Start: 2023-04-15 | End: 2023-04-15

## 2023-04-15 RX ORDER — HYDROXYZINE HYDROCHLORIDE 25 MG/1
50 TABLET, FILM COATED ORAL ONCE
Status: COMPLETED | OUTPATIENT
Start: 2023-04-15 | End: 2023-04-15

## 2023-04-15 RX ORDER — METHYLPREDNISOLONE SODIUM SUCCINATE 125 MG/2ML
100 INJECTION, POWDER, LYOPHILIZED, FOR SOLUTION INTRAMUSCULAR; INTRAVENOUS ONCE
Status: COMPLETED | OUTPATIENT
Start: 2023-04-15 | End: 2023-04-15

## 2023-04-15 RX ORDER — VALGANCICLOVIR 450 MG/1
450 TABLET, FILM COATED ORAL DAILY
Status: DISCONTINUED | OUTPATIENT
Start: 2023-04-15 | End: 2023-04-16

## 2023-04-15 RX ORDER — HYDROMORPHONE HCL IN WATER/PF 6 MG/30 ML
0.2 PATIENT CONTROLLED ANALGESIA SYRINGE INTRAVENOUS
Status: COMPLETED | OUTPATIENT
Start: 2023-04-15 | End: 2023-04-15

## 2023-04-15 RX ORDER — POLYETHYLENE GLYCOL 3350 17 G/17G
17 POWDER, FOR SOLUTION ORAL ONCE
Status: COMPLETED | OUTPATIENT
Start: 2023-04-15 | End: 2023-04-15

## 2023-04-15 RX ORDER — DIPHENHYDRAMINE HCL 25 MG
25-50 CAPSULE ORAL ONCE
Status: COMPLETED | OUTPATIENT
Start: 2023-04-15 | End: 2023-04-15

## 2023-04-15 RX ORDER — LIDOCAINE 40 MG/G
CREAM TOPICAL
Status: DISCONTINUED | OUTPATIENT
Start: 2023-04-15 | End: 2023-04-20 | Stop reason: HOSPADM

## 2023-04-15 RX ORDER — DIPHENHYDRAMINE HCL 12.5MG/5ML
25-50 LIQUID (ML) ORAL ONCE
Status: COMPLETED | OUTPATIENT
Start: 2023-04-15 | End: 2023-04-15

## 2023-04-15 RX ORDER — SULFAMETHOXAZOLE AND TRIMETHOPRIM 400; 80 MG/1; MG/1
1 TABLET ORAL DAILY
Status: DISCONTINUED | OUTPATIENT
Start: 2023-04-15 | End: 2023-04-16

## 2023-04-15 RX ADMIN — ACETAMINOPHEN 975 MG: 325 TABLET, FILM COATED ORAL at 11:04

## 2023-04-15 RX ADMIN — BISACODYL 10 MG: 10 SUPPOSITORY RECTAL at 15:56

## 2023-04-15 RX ADMIN — HYDROXYZINE HYDROCHLORIDE 25 MG: 25 TABLET, FILM COATED ORAL at 17:05

## 2023-04-15 RX ADMIN — ANTI-THYMOCYTE GLOBULIN (RABBIT) 100 MG: 5 INJECTION, POWDER, LYOPHILIZED, FOR SOLUTION INTRAVENOUS at 11:32

## 2023-04-15 RX ADMIN — MYCOPHENOLATE MOFETIL 750 MG: 250 CAPSULE ORAL at 18:24

## 2023-04-15 RX ADMIN — METHYLPREDNISOLONE SODIUM SUCCINATE 100 MG: 125 INJECTION, POWDER, FOR SOLUTION INTRAMUSCULAR; INTRAVENOUS at 11:03

## 2023-04-15 RX ADMIN — MAGNESIUM OXIDE TAB 400 MG (241.3 MG ELEMENTAL MG) 400 MG: 400 (241.3 MG) TAB at 11:34

## 2023-04-15 RX ADMIN — OXYCODONE HYDROCHLORIDE 10 MG: 10 TABLET ORAL at 06:11

## 2023-04-15 RX ADMIN — SENNOSIDES AND DOCUSATE SODIUM 1 TABLET: 8.6; 5 TABLET ORAL at 08:30

## 2023-04-15 RX ADMIN — TACROLIMUS 4 MG: 1 CAPSULE ORAL at 18:24

## 2023-04-15 RX ADMIN — VALGANCICLOVIR 450 MG: 450 TABLET, FILM COATED ORAL at 08:31

## 2023-04-15 RX ADMIN — MYCOPHENOLATE MOFETIL 750 MG: 250 CAPSULE ORAL at 08:30

## 2023-04-15 RX ADMIN — SODIUM CHLORIDE, SODIUM LACTATE, POTASSIUM CHLORIDE, CALCIUM CHLORIDE AND DEXTROSE MONOHYDRATE: 5; 600; 310; 30; 20 INJECTION, SOLUTION INTRAVENOUS at 00:04

## 2023-04-15 RX ADMIN — SULFAMETHOXAZOLE AND TRIMETHOPRIM 1 TABLET: 400; 80 TABLET ORAL at 08:30

## 2023-04-15 RX ADMIN — ACETAMINOPHEN 975 MG: 325 TABLET, FILM COATED ORAL at 06:12

## 2023-04-15 RX ADMIN — OXYCODONE HYDROCHLORIDE 5 MG: 5 TABLET ORAL at 17:05

## 2023-04-15 RX ADMIN — OXYCODONE HYDROCHLORIDE 10 MG: 10 TABLET ORAL at 20:50

## 2023-04-15 RX ADMIN — HYDROMORPHONE HYDROCHLORIDE 0.2 MG: 0.2 INJECTION, SOLUTION INTRAMUSCULAR; INTRAVENOUS; SUBCUTANEOUS at 22:57

## 2023-04-15 RX ADMIN — TACROLIMUS 4 MG: 1 CAPSULE ORAL at 08:30

## 2023-04-15 RX ADMIN — OXYCODONE HYDROCHLORIDE 10 MG: 10 TABLET ORAL at 00:04

## 2023-04-15 RX ADMIN — POLYETHYLENE GLYCOL 3350 17 G: 17 POWDER, FOR SOLUTION ORAL at 16:00

## 2023-04-15 RX ADMIN — HYDROXYZINE HYDROCHLORIDE 25 MG: 25 TABLET, FILM COATED ORAL at 00:04

## 2023-04-15 RX ADMIN — DIPHENHYDRAMINE HYDROCHLORIDE 25 MG: 25 CAPSULE ORAL at 11:04

## 2023-04-15 ASSESSMENT — ACTIVITIES OF DAILY LIVING (ADL)
ADLS_ACUITY_SCORE: 23
ADLS_ACUITY_SCORE: 26
ADLS_ACUITY_SCORE: 22
ADLS_ACUITY_SCORE: 26
ADLS_ACUITY_SCORE: 23
ADLS_ACUITY_SCORE: 26
ADLS_ACUITY_SCORE: 22
ADLS_ACUITY_SCORE: 26
ADLS_ACUITY_SCORE: 22
ADLS_ACUITY_SCORE: 23
ADLS_ACUITY_SCORE: 23
ADLS_ACUITY_SCORE: 26

## 2023-04-15 NOTE — PROVIDER NOTIFICATION
Surgery cross-cover paged: Marline on 7A, Kidney  Pt's BP: 105/64, 82 after NS bolus. Do you want to order another bolus?  Thanks,  Peri #[id number]      No order to treat & to continue to monitor pt. per POC.

## 2023-04-15 NOTE — PROVIDER NOTIFICATION
Surgery cross-cover paged: TOMAS Horan on 7A, Kidney  Pt's Hgb 7.6 Hgb down from 8.0.  Pt's BP: 115/84, 102, AOVSS on RA. [place]arton: 400cc. Pt. denies lightheadedness or dizziness.  Petar, Peri #[id number]    No treatment ordered & MD stated to keep monitoring pt.

## 2023-04-15 NOTE — PROGRESS NOTES
Transplant Surgery  Inpatient Daily Progress Note  04/15/2023    Assessment & Plan: Caity Horan is a 32 year old White female who has PMH significant for ESRD 2/2 IgA Nephropathy c/b graft failure. She is now POD1 from LDKT on 4/13/22 with Dr. Sepulveda.     Transplant:s/p kidney Postoperative day: 2.   - thymoglobulin 100mg and solumedrol 100 today    Immunosuppression management:   - TAC 2mg BID and MMF 750mg BID  - Induction with Thymo. Received 2 mg/kg for a total of 125mg and solu-medrol 500mg x1  - thymoglobulin 2mg/kg with solumedrol at 250mg on 4/14   - thymogobulin 2mg/kg with solu-medrol 100mg on 4/15  - Bactrim for PJP ppx  - Valcyte for CMV ppx  - BK PCR tomorrow AM    Nephrology/ Fluid/Electrolytes: LDKT with Dr. Sepulveda on 4/13. Post-op renal U/S w/ doppler without any acute abnormalities nor hydronephrosis   - repeat renal U/S w/ doppler on 4/14 - small perinephric fluid collection, otherwise WNL  - discontinue IVF  - rutledge in place with clear colored urine, discontinue on POD3  - Urine protein creatinine ratio today    GI   - general diet   - plan for enytvio as scheduled this coming week    Neuro/Pain: multimodal pain mgmt     Hematology: Hgb stable at 7.8    Cardiorespiratory: continue PTA Lipitor for HLD    FEN/GI: regular diet    Endocrine: ZAHIDA  Infectious disease: afebrile. Bactrim + valcyte for PJP as well as CMV ppx respectively  ,    WBC   Date Value Ref Range Status   12/03/2020 6.3 4.0 - 11.0 10e9/L Final     WBC Count   Date Value Ref Range Status   04/15/2023 5.8 4.0 - 11.0 10e3/uL Final     Disposition: potential discharge tomorrow     SRINIVASAN/Fellow/Resident Provider: Fransico Bautista  Faculty: Ivy Sepulveda M.D.  _________________________________________________________________  Transplant History: Admitted 4/13/2023 for LDKT.  4/13/2023 (Kidney), 12/5/2014 (Kidney), Postoperative day: 2     Interval History: Pt denies being in any acute distress. No issues over night. No  "nausea/vomiting. Tolerating po liquids. No BM/flatus yet.    ROS:   A 10-point review of systems was negative except as noted above.    Meds:    acetaminophen  650 mg Oral Once     acetaminophen  975 mg Oral Q8H     anti-thymocyte globulin  100 mg Intravenous Central line Once     atorvastatin  10 mg Oral Daily     diphenhydrAMINE  25-50 mg Oral Once    Or     diphenhydrAMINE  25-50 mg Per NG tube Once     [Held by provider] furosemide  40 mg Intravenous BID     magnesium oxide  400 mg Oral Daily with lunch     methylPREDNISolone  100 mg Intravenous Once     mycophenolate  750 mg Oral BID IS     polyethylene glycol  17 g Oral Daily     senna-docusate  1 tablet Oral BID     sodium chloride (PF)  10 mL Intracatheter Q8H     sulfamethoxazole-trimethoprim  1 tablet Oral Daily     tacrolimus  4 mg Oral BID IS     valGANciclovir  450 mg Oral Daily       Physical Exam:     Admit Weight: 58.2 kg (128 lb 4.9 oz)    Current vitals:   /81 (BP Location: Right arm)   Pulse 82   Temp 98.2  F (36.8  C) (Oral)   Resp 20   Ht 1.6 m (5' 3\")   Wt 63.8 kg (140 lb 9.6 oz)   SpO2 98%   BMI 24.91 kg/m      CVP (mmHg): 12 mmHg    Vital sign ranges:    Temp: 98.2  F (36.8  C) Temp src: Oral BP: 114/81 Pulse: 82   Resp: 20 SpO2: 98 % O2 Device: None (Room air)      General Appearance: in no apparent distress.   Skin: warm and dry  Heart: regular rate and normal rhythm  Lungs: no increased WOB on RA  Abdomen: The abdomen is flat, and non-tender   LLQ Incision: C/D/I without surrounding erythema  : rutledge is present with clear colored urine  Extremities: warm and well perfused without edema  Neurologic: CN II-XII grossly intact without focal neuro deficits    Data:   CMP  Recent Labs   Lab 04/15/23  0436 04/15/23  0123 04/14/23  1009 04/14/23  0812 04/13/23  1253 04/11/23  0832     --   --  138   < > 136   POTASSIUM 3.9  3.9 4.1   < > 3.6  3.6   < > 3.6   CHLORIDE 109*  --   --  100   < > 88*   CO2 23  --   --  25   < > " 29   *  --   --  174*   < > 122*   BUN 13.6  --   --  23.2*   < > 30.3*   CR 1.80*  --   --  3.89*   < > 8.30*   GFRESTIMATED 38*  --   --  15*   < > 6*   MICAH 8.5*  --   --  8.7   < > 10.5*   MAG 1.8  --   --  2.2   < >  --    PHOS 2.3*  --   --  4.6*   < >  --    ALBUMIN  --   --   --   --   --  5.1   BILITOTAL  --   --   --   --   --  0.6   ALKPHOS  --   --   --   --   --  70   AST  --   --   --   --   --  23   ALT  --   --   --   --   --  15    < > = values in this interval not displayed.     CBC  Recent Labs   Lab 04/15/23  0436 04/15/23  0123 04/14/23  1009 04/14/23  0812   HGB 7.8* 7.6*   < > 8.9*   WBC 5.8  --   --  5.8   *  --   --  156    < > = values in this interval not displayed.     COAGSNo lab results found in last 7 days.    Invalid input(s): XA   Urinalysis  Recent Labs   Lab Test 12/05/22  1200 01/14/21  1200 12/03/20  0915 06/27/19  0746 12/19/18  0615   COLOR Light Yellow  --   --   --  Yellow   APPEARANCE Clear  --   --   --  Clear   URINEGLC 50*  --   --   --  Negative   URINEBILI Negative  --   --   --  Negative   URINEKETONE Negative  --   --   --  Negative   SG 1.012  --   --   --  1.019   UBLD Moderate*  --   --   --  Negative   URINEPH 7.5*  --   --   --  5.0   PROTEIN 300*  --   --   --  Negative   NITRITE Negative  --   --   --  Negative   LEUKEST Negative  --   --   --  Negative   RBCU 4*  --   --   --  1   WBCU 8*  --   --   --  2   UTPG  --  2.09* 1.36*   < > 0.12    < > = values in this interval not displayed.     Virology:  CMV DNA Quantitation Specimen   Date Value Ref Range Status   07/25/2017 Plasma, EDTA anticoagulant  Final     Hepatitis C Antibody   Date Value Ref Range Status   04/13/2023 Nonreactive Nonreactive Final   05/26/2015  NR Final    Nonreactive   Assay performance characteristics have not been established for newborns,   infants, and children       BK Virus Result   Date Value Ref Range Status   06/27/2019 BK Virus DNA Not Detected BKNEG^BK Virus DNA  Not Detected copies/mL Final   12/19/2018 BK Virus DNA Not Detected BKNEG^BK Virus DNA Not Detected copies/mL Final   11/01/2018 BK Virus DNA Not Detected BKNEG^BK Virus DNA Not Detected copies/mL Final   02/01/2018 BK Virus DNA Not Detected BKNEG^BK Virus DNA Not Detected copies/mL Final   07/19/2017 BK Virus DNA Not Detected BKNEG copies/mL Final   12/13/2016 BK Virus DNA Not Detected BKNEG copies/mL Final   05/19/2016 BK Virus DNA Not Detected BKNEG copies/mL Final   10/06/2015 BK Virus DNA Not Detected BKNEG copies/mL Final   08/25/2015 BK Virus DNA Not Detected BKNEG copies/mL Final   06/30/2015 BK Virus DNA Not Detected BKNEG copies/mL Final   04/06/2015 BK Virus DNA Not Detected BKNEG copies/mL Final   02/09/2015 BK Virus DNA Not Detected BKNEG copies/mL Final   02/02/2015 BK Virus DNA Not Detected BKNEG copies/mL Final   01/05/2015 BK Virus DNA Not Detected BKNEG copies/mL Final

## 2023-04-15 NOTE — PROGRESS NOTES
[facility]   Transplant Nephrology Progress Note  Date of Admission:  4/13/2023  Today's Date: 04/15/2023    Recommendations:  - Recommend starting phosphorus supplement 250 mg bid.   - Okay to remove tunneled dialysis catheter.    Assessment & Plan   # LDKT: Decreased   - Baseline Creatinine: ~ TBD   - Proteinuria: Not checked post transplant   - Date DSA Last Checked: Apr/2023      Latest DSA: No DSA at time of transplant   - BK Viremia: Not checked post transplant   - Kidney Tx Biopsy: No   - Transplant Ureteral Stent: No    # Immunosuppression: Tacrolimus immediate release (goal 8-10) and Mycophenolate mofetil (dose 750 mg every 12 hours)   - Induction with Recent Transplant:  High Intensity Protocol due to high cPRA   - Changes: Not at this time    # Infection Prophylaxis:   - PJP: Sulfa/TMP (Bactrim)  - CMV: Valganciclovir (Valcyte); Patient is CMV IgG Ab discordant (D+/R-) and will continue on Valcyte x 6 months, then check CMV PCR monthly until 12 months post transplant.  - Thrush: None    # Blood Pressure: Controlled;  Goal BP: < 150/90   - Volume status: Euvolemic   - Changes: Not at this time    # Elevated Blood Glucose: Glucose generally running ~ 140-170s due to high dose steroids   - Management as per primary team.    # Anemia in Chronic Renal Disease: Hgb: Trend down      MICKEY: No   - Iron studies: High iron saturation    # Mineral Bone Disorder:   - Secondary renal hyperparathyroidism; PTH level: Moderately elevated (301-600 pg/ml)        On treatment: None  - Vitamin D; level: Normal        On supplement: No  - Calcium; level: Normal        On supplement: No  - Phosphorus; level: Low        On supplement: No; Recommend starting phosphorus supplement 250 mg bid.    # Electrolytes:   - Potassium; level: Normal        On supplement: No  - Magnesium; level: Normal        On supplement: No  - Bicarbonate; level: Normal        On supplement: No    # Ulcerative  Colitis: No symptoms at present.  Patient has been maintained on vedolizumab with next dose due .  Most recent UC flare Dec 2022 with mild chronic active colitis on most recent colonoscopy 2022.    # Transplant History:  Etiology of Kidney Failure: IgA nephropathy  Tx: LDKT  Transplant: 2023 (Kidney), 2014 (Kidney)  Donor Type: Living Donor Class:   Crossmatch at time of Tx: negative  DSA at time of Tx: No  Significant changes in immunosuppression: None  Significant transplant-related complications: None    Recommendations were communicated to the primary team via this note.    Otilio Mar MD   Pager: [phone]    Interval History   Ms. Horan's creatinine is 1.80 (04/15 0436); Decreased.  Very good urine output.  Other significant labs/tests/vitals: Stable electrolytes.  Trend down hemoglobin.  No new events overnight.  No chest pain or shortness of breath.  No leg swelling.  No nausea and vomiting.  Bowel movements are none yet.  No fever, sweats or chills.    Review of Systems   4 point ROS was obtained and negative except as noted in the Interval History.    MEDICATIONS:    acetaminophen  975 mg Oral Q8H     anti-thymocyte globulin  100 mg Intravenous Central line Once     atorvastatin  10 mg Oral Daily     [Held by provider] furosemide  40 mg Intravenous BID     magnesium oxide  400 mg Oral Daily with lunch     mycophenolate  750 mg Oral BID IS     polyethylene glycol  17 g Oral Daily     senna-docusate  1 tablet Oral BID     sodium chloride (PF)  10 mL Intracatheter Q8H     sulfamethoxazole-trimethoprim  1 tablet Oral Daily     tacrolimus  4 mg Oral BID IS     valGANciclovir  450 mg Oral Daily         Physical Exam   Temp  Av.4  F (36.9  C)  Min: 97.9  F (36.6  C)  Max: 100  F (37.8  C)      Pulse  Av.5  Min: 78  Max: 111 Resp  Av.1  Min: 7  Max: 26  SpO2  Av %  Min: 94 %  Max: 100 %    CVP (mmHg): 13 mmHgBP 122/69 (BP Location: Right arm)   Pulse 97   Temp 98.3  " F (36.8  C) (Oral)   Resp 18   Ht 1.6 m (5' 3\")   Wt 63.8 kg (140 lb 9.6 oz)   SpO2 96%   BMI 24.91 kg/m     Date 04/15/23 0700 - 04/16/23 0659   Shift 0021-0864 8791-1084 5792-3912 24 Hour Total   INTAKE   P.O. 750   750   I.V. 10   10   Shift Total(mL/kg) 760(11.92)   760(11.92)   OUTPUT   Shift Total(mL/kg)       Weight (kg) 63.78 63.78 63.78 63.78      Admit Weight: 58.2 kg (128 lb 4.9 oz)     GENERAL APPEARANCE: alert and no distress  HENT: mouth without ulcers or lesions  RESP: lungs clear to auscultation - no rales, rhonchi or wheezes  CV: regular rhythm, normal rate, no rub, no murmur  EDEMA: no LE edema bilaterally  ABDOMEN: soft, nondistended, nontender, bowel sounds normal  MS: extremities normal - no gross deformities noted, no evidence of inflammation in joints, no muscle tenderness  SKIN: no rash  TX KIDNEY: mild TTP  DIALYSIS ACCESS:  Right IJ tunneled catheter    Data   All labs reviewed by me.  CMP  Recent Labs   Lab 04/15/23  0956 04/15/23  0436 04/15/23  0123 04/14/23  2214 04/14/23  1009 04/14/23  0812 04/13/23  2232 04/13/23  1842 04/13/23  1829 04/13/23  1253 04/11/23  0832   NA  --  139  --   --   --  138  --  133*  --   --  136   POTASSIUM 3.8 3.9  3.9 4.1 4.0   < > 3.6  3.6   < > 4.1  4.1  --   --  3.6   CHLORIDE  --  109*  --   --   --  100  --  95*  --   --  88*   CO2  --  23  --   --   --  25  --  23  --   --  29   ANIONGAP  --  7  --   --   --  13  --  15  --   --  19*   GLC  --  160*  --   --   --  174*  --  142* 154*   < > 122*   BUN  --  13.6  --   --   --  23.2*  --  29.7*  --   --  30.3*   CR  --  1.80*  --   --   --  3.89*  --  7.23*  --   --  8.30*   GFRESTIMATED  --  38*  --   --   --  15*  --  7*  --   --  6*   MICAH  --  8.5*  --   --   --  8.7  --  9.5  --   --  10.5*   MAG  --  1.8  --   --   --  2.2  --  1.3*  --   --   --    PHOS  --  2.3*  --   --   --  4.6*  --  4.1  --   --   --    PROTTOTAL  --   --   --   --   --   --   --   --   --   --  8.6*   ALBUMIN  --   -- "   --   --   --   --   --   --   --   --  5.1   BILITOTAL  --   --   --   --   --   --   --   --   --   --  0.6   ALKPHOS  --   --   --   --   --   --   --   --   --   --  70   AST  --   --   --   --   --   --   --   --   --   --  23   ALT  --   --   --   --   --   --   --   --   --   --  15    < > = values in this interval not displayed.     CBC  Recent Labs   Lab 04/15/23  0956 04/15/23  0436 04/15/23  0123 04/14/23  2214 04/14/23  1009 04/14/23  0812 04/13/23  2232 04/13/23  1842 04/11/23  0832   HGB 7.5* 7.8* 7.6* 8.0*   < > 8.9*   < > 9.3*  9.3* 12.5   WBC  --  5.8  --   --   --  5.8  --  5.4 5.7   RBC  --  2.59*  --   --   --  2.91*  --  3.10* 4.12   HCT  --  26.2*  --   --   --  28.8*  --  29.4* 37.9   MCV  --  101*  --   --   --  99  --  95 92   MCH  --  30.1  --   --   --  30.6  --  30.0 30.3   MCHC  --  29.8*  --   --   --  30.9*  --  31.6 33.0   RDW  --  13.8  --   --   --  13.5  --  13.5 13.5   PLT  --  148*  --   --   --  156  --  144* 226    < > = values in this interval not displayed.     INRNo lab results found in last 7 days.  ABGNo lab results found in last 7 days.   Urine Studies  Recent Labs   Lab Test 12/05/22  1200 12/19/18  0615   COLOR Light Yellow Yellow   APPEARANCE Clear Clear   URINEGLC 50* Negative   URINEBILI Negative Negative   URINEKETONE Negative Negative   SG 1.012 1.019   UBLD Moderate* Negative   URINEPH 7.5* 5.0   PROTEIN 300* Negative   NITRITE Negative Negative   LEUKEST Negative Negative   RBCU 4* 1   WBCU 8* 2     Recent Labs   Lab Test 01/14/21  1200 12/03/20  0915 06/18/20  0950 11/11/19  0718 07/18/19  0809 06/27/19  0746 12/19/18  0615 11/01/18  0745 02/01/18  0656 07/19/17  0727 12/13/16  0747 05/19/16  0801 12/08/15  1210 06/30/15  0822 01/30/15  0821   UTPG 2.09* 1.36* 1.23* 0.17 0.25* 0.83* 0.12 0.07 0.05 0.10 0.10 0.07 0.08 0.06 0.09     PTH  Recent Labs   Lab Test 04/03/23  1556   PTHI 309*     Iron Studies  Recent Labs   Lab Test 04/03/23  1556 12/22/22  0608  06/27/19  0742 11/01/18  0737 08/23/18  0743 07/26/18  0830 05/17/18  0740 05/03/18  1320 03/29/18  0729 02/01/18  0702 12/01/17  1417 10/12/17  0744 07/25/17  1648   IRON 186* 80 99 113 131 73 19* 19* 29* 36 112 14* 21*    223* 288 262 236* 242 369 321 210* 291 274 423 467*   IRONSAT 71* 36 34 43 55* 30 5* 6* 14* 12* 41 3* 4*   FRANNY 902* 361* 233* 216* 226* 216* 7* 13 90 14 277* 3* 3*       IMAGING:  All imaging studies reviewed by me.

## 2023-04-15 NOTE — PHARMACY-ADMISSION MEDICATION HISTORY
Pharmacist Admission Medication History    Admission medication history is complete. The information provided in this note is only as accurate as the sources available at the time of the update.    Medication reconciliation/reorder completed by provider prior to medication history? No    Information Source(s): Patient, Patient's pharmacy, Clinic records, Hospital records and CareEverywhere/SureScripts via N/A    Pertinent Information: patient reported some doses to RN pre-op. Patient was unreachable >3 attempts made to contact patient in room without answer    Changes made to PTA medication list:    Added: None    Deleted: None    Changed: None    Medication Affordability:     Allergies reviewed with patient and updates made in EHR: no    PTA Med List   Medication Sig Last Dose     calcium carbonate (TUMS) 500 MG chewable tablet Take 2 chew tab by mouth 3 times daily Unknown     carvedilol (COREG) 12.5 MG tablet Take 12.5 mg by mouth 2 times daily Unknown     chlorthalidone (HYGROTON) 25 MG tablet Take 25 mg by mouth daily Unknown     Cyanocobalamin (B-12) 1000 MCG TBCR TAKE ONE TABLET BY MOUTH EVERY DAY Unknown     levonorgestrel (MIRENA) 20 MCG/DAY IUD 1 each by Intrauterine route Replacement every 7 years. Placed 1/2019 More than a month     mycophenolate (GENERIC EQUIVALENT) 250 MG capsule Take 2 capsules (500 mg) by mouth 2 times daily 4/12/2023     norethindrone (AYGESTIN) 5 MG tablet Take 2.5 mg by mouth daily Unknown     tacrolimus (GENERIC EQUIVALENT) 1 MG capsule Take 3 capsules (3 mg) by mouth 2 times daily 4/12/2023     vedolizumab (ENTYVIO) 60 MG/ML injection Inject 300 mg into the vein once every six weeks 3/8/2023     vitamin D3 (CHOLECALCIFEROL) 50 mcg (2000 units) tablet Take 1 tablet (50 mcg) by mouth daily 4/12/2023     Amilcar Ray PharmD, BCTXP, BCPS  Inpatient clinical pharmacist

## 2023-04-15 NOTE — PROVIDER NOTIFICATION
Marline on 7A, Kidney team  Pt's BP: 94/59, AOVSS on RA. Order to notify if SBP<120.  Do you want to order IV Bolus? Pt. received bolus this am for soft BPs.  Thanks,  Peri #[id number]    MD ordered NS 500cc bolus x1.

## 2023-04-15 NOTE — PLAN OF CARE
"/81 (BP Location: Right arm)   Pulse 82   Temp 98.2  F (36.8  C) (Oral)   Resp 20   Ht 1.6 m (5' 3\")   Wt 63.8 kg (140 lb 9.6 oz)   SpO2 98%   BMI 24.91 kg/m        4454-8755  Neuro: Pt. alert & Ox4  Behavior: Pt. calm & cooperative with cares.  BG: N/A  Activity: Pt. up with SBA  Vital: Pt. with soft BPs last evening &received NS bolus x1 per order. Recheck BPs  105/64, 115/84. AOVSS on RA.  LDAs: Left internal jugular with MIVF at 100cc/hour, PIV SL.  Cardiac: WNL  Respiratory: LS clear bilat.  GI/: Wharton with 1,325ml clear pink/yellow urine. No stools this shift.   Skin: Incision  & TEDDY.  Pain/Nausea: Abdominal pain managed with sched. Tylenol & prn Atarax & Oxy.  Pt. denies nausea.   Diet: Regular  Labs/Imaging: Hgb 7.6, cross-cover notified & no orders to treat & recheck with am labs.  Plan: Continue to follow POC & notify MD with change in status.                           "

## 2023-04-16 ENCOUNTER — APPOINTMENT (OUTPATIENT)
Dept: ULTRASOUND IMAGING | Facility: CLINIC | Age: 33
DRG: 652 | End: 2023-04-16
Attending: PHYSICIAN ASSISTANT
Payer: COMMERCIAL

## 2023-04-16 ENCOUNTER — APPOINTMENT (OUTPATIENT)
Dept: GENERAL RADIOLOGY | Facility: CLINIC | Age: 33
DRG: 652 | End: 2023-04-16
Attending: PHYSICIAN ASSISTANT
Payer: COMMERCIAL

## 2023-04-16 LAB
ANION GAP SERPL CALCULATED.3IONS-SCNC: 11 MMOL/L (ref 7–15)
BASOPHILS # BLD MANUAL: 0 10E3/UL (ref 0–0.2)
BASOPHILS NFR BLD MANUAL: 0 %
BUN SERPL-MCNC: 20.7 MG/DL (ref 6–20)
CALCIUM SERPL-MCNC: 9.1 MG/DL (ref 8.6–10)
CHLORIDE SERPL-SCNC: 109 MMOL/L (ref 98–107)
CREAT SERPL-MCNC: 2.79 MG/DL (ref 0.51–0.95)
CRP SERPL-MCNC: 11.5 MG/L
DEPRECATED HCO3 PLAS-SCNC: 22 MMOL/L (ref 22–29)
EOSINOPHIL # BLD MANUAL: 0 10E3/UL (ref 0–0.7)
EOSINOPHIL NFR BLD MANUAL: 1 %
ERYTHROCYTE [DISTWIDTH] IN BLOOD BY AUTOMATED COUNT: 13.9 % (ref 10–15)
GFR SERPL CREATININE-BSD FRML MDRD: 22 ML/MIN/1.73M2
GLUCOSE SERPL-MCNC: 97 MG/DL (ref 70–99)
HCT VFR BLD AUTO: 27 % (ref 35–47)
HGB BLD-MCNC: 8.2 G/DL (ref 11.7–15.7)
LYMPHOCYTES # BLD MANUAL: 0.1 10E3/UL (ref 0.8–5.3)
LYMPHOCYTES NFR BLD MANUAL: 2 %
MAGNESIUM SERPL-MCNC: 1.6 MG/DL (ref 1.7–2.3)
MCH RBC QN AUTO: 30.6 PG (ref 26.5–33)
MCHC RBC AUTO-ENTMCNC: 30.4 G/DL (ref 31.5–36.5)
MCV RBC AUTO: 101 FL (ref 78–100)
MONOCYTES # BLD MANUAL: 0.2 10E3/UL (ref 0–1.3)
MONOCYTES NFR BLD MANUAL: 5 %
NEUTROPHILS # BLD MANUAL: 4.4 10E3/UL (ref 1.6–8.3)
NEUTROPHILS NFR BLD MANUAL: 92 %
PHOSPHATE SERPL-MCNC: 1.9 MG/DL (ref 2.5–4.5)
PLAT MORPH BLD: ABNORMAL
PLATELET # BLD AUTO: 166 10E3/UL (ref 150–450)
POTASSIUM SERPL-SCNC: 4 MMOL/L (ref 3.4–5.3)
RBC # BLD AUTO: 2.68 10E6/UL (ref 3.8–5.2)
RBC MORPH BLD: ABNORMAL
SODIUM SERPL-SCNC: 142 MMOL/L (ref 136–145)
TACROLIMUS BLD-MCNC: 9.8 UG/L (ref 5–15)
TME LAST DOSE: NORMAL H
TME LAST DOSE: NORMAL H
WBC # BLD AUTO: 4.8 10E3/UL (ref 4–11)

## 2023-04-16 PROCEDURE — 84100 ASSAY OF PHOSPHORUS: CPT | Performed by: SURGERY

## 2023-04-16 PROCEDURE — 250N000013 HC RX MED GY IP 250 OP 250 PS 637: Performed by: PHYSICIAN ASSISTANT

## 2023-04-16 PROCEDURE — 85027 COMPLETE CBC AUTOMATED: CPT | Performed by: SURGERY

## 2023-04-16 PROCEDURE — 74018 RADEX ABDOMEN 1 VIEW: CPT | Mod: 26 | Performed by: RADIOLOGY

## 2023-04-16 PROCEDURE — 36592 COLLECT BLOOD FROM PICC: CPT | Performed by: SURGERY

## 2023-04-16 PROCEDURE — 87799 DETECT AGENT NOS DNA QUANT: CPT | Performed by: PHYSICIAN ASSISTANT

## 2023-04-16 PROCEDURE — 250N000011 HC RX IP 250 OP 636: Performed by: PHARMACIST

## 2023-04-16 PROCEDURE — 80048 BASIC METABOLIC PNL TOTAL CA: CPT | Performed by: SURGERY

## 2023-04-16 PROCEDURE — 250N000011 HC RX IP 250 OP 636: Performed by: SURGERY

## 2023-04-16 PROCEDURE — 83735 ASSAY OF MAGNESIUM: CPT | Performed by: SURGERY

## 2023-04-16 PROCEDURE — 250N000013 HC RX MED GY IP 250 OP 250 PS 637: Performed by: SURGERY

## 2023-04-16 PROCEDURE — 74018 RADEX ABDOMEN 1 VIEW: CPT

## 2023-04-16 PROCEDURE — 86140 C-REACTIVE PROTEIN: CPT | Performed by: PHYSICIAN ASSISTANT

## 2023-04-16 PROCEDURE — 85007 BL SMEAR W/DIFF WBC COUNT: CPT | Performed by: SURGERY

## 2023-04-16 PROCEDURE — 99222 1ST HOSP IP/OBS MODERATE 55: CPT | Mod: 24 | Performed by: INTERNAL MEDICINE

## 2023-04-16 PROCEDURE — 250N000012 HC RX MED GY IP 250 OP 636 PS 637: Performed by: SURGERY

## 2023-04-16 PROCEDURE — 250N000011 HC RX IP 250 OP 636: Performed by: PHYSICIAN ASSISTANT

## 2023-04-16 PROCEDURE — 250N000013 HC RX MED GY IP 250 OP 250 PS 637

## 2023-04-16 PROCEDURE — 76776 US EXAM K TRANSPL W/DOPPLER: CPT

## 2023-04-16 PROCEDURE — 80197 ASSAY OF TACROLIMUS: CPT | Performed by: SURGERY

## 2023-04-16 PROCEDURE — 250N000012 HC RX MED GY IP 250 OP 636 PS 637: Performed by: NURSE PRACTITIONER

## 2023-04-16 PROCEDURE — 76776 US EXAM K TRANSPL W/DOPPLER: CPT | Mod: 26 | Performed by: RADIOLOGY

## 2023-04-16 PROCEDURE — 258N000003 HC RX IP 258 OP 636: Performed by: PHYSICIAN ASSISTANT

## 2023-04-16 PROCEDURE — 120N000011 HC R&B TRANSPLANT UMMC

## 2023-04-16 PROCEDURE — 99233 SBSQ HOSP IP/OBS HIGH 50: CPT | Mod: 24 | Performed by: INTERNAL MEDICINE

## 2023-04-16 RX ORDER — HYDROMORPHONE HCL IN WATER/PF 6 MG/30 ML
0.2 PATIENT CONTROLLED ANALGESIA SYRINGE INTRAVENOUS
Status: COMPLETED | OUTPATIENT
Start: 2023-04-16 | End: 2023-04-16

## 2023-04-16 RX ORDER — LORAZEPAM 2 MG/ML
INJECTION INTRAMUSCULAR
Status: DISCONTINUED
Start: 2023-04-16 | End: 2023-04-16 | Stop reason: WASHOUT

## 2023-04-16 RX ORDER — SULFAMETHOXAZOLE AND TRIMETHOPRIM 400; 80 MG/1; MG/1
1 TABLET ORAL
Status: DISCONTINUED | OUTPATIENT
Start: 2023-04-18 | End: 2023-04-18

## 2023-04-16 RX ORDER — LORAZEPAM 2 MG/ML
0.5 INJECTION INTRAMUSCULAR ONCE
Status: COMPLETED | OUTPATIENT
Start: 2023-04-16 | End: 2023-04-16

## 2023-04-16 RX ORDER — VALGANCICLOVIR 450 MG/1
450 TABLET, FILM COATED ORAL EVERY OTHER DAY
Status: DISCONTINUED | OUTPATIENT
Start: 2023-04-18 | End: 2023-04-17

## 2023-04-16 RX ORDER — SODIUM CHLORIDE, SODIUM LACTATE, POTASSIUM CHLORIDE, CALCIUM CHLORIDE 600; 310; 30; 20 MG/100ML; MG/100ML; MG/100ML; MG/100ML
INJECTION, SOLUTION INTRAVENOUS CONTINUOUS
Status: DISCONTINUED | OUTPATIENT
Start: 2023-04-16 | End: 2023-04-18

## 2023-04-16 RX ORDER — HYDROMORPHONE HCL IN WATER/PF 6 MG/30 ML
0.2 PATIENT CONTROLLED ANALGESIA SYRINGE INTRAVENOUS ONCE
Status: DISCONTINUED | OUTPATIENT
Start: 2023-04-16 | End: 2023-04-16

## 2023-04-16 RX ORDER — MAGNESIUM SULFATE HEPTAHYDRATE 40 MG/ML
2 INJECTION, SOLUTION INTRAVENOUS ONCE
Status: COMPLETED | OUTPATIENT
Start: 2023-04-16 | End: 2023-04-16

## 2023-04-16 RX ORDER — HYDROXYZINE HYDROCHLORIDE 25 MG/1
25 TABLET, FILM COATED ORAL EVERY 4 HOURS PRN
Status: DISCONTINUED | OUTPATIENT
Start: 2023-04-16 | End: 2023-04-20 | Stop reason: HOSPADM

## 2023-04-16 RX ORDER — HYDROMORPHONE HCL IN WATER/PF 6 MG/30 ML
0.2 PATIENT CONTROLLED ANALGESIA SYRINGE INTRAVENOUS
Status: DISCONTINUED | OUTPATIENT
Start: 2023-04-16 | End: 2023-04-17

## 2023-04-16 RX ADMIN — VALGANCICLOVIR 450 MG: 450 TABLET, FILM COATED ORAL at 08:29

## 2023-04-16 RX ADMIN — SODIUM CHLORIDE, POTASSIUM CHLORIDE, SODIUM LACTATE AND CALCIUM CHLORIDE: 600; 310; 30; 20 INJECTION, SOLUTION INTRAVENOUS at 17:17

## 2023-04-16 RX ADMIN — HYDROMORPHONE HYDROCHLORIDE 0.2 MG: 0.2 INJECTION, SOLUTION INTRAMUSCULAR; INTRAVENOUS; SUBCUTANEOUS at 04:21

## 2023-04-16 RX ADMIN — HYDROXYZINE HYDROCHLORIDE 25 MG: 25 TABLET, FILM COATED ORAL at 08:38

## 2023-04-16 RX ADMIN — TACROLIMUS 4 MG: 1 CAPSULE ORAL at 17:46

## 2023-04-16 RX ADMIN — MAGNESIUM OXIDE TAB 400 MG (241.3 MG ELEMENTAL MG) 400 MG: 400 (241.3 MG) TAB at 13:03

## 2023-04-16 RX ADMIN — TACROLIMUS 4 MG: 1 CAPSULE ORAL at 08:28

## 2023-04-16 RX ADMIN — SODIUM PHOSPHATE, DIBASIC, ANHYDROUS, POTASSIUM PHOSPHATE, MONOBASIC, AND SODIUM PHOSPHATE, MONOBASIC, MONOHYDRATE 250 MG: 852; 155; 130 TABLET, COATED ORAL at 13:02

## 2023-04-16 RX ADMIN — MAGNESIUM SULFATE IN WATER 2 G: 40 INJECTION, SOLUTION INTRAVENOUS at 13:02

## 2023-04-16 RX ADMIN — HYDROMORPHONE HYDROCHLORIDE 0.2 MG: 0.2 INJECTION, SOLUTION INTRAMUSCULAR; INTRAVENOUS; SUBCUTANEOUS at 09:29

## 2023-04-16 RX ADMIN — BISACODYL 5 MG: 5 TABLET, COATED ORAL at 08:38

## 2023-04-16 RX ADMIN — MYCOPHENOLATE MOFETIL 750 MG: 250 CAPSULE ORAL at 17:46

## 2023-04-16 RX ADMIN — MYCOPHENOLATE MOFETIL 750 MG: 250 CAPSULE ORAL at 08:29

## 2023-04-16 RX ADMIN — ONDANSETRON 4 MG: 2 INJECTION INTRAMUSCULAR; INTRAVENOUS at 10:03

## 2023-04-16 RX ADMIN — LORAZEPAM 0.5 MG: 2 INJECTION INTRAMUSCULAR; INTRAVENOUS at 12:19

## 2023-04-16 RX ADMIN — DIATRIZOATE MEGLUMINE AND DIATRIZOATE SODIUM 600 ML: 660; 100 SOLUTION ORAL; RECTAL at 17:44

## 2023-04-16 RX ADMIN — HYDROMORPHONE HYDROCHLORIDE 0.2 MG: 0.2 INJECTION, SOLUTION INTRAMUSCULAR; INTRAVENOUS; SUBCUTANEOUS at 14:19

## 2023-04-16 RX ADMIN — HYDROMORPHONE HYDROCHLORIDE 0.2 MG: 0.2 INJECTION, SOLUTION INTRAMUSCULAR; INTRAVENOUS; SUBCUTANEOUS at 23:29

## 2023-04-16 RX ADMIN — SODIUM PHOSPHATE, DIBASIC, ANHYDROUS, POTASSIUM PHOSPHATE, MONOBASIC, AND SODIUM PHOSPHATE, MONOBASIC, MONOHYDRATE 250 MG: 852; 155; 130 TABLET, COATED ORAL at 21:22

## 2023-04-16 RX ADMIN — BISACODYL 10 MG: 10 SUPPOSITORY RECTAL at 10:09

## 2023-04-16 RX ADMIN — ACETAMINOPHEN 650 MG: 325 TABLET ORAL at 13:02

## 2023-04-16 RX ADMIN — HYDROMORPHONE HYDROCHLORIDE 0.2 MG: 0.2 INJECTION, SOLUTION INTRAMUSCULAR; INTRAVENOUS; SUBCUTANEOUS at 21:22

## 2023-04-16 RX ADMIN — SENNOSIDES AND DOCUSATE SODIUM 1 TABLET: 8.6; 5 TABLET ORAL at 21:22

## 2023-04-16 RX ADMIN — SODIUM CHLORIDE, POTASSIUM CHLORIDE, SODIUM LACTATE AND CALCIUM CHLORIDE 1000 ML: 600; 310; 30; 20 INJECTION, SOLUTION INTRAVENOUS at 12:20

## 2023-04-16 RX ADMIN — HYDROMORPHONE HYDROCHLORIDE 0.2 MG: 0.2 INJECTION, SOLUTION INTRAMUSCULAR; INTRAVENOUS; SUBCUTANEOUS at 01:27

## 2023-04-16 RX ADMIN — HYDROMORPHONE HYDROCHLORIDE 0.2 MG: 0.2 INJECTION, SOLUTION INTRAMUSCULAR; INTRAVENOUS; SUBCUTANEOUS at 11:54

## 2023-04-16 RX ADMIN — OXYCODONE HYDROCHLORIDE 10 MG: 10 TABLET ORAL at 08:26

## 2023-04-16 RX ADMIN — HYDROMORPHONE HYDROCHLORIDE 0.2 MG: 0.2 INJECTION, SOLUTION INTRAMUSCULAR; INTRAVENOUS; SUBCUTANEOUS at 19:08

## 2023-04-16 RX ADMIN — HYDROMORPHONE HYDROCHLORIDE 0.2 MG: 0.2 INJECTION, SOLUTION INTRAMUSCULAR; INTRAVENOUS; SUBCUTANEOUS at 16:01

## 2023-04-16 RX ADMIN — SULFAMETHOXAZOLE AND TRIMETHOPRIM 1 TABLET: 400; 80 TABLET ORAL at 08:28

## 2023-04-16 ASSESSMENT — ACTIVITIES OF DAILY LIVING (ADL)
ADLS_ACUITY_SCORE: 23
ADLS_ACUITY_SCORE: 26
ADLS_ACUITY_SCORE: 27
ADLS_ACUITY_SCORE: 22
ADLS_ACUITY_SCORE: 23
ADLS_ACUITY_SCORE: 26
ADLS_ACUITY_SCORE: 26
ADLS_ACUITY_SCORE: 27
ADLS_ACUITY_SCORE: 26
ADLS_ACUITY_SCORE: 26

## 2023-04-16 NOTE — PROVIDER NOTIFICATION
Surgery cross-cover paged: Marline on 7A, Kidney  Pt. requesting Entyvio injection tonight which she gets q 6 weeks for colitis. She is not passing gas & hasn't had BM since prior to transplant. Can you please order it?   Thanks,  Peri #[id number]    MD stated she can't order medication d/t our pharmacy doesn't carry it & insurance won't cover it.

## 2023-04-16 NOTE — PLAN OF CARE
"BP (!) 135/91   Pulse 94   Temp 98.3  F (36.8  C)   Resp 14   Ht 1.6 m (5' 3\")   Wt 63.8 kg (140 lb 9.6 oz)   SpO2 99%   BMI 24.91 kg/m      Patient VSS on RA, afebrile. Minimal abdominal incision pain, managed with oxycodone and atarax. Tolerating regular diet with improving appetite. PIV saline locked. HD line. IJ saline locked. Good UOP via rutledge catheter. Bladder spasms/discomfort, provider paged per patient request to remove rutledge. Rutledge to stay in place until POD 3. Passing gas, small BM. Abdominal incision CDI.  Up independently in room. Thymo completed, tolerated well. Lab book and med card up to date. Will continue with POC and notify MD with changes or concerns.      "

## 2023-04-16 NOTE — PROVIDER NOTIFICATION
"Surgery cross-cover paged: Marline on 7A  Pt. requests IV Dilaudid for inflammation from colitis & states last dose helped. She's requests 2 doses ordered to \"get through the night.\"  She refusing Atarax & Oxy. Can you please order Dilaudid?  Peri #[id number]    MD ordered prn IV Dilaudid x2 doses.   "

## 2023-04-16 NOTE — PLAN OF CARE
"/88 (BP Location: Left arm)   Pulse 93   Temp 98.5  F (36.9  C) (Oral)   Resp 16   Ht 1.6 m (5' 3\")   Wt 63.8 kg (140 lb 9.6 oz)   SpO2 97%   BMI 24.91 kg/m      [id number]  Neuro: Pt. alert & Ox4  Behavior: Pt. anxious at times, cooperative with cares.   BG: N/A  Activity: Pt. up ad matthias.  Vital:  AVSS on RA.  LDAs: Left internal jugular SL & left PIV SL. Right HD line.  BG: N/A  Cardiac: WNL  Respiratory: LS clear bilat.  GI/: Wharton pulled last evening d/t pt. c/o severe pain, cross-cover aware. Pt's left labia very swollen, MD assessed & ordered prn Lidocaine gel, ice pack applied. No stools this shift. Pt. reported she had 3 stools yesterday.   Skin: Incision with liquid bandage  & TEDDY.  Pain/Nausea: Pt. c/o severe pain d/t inflammation from colitis & declined sched. Tylenol or prn Oxy & Atarax & notified  MD who ordered 1 time IV Dilaudid. Later on during night, pt. Requesting 2 more IV Dilaudid \"to get through the night\" which MD ordered & pt. received.   Pt. denies nausea.   Diet: Regular  Labs/Imaging: Morning labs pending.  Plan: Continue to follow POC & notify MD with change in status.     Change in shift:  Pt. requested Entyvio injection last evening for inflammation from colitis. Cross-cover paged & stated she wouldn't order it d/t not available from pharmacy & insurance wouldn't cover it & would pass onto team re. pt's request for it.          "

## 2023-04-16 NOTE — PROGRESS NOTES
Transplant Surgery  Inpatient Daily Progress Note  04/16/2023    Assessment & Plan: Caity Horan is a 32 year old White female who has PMH significant for ESRD 2/2 IgA Nephropathy c/b graft failure. She is now POD 3 from LDKT on 4/13/22 with Dr. Sepulveda.     Transplant:s/p kidney Postoperative day: 3. Wharton removed overnight per patient.  -Cr increased today, 2.8 (1.8). STAT US ordered, shows some fluid collections, not concerning per fellow. May be dry due to decreased intake with acute pain, will order IV fluid bolus and ongoing fluids.    Immunosuppression management:   -Induction with Thymo. Completed 3 doses, 125/125/100 for a total of 6 mg/kg.  -Solu-Medrol 500/250/100  -Tac 4 mg BID, 4/16 level drawn 1 hr post administration  -MMF 750mg BID  - Bactrim for PJP ppx  - Valcyte for CMV ppx  - BK PCR pending    Nephrology/ Fluid/Electrolytes: LDKT with Dr. Sepulveda on 4/13. Post-op renal U/S w/ doppler without any acute abnormalities nor hydronephrosis   - repeat renal U/S w/ doppler on 4/14 - small perinephric fluid collection, otherwise WNL  -Repeat renal US with doppler 4/16 prelim says patent doppler, mild hydro, incisional fluid collection, and small peritransplant fluid and fluid collection at the midline of the pelvis. No acute interventions per fellow.    - Bolus and restart IVF  - Urine protein creatinine ratio today  -Hypophosphatemia: Kphos ordered    GI  -Patient with increased pain which she states is consistent with UC flares. Consulted GI. Pain meds ordered. Patient requesting entyvio early, unlikely to be able to get as an inpatient but will defer to GI.   - general diet as tolerated    Neuro/Pain: multimodal pain mgmt     Hematology: Hgb stable ~8    Cardiorespiratory: Patient refusing Lipitor    FEN/GI: regular diet    Endocrine: ZAHIDA  Infectious disease: afebrile. Bactrim + valcyte for PJP as well as CMV ppx respectively  ,    WBC   Date Value Ref Range Status   12/03/2020 6.3 4.0 - 11.0  "10e9/L Final     WBC Count   Date Value Ref Range Status   04/16/2023 4.8 4.0 - 11.0 10e3/uL Final     Disposition: 7A     SRINIVASAN/Fellow/Resident Provider: Jessica Song PA-C  Faculty: Ivy Sepulveda M.D.  _________________________________________________________________  Transplant History: Admitted 4/13/2023 for LDKT.  4/13/2023 (Kidney), 12/5/2014 (Kidney), Postoperative day: 3     Interval History: Pt in acute pain. + BM yesterday, but endorses abdominal pain (originally RLQ, no diffuse) which she states is similar to previous UC flares.    ROS:   A 10-point review of systems was negative except as noted above.    Meds:    atorvastatin  10 mg Oral Daily     [Held by provider] furosemide  40 mg Intravenous BID     lactated ringers  1,000 mL Intravenous Once     magnesium oxide  400 mg Oral Daily with lunch     magnesium sulfate  2 g Intravenous Once     mycophenolate  750 mg Oral BID IS     phosphorus tablet 250 mg  250 mg Oral BID     polyethylene glycol  17 g Oral Daily     senna-docusate  1 tablet Oral BID     sodium chloride (PF)  10 mL Intracatheter Q8H     [START ON 4/18/2023] sulfamethoxazole-trimethoprim  1 tablet Oral Once per day on Tue Thu Sat     tacrolimus  4 mg Oral BID IS     [START ON 4/18/2023] valGANciclovir  450 mg Oral Every Other Day       Physical Exam:     Admit Weight: 58.2 kg (128 lb 4.9 oz)    Current vitals:   BP (!) 138/92 (BP Location: Right arm)   Pulse 107   Temp 98.6  F (37  C) (Oral)   Resp 18   Ht 1.6 m (5' 3\")   Wt 65.6 kg (144 lb 9.6 oz)   SpO2 97%   BMI 25.61 kg/m      CVP (mmHg): 12 mmHg    Vital sign ranges:    Temp: 98.6  F (37  C) Temp src: Oral BP: (!) 138/92 Pulse: 107   Resp: 18 SpO2: 97 % O2 Device: None (Room air)      General Appearance: in no apparent distress.   Skin: warm and dry  Heart: regular rate and normal rhythm  Lungs: no increased WOB on RA  Abdomen: The abdomen is flat, and tender to palpation RLQ  LLQ Incision: C/D/I without surrounding " erythema  : rutledge is removed  Extremities: warm and well perfused without edema  Neurologic: CN II-XII grossly intact without focal neuro deficits    Data:   CMP  Recent Labs   Lab 04/16/23  0926 04/15/23  1318 04/15/23  0956 04/15/23  0436 04/13/23  1253 04/11/23  0832     --   --  139   < > 136   POTASSIUM 4.0 3.8   < > 3.9  3.9   < > 3.6   CHLORIDE 109*  --   --  109*   < > 88*   CO2 22  --   --  23   < > 29   GLC 97  --   --  160*   < > 122*   BUN 20.7*  --   --  13.6   < > 30.3*   CR 2.79*  --   --  1.80*   < > 8.30*   GFRESTIMATED 22*  --   --  38*   < > 6*   MICHA 9.1  --   --  8.5*   < > 10.5*   MAG 1.6*  --   --  1.8   < >  --    PHOS 1.9*  --   --  2.3*   < >  --    ALBUMIN  --   --   --   --   --  5.1   BILITOTAL  --   --   --   --   --  0.6   ALKPHOS  --   --   --   --   --  70   AST  --   --   --   --   --  23   ALT  --   --   --   --   --  15    < > = values in this interval not displayed.     CBC  Recent Labs   Lab 04/16/23  0926 04/15/23  1318 04/15/23  0956 04/15/23  0436   HGB 8.2* 8.4*   < > 7.8*   WBC 4.8  --   --  5.8     --   --  148*    < > = values in this interval not displayed.     COAGSNo lab results found in last 7 days.    Invalid input(s): XA   Urinalysis  Recent Labs   Lab Test 12/05/22  1200 01/14/21  1200 12/03/20  0915 06/27/19  0746 12/19/18  0615   COLOR Light Yellow  --   --   --  Yellow   APPEARANCE Clear  --   --   --  Clear   URINEGLC 50*  --   --   --  Negative   URINEBILI Negative  --   --   --  Negative   URINEKETONE Negative  --   --   --  Negative   SG 1.012  --   --   --  1.019   UBLD Moderate*  --   --   --  Negative   URINEPH 7.5*  --   --   --  5.0   PROTEIN 300*  --   --   --  Negative   NITRITE Negative  --   --   --  Negative   LEUKEST Negative  --   --   --  Negative   RBCU 4*  --   --   --  1   WBCU 8*  --   --   --  2   UTPG  --  2.09* 1.36*   < > 0.12    < > = values in this interval not displayed.     Virology:  CMV DNA Quantitation Specimen    Date Value Ref Range Status   07/25/2017 Plasma, EDTA anticoagulant  Final     Hepatitis C Antibody   Date Value Ref Range Status   04/13/2023 Nonreactive Nonreactive Final   05/26/2015  NR Final    Nonreactive   Assay performance characteristics have not been established for newborns,   infants, and children       BK Virus Result   Date Value Ref Range Status   06/27/2019 BK Virus DNA Not Detected BKNEG^BK Virus DNA Not Detected copies/mL Final   12/19/2018 BK Virus DNA Not Detected BKNEG^BK Virus DNA Not Detected copies/mL Final   11/01/2018 BK Virus DNA Not Detected BKNEG^BK Virus DNA Not Detected copies/mL Final   02/01/2018 BK Virus DNA Not Detected BKNEG^BK Virus DNA Not Detected copies/mL Final   07/19/2017 BK Virus DNA Not Detected BKNEG copies/mL Final   12/13/2016 BK Virus DNA Not Detected BKNEG copies/mL Final   05/19/2016 BK Virus DNA Not Detected BKNEG copies/mL Final   10/06/2015 BK Virus DNA Not Detected BKNEG copies/mL Final   08/25/2015 BK Virus DNA Not Detected BKNEG copies/mL Final   06/30/2015 BK Virus DNA Not Detected BKNEG copies/mL Final   04/06/2015 BK Virus DNA Not Detected BKNEG copies/mL Final   02/09/2015 BK Virus DNA Not Detected BKNEG copies/mL Final   02/02/2015 BK Virus DNA Not Detected BKNEG copies/mL Final   01/05/2015 BK Virus DNA Not Detected BKNEG copies/mL Final

## 2023-04-16 NOTE — ANESTHESIA POSTPROCEDURE EVALUATION
Patient: Caity Horan    Procedure: Procedure(s):  Kidney Transplant Non-Directed Recipient  Bench kidney       Anesthesia Type:  General    Note:  Disposition: Inpatient   Postop Pain Control: Uneventful            Sign Out: Well controlled pain   PONV: No   Neuro/Psych: Uneventful            Sign Out: Acceptable/Baseline neuro status   Airway/Respiratory: Uneventful            Sign Out: Acceptable/Baseline resp. status   CV/Hemodynamics: Uneventful            Sign Out: Acceptable CV status; No obvious hypovolemia; No obvious fluid overload   Other NRE: NONE   DID A NON-ROUTINE EVENT OCCUR? No           Last vitals:  Vitals Value Taken Time   /67 04/13/23 2000   Temp 37  C (98.6  F) 04/13/23 1815   Pulse 99 04/13/23 2000   Resp 9 04/13/23 1951   SpO2 100 % 04/13/23 2003   Vitals shown include unvalidated device data.    Electronically Signed By: Anton Pan MD  April 15, 2023  10:43 PM

## 2023-04-16 NOTE — PROVIDER NOTIFICATION
Surgery cross-cover paged: TOMAS Horan on 7A, Kidney team  Pt. is in extreme  pain from catheter. She is demanding it gets pulled now & prevents her from sleeping. She is very sleep deprived. Can you please come talk to her?    Thanks,  Peri #[id number]    MD told rutledge pulled d/t extreme pain & MD assessed pt. & ordered 1 time IV Dilaudid if prn Oxy. Not relieving pain & Lidocaine gel for labial tenderness & left labia edema.

## 2023-04-16 NOTE — PROGRESS NOTES
Writer answered patient's call light. Patient and patient's mother adamant about rutledge removal d/t high pain. Clean gloves donned, site cleaned, 10mL removed from rutledge balloon, and rutledge removed without complication. Patient still crying after removal, but stated she felt more comfortable. Patient ambulated to bathroom to attempt to urinate, with no output. Hat in toilet. 10mg Oxycodone administered for pain. Upon assessment, patients labia on left size very edematous. Tender to touch. Ice applied with no relief.     MD now at bedside to assess.

## 2023-04-16 NOTE — PROGRESS NOTES
Cross-cover note: 9:20 PM 4/15/2023     April 15, 2023    General Surgery Cross Cover Note    Paged by nurse regarding patient requesting rutledge removed as it's uncomfortable. Discussed with nurse that we would like rutledge to remain in tonight to monitor UOP, plan per day team's note is to remove rutledge tomorrow (POD3). Paged again about same situation ~1 hour later. Subsequently paged two additional times in less than 30 minutes. I was with another patient at the time and unable to assess patient at bedside. Patient insisted that rutledge was removed so bedside nurse removed rutledge. Subsequently paged that patient's left labia was swollen.     Per patient, she was having bladder spasms when rutledge was in which are now resolved. However, having severe pain of left labia. Has voided since rutledge removed.     B/P: 135/91, T: 98.3, P: 94, R: 14    PE:  A&O, patient anxious appearing  Breathing non-labored  Left labia swollen, tender to touch, no fluctuance, no erythema, no drainage    Plan:  -Ok to try hot pack or cold pack for pain  -Topical lidocaine x1   -If oxycodone dose not work to reduce pain, hydromorphone x1  -Hydroxyzine for anxiety    Abida Ramos MD, PharmD  General Surgery, PGY1  Pager [phone]  Please page primary team with questions

## 2023-04-16 NOTE — CONSULTS
GASTROENTEROLOGY CONSULTATION      Date of Admission:  4/13/2023          ASSESSMENT AND RECOMMENDATIONS:     32 year old female with a history of ulcerative colitis on Entyvio,  ESRD 2/2 IgA Nephropathy c/b graft failure. She is now POD 3 from LDKT on 4/13/22 who is now reporting abdominal pain similar to her previous UC flares for which GI was consulted for further evaluation.     ESRD s/p LDKT on 4/13  Hx of Ulcerative Colitis   Abdominal Pain.     IBD History   Diagnosis: [id number]  Macroscopic extent of disease (most recent) E3  Total number of IBD surgeries (except perianal): 0  Current IBD Medications:Vedolizumab every 6 weeks  Past IBD Medications: None    Patient is POD 3 after a LDKT and was being planned for discharge today when she unfortunately started to report abdominal pain which she is convinced she is having a UC flare because her symptom is very similar to the symptoms she has when she has a UC flare. She denies diarrhea, blood in stool, weight loss, nausea or vomiting. She states she typically does not have diarrhea when she is having a flare only abdominal pain.     She is on Entyvio for her UC and is asking for Entyvio while inpatient,  stating that's the only thing that can help her symptom. We have explained that we are unable to get Entyvio approved for inpatient use ( she is due for her next dose in a few days).     While it is possible that her pain may be 2/2 to her UC, we will need to rule out other possibilities of her symptoms and tender abdomen ( abscess, infection etc) hence are recommending a CT with oral contrast ( given elevated Scr) for further evaluation. If imaging unrevealing we are happy to consider a flex sig for further evaluation.     In the event that patient truly has a UC flare, we are unable to offer her Entyvio inpatient, we will likely start her on a steroid regimen ( prednisone, Uceris etc)    While there is risk of IBD flare post transplant, notable this  patient recently had a colonoscopy with Mcbride 0 inactive disease.     She is clinically stable at this time.     RECOMMENDATIONS  -- Proceed with CT with oral contrast ( in the setting of elevated Scr)  --Obtain CRP  --Continue supportive care and pain control   --if patient is agreeable and CT unrevealing can make NPO at [facility] for consideration of a flex sig tomorrow     Thank you for involving us in this patient's care. Please do not hesitate to contact the GI service with any questions or concerns.     Patient care plan discussed with Dr. Shahid, GI staff physician.    Giorgi Law MD  Gastroenterology Fellow  Pager             Chief Complaint:   We were asked by Jessica Song PA-C of the transplant team to evaluate this patient with abdominal pain    History is obtained from the patient and the medical record.          History of Present Illness:   Caity Horan is a 32 year old female with a history o fulcerative colitis on Entyvio,  ESRD 2/2 IgA Nephropathy c/b graft failure. She is now POD 3 from LDKT on 4/13/22 who is now reporting abdominal pain similar to her previous UC flares for which GI was consulted for further evaluation.     Patient is POD 3 after a LDKT and was being planned for discharge today when she unfortunately started to report abdominal pain which she is convinced she is having a UC flare because her symptom is very similar to the symptoms she has when she has a UC flare. She denies diarrhea, blood in stool, weight loss, nausea or vomiting. She states she typically does not have diarrhea when she is having a flare only abdominal pain.           Past Medical History:   Reviewed and edited as appropriate  Past Medical History:   Diagnosis Date     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     Anemia in stage 5 chronic kidney disease (H) 10/27/2014     ESRD (end stage renal disease) on dialysis (H)      HTN (hypertension) 10/27/2014     Hypertension 10/2014     IgA nephropathy      biopsy proven     Metabolic acidosis      Ulcerative pancolitis (H) 2012     Vitamin D deficiency             Past Surgical History:   Reviewed and edited as appropriate   Past Surgical History:   Procedure Laterality Date     BENCH KIDNEY  4/13/2023    Procedure: Bench kidney;  Surgeon: Ivy Sepulveda MD;  Location: [facility] OR     BIOPSY  2021    renal     COLONOSCOPY N/A 03/12/2018    Procedure: COMBINED COLONOSCOPY, SINGLE OR MULTIPLE BIOPSY/POLYPECTOMY BY BIOPSY;  EGD/Colonoscopy ;  Surgeon: Kory Massey MD;  Location: [facility] GI     COLONOSCOPY N/A 09/10/2018    Procedure: COMBINED COLONOSCOPY, SINGLE OR MULTIPLE BIOPSY/POLYPECTOMY BY BIOPSY;  Colonoscopy;  Surgeon: Yenifer Doan MD;  [facility]     COLONOSCOPY N/A 2/3/2023    Procedure: COLONOSCOPY, WITH BIOPSY;  Surgeon: Yaakov Loving MD;  Location: [facility] GI     EXTRACTION(S) DENTAL       IR CVC TUNNEL PLACEMENT > 5 YRS OF AGE  12/22/2022     PERCUTANEOUS BIOPSY KIDNEY Right 12/19/2018    Procedure: Right Kidney Biopsy;  Surgeon: Otilio Mar MD;  [facility]     TRANSPLANT KIDNEY RECIPIENT LIVING RELATED N/A 12/05/2014    Procedure: TRANSPLANT KIDNEY RECIPIENT LIVING RELATED;  Surgeon: Dale Middleton MD;  Location: [facility] OR     WISDOM TOOTH EXTRACTION Bilateral             Previous Endoscopy:     Results for orders placed or performed during the hospital encounter of 02/03/23   COLONOSCOPY   Result Value Ref Range    COLONOSCOPY       [address] [address] [phone]     Endoscopy Department  _______________________________________________________________________________  Patient Name: Caity Horan           Procedure Date: 2/3/2023 10:44 AM  MRN: [id number]                       Account Number: [id number]  YOB: 1990              Admit Type: Outpatient  Age: 32                               Room: [facility]                      Gender: Female                         Note Status: Finalized  Attending MD: MORGAN SALINAS MD Pause for the Cause: pause for cause   completed  Total Sedation Time:                    _______________________________________________________________________________     Procedure:             Colonoscopy  Indications:           High risk colon cancer surveillance: Ulcerative                          pancolitis of 8 (or more) years duration  Providers:             MORGAN SALINAS MD, RD HERNANDEZ, Nunu Soto RN  Patient Profile:       This is a 32 year old female with pmhx of UC who                          presents for surveillance. No diarrhea, hematochezia                          or abdominal pain. ESRD 2/2 IgA s/p renal transplant                          with recurrent ESRD on HD.  Referring MD:          JAYLAN MCCARTHY  Requesting Provider:   MAURISIO GOODWIN MD  Medicines:             Fentanyl 200 micrograms IV, Midazolam 4 mg IV,                          Diphenhydramine 50 mg IV  Complications:         No immediate complications. Estimated blood loss:                          Minimal.  _______________________________________________________________________________  Procedure:             Pre-Anesthesia Assessment:                         - Prior to the procedure, a History and Physical was                          performed, and patient medications and allergies were                          reviewed. The patient is competent.  The risks and                          benefits of the procedure and the sedation options and                          risks were discussed with the patient. All questions                          were answered and informed consent was obtained.                          Patient identification and proposed procedure were                          verified by the physician, the nurse and the                          technician in the pre-procedure area  in the procedure                          room in the endoscopy suite. Mental Status                          Examination: alert and oriented. Airway Examination:                          normal oropharyngeal airway and neck mobility.                          Respiratory Examination: clear to auscultation. CV                          Examination: normal. Prophylactic Antibiotics: The                          patient does not require prophylactic antibiotics.                          Prior Anticoagulants: The patient has taken no                           anticoagulant or antiplatelet agents. ASA Grade                          Assessment: III - A patient with severe systemic                          disease. After reviewing the risks and benefits, the                          patient was deemed in satisfactory condition to                          undergo the procedure. The anesthesia plan was to use                          moderate sedation / analgesia (conscious sedation).                          Immediately prior to administration of medications,                          the patient was re-assessed for adequacy to receive                          sedatives. The heart rate, respiratory rate, oxygen                          saturations, blood pressure, adequacy of pulmonary                          ventilation, and response to care were monitored                          throughout the procedure. The physical status of the                          patient was re-assessed after the procedure.                         Afte r obtaining informed consent, the colonoscope was                          passed under direct vision. Throughout the procedure,                          the patient's blood pressure, pulse, and oxygen                          saturations were monitored continuously. The                          Colonoscope was introduced through the anus and                          advanced to the terminal ileum.  The colonoscopy was                          performed without difficulty. The patient tolerated                          the procedure well. The quality of the bowel                          preparation was good.                                                                                   Findings:       The perianal examination was normal.       Scattered pseudopolyps were found from transverse colon to sigmoid colon.       Inflammation was not found based on the endoscopic appearance of the        mucosa in the colon. This was graded as Mcbride Score 0 (normal or inactive        disease ). Multiple random biopsies were obtained with cold forceps for        histology randomly every 10cm from the cecum to the rectum. 9 total jars        were obtained - including cecum, rectum and every 10cm for 70 cm.       The terminal ileum appeared normal.       Non-bleeding internal hemorrhoids were found during retroflexion. The        hemorrhoids were small.       The exam was otherwise without abnormality on direct and retroflexion        views.                                                                                   Moderate Sedation:       Moderate (conscious) sedation was administered by the endoscopy nurse        and supervised by the endoscopist. The following parameters were        monitored: oxygen saturation, heart rate, blood pressure, and response        to care. Total physician intraservice time was 34 minutes.  Impression:            Inactive (Mcbride Score 0) ulcerative colitis.                         - Pseudopolyps from transverse colon to sigmoid colon.                          - The examined portion of the ileum was normal.                         - Non-bleeding internal hemorrhoids.                         - The examination was otherwise normal on direct and                          retroflexion views.                         - Multiple random biopsies were obtained every 10cm                           from the cecum to the rectum. 9 total jars were                          obtained.  Recommendation:        - Discharge patient to home (with escort).                         - Resume previous diet.                         - Continue present medications.                         - Await pathology results.                         - Return to GI clinic as previously scheduled.                                                                                     _________________________  MORGAN SALINAS MD  2/3/2023 11:40:36 AM  I was physically present for the entire viewing portion of the exam.  __________________________  Signature of t eaching physician  [id number] SALINAS MD    _____________________________  RD HERNANDEZ,   Number of Addenda: 0    Note Initiated On: 2/3/2023 10:44 AM  Scope In:  Scope Out:              Social History:   Reviewed and edited as appropriate  Social History     Socioeconomic History     Marital status: Single     Spouse name: Not on file     Number of children: Not on file     Years of education: 16     Highest education level: Not on file   Occupational History     Occupation: advertising     Employer: [employer]   Tobacco Use     Smoking status: Never     Smokeless tobacco: Never   Vaping Use     Vaping status: Not on file   Substance and Sexual Activity     Alcohol use: Not Currently     Alcohol/week: 3.0 - 9.0 standard drinks of alcohol     Types: 1 - 3 Glasses of wine, 1 - 3 Cans of beer, 1 - 3 Shots of liquor per week     Drug use: No     Sexual activity: Never   Other Topics Concern      Service Not Asked     Blood Transfusions No     Caffeine Concern Not Asked     Occupational Exposure Not Asked     Hobby Hazards Not Asked     Sleep Concern Not Asked     Stress Concern Not Asked     Weight Concern Not Asked     Special Diet Not Asked     Back Care Not Asked     Exercise Not Asked     Bike Helmet Not Asked     Seat Belt Yes      "Self-Exams Not Asked   Social History Narrative     Not on file     Social Determinants of Health     Financial Resource Strain: Not on file   Food Insecurity: Not on file   Transportation Needs: Not on file   Physical Activity: Not on file   Stress: Not on file   Social Connections: Not on file   Intimate Partner Violence: Not on file   Housing Stability: Not on file            Family History:   Reviewed and edited as appropriate  No known history of gastrointestinal/liver disease or  gastrointestinal malignancies       Allergies:   Reviewed and edited as appropriate     Allergies   Allergen Reactions     Bumetanide Other (See Comments)     Causes paralysis     Amoxicillin Rash          Review of Systems:     A complete review of systems was performed and is negative except as noted in the HPI           Physical Exam:   /86 (BP Location: Right arm)   Pulse (!) 125   Temp 98.7  F (37.1  C) (Oral)   Resp 20   Ht 1.6 m (5' 3\")   Wt 65.6 kg (144 lb 9.6 oz)   SpO2 95%   BMI 25.61 kg/m    Wt:   Wt Readings from Last 2 Encounters:   04/16/23 65.6 kg (144 lb 9.6 oz)   04/03/23 59.1 kg (130 lb 3.2 oz)      Constitutional: Not dyspneic/diaphoretic, no acute distress  Eyes: Conjunctiva anicteric/injected  Ears/nose/mouth/throat: Mucus membranes moist  Neck: supple  CV: No edema  Respiratory: Unlabored breathing  Abdomen: Non distended, TTP, No peritoneal signs   Skin: warm, perfused, no jaundice  Neuro: AAO x 3, No asterixis  Psych: Normal affect  MSK: Normal gait         Data:   Labs and imaging below were independently reviewed and interpreted    BMP  Recent Labs   Lab 04/16/23  0926 04/15/23  1318 04/15/23  0956 04/15/23  0436 04/14/23  1009 04/14/23  0812 04/13/23  2232 04/13/23  1842     --   --  139  --  138  --  133*   POTASSIUM 4.0 3.8 3.8 3.9  3.9   < > 3.6  3.6   < > 4.1  4.1   CHLORIDE 109*  --   --  109*  --  100  --  95*   MICAH 9.1  --   --  8.5*  --  8.7  --  9.5   CO2 22  --   --  23  --  " 25  --  23   BUN 20.7*  --   --  13.6  --  23.2*  --  29.7*   CR 2.79*  --   --  1.80*  --  3.89*  --  7.23*   GLC 97  --   --  160*  --  174*  --  142*    < > = values in this interval not displayed.     CBC  Recent Labs   Lab 04/16/23  0926 04/15/23  0956 04/15/23  0436 04/14/23  1009 04/14/23  0812 04/13/23  2232 04/13/23  1842   WBC 4.8  --  5.8  --  5.8  --  5.4   RBC 2.68*  --  2.59*  --  2.91*  --  3.10*   HGB 8.2*   < > 7.8*   < > 8.9*   < > 9.3*  9.3*   HCT 27.0*  --  26.2*  --  28.8*  --  29.4*   *  --  101*  --  99  --  95   MCH 30.6  --  30.1  --  30.6  --  30.0   MCHC 30.4*  --  29.8*  --  30.9*  --  31.6   RDW 13.9  --  13.8  --  13.5  --  13.5     --  148*  --  156  --  144*    < > = values in this interval not displayed.     INRNo lab results found in last 7 days.  LFTs  Recent Labs   Lab 04/11/23  0832   ALKPHOS 70   AST 23   ALT 15   BILITOTAL 0.6   PROTTOTAL 8.6*   ALBUMIN 5.1      PANCNo lab results found in last 7 days.

## 2023-04-16 NOTE — PROGRESS NOTES
[facility]   Transplant Nephrology Progress Note  Date of Admission:  4/13/2023  Today's Date: 04/16/2023    Recommendations:  - Would do stat kidney transplant ultrasound with doppler to assess for blood flow, hydronephrosis and perinephric fluid.   - Would consider IV fluid bolus.  - Appreciate GI input for possible ulcerative colitis flare.  - Recommend starting magnesium oxide 400 mg daily with lunch.  - Recommend starting phosphorus supplement 500 mg bid.    Assessment & Plan   # LDKT: Increased creatinine due to unclear etiology.  Differential dx of ASHLEY would be prerenal, urinary obstruction/hydronephrosis, urine leak, arterial or venous thrombosis or less likely acute rejection.  Recommend stat kidney transplant ultrasound with doppler.  Would consider IV fluid bolus.   - Baseline Creatinine: ~ TBD   - Proteinuria: Not checked post transplant   - Date DSA Last Checked: Apr/2023      Latest DSA: No DSA at time of transplant   - BK Viremia: Not checked post transplant   - Kidney Tx Biopsy: No   - Transplant Ureteral Stent: No    # Immunosuppression: Tacrolimus immediate release (goal 8-10) and Mycophenolate mofetil (dose 750 mg every 12 hours)   - Induction with Recent Transplant:  High Intensity Protocol due to high cPRA   - Changes: Not at this time    # Infection Prophylaxis:   - PJP: Sulfa/TMP (Bactrim)  - CMV: Valganciclovir (Valcyte); Patient is CMV IgG Ab discordant (D+/R-) and will continue on Valcyte x 6 months, then check CMV PCR monthly until 12 months post transplant.  - Thrush: None    # Blood Pressure: Controlled;  Goal BP: < 150/90   - Volume status: Euvolemic   - Changes: Yes - With ASHLEY, would consider IV fluid bolus.    # Elevated Blood Glucose: Glucose generally running ~ 90s and normal with today's labs.   - Management as per primary team.    # Anemia in Chronic Renal Disease: Hgb: Stable, low      MICKEY: No   - Iron studies: High iron  saturation    # Mineral Bone Disorder:   - Secondary renal hyperparathyroidism; PTH level: Moderately elevated (301-600 pg/ml)        On treatment: None  - Vitamin D; level: Normal        On supplement: No  - Calcium; level: Normal        On supplement: No  - Phosphorus; level: Low        On supplement: No; Recommend starting phosphorus supplement 500 mg bid.    # Electrolytes:   - Potassium; level: Normal        On supplement: No  - Magnesium; level: Low        On supplement: No; Recommend starting magnesium oxide 400 mg daily with lunch.  - Bicarbonate; level: Normal        On supplement: No    # Acute Abdominal Pain: Patient with acute abdominal pain since last night.  She reports symptoms of similar to previous UC flares.  However, with ASHLEY, concern of possible kidney related issues such as urine leak, hydronephrosis and/or vascular thrombosis, which may either be causing the pain or in addition to UC symptoms.   - Recommend stat kidney transplant ultrasound with doppler.    # Ulcerative Colitis: Acute abdominal pain, which is similar to UC flare symptoms in the past per patient.   Patient has been maintained on vedolizumab with next dose due 4/22.  Most recent UC flare Dec 2022 with mild chronic active colitis on most recent colonoscopy 2/2022.   - Recommend GI consult.    # Transplant History:  Etiology of Kidney Failure: IgA nephropathy  Tx: LDKT  Transplant: 4/13/2023 (Kidney), 12/5/2014 (Kidney)  Donor Type: Living Donor Class:   Crossmatch at time of Tx: negative  DSA at time of Tx: No  Significant changes in immunosuppression: None  Significant transplant-related complications: None    Recommendations were communicated to the primary team via this note.    Otilio Mar MD   Pager: [phone]    Interval History   Ms. Sanchez creatinine is 2.79 (04/16 0926); Increased.  Good urine output yesterday, but not recorded since rutledge catheter removal last night.  Other significant labs/tests/vitals: Trend  "down in serum magnesium and phosphorus levels.  Otherwise, stable electrolytes.  Stable hemoglobin.  Patient reports beginning last night at ~ 8 pm, significant abdominal pain, especially in the right lower quadrant and epigastric area, similar to previous ulcerative colitis flares.  Minimal nausea and no vomiting.  She had 2 watery and one soft bowel movement after Go-Lytely last night.  No chest pain or shortness of breath.  Some leg swelling.  No fever, sweats or chills.    Review of Systems   4 point ROS was obtained and negative except as noted in the Interval History.    MEDICATIONS:    atorvastatin  10 mg Oral Daily     [Held by provider] furosemide  40 mg Intravenous BID     magnesium oxide  400 mg Oral Daily with lunch     mycophenolate  750 mg Oral BID IS     polyethylene glycol  17 g Oral Daily     senna-docusate  1 tablet Oral BID     sodium chloride (PF)  10 mL Intracatheter Q8H     sulfamethoxazole-trimethoprim  1 tablet Oral Daily     tacrolimus  4 mg Oral BID IS     valGANciclovir  450 mg Oral Daily         Physical Exam   Temp  Av.4  F (36.9  C)  Min: 97.9  F (36.6  C)  Max: 100  F (37.8  C)      Pulse  Av.5  Min: 78  Max: 111 Resp  Av.1  Min: 7  Max: 26  SpO2  Av %  Min: 94 %  Max: 100 %    CVP (mmHg): 13 mmHgBP (!) 138/92 (BP Location: Right arm)   Pulse 107   Temp 98.6  F (37  C) (Oral)   Resp 18   Ht 1.6 m (5' 3\")   Wt 65.6 kg (144 lb 9.6 oz)   SpO2 97%   BMI 25.61 kg/m     Date 04/15/23 07 - 23 0659   Shift 6199-2603 6860-1576 1414-3783 24 Hour Total   INTAKE   P.O. 750   750   I.V. 10   10   Shift Total(mL/kg) 760(11.92)   760(11.92)   OUTPUT   Shift Total(mL/kg)       Weight (kg) 63.78 63.78 63.78 63.78      Admit Weight: 58.2 kg (128 lb 4.9 oz)     GENERAL APPEARANCE: alert in moderate distress, standing hunched over at the side of the bed  HENT: mouth without ulcers or lesions  RESP: lungs clear to auscultation - no rales, rhonchi or wheezes  CV: regular " rhythm, normal rate, no rub, no murmur  EDEMA: no LE edema bilaterally  ABDOMEN: soft, nondistended, moderate TTP, bowel sounds normal  MS: extremities normal - no gross deformities noted, no evidence of inflammation in joints, no muscle tenderness  SKIN: no rash  TX KIDNEY: mild TTP  DIALYSIS ACCESS:  Right IJ tunneled catheter    Data   All labs reviewed by me.  CMP  Recent Labs   Lab 04/16/23  0926 04/15/23  1318 04/15/23  0956 04/15/23  0436 04/14/23  1009 04/14/23  0812 04/13/23  2232 04/13/23  1842 04/13/23  1253 04/11/23  0832     --   --  139  --  138  --  133*  --  136   POTASSIUM 4.0 3.8 3.8 3.9  3.9   < > 3.6  3.6   < > 4.1  4.1  --  3.6   CHLORIDE 109*  --   --  109*  --  100  --  95*  --  88*   CO2 22  --   --  23  --  25  --  23  --  29   ANIONGAP 11  --   --  7  --  13  --  15  --  19*   GLC 97  --   --  160*  --  174*  --  142*   < > 122*   BUN 20.7*  --   --  13.6  --  23.2*  --  29.7*  --  30.3*   CR 2.79*  --   --  1.80*  --  3.89*  --  7.23*  --  8.30*   GFRESTIMATED 22*  --   --  38*  --  15*  --  7*  --  6*   MICAH 9.1  --   --  8.5*  --  8.7  --  9.5  --  10.5*   MAG 1.6*  --   --  1.8  --  2.2  --  1.3*  --   --    PHOS 1.9*  --   --  2.3*  --  4.6*  --  4.1  --   --    PROTTOTAL  --   --   --   --   --   --   --   --   --  8.6*   ALBUMIN  --   --   --   --   --   --   --   --   --  5.1   BILITOTAL  --   --   --   --   --   --   --   --   --  0.6   ALKPHOS  --   --   --   --   --   --   --   --   --  70   AST  --   --   --   --   --   --   --   --   --  23   ALT  --   --   --   --   --   --   --   --   --  15    < > = values in this interval not displayed.     CBC  Recent Labs   Lab 04/16/23  0926 04/15/23  1318 04/15/23  0956 04/15/23  0436 04/14/23  1009 04/14/23  0812 04/13/23  2232 04/13/23  1842   HGB 8.2* 8.4* 7.5* 7.8*   < > 8.9*   < > 9.3*  9.3*   WBC 4.8  --   --  5.8  --  5.8  --  5.4   RBC 2.68*  --   --  2.59*  --  2.91*  --  3.10*   HCT 27.0*  --   --  26.2*  --  28.8*   --  29.4*   *  --   --  101*  --  99  --  95   MCH 30.6  --   --  30.1  --  30.6  --  30.0   MCHC 30.4*  --   --  29.8*  --  30.9*  --  31.6   RDW 13.9  --   --  13.8  --  13.5  --  13.5     --   --  148*  --  156  --  144*    < > = values in this interval not displayed.     INRNo lab results found in last 7 days.  ABGNo lab results found in last 7 days.   Urine Studies  Recent Labs   Lab Test 12/05/22  1200 12/19/18  0615   COLOR Light Yellow Yellow   APPEARANCE Clear Clear   URINEGLC 50* Negative   URINEBILI Negative Negative   URINEKETONE Negative Negative   SG 1.012 1.019   UBLD Moderate* Negative   URINEPH 7.5* 5.0   PROTEIN 300* Negative   NITRITE Negative Negative   LEUKEST Negative Negative   RBCU 4* 1   WBCU 8* 2     Recent Labs   Lab Test 01/14/21  1200 12/03/20  0915 06/18/20  0950 11/11/19  0718 07/18/19  0809 06/27/19  0746 12/19/18  0615 11/01/18  0745 02/01/18  0656 07/19/17  0727 12/13/16  0747 05/19/16  0801 12/08/15  1210 06/30/15  0822 01/30/15  0821   UTPG 2.09* 1.36* 1.23* 0.17 0.25* 0.83* 0.12 0.07 0.05 0.10 0.10 0.07 0.08 0.06 0.09     PTH  Recent Labs   Lab Test 04/03/23  1556   PTHI 309*     Iron Studies  Recent Labs   Lab Test 04/03/23  1556 12/22/22  0608 06/27/19  0742 11/01/18  0737 08/23/18  0743 07/26/18  0830 05/17/18  0740 05/03/18  1320 03/29/18  0729 02/01/18  0702 12/01/17  1417 10/12/17  0744 07/25/17  1648   IRON 186* 80 99 113 131 73 19* 19* 29* 36 112 14* 21*    223* 288 262 236* 242 369 321 210* 291 274 423 467*   IRONSAT 71* 36 34 43 55* 30 5* 6* 14* 12* 41 3* 4*   FRANNY 902* 361* 233* 216* 226* 216* 7* 13 90 14 277* 3* 3*       IMAGING:  All imaging studies reviewed by me.

## 2023-04-16 NOTE — PROGRESS NOTES
Brief Care Coordination Note    Per care team, pt has been having severe abdominal pain. Pt reports the pain is what she experiences when having bouts of colitis. Pt receives an infusion of Entyvio every 6 weeks for this condition and per pt, experiences significant relief from the infusion. Per insurance, pharmacist and care team, pt cannot receive this infusion inpt. Pt cannot receive this infusion and have it covered by insurance on the same day she has an inpt stay, so will need to discharge and receive the infusion the next day. Writer made appt for pt to receive the infusion 8am at [facility] (along with her scheduled ATC appt) on Monday 4/17. Team has concerns regarding the pain and lab values and are evaluating pt. At this point, per team, pt will likely not discharge today and will need to have the infusion rescheduled for 4/18 Tuesday am. Writer sent message via [employer] to [facility] scheduling.     Leif Rogers RNCC  Covering for 7th floor and 5C  Phone [phone]    SE[facility]ABLE in [facility] - search CARE COORDINATOR    [place] & [place] ([phone]) Saturday & Sunday; ([phone]) [facility] Recognized Holidays    Units: 4A, 4C, 4E, 5A & 5B   Pager: [phone]    Units: 6A & 6B    Pager: [phone]    Units: 6C & 6D   Pager: [phone]    Units: 7A, 7B, 7C, 7D & 5C    Pager: [phone]    Units: [facility] ED, 5 Ortho, 5 Med/Surg, 6 Med/Surg, 8A & 10 ICU

## 2023-04-17 ENCOUNTER — DOCUMENTATION ONLY (OUTPATIENT)
Dept: TRANSPLANT | Facility: CLINIC | Age: 33
End: 2023-04-17

## 2023-04-17 ENCOUNTER — ANESTHESIA EVENT (OUTPATIENT)
Dept: SURGERY | Facility: CLINIC | Age: 33
DRG: 652 | End: 2023-04-17
Payer: COMMERCIAL

## 2023-04-17 ENCOUNTER — APPOINTMENT (OUTPATIENT)
Dept: ULTRASOUND IMAGING | Facility: CLINIC | Age: 33
DRG: 652 | End: 2023-04-17
Attending: STUDENT IN AN ORGANIZED HEALTH CARE EDUCATION/TRAINING PROGRAM
Payer: COMMERCIAL

## 2023-04-17 ENCOUNTER — ANESTHESIA (OUTPATIENT)
Dept: SURGERY | Facility: CLINIC | Age: 33
DRG: 652 | End: 2023-04-17
Payer: COMMERCIAL

## 2023-04-17 ENCOUNTER — APPOINTMENT (OUTPATIENT)
Dept: ULTRASOUND IMAGING | Facility: CLINIC | Age: 33
DRG: 652 | End: 2023-04-17
Attending: SURGERY
Payer: COMMERCIAL

## 2023-04-17 DIAGNOSIS — Z79.899 ENCOUNTER FOR LONG-TERM CURRENT USE OF MEDICATION: ICD-10-CM

## 2023-04-17 DIAGNOSIS — Z20.828 CONTACT WITH AND (SUSPECTED) EXPOSURE TO OTHER VIRAL COMMUNICABLE DISEASES: ICD-10-CM

## 2023-04-17 DIAGNOSIS — Z94.0 KIDNEY REPLACED BY TRANSPLANT: Primary | ICD-10-CM

## 2023-04-17 DIAGNOSIS — Z48.298 AFTERCARE FOLLOWING ORGAN TRANSPLANT: ICD-10-CM

## 2023-04-17 LAB
ABO/RH(D): NORMAL
ALBUMIN MFR UR ELPH: 25.4 MG/DL (ref 1–14)
ANION GAP SERPL CALCULATED.3IONS-SCNC: 12 MMOL/L (ref 7–15)
ANION GAP SERPL CALCULATED.3IONS-SCNC: 13 MMOL/L (ref 7–15)
ANTIBODY SCREEN: NEGATIVE
BASOPHILS # BLD MANUAL: 0 10E3/UL (ref 0–0.2)
BASOPHILS NFR BLD MANUAL: 1 %
BKV DNA # SPEC NAA+PROBE: NOT DETECTED COPIES/ML
BLD PROD TYP BPU: NORMAL
BLD PROD TYP BPU: NORMAL
BLOOD COMPONENT TYPE: NORMAL
BLOOD COMPONENT TYPE: NORMAL
BUN SERPL-MCNC: 24.9 MG/DL (ref 6–20)
BUN SERPL-MCNC: 29.1 MG/DL (ref 6–20)
CALCIUM SERPL-MCNC: 8.2 MG/DL (ref 8.6–10)
CALCIUM SERPL-MCNC: 8.4 MG/DL (ref 8.6–10)
CHLORIDE SERPL-SCNC: 104 MMOL/L (ref 98–107)
CHLORIDE SERPL-SCNC: 107 MMOL/L (ref 98–107)
CODING SYSTEM: NORMAL
CODING SYSTEM: NORMAL
CREAT FLD-MCNC: 31.3 MG/DL
CREAT SERPL-MCNC: 3.26 MG/DL (ref 0.51–0.95)
CREAT SERPL-MCNC: 4.48 MG/DL (ref 0.51–0.95)
CREAT UR-MCNC: 70.9 MG/DL
CREATININE BODY FLUID SOURCE: NORMAL
CROSSMATCH: NORMAL
CROSSMATCH: NORMAL
DEPRECATED HCO3 PLAS-SCNC: 20 MMOL/L (ref 22–29)
DEPRECATED HCO3 PLAS-SCNC: 20 MMOL/L (ref 22–29)
EOSINOPHIL # BLD MANUAL: 0 10E3/UL (ref 0–0.7)
EOSINOPHIL NFR BLD MANUAL: 0 %
ERYTHROCYTE [DISTWIDTH] IN BLOOD BY AUTOMATED COUNT: 13.7 % (ref 10–15)
GFR SERPL CREATININE-BSD FRML MDRD: 13 ML/MIN/1.73M2
GFR SERPL CREATININE-BSD FRML MDRD: 18 ML/MIN/1.73M2
GLUCOSE SERPL-MCNC: 100 MG/DL (ref 70–99)
GLUCOSE SERPL-MCNC: 94 MG/DL (ref 70–99)
GRAM STAIN RESULT: NORMAL
GRAM STAIN RESULT: NORMAL
HCT VFR BLD AUTO: 24.8 % (ref 35–47)
HGB BLD-MCNC: 7.3 G/DL (ref 11.7–15.7)
HOLD SPECIMEN: NORMAL
HOLD SPECIMEN: NORMAL
INR PPP: 1.27 (ref 0.85–1.15)
ISSUE DATE AND TIME: NORMAL
ISSUE DATE AND TIME: NORMAL
LYMPHOCYTES # BLD MANUAL: 0.2 10E3/UL (ref 0.8–5.3)
LYMPHOCYTES NFR BLD MANUAL: 5 %
MAGNESIUM SERPL-MCNC: 2 MG/DL (ref 1.7–2.3)
MCH RBC QN AUTO: 29.9 PG (ref 26.5–33)
MCHC RBC AUTO-ENTMCNC: 29.4 G/DL (ref 31.5–36.5)
MCV RBC AUTO: 102 FL (ref 78–100)
MONOCYTES # BLD MANUAL: 0.2 10E3/UL (ref 0–1.3)
MONOCYTES NFR BLD MANUAL: 5 %
NEUTROPHILS # BLD MANUAL: 3.7 10E3/UL (ref 1.6–8.3)
NEUTROPHILS NFR BLD MANUAL: 89 %
PHOSPHATE SERPL-MCNC: 2.5 MG/DL (ref 2.5–4.5)
PLAT MORPH BLD: ABNORMAL
PLATELET # BLD AUTO: 146 10E3/UL (ref 150–450)
POTASSIUM SERPL-SCNC: 3.9 MMOL/L (ref 3.4–5.3)
POTASSIUM SERPL-SCNC: 4.2 MMOL/L (ref 3.4–5.3)
PROT/CREAT 24H UR: 0.36 MG/MG CR (ref 0–0.2)
RBC # BLD AUTO: 2.44 10E6/UL (ref 3.8–5.2)
RBC MORPH BLD: ABNORMAL
SODIUM SERPL-SCNC: 137 MMOL/L (ref 136–145)
SODIUM SERPL-SCNC: 139 MMOL/L (ref 136–145)
SPECIMEN EXPIRATION DATE: NORMAL
TACROLIMUS BLD-MCNC: 11.6 UG/L (ref 5–15)
TME LAST DOSE: NORMAL H
TME LAST DOSE: NORMAL H
UNIT ABO/RH: NORMAL
UNIT ABO/RH: NORMAL
UNIT NUMBER: NORMAL
UNIT NUMBER: NORMAL
UNIT STATUS: NORMAL
UNIT STATUS: NORMAL
UNIT TYPE ISBT: 600
UNIT TYPE ISBT: 600
WBC # BLD AUTO: 4.2 10E3/UL (ref 4–11)

## 2023-04-17 PROCEDURE — 250N000012 HC RX MED GY IP 250 OP 636 PS 637: Performed by: SURGERY

## 2023-04-17 PROCEDURE — 88346 IMFLUOR 1ST 1ANTB STAIN PX: CPT | Mod: 26 | Performed by: PATHOLOGY

## 2023-04-17 PROCEDURE — 120N000011 HC R&B TRANSPLANT UMMC

## 2023-04-17 PROCEDURE — 85610 PROTHROMBIN TIME: CPT | Performed by: NURSE PRACTITIONER

## 2023-04-17 PROCEDURE — 88313 SPECIAL STAINS GROUP 2: CPT | Mod: 26 | Performed by: PATHOLOGY

## 2023-04-17 PROCEDURE — 76776 US EXAM K TRANSPL W/DOPPLER: CPT

## 2023-04-17 PROCEDURE — 86923 COMPATIBILITY TEST ELECTRIC: CPT

## 2023-04-17 PROCEDURE — 250N000011 HC RX IP 250 OP 636: Performed by: PHYSICIAN ASSISTANT

## 2023-04-17 PROCEDURE — 0TB10ZX EXCISION OF LEFT KIDNEY, OPEN APPROACH, DIAGNOSTIC: ICD-10-PCS | Performed by: SURGERY

## 2023-04-17 PROCEDURE — 250N000011 HC RX IP 250 OP 636: Performed by: STUDENT IN AN ORGANIZED HEALTH CARE EDUCATION/TRAINING PROGRAM

## 2023-04-17 PROCEDURE — 86850 RBC ANTIBODY SCREEN: CPT | Performed by: NURSE PRACTITIONER

## 2023-04-17 PROCEDURE — 0T770DZ DILATION OF LEFT URETER WITH INTRALUMINAL DEVICE, OPEN APPROACH: ICD-10-PCS | Performed by: SURGERY

## 2023-04-17 PROCEDURE — 82570 ASSAY OF URINE CREATININE: CPT | Performed by: SURGERY

## 2023-04-17 PROCEDURE — 258N000003 HC RX IP 258 OP 636: Performed by: NURSE ANESTHETIST, CERTIFIED REGISTERED

## 2023-04-17 PROCEDURE — 86833 HLA CLASS II HIGH DEFIN QUAL: CPT | Performed by: NURSE PRACTITIONER

## 2023-04-17 PROCEDURE — 250N000013 HC RX MED GY IP 250 OP 250 PS 637: Performed by: SURGERY

## 2023-04-17 PROCEDURE — 99233 SBSQ HOSP IP/OBS HIGH 50: CPT | Mod: FS

## 2023-04-17 PROCEDURE — 83735 ASSAY OF MAGNESIUM: CPT | Performed by: SURGERY

## 2023-04-17 PROCEDURE — 250N000011 HC RX IP 250 OP 636: Performed by: SURGERY

## 2023-04-17 PROCEDURE — 87075 CULTR BACTERIA EXCEPT BLOOD: CPT | Performed by: SURGERY

## 2023-04-17 PROCEDURE — 250N000012 HC RX MED GY IP 250 OP 636 PS 637: Performed by: NURSE PRACTITIONER

## 2023-04-17 PROCEDURE — 360N000077 HC SURGERY LEVEL 4, PER MIN: Performed by: SURGERY

## 2023-04-17 PROCEDURE — 250N000011 HC RX IP 250 OP 636: Performed by: NURSE ANESTHETIST, CERTIFIED REGISTERED

## 2023-04-17 PROCEDURE — 85027 COMPLETE CBC AUTOMATED: CPT | Performed by: SURGERY

## 2023-04-17 PROCEDURE — 88350 IMFLUOR EA ADDL 1ANTB STN PX: CPT | Mod: TC | Performed by: SURGERY

## 2023-04-17 PROCEDURE — 50780 REIMPLANT URETER IN BLADDER: CPT | Mod: 78 | Performed by: SURGERY

## 2023-04-17 PROCEDURE — 80197 ASSAY OF TACROLIMUS: CPT | Performed by: PHYSICIAN ASSISTANT

## 2023-04-17 PROCEDURE — 87205 SMEAR GRAM STAIN: CPT | Performed by: SURGERY

## 2023-04-17 PROCEDURE — 36591 DRAW BLOOD OFF VENOUS DEVICE: CPT | Performed by: NURSE PRACTITIONER

## 2023-04-17 PROCEDURE — 272N000001 HC OR GENERAL SUPPLY STERILE: Performed by: SURGERY

## 2023-04-17 PROCEDURE — 250N000009 HC RX 250: Performed by: NURSE ANESTHETIST, CERTIFIED REGISTERED

## 2023-04-17 PROCEDURE — 76776 US EXAM K TRANSPL W/DOPPLER: CPT | Mod: 26 | Performed by: RADIOLOGY

## 2023-04-17 PROCEDURE — 84156 ASSAY OF PROTEIN URINE: CPT | Performed by: PHYSICIAN ASSISTANT

## 2023-04-17 PROCEDURE — 84100 ASSAY OF PHOSPHORUS: CPT | Performed by: SURGERY

## 2023-04-17 PROCEDURE — 0TQ70ZZ REPAIR LEFT URETER, OPEN APPROACH: ICD-10-PCS | Performed by: SURGERY

## 2023-04-17 PROCEDURE — 76776 US EXAM K TRANSPL W/DOPPLER: CPT | Mod: 77

## 2023-04-17 PROCEDURE — 86828 HLA CLASS I&II ANTIBODY QUAL: CPT | Performed by: NURSE PRACTITIONER

## 2023-04-17 PROCEDURE — 36592 COLLECT BLOOD FROM PICC: CPT | Performed by: PHYSICIAN ASSISTANT

## 2023-04-17 PROCEDURE — 370N000017 HC ANESTHESIA TECHNICAL FEE, PER MIN: Performed by: SURGERY

## 2023-04-17 PROCEDURE — 85007 BL SMEAR W/DIFF WBC COUNT: CPT | Performed by: SURGERY

## 2023-04-17 PROCEDURE — 88348 ELECTRON MICROSCOPY DX: CPT | Mod: 26 | Performed by: PATHOLOGY

## 2023-04-17 PROCEDURE — 86832 HLA CLASS I HIGH DEFIN QUAL: CPT | Performed by: NURSE PRACTITIONER

## 2023-04-17 PROCEDURE — 86923 COMPATIBILITY TEST ELECTRIC: CPT | Performed by: NURSE PRACTITIONER

## 2023-04-17 PROCEDURE — 710N000010 HC RECOVERY PHASE 1, LEVEL 2, PER MIN: Performed by: SURGERY

## 2023-04-17 PROCEDURE — 88350 IMFLUOR EA ADDL 1ANTB STN PX: CPT | Mod: 26 | Performed by: PATHOLOGY

## 2023-04-17 PROCEDURE — 87070 CULTURE OTHR SPECIMN AEROBIC: CPT | Performed by: SURGERY

## 2023-04-17 PROCEDURE — 87102 FUNGUS ISOLATION CULTURE: CPT | Performed by: SURGERY

## 2023-04-17 PROCEDURE — 50205 RENAL BX SURG EXPOSURE KDN: CPT | Mod: 78 | Performed by: SURGERY

## 2023-04-17 PROCEDURE — 82310 ASSAY OF CALCIUM: CPT | Performed by: SURGERY

## 2023-04-17 PROCEDURE — 250N000024 HC ISOFLURANE, PER MIN: Performed by: SURGERY

## 2023-04-17 PROCEDURE — 250N000011 HC RX IP 250 OP 636: Performed by: NURSE PRACTITIONER

## 2023-04-17 PROCEDURE — 250N000013 HC RX MED GY IP 250 OP 250 PS 637: Performed by: NURSE PRACTITIONER

## 2023-04-17 PROCEDURE — 250N000013 HC RX MED GY IP 250 OP 250 PS 637: Performed by: PHYSICIAN ASSISTANT

## 2023-04-17 PROCEDURE — P9016 RBC LEUKOCYTES REDUCED: HCPCS

## 2023-04-17 PROCEDURE — 36592 COLLECT BLOOD FROM PICC: CPT | Performed by: SURGERY

## 2023-04-17 PROCEDURE — C2617 STENT, NON-COR, TEM W/O DEL: HCPCS | Performed by: SURGERY

## 2023-04-17 PROCEDURE — 4A1BXSH MONITORING OF GASTROINTESTINAL VASCULAR PERFUSION USING INDOCYANINE GREEN DYE, EXTERNAL APPROACH: ICD-10-PCS | Performed by: SURGERY

## 2023-04-17 PROCEDURE — 88305 TISSUE EXAM BY PATHOLOGIST: CPT | Mod: TC | Performed by: SURGERY

## 2023-04-17 PROCEDURE — 88305 TISSUE EXAM BY PATHOLOGIST: CPT | Mod: 26 | Performed by: PATHOLOGY

## 2023-04-17 PROCEDURE — 258N000003 HC RX IP 258 OP 636: Performed by: PHYSICIAN ASSISTANT

## 2023-04-17 DEVICE — STENT URETERAL PERCUFLEX PLUS 4.8FRX16CM M0061751980
Type: IMPLANTABLE DEVICE | Site: ABDOMEN | Status: NON-FUNCTIONAL
Removed: 2023-05-22

## 2023-04-17 RX ORDER — LABETALOL HYDROCHLORIDE 5 MG/ML
5 INJECTION, SOLUTION INTRAVENOUS EVERY 5 MIN PRN
Status: DISCONTINUED | OUTPATIENT
Start: 2023-04-17 | End: 2023-04-17 | Stop reason: HOSPADM

## 2023-04-17 RX ORDER — SODIUM CHLORIDE, SODIUM LACTATE, POTASSIUM CHLORIDE, CALCIUM CHLORIDE 600; 310; 30; 20 MG/100ML; MG/100ML; MG/100ML; MG/100ML
INJECTION, SOLUTION INTRAVENOUS CONTINUOUS
Status: DISCONTINUED | OUTPATIENT
Start: 2023-04-17 | End: 2023-04-17 | Stop reason: HOSPADM

## 2023-04-17 RX ORDER — HYDROMORPHONE HCL IN WATER/PF 6 MG/30 ML
0.2 PATIENT CONTROLLED ANALGESIA SYRINGE INTRAVENOUS ONCE
Status: DISCONTINUED | OUTPATIENT
Start: 2023-04-17 | End: 2023-04-17

## 2023-04-17 RX ORDER — OXYCODONE HYDROCHLORIDE 5 MG/1
5 TABLET ORAL
Status: CANCELLED | OUTPATIENT
Start: 2023-04-17

## 2023-04-17 RX ORDER — OXYCODONE HYDROCHLORIDE 10 MG/1
10 TABLET ORAL
Status: CANCELLED | OUTPATIENT
Start: 2023-04-17

## 2023-04-17 RX ORDER — KETOROLAC TROMETHAMINE 30 MG/ML
15 INJECTION, SOLUTION INTRAMUSCULAR; INTRAVENOUS
Status: DISCONTINUED | OUTPATIENT
Start: 2023-04-17 | End: 2023-04-17 | Stop reason: HOSPADM

## 2023-04-17 RX ORDER — ONDANSETRON 2 MG/ML
4 INJECTION INTRAMUSCULAR; INTRAVENOUS EVERY 30 MIN PRN
Status: DISCONTINUED | OUTPATIENT
Start: 2023-04-17 | End: 2023-04-17 | Stop reason: HOSPADM

## 2023-04-17 RX ORDER — DEXMEDETOMIDINE HYDROCHLORIDE 4 UG/ML
INJECTION, SOLUTION INTRAVENOUS PRN
Status: DISCONTINUED | OUTPATIENT
Start: 2023-04-17 | End: 2023-04-17

## 2023-04-17 RX ORDER — HYDROMORPHONE HCL IN WATER/PF 6 MG/30 ML
0.2 PATIENT CONTROLLED ANALGESIA SYRINGE INTRAVENOUS EVERY 5 MIN PRN
Status: DISCONTINUED | OUTPATIENT
Start: 2023-04-17 | End: 2023-04-17 | Stop reason: HOSPADM

## 2023-04-17 RX ORDER — ALBUTEROL SULFATE 0.83 MG/ML
2.5 SOLUTION RESPIRATORY (INHALATION) EVERY 4 HOURS PRN
Status: DISCONTINUED | OUTPATIENT
Start: 2023-04-17 | End: 2023-04-17 | Stop reason: HOSPADM

## 2023-04-17 RX ORDER — METHOCARBAMOL 500 MG/1
500 TABLET, FILM COATED ORAL 4 TIMES DAILY PRN
Status: DISCONTINUED | OUTPATIENT
Start: 2023-04-17 | End: 2023-04-20 | Stop reason: HOSPADM

## 2023-04-17 RX ORDER — HYDRALAZINE HYDROCHLORIDE 20 MG/ML
2.5-5 INJECTION INTRAMUSCULAR; INTRAVENOUS EVERY 10 MIN PRN
Status: DISCONTINUED | OUTPATIENT
Start: 2023-04-17 | End: 2023-04-17 | Stop reason: HOSPADM

## 2023-04-17 RX ORDER — LIDOCAINE 4 G/G
1-2 PATCH TOPICAL
Status: DISCONTINUED | OUTPATIENT
Start: 2023-04-18 | End: 2023-04-19

## 2023-04-17 RX ORDER — ONDANSETRON 4 MG/1
4 TABLET, ORALLY DISINTEGRATING ORAL EVERY 30 MIN PRN
Status: DISCONTINUED | OUTPATIENT
Start: 2023-04-17 | End: 2023-04-17 | Stop reason: HOSPADM

## 2023-04-17 RX ORDER — HYDROMORPHONE HCL IN WATER/PF 6 MG/30 ML
0.2 PATIENT CONTROLLED ANALGESIA SYRINGE INTRAVENOUS EVERY 6 HOURS PRN
Status: CANCELLED | OUTPATIENT
Start: 2023-04-17

## 2023-04-17 RX ORDER — CEFAZOLIN SODIUM 1 G/3ML
INJECTION, POWDER, FOR SOLUTION INTRAMUSCULAR; INTRAVENOUS PRN
Status: DISCONTINUED | OUTPATIENT
Start: 2023-04-17 | End: 2023-04-17

## 2023-04-17 RX ORDER — HYDROMORPHONE HCL IN WATER/PF 6 MG/30 ML
0.2 PATIENT CONTROLLED ANALGESIA SYRINGE INTRAVENOUS EVERY 4 HOURS PRN
Status: DISCONTINUED | OUTPATIENT
Start: 2023-04-17 | End: 2023-04-18

## 2023-04-17 RX ORDER — ONDANSETRON 2 MG/ML
INJECTION INTRAMUSCULAR; INTRAVENOUS PRN
Status: DISCONTINUED | OUTPATIENT
Start: 2023-04-17 | End: 2023-04-17

## 2023-04-17 RX ORDER — FENTANYL CITRATE 50 UG/ML
INJECTION, SOLUTION INTRAMUSCULAR; INTRAVENOUS PRN
Status: DISCONTINUED | OUTPATIENT
Start: 2023-04-17 | End: 2023-04-17

## 2023-04-17 RX ORDER — HYDROMORPHONE HCL IN WATER/PF 6 MG/30 ML
0.4 PATIENT CONTROLLED ANALGESIA SYRINGE INTRAVENOUS EVERY 5 MIN PRN
Status: DISCONTINUED | OUTPATIENT
Start: 2023-04-17 | End: 2023-04-17 | Stop reason: HOSPADM

## 2023-04-17 RX ORDER — KETAMINE HYDROCHLORIDE 10 MG/ML
INJECTION INTRAMUSCULAR; INTRAVENOUS PRN
Status: DISCONTINUED | OUTPATIENT
Start: 2023-04-17 | End: 2023-04-17

## 2023-04-17 RX ORDER — FENTANYL CITRATE 50 UG/ML
50 INJECTION, SOLUTION INTRAMUSCULAR; INTRAVENOUS EVERY 5 MIN PRN
Status: DISCONTINUED | OUTPATIENT
Start: 2023-04-17 | End: 2023-04-17 | Stop reason: HOSPADM

## 2023-04-17 RX ORDER — FENTANYL CITRATE 50 UG/ML
25 INJECTION, SOLUTION INTRAMUSCULAR; INTRAVENOUS EVERY 5 MIN PRN
Status: DISCONTINUED | OUTPATIENT
Start: 2023-04-17 | End: 2023-04-17 | Stop reason: HOSPADM

## 2023-04-17 RX ORDER — SODIUM CHLORIDE, SODIUM GLUCONATE, SODIUM ACETATE, POTASSIUM CHLORIDE AND MAGNESIUM CHLORIDE 526; 502; 368; 37; 30 MG/100ML; MG/100ML; MG/100ML; MG/100ML; MG/100ML
INJECTION, SOLUTION INTRAVENOUS CONTINUOUS PRN
Status: DISCONTINUED | OUTPATIENT
Start: 2023-04-17 | End: 2023-04-17

## 2023-04-17 RX ORDER — VALGANCICLOVIR 450 MG/1
450 TABLET, FILM COATED ORAL EVERY OTHER DAY
Status: DISCONTINUED | OUTPATIENT
Start: 2023-04-18 | End: 2023-04-18

## 2023-04-17 RX ORDER — FENTANYL CITRATE 50 UG/ML
50 INJECTION, SOLUTION INTRAMUSCULAR; INTRAVENOUS ONCE
Status: COMPLETED | OUTPATIENT
Start: 2023-04-17 | End: 2023-04-17

## 2023-04-17 RX ORDER — VALGANCICLOVIR 450 MG/1
450 TABLET, FILM COATED ORAL
Status: DISCONTINUED | OUTPATIENT
Start: 2023-04-20 | End: 2023-04-17

## 2023-04-17 RX ORDER — LABETALOL HYDROCHLORIDE 5 MG/ML
10 INJECTION, SOLUTION INTRAVENOUS
Status: DISCONTINUED | OUTPATIENT
Start: 2023-04-17 | End: 2023-04-17 | Stop reason: HOSPADM

## 2023-04-17 RX ORDER — PROPOFOL 10 MG/ML
INJECTION, EMULSION INTRAVENOUS PRN
Status: DISCONTINUED | OUTPATIENT
Start: 2023-04-17 | End: 2023-04-17

## 2023-04-17 RX ORDER — LIDOCAINE HYDROCHLORIDE 20 MG/ML
INJECTION, SOLUTION INFILTRATION; PERINEURAL PRN
Status: DISCONTINUED | OUTPATIENT
Start: 2023-04-17 | End: 2023-04-17

## 2023-04-17 RX ADMIN — FENTANYL CITRATE 50 MCG: 50 INJECTION, SOLUTION INTRAMUSCULAR; INTRAVENOUS at 09:33

## 2023-04-17 RX ADMIN — SODIUM CHLORIDE, SODIUM GLUCONATE, SODIUM ACETATE, POTASSIUM CHLORIDE AND MAGNESIUM CHLORIDE: 526; 502; 368; 37; 30 INJECTION, SOLUTION INTRAVENOUS at 09:28

## 2023-04-17 RX ADMIN — PROPOFOL 100 MG: 10 INJECTION, EMULSION INTRAVENOUS at 09:33

## 2023-04-17 RX ADMIN — HYDROMORPHONE HYDROCHLORIDE 0.2 MG: 0.2 INJECTION, SOLUTION INTRAMUSCULAR; INTRAVENOUS; SUBCUTANEOUS at 13:11

## 2023-04-17 RX ADMIN — PHENYLEPHRINE HYDROCHLORIDE 100 MCG: 10 INJECTION INTRAVENOUS at 09:50

## 2023-04-17 RX ADMIN — SODIUM CHLORIDE, POTASSIUM CHLORIDE, SODIUM LACTATE AND CALCIUM CHLORIDE: 600; 310; 30; 20 INJECTION, SOLUTION INTRAVENOUS at 14:22

## 2023-04-17 RX ADMIN — HYDROMORPHONE HYDROCHLORIDE 0.2 MG: 0.2 INJECTION, SOLUTION INTRAMUSCULAR; INTRAVENOUS; SUBCUTANEOUS at 14:12

## 2023-04-17 RX ADMIN — HYDROMORPHONE HYDROCHLORIDE 0.2 MG: 0.2 INJECTION, SOLUTION INTRAMUSCULAR; INTRAVENOUS; SUBCUTANEOUS at 08:37

## 2023-04-17 RX ADMIN — PHENYLEPHRINE HYDROCHLORIDE 100 MCG: 10 INJECTION INTRAVENOUS at 09:45

## 2023-04-17 RX ADMIN — LIDOCAINE HYDROCHLORIDE 100 MG: 20 INJECTION, SOLUTION INFILTRATION; PERINEURAL at 09:33

## 2023-04-17 RX ADMIN — PHENYLEPHRINE HYDROCHLORIDE 100 MCG: 10 INJECTION INTRAVENOUS at 10:26

## 2023-04-17 RX ADMIN — PHENYLEPHRINE HYDROCHLORIDE 100 MCG: 10 INJECTION INTRAVENOUS at 09:55

## 2023-04-17 RX ADMIN — HYDROMORPHONE HYDROCHLORIDE 0.2 MG: 0.2 INJECTION, SOLUTION INTRAMUSCULAR; INTRAVENOUS; SUBCUTANEOUS at 13:00

## 2023-04-17 RX ADMIN — TACROLIMUS 4 MG: 1 CAPSULE ORAL at 08:30

## 2023-04-17 RX ADMIN — FENTANYL CITRATE 50 MCG: 50 INJECTION, SOLUTION INTRAMUSCULAR; INTRAVENOUS at 10:12

## 2023-04-17 RX ADMIN — Medication 10 MG: at 10:56

## 2023-04-17 RX ADMIN — HYDROMORPHONE HYDROCHLORIDE 0.2 MG: 0.2 INJECTION, SOLUTION INTRAMUSCULAR; INTRAVENOUS; SUBCUTANEOUS at 01:30

## 2023-04-17 RX ADMIN — HYDROMORPHONE HYDROCHLORIDE 0.2 MG: 0.2 INJECTION, SOLUTION INTRAMUSCULAR; INTRAVENOUS; SUBCUTANEOUS at 13:20

## 2023-04-17 RX ADMIN — TACROLIMUS 3.5 MG: 1 CAPSULE ORAL at 18:33

## 2023-04-17 RX ADMIN — FENTANYL CITRATE 50 MCG: 50 INJECTION, SOLUTION INTRAMUSCULAR; INTRAVENOUS at 09:27

## 2023-04-17 RX ADMIN — ACETAMINOPHEN 650 MG: 325 TABLET ORAL at 18:33

## 2023-04-17 RX ADMIN — CEFAZOLIN 2 G: 1 INJECTION, POWDER, FOR SOLUTION INTRAMUSCULAR; INTRAVENOUS at 09:37

## 2023-04-17 RX ADMIN — SENNOSIDES AND DOCUSATE SODIUM 1 TABLET: 8.6; 5 TABLET ORAL at 20:03

## 2023-04-17 RX ADMIN — SODIUM CHLORIDE, POTASSIUM CHLORIDE, SODIUM LACTATE AND CALCIUM CHLORIDE: 600; 310; 30; 20 INJECTION, SOLUTION INTRAVENOUS at 02:52

## 2023-04-17 RX ADMIN — FENTANYL CITRATE 50 MCG: 50 INJECTION, SOLUTION INTRAMUSCULAR; INTRAVENOUS at 12:24

## 2023-04-17 RX ADMIN — HYDROXYZINE HYDROCHLORIDE 25 MG: 25 TABLET, FILM COATED ORAL at 23:07

## 2023-04-17 RX ADMIN — Medication 10 MG: at 09:55

## 2023-04-17 RX ADMIN — HYDROMORPHONE HYDROCHLORIDE 0.2 MG: 0.2 INJECTION, SOLUTION INTRAMUSCULAR; INTRAVENOUS; SUBCUTANEOUS at 12:53

## 2023-04-17 RX ADMIN — Medication 4 MCG: at 11:21

## 2023-04-17 RX ADMIN — OXYCODONE HYDROCHLORIDE 10 MG: 10 TABLET ORAL at 20:03

## 2023-04-17 RX ADMIN — SUGAMMADEX 200 MG: 100 INJECTION, SOLUTION INTRAVENOUS at 11:48

## 2023-04-17 RX ADMIN — Medication 50 MG: at 09:33

## 2023-04-17 RX ADMIN — MYCOPHENOLATE MOFETIL 750 MG: 250 CAPSULE ORAL at 18:33

## 2023-04-17 RX ADMIN — Medication 20 MG: at 10:05

## 2023-04-17 RX ADMIN — METHOCARBAMOL 500 MG: 500 TABLET ORAL at 18:33

## 2023-04-17 RX ADMIN — Medication 20 MG: at 11:07

## 2023-04-17 RX ADMIN — HYDROMORPHONE HYDROCHLORIDE 0.5 MG: 1 INJECTION, SOLUTION INTRAMUSCULAR; INTRAVENOUS; SUBCUTANEOUS at 11:16

## 2023-04-17 RX ADMIN — ACETAMINOPHEN 650 MG: 325 TABLET ORAL at 23:07

## 2023-04-17 RX ADMIN — Medication 4 MCG: at 11:32

## 2023-04-17 RX ADMIN — HYDROMORPHONE HYDROCHLORIDE 0.2 MG: 0.2 INJECTION, SOLUTION INTRAMUSCULAR; INTRAVENOUS; SUBCUTANEOUS at 12:45

## 2023-04-17 RX ADMIN — HYDROMORPHONE HYDROCHLORIDE 0.2 MG: 0.2 INJECTION, SOLUTION INTRAMUSCULAR; INTRAVENOUS; SUBCUTANEOUS at 23:09

## 2023-04-17 RX ADMIN — MIDAZOLAM 2 MG: 1 INJECTION INTRAMUSCULAR; INTRAVENOUS at 09:27

## 2023-04-17 RX ADMIN — FENTANYL CITRATE 50 MCG: 50 INJECTION, SOLUTION INTRAMUSCULAR; INTRAVENOUS at 10:04

## 2023-04-17 RX ADMIN — OXYCODONE HYDROCHLORIDE 10 MG: 10 TABLET ORAL at 15:48

## 2023-04-17 RX ADMIN — PROPOFOL 50 MG: 10 INJECTION, EMULSION INTRAVENOUS at 11:48

## 2023-04-17 RX ADMIN — HYDROXYZINE HYDROCHLORIDE 25 MG: 25 TABLET, FILM COATED ORAL at 15:48

## 2023-04-17 RX ADMIN — FENTANYL CITRATE 50 MCG: 50 INJECTION, SOLUTION INTRAMUSCULAR; INTRAVENOUS at 12:32

## 2023-04-17 RX ADMIN — Medication 8 MCG: at 11:11

## 2023-04-17 RX ADMIN — MYCOPHENOLATE MOFETIL 750 MG: 250 CAPSULE ORAL at 08:30

## 2023-04-17 RX ADMIN — MAGNESIUM OXIDE TAB 400 MG (241.3 MG ELEMENTAL MG) 400 MG: 400 (241.3 MG) TAB at 20:03

## 2023-04-17 RX ADMIN — ONDANSETRON 4 MG: 2 INJECTION INTRAMUSCULAR; INTRAVENOUS at 11:18

## 2023-04-17 ASSESSMENT — ACTIVITIES OF DAILY LIVING (ADL)
ADLS_ACUITY_SCORE: 27
ADLS_ACUITY_SCORE: 26
ADLS_ACUITY_SCORE: 27

## 2023-04-17 NOTE — OP NOTE
Transplant Surgery  Operative Note    Preop Dx:  Urine leak  Postop Dx: Urine leak  Procedure: exploratory of kidney transplant with ureteral anastomosis revision and stent placement, open kidney biopsy  Surgeon: Ivy Sepulveda M.D.  ASSISTANT:  Fransico Bautista fellow.. There was no qualified general surgery resident available to assist during this procedure.   Anesthesia: General  EBL: minimal ml  Fluids: n/a  UO: 100  Drains: Adalid-Ritter drain  Specimen: peritransplant fluid.  Complications: None  Findings:  peritransplant fluid collection, small hole at the heel of utereteral anastomosis  Complications: None.    Indication: The patient has urine leak after kidney transplant  After discussing the risks and benefits of surgery and potential complications, the patient provided informed consent.     DETAILS OF PROCEDURE:  The patient was brought to the operating room, placed in a supine position.  Perioperative prophylactic IV antibiotics were given.  Anesthesia was adminisitered. The Abdomen was prepped and draped in the usual sterile fashion.  Time out was performed.    The previous incision was opened. Subcutaneous fluid collection was noted. A sample was taken for fluid creatinine. Additional peritransplant fluid collection was noted and sample was taken from here as well. The kidney appeared to be well perfused. Doppler was performed with biphasic signal throughout kidney. The arterial and venous anastomosis were intact. The ureter was inspected and noted to have a small hole with urine leak from the heel of the ureteral anastomosis into the bladder. The bladder anastomosis was taken down. 6-0 PDS suture was used to redo the ureteral anastomosis. A J-J ureteral stent was placed prior to closing the lateral segment of the ureteral anastomosis. 5-0 PDS was used to create a bladder tunnel. The abdomen was irrigated. A 19 Fr DALTON drain was placed. Fascia was closed with 0 looped PDS followed by 3-0 vicryl. Skin  was closed with 4-0 monocryl suture. Skin glue was applied.     All needle, sponge, and instrument counts were accurate.  The patient tolerated the procedure well without apparent complications and was trasfered to the PACU in good condition.  Faculty was present for critical portions of the procedure.    I was present during the key portions of the procedure, and I was immediately available for the entire procedure. There was no qualified resident available to assist with the entire procedure. The fellow noted above participated as the first assistant in all parts of the dictated procedure and was the primary in the opening and closure with assistance from me as needed.

## 2023-04-17 NOTE — PLAN OF CARE
Patient returned from PACU at 1400 post exploratory abdominal/kidney surgery. Patient is alert and oriented. Vitals are stable, tachycardia 108, 89% on room air. Placed on 2L per NC, patient would not keep capnography on. LR at 100cc/hr, DALTON rutledge. IV Dilaudid 0.2 IV X1, encourage patient to try oral pain medications. Diet advanced to regular. RLQ abdominal incision with dermabond, clean and intact. Mom is  in the room and has been updated on plan of care.

## 2023-04-17 NOTE — ANESTHESIA CARE TRANSFER NOTE
Patient: Caity Horan    Procedure: Procedure(s):  Exploration of return kidney transplant, kidney biopsy, revision of ureteral anastomosis, ureteral stent placement       Diagnosis: Status post kidney transplant [Z94.0]  Diagnosis Additional Information: No value filed.    Anesthesia Type:   No value filed.     Note:    Oropharynx: oropharynx clear of all foreign objects and spontaneously breathing  Level of Consciousness: drowsy  Oxygen Supplementation: nasal cannula  Level of Supplemental Oxygen (L/min / FiO2): 4  Independent Airway: airway patency satisfactory and stable  Dentition: dentition unchanged  Vital Signs Stable: post-procedure vital signs reviewed and stable  Report to RN Given: handoff report given  Patient transferred to: PACU    Handoff Report: Identifed the Patient, Identified the Reponsible Provider, Reviewed the pertinent medical history, Discussed the surgical course, Reviewed Intra-OP anesthesia mangement and issues during anesthesia, Set expectations for post-procedure period and Allowed opportunity for questions and acknowledgement of understanding      Vitals:  Vitals Value Taken Time   /87 04/17/23 1203   Temp     Pulse 116 04/17/23 1207   Resp 24 04/17/23 1207   SpO2 98 % 04/17/23 1207   Vitals shown include unvalidated device data.    Electronically Signed By: TANYA Velazco CRNA  April 17, 2023  12:09 PM

## 2023-04-17 NOTE — PROGRESS NOTES
[facility]   Transplant Nephrology Progress Note  Date of Admission:  4/13/2023  Today's Date: 04/17/2023    Recommendations:  - Concern for urine leak.  Returning to OR today for re-exploration and intra-op biopsy.       Assessment & Plan   # LDKT: Increased creatinine due to unclear etiology but concern for urine leak.      - Baseline Creatinine: ~ TBD   - Proteinuria: Not checked post transplant   - Date DSA Last Checked: Apr/2023      Latest DSA: No DSA at time of transplant   - BK Viremia: Not checked post transplant   - Kidney Tx Biopsy: No   - Transplant Ureteral Stent: No    - Renal ultrasound (4/16) showed mild hydronephrosis and the following fluid collections:    #1: Superior: 1.6 x 3.7 x 3.7 cm. Hypoechoic.                 #2: Subincisional: 2.0 x 5.6 x 9.6 cm. Hypoechoic.     # Immunosuppression: Tacrolimus immediate release (goal 8-10) and Mycophenolate mofetil (dose 750 mg every 12 hours)   - Induction with Recent Transplant:  High Intensity Protocol    - Changes: Not at this time    # Infection Prophylaxis:   - PJP: Sulfa/TMP (Bactrim)  - CMV: Valganciclovir (Valcyte); Patient is CMV IgG Ab discordant (D+/R-) and will continue on Valcyte x 6 months, then check CMV PCR monthly until 12 months post transplant.  - Thrush: None    # Blood Pressure: Controlled;  Goal BP: < 150/90   - Volume status: Euvolemic   - Changes: No    # Elevated Blood Glucose: Glucose generally running ~ 90s and normal with today's labs.   - Management as per primary team.    # Anemia in Chronic Renal Disease: Hgb: Stable, low      MICKEY: No   - Iron studies: High iron saturation    # Mineral Bone Disorder:   - Secondary renal hyperparathyroidism; PTH level: Moderately elevated (301-600 pg/ml)        On treatment: None  - Vitamin D; level: Normal        On supplement: No  - Calcium; level: Low 8.4        On supplement: No  - Phosphorus; level: Normal        On supplement: Yes,   phosphorus 500 mg bid.    # Electrolytes:   - Potassium; level: Normal        On supplement: No  - Magnesium; level: Normal        On supplement: Yes; magnesium oxide 400 mg daily  - Bicarbonate; level: Low 20        On supplement: No    # Acute Abdominal Pain: Patient with acute abdominal pain which started on 4/15.  She reports symptoms similar to previous UC flares.  However, with ASHLEY, concern for urine leak and increased stool burden    # Ulcerative Colitis: Acute abdominal pain, which is similar to UC flare symptoms in the past per patient.   Patient has been maintained on vedolizumab with next dose due 4/22.  Most recent UC flare Dec 2022 with mild chronic active colitis on most recent colonoscopy 2/2022.  Gastroenterology reports (4/16) that ulcerative colitis flare is unlikely at this time. KUB 4/16 with moderate stool burden.      # Transplant History:  Etiology of Kidney Failure: IgA nephropathy  Tx: LDKT  Transplant: 4/13/2023 (Kidney), 12/5/2014 (Kidney)  Donor Type: Living Donor Class:   Crossmatch at time of Tx: negative  DSA at time of Tx: No  Significant changes in immunosuppression: None  Significant transplant-related complications: None    Recommendations were communicated to the primary team verbally.    TANYA Bertrand CNP   Pager: [phone]      Physician Attestation     I saw and evaluated Caity Horan as part of a shared APRN/PA visit.     I personally reviewed the vital signs, medications, labs and imaging.    I personally performed the substantive portion of the medical decision making for this visit - please see the SRINIVASAN's documentation for full details.    Key management decisions made by me and carried out under my direction: Recommend OR return due to concern for urine leak. Ok to perform intraop biopsy. Concern that pain is 2/2 urine leak.    I personally performed the substantive portion of the history for this visit - please see the SRINIVASAN's documentation for full details.  Key  "additional history findings made by me: Pt with pain, increasing Scr, increasing perinephric fluid collection.     Zhang Barrera MD  Date of Service (when I saw the patient): 23      Interval History   Abdominal pain and concern for urine leak.  Returning to OR today for re-exploration and intra-op biopsy.  Ms. Horan's creatinine is 4.48 ( 0545); Trend up.  low urine output.  Other significant labs/tests/vitals: tachycardia, Tmax 100.9 overnight.  No chest pain or shortness of breath.  1+ leg swelling.  Bowel movements are loose.      Review of Systems   4 point ROS was obtained and negative except as noted in the Interval History.    MEDICATIONS:    [Auto Hold] atorvastatin  10 mg Oral Daily     fentaNYL  50 mcg Intravenous Once     [Held by provider] furosemide  40 mg Intravenous BID     [Auto Hold] magnesium oxide  400 mg Oral Daily with lunch     [Auto Hold] mycophenolate  750 mg Oral BID IS     [Held by provider] phosphorus tablet 250 mg  250 mg Oral BID     [Auto Hold] polyethylene glycol  17 g Oral Daily     [Auto Hold] senna-docusate  1 tablet Oral BID     [Auto Hold] sodium chloride (PF)  10 mL Intracatheter Q8H     [Auto Hold] sulfamethoxazole-trimethoprim  1 tablet Oral Once per day on  Sat     [Auto Hold] tacrolimus  3.5 mg Oral BID IS     [Auto Hold] valGANciclovir  450 mg Oral Once per day on u       lactated ringers 100 mL/hr at 23 0252       Physical Exam   Temp  Av.4  F (36.9  C)  Min: 97.9  F (36.6  C)  Max: 100  F (37.8  C)      Pulse  Av.5  Min: 78  Max: 111 Resp  Av.1  Min: 7  Max: 26  SpO2  Av %  Min: 94 %  Max: 100 %    CVP (mmHg): 13 mmHgBP 139/89   Pulse 119   Temp 98.5  F (36.9  C) (Oral)   Resp 18   Ht 1.6 m (5' 3\")   Wt 65.6 kg (144 lb 9.6 oz)   SpO2 95%   BMI 25.61 kg/m     Date 04/15/23 0700 - 23 0659   Shift 7304-6766 9535-7208 0088-2138 24 Hour Total   INTAKE   P.O. 750   750   I.V. 10   10   Shift Total(mL/kg) 760(11.92) "   760(11.92)   OUTPUT   Shift Total(mL/kg)       Weight (kg) 63.78 63.78 63.78 63.78      Admit Weight: 58.2 kg (128 lb 4.9 oz)     GENERAL APPEARANCE: alert in moderate distress  RESP: lungs clear to auscultation - no rales, rhonchi or wheezes  CV: regular rhythm, normal rate, no rub, no murmur  EDEMA: 1+ LE edema bilaterally  ABDOMEN: soft, distended, tender, bowel sounds normal  MS: extremities normal - no gross deformities noted, no evidence of inflammation in joints, no muscle tenderness  SKIN: no rash  TX KIDNEY: mild TTP  DIALYSIS ACCESS:  Right IJ tunneled catheter    Data   All labs reviewed by me.  CMP  Recent Labs   Lab 04/17/23  0545 04/16/23  0926 04/15/23  1318 04/15/23  0956 04/15/23  0436 04/14/23  1009 04/14/23  0812 04/13/23  1253 04/11/23  0832    142  --   --  139  --  138   < > 136   POTASSIUM 3.9 4.0 3.8 3.8 3.9  3.9   < > 3.6  3.6   < > 3.6   CHLORIDE 104 109*  --   --  109*  --  100   < > 88*   CO2 20* 22  --   --  23  --  25   < > 29   ANIONGAP 13 11  --   --  7  --  13   < > 19*   GLC 94 97  --   --  160*  --  174*   < > 122*   BUN 29.1* 20.7*  --   --  13.6  --  23.2*   < > 30.3*   CR 4.48* 2.79*  --   --  1.80*  --  3.89*   < > 8.30*   GFRESTIMATED 13* 22*  --   --  38*  --  15*   < > 6*   MICAH 8.4* 9.1  --   --  8.5*  --  8.7   < > 10.5*   MAG 2.0 1.6*  --   --  1.8  --  2.2   < >  --    PHOS 2.5 1.9*  --   --  2.3*  --  4.6*   < >  --    PROTTOTAL  --   --   --   --   --   --   --   --  8.6*   ALBUMIN  --   --   --   --   --   --   --   --  5.1   BILITOTAL  --   --   --   --   --   --   --   --  0.6   ALKPHOS  --   --   --   --   --   --   --   --  70   AST  --   --   --   --   --   --   --   --  23   ALT  --   --   --   --   --   --   --   --  15    < > = values in this interval not displayed.     CBC  Recent Labs   Lab 04/17/23  0545 04/16/23  0926 04/15/23  1318 04/15/23  0956 04/15/23  0436 04/14/23  1009 04/14/23  0812   HGB 7.3* 8.2* 8.4* 7.5* 7.8*   < > 8.9*   WBC 4.2 4.8   --   --  5.8  --  5.8   RBC 2.44* 2.68*  --   --  2.59*  --  2.91*   HCT 24.8* 27.0*  --   --  26.2*  --  28.8*   * 101*  --   --  101*  --  99   MCH 29.9 30.6  --   --  30.1  --  30.6   MCHC 29.4* 30.4*  --   --  29.8*  --  30.9*   RDW 13.7 13.9  --   --  13.8  --  13.5   * 166  --   --  148*  --  156    < > = values in this interval not displayed.     INR  Recent Labs   Lab 04/17/23  0904   INR 1.27*     ABGNo lab results found in last 7 days.   Urine Studies  Recent Labs   Lab Test 12/05/22  1200 12/19/18  0615   COLOR Light Yellow Yellow   APPEARANCE Clear Clear   URINEGLC 50* Negative   URINEBILI Negative Negative   URINEKETONE Negative Negative   SG 1.012 1.019   UBLD Moderate* Negative   URINEPH 7.5* 5.0   PROTEIN 300* Negative   NITRITE Negative Negative   LEUKEST Negative Negative   RBCU 4* 1   WBCU 8* 2     Recent Labs   Lab Test 01/14/21  1200 12/03/20  0915 06/18/20  0950 11/11/19  0718 07/18/19  0809 06/27/19  0746 12/19/18  0615 11/01/18  0745 02/01/18  0656 07/19/17  0727 12/13/16  0747 05/19/16  0801 12/08/15  1210 06/30/15  0822 01/30/15  0821   UTPG 2.09* 1.36* 1.23* 0.17 0.25* 0.83* 0.12 0.07 0.05 0.10 0.10 0.07 0.08 0.06 0.09     PTH  Recent Labs   Lab Test 04/03/23  1556   PTHI 309*     Iron Studies  Recent Labs   Lab Test 04/03/23  1556 12/22/22  0608 06/27/19  0742 11/01/18  0737 08/23/18  0743 07/26/18  0830 05/17/18  0740 05/03/18  1320 03/29/18  0729 02/01/18  0702 12/01/17  1417 10/12/17  0744 07/25/17  1648   IRON 186* 80 99 113 131 73 19* 19* 29* 36 112 14* 21*    223* 288 262 236* 242 369 321 210* 291 274 423 467*   IRONSAT 71* 36 34 43 55* 30 5* 6* 14* 12* 41 3* 4*   FRANNY 902* 361* 233* 216* 226* 216* 7* 13 90 14 277* 3* 3*       IMAGING:  All imaging studies reviewed by me.

## 2023-04-17 NOTE — PROGRESS NOTES
Brief GI Progress Note     32 year old female with a history of ulcerative colitis on Entyvio,  ESRD 2/2 IgA Nephropathy c/b graft failure. She is now POD 3 from LDKT on 4/13/22 who is now reporting abdominal pain similar to her previous UC flares for which GI was consulted for further evaluation.     We have a low suspicion for a UC flare given normal inflammatory maker and pateint was in endoscopic remission prior to renal transplant in Feb 2023, although some mild right sided histologic inflammation. Although tacrolimus is slightly associated with IBD flares post solid organ transplant, the data is observational.     We recommended CT abdomen to evaluate for other possible acute intraabdominal etiologies of her pain e.g abscess and if unrevealing, we will consider a flex sig for further evaluation.     Patient unfortunately found to have a urine leaking around vesical-ureteral anastomosis and was taken back to the OR for a revision today, transplant thinks this could explain her abdominal pain.     Given a plausible explanation of patient's abdominal pain and our low suspicion for a UC flare at this time, we will defer to the transplant team for continued management.     She should follow up for her outpatient Entyvio as scheduled.       GI will sign off. Thank you for allowing us to participate in the care of this patient. Please feel free to call us with questions or concerns.         Giorgi Law MD  GI Fellow

## 2023-04-17 NOTE — PROGRESS NOTES
Patient removed from OS waitlist after living donor kidney transplant. OS ID [id number].    Donor Has Risk Criteria for Transmission of HIV/HCV/HBV: NA  Recipient Notified of Risk Criteria: NA

## 2023-04-17 NOTE — ANESTHESIA POSTPROCEDURE EVALUATION
Patient: Caity Horan    Procedure: Procedure(s):  Exploration of return kidney transplant, kidney biopsy, revision of ureteral anastomosis, ureteral stent placement       Anesthesia Type:  No value filed.    Note:  Disposition: Inpatient   Postop Pain Control: Uneventful            Sign Out: Well controlled pain   PONV: No   Neuro/Psych: Uneventful            Sign Out: Acceptable/Baseline neuro status   Airway/Respiratory: Uneventful            Sign Out: Acceptable/Baseline resp. status   CV/Hemodynamics: Uneventful            Sign Out: Acceptable CV status; No obvious hypovolemia; No obvious fluid overload   Other NRE: NONE   DID A NON-ROUTINE EVENT OCCUR? No           Last vitals:  Vitals Value Taken Time   /77 04/17/23 1245   Temp 36.9  C (98.5  F) 04/17/23 1201   Pulse 117 04/17/23 1246   Resp 15 04/17/23 1246   SpO2 96 % 04/17/23 1246   Vitals shown include unvalidated device data.    Electronically Signed By: Santana Taylor MD  April 17, 2023  12:47 PM

## 2023-04-17 NOTE — PLAN OF CARE
"/63 (BP Location: Right arm)   Pulse 112   Temp 99  F (37.2  C) (Oral)   Resp 18   Ht 1.6 m (5' 3\")   Wt 65.6 kg (144 lb 9.6 oz)   SpO2 98%   BMI 25.61 kg/m      Patient VSS on RA, afebrile. Abdominal pain related to colitis unmanageable, IV dilaudid q2 hours with some relief. One episode of nausea. Tolerating regular diet with no appetite today. PIV saline locked. HD line CDI. IJ running LR. Voiding spontaneously. UA sent. Extensive bowel regimen administered today; see MAR. Some relief. Abdominal incision CDI. Up independently. Abdominal xray and US completed. Education up to date. Plan for discharge when medically stable with follow-up appt + entyvio. Will continue with POC and notify MD with changes or concerns.      "

## 2023-04-17 NOTE — PROGRESS NOTES
"/83   Pulse 118   Temp 98.9  F (37.2  C) (Oral)   Resp 16   Ht 1.6 m (5' 3\")   Wt 65.6 kg (144 lb 9.6 oz)   SpO2 97%   BMI 25.61 kg/m      Shift: 3079-0517  Neuro: WDL, frustrated with hospital course, tearful, cooperative  Cardio: tachy  Respiratory: WDL  GI/: voiding, small loose BM  Skin: no new issues  Diet: Reg  Labs: drawn  BG: NA  LDA: L PIV, L 3L internal jugular, R HD cath  Mobility: Ind  Pain/nausea/PRNS: IV dilaudid x3, declined oxy/atarax, pt states they don't help and they don't want to take a bunch of pills       "

## 2023-04-17 NOTE — PLAN OF CARE
"Vitals: /89   Pulse 119   Temp 100.1  F (37.8  C) (Oral)   Resp 18   Ht 1.6 m (5' 3\")   Wt 65.6 kg (144 lb 9.6 oz)   SpO2 95%   BMI 25.61 kg/m    Tachycardic, 118.  Endocrine: n/a  Labs: Elevated creatinine  4.48.  Pain: Moderate abdominal pressure/pain.  PRN's: IV Dilaudid 0.2 mg IV.  Diet: NPO.  LDA: TL internal jugular, PIV, LR at 100cc/hr.  GI: Loose BM, declined laxatives.  : Voiding jennifer urine.  Skin: Abdominal incision with dermabond, leaking at the base  Neuro: Anxious, tearful, upset.  Mobility: Up ad matthias.  Education: Unable to participate due to pain.  Plan: OR for exploratory lap at 0930 after renal ultrasound this morning.                          "

## 2023-04-17 NOTE — CONSULTS
"IR was called by TANYA Molina 0834 cancelling renal biopsy. Pt going to the OR emergently. TANYA Gordon        Interventional Radiology Consult Service Note    Patient is on IR schedule 4/17 for a LLQ renal transplant biopsy.   Platelets WNLs, INR pending. COVID neg.    Pt is reportedly NPO but procedure could be done with local only.  Consent will be done prior to procedure.     Please contact the IR [place] RN at [phone] for estimated time of procedure.     Case discussed with Dr. Hernandez from IR and Dr. Hernandez from IR. This is a 32 year old female who has PMH significant for ESRD 2/2 IgA Nephropathy c/b graft failure. She is now s/p from LDKT on 4/13/22 with Dr. Sepulveda. Increasing Cr of unclear etiology. IR consulted for emergent renal biopsy at 0818. Renal pathology not available until after 1000. Dr. Sepulveda will coordinate staff to come review the biopsy sample for adequacy.     Dx labs per TANYA Molina    Expected date of discharge: TBD    Vitals:   /89   Pulse 119   Temp 100.1  F (37.8  C) (Oral)   Resp 18   Ht 1.6 m (5' 3\")   Wt 65.6 kg (144 lb 9.6 oz)   SpO2 95%   BMI 25.61 kg/m      Pertinent Labs:     Lab Results   Component Value Date    WBC 4.2 04/17/2023    WBC 4.8 04/16/2023    WBC 5.8 04/15/2023    WBC 6.3 12/03/2020    WBC 6.6 10/22/2020    WBC 6.5 09/10/2020       Lab Results   Component Value Date    HGB 7.3 04/17/2023    HGB 8.2 04/16/2023    HGB 8.4 04/15/2023    HGB 10.6 01/14/2021    HGB 9.8 12/03/2020    HGB 9.7 10/22/2020       Lab Results   Component Value Date     04/17/2023     04/16/2023     04/15/2023     01/14/2021     12/03/2020     10/22/2020       Lab Results   Component Value Date    INR 1.07 04/03/2023    INR 1.00 12/19/2018    PTT 33 01/02/2023    PTT 49 (H) 12/05/2014       Lab Results   Component Value Date    POTASSIUM 3.9 04/17/2023    POTASSIUM 4.0 04/16/2021        TANYA Rosas " CNP  Interventional Radiology  Pager: [phone]

## 2023-04-17 NOTE — ANESTHESIA PREPROCEDURE EVALUATION
Anesthesia Pre-Procedure Evaluation    Patient: Caity Horan   MRN: [id number] : 1990        Procedure : Procedure(s):  Exploration of return kidney transplant, kidney biopsy, revision of ureteral anastomosis, ureteral stent placement          Past Medical History:   Diagnosis Date     Acute rejection of kidney transplant 2021     Anemia in stage 5 chronic kidney disease (H) 10/27/2014     ESRD (end stage renal disease) on dialysis (H)      HTN (hypertension) 10/27/2014     Hypertension 10/2014     IgA nephropathy     biopsy proven     Metabolic acidosis      Ulcerative pancolitis (H)      Vitamin D deficiency       Past Surgical History:   Procedure Laterality Date     BENCH KIDNEY  2023    Procedure: Bench kidney;  Surgeon: Ivy Sepulveda MD;  Location: [facility] OR     BIOPSY      renal     COLONOSCOPY N/A 2018    Procedure: COMBINED COLONOSCOPY, SINGLE OR MULTIPLE BIOPSY/POLYPECTOMY BY BIOPSY;  EGD/Colonoscopy ;  Surgeon: Kory Massey MD;  Location: [facility] GI     COLONOSCOPY N/A 09/10/2018    Procedure: COMBINED COLONOSCOPY, SINGLE OR MULTIPLE BIOPSY/POLYPECTOMY BY BIOPSY;  Colonoscopy;  Surgeon: Yenifer Doan MD;  Location: [facility] OR     COLONOSCOPY N/A 2/3/2023    Procedure: COLONOSCOPY, WITH BIOPSY;  Surgeon: Yaakov Loving MD;  Location: [facility] GI     EXTRACTION(S) DENTAL       IR CVC TUNNEL PLACEMENT > 5 YRS OF AGE  2022     PERCUTANEOUS BIOPSY KIDNEY Right 2018    Procedure: Right Kidney Biopsy;  Surgeon: Otilio Mar MD;  Location: [facility] OR     TRANSPLANT KIDNEY RECIPIENT LIVING RELATED N/A 2014    Procedure: TRANSPLANT KIDNEY RECIPIENT LIVING RELATED;  Surgeon: Dale Middleton MD;  Location: [facility] OR     WISDOM TOOTH EXTRACTION Bilateral       Allergies   Allergen Reactions     Bumetanide Other (See Comments)     Causes paralysis     Amoxicillin Rash      Social History     Tobacco Use     Smoking status: Never     Smokeless tobacco: Never   Vaping  Use     Vaping status: Not on file   Substance Use Topics     Alcohol use: Not Currently     Alcohol/week: 3.0 - 9.0 standard drinks of alcohol     Types: 1 - 3 Glasses of wine, 1 - 3 Cans of beer, 1 - 3 Shots of liquor per week      Wt Readings from Last 1 Encounters:   04/16/23 65.6 kg (144 lb 9.6 oz)        Anesthesia Evaluation   Pt has had prior anesthetic. Type: General.    No history of anesthetic complications       ROS/MED HX  ENT/Pulmonary:  - neg pulmonary ROS     Neurologic:  - neg neurologic ROS     Cardiovascular:     (+) hypertension-----Previous cardiac testing   Echo: Date: 12/22 Results:  Stress test not performed due to acute anemia.  Global and regional left ventricular function is normal with an EF of 60-65%.  Mild to moderate concentric wall thickening consistent with left ventricular  hypertrophy is present.  No significant valvular abnormalities present.  Global right ventricular function is normal.  Mild dilatation of the aorta is present.  IVC diameter >2.1 cm collapsing <50% with sniff suggests a high RA pressure  estimated at 15 mmHg or greater.  No pericardial effusion is present.  There is no prior study for direct comparison.  Stress Test: Date: Results:    ECG Reviewed: Date: Results:    Cath: Date: Results:      METS/Exercise Tolerance:     Hematologic:     (+) anemia,     Musculoskeletal:  - neg musculoskeletal ROS     GI/Hepatic:     (+) Inflammatory bowel disease,     Renal/Genitourinary:     (+) renal disease, Pt does not require dialysis, Pt has history of transplant, date: 2014, 2023,     Endo:  - neg endo ROS     Psychiatric/Substance Use:  - neg psychiatric ROS     Infectious Disease:  - neg infectious disease ROS     Malignancy:  - neg malignancy ROS     Other:            Physical Exam    Airway        Mallampati: II   TM distance: > 3 FB   Neck ROM: full   Mouth opening: > 3 cm    Respiratory Devices and Support         Dental       (+) Completely normal  teeth      Cardiovascular             Pulmonary                   OUTSIDE LABS:  CBC:   Lab Results   Component Value Date    WBC 4.2 04/17/2023    WBC 4.8 04/16/2023    HGB 7.3 (L) 04/17/2023    HGB 8.2 (L) 04/16/2023    HCT 24.8 (L) 04/17/2023    HCT 27.0 (L) 04/16/2023     (L) 04/17/2023     04/16/2023     BMP:   Lab Results   Component Value Date     04/17/2023     04/16/2023    POTASSIUM 3.9 04/17/2023    POTASSIUM 4.0 04/16/2023    CHLORIDE 104 04/17/2023    CHLORIDE 109 (H) 04/16/2023    CO2 20 (L) 04/17/2023    CO2 22 04/16/2023    BUN 29.1 (H) 04/17/2023    BUN 20.7 (H) 04/16/2023    CR 4.48 (H) 04/17/2023    CR 2.79 (H) 04/16/2023    GLC 94 04/17/2023    GLC 97 04/16/2023     COAGS:   Lab Results   Component Value Date    PTT 33 01/02/2023    INR 1.27 (H) 04/17/2023     POC:   Lab Results   Component Value Date    HCG Negative 09/10/2018    HCGS Negative 04/03/2023     HEPATIC:   Lab Results   Component Value Date    ALBUMIN 5.1 04/11/2023    PROTTOTAL 8.6 (H) 04/11/2023    ALT 15 04/11/2023    AST 23 04/11/2023    ALKPHOS 70 04/11/2023    BILITOTAL 0.6 04/11/2023     OTHER:   Lab Results   Component Value Date    PH 7.44 12/05/2014    A1C 5.3 04/03/2023    MICAH 8.4 (L) 04/17/2023    PHOS 2.5 04/17/2023    MAG 2.0 04/17/2023    CRP <2.9 04/16/2021    SED 23 (H) 04/11/2023       Anesthesia Plan    ASA Status:  3   NPO Status:  ELEVATED Aspiration Risk/Unknown    Anesthesia Type: General.     - Airway: ETT   Induction: Intravenous, Propofol, RSI.   Maintenance: Balanced.   Techniques and Equipment:     - Lines/Monitors: 2nd IV, CVL in situ     Consents    Anesthesia Plan(s) and associated risks, benefits, and realistic alternatives discussed. Questions answered and patient/representative(s) expressed understanding.    - Discussed:     - Discussed with:  Patient      - Extended Intubation/Ventilatory Support Discussed: No.      - Patient is DNR/DNI Status: No         Postoperative  Care    Pain management: IV analgesics, Multi-modal analgesia.   PONV prophylaxis: Ondansetron (or other 5HT-3), Dexamethasone or Solumedrol     Comments:                Santana Taylor MD

## 2023-04-17 NOTE — ANESTHESIA PROCEDURE NOTES
Airway       Patient location during procedure: OR       Procedure Start/Stop Times: 4/17/2023 9:35 AM  Staff -        CRNA: Radha Augustin APRN CRNA       Performed By: CRNA  Consent for Airway        Urgency: elective  Indications and Patient Condition       Indications for airway management: mar-procedural       Induction type:RSI      Final Airway Details       Final airway type: endotracheal airway       Successful airway: ETT - single  Endotracheal Airway Details        ETT size (mm): 7.0       Cuffed: yes       Successful intubation technique: direct laryngoscopy       DL Blade Type: Barron 2       Grade View of Cords: 1       Adjucts: stylet       Position: Right       Measured from: lips       Secured at (cm): 22       Bite block used: None    Post intubation assessment        Placement verified by: capnometry, equal breath sounds and chest rise        Number of attempts at approach: 1       Secured with: pink tape       Ease of procedure: easy       Dentition: Intact and Unchanged    Medication(s) Administered   Medication Administration Time: 4/17/2023 9:35 AM

## 2023-04-17 NOTE — BRIEF OP NOTE
[facility]    Brief Operative Note    Pre-operative diagnosis: Status post kidney transplant [Z94.0]  Post-operative diagnosis Same as pre-operative diagnosis    Procedure: Procedure(s):  Exploration of return kidney transplant, kidney biopsy, revision of ureteral anastomosis, ureteral stent placement  Surgeon: Surgeon(s) and Role:     * Ivy Sepulveda MD - Primary     * Juve Rosales MD - Resident - Assisting     * Austin Rivera MD - Fellow - Assisting     * Fransico Bautista MD - Fellow - Assisting  Anesthesia: General   Estimated Blood Loss: Minimal    Drains: 19 Fr DALTON drain by the vesical-ureteral anastomosis.   Specimens:   ID Type Source Tests Collected by Time Destination   1 : transplanted kidney biopsy Biopsy Kidney, Left SURGICAL PATHOLOGY EXAM Ivy Sepulveda MD 4/17/2023 10:38 AM    A : retroperitoneal fluid Peritoneal Fluid Other ANAEROBIC BACTERIAL CULTURE ROUTINE, CREATININE FLUID, GRAM STAIN, FUNGAL OR YEAST CULTURE ROUTINE, AEROBIC BACTERIAL CULTURE ROUTINE Ivy Sepuvleda MD 4/17/2023 10:02 AM      Findings: No gross abnormalities noted on transplanted kidney. Biopsies of transplanted kidney taken. Urine leaking around vesical-ureteral anastomosis. This was repaired with running 6-0 PDS suture with placement of ureteral stent prior to completion of ureteral-vesical anastomosis. Instillation of methylene blue into patient's rutledge without subsequent demonstration of leakage of contents.   Complications: None.  Implants:   Implant Name Type Inv. Item Serial No.  Lot No. LRB No. Used Action   STENT URETERAL PERCUFLEX PLUS [id number] - [id number] Stent STENT URETERAL PERCUFLEX PLUS [id number]  [employer] [id number] N/A 1 Implanted

## 2023-04-17 NOTE — PROGRESS NOTES
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 She is now s/p LDKT on 4/13/22 with Dr. Sepulveda. Patient returned to OR 4/17/23 for re-exploration and intra-op biopsy d/t concern for urine leak vs rejection which found urine leak at anastomosis and hydronephrotic ureter without signs of obstruction - anastomosis redone and ureteral stent placed.     s/p LDKT 4/13/23 c/b Urine leak: POD# 4. Cr 4.5 (from 2.8).  mL yesterday. RTOR today revealing urine leak as below.    - Intra op biopsy; pathology pending   - DSA pending    - Post-op US pending    Urine leak: Cr increased POD#2 with US demonstrating increased LLQ fluid collection and but patent flow. Cr today 4.5 (from 2.8) despite IVF bolus yesterday. Repeat STAT US patent with perinephric fluid collections slightly larger. Patient returned to OR for re-exploration and intra-op biopsy d/t concern for urine leak vs rejection which found urine leak at anastomosis and hydronephrotic ureter without signs of obstruction - anastomosis redone and ureteral stent placed.     - Continue Wharton catheter x 10 days    Immunosuppression management:   Induction: via high risk induction (cPRA 82, no DSA) with Thymoglobulin 350 mg (6 mg/kg) and steroids.    Maintenance:    -  mg BID   - Tacrolimus 3.5 mg BID. Goal level 8-10.     Neuro:   Acute post op pain: Continue PRN IV dilaudid, PRN oxycodone, PRN Tylenol. Add Lidoderm patches and PRN Robaxin today.     Hematology:   Anemia of chronic disease: Hgb 7.3 (down from 8.2 < 7.5), likely some element of dilution as she is up 7 kg. No e/o active bleeding. Monitor.  Thrombocytopenia:  (from 166), likely r/t Thymo. Monitor.     Cardiorespiratory:   HTN: -130's, hold PTA antihypertensives.     GI:  Resume regular diet post-op.  Hx Ulcerative colitis: GI consulted d/t concern for UC flare with increased diffuse abdominal pain. GI states UC flare unlikely at this time but recommended CT scan with PO contrast to aid in diagnosis; will hold off d/t RTOR.   Constipation: Mild prominent fecal retention on AXR 4/16. Good BM after enema. Continue bowel regimen with Senokot-S, Miralax.     Fluid/Electrolytes:   Hypomagnesemia: Continue PO Mag-Ox 400 mg daily.     : Wharton to remain d/t urine leak and new anastomosis x 10 days    Endocrine: No issues.     Infectious disease:   Fever: Tmax 100.9 yesterday evening. WBC 4.2. Fever may be r/t Thymo, monitor.     Prophylaxis: DVT (mechanical), GI, viral (Valcyte x 6 months; CMV D + / R -), PCP (Bactrim)    Disposition: 7A     SRINIVASAN/Fellow/Resident Provider: Amalia Marie NP [id number]    Faculty: Ivy Sepulveda M.D.  _________________________________________________________________  Transplant History: Admitted 4/13/2023 for LDKT.  4/13/2023 (Kidney), 12/5/2014 (Kidney), Postoperative day: 4     Interval History:   Overnight events: Patient continued to c/o LLQ abdominal pain/abdominal fullness overnight unrelieved with current pain regimen. This morning patient tearful and shaking in pain.     ROS:   A 10-point review of systems was negative except as noted above.    Meds:    atorvastatin  10 mg Oral Daily     [Held by provider] furosemide  40 mg Intravenous BID     magnesium oxide  400 mg Oral Daily with lunch     mycophenolate  750 mg Oral BID IS     [Held by provider] phosphorus tablet 250 mg  250 mg Oral BID     polyethylene glycol  17 g Oral Daily     senna-docusate  1 tablet Oral BID     sodium chloride (PF)  10 mL Intracatheter Q8H     [START ON 4/18/2023] sulfamethoxazole-trimethoprim  1 tablet Oral Once per day on Tue Thu Sat     tacrolimus  4 mg Oral BID IS     [START ON 4/20/2023] valGANciclovir  450 mg Oral Once per day on Mon Thu       Physical Exam:     Admit  "Weight: 58.2 kg (128 lb 4.9 oz)    Current vitals:   /89   Pulse 119   Temp 100.1  F (37.8  C) (Oral)   Resp 18   Ht 1.6 m (5' 3\")   Wt 65.6 kg (144 lb 9.6 oz)   SpO2 95%   BMI 25.61 kg/m      Vital sign ranges:    Temp: 100.1  F (37.8  C) Temp src: Oral BP: 139/89 Pulse: 119   Resp: 18 SpO2: 95 % O2 Device: None (Room air)      General Appearance: in no apparent distress.   Skin: warm and dry  Heart: regular rate and normal rhythm  Lungs: no increased WOB on RA  Abdomen: The abdomen is tender to palpation; LLQ Incision: covered; C/D/I with clear output  : Wharton is removed  Extremities: warm and well perfused with generalized edema  Neurologic: CN II-XII grossly intact without focal neuro deficits    Data:   CMP  Recent Labs   Lab 04/17/23  0545 04/16/23  0926 04/13/23  1253 04/11/23  0832    142   < > 136   POTASSIUM 3.9 4.0   < > 3.6   CHLORIDE 104 109*   < > 88*   CO2 20* 22   < > 29   GLC 94 97   < > 122*   BUN 29.1* 20.7*   < > 30.3*   CR 4.48* 2.79*   < > 8.30*   GFRESTIMATED 13* 22*   < > 6*   MICAH 8.4* 9.1   < > 10.5*   MAG 2.0 1.6*   < >  --    PHOS 2.5 1.9*   < >  --    ALBUMIN  --   --   --  5.1   BILITOTAL  --   --   --  0.6   ALKPHOS  --   --   --  70   AST  --   --   --  23   ALT  --   --   --  15    < > = values in this interval not displayed.     CBC  Recent Labs   Lab 04/17/23  0545 04/16/23  0926   HGB 7.3* 8.2*   WBC 4.2 4.8   * 166     COAGS  Recent Labs   Lab 04/17/23  0904   INR 1.27*      Urinalysis  Recent Labs   Lab Test 12/05/22  1200 01/14/21  1200 12/03/20  0915 06/27/19  0746 12/19/18  0615   COLOR Light Yellow  --   --   --  Yellow   APPEARANCE Clear  --   --   --  Clear   URINEGLC 50*  --   --   --  Negative   URINEBILI Negative  --   --   --  Negative   URINEKETONE Negative  --   --   --  Negative   SG 1.012  --   --   --  1.019   UBLD Moderate*  --   --   --  Negative   URINEPH 7.5*  --   --   --  5.0   PROTEIN 300*  --   --   --  Negative   NITRITE " Negative  --   --   --  Negative   LEUKEST Negative  --   --   --  Negative   RBCU 4*  --   --   --  1   WBCU 8*  --   --   --  2   UTPG  --  2.09* 1.36*   < > 0.12    < > = values in this interval not displayed.     Virology:  CMV DNA Quantitation Specimen   Date Value Ref Range Status   07/25/2017 Plasma, EDTA anticoagulant  Final     Hepatitis C Antibody   Date Value Ref Range Status   04/13/2023 Nonreactive Nonreactive Final   05/26/2015  NR Final    Nonreactive   Assay performance characteristics have not been established for newborns,   infants, and children       BK Virus Result   Date Value Ref Range Status   06/27/2019 BK Virus DNA Not Detected BKNEG^BK Virus DNA Not Detected copies/mL Final   12/19/2018 BK Virus DNA Not Detected BKNEG^BK Virus DNA Not Detected copies/mL Final   11/01/2018 BK Virus DNA Not Detected BKNEG^BK Virus DNA Not Detected copies/mL Final   02/01/2018 BK Virus DNA Not Detected BKNEG^BK Virus DNA Not Detected copies/mL Final   07/19/2017 BK Virus DNA Not Detected BKNEG copies/mL Final   12/13/2016 BK Virus DNA Not Detected BKNEG copies/mL Final   05/19/2016 BK Virus DNA Not Detected BKNEG copies/mL Final   10/06/2015 BK Virus DNA Not Detected BKNEG copies/mL Final   08/25/2015 BK Virus DNA Not Detected BKNEG copies/mL Final   06/30/2015 BK Virus DNA Not Detected BKNEG copies/mL Final   04/06/2015 BK Virus DNA Not Detected BKNEG copies/mL Final   02/09/2015 BK Virus DNA Not Detected BKNEG copies/mL Final   02/02/2015 BK Virus DNA Not Detected BKNEG copies/mL Final   01/05/2015 BK Virus DNA Not Detected BKNEG copies/mL Final

## 2023-04-18 PROBLEM — K59.00 CONSTIPATION: Status: ACTIVE | Noted: 2023-04-18

## 2023-04-18 PROBLEM — E83.39 HYPOPHOSPHATEMIA: Status: ACTIVE | Noted: 2023-04-18

## 2023-04-18 PROBLEM — D50.9 ANEMIA, IRON DEFICIENCY: Status: ACTIVE | Noted: 2017-10-13

## 2023-04-18 PROBLEM — G89.18 ACUTE POST-OPERATIVE PAIN: Status: ACTIVE | Noted: 2023-04-18

## 2023-04-18 PROBLEM — T86.898 URINE LEAK FROM TRANSPLANTED URETER: Status: ACTIVE | Noted: 2023-04-18

## 2023-04-18 LAB
ANION GAP SERPL CALCULATED.3IONS-SCNC: 10 MMOL/L (ref 7–15)
BASOPHILS # BLD MANUAL: 0 10E3/UL (ref 0–0.2)
BASOPHILS NFR BLD MANUAL: 0 %
BLD PROD TYP BPU: NORMAL
BLOOD COMPONENT TYPE: NORMAL
BUN SERPL-MCNC: 14 MG/DL (ref 6–20)
CALCIUM SERPL-MCNC: 8.2 MG/DL (ref 8.6–10)
CHLORIDE SERPL-SCNC: 107 MMOL/L (ref 98–107)
CODING SYSTEM: NORMAL
CREAT SERPL-MCNC: 1.7 MG/DL (ref 0.51–0.95)
CROSSMATCH: NORMAL
DEPRECATED HCO3 PLAS-SCNC: 22 MMOL/L (ref 22–29)
DONOR IDENTIFICATION: NORMAL
DSA COMMENTS: NORMAL
DSA PRESENT: NO
DSA TEST METHOD: NORMAL
ELLIPTOCYTES BLD QL SMEAR: SLIGHT
EOSINOPHIL # BLD MANUAL: 0.1 10E3/UL (ref 0–0.7)
EOSINOPHIL NFR BLD MANUAL: 2 %
ERYTHROCYTE [DISTWIDTH] IN BLOOD BY AUTOMATED COUNT: 13.5 % (ref 10–15)
GFR SERPL CREATININE-BSD FRML MDRD: 40 ML/MIN/1.73M2
GLUCOSE SERPL-MCNC: 94 MG/DL (ref 70–99)
HCT VFR BLD AUTO: 23.5 % (ref 35–47)
HGB BLD-MCNC: 6.9 G/DL (ref 11.7–15.7)
INT SUB RESULT: NORMAL
INTERF SUBSTANCE: NORMAL
INTSUB TEST METHOD: NORMAL
LYMPHOCYTES # BLD MANUAL: 0 10E3/UL (ref 0.8–5.3)
LYMPHOCYTES NFR BLD MANUAL: 0 %
MAGNESIUM SERPL-MCNC: 1.5 MG/DL (ref 1.7–2.3)
MCH RBC QN AUTO: 30.3 PG (ref 26.5–33)
MCHC RBC AUTO-ENTMCNC: 29.4 G/DL (ref 31.5–36.5)
MCV RBC AUTO: 103 FL (ref 78–100)
MONOCYTES # BLD MANUAL: 0.2 10E3/UL (ref 0–1.3)
MONOCYTES NFR BLD MANUAL: 5 %
NEUTROPHILS # BLD MANUAL: 3.7 10E3/UL (ref 1.6–8.3)
NEUTROPHILS NFR BLD MANUAL: 93 %
NRBC # BLD AUTO: 0 10E3/UL
NRBC BLD MANUAL-RTO: 1 %
ORGAN: NORMAL
PHOSPHATE SERPL-MCNC: 1.7 MG/DL (ref 2.5–4.5)
PLAT MORPH BLD: ABNORMAL
PLATELET # BLD AUTO: 155 10E3/UL (ref 150–450)
POTASSIUM SERPL-SCNC: 4.2 MMOL/L (ref 3.4–5.3)
RBC # BLD AUTO: 2.28 10E6/UL (ref 3.8–5.2)
RBC MORPH BLD: ABNORMAL
SA 1 CELL: NORMAL
SA 1 TEST METHOD: NORMAL
SA 2 CELL: NORMAL
SA 2 TEST METHOD: NORMAL
SA1 HI RISK ABY: NORMAL
SA1 MOD RISK ABY: NORMAL
SA2 HI RISK ABY: NORMAL
SA2 MOD RISK ABY: NORMAL
SODIUM SERPL-SCNC: 139 MMOL/L (ref 136–145)
TACROLIMUS BLD-MCNC: 17.7 UG/L (ref 5–15)
TME LAST DOSE: ABNORMAL H
TME LAST DOSE: ABNORMAL H
UNACCEPTABLE ANTIGENS: NORMAL
UNIT ABO/RH: NORMAL
UNIT NUMBER: NORMAL
UNIT STATUS: NORMAL
UNIT TYPE ISBT: 600
UNOS CPRA: 82
WBC # BLD AUTO: 4 10E3/UL (ref 4–11)
ZZZINT SUB COMMENTS: NORMAL
ZZZSA 1  COMMENTS: NORMAL
ZZZSA 2 COMMENTS: NORMAL

## 2023-04-18 PROCEDURE — 250N000012 HC RX MED GY IP 250 OP 636 PS 637: Performed by: SURGERY

## 2023-04-18 PROCEDURE — 250N000013 HC RX MED GY IP 250 OP 250 PS 637: Performed by: PHARMACIST

## 2023-04-18 PROCEDURE — 250N000013 HC RX MED GY IP 250 OP 250 PS 637: Performed by: NURSE PRACTITIONER

## 2023-04-18 PROCEDURE — 250N000013 HC RX MED GY IP 250 OP 250 PS 637: Performed by: SURGERY

## 2023-04-18 PROCEDURE — 250N000012 HC RX MED GY IP 250 OP 636 PS 637: Performed by: NURSE PRACTITIONER

## 2023-04-18 PROCEDURE — 99233 SBSQ HOSP IP/OBS HIGH 50: CPT | Mod: 24 | Performed by: INTERNAL MEDICINE

## 2023-04-18 PROCEDURE — 258N000003 HC RX IP 258 OP 636: Performed by: PHYSICIAN ASSISTANT

## 2023-04-18 PROCEDURE — 36592 COLLECT BLOOD FROM PICC: CPT | Performed by: SURGERY

## 2023-04-18 PROCEDURE — 250N000013 HC RX MED GY IP 250 OP 250 PS 637: Performed by: PHYSICIAN ASSISTANT

## 2023-04-18 PROCEDURE — 120N000011 HC R&B TRANSPLANT UMMC

## 2023-04-18 PROCEDURE — 83735 ASSAY OF MAGNESIUM: CPT | Performed by: SURGERY

## 2023-04-18 PROCEDURE — 250N000013 HC RX MED GY IP 250 OP 250 PS 637

## 2023-04-18 PROCEDURE — 85007 BL SMEAR W/DIFF WBC COUNT: CPT | Performed by: SURGERY

## 2023-04-18 PROCEDURE — 84295 ASSAY OF SERUM SODIUM: CPT | Performed by: SURGERY

## 2023-04-18 PROCEDURE — 250N000011 HC RX IP 250 OP 636: Performed by: NURSE PRACTITIONER

## 2023-04-18 PROCEDURE — 80197 ASSAY OF TACROLIMUS: CPT | Performed by: SURGERY

## 2023-04-18 PROCEDURE — 84100 ASSAY OF PHOSPHORUS: CPT | Performed by: SURGERY

## 2023-04-18 PROCEDURE — 85027 COMPLETE CBC AUTOMATED: CPT | Performed by: SURGERY

## 2023-04-18 RX ORDER — SULFAMETHOXAZOLE AND TRIMETHOPRIM 400; 80 MG/1; MG/1
1 TABLET ORAL DAILY
Status: DISCONTINUED | OUTPATIENT
Start: 2023-04-18 | End: 2023-04-20 | Stop reason: HOSPADM

## 2023-04-18 RX ORDER — TACROLIMUS 1 MG/1
2 CAPSULE ORAL
Status: DISCONTINUED | OUTPATIENT
Start: 2023-04-19 | End: 2023-04-20

## 2023-04-18 RX ORDER — VALGANCICLOVIR 450 MG/1
450 TABLET, FILM COATED ORAL DAILY
Status: DISCONTINUED | OUTPATIENT
Start: 2023-04-18 | End: 2023-04-20 | Stop reason: HOSPADM

## 2023-04-18 RX ORDER — BISACODYL 5 MG
5-10 TABLET, DELAYED RELEASE (ENTERIC COATED) ORAL 2 TIMES DAILY
Status: DISCONTINUED | OUTPATIENT
Start: 2023-04-18 | End: 2023-04-20 | Stop reason: HOSPADM

## 2023-04-18 RX ORDER — MAGNESIUM SULFATE HEPTAHYDRATE 40 MG/ML
2 INJECTION, SOLUTION INTRAVENOUS ONCE
Status: COMPLETED | OUTPATIENT
Start: 2023-04-18 | End: 2023-04-18

## 2023-04-18 RX ORDER — SIMETHICONE 80 MG
80 TABLET,CHEWABLE ORAL EVERY 6 HOURS PRN
Status: DISCONTINUED | OUTPATIENT
Start: 2023-04-18 | End: 2023-04-20 | Stop reason: HOSPADM

## 2023-04-18 RX ORDER — FUROSEMIDE 10 MG/ML
40 INJECTION INTRAMUSCULAR; INTRAVENOUS ONCE
Status: COMPLETED | OUTPATIENT
Start: 2023-04-18 | End: 2023-04-18

## 2023-04-18 RX ADMIN — HYDROMORPHONE HYDROCHLORIDE 0.2 MG: 0.2 INJECTION, SOLUTION INTRAMUSCULAR; INTRAVENOUS; SUBCUTANEOUS at 04:56

## 2023-04-18 RX ADMIN — VALGANCICLOVIR 450 MG: 450 TABLET, FILM COATED ORAL at 09:00

## 2023-04-18 RX ADMIN — OXYCODONE HYDROCHLORIDE 10 MG: 10 TABLET ORAL at 01:36

## 2023-04-18 RX ADMIN — MAGNESIUM OXIDE TAB 400 MG (241.3 MG ELEMENTAL MG) 400 MG: 400 (241.3 MG) TAB at 12:12

## 2023-04-18 RX ADMIN — SENNOSIDES AND DOCUSATE SODIUM 1 TABLET: 8.6; 5 TABLET ORAL at 08:59

## 2023-04-18 RX ADMIN — SODIUM PHOSPHATE, DIBASIC, ANHYDROUS, POTASSIUM PHOSPHATE, MONOBASIC, AND SODIUM PHOSPHATE, MONOBASIC, MONOHYDRATE 500 MG: 852; 155; 130 TABLET, COATED ORAL at 21:30

## 2023-04-18 RX ADMIN — VALGANCICLOVIR 450 MG: 450 TABLET, FILM COATED ORAL at 09:30

## 2023-04-18 RX ADMIN — SODIUM CHLORIDE, POTASSIUM CHLORIDE, SODIUM LACTATE AND CALCIUM CHLORIDE: 600; 310; 30; 20 INJECTION, SOLUTION INTRAVENOUS at 00:35

## 2023-04-18 RX ADMIN — ACETAMINOPHEN 650 MG: 325 TABLET ORAL at 03:56

## 2023-04-18 RX ADMIN — MAGNESIUM SULFATE IN WATER 2 G: 40 INJECTION, SOLUTION INTRAVENOUS at 09:23

## 2023-04-18 RX ADMIN — OXYCODONE HYDROCHLORIDE 5 MG: 5 TABLET ORAL at 06:21

## 2023-04-18 RX ADMIN — HYDROXYZINE HYDROCHLORIDE 25 MG: 25 TABLET, FILM COATED ORAL at 03:56

## 2023-04-18 RX ADMIN — BISACODYL 5 MG: 5 TABLET, COATED ORAL at 09:25

## 2023-04-18 RX ADMIN — MAGNESIUM HYDROXIDE 30 ML: 400 SUSPENSION ORAL at 14:36

## 2023-04-18 RX ADMIN — VALGANCICLOVIR HYDROCHLORIDE 450 MG: 450 TABLET ORAL at 08:00

## 2023-04-18 RX ADMIN — TACROLIMUS 3.5 MG: 1 CAPSULE ORAL at 08:59

## 2023-04-18 RX ADMIN — MYCOPHENOLATE MOFETIL 750 MG: 250 CAPSULE ORAL at 17:43

## 2023-04-18 RX ADMIN — BISACODYL 10 MG: 5 TABLET, COATED ORAL at 18:08

## 2023-04-18 RX ADMIN — METHOCARBAMOL 500 MG: 500 TABLET ORAL at 20:21

## 2023-04-18 RX ADMIN — SIMETHICONE 80 MG: 80 TABLET, CHEWABLE ORAL at 21:30

## 2023-04-18 RX ADMIN — OXYCODONE HYDROCHLORIDE 10 MG: 10 TABLET ORAL at 20:21

## 2023-04-18 RX ADMIN — MYCOPHENOLATE MOFETIL 750 MG: 250 CAPSULE ORAL at 08:59

## 2023-04-18 RX ADMIN — OXYCODONE HYDROCHLORIDE 5 MG: 5 TABLET ORAL at 12:12

## 2023-04-18 RX ADMIN — FUROSEMIDE 40 MG: 10 INJECTION, SOLUTION INTRAMUSCULAR; INTRAVENOUS at 09:22

## 2023-04-18 RX ADMIN — SODIUM PHOSPHATE, DIBASIC, ANHYDROUS, POTASSIUM PHOSPHATE, MONOBASIC, AND SODIUM PHOSPHATE, MONOBASIC, MONOHYDRATE 500 MG: 852; 155; 130 TABLET, COATED ORAL at 09:23

## 2023-04-18 RX ADMIN — METHOCARBAMOL 500 MG: 500 TABLET ORAL at 00:35

## 2023-04-18 RX ADMIN — ACETAMINOPHEN 650 MG: 325 TABLET ORAL at 12:12

## 2023-04-18 RX ADMIN — SULFAMETHOXAZOLE AND TRIMETHOPRIM 1 TABLET: 400; 80 TABLET ORAL at 09:23

## 2023-04-18 ASSESSMENT — ACTIVITIES OF DAILY LIVING (ADL)
ADLS_ACUITY_SCORE: 26

## 2023-04-18 NOTE — DISCHARGE SUMMARY
[facility]    Discharge Summary  Transplant Surgery    Date of Admission:  4/13/2023  Date of Discharge:  4/20/2023  Discharging Provider: Amalia Marie NP / Jen Brice MD    Discharge Diagnoses   Principal Problem:    ESRD (end stage renal disease) (H)  Active Problems:    Kidney replaced by transplant    Immunosuppressed status (H)    Hypomagnesemia    Anemia, iron deficiency    Ulcerative pancolitis with rectal bleeding (H)    Awaiting organ transplant    Encounter for pregnancy test    Urine leak from transplanted ureter    Acute post-operative pain    Constipation    Hypophosphatemia    Procedure/Surgery Information    Procedure date:  04/13/23    Preoperative diagnosis:  Chronic renal failure due to IgA nephropathy    Postoperative diagnosis:  Same    Procedure:  1. Left kidney  Re- transplant,  Living, Left iliac fossa, without vascular reconstruction. A J-J ureteral stent was not placed.  2. Kidney allograft preparation on Back Table    Surgeon:  ELLIE SEPULVEDA    Fellow/Assistant:  Fransico Bautista, fellow,  There was no qualified resident to assist with this procedure.    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 She is now s/p LDKT on 4/13/22 with Dr. Sepulveda. Patient returned to OR 4/17/23 for re-exploration and intra-op biopsy d/t  concern for urine leak vs rejection which found urine leak at anastomosis and hydronephrotic ureter without signs of obstruction - anastomosis redone and ureteral stent placed.     Hospital Course    s/p LDKT 4/13/23 c/b Urine leak: Cr improving after ureteral anastomosis revision and stent placement 4/17/23 as below. Last US 4/17 patent with resolved fluid collections. Intra-op biopsy with pathology negative for rejection. Cr 1.4 on day of discharge.    Urine leak: Cr increased POD#2 with US demonstrating increased LLQ fluid collection but patent flow. POD#3 Cr 4.5 (from 2.8) despite IVF resuscitation. Repeat STAT US patent with perinephric fluid collections slightly larger. Patient returned to OR 4/17/23 for re-exploration and intra-op biopsy d/t concern for urine leak vs rejection which found urine leak at anastomosis and hydronephrotic ureter without signs of obstruction - anastomosis redone and ureteral stent placed.     - Continue Wharton catheter x 10 days   - Drain to be removed post-Wharton removal    Immunosuppressed status 2/2 medications:   Induction: via high risk induction (cPRA 82, no DSA) with Thymoglobulin 350 mg (6 mg/kg) and steroids.  Maintenance:    -  mg BID   - Hold Tacrolimus 4/20 evening and start 1 mg BID tomorrow. Goal level 8-10.   Infection prophylaxis: viral (Valcyte x 6 months), PCP (Bactrim indefinitely)    Transplant coordinator: Eleanor Lund [phone]  Donor type:  Live  DSA at time of transplant:  No DSA 4/17/23  Ureteral stent: Yes  CMV:  Donor + / Recipient -  EBV:  Donor + / Recipient +  Thymoglobulin: 350 mg (6 mg/kg)    Neuro:   Acute post op pain: Continue PRN oxycodone (weaning), PRN Tylenol, and PRN Robaxin. Heat/Ice encouraged.  Anxiety: Small prescription of PRN hydroxyzine provided on discharge.     Hematology:   Anemia of chronic disease: Hgb ~7, stable. Continue to monitor outpatient.      Cardiorespiratory:   HTN: -130's, hold PTA antihypertensives.       GI:   Hx Ulcerative colitis: GI consulted d/t concern for UC flare with increased diffuse abdominal pain. GI states UC flare unlikely at this time. HOLD Entyvio infusion x1 month post-transplant per Transplant nephrology.   Constipation: Continue bowel regimen with dulcolax BID per patient preference.     Fluid/Electrolytes:   Hypomagnesemia: Continue PO Mag-Ox 800 mg daily.   Hypophosphatemia: Continue PO Phos 500 mg TID.      Discharge Disposition   Discharged to home   Condition at discharge: Stable    Pending Results   These results will be followed up by transplant team  Unresulted Labs Ordered in the Past 30 Days of this Admission     Date and Time Order Name Status Description    4/18/2023  8:09 AM Prepare red blood cells (unit) Preliminary     4/17/2023 10:03 AM Peritoneal Fluid Aerobic Bacterial Culture Routine Preliminary     4/17/2023 10:03 AM Fungal or Yeast Culture Routine Preliminary     4/17/2023 10:03 AM Anaerobic Bacterial Culture Routine Preliminary     4/17/2023  9:56 AM Prepare red blood cells (unit) Preliminary     4/17/2023  9:56 AM Prepare red blood cells (unit) Preliminary         Final pathology results:   Case Report   Surgical Pathology Report                         Case: [id number]                                   Authorizing Provider:  Ivy Sepulveda MD       Collected:           04/17/2023 10:38 AM           Ordering Location:     [facility]     Received:            04/17/2023 10:47 AM                                  [address] [place]                                                             Pathologist:           Osei Drummond MD                                                             Specimen:    Kidney, Left, transplanted kidney biopsy                                                   Final Diagnosis   Kidney allograft biopsy with no evidence of acute cellular or humoral rejection      Primary Care Physician   Physician No Ref-Primary    Physical Exam    Temp: 97.8  F (36.6  C) Temp src: Oral BP: 124/81 Pulse: 86   Resp: 16 SpO2: 96 % O2 Device: None (Room air)    Vitals:    04/18/23 0436 04/19/23 0800 04/20/23 0622   Weight: 67 kg (147 lb 11.2 oz) 65 kg (143 lb 3.2 oz) 62.9 kg (138 lb 10.7 oz)     Vital Signs with Ranges  Temp:  [97.5  F (36.4  C)-98.6  F (37  C)] 97.8  F (36.6  C)  Pulse:  [82-89] 86  Resp:  [14-16] 16  BP: (116-127)/(74-90) 124/81  SpO2:  [95 %-97 %] 96 %  I/O last 3 completed shifts:  In: 800 [P.O.:800]  Out: 3220 [Urine:3200; Drains:20]    Constitutional: in no apparent distress  Eyes: EOMI  ENT: Right internal jugular line now removed; dressing in place  Respiratory: unlabored on RA  Cardiovascular: perfused  GI: abdomen soft, flat. Incision LLQ closed with liquid bandage and open to air. ADLTON with minimal thin serosanguinous output.   Genitourinary: Wharton with clear jennifer UOP  Skin: warm, dry  Musculoskeletal: generalized edema  Neurologic: A&Ox4  Neuropsychiatric: anxious    Consultations This Hospital Stay   SOT MEDICATION HISTORY IP PHARMACY CONSULT  SOCIAL WORK IP CONSULT  PHARMACY IP CONSULT  NUTRITION SERVICES ADULT IP CONSULT  NEPHROLOGY KIDNEY/PANCREAS TRANSPLANT ADULT IP CONSULT  CARE MANAGEMENT / SOCIAL WORK IP CONSULT  GI LUMINAL ADULT IP CONSULT  INTERVENTIONAL RADIOLOGY ADULT/PEDS IP CONSULT  INTERVENTIONAL RADIOLOGY ADULT/PEDS IP CONSULT    Time Spent on this Encounter   I have spent greater than 30 minutes on this discharge.    Discharge Orders   Discharge Medications   Current Discharge Medication List      START taking these medications    Details   acetaminophen (TYLENOL) 325 MG tablet Take 2 tablets (650 mg) by mouth every 4 hours as needed for other (For optimal non-opioid multimodal pain management to improve pain control.)  Qty: 60 tablet, Refills: 0    Associated Diagnoses: Acute post-operative pain      bisacodyl (DULCOLAX) 5 MG EC tablet Take 1-2 tablets (5-10 mg) by mouth 2 times daily as needed for  constipation  Qty: 60 tablet, Refills: 0    Associated Diagnoses: Kidney replaced by transplant; Drug-induced constipation      hydrOXYzine (ATARAX) 25 MG tablet Take 1 tablet (25 mg) by mouth every 4 hours as needed for anxiety or other (adjuvant pain)  Qty: 15 tablet, Refills: 0    Associated Diagnoses: Acute post-operative pain; Anxiety      magnesium oxide (MAG-OX) 400 MG tablet Take 2 tablets (800 mg) by mouth 2 times daily  Qty: 120 tablet, Refills: 2    Associated Diagnoses: Hypomagnesemia      methocarbamol (ROBAXIN) 500 MG tablet Take 1 tablet (500 mg) by mouth 4 times daily as needed for muscle spasms  Qty: 20 tablet, Refills: 0    Associated Diagnoses: Acute post-operative pain      ondansetron (ZOFRAN ODT) 4 MG ODT tab Take 1 tablet (4 mg) by mouth every 6 hours as needed for nausea or vomiting  Qty: 10 tablet, Refills: 0    Associated Diagnoses: Kidney replaced by transplant      oxyCODONE (ROXICODONE) 5 MG tablet Take 1 tablet (5 mg) by mouth every 6 hours as needed for moderate pain  Qty: 15 tablet, Refills: 0    Associated Diagnoses: Acute post-operative pain      phosphorus tablet 250 mg (PHOSPHA 250 NEUTRAL) 250 MG per tablet Take 2 tablets (500 mg) by mouth 3 times daily  Qty: 60 tablet, Refills: 0    Associated Diagnoses: Hypophosphatemia      simethicone (MYLICON) 80 MG chewable tablet Take 1 tablet (80 mg) by mouth every 6 hours as needed for cramping  Qty: 15 tablet, Refills: 0    Associated Diagnoses: Drug-induced constipation      sulfamethoxazole-trimethoprim (BACTRIM) 400-80 MG tablet Take 1 tablet by mouth daily  Qty: 30 tablet, Refills: 11    Associated Diagnoses: Kidney replaced by transplant; Immunosuppressed status (H)      tacrolimus (GENERIC EQUIVALENT) 0.5 MG capsule Take 1 capsule (0.5 mg) by mouth 2 times daily To have available for dose adjustments per transplant team. Discharge dose = 1 mg BID.  Qty: 60 capsule, Refills: 11    Associated Diagnoses: Kidney replaced by  transplant; Immunosuppressed status (H)      valGANciclovir (VALCYTE) 450 MG tablet Take 2 tablets (900 mg) by mouth daily  Qty: 60 tablet, Refills: 5    Associated Diagnoses: Kidney replaced by transplant; Immunosuppressed status (H)         CONTINUE these medications which have CHANGED    Details   mycophenolate (GENERIC EQUIVALENT) 250 MG capsule Take 3 capsules (750 mg) by mouth 2 times daily  Qty: 180 capsule, Refills: 11    Associated Diagnoses: Kidney replaced by transplant; Immunosuppressed status (H)      tacrolimus (GENERIC EQUIVALENT) 1 MG capsule Take 1 capsule (1 mg) by mouth 2 times daily Hold 4/20 evening dose. Start 1 mg BID tomorrow.  Qty: 60 capsule, Refills: 11    Associated Diagnoses: Kidney replaced by transplant; Immunosuppressed status (H)         CONTINUE these medications which have NOT CHANGED    Details   levonorgestrel (MIRENA) 20 MCG/DAY IUD 1 each by Intrauterine route Replacement every 7 years. Placed 1/2019      vedolizumab (ENTYVIO) 60 MG/ML injection Inject 300 mg into the vein once every six weeks         STOP taking these medications       calcium carbonate (TUMS) 500 MG chewable tablet Comments:   Reason for Stopping:         carvedilol (COREG) 12.5 MG tablet Comments:   Reason for Stopping:         chlorthalidone (HYGROTON) 25 MG tablet Comments:   Reason for Stopping:         Cyanocobalamin (B-12) 1000 MCG TBCR Comments:   Reason for Stopping:         norethindrone (AYGESTIN) 5 MG tablet Comments:   Reason for Stopping:         vitamin D3 (CHOLECALCIFEROL) 50 mcg (2000 units) tablet Comments:   Reason for Stopping:                  Reason for your hospital stay    You were admitted for a kidney transplant. You returned to the OR post-operatively and were found to have a urine leak. A ureteral stent and drain were placed. You are discharging home in stable condition with close follow up.     Activity    Your activity upon discharge: Walk at least four times a day, lift no  greater than 10 pounds for 6-8 weeks from the time of surgery.  No driving while taking narcotics or 3 weeks after surgery.     [facility] Follow-up and recommended labs and tests    [facility] FOLLOW UP:     1. [facility]: Over the next 2 days you will be seen in the [facility] (ph. [phone], option 3). Your labs will be drawn at the beginning of your appointment. DO NOT take your medications prior to having labs drawn. Please bring all your medications with you from home to take after labs are drawn.     2. Follow up with Transplant NP in clinic on 4/27/23 for Wharton catheter removal and DALTON drain evaluation.    3. Follow up with Dr. Sepulveda in Transplant Clinic in 1-2 weeks.     4. Follow up with Transplant Nephrologist as scheduled.     5.  Ureteral stent removal in 4-6 weeks, to be scheduled by Transplant Coordinator. If a  does not contact you for this, please contact your transplant coordinator.     6. Follow up with your primary care provider in ~8 weeks. Patient to schedule.     Remember to always bring an updated medication list to all appointments.        Call your Transplant Coordinator ([phone]) with questions about Transplant Center appointment scheduling.     LABS:     CBC, BMP, magnesium, phosphorus, tacrolimus level to be drawn daily while in ATC, then every Monday and Thursday by home health care nurse if arranged, or at an outpatient lab.     Monitor and record    Monitor blood pressure and weight daily.     Monitor Wharton catheter and DALTON drain output.     When to contact your care team    WHEN TO CONTACT YOUR  COORDINATOR:     Transplant Coordinator [phone]     Notify your coordinator if you have pain over your kidney, increased redness or drainage from your incision, fever greater than 100F, decreased urine output or new or increased amount of blood in urine.     Notify your coordinator if you have issues with your Wharton  catheter or DALTON drain.     Notify your coordinator immediately if you are ever unable to take your immunosuppressive medications for any reason.     If you have URGENT concerns after office hours, please call the hospital switchboard at [phone] and ask to have the organ transplant nurse on-call paged. If you have a life-threatening emergency, go to the nearest emergency room.     Wound care and dressings    Instructions to care for your wound at home: If you have staples in place, they will be removed in 3 weeks after operation. Wash incision daily with soap and water. Do not soak or scrub.     Tubes and drains    You are going home with the following tubes or drains: Wharton catheter and DALTON drain. Tube cares per hospital or home care instructions. Please monitor and record output.     Diet    Diet recommendations post-transplant: Heart healthy dietary habits long term (low saturated/trans fat, low sodium). High protein diet x 8 weeks. Practice food safety precautions.         Data   Most Recent 3 CBC's:  Recent Labs   Lab Test 04/20/23  0609 04/19/23  0827 04/18/23  0649   WBC 3.2* 3.4* 4.0   HGB 7.3* 7.0* 6.9*    99 103*    199 155      Most Recent 3 BMP's:  Recent Labs   Lab Test 04/20/23  0609 04/19/23  0827 04/18/23  0649    138 139   POTASSIUM 4.5 4.2 4.2   CHLORIDE 104 105 107   CO2 25 26 22   BUN 7.6 8.3 14.0   CR 1.38* 1.30* 1.70*   ANIONGAP 10 7 10   MICAH 8.4* 8.2* 8.2*   GLC 90 100* 94     Most Recent 2 LFT's:  Recent Labs   Lab Test 04/11/23  0832 04/03/23  1556   AST 23 19   ALT 15 9*   ALKPHOS 70 75   BILITOTAL 0.6 0.5     Most Recent INR's and Anticoagulation Dosing History:  Anticoagulation Dose History         Latest Ref Rng & Units 12/5/2014 12/19/2018 12/5/2022 12/21/2022   Recent Dosing and Labs   INR 0.85 - 1.15 1.25   1.00   1.07   1.07         1/2/2023 4/3/2023 4/17/2023   Recent Dosing and Labs   INR 1.12   1.07   1.27                Most Recent 3 Troponin's:No lab  results found.  Most Recent Cholesterol Panel:  Recent Labs   Lab Test 06/30/15  0816   CHOL 172   LDL 82   HDL 71   TRIG 96     Most Recent 6 Bacteria Isolates From Any Culture (See EPIC Reports for Culture Details):  Recent Labs   Lab Test 12/19/18  0615   CULT No growth     Most Recent TSH, T4 and A1c Labs:  Recent Labs   Lab Test 04/03/23  1556   A1C 5.3     Results for orders placed or performed during the hospital encounter of 04/13/23   XR Chest Port 1 View     Value    Radiologist flags (Urgent)     Left internal jugular central catheter projects over    Narrative    XR CHEST PORT 1 VIEW  4/13/2023 6:33 PM      HISTORY: eval central line    COMPARISON: Chest x-ray 4/11/2023, 12/21/2022.    FINDINGS:   Right internal jugular dual lumen catheter terminates over the  cavoatrial junction. Left internal jugular central catheter is curved  over the brachiocephalic confluence directed over the left subclavian  vein.    Trachea is midline. Cardiac silhouette is within normal limits.  Pulmonary vasculature is distinct. No focal airspace opacity, pleural  effusion, pneumothorax.    Bony structures appear intact.      Impression    IMPRESSION:   1.  Left internal jugular central catheter is curved over the  brachiocephalic confluence directed over the left subclavian vein.  2.  No acute cardiopulmonary abnormality.    [Urgent Result: Left internal jugular central catheter projects over  the left subclavian vein]    Finding was identified on 4/13/2023 6:38 PM.     Dr. Bautista was contacted by Dr. Singh at 4/13/2023 6:39 PM and  verbalized understanding of the urgent finding.     I have personally reviewed the examination and initial interpretation  and I agree with the findings.    CHINO HUI MD         SYSTEM ID:  [id number]   US Renal Transplant with Doppler    Narrative    EXAM: US Renal Transplant,  4/13/2023 6:54 PM.    COMPARISON: None.    HISTORY: single artery and vein. Postop day 0.    TECHNIQUE:   Grey-scale, color Doppler and spectral flow analysis.    FINDINGS:  The transplant kidney is located left lower quadrant, and measures  10.3 cm.Parenchyma is of normal thickness and echogenicity. No focal  lesions. Mild hydronephrosis. No perinephric fluid collection. 2  nonobstructing stones in the inferior pole measuring up to 6 and 4 mm  respectively.    Renal artery flow:   218/43 cm/sec peak systolic at hilum.  245/70 3 cm/s mid.  361/93 peak systolic at anastomosis.  Arcuate artery resistive indices (lower to upper): 0.64, 0.54, 0.54    Renal Vein Flow:  108 cm/s at hilum.   208 cm/s at mid.  256 cm/s at anastomosis.    Iliac artery flow:  210 cm/s peak systolic above anastomosis.  159 cm/s peak systolic below anastomosis.    Iliac vein flow:  Patent above and below the anastomosis.    Bladder collapsed with Wharton catheter.      Impression    IMPRESSION:   1.  Left lower quadrant renal transplant with patent vasculature and  elevated arterial velocities across the anastomosis measuring up to  361 cm/s. Findings may represent edema in the immediate postoperative  setting. Attention on follow-up.  2.  Mild hydronephrosis. No perinephric fluid collection.  3.  Nonobstructing stones in the inferior pole measuring up to 6 mm.    GUIDELINES:  For native kidneys:  Diagnostic criteria suggestive of > 60% diameter stenosis  Renal artery:  Peak systolic velocity > 180 cm/s  RAR > 3.5  Turbulent flow    Arcuate artery Resistive Index Normal < 0.7    For renal transplants:  Renal transplant peak systolic velocity criteria range from > 180  cm/s to > 400 cm/s  Source suggests using:  Peak systolic velocity > or = 300 cm/s  Spectral broadening  Intraparenchymal arterial acceleration time > or = 0.1 s     Source: Fiona SHINE, Mayra HOFFMAN, Devon PERSAUD, et al. Screening for  transplant renal artery stenosis: Ultrasound-based stenosis  probability stratification. American Journal of Roentgenology.  2017;209: 6181-4121.  [id number]    I have personally reviewed the examination and initial interpretation  and I agree with the findings.    CHINO HUI MD         SYSTEM ID:  [id number]   US Renal Transplant with Doppler    Narrative    EXAMINATION: US RENAL TRANSPLANT,  4/14/2023 8:54 AM     COMPARISON: Ultrasound renal transplant with Doppler 4/13/2023    HISTORY: decreased UOP, POD1 from kidney transplant    TECHNIQUE:  Grey-scale, color Doppler and spectral flow analysis.    FINDINGS:  The transplant kidney is located in left lower quadrant, and measures  11.0 cm. Parenchyma is of normal thickness and echogenicity. No focal  lesions. Mild hydronephrosis. There is a 5.8 cm peritransplant fluid  collection within the left lower quadrant.    Renal artery flow:   110 cm/sec peak systolic at hilum.  150 cm/s peak systolic at anastomosis.  Arcuate artery resistive indices (upper to lower): 0.54, 0.61, 0.6    Renal Vein Flow:  71 cm/s at hilum.   126 cm/s at anastomosis.    Iliac artery flow:  79 cm/s peak systolic above anastomosis.  81 cm/s peak systolic below anastomosis.    Iliac vein flow:  Patent above and below the anastomosis.    Bladder: Partially decompressed, Wharton catheter is in place.      Impression    IMPRESSION:  1. Left lower quadrant renal transplant with patent vasculature with  normal arterial velocities across the anastomosis measuring up to 150  cm/s.  2. Mild hydronephrosis. Previously detected inferior pole calculus is  not seen now.  3. 5.8 cm peritransplant fluid collection within the left lower  quadrant.     I have personally reviewed the examination and initial interpretation  and I agree with the findings.    SYLVIA SORENSON MD         SYSTEM ID:  [id number]   US Renal Transplant with Doppler    Narrative    EXAMINATION: US RENAL TRANSPLANT,  4/16/2023 12:25 PM     COMPARISON: 4/14/2023, 4/13/2023    HISTORY: Cr increased, assess with doppler, also assess for  perinephric collections/urine  leak    TECHNIQUE:  Grey-scale, color Doppler and spectral flow analysis.    FINDINGS:  The transplant kidney is located in the left lower quadrant, and  measures 11.3 cm. Parenchyma is of normal thickness and echogenicity.  Mild hydronephrosis. There is a fluid collection at the incision site  in the left lower quadrant measuring 10.3 x 4.2 x 1.3 cm, increased  compared to prior. Additionally there is a small fluid collection  superior lateral to the transplant kidney measuring 2.1 x 2.2 x 1.6  cm. At the midline of the pelvis there is a suspected heterogenous  complex fluid collection, although direct measurements are difficult.    Renal artery flow:   81 cm/sec peak systolic at hilum.  118 cm/sec peak systolic at anastomosis.  Arcuate artery resistive indices (upper to lower): 0.63, 0.64, 0.58    Renal Vein Flow:  52 cm/sec at hilum.   168 cm/sec at anastomosis.    Iliac artery flow:  110 cm/sec peak systolic above anastomosis.  98 cm/sec peak systolic below anastomosis.    Iliac vein flow:  Patent above and below the anastomosis.    Bladder: Decompressed.      Impression    IMPRESSION:   1. Left lower quadrant renal transplant with patent Doppler  evaluation.  2. Mild hydronephrosis.  3. Increased size of the left lower quadrant fluid collection at the  incision site now measuring up to 10.3 cm. There is an additional  small peritransplant fluid collection along the superior lateral  aspect of the transplant kidney measuring up to 2.2 cm.  4. At the midline of the pelvis there is a suspected complex  peritransplant collection, although direct measurements are difficult  given the location.    I have personally reviewed the examination and initial interpretation  and I agree with the findings.    SYLVIA SORENSON MD         SYSTEM ID:  [id number]   XR Abdomen Port 1 View    Narrative    Portable abdomen 1 view    INDICATION: Assess stool burden    COMPARISON: None    FINDINGS: IUD in the pelvis. Surgical clips in the  pelvis and left  lower abdomen. Mild prominent retention of fecal material in the  colon.       Impression    Impression: Mild prominent fecal retention in the large intestine. IUD  in the pelvis.    HAYLIE GARCIA MD         SYSTEM ID:  [id number]   US Renal Transplant with Doppler    Narrative    ULTRASOUND RENAL TRANSPLANT WITH DOPPLER  4/17/2023 8:14 AM    CLINICAL HISTORY: Increasing creatinine and decreasing urine output.  Left lower quadrant renal transplant placed 4/13/2023.    COMPARISONS: Ultrasound renal transplant with Doppler 4/16/2023.    REFERRING PROVIDER: LORELEI BENITEZ    TECHNIQUE: Left lower quadrant renal transplant evaluated with  grayscale, color Doppler, and Doppler waveform ultrasound. Transplant  renal vasculature evaluated with color Doppler and Doppler waveform  ultrasound.    Cine clips through the renal transplant were saved in the patient's  record.    FINDINGS:   LEFT LOWER QUADRANT RENAL TRANSPLANT:         Fluid collections:             #1: Superior: 1.6 x 3.7 x 3.7 cm. Hypoechoic. No internal  vascular flow by color Doppler imaging.            #2: Subincisional: 2.0 x 5.6 x 9.6 cm. Hypoechoic. No  internal vascular flow by color Doppler imaging.         Renal artery:            Hilum: 98/23 cm/s            Anastomosis: 97/19 cm/s         Renal artery - iliac ratio: 0.85         Renal vein:            Hilum: 47 cm/s            Anastomosis: 104 cm/s         Length: 11.9 cm         Arcuate artery resistive index (superior / mid / inferior): 0.67  / 0.66 / 0.63 (previously 0.63 / 0.64 / 0.58)         Parenchyma: Infundibuli remain dilated and calyces remain  relatively sharp. No mass or stone.     Iliac artery:       Above anastomosis: 114 cm/s       Below anastomosis: 106 cm/s    Iliac vein:       Above anastomosis: 101 cm/s       Below anastomosis: 168 cm/s    Bladder: Partially distended.      Impression    IMPRESSION:   1. No renal transplant arterial or venous stenosis  demonstrated.    2. Arcuate artery resistive indices remain normal.    3. Mild hydronephrosis, similar to previous.    4. Perinephric collections slightly larger. Differential includes  hematoma, seroma, and urinoma.    GUIDELINES:  For native kidneys:       Diagnostic criteria suggestive of > 60% diameter stenosis             Renal artery:             Peak systolic velocity > 180 cm/s             RAR > 3.5             Turbulent flow         Arcuate artery Resistive Index Normal < 0.7    For renal transplants:       Renal transplant peak systolic velocity criteria range from > 180  cm/s to > 400 cm/s       Source suggests using:            Peak systolic velocity > or = 300 cm/s            Spectral broadening            Intraparenchymal arterial acceleration time > or = 0.1 s     Source: Fiona G, Mayra DALTON, Devon PERSAUD, et al. Screening for  transplant renal artery stenosis: Ultrasound-based stenosis  probability stratification. American Journal of Roentgenology.  2017;209: 8372-4976. [id number]    LAURO NATARAJAN MD         SYSTEM ID:  [id number]   US Renal Transplant with Doppler    Narrative    ULTRASOUND RENAL TRANSPLANT WITH DOPPLER  4/17/2023 1:48 PM    CLINICAL HISTORY: Left lower quadrant renal transplant placed  4/13/2023. Patient returned to OR today for ureteral reimplantation.  Reevaluate.    COMPARISONS: Ultrasound renal transplant with Doppler 4/17/2023 @   07:50    REFERRING PROVIDER: CE DENNY    TECHNIQUE: Left lower quadrant renal transplant evaluated with  grayscale, color Doppler, and Doppler waveform ultrasound. Transplant  renal vasculature evaluated with color Doppler and Doppler waveform  ultrasound.    Cine clips through the renal transplant were saved in the patient's  record.    FINDINGS:   LEFT LOWER QUADRANT RENAL TRANSPLANT:         Fluid collections:             #1: Superior to kidney: 1.0 x 1.7 x 1.6 cm. Hypoechoic. No  internal vascular flow by color Doppler imaging.             #2: Subincisional: Previous collection not demonstrated.         Renal artery:            Hilum: 90/27 cm/s            Anastomosis: 146/27 cm/s         Renal artery - iliac ratio: 1.38         Renal vein:            Hilum: 62 cm/s            Anastomosis: 133 cm/s         Renal vein - iliac ratio: 1.82          Length: 11.1 cm         Arcuate artery resistive index (superior / mid / inferior): 0.69  / 0.68 / 0.58 (previously 0.67 / 0.66 / 0.63)         Parenchyma: Ureteral stent in place. Infundibuli remain dilated.  Calyces remain relatively sharp..    Iliac artery:       Above anastomosis: 106/29 cm/s       Below anastomosis: 146/0 cm/s    Iliac vein:       Above anastomosis: 73 cm/s       Below anastomosis: 115 cm/s    Bladder: Wharton catheter in place.      Impression    IMPRESSION:   1. No renal transplant arterial or venous stenosis demonstrated.    2. Arcuate artery resistive indices remain normal.    3. Mild hydronephrosis, similar to previous. Ureteral stent in place.    4. Subincisional collection no longer demonstrated. Collection  superior to the kidney slightly smaller.    GUIDELINES:  For native kidneys:       Diagnostic criteria suggestive of > 60% diameter stenosis             Renal artery:             Peak systolic velocity > 180 cm/s             RAR > 3.5             Turbulent flow         Arcuate artery Resistive Index Normal < 0.7    For renal transplants:       Renal transplant peak systolic velocity criteria range from > 180  cm/s to > 400 cm/s       Source suggests using:            Peak systolic velocity > or = 300 cm/s            Spectral broadening            Intraparenchymal arterial acceleration time > or = 0.1 s     Source: Fiona SHINE, Mayra DALTON, Devon PERSAUD, et al. Screening for  transplant renal artery stenosis: Ultrasound-based stenosis  probability stratification. American Journal of Roentgenology.  2017;209: 2334-9123. [id number]    LAURO NATARAJAN MD         SYSTEM  ID:  [id number]

## 2023-04-18 NOTE — PROGRESS NOTES
"Pt complaining about increasing gas discomfort during shift change @ 2300. Paged on-call resident, Campos, for simethicone. Campos doesn't think simthicone will be effective since she has not passed any gas since surgery on 4/17.     Informed pt about reasons for not ordering simethicone, pt wanting provider to contact GI for input in relieving gas pain. Writer mentioned other alternatives such as pain management, walking, suppository. Pt  Stated walking and suppository will not be effective given her UC. Writer repaged on-call about patient concerns but given common s/s post surgery no other interventions ordered. Writer suggested oral ducolax and MOM to help stimulate bowels, pt refused and stated she would wait until morning. Pt confident s/s are UC vs. Post-surgical s/s. Pt now asking for fluids to be decreased to help relieve abdominal discomfort, pt confused why orders have not been placed for Entyvio.    0105  \"7A 211 Marline, A    pt now requesting for IVF to be decreased as she thinks this is increasing her pain. pt also refused oral Dulcolax and MOM.    Sushila Fletcher, [phone]\"    0120  Provider decreased IVF to 50 ml/hr, will have GI consult pt in the AM  "

## 2023-04-18 NOTE — PROGRESS NOTES
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 She is now s/p LDKT on 4/13/22 with Dr. Sepulveda. Patient returned to OR 4/17/23 for re-exploration and intra-op biopsy d/t concern for urine leak vs rejection which found urine leak at anastomosis and hydronephrotic ureter without signs of obstruction - anastomosis redone and ureteral stent placed.     s/p LDKT 4/13/23 c/b Urine leak: POD# 5. Cr 1.7 (down from 3.3), improving after ureteral anastomosis revision and stent placement yesterday. UOP 2.4L yesterday, DALTON with small clear serosanguinous output. Last US 4/17 patent with resolved fluid collections.   - Intra op biopsy; preliminary pathology negative for rejection   - 4/17 No DSA    Urine leak: Cr increased POD#2 with US demonstrating increased LLQ fluid collection and but patent flow. POD#3 Cr 4.5 (from 2.8) despite IVF bolus yesterday. Repeat STAT US patent with perinephric fluid collections slightly larger. Patient returned to OR for re-exploration and intra-op biopsy d/t concern for urine leak vs rejection which found urine leak at anastomosis and hydronephrotic ureter without signs of obstruction - anastomosis redone and ureteral stent placed.     - Continue Wharton catheter x 10 days    Immunosuppression management:   Induction: via high risk induction (cPRA 82, no DSA) with Thymoglobulin 350 mg (6 mg/kg) and steroids.  Maintenance:    -  mg BID   - Tacrolimus 3.5 mg BID. Goal level 8-10.     Neuro:   Acute post op pain: Continue PRN oxycodone, PRN Tylenol, Lidoderm patches, and PRN Robaxin. Stop IV dilaudid. Heat/Ice encouraged.  Anxiety: PRN hydroxyzine added overnight.      Hematology:   Anemia of chronic disease: Hgb 6.9 (down from 7.3), likely some element of dilution as she is up 9  kg. No e/o active bleeding. Plan for diuresis today and recheck Hgb tomorrow.    Cardiorespiratory:   HTN: -130's, hold PTA antihypertensives.     GI: Regular diet  Hx Ulcerative colitis: GI consulted d/t concern for UC flare with increased diffuse abdominal pain. GI states UC flare unlikely at this time but recommended CT scan with PO contrast to aid in diagnosis; will hold off d/t improvement in symptoms.  Constipation: Mild prominent fecal retention on AXR 4/16. Good BM after enema. Continue bowel regimen with Senokot-S, PRN dulcolax.     Fluid/Electrolytes:   Hypomagnesemia: Continue PO Mag-Ox 400 mg daily. Give additional 2 grams IV today.  Hypophosphatemia: Phos 1.7, start PO Phos BID.   Hypervolemia: Up 9 kg from admission. Give IV Lasix 40 mg x1. Goal net negative 2L today.     : Wharton to remain d/t urine leak and new anastomosis x 10 days    Endocrine: No issues.     Infectious disease: No issues.     Prophylaxis: DVT (mechanical), GI, viral (Valcyte x 6 months; CMV D + / R -), PCP (Bactrim)    Disposition: 7A     SRINIVASAN/Fellow/Resident Provider: Amalia Marie NP [id number]    Faculty: Ivy Sepulveda MD  _________________________________________________________________  Transplant History: Admitted 4/13/2023 for LDKT.  4/13/2023 (Kidney), 12/5/2014 (Kidney), Postoperative day: 5     Interval History:   Overnight events: Patient c/o gas pain and inability to pass flatus overnight; nonpharmacologic interventions recommended. This morning Hgb 6.9 (from 7.3), no e/o active bleeding, asymptomatic. Pain is best controlled with IV dilaudid, encouraged PO options in anticipation of discharge. Denies N/V. Tolerating water. Ambulated x1 overnight.     ROS:   A 10-point review of systems was negative except as noted above.    Meds:    atorvastatin  10 mg Oral Daily     furosemide  40 mg Intravenous Once     lidocaine  1-2 patch Transdermal Q24H     lidocaine   Transdermal Q8H AMADA     magnesium oxide  400 mg Oral  "Daily with lunch     magnesium sulfate  2 g Intravenous Once     mycophenolate  750 mg Oral BID IS     phosphorus tablet 250 mg  500 mg Oral BID     polyethylene glycol  17 g Oral Daily     senna-docusate  1 tablet Oral BID     sodium chloride (PF)  10 mL Intracatheter Q8H     sulfamethoxazole-trimethoprim  1 tablet Oral Daily     tacrolimus  3.5 mg Oral BID IS     valGANciclovir  450 mg Oral Daily       Physical Exam:     Admit Weight: 58.2 kg (128 lb 4.9 oz)    Current vitals:   /75 (BP Location: Left arm)   Pulse 93   Temp 98.4  F (36.9  C) (Oral)   Resp 16   Ht 1.6 m (5' 3\")   Wt 67 kg (147 lb 11.2 oz)   SpO2 93%   BMI 26.16 kg/m      Vital sign ranges:    Temp: 98.4  F (36.9  C) Temp src: Oral BP: 127/75 Pulse: 93   Resp: 16 SpO2: 93 % O2 Device: None (Room air) Oxygen Delivery: 1 LPM    General Appearance: in no apparent distress.   Skin: warm and dry  Heart: regular rate and normal rhythm  Lungs: no increased WOB on RA  Abdomen: The abdomen is tender to palpation; LLQ Incision: covered; C/D/I with clear output  : Wharton is removed  Extremities: warm and well perfused with generalized edema  Neurologic: CN II-XII grossly intact without focal neuro deficits    Data:   CMP  Recent Labs   Lab 04/18/23  0649 04/17/23  1613 04/17/23  1002 04/17/23  0545    139  --  137   POTASSIUM 4.2 4.2  --  3.9   CHLORIDE 107 107  --  104   CO2 22 20*  --  20*   GLC 94 100*  --  94   BUN 14.0 24.9*  --  29.1*   CR 1.70* 3.26*  --  4.48*   GFRESTIMATED 40* 18*  --  13*   MICAH 8.2* 8.2*  --  8.4*   MAG 1.5*  --   --  2.0   PHOS 1.7*  --   --  2.5   FCREAT  --   --  31.3  --      CBC  Recent Labs   Lab 04/18/23  0649 04/17/23  0545   HGB 6.9* 7.3*   WBC 4.0 4.2    146*     COAGS  Recent Labs   Lab 04/17/23  0904   INR 1.27*      Urinalysis  Recent Labs   Lab Test 12/05/22  1200 01/14/21  1200 12/03/20  0915 06/27/19  0746 12/19/18  0615   COLOR Light Yellow  --   --   --  Yellow   APPEARANCE Clear  --   -- "   --  Clear   URINEGLC 50*  --   --   --  Negative   URINEBILI Negative  --   --   --  Negative   URINEKETONE Negative  --   --   --  Negative   SG 1.012  --   --   --  1.019   UBLD Moderate*  --   --   --  Negative   URINEPH 7.5*  --   --   --  5.0   PROTEIN 300*  --   --   --  Negative   NITRITE Negative  --   --   --  Negative   LEUKEST Negative  --   --   --  Negative   RBCU 4*  --   --   --  1   WBCU 8*  --   --   --  2   UTPG  --  2.09* 1.36*   < > 0.12    < > = values in this interval not displayed.     Virology:  CMV DNA Quantitation Specimen   Date Value Ref Range Status   07/25/2017 Plasma, EDTA anticoagulant  Final     Hepatitis C Antibody   Date Value Ref Range Status   04/13/2023 Nonreactive Nonreactive Final   05/26/2015  NR Final    Nonreactive   Assay performance characteristics have not been established for newborns,   infants, and children       BK Virus Result   Date Value Ref Range Status   06/27/2019 BK Virus DNA Not Detected BKNEG^BK Virus DNA Not Detected copies/mL Final   12/19/2018 BK Virus DNA Not Detected BKNEG^BK Virus DNA Not Detected copies/mL Final   11/01/2018 BK Virus DNA Not Detected BKNEG^BK Virus DNA Not Detected copies/mL Final   02/01/2018 BK Virus DNA Not Detected BKNEG^BK Virus DNA Not Detected copies/mL Final   07/19/2017 BK Virus DNA Not Detected BKNEG copies/mL Final   12/13/2016 BK Virus DNA Not Detected BKNEG copies/mL Final   05/19/2016 BK Virus DNA Not Detected BKNEG copies/mL Final   10/06/2015 BK Virus DNA Not Detected BKNEG copies/mL Final   08/25/2015 BK Virus DNA Not Detected BKNEG copies/mL Final   06/30/2015 BK Virus DNA Not Detected BKNEG copies/mL Final   04/06/2015 BK Virus DNA Not Detected BKNEG copies/mL Final   02/09/2015 BK Virus DNA Not Detected BKNEG copies/mL Final   02/02/2015 BK Virus DNA Not Detected BKNEG copies/mL Final   01/05/2015 BK Virus DNA Not Detected BKNEG copies/mL Final     BK Virus DNA copies/mL   Date Value Ref Range Status   04/16/2023  Not Detected Not Detected copies/mL Final

## 2023-04-18 NOTE — PLAN OF CARE
"/86 (BP Location: Left arm)   Pulse 102   Temp 99.3  F (37.4  C) (Oral)   Resp 16   Ht 1.6 m (5' 3\")   Wt 65.6 kg (144 lb 9.6 oz)   SpO2 97%   BMI 25.61 kg/m      Shift: 7976-2029  Isolation Status: none  VS: stable on 1L O2 NC, afebrile  Neuro: Aox4  Behaviors: calm, cooperative, sometimes anxious   BG: none  Labs: urine protein  Respiratory: WDL  Cardiac: tachycardic = low 100s  Pain/Nausea: abd pain, denies nausea  PRN: oxycodone q4 administered x1, tylenol q4 administered x2, robaxin q6 administered x1, atarax q4 administered x1, dilaudid IV q4 administered x1  Diet: regualr  IV Access: left triple lumen internal jugular, right PIV  Infusion(s): LR @100  Lines/Drains: left DALTON, rutledge  GI/: No BM/no flatus. [place]ledge cath = q2 I/O  Skin: LLQ incision, left DALTON  Mobility: SBA  Plan: Strict I/Os q2 hrs      "

## 2023-04-18 NOTE — PROGRESS NOTES
[facility]   Transplant Nephrology Progress Note  Date of Admission:  4/13/2023  Today's Date: 04/18/2023    Recommendations:  -Abdominal pain appeared to be related to urine leak. RTOR yesterday for repair. GI evaluated and recommended no Entyvio as GI symptoms appear to be related to surgery.  - Ureteral stent will need to be scheduled for removal  - Received 40mg IV furosemide this am. Ok to repeat dose this afternoon to make negative 2L today  - Recommend IR remove RIJ tunneled line under sedation while inpatient  -Decrease tacrolimus dose    Assessment & Plan   # LDKT: Decreased creatinine due to urine leak. Now s/p repair. Creatinine down to 1.7 from 3.26.     - Baseline Creatinine: ~ TBD   - Proteinuria: Not checked post transplant   - Date DSA Last Checked: Apr/2023      Latest DSA: No DSA at time of transplant   - BK Viremia: Not checked post transplant   - Kidney Tx Biopsy: Apr 17, 2023; Result: preliminary negative for rejection   - Transplant Ureteral Stent: Yes    -Double J stent placed due to urine leak on 4/17/23.  Remove 4-6 weeks post transplant.    # Urine leak:   -RTOR on 4/17/23 due to increase in Scr with large pernephric collection found on U/S   -Fluid creatinine elevated c/w urine leak.    -Stent placed, rutledge replaced. Rutledge duration per transplant surgery    # Immunosuppression: Tacrolimus immediate release (goal 8-10) and Mycophenolate mofetil (dose 750 mg every 12 hours)   - Induction with Recent Transplant:  High Intensity Protocol    - Changes: Yes - decrease tacrolimus dose for elevated level    # Infection Prophylaxis:   - PJP: Sulfa/TMP (Bactrim)  - CMV: Valganciclovir (Valcyte); Patient is CMV IgG Ab discordant (D+/R-) and will continue on Valcyte x 6 months, then check CMV PCR monthly until 12 months post transplant.    # Blood Pressure: Controlled;  Goal BP: < 150/90   - Volume status: Mildly hypervolemic   - Changes: Yes - recommend  another dose of furosemide 40mg IV later today if not net negative 2L     # Elevated Blood Glucose: Glucose generally running ~ 90s and normal with today's labs.   - Management as per primary team.    # Anemia in Chronic Renal Disease: Hgb: Trend down      MICKEY: No   - Iron studies: High iron saturation    # Mineral Bone Disorder:   - Secondary renal hyperparathyroidism; PTH level: Moderately elevated (301-600 pg/ml)        On treatment: None  - Vitamin D; level: Normal        On supplement: No  - Calcium; level: Low     On supplement: No  - Phosphorus; level: Normal        On supplement: Yes,  phosphorus 500 mg bid.    # Electrolytes:   - Potassium; level: Normal        On supplement: No  - Magnesium; level: Low normal        On supplement: Yes; magnesium oxide 400 mg daily  - Bicarbonate; level: Low        On supplement: No    # Acute Abdominal Pain:    -Improved after repair of urine leak on 4/17/23   -Minimize narcotics, bowel regimen    # Ulcerative Colitis:    -Acute abdominal pain, which is similar to UC flare symptoms in the past per patient.   Patient has been maintained on vedolizumab with next dose due 4/22.  Most recent UC flare Dec 2022 with mild chronic active colitis on most recent colonoscopy 2/2022.  Gastroenterology reports (4/16) that ulcerative colitis flare is unlikely at this time. KUB 4/16 with moderate stool burden.      # Transplant History:  Etiology of Kidney Failure: IgA nephropathy  Tx: LDKT  Transplant: 4/13/2023 (Kidney), 12/5/2014 (Kidney)  Donor Type: Living Donor Class:   Crossmatch at time of Tx: negative  DSA at time of Tx: No  Significant changes in immunosuppression: None  Significant transplant-related complications: None    Recommendations were communicated to the primary team verbally.    Irena Bishop MD  [phone]    Physician Attestation   I, Zhang Barrera MD, personally examined and evaluated this patient.  I discussed the patient with the resident/fellow and care  "team, and agree with the assessment and plan of care as documented in the note on 23 .      I personally reviewed vital signs, medications, labs, and imaging.  Zhang Barrera MD  Date of Service (when I saw the patient): 23          Interval History   Abdominal pain overnight.   OR : \"Subcutaneous fluid collection was noted. A sample was taken for fluid creatinine. Additional peritransplant fluid collection was noted and sample was taken from here as well. Kidney appeared to be well perfused. The ureter was inspected and noted to have a small hole with urine leak from the heel of the ureteral anastomosis into the bladder.\"  Ms. Horan's creatinine is 1.7 (); Trend down.  UOP 2440 ml/24h. 1000 ml so far today  Other significant labs/tests/vitals: tachycardia, afebrile overnight.  No chest pain or shortness of breath.  1+ leg swelling.      Review of Systems   4 point ROS was obtained and negative except as noted in the Interval History.    MEDICATIONS:    atorvastatin  10 mg Oral Daily     [Held by provider] furosemide  40 mg Intravenous BID     lidocaine  1-2 patch Transdermal Q24H     lidocaine   Transdermal Q8H AMADA     magnesium oxide  400 mg Oral Daily with lunch     magnesium sulfate  2 g Intravenous Once     mycophenolate  750 mg Oral BID IS     phosphorus tablet 250 mg  500 mg Oral BID     polyethylene glycol  17 g Oral Daily     senna-docusate  1 tablet Oral BID     sodium chloride (PF)  10 mL Intracatheter Q8H     sulfamethoxazole-trimethoprim  1 tablet Oral Daily     tacrolimus  3.5 mg Oral BID IS     valGANciclovir  450 mg Oral Daily       lactated ringers 50 mL/hr at 23 0600       Physical Exam   Temp  Av.4  F (36.9  C)  Min: 97.9  F (36.6  C)  Max: 100  F (37.8  C)      Pulse  Av.5  Min: 78  Max: 111 Resp  Av.1  Min: 7  Max: 26  SpO2  Av %  Min: 94 %  Max: 100 %    CVP (mmHg): 13 mmHgBP 127/75 (BP Location: Left arm)   Pulse 93   Temp 98.4  F (36.9  C) " "(Oral)   Resp 16   Ht 1.6 m (5' 3\")   Wt 67 kg (147 lb 11.2 oz)   SpO2 93%   BMI 26.16 kg/m     Date 04/15/23 0700 - 04/16/23 0659   Shift 7360-0528 1381-3367 2050-3584 24 Hour Total   INTAKE   P.O. 750   750   I.V. 10   10   Shift Total(mL/kg) 760(11.92)   760(11.92)   OUTPUT   Shift Total(mL/kg)       Weight (kg) 63.78 63.78 63.78 63.78      Admit Weight: 58.2 kg (128 lb 4.9 oz)     GENERAL APPEARANCE: alert in moderate distress  RESP: lungs clear to auscultation - no rales, rhonchi or wheezes  CV: regular rhythm, normal rate, no rub, no murmur  EDEMA: 1+ LE edema bilaterally  ABDOMEN: soft, distended, tender, bowel sounds normal  MS: extremities normal - no gross deformities noted, no evidence of inflammation in joints, no muscle tenderness  SKIN: no rash  TX KIDNEY: mild TTP  DIALYSIS ACCESS:  Right IJ tunneled catheter    Data   All labs reviewed by me.  CMP  Recent Labs   Lab 04/18/23  0649 04/17/23  1613 04/17/23  0545 04/16/23  0926 04/15/23  0956 04/15/23  0436    139 137 142  --  139   POTASSIUM 4.2 4.2 3.9 4.0   < > 3.9  3.9   CHLORIDE 107 107 104 109*  --  109*   CO2 22 20* 20* 22  --  23   ANIONGAP 10 12 13 11  --  7   GLC 94 100* 94 97  --  160*   BUN 14.0 24.9* 29.1* 20.7*  --  13.6   CR 1.70* 3.26* 4.48* 2.79*  --  1.80*   GFRESTIMATED 40* 18* 13* 22*  --  38*   MICAH 8.2* 8.2* 8.4* 9.1  --  8.5*   MAG 1.5*  --  2.0 1.6*  --  1.8   PHOS 1.7*  --  2.5 1.9*  --  2.3*    < > = values in this interval not displayed.     CBC  Recent Labs   Lab 04/18/23  0649 04/17/23  0545 04/16/23  0926 04/15/23  1318 04/15/23  0956 04/15/23  0436   HGB 6.9* 7.3* 8.2* 8.4*   < > 7.8*   WBC 4.0 4.2 4.8  --   --  5.8   RBC 2.28* 2.44* 2.68*  --   --  2.59*   HCT 23.5* 24.8* 27.0*  --   --  26.2*   * 102* 101*  --   --  101*   MCH 30.3 29.9 30.6  --   --  30.1   MCHC 29.4* 29.4* 30.4*  --   --  29.8*   RDW 13.5 13.7 13.9  --   --  13.8    146* 166  --   --  148*    < > = values in this interval not " displayed.     INR  Recent Labs   Lab 04/17/23  0904   INR 1.27*     ABGNo lab results found in last 7 days.   Urine Studies  Recent Labs   Lab Test 12/05/22  1200 12/19/18  0615   COLOR Light Yellow Yellow   APPEARANCE Clear Clear   URINEGLC 50* Negative   URINEBILI Negative Negative   URINEKETONE Negative Negative   SG 1.012 1.019   UBLD Moderate* Negative   URINEPH 7.5* 5.0   PROTEIN 300* Negative   NITRITE Negative Negative   LEUKEST Negative Negative   RBCU 4* 1   WBCU 8* 2     Recent Labs   Lab Test 01/14/21  1200 12/03/20  0915 06/18/20  0950 11/11/19  0718 07/18/19  0809 06/27/19  0746 12/19/18  0615 11/01/18  0745 02/01/18  0656 07/19/17  0727 12/13/16  0747 05/19/16  0801 12/08/15  1210 06/30/15  0822 01/30/15  0821   UTPG 2.09* 1.36* 1.23* 0.17 0.25* 0.83* 0.12 0.07 0.05 0.10 0.10 0.07 0.08 0.06 0.09     PTH  Recent Labs   Lab Test 04/03/23  1556   PTHI 309*     Iron Studies  Recent Labs   Lab Test 04/03/23  1556 12/22/22  0608 06/27/19  0742 11/01/18  0737 08/23/18  0743 07/26/18  0830 05/17/18  0740 05/03/18  1320 03/29/18  0729 02/01/18  0702 12/01/17  1417 10/12/17  0744 07/25/17  1648   IRON 186* 80 99 113 131 73 19* 19* 29* 36 112 14* 21*    223* 288 262 236* 242 369 321 210* 291 274 423 467*   IRONSAT 71* 36 34 43 55* 30 5* 6* 14* 12* 41 3* 4*   FRANNY 902* 361* 233* 216* 226* 216* 7* 13 90 14 277* 3* 3*       IMAGING:  All imaging studies reviewed by me.

## 2023-04-18 NOTE — PROGRESS NOTES
Cross-cover note: 11:51 PM 4/17/2023 04/18/2023    General Surgery Cross Cover Note    Called by nursing regarding: Pt asked what can be done for gas pain. Wants to know if simethicone would benefit. Concerned about absence of flatus after being POD1 from ex-lap and revision of ureteral anastomosis. Concerned that bowel regimen will not work given hx of UC, would like to know if GI input would be beneficial.     Per patient: Feeling post-op pain around her incisions and gas pain. Concerned that she has not had flatus and understandably frustrated. Would like to know what treatment options are available for her pain bedsides her current pain regimen and bowel regimen.     B/P: 130/86, T: 99.3, P: 102, R: 16    PE:  Laying awake in bed, comfortable, appears frustrated   Breathing non-labored     Plan:  Discussed w/ pt that it is not unusual to not have flatus POD1 after an abdominal surgery. Offered to evaluate pain regimen to see if there is room to go up on muscle relaxants or other medications but pt did not express interest in this. Discussed that there is unfortunately no quick fix, and that all we could do was maximize the appropriate use of non-opioid PRNs and a bowel regimen. In addition to environmental factors such as walking, sitting upright, and having small, frequent meals to minimize nausea.     Received follow up page that pt would like to know if there is a role for GI involvement given lack of ROBF on POD1 and hx of UC. Discussed w/ bedside RN that an overnight GI consult would not be beneficial, unfortunately. Will pass on pt concerns to day team.     Please page resident on call,     Herb Gasca MD  Surgical Resident

## 2023-04-18 NOTE — PROGRESS NOTES
"/89 (BP Location: Left arm)   Pulse 108   Temp 98.2  F (36.8  C) (Oral)   Resp 15   Ht 1.6 m (5' 3\")   Wt 65.6 kg (144 lb 9.6 oz)   SpO2 94%   BMI 25.61 kg/m      Shift: 0451-4292  VS: VSS on RA, afebrile  Neuro: Aox4  Behaviors: Cooperative, anxious/frustrated; flat affect.  Labs: Cr. 3.26  Respiratory: WDL  Cardiac: WDL  Pain/Nausea/PRN: Robaxin 500 mg Q6HR x1 @ 0035, Oxy 10 mg Q4HR x1 @ 0621; Atarax 25 mg Q4HR x1 @ 0356, Tylenol 650 mg Q4HR x1 @ 0356. IV Dilaudid 0.2 mg x1 @ 0456.  Diet: Regular  LDA: R HD cath. L triple lumen IJ, running LR @ 50 ml/hr. L PIV-SL. L DALTON drain, leaking at site so reinforced dressing w/ gauze; total OP 50 ml.  GI/: Wharton catheter Q2HR, UOP 1050 ml. No BM and unable to pass flatus. Alternative interventions provided, pt refused (see note). GI potential consult in AM.   Skin: Abd incision, dermabonded; CDI.  Mobility: SBA  Plan: Continue strict I/Os Q2HR, as well as pain control/management. GI consult in AM.     "

## 2023-04-18 NOTE — PLAN OF CARE
"/84 (BP Location: Left arm)   Pulse 99   Temp 98.6  F (37  C) (Oral)   Resp 16   Ht 1.6 m (5' 3\")   Wt 67 kg (147 lb 11.2 oz)   SpO2 98%   BMI 26.16 kg/m      Shift: 2518-0671  Isolation Status: None  VS: VSS on RA, afebrile  Neuro: Aox4  Behaviors: Calm, anxious. Mostly sleeping during shift.  BG: None  Labs: Critical Hgb 6.9. Redraw tomorrow. Mg 1.5. Phos 1.7.   Pain/Nausea: Some pain, no nausea  PRN: Oxy x1. Ducolyx x1.  Diet: Regular. Little appetite.  IV Access: LIJ triple lumen, R Chest HD line, L PIV SL.  Infusion(s): None  Lines/Drains: DALTON drain, sero-serosang output  GI/: No BM yet this shift. Encouraging stool regimen, but is resistant  Skin: Incision liquid bandage. DALTON site leaky  Mobility: SBA. Has not been OOB this shift.  Events/Education: Updated med card and lab book.   Plan: NPO at midnight tonight. Going to IR tomorrow to have HD line removed, 4/19.    "

## 2023-04-19 LAB
ANION GAP SERPL CALCULATED.3IONS-SCNC: 7 MMOL/L (ref 7–15)
BASOPHILS # BLD MANUAL: 0 10E3/UL (ref 0–0.2)
BASOPHILS NFR BLD MANUAL: 0 %
BUN SERPL-MCNC: 8.3 MG/DL (ref 6–20)
CALCIUM SERPL-MCNC: 8.2 MG/DL (ref 8.6–10)
CHLORIDE SERPL-SCNC: 105 MMOL/L (ref 98–107)
CREAT SERPL-MCNC: 1.3 MG/DL (ref 0.51–0.95)
DEPRECATED HCO3 PLAS-SCNC: 26 MMOL/L (ref 22–29)
EOSINOPHIL # BLD MANUAL: 0.1 10E3/UL (ref 0–0.7)
EOSINOPHIL NFR BLD MANUAL: 4 %
ERYTHROCYTE [DISTWIDTH] IN BLOOD BY AUTOMATED COUNT: 13.6 % (ref 10–15)
GFR SERPL CREATININE-BSD FRML MDRD: 56 ML/MIN/1.73M2
GLUCOSE SERPL-MCNC: 100 MG/DL (ref 70–99)
HCT VFR BLD AUTO: 23.3 % (ref 35–47)
HGB BLD-MCNC: 7 G/DL (ref 11.7–15.7)
LYMPHOCYTES # BLD MANUAL: 0.1 10E3/UL (ref 0.8–5.3)
LYMPHOCYTES NFR BLD MANUAL: 4 %
MAGNESIUM SERPL-MCNC: 1.5 MG/DL (ref 1.7–2.3)
MCH RBC QN AUTO: 29.8 PG (ref 26.5–33)
MCHC RBC AUTO-ENTMCNC: 30 G/DL (ref 31.5–36.5)
MCV RBC AUTO: 99 FL (ref 78–100)
MONOCYTES # BLD MANUAL: 0.3 10E3/UL (ref 0–1.3)
MONOCYTES NFR BLD MANUAL: 8 %
MYELOCYTES # BLD MANUAL: 0 10E3/UL
MYELOCYTES NFR BLD MANUAL: 1 %
NEUTROPHILS # BLD MANUAL: 2.8 10E3/UL (ref 1.6–8.3)
NEUTROPHILS NFR BLD MANUAL: 83 %
PATH REPORT.COMMENTS IMP SPEC: NORMAL
PATH REPORT.COMMENTS IMP SPEC: NORMAL
PATH REPORT.FINAL DX SPEC: NORMAL
PATH REPORT.GROSS SPEC: NORMAL
PATH REPORT.MICROSCOPIC SPEC OTHER STN: NORMAL
PATH REPORT.RELEVANT HX SPEC: NORMAL
PHOSPHATE SERPL-MCNC: 1.6 MG/DL (ref 2.5–4.5)
PHOTO IMAGE: NORMAL
PLAT MORPH BLD: ABNORMAL
PLATELET # BLD AUTO: 199 10E3/UL (ref 150–450)
POTASSIUM SERPL-SCNC: 4.2 MMOL/L (ref 3.4–5.3)
RBC # BLD AUTO: 2.35 10E6/UL (ref 3.8–5.2)
RBC MORPH BLD: ABNORMAL
SODIUM SERPL-SCNC: 138 MMOL/L (ref 136–145)
TACROLIMUS BLD-MCNC: 14.3 UG/L (ref 5–15)
TME LAST DOSE: NORMAL H
TME LAST DOSE: NORMAL H
WBC # BLD AUTO: 3.4 10E3/UL (ref 4–11)

## 2023-04-19 PROCEDURE — 36592 COLLECT BLOOD FROM PICC: CPT | Performed by: SURGERY

## 2023-04-19 PROCEDURE — 250N000013 HC RX MED GY IP 250 OP 250 PS 637: Performed by: SURGERY

## 2023-04-19 PROCEDURE — 83735 ASSAY OF MAGNESIUM: CPT | Performed by: SURGERY

## 2023-04-19 PROCEDURE — 250N000012 HC RX MED GY IP 250 OP 636 PS 637: Performed by: NURSE PRACTITIONER

## 2023-04-19 PROCEDURE — 120N000011 HC R&B TRANSPLANT UMMC

## 2023-04-19 PROCEDURE — 85027 COMPLETE CBC AUTOMATED: CPT | Performed by: SURGERY

## 2023-04-19 PROCEDURE — 80048 BASIC METABOLIC PNL TOTAL CA: CPT | Performed by: SURGERY

## 2023-04-19 PROCEDURE — 250N000011 HC RX IP 250 OP 636: Performed by: NURSE PRACTITIONER

## 2023-04-19 PROCEDURE — 99233 SBSQ HOSP IP/OBS HIGH 50: CPT | Mod: 24 | Performed by: INTERNAL MEDICINE

## 2023-04-19 PROCEDURE — 250N000013 HC RX MED GY IP 250 OP 250 PS 637: Performed by: NURSE PRACTITIONER

## 2023-04-19 PROCEDURE — 85007 BL SMEAR W/DIFF WBC COUNT: CPT | Performed by: SURGERY

## 2023-04-19 PROCEDURE — 84100 ASSAY OF PHOSPHORUS: CPT | Performed by: SURGERY

## 2023-04-19 PROCEDURE — 80197 ASSAY OF TACROLIMUS: CPT | Performed by: NURSE PRACTITIONER

## 2023-04-19 PROCEDURE — 250N000011 HC RX IP 250 OP 636: Performed by: SURGERY

## 2023-04-19 PROCEDURE — 250N000013 HC RX MED GY IP 250 OP 250 PS 637: Performed by: PHARMACIST

## 2023-04-19 PROCEDURE — 250N000012 HC RX MED GY IP 250 OP 636 PS 637: Performed by: SURGERY

## 2023-04-19 RX ORDER — MAGNESIUM OXIDE 400 MG/1
400 TABLET ORAL 2 TIMES DAILY
Status: DISCONTINUED | OUTPATIENT
Start: 2023-04-19 | End: 2023-04-20

## 2023-04-19 RX ORDER — SIMETHICONE 80 MG
80 TABLET,CHEWABLE ORAL ONCE
Status: COMPLETED | OUTPATIENT
Start: 2023-04-19 | End: 2023-04-19

## 2023-04-19 RX ORDER — FUROSEMIDE 10 MG/ML
40 INJECTION INTRAMUSCULAR; INTRAVENOUS ONCE
Status: COMPLETED | OUTPATIENT
Start: 2023-04-19 | End: 2023-04-19

## 2023-04-19 RX ADMIN — OXYCODONE HYDROCHLORIDE 5 MG: 5 TABLET ORAL at 08:32

## 2023-04-19 RX ADMIN — FUROSEMIDE 40 MG: 10 INJECTION, SOLUTION INTRAMUSCULAR; INTRAVENOUS at 15:59

## 2023-04-19 RX ADMIN — MAGNESIUM OXIDE TAB 400 MG (241.3 MG ELEMENTAL MG) 400 MG: 400 (241.3 MG) TAB at 09:47

## 2023-04-19 RX ADMIN — SIMETHICONE 80 MG: 80 TABLET, CHEWABLE ORAL at 05:40

## 2023-04-19 RX ADMIN — BISACODYL 10 MG: 5 TABLET, COATED ORAL at 13:20

## 2023-04-19 RX ADMIN — ONDANSETRON 4 MG: 2 INJECTION INTRAMUSCULAR; INTRAVENOUS at 03:31

## 2023-04-19 RX ADMIN — OXYCODONE HYDROCHLORIDE 5 MG: 5 TABLET ORAL at 21:43

## 2023-04-19 RX ADMIN — SIMETHICONE 80 MG: 80 TABLET, CHEWABLE ORAL at 15:57

## 2023-04-19 RX ADMIN — ACETAMINOPHEN 650 MG: 325 TABLET ORAL at 08:31

## 2023-04-19 RX ADMIN — SODIUM PHOSPHATE, DIBASIC, ANHYDROUS, POTASSIUM PHOSPHATE, MONOBASIC, AND SODIUM PHOSPHATE, MONOBASIC, MONOHYDRATE 500 MG: 852; 155; 130 TABLET, COATED ORAL at 08:24

## 2023-04-19 RX ADMIN — ACETAMINOPHEN 650 MG: 325 TABLET ORAL at 15:18

## 2023-04-19 RX ADMIN — MYCOPHENOLATE MOFETIL 750 MG: 250 CAPSULE ORAL at 17:47

## 2023-04-19 RX ADMIN — MAGNESIUM OXIDE TAB 400 MG (241.3 MG ELEMENTAL MG) 400 MG: 400 (241.3 MG) TAB at 19:50

## 2023-04-19 RX ADMIN — VALGANCICLOVIR 450 MG: 450 TABLET, FILM COATED ORAL at 08:25

## 2023-04-19 RX ADMIN — MYCOPHENOLATE MOFETIL 750 MG: 250 CAPSULE ORAL at 08:23

## 2023-04-19 RX ADMIN — SULFAMETHOXAZOLE AND TRIMETHOPRIM 1 TABLET: 400; 80 TABLET ORAL at 08:31

## 2023-04-19 RX ADMIN — OXYCODONE HYDROCHLORIDE 10 MG: 10 TABLET ORAL at 00:27

## 2023-04-19 RX ADMIN — OXYCODONE HYDROCHLORIDE 5 MG: 5 TABLET ORAL at 15:18

## 2023-04-19 RX ADMIN — SIMETHICONE 80 MG: 80 TABLET, CHEWABLE ORAL at 12:00

## 2023-04-19 RX ADMIN — METHOCARBAMOL 500 MG: 500 TABLET ORAL at 11:59

## 2023-04-19 RX ADMIN — SODIUM PHOSPHATE, DIBASIC, ANHYDROUS, POTASSIUM PHOSPHATE, MONOBASIC, AND SODIUM PHOSPHATE, MONOBASIC, MONOHYDRATE 500 MG: 852; 155; 130 TABLET, COATED ORAL at 14:16

## 2023-04-19 RX ADMIN — TACROLIMUS 2 MG: 1 CAPSULE ORAL at 17:47

## 2023-04-19 RX ADMIN — SODIUM PHOSPHATE, DIBASIC, ANHYDROUS, POTASSIUM PHOSPHATE, MONOBASIC, AND SODIUM PHOSPHATE, MONOBASIC, MONOHYDRATE 500 MG: 852; 155; 130 TABLET, COATED ORAL at 19:50

## 2023-04-19 RX ADMIN — SIMETHICONE 80 MG: 80 TABLET, CHEWABLE ORAL at 21:43

## 2023-04-19 RX ADMIN — TACROLIMUS 2 MG: 1 CAPSULE ORAL at 08:24

## 2023-04-19 ASSESSMENT — ACTIVITIES OF DAILY LIVING (ADL)
ADLS_ACUITY_SCORE: 21

## 2023-04-19 NOTE — PROGRESS NOTES
"/74 (BP Location: Left arm)   Pulse 82   Temp 98.5  F (36.9  C) (Oral)   Resp 16   Ht 1.6 m (5' 3\")   Wt 65 kg (143 lb 3.2 oz)   SpO2 96%   BMI 25.37 kg/m       Patient is alert and oriented X4. Denied nausea, stated pain 7/10 in abdomen and was given Oxycodone X1 and Tylenol and was c/o abdominal cramps and was given a X1 dose of simethicone and patient stated was effective. Pt is passing gas and bowel sounds is active. Has been up walking around the unit X4 per patient. Is on a regular diet refused supper d/t cramping. Has a rutledge with clear UOP of 1200 ml. And has a DALTON with minimal serous output. Had a dose of lasix today. NPO at MN for HD line removal.           "

## 2023-04-19 NOTE — PLAN OF CARE
VS: 99.5 orally, other vitals stable.  On room air  BG: none  Labs: none  Pain/Nausea: complains of incisional and abdominal pain.  Oxycodone and robaxin given.  Complains of severe gas pain.  MD notified.  Simithicone given.  Ambulation encouraged.    Diet: regular.  Pt notified of NPO after MN.    LDA: I J and HD catheter saline locked.  DALTON leaking at site which is causing pt some distress.  Dressing changed.    GI: Pt had a small loose BM.  Passing some gas.  Hyperactive bowel sounds.    : rutledge with yellow urine.  Pt felt like her bladder was full.  Bladder scan negative.    Skin: Incision liquid bandaged.    Mobility: ambulated in halls   Plan: NPO for HD cath removal tomorrow.

## 2023-04-19 NOTE — PROGRESS NOTES
CLINICAL NUTRITION SERVICES - BRIEF/DISCHARGE NOTE     Nutrition Prescription    RECOMMENDATIONS FOR MDs/PROVIDERS TO ORDER:  Encourage PO intake, protein needs post-op    Recommendations already ordered by Registered Dietitian (RD):  Reviewed protein/energy needs post-tx    Future/Additional Recommendations:  Minimize diet restrictions as able d/t high calorie/protein needs post-transplant.  Oral supplements as needed to help meet nutritional needs.     High protein food choices with meals to help meet high needs post-transplant over the next 6-8 weeks.     Heart-healthy diet (low saturated fat, low sodium, high fiber) and food safety precautions long term due to immunosuppression regimen post-transplant         EVALUATION OF THE PROGRESS TOWARD GOALS   Diet: Regular    Pt continues to have minimal PO intake d/t GI symptoms. Pt reports intake will resolve with improvement in stooling. Pt not eating protein-rich foods as she reports she can't tolerate with colitis. Writer offered nutrition supplements and pt declined. Writer reviewed importance of high protein needs for 6-8 wks post-tx, heart healthy diet recs, and food safety guidelines. Pt denied any other nutrition questions or concerns.     Patient s discharge needs assessed and discharge planning has been conducted with the multidisciplinary transplant care team including physicians, pharmacy, social work and transplant coordinator.     Monitoring/Evaluation  Progress toward goals will be monitored and evaluated per protocol.    Once discharged, place outpatient nutrition consult via the transplant team if nutrition concerns arise.    Kaleigh Aguilera RD, RUDDY  7A/Obs RD pager: [phone]  Weekend/Holiday RD pager: [phone]

## 2023-04-19 NOTE — PLAN OF CARE
"Goal Outcome Evaluation:       /87 (BP Location: Left arm)   Pulse 109   Temp 98.4  F (36.9  C) (Oral)   Resp 16   Ht 1.6 m (5' 3\")   Wt 67 kg (147 lb 11.2 oz)   SpO2 96%   BMI 26.16 kg/m      Shift: 2261-0686    VSS  Neuro: WDL  Cardio: WDL  Respiratory: WDL on RA  GI/: Output from Wharton 575ml and clear yellow, LBM 4/18, passing flatus  Skin: Abd incision dermabonded   Diet: NPO for HD cath removal    Labs: Hgb 6.9 recheck this morning pending  BG: none   LDA:  I J, HD, DALTON draining serosanguinous, Wharton  Infusions: None  Mobility: Up ad matthias    Pain/Nausea: moderate abd pain, c/o of nausea   PRN medications: oxy, zofran, and simethecone given  Pt nauseous overnight, PRN zofran given. Pt up and walking a couple times.  Plan for HD cath to be removed today                 "

## 2023-04-19 NOTE — PLAN OF CARE
Patient's Name: Caity Horan    Procedure Date: 04/19/23  Procedure Time 1330    Procedure Performed: Central line removal     The Central Venous Catheter (CVC) was removed per provider order.     The central venous catheter was located: Left Internal Jugular vein, with a total of 3   lumens.     The patient was placed in Supine position with Insertion site lower than heart.     Valsalva response achieved by: Patient instructed to take a deep breath and bear down while the CVC was removed with a constant steady motion.     Firm pressure was applied at the access site for 15minutes until bleeding stopped.     Post removal site assessment; Clean and dry without bleeding. Occlusive dressing applied: Yes    CVC tip was inspected and intact: Yes    Tip was sent to lab for culture per provider order: No    Patient tolerated the procedure: well    2nd RN present during removal (if applicable) n/a        ANTONIO PICKERING RN   4/19/2023   2:06 PM

## 2023-04-19 NOTE — PROGRESS NOTES
[facility]   Transplant Nephrology Progress Note  Date of Admission:  4/13/2023  Today's Date: 04/19/2023    Recommendations:  -Abdominal pain has improved but no true BM yet  - Ureteral stent will need to be scheduled for removal  - Repeat Laxis 40 mg IV x1  - Recommend IR remove RIJ tunneled line under sedation while inpatient (tomorrow)    Assessment & Plan   # LDKT: Trend down. Scr increased post txp due to urine leak. Now s/p repair on 4/17.    - Baseline Creatinine: ~ TBD   - Proteinuria: Not checked post transplant   - Date DSA Last Checked: Apr/2023      Latest DSA: No   - BK Viremia: Not checked post transplant   - Kidney Tx Biopsy: Apr 17, 2023; Result: preliminary negative for rejection   - Transplant Ureteral Stent: Yes    -Double J stent placed due to urine leak on 4/17/23.  Remove 4-6 weeks post transplant.    # Urine leak:   -RTOR on 4/17/23 due to increase in Scr with large pernephric collection found on U/S   -Fluid creatinine elevated c/w urine leak.    -Stent placed, rutledge replaced. Rutledge for 2 weeks post op     # Immunosuppression: Tacrolimus immediate release (goal 8-10) and Mycophenolate mofetil (dose 750 mg every 12 hours)   - Induction with Recent Transplant:  High Intensity Protocol    - Changes: Not at this time    # Infection Prophylaxis:   - PJP: Sulfa/TMP (Bactrim)  - CMV: Valganciclovir (Valcyte); Patient is CMV IgG Ab discordant (D+/R-) and will continue on Valcyte x 6 months, then check CMV PCR monthly until 12 months post transplant.    # Blood Pressure: Controlled;  Goal BP: < 150/90   - Volume status: Mildly hypervolemic   - Changes: Yes - recommend another dose of furosemide 40mg IV today    # Elevated Blood Glucose: Glucose generally running ~ 90s and normal with today's labs.   - Management as per primary team.    # Anemia in Chronic Renal Disease: Hgb: Stable, low      MICKEY: No   - Iron studies: High iron saturation    # Mineral Bone  Disorder:   - Secondary renal hyperparathyroidism; PTH level: Moderately elevated (301-600 pg/ml)        On treatment: None  - Vitamin D; level: Normal        On supplement: No  - Calcium; level: Low     On supplement: No  - Phosphorus; level: Normal        On supplement: Yes,  phosphorus 500 mg bid.    # Electrolytes:   - Potassium; level: Normal        On supplement: No  - Magnesium; level: Low normal        On supplement: Yes; magnesium oxide 400 mg daily  - Bicarbonate; level: Normal        On supplement: No    # Acute Abdominal Pain:    -Improved after repair of urine leak on 4/17/23   -Minimize narcotics, bowel regimen    # Ulcerative Colitis:    -  Patient had been maintained on vedolizumab with next dose previously scheduled 4/22.  Most recent UC flare Dec 2022 with mild chronic active colitis on most recent colonoscopy 2/2022.  Gastroenterology reports (4/16) that ulcerative colitis flare is unlikely at this time. KUB 4/16 with moderate stool burden.    -Continue stool regimen as no bowel movement yet     # Transplant History:  Etiology of Kidney Failure: IgA nephropathy  Tx: LDKT  Transplant: 4/13/2023 (Kidney), 12/5/2014 (Kidney)  Donor Type: Living Donor Class:   Crossmatch at time of Tx: negative  DSA at time of Tx: No  Significant changes in immunosuppression: None  Significant transplant-related complications: Urine leak    Recommendations were communicated to the primary team verbally.    Irena Bishop MD  [phone]    Physician Attestation   I, Zhang Barrera MD, personally examined and evaluated this patient.  I discussed the patient with the resident/fellow and care team, and agree with the assessment and plan of care as documented in the note on 04/19/23 .      I personally reviewed vital signs, medications, labs, and imaging.    Zhang Barrera MD  Date of Service (when I saw the patient): 04/19/23        Interval History   Abdominal pain overnight.   No N/V/D  Ms. Horan's creatinine is 1.3  "(); Trend down.  UOP 3025 ml/24h. 775 ml so far today  Other significant labs/tests/vitals: tachycardia, afebrile overnight.  No chest pain or shortness of breath.  1+ leg swelling.      Review of Systems   4 point ROS was obtained and negative except as noted in the Interval History.    MEDICATIONS:    atorvastatin  10 mg Oral Daily     bisacodyl  5-10 mg Oral BID     lidocaine  1-2 patch Transdermal Q24H     lidocaine   Transdermal Q8H AMADA     magnesium oxide  400 mg Oral Daily with lunch     mycophenolate  750 mg Oral BID IS     phosphorus tablet 250 mg  500 mg Oral BID     sodium chloride (PF)  10 mL Intracatheter Q8H     sulfamethoxazole-trimethoprim  1 tablet Oral Daily     tacrolimus  2 mg Oral BID IS     valGANciclovir  450 mg Oral Daily         Physical Exam   Temp  Av.4  F (36.9  C)  Min: 97.9  F (36.6  C)  Max: 100  F (37.8  C)      Pulse  Av.5  Min: 78  Max: 111 Resp  Av.1  Min: 7  Max: 26  SpO2  Av %  Min: 94 %  Max: 100 %    CVP (mmHg): 13 mmHgBP 125/87 (BP Location: Left arm)   Pulse 109   Temp 98.4  F (36.9  C) (Oral)   Resp 16   Ht 1.6 m (5' 3\")   Wt 67 kg (147 lb 11.2 oz)   SpO2 96%   BMI 26.16 kg/m     Date 04/15/23 0700 - 23 0659   Shift 0084-1201 0309-8040 6130-6737 24 Hour Total   INTAKE   P.O. 750   750   I.V. 10   10   Shift Total(mL/kg) 760(11.92)   760(11.92)   OUTPUT   Shift Total(mL/kg)       Weight (kg) 63.78 63.78 63.78 63.78      Admit Weight: 58.2 kg (128 lb 4.9 oz)     GENERAL APPEARANCE: alert in moderate distress  RESP: lungs clear to auscultation - no rales, rhonchi or wheezes  CV: regular rhythm, normal rate, no rub, no murmur  EDEMA: 1+ LE edema bilaterally  ABDOMEN: soft, distended, tender, bowel sounds normal  MS: extremities normal - no gross deformities noted, no evidence of inflammation in joints, no muscle tenderness  SKIN: no rash  TX KIDNEY: mild TTP  DIALYSIS ACCESS:  Right IJ tunneled catheter    Data   All labs reviewed by " me.  CMP  Recent Labs   Lab 04/18/23  0649 04/17/23  1613 04/17/23  0545 04/16/23  0926 04/15/23  0956 04/15/23  0436    139 137 142  --  139   POTASSIUM 4.2 4.2 3.9 4.0   < > 3.9  3.9   CHLORIDE 107 107 104 109*  --  109*   CO2 22 20* 20* 22  --  23   ANIONGAP 10 12 13 11  --  7   GLC 94 100* 94 97  --  160*   BUN 14.0 24.9* 29.1* 20.7*  --  13.6   CR 1.70* 3.26* 4.48* 2.79*  --  1.80*   GFRESTIMATED 40* 18* 13* 22*  --  38*   MICAH 8.2* 8.2* 8.4* 9.1  --  8.5*   MAG 1.5*  --  2.0 1.6*  --  1.8   PHOS 1.7*  --  2.5 1.9*  --  2.3*    < > = values in this interval not displayed.     CBC  Recent Labs   Lab 04/18/23  0649 04/17/23  0545 04/16/23  0926 04/15/23  1318 04/15/23  0956 04/15/23  0436   HGB 6.9* 7.3* 8.2* 8.4*   < > 7.8*   WBC 4.0 4.2 4.8  --   --  5.8   RBC 2.28* 2.44* 2.68*  --   --  2.59*   HCT 23.5* 24.8* 27.0*  --   --  26.2*   * 102* 101*  --   --  101*   MCH 30.3 29.9 30.6  --   --  30.1   MCHC 29.4* 29.4* 30.4*  --   --  29.8*   RDW 13.5 13.7 13.9  --   --  13.8    146* 166  --   --  148*    < > = values in this interval not displayed.     INR  Recent Labs   Lab 04/17/23  0904   INR 1.27*     ABGNo lab results found in last 7 days.   Urine Studies  Recent Labs   Lab Test 12/05/22  1200 12/19/18  0615   COLOR Light Yellow Yellow   APPEARANCE Clear Clear   URINEGLC 50* Negative   URINEBILI Negative Negative   URINEKETONE Negative Negative   SG 1.012 1.019   UBLD Moderate* Negative   URINEPH 7.5* 5.0   PROTEIN 300* Negative   NITRITE Negative Negative   LEUKEST Negative Negative   RBCU 4* 1   WBCU 8* 2     Recent Labs   Lab Test 01/14/21  1200 12/03/20  0915 06/18/20  0950 11/11/19  0718 07/18/19  0809 06/27/19  0746 12/19/18  0615 11/01/18  0745 02/01/18  0656 07/19/17  0727 12/13/16  0747 05/19/16  0801 12/08/15  1210 06/30/15  0822   UTPG 2.09* 1.36* 1.23* 0.17 0.25* 0.83* 0.12 0.07 0.05 0.10 0.10 0.07 0.08 0.06     PTH  Recent Labs   Lab Test 04/03/23  1556   PTHI 309*     Iron  Studies  Recent Labs   Lab Test 04/03/23  1556 12/22/22  0608 06/27/19  0742 11/01/18  0737 08/23/18  0743 07/26/18  0830 05/17/18  0740 05/03/18  1320 03/29/18  0729 02/01/18  0702 12/01/17  1417 10/12/17  0744 07/25/17  1648   IRON 186* 80 99 113 131 73 19* 19* 29* 36 112 14* 21*    223* 288 262 236* 242 369 321 210* 291 274 423 467*   IRONSAT 71* 36 34 43 55* 30 5* 6* 14* 12* 41 3* 4*   FRANNY 902* 361* 233* 216* 226* 216* 7* 13 90 14 277* 3* 3*       IMAGING:  All imaging studies reviewed by me.

## 2023-04-19 NOTE — PROGRESS NOTES
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 She is now s/p LDKT on 4/13/22 with Dr. Sepulveda. Patient returned to OR 4/17/23 for re-exploration and intra-op biopsy d/t concern for urine leak vs rejection which found urine leak at anastomosis and hydronephrotic ureter without signs of obstruction - anastomosis redone and ureteral stent placed.     s/p LDKT 4/13/23 c/b Urine leak: POD# 6. Cr 1.3, improving after ureteral anastomosis revision and stent placement yesterday. UOP 3L yesterday, DALTON with small serosanguinous output. Last US 4/17 patent with resolved fluid collections.   - Intra op biopsy; preliminary pathology negative for rejection   - 4/17 No DSA    Urine leak: Cr increased POD#2 with US demonstrating increased LLQ fluid collection and but patent flow. POD#3 Cr 4.5 (from 2.8) despite IVF bolus yesterday. Repeat STAT US patent with perinephric fluid collections slightly larger. Patient returned to OR for re-exploration and intra-op biopsy d/t concern for urine leak vs rejection which found urine leak at anastomosis and hydronephrotic ureter without signs of obstruction - anastomosis redone and ureteral stent placed.     - Continue Wharton catheter x 10 days    Immunosuppression management:   Induction: via high risk induction (cPRA 82, no DSA) with Thymoglobulin 350 mg (6 mg/kg) and steroids.  Maintenance:    -  mg BID   - Tacrolimus 2 mg BID. Goal level 8-10.     Neuro:   Acute post op pain: Continue PRN oxycodone, PRN Tylenol, and PRN Robaxin. Heat/Ice encouraged.  Anxiety: PRN hydroxyzine added overnight.      Hematology:   Anemia of chronic disease: Hgb 7 (from 6.9), likely some element of dilution as she is up 7 kg. No e/o active bleeding. Plan for diuresis today and recheck Hgb  tomorrow.    Cardiorespiratory:   HTN: -130's, hold PTA antihypertensives.     GI: Regular diet  Hx Ulcerative colitis: GI consulted d/t concern for UC flare with increased diffuse abdominal pain. GI states UC flare unlikely at this time but recommended CT scan with PO contrast to aid in diagnosis; will hold off d/t improvement in symptoms.  Constipation: Continue bisacodyl BID per patient preference. Patient encouraged to take suppository, PRN MOM, ambulate in hallway.      Fluid/Electrolytes:   Hypomagnesemia: Increase PO Mag-Ox 400 mg to BID.   Hypophosphatemia: Phos 1.7, increase PO Phos to TID.   Hypervolemia: Up 7 kg from admission. Give IV Lasix 40 mg x1. Goal net negative 2L today.     : Wharton to remain d/t urine leak and new anastomosis x 10 days    Endocrine: No issues.     Infectious disease: No issues.     Prophylaxis: DVT (mechanical), GI, viral (Valcyte x 6 months; CMV D + / R -), PCP (Bactrim)    Disposition: 7A; IR consulted for HD line removal prior to discharge locally tomorrow.     SRINIVASAN/Fellow/Resident Provider: Amalia Marie NP [id number]    Faculty: Jen Brice MD  _________________________________________________________________  Transplant History: Admitted 4/13/2023 for LDKT.  4/13/2023 (Kidney), 12/5/2014 (Kidney), Postoperative day: 6     Interval History:   Overnight events: Received simethicone overnight with improvement in gas pain. This morning she reports she had a small smear overnight, passing gas. Drinking good amounts of water, minimal food intake. Up ad matthias in room and brown.     ROS:   A 10-point review of systems was negative except as noted above.    Meds:    bisacodyl  5-10 mg Oral BID     furosemide  40 mg Intravenous Once     magnesium oxide  400 mg Oral BID     mycophenolate  750 mg Oral BID IS     phosphorus tablet 250 mg  500 mg Oral TID     simethicone  80 mg Oral Once     sodium chloride (PF)  10 mL Intracatheter Q8H     sulfamethoxazole-trimethoprim  1  "tablet Oral Daily     tacrolimus  2 mg Oral BID IS     valGANciclovir  450 mg Oral Daily       Physical Exam:     Admit Weight: 58.2 kg (128 lb 4.9 oz)    Current vitals:   /86 (BP Location: Left arm)   Pulse 87   Temp 98.1  F (36.7  C) (Oral)   Resp 16   Ht 1.6 m (5' 3\")   Wt 65 kg (143 lb 3.2 oz)   SpO2 97%   BMI 25.37 kg/m      Vital sign ranges:    Temp: 98.1  F (36.7  C) Temp src: Oral BP: 131/86 Pulse: 87   Resp: 16 SpO2: 97 % O2 Device: None (Room air)      General Appearance: in no apparent distress  Skin: warm and dry  Heart: perfused  Lungs: no increased WOB on RA  Abdomen: The abdomen is tender to palpation; LLQ Incision C/D/I no output. DALTON with serosanguinous output.   : Wharton is in place with yellow UOP  Extremities: warm and well perfused with generalized edema  Neurologic: CN II-XII grossly intact without focal neuro deficits    Data:   CMP  Recent Labs   Lab 04/19/23  0827 04/18/23  0649 04/17/23  1613 04/17/23  1002    139   < >  --    POTASSIUM 4.2 4.2   < >  --    CHLORIDE 105 107   < >  --    CO2 26 22   < >  --    * 94   < >  --    BUN 8.3 14.0   < >  --    CR 1.30* 1.70*   < >  --    GFRESTIMATED 56* 40*   < >  --    MICAH 8.2* 8.2*   < >  --    MAG 1.5* 1.5*  --   --    PHOS 1.6* 1.7*  --   --    FCREAT  --   --   --  31.3    < > = values in this interval not displayed.     CBC  Recent Labs   Lab 04/19/23  0827 04/18/23  0649   HGB 7.0* 6.9*   WBC 3.4* 4.0    155     COAGS  Recent Labs   Lab 04/17/23  0904   INR 1.27*      Urinalysis  Recent Labs   Lab Test 12/05/22  1200 01/14/21  1200 12/03/20  0915 06/27/19  0746 12/19/18  0615   COLOR Light Yellow  --   --   --  Yellow   APPEARANCE Clear  --   --   --  Clear   URINEGLC 50*  --   --   --  Negative   URINEBILI Negative  --   --   --  Negative   URINEKETONE Negative  --   --   --  Negative   SG 1.012  --   --   --  1.019   UBLD Moderate*  --   --   --  Negative   URINEPH 7.5*  --   --   --  5.0   PROTEIN 300*  " --   --   --  Negative   NITRITE Negative  --   --   --  Negative   LEUKEST Negative  --   --   --  Negative   RBCU 4*  --   --   --  1   WBCU 8*  --   --   --  2   UTPG  --  2.09* 1.36*   < > 0.12    < > = values in this interval not displayed.     Virology:  CMV DNA Quantitation Specimen   Date Value Ref Range Status   07/25/2017 Plasma, EDTA anticoagulant  Final     Hepatitis C Antibody   Date Value Ref Range Status   04/13/2023 Nonreactive Nonreactive Final   05/26/2015  NR Final    Nonreactive   Assay performance characteristics have not been established for newborns,   infants, and children       BK Virus Result   Date Value Ref Range Status   06/27/2019 BK Virus DNA Not Detected BKNEG^BK Virus DNA Not Detected copies/mL Final   12/19/2018 BK Virus DNA Not Detected BKNEG^BK Virus DNA Not Detected copies/mL Final   11/01/2018 BK Virus DNA Not Detected BKNEG^BK Virus DNA Not Detected copies/mL Final   02/01/2018 BK Virus DNA Not Detected BKNEG^BK Virus DNA Not Detected copies/mL Final   07/19/2017 BK Virus DNA Not Detected BKNEG copies/mL Final   12/13/2016 BK Virus DNA Not Detected BKNEG copies/mL Final   05/19/2016 BK Virus DNA Not Detected BKNEG copies/mL Final   10/06/2015 BK Virus DNA Not Detected BKNEG copies/mL Final   08/25/2015 BK Virus DNA Not Detected BKNEG copies/mL Final   06/30/2015 BK Virus DNA Not Detected BKNEG copies/mL Final   04/06/2015 BK Virus DNA Not Detected BKNEG copies/mL Final   02/09/2015 BK Virus DNA Not Detected BKNEG copies/mL Final   02/02/2015 BK Virus DNA Not Detected BKNEG copies/mL Final   01/05/2015 BK Virus DNA Not Detected BKNEG copies/mL Final     BK Virus DNA copies/mL   Date Value Ref Range Status   04/16/2023 Not Detected Not Detected copies/mL Final

## 2023-04-19 NOTE — PLAN OF CARE
" Vitals: BP (!) 130/90 (BP Location: Left arm)   Pulse 103   Temp 98.1  F (36.7  C) (Oral)   Resp 16   Ht 1.6 m (5' 3\")   Wt 65 kg (143 lb 3.2 oz)   SpO2 96%   BMI 25.37 kg/m      Endocrine: n/a  Labs: Improving labs, low Magnesium and Phosphorus.  Pain: Mild abdominal incisional pain.  PRN's: Robaxin X1, Tylenol, Simethicone.  Diet: Regular diet, poor appetite.   LDA: Internal jugular removed, PIV saline locked, R CVC.  GI: Declined laxatives this morning, agreed to Dulcolax tabs this afternoon. One tiny mucous BM.  : Rutledge in place, adequate urine output, concentrated.  Skin: Abdominal incision approximated with dermabond DALTON.  Neuro: Flat, cooperative, anxious at times.  Mobility: Up ad matthias, walking more today.  Education: Reviewed medication card, previous transplant.  Plan: HD line removal tomorrow in IR. Discharge home with rutledge, will need rutledge care teaching and supplies.                          "

## 2023-04-19 NOTE — PROGRESS NOTES
"/84 (BP Location: Left arm)   Pulse 99   Temp 98.6  F (37  C) (Oral)   Resp 16   Ht 1.6 m (5' 3\")   Wt 67 kg (147 lb 11.2 oz)   SpO2 98%   BMI 26.16 kg/m        [id number]. Provider paged regarding scheduled ducolax at 2000, asked if RN could administer earlier. Provider OK'd administering ducolax at 1800 and agreed to page provider if this did not help patient.     Patient VSS on RA, afebrile. Patient frustrated that she has not had a BM. Continue with plan of care and update team with any changes.   "

## 2023-04-19 NOTE — CONSULTS
"    Interventional Radiology Consult Service Note    Patient is on IR schedule tomorrow for a removal of a right-sided tunneled CVC at the request of Transplant Surgery.   Labs WNL for procedure. Patient's hemoglobin stable at 7.0.   No NPO required.  Medications to be held include: none.  Consent will be done prior to procedure.     Please contact the IR charge RN at [phone] for estimated time of procedure.     Case discussed with Dr. Boone Bruno. This is a 32 y.o. with ESRD 2/2 IgA Nephropathy s/p LDKT (4/13/22) and re-exploration with ureteral stent placement and anastomosis reconstruction (3/17/23). Patient's right-sided CVC was placed on 12/22/22 for hemodialysis which is no longer needed at this time.     Patient is requesting sedation due to a traumatic past experience with a line removal. Will plan to have a conversation with patient prior to procedure to discuss not needing sedation for a line removal. If difficulty is experienced during the removal, possibly due to a fibrin sheath forming, we will consider starting sedation.     Vitals:   BP (!) 130/90 (BP Location: Left arm)   Pulse 103   Temp 98.1  F (36.7  C) (Oral)   Resp 16   Ht 1.6 m (5' 3\")   Wt 65 kg (143 lb 3.2 oz)   SpO2 96%   BMI 25.37 kg/m      Pertinent Labs:     Lab Results   Component Value Date    WBC 3.4 (L) 04/19/2023    WBC 4.0 04/18/2023    WBC 4.2 04/17/2023    WBC 6.3 12/03/2020    WBC 6.6 10/22/2020    WBC 6.5 09/10/2020       Lab Results   Component Value Date    HGB 7.0 04/19/2023    HGB 6.9 04/18/2023    HGB 7.3 04/17/2023    HGB 10.6 01/14/2021    HGB 9.8 12/03/2020    HGB 9.7 10/22/2020       Lab Results   Component Value Date     04/19/2023     04/18/2023     04/17/2023     01/14/2021     12/03/2020     10/22/2020       Lab Results   Component Value Date    INR 1.27 (H) 04/17/2023    INR 1.00 12/19/2018    PTT 33 01/02/2023    PTT 49 (H) 12/05/2014       Lab Results   Component " Value Date    POTASSIUM 4.2 04/19/2023    POTASSIUM 4.0 04/16/2021        Crys Bradley PA-C  Interventional Radiology  Pager: [phone]

## 2023-04-20 ENCOUNTER — APPOINTMENT (OUTPATIENT)
Dept: INTERVENTIONAL RADIOLOGY/VASCULAR | Facility: CLINIC | Age: 33
DRG: 652 | End: 2023-04-20
Payer: COMMERCIAL

## 2023-04-20 VITALS
DIASTOLIC BLOOD PRESSURE: 72 MMHG | OXYGEN SATURATION: 95 % | SYSTOLIC BLOOD PRESSURE: 123 MMHG | RESPIRATION RATE: 18 BRPM | TEMPERATURE: 98.3 F | BODY MASS INDEX: 24.57 KG/M2 | HEART RATE: 100 BPM | WEIGHT: 138.67 LBS | HEIGHT: 63 IN

## 2023-04-20 DIAGNOSIS — Z94.0 KIDNEY REPLACED BY TRANSPLANT: Primary | ICD-10-CM

## 2023-04-20 LAB
ANION GAP SERPL CALCULATED.3IONS-SCNC: 10 MMOL/L (ref 7–15)
BASOPHILS # BLD MANUAL: 0 10E3/UL (ref 0–0.2)
BASOPHILS NFR BLD MANUAL: 0 %
BUN SERPL-MCNC: 7.6 MG/DL (ref 6–20)
CALCIUM SERPL-MCNC: 8.4 MG/DL (ref 8.6–10)
CHLORIDE SERPL-SCNC: 104 MMOL/L (ref 98–107)
CREAT SERPL-MCNC: 1.38 MG/DL (ref 0.51–0.95)
DEPRECATED HCO3 PLAS-SCNC: 25 MMOL/L (ref 22–29)
EOSINOPHIL # BLD MANUAL: 0.1 10E3/UL (ref 0–0.7)
EOSINOPHIL NFR BLD MANUAL: 2 %
ERYTHROCYTE [DISTWIDTH] IN BLOOD BY AUTOMATED COUNT: 13.2 % (ref 10–15)
GFR SERPL CREATININE-BSD FRML MDRD: 52 ML/MIN/1.73M2
GLUCOSE SERPL-MCNC: 90 MG/DL (ref 70–99)
HCT VFR BLD AUTO: 24.7 % (ref 35–47)
HGB BLD-MCNC: 7.3 G/DL (ref 11.7–15.7)
LYMPHOCYTES # BLD MANUAL: 0.1 10E3/UL (ref 0.8–5.3)
LYMPHOCYTES NFR BLD MANUAL: 2 %
MAGNESIUM SERPL-MCNC: 1.4 MG/DL (ref 1.7–2.3)
MCH RBC QN AUTO: 29.7 PG (ref 26.5–33)
MCHC RBC AUTO-ENTMCNC: 29.6 G/DL (ref 31.5–36.5)
MCV RBC AUTO: 100 FL (ref 78–100)
MONOCYTES # BLD MANUAL: 0.2 10E3/UL (ref 0–1.3)
MONOCYTES NFR BLD MANUAL: 5 %
NEUTROPHILS # BLD MANUAL: 2.9 10E3/UL (ref 1.6–8.3)
NEUTROPHILS NFR BLD MANUAL: 91 %
PHOSPHATE SERPL-MCNC: 1.9 MG/DL (ref 2.5–4.5)
PLAT MORPH BLD: ABNORMAL
PLATELET # BLD AUTO: 211 10E3/UL (ref 150–450)
POLYCHROMASIA BLD QL SMEAR: SLIGHT
POTASSIUM SERPL-SCNC: 4.5 MMOL/L (ref 3.4–5.3)
RBC # BLD AUTO: 2.46 10E6/UL (ref 3.8–5.2)
RBC MORPH BLD: ABNORMAL
SCANNED LAB RESULT: ABNORMAL
SODIUM SERPL-SCNC: 139 MMOL/L (ref 136–145)
TACROLIMUS BLD-MCNC: 15.3 UG/L (ref 5–15)
TME LAST DOSE: ABNORMAL H
TME LAST DOSE: ABNORMAL H
WBC # BLD AUTO: 3.2 10E3/UL (ref 4–11)

## 2023-04-20 PROCEDURE — 80048 BASIC METABOLIC PNL TOTAL CA: CPT | Performed by: SURGERY

## 2023-04-20 PROCEDURE — 84100 ASSAY OF PHOSPHORUS: CPT | Performed by: SURGERY

## 2023-04-20 PROCEDURE — 250N000009 HC RX 250: Performed by: STUDENT IN AN ORGANIZED HEALTH CARE EDUCATION/TRAINING PROGRAM

## 2023-04-20 PROCEDURE — 36589 REMOVAL TUNNELED CV CATH: CPT | Mod: GC | Performed by: RADIOLOGY

## 2023-04-20 PROCEDURE — 99233 SBSQ HOSP IP/OBS HIGH 50: CPT | Mod: 24 | Performed by: INTERNAL MEDICINE

## 2023-04-20 PROCEDURE — 250N000012 HC RX MED GY IP 250 OP 636 PS 637: Performed by: NURSE PRACTITIONER

## 2023-04-20 PROCEDURE — 36415 COLL VENOUS BLD VENIPUNCTURE: CPT | Performed by: SURGERY

## 2023-04-20 PROCEDURE — 85007 BL SMEAR W/DIFF WBC COUNT: CPT | Performed by: SURGERY

## 2023-04-20 PROCEDURE — 250N000012 HC RX MED GY IP 250 OP 636 PS 637: Performed by: SURGERY

## 2023-04-20 PROCEDURE — 85027 COMPLETE CBC AUTOMATED: CPT | Performed by: SURGERY

## 2023-04-20 PROCEDURE — 250N000013 HC RX MED GY IP 250 OP 250 PS 637: Performed by: SURGERY

## 2023-04-20 PROCEDURE — 250N000011 HC RX IP 250 OP 636: Performed by: STUDENT IN AN ORGANIZED HEALTH CARE EDUCATION/TRAINING PROGRAM

## 2023-04-20 PROCEDURE — 80197 ASSAY OF TACROLIMUS: CPT | Performed by: SURGERY

## 2023-04-20 PROCEDURE — 250N000013 HC RX MED GY IP 250 OP 250 PS 637: Performed by: NURSE PRACTITIONER

## 2023-04-20 PROCEDURE — 02PY33Z REMOVAL OF INFUSION DEVICE FROM GREAT VESSEL, PERCUTANEOUS APPROACH: ICD-10-PCS | Performed by: RADIOLOGY

## 2023-04-20 PROCEDURE — 83735 ASSAY OF MAGNESIUM: CPT | Performed by: SURGERY

## 2023-04-20 PROCEDURE — 36589 REMOVAL TUNNELED CV CATH: CPT

## 2023-04-20 PROCEDURE — 250N000011 HC RX IP 250 OP 636: Performed by: NURSE PRACTITIONER

## 2023-04-20 PROCEDURE — 0JPT3XZ REMOVAL OF TUNNELED VASCULAR ACCESS DEVICE FROM TRUNK SUBCUTANEOUS TISSUE AND FASCIA, PERCUTANEOUS APPROACH: ICD-10-PCS | Performed by: RADIOLOGY

## 2023-04-20 PROCEDURE — 99024 POSTOP FOLLOW-UP VISIT: CPT | Performed by: SURGERY

## 2023-04-20 RX ORDER — HYDROXYZINE HYDROCHLORIDE 25 MG/1
25 TABLET, FILM COATED ORAL EVERY 4 HOURS PRN
Qty: 15 TABLET | Refills: 0 | Status: SHIPPED | OUTPATIENT
Start: 2023-04-20 | End: 2023-07-02

## 2023-04-20 RX ORDER — FLUMAZENIL 0.1 MG/ML
0.2 INJECTION, SOLUTION INTRAVENOUS
Status: DISCONTINUED | OUTPATIENT
Start: 2023-04-20 | End: 2023-04-20 | Stop reason: HOSPADM

## 2023-04-20 RX ORDER — VALGANCICLOVIR 450 MG/1
900 TABLET, FILM COATED ORAL DAILY
Qty: 60 TABLET | Refills: 5 | Status: ON HOLD | OUTPATIENT
Start: 2023-04-21 | End: 2023-05-01

## 2023-04-20 RX ORDER — NALOXONE HYDROCHLORIDE 0.4 MG/ML
0.2 INJECTION, SOLUTION INTRAMUSCULAR; INTRAVENOUS; SUBCUTANEOUS
Status: DISCONTINUED | OUTPATIENT
Start: 2023-04-20 | End: 2023-04-20 | Stop reason: HOSPADM

## 2023-04-20 RX ORDER — BISACODYL 5 MG
5-10 TABLET, DELAYED RELEASE (ENTERIC COATED) ORAL 2 TIMES DAILY PRN
Qty: 60 TABLET | Refills: 0 | Status: SHIPPED | OUTPATIENT
Start: 2023-04-20 | End: 2023-04-24

## 2023-04-20 RX ORDER — MAGNESIUM OXIDE 400 MG/1
800 TABLET ORAL 2 TIMES DAILY
Qty: 120 TABLET | Refills: 2 | Status: SHIPPED | OUTPATIENT
Start: 2023-04-20 | End: 2023-04-21

## 2023-04-20 RX ORDER — ACETAMINOPHEN 325 MG/1
650 TABLET ORAL EVERY 4 HOURS PRN
Qty: 60 TABLET | Refills: 0 | Status: SHIPPED | OUTPATIENT
Start: 2023-04-20 | End: 2023-04-24

## 2023-04-20 RX ORDER — OXYCODONE HYDROCHLORIDE 5 MG/1
5 TABLET ORAL EVERY 6 HOURS PRN
Qty: 15 TABLET | Refills: 0 | Status: SHIPPED | OUTPATIENT
Start: 2023-04-20 | End: 2023-07-02

## 2023-04-20 RX ORDER — MYCOPHENOLATE MOFETIL 250 MG/1
750 CAPSULE ORAL 2 TIMES DAILY
Qty: 180 CAPSULE | Refills: 11 | Status: SHIPPED | OUTPATIENT
Start: 2023-04-20 | End: 2024-01-12

## 2023-04-20 RX ORDER — METHOCARBAMOL 500 MG/1
500 TABLET, FILM COATED ORAL 4 TIMES DAILY PRN
Qty: 20 TABLET | Refills: 0 | Status: SHIPPED | OUTPATIENT
Start: 2023-04-20 | End: 2023-07-02

## 2023-04-20 RX ORDER — FUROSEMIDE 10 MG/ML
40 INJECTION INTRAMUSCULAR; INTRAVENOUS ONCE
Status: COMPLETED | OUTPATIENT
Start: 2023-04-20 | End: 2023-04-20

## 2023-04-20 RX ORDER — MAGNESIUM OXIDE 400 MG/1
800 TABLET ORAL 2 TIMES DAILY
Status: DISCONTINUED | OUTPATIENT
Start: 2023-04-20 | End: 2023-04-20 | Stop reason: HOSPADM

## 2023-04-20 RX ORDER — SULFAMETHOXAZOLE AND TRIMETHOPRIM 400; 80 MG/1; MG/1
1 TABLET ORAL DAILY
Qty: 30 TABLET | Refills: 11 | Status: SHIPPED | OUTPATIENT
Start: 2023-04-21 | End: 2023-05-16

## 2023-04-20 RX ORDER — ONDANSETRON 4 MG/1
4 TABLET, ORALLY DISINTEGRATING ORAL EVERY 6 HOURS PRN
Qty: 10 TABLET | Refills: 0 | Status: SHIPPED | OUTPATIENT
Start: 2023-04-20 | End: 2023-07-02

## 2023-04-20 RX ORDER — SIMETHICONE 80 MG
80 TABLET,CHEWABLE ORAL EVERY 6 HOURS PRN
Qty: 15 TABLET | Refills: 0 | Status: SHIPPED | OUTPATIENT
Start: 2023-04-20 | End: 2023-04-24

## 2023-04-20 RX ORDER — TACROLIMUS 1 MG/1
1 CAPSULE ORAL 2 TIMES DAILY
Qty: 60 CAPSULE | Refills: 11 | Status: ON HOLD | OUTPATIENT
Start: 2023-04-21 | End: 2023-05-01

## 2023-04-20 RX ORDER — TACROLIMUS 0.5 MG/1
0.5 CAPSULE ORAL 2 TIMES DAILY
Qty: 60 CAPSULE | Refills: 11 | Status: ON HOLD | OUTPATIENT
Start: 2023-04-20 | End: 2023-05-01

## 2023-04-20 RX ORDER — TACROLIMUS 1 MG/1
1 CAPSULE ORAL
Status: DISCONTINUED | OUTPATIENT
Start: 2023-04-21 | End: 2023-04-20 | Stop reason: HOSPADM

## 2023-04-20 RX ORDER — NALOXONE HYDROCHLORIDE 0.4 MG/ML
0.4 INJECTION, SOLUTION INTRAMUSCULAR; INTRAVENOUS; SUBCUTANEOUS
Status: DISCONTINUED | OUTPATIENT
Start: 2023-04-20 | End: 2023-04-20 | Stop reason: HOSPADM

## 2023-04-20 RX ORDER — OXYCODONE HYDROCHLORIDE 5 MG/1
5 TABLET ORAL EVERY 6 HOURS PRN
Status: DISCONTINUED | OUTPATIENT
Start: 2023-04-20 | End: 2023-04-20 | Stop reason: HOSPADM

## 2023-04-20 RX ORDER — FENTANYL CITRATE 50 UG/ML
25-50 INJECTION, SOLUTION INTRAMUSCULAR; INTRAVENOUS EVERY 5 MIN PRN
Status: DISCONTINUED | OUTPATIENT
Start: 2023-04-20 | End: 2023-04-20 | Stop reason: HOSPADM

## 2023-04-20 RX ORDER — MAGNESIUM SULFATE HEPTAHYDRATE 40 MG/ML
2 INJECTION, SOLUTION INTRAVENOUS ONCE
Status: DISCONTINUED | OUTPATIENT
Start: 2023-04-20 | End: 2023-04-20

## 2023-04-20 RX ORDER — TACROLIMUS 1 MG/1
1 CAPSULE ORAL
Status: DISCONTINUED | OUTPATIENT
Start: 2023-04-20 | End: 2023-04-20

## 2023-04-20 RX ADMIN — FENTANYL CITRATE 50 MCG: 50 INJECTION, SOLUTION INTRAMUSCULAR; INTRAVENOUS at 14:14

## 2023-04-20 RX ADMIN — OXYCODONE HYDROCHLORIDE 5 MG: 5 TABLET ORAL at 06:20

## 2023-04-20 RX ADMIN — SODIUM PHOSPHATE, DIBASIC, ANHYDROUS, POTASSIUM PHOSPHATE, MONOBASIC, AND SODIUM PHOSPHATE, MONOBASIC, MONOHYDRATE 500 MG: 852; 155; 130 TABLET, COATED ORAL at 14:51

## 2023-04-20 RX ADMIN — OXYCODONE HYDROCHLORIDE 5 MG: 5 TABLET ORAL at 02:17

## 2023-04-20 RX ADMIN — LIDOCAINE HYDROCHLORIDE 15 ML: 10 INJECTION, SOLUTION EPIDURAL; INFILTRATION; INTRACAUDAL; PERINEURAL at 14:39

## 2023-04-20 RX ADMIN — SULFAMETHOXAZOLE AND TRIMETHOPRIM 1 TABLET: 400; 80 TABLET ORAL at 08:02

## 2023-04-20 RX ADMIN — FENTANYL CITRATE 50 MCG: 50 INJECTION, SOLUTION INTRAMUSCULAR; INTRAVENOUS at 14:22

## 2023-04-20 RX ADMIN — MIDAZOLAM 0.5 MG: 1 INJECTION INTRAMUSCULAR; INTRAVENOUS at 14:22

## 2023-04-20 RX ADMIN — TACROLIMUS 2 MG: 1 CAPSULE ORAL at 08:02

## 2023-04-20 RX ADMIN — MIDAZOLAM 0.5 MG: 1 INJECTION INTRAMUSCULAR; INTRAVENOUS at 14:30

## 2023-04-20 RX ADMIN — FUROSEMIDE 40 MG: 10 INJECTION, SOLUTION INTRAMUSCULAR; INTRAVENOUS at 10:13

## 2023-04-20 RX ADMIN — MAGNESIUM OXIDE TAB 400 MG (241.3 MG ELEMENTAL MG) 400 MG: 400 (241.3 MG) TAB at 08:03

## 2023-04-20 RX ADMIN — SODIUM PHOSPHATE, DIBASIC, ANHYDROUS, POTASSIUM PHOSPHATE, MONOBASIC, AND SODIUM PHOSPHATE, MONOBASIC, MONOHYDRATE 500 MG: 852; 155; 130 TABLET, COATED ORAL at 08:02

## 2023-04-20 RX ADMIN — VALGANCICLOVIR 450 MG: 450 TABLET, FILM COATED ORAL at 08:03

## 2023-04-20 RX ADMIN — METHOCARBAMOL 500 MG: 500 TABLET ORAL at 02:17

## 2023-04-20 RX ADMIN — OXYCODONE HYDROCHLORIDE 5 MG: 5 TABLET ORAL at 13:05

## 2023-04-20 RX ADMIN — MIDAZOLAM 1 MG: 1 INJECTION INTRAMUSCULAR; INTRAVENOUS at 14:15

## 2023-04-20 RX ADMIN — MYCOPHENOLATE MOFETIL 750 MG: 250 CAPSULE ORAL at 08:02

## 2023-04-20 ASSESSMENT — ACTIVITIES OF DAILY LIVING (ADL)
ADLS_ACUITY_SCORE: 20
ADLS_ACUITY_SCORE: 21
ADLS_ACUITY_SCORE: 20
ADLS_ACUITY_SCORE: 21
ADLS_ACUITY_SCORE: 20

## 2023-04-20 NOTE — PLAN OF CARE
"BP (!) 127/90 (BP Location: Left arm)   Pulse 85   Temp 97.5  F (36.4  C) (Oral)   Resp 14   Ht 1.6 m (5' 3\")   Wt 62.9 kg (138 lb 10.7 oz)   SpO2 95%   BMI 24.56 kg/m      SHIFT: 1419-3310  ISOLATION: NA  VITALS: AVSS on 2L NC.  BG:   Recent Labs   Lab 04/20/23  0609 04/19/23  0827 04/18/23  0649 04/17/23  1613 04/17/23  0545 04/16/23  0926   GLC 90 100* 94 100* 94 97     DIET: NPO for procedure today.  PAIN: Oxy given x3, robaxin x1, and simethicone x1.  RESPIRATORY: WDL. Previous LT.  CARDIAC: WDL  : 1300 mLs of UOP.  GI: LBM: 04/18. Pt is a poor historian regarding BMs.  TUBES: PIV SL, HD line (to be reemoved), L DALTON, Wharton cath.  MIVF/GTT/ABX: NA  ASSIST: UAL  SAFETY: NA  SKIN: Abdominal incision approximated, dermabonded, and TEDDY without drainage.  LABS: Hgb of 7.3 from 7.0. Pt previously refusing transfusions.  PLAN: HD line removal today.  "

## 2023-04-20 NOTE — PRE-PROCEDURE
GENERAL PRE-PROCEDURE:   Date/Time:  4/20/2023 2:06 PM    Verbal consent obtained?: Yes    Written consent obtained?: Yes    Risks and benefits: Risks, benefits and alternatives were discussed    Consent given by:  Patient  Patient states understanding of procedure being performed: Yes    Patient's understanding of procedure matches consent: Yes    Procedure consent matches procedure scheduled: Yes    Expected level of sedation:  Moderate  Appropriately NPO:  Yes  ASA Class:  2  Mallampati  :  Grade 2- soft palate, base of uvula, tonsillar pillars, and portion of posterior pharyngeal wall visible  Lungs:  Lungs clear with good breath sounds bilaterally  Heart:  Normal heart sounds and rate  History & Physical reviewed:  History and physical reviewed and no updates needed  Statement of review:  I have reviewed the lab findings, diagnostic data, medications, and the plan for sedation

## 2023-04-20 NOTE — PROGRESS NOTES
Care Management Discharge Note    Discharge Date: 04/20/2023       Discharge Disposition: Home ([facility] May 23rd)    Discharge Services: None    Discharge DME: None    Discharge Transportation: family or friend will provide    Patient/family educated on Medicare website which has current facility and service quality ratings: no    Education Provided on the Discharge Plan:  Yes  Persons Notified of Discharge Plans: Patient  Patient/Family in Agreement with the Plan: yes    Handoff Referral Completed: Yes    Additional Information:  Per care team rounds, patient medically ready to discharge today. ATC appointment requested.     1200: Met with patient. Discussed ATC and transplant labs. Patient confirmed appointments and follow MyChart. Patient inquired about DALTON drain removal and Antivio infusion date. Reviewed with provider. Plan for DALTON and rutledge removal 4/27/23. Patient updated. Antivio infusion one month from transplant. Provider will address. Patient notes no other concerns for plan of discharge.       Namrata Pappas RN

## 2023-04-20 NOTE — PROCEDURES
[facility]    Procedure: IR Right IJ Tunnel dialysis catheter removal    Date/Time: 4/20/2023 2:48 PM    Performed by: Ru Schaefer MD  Authorized by: Ru Schaefer MD      UNIVERSAL PROTOCOL   Site Marked: NA  Prior Images Obtained and Reviewed:  Yes  Required items: Required blood products, implants, devices and special equipment available    Patient identity confirmed:  Verbally with patient, arm band, provided demographic data and hospital-assigned identification number  Patient was reevaluated immediately before administering moderate or deep sedation or anesthesia  Confirmation Checklist:  Patient's identity using two indicators, relevant allergies, procedure was appropriate and matched the consent or emergent situation and correct equipment/implants were available  Time out: Immediately prior to the procedure a time out was called    Universal Protocol: the Joint Commission Universal Protocol was followed    Preparation: Patient was prepped and draped in usual sterile fashion       ANESTHESIA    Anesthesia: Local infiltration  Local Anesthetic:  Lidocaine 1% without epinephrine  Anesthetic Total (mL):  15      SEDATION  Patient Sedated: Yes    Sedation Type:  Moderate (conscious) sedation  Sedation:  Fentanyl and midazolam  Vital signs: Vital signs monitored during sedation    See dictated procedure note for full details.  Findings: Right IJ tunneled dialysis catheter was removed.  Pressure was held at the venotomy site.  No immediate complication.    Specimens: none    Complications: None    Condition: Stable      PROCEDURE    Patient Tolerance:  Patient tolerated the procedure well with no immediate complications  Length of time physician/provider present for 1:1 monitoring during sedation: 20

## 2023-04-20 NOTE — IR NOTE
Patient Name: Caity Horan  Medical Record Number: [id number]  Today's Date: 4/20/2023    Procedure: Tunneled central line removal  Proceduralist: Dr. Silvano Monique    Procedure Start: 1417  Procedure end: 1440  Sedation medications administered: 100 mcg fentanyl, 2 mg versed   Sedation time: 20 minutes    Report given to: Bushra POLLOCK 7A  : [email or web address]    Other Notes: Pt arrived to IR room 7 from . Consent reviewed. Pt denies any questions or concerns regarding procedure. Pt positioned supine and monitored per protocol. Pt tolerated procedure without any noted complications. Pt transferred back to .

## 2023-04-20 NOTE — PHARMACY-TRANSPLANT NOTE
Solid Organ Transplant Recipient Prior to Discharge Note    31 yo F s/p LDKT 4/14/23 for recurrent IgA/chronic AMR   4/17 kidney Bx NEGATIVE for AMR and ACR; RTOR finding urine leak. Anastomosis redone and ureteral stent placed    PMHx: IgA s/p LDKT 2014, anxiety, Ulcerative colitis, EBV viremia     Induction - 6mg/kg = 350mg   4/13 thymo 125mg;    4/14 thymo 125mg;    4/15 thymo 100mg;      Maintenance  - Mycophenolate mofetil 750mg   - Tacrolimus goal 8-10ng/mL for first six months  - No further steroids warranted at this time    Opportunistic pathogen prophylaxis includes:  - PJP: trimethoprim/sulfamethoxazole indefinitely   - CMV D+/R-: Valganciclovir for 6 months duration     Ulcerative Colitis  - Vedolizumab 300mg [id number] to not be given until at least 1 month after transplant (5/13/2023).  will continue post transplant as the additive immunosuppression is insignificantly minimal     Pharmacy has monitored for medication interactions and immunosuppression levels in conjunction with the multidisciplinary team. In anticipation for discharge, medication therapy needs have been addressed daily throughout the current admission via multidisciplinary rounds and/or discussions, order verification, daily clinical pharmacy review, and communication with prescribers.    Amilcar Ray PharmD, BCTXP, BCPS  Inpatient clinical pharmacist

## 2023-04-20 NOTE — PLAN OF CARE
"Vitals: /81 (BP Location: Left arm)   Pulse 86   Temp 97.8  F (36.6  C) (Oral)   Resp 16   Ht 1.6 m (5' 3\")   Wt 62.9 kg (138 lb 10.7 oz)   SpO2 96%   BMI 24.56 kg/m      Endocrine: n/a  Labs: Improving labs, low Magnesium, Phosphorus and HGB. Oral replacements increased for electrolytes.  Pain: Improving abdominal pain.  PRN's: Oxycodone 5 mg  Diet: NPO  LDA: L PIV saline locked, rutledge, R CVC, DALTON.  GI: One loose/soft  medium BM, declined laxatives, good bowel sounds, passing gas.  : Rutledge in place, will discharge home with Lasix 40 mg IV X1.  Skin: Abdominal incision with dermabond.  Neuro: Tearful, frustrated.  Mobility: Up ad matthias.  Education: Reviewed morning medications, discharge prescriptions and discharge orders. Patient was not engaged in reviewing medications. Supplies and instructions given to patient for management of rutledge and DALTON at home.  Plan: IR for line removal, discharge home this afternoon. Report given to [facility] for appointment tomorrow morning.                           "

## 2023-04-20 NOTE — PLAN OF CARE
DISCHARGE:  D: Patient with orders to discharge to home.   I: Discharge instructions, medications, & follow ups reviewed with patient and family. Copy of discharge summary given to patient.  Left PIV removed. All belongings packed & sent with patient. Medications filled & picked up at discharge pharmacy. Paperwork faxed.   A: Patient in stable condition. AVSS. Patient had no further questions regarding discharge instructions and medications. Patient transferred out by wheelchair & left with family.   P: Plan for patient to return home with rutledge catheter and DALTON drain. Follow up appointments scheduled.

## 2023-04-20 NOTE — PROGRESS NOTES
[facility]   Transplant Nephrology Progress Note  Date of Admission:  4/13/2023  Today's Date: 04/20/2023    Recommendations:  - Ureteral stent will need to be scheduled for removal in 4-6 weeks post txp  - Rutledge for total of 10 days  - Recommend IR remove RIJ tunneled line under sedation while inpatient (today)  - Discussed at length about the importance to have and keep the drain in her abdomen due to the urine leak she had.   - Ok to use one more Lasix dose 40mg IV   -Do not recommend biologic until 1 month post transplant  -Decrease tac to 1.5mg bid    Assessment & Plan   # LDKT: Stable. Scr increased post txp due to urine leak. Now s/p repair on 4/17.    - Baseline Creatinine: ~ TBD   - Proteinuria: Not checked post transplant   - Date DSA Last Checked: Apr/2023      Latest DSA: No   - BK Viremia: Not checked post transplant   - Kidney Tx Biopsy: Apr 17, 2023; Result: preliminary negative for rejection   - Transplant Ureteral Stent: Yes    -Double J stent placed due to urine leak on 4/17/23.  Remove 4-6 weeks post transplant.    # Urine leak:   -RTOR on 4/17/23 due to increase in Scr with large pernephric collection found on U/S   -Fluid creatinine elevated c/w urine leak.    -Stent placed, rutledge replaced. Rutledge for 10d post op     # Immunosuppression: Tacrolimus immediate release (goal 8-10) and Mycophenolate mofetil (dose 750 mg every 12 hours)   - Induction with Recent Transplant:  High Intensity Protocol    - Changes: Yes - decrease tac to 1.5mg bid    # Infection Prophylaxis:   - PJP: Sulfa/TMP (Bactrim)  - CMV: Valganciclovir (Valcyte); Patient is CMV IgG Ab discordant (D+/R-) and will continue on Valcyte x 6 months, then check CMV PCR monthly until 12 months post transplant.    # Blood Pressure: Controlled;  Goal BP: < 150/90   - Volume status: Mildly hypervolemic   - Changes: Yes - recommend another dose of furosemide 40mg IV today    # Elevated Blood  Glucose: Glucose generally running ~ 90s and normal with today's labs.   - Management as per primary team.    # Anemia in Chronic Renal Disease: Hgb: Stable, low      MICKEY: No   - Iron studies: High iron saturation    # Mineral Bone Disorder:   - Secondary renal hyperparathyroidism; PTH level: Moderately elevated (301-600 pg/ml)        On treatment: None  - Vitamin D; level: Normal        On supplement: No  - Calcium; level: Low     On supplement: No  - Phosphorus; level: Normal        On supplement: Yes,  phosphorus 500 mg TID    # Electrolytes:   - Potassium; level: Normal        On supplement: No  - Magnesium; level: Low normal        On supplement: Yes; magnesium oxide 400 mg BID  - Bicarbonate; level: Normal        On supplement: No    # Acute Abdominal Pain:    -Improved after repair of urine leak on 4/17/23   -Minimize narcotics, bowel regimen    # Ulcerative Colitis:    -  Patient had been maintained on vedolizumab with next dose previously scheduled 4/22.  Most recent UC flare Dec 2022 with mild chronic active colitis on most recent colonoscopy 2/2022.  Gastroenterology reports (4/16) that ulcerative colitis flare is unlikely at this time. KUB 4/16 with moderate stool burden.    -Do not recommend vedolizumab until 1m post txp   -Continue stool regimen    # Transplant History:  Etiology of Kidney Failure: IgA nephropathy  Tx: LDKT  Transplant: 4/13/2023 (Kidney), 12/5/2014 (Kidney)  Donor Type: Living Donor Class:   Crossmatch at time of Tx: negative  DSA at time of Tx: No  Significant changes in immunosuppression: None  Significant transplant-related complications: Urine leak    Recommendations were communicated to the primary team verbally.    Irena Bishop MD  [phone]    Physician Attestation   I, Zhang Barrera MD, personally examined and evaluated this patient.  I discussed the patient with the resident/fellow and care team, and agree with the assessment and plan of care as documented in the note on  "23 .      I personally reviewed vital signs, medications, labs, and imaging.  Zhang Barrera MD  Date of Service (when I saw the patient): 23          Interval History   No N/V/D  Ms. Sanchez creatinine is 1.3 (); Trend down.  UOP 34 L/24h. Received 40 mg IV lasix   Other significant labs/tests/vitals: VS stable, afebrile overnight.  No chest pain or shortness of breath.  trace leg swelling.      Review of Systems   4 point ROS was obtained and negative except as noted in the Interval History.    MEDICATIONS:    bisacodyl  5-10 mg Oral BID     magnesium oxide  400 mg Oral BID     mycophenolate  750 mg Oral BID IS     phosphorus tablet 250 mg  500 mg Oral TID     sodium chloride (PF)  10 mL Intracatheter Q8H     sulfamethoxazole-trimethoprim  1 tablet Oral Daily     tacrolimus  2 mg Oral BID IS     valGANciclovir  450 mg Oral Daily         Physical Exam   Temp  Av.4  F (36.9  C)  Min: 97.9  F (36.6  C)  Max: 100  F (37.8  C)      Pulse  Av.5  Min: 78  Max: 111 Resp  Av.1  Min: 7  Max: 26  SpO2  Av %  Min: 94 %  Max: 100 %    CVP (mmHg): 13 mmHgBP (!) 127/90 (BP Location: Left arm)   Pulse 85   Temp 97.5  F (36.4  C) (Oral)   Resp 14   Ht 1.6 m (5' 3\")   Wt 62.9 kg (138 lb 10.7 oz)   SpO2 95%   BMI 24.56 kg/m     Date 04/15/23 0700 - 23 0659   Shift 5272-3543 3392-9214 6427-7110 24 Hour Total   INTAKE   P.O. 750   750   I.V. 10   10   Shift Total(mL/kg) 760(11.92)   760(11.92)   OUTPUT   Shift Total(mL/kg)       Weight (kg) 63.78 63.78 63.78 63.78      Admit Weight: 58.2 kg (128 lb 4.9 oz)     GENERAL APPEARANCE: alert in moderate distress  RESP: lungs clear to auscultation - no rales, rhonchi or wheezes  CV: regular rhythm, normal rate, no rub, no murmur  EDEMA: 1+ LE edema bilaterally  ABDOMEN: soft, distended, tender, bowel sounds normal  MS: extremities normal - no gross deformities noted, no evidence of inflammation in joints, no muscle tenderness  SKIN: no " rash  TX KIDNEY: mild TTP  DIALYSIS ACCESS:  Right IJ tunneled catheter    Data   All labs reviewed by me.  CMP  Recent Labs   Lab 04/20/23  0609 04/19/23  0827 04/18/23  0649 04/17/23  1613 04/17/23  0545    138 139 139 137   POTASSIUM 4.5 4.2 4.2 4.2 3.9   CHLORIDE 104 105 107 107 104   CO2 25 26 22 20* 20*   ANIONGAP 10 7 10 12 13   GLC 90 100* 94 100* 94   BUN 7.6 8.3 14.0 24.9* 29.1*   CR 1.38* 1.30* 1.70* 3.26* 4.48*   GFRESTIMATED 52* 56* 40* 18* 13*   MICAH 8.4* 8.2* 8.2* 8.2* 8.4*   MAG 1.4* 1.5* 1.5*  --  2.0   PHOS 1.9* 1.6* 1.7*  --  2.5     CBC  Recent Labs   Lab 04/20/23  0609 04/19/23  0827 04/18/23  0649 04/17/23  0545   HGB 7.3* 7.0* 6.9* 7.3*   WBC 3.2* 3.4* 4.0 4.2   RBC 2.46* 2.35* 2.28* 2.44*   HCT 24.7* 23.3* 23.5* 24.8*    99 103* 102*   MCH 29.7 29.8 30.3 29.9   MCHC 29.6* 30.0* 29.4* 29.4*   RDW 13.2 13.6 13.5 13.7    199 155 146*     INR  Recent Labs   Lab 04/17/23  0904   INR 1.27*     ABGNo lab results found in last 7 days.   Urine Studies  Recent Labs   Lab Test 12/05/22  1200 12/19/18  0615   COLOR Light Yellow Yellow   APPEARANCE Clear Clear   URINEGLC 50* Negative   URINEBILI Negative Negative   URINEKETONE Negative Negative   SG 1.012 1.019   UBLD Moderate* Negative   URINEPH 7.5* 5.0   PROTEIN 300* Negative   NITRITE Negative Negative   LEUKEST Negative Negative   RBCU 4* 1   WBCU 8* 2     Recent Labs   Lab Test 01/14/21  1200 12/03/20  0915 06/18/20  0950 11/11/19  0718 07/18/19  0809 06/27/19  0746 12/19/18  0615 11/01/18  0745 02/01/18  0656 07/19/17  0727 12/13/16  0747 05/19/16  0801 12/08/15  1210 06/30/15  0822   UTPG 2.09* 1.36* 1.23* 0.17 0.25* 0.83* 0.12 0.07 0.05 0.10 0.10 0.07 0.08 0.06     PTH  Recent Labs   Lab Test 04/03/23  1556   PTHI 309*     Iron Studies  Recent Labs   Lab Test 04/03/23  1556 12/22/22  0608 06/27/19  0742 11/01/18  0737 08/23/18  0743 07/26/18  0830 05/17/18  0740 05/03/18  1320 03/29/18  0729 02/01/18  0702 12/01/17  1417  10/12/17  0744 07/25/17  1648   IRON 186* 80 99 113 131 73 19* 19* 29* 36 112 14* 21*    223* 288 262 236* 242 369 321 210* 291 274 423 467*   IRONSAT 71* 36 34 43 55* 30 5* 6* 14* 12* 41 3* 4*   FRANNY 902* 361* 233* 216* 226* 216* 7* 13 90 14 277* 3* 3*       IMAGING:  All imaging studies reviewed by me.

## 2023-04-21 ENCOUNTER — TELEPHONE (OUTPATIENT)
Dept: TRANSPLANT | Facility: CLINIC | Age: 33
End: 2023-04-21

## 2023-04-21 ENCOUNTER — LAB (OUTPATIENT)
Dept: LAB | Facility: CLINIC | Age: 33
End: 2023-04-21
Payer: COMMERCIAL

## 2023-04-21 ENCOUNTER — OFFICE VISIT (OUTPATIENT)
Dept: INFUSION THERAPY | Facility: CLINIC | Age: 33
End: 2023-04-21
Attending: INTERNAL MEDICINE
Payer: COMMERCIAL

## 2023-04-21 VITALS
TEMPERATURE: 97.8 F | HEART RATE: 103 BPM | RESPIRATION RATE: 14 BRPM | SYSTOLIC BLOOD PRESSURE: 109 MMHG | DIASTOLIC BLOOD PRESSURE: 75 MMHG | OXYGEN SATURATION: 97 % | WEIGHT: 133.6 LBS | BODY MASS INDEX: 23.67 KG/M2

## 2023-04-21 DIAGNOSIS — Z94.0 KIDNEY REPLACED BY TRANSPLANT: Primary | ICD-10-CM

## 2023-04-21 DIAGNOSIS — Z94.0 KIDNEY REPLACED BY TRANSPLANT: ICD-10-CM

## 2023-04-21 DIAGNOSIS — N02.B9 IGA NEPHROPATHY: ICD-10-CM

## 2023-04-21 DIAGNOSIS — N02.B9 IGA NEPHROPATHY: Primary | ICD-10-CM

## 2023-04-21 DIAGNOSIS — E83.42 HYPOMAGNESEMIA: ICD-10-CM

## 2023-04-21 LAB
ANION GAP SERPL CALCULATED.3IONS-SCNC: 8 MMOL/L (ref 7–15)
BASOPHILS # BLD MANUAL: 0 10E3/UL (ref 0–0.2)
BASOPHILS NFR BLD MANUAL: 0 %
BUN SERPL-MCNC: 6.6 MG/DL (ref 6–20)
CALCIUM SERPL-MCNC: 8.8 MG/DL (ref 8.6–10)
CHLORIDE SERPL-SCNC: 103 MMOL/L (ref 98–107)
CREAT SERPL-MCNC: 1.29 MG/DL (ref 0.51–0.95)
DACRYOCYTES BLD QL SMEAR: SLIGHT
DEPRECATED HCO3 PLAS-SCNC: 27 MMOL/L (ref 22–29)
EOSINOPHIL # BLD MANUAL: 0.1 10E3/UL (ref 0–0.7)
EOSINOPHIL NFR BLD MANUAL: 4 %
ERYTHROCYTE [DISTWIDTH] IN BLOOD BY AUTOMATED COUNT: 13.2 % (ref 10–15)
FRAGMENTS BLD QL SMEAR: SLIGHT
GFR SERPL CREATININE-BSD FRML MDRD: 56 ML/MIN/1.73M2
GLUCOSE SERPL-MCNC: 112 MG/DL (ref 70–99)
HCT VFR BLD AUTO: 27.2 % (ref 35–47)
HGB BLD-MCNC: 8.6 G/DL (ref 11.7–15.7)
LYMPHOCYTES # BLD MANUAL: 0.2 10E3/UL (ref 0.8–5.3)
LYMPHOCYTES NFR BLD MANUAL: 7 %
MAGNESIUM SERPL-MCNC: 1.3 MG/DL (ref 1.7–2.3)
MCH RBC QN AUTO: 30.3 PG (ref 26.5–33)
MCHC RBC AUTO-ENTMCNC: 31.6 G/DL (ref 31.5–36.5)
MCV RBC AUTO: 96 FL (ref 78–100)
MONOCYTES # BLD MANUAL: 0.2 10E3/UL (ref 0–1.3)
MONOCYTES NFR BLD MANUAL: 9 %
MYCOPHENOLATE SERPL LC/MS/MS-MCNC: 1.12 MG/L (ref 1–3.5)
MYCOPHENOLATE-G SERPL LC/MS/MS-MCNC: 66.1 MG/L (ref 30–95)
MYELOCYTES # BLD MANUAL: 0.1 10E3/UL
MYELOCYTES NFR BLD MANUAL: 2 %
NEUTROPHILS # BLD MANUAL: 2 10E3/UL (ref 1.6–8.3)
NEUTROPHILS NFR BLD MANUAL: 78 %
PHOSPHATE SERPL-MCNC: 2.1 MG/DL (ref 2.5–4.5)
PLAT MORPH BLD: ABNORMAL
PLATELET # BLD AUTO: 276 10E3/UL (ref 150–450)
POTASSIUM SERPL-SCNC: 4.5 MMOL/L (ref 3.4–5.3)
RBC # BLD AUTO: 2.84 10E6/UL (ref 3.8–5.2)
RBC MORPH BLD: ABNORMAL
SODIUM SERPL-SCNC: 138 MMOL/L (ref 136–145)
TACROLIMUS BLD-MCNC: 10.3 UG/L (ref 5–15)
TME LAST DOSE: NORMAL H
WBC # BLD AUTO: 2.5 10E3/UL (ref 4–11)

## 2023-04-21 PROCEDURE — 36415 COLL VENOUS BLD VENIPUNCTURE: CPT | Performed by: PATHOLOGY

## 2023-04-21 PROCEDURE — 84100 ASSAY OF PHOSPHORUS: CPT | Performed by: PATHOLOGY

## 2023-04-21 PROCEDURE — 80048 BASIC METABOLIC PNL TOTAL CA: CPT | Performed by: PATHOLOGY

## 2023-04-21 PROCEDURE — 99000 SPECIMEN HANDLING OFFICE-LAB: CPT | Performed by: PATHOLOGY

## 2023-04-21 PROCEDURE — 85027 COMPLETE CBC AUTOMATED: CPT | Performed by: PATHOLOGY

## 2023-04-21 PROCEDURE — 99215 OFFICE O/P EST HI 40 MIN: CPT | Mod: 24 | Performed by: NURSE PRACTITIONER

## 2023-04-21 PROCEDURE — 80197 ASSAY OF TACROLIMUS: CPT | Mod: 90 | Performed by: PATHOLOGY

## 2023-04-21 PROCEDURE — 80180 DRUG SCRN QUAN MYCOPHENOLATE: CPT | Mod: 90 | Performed by: PATHOLOGY

## 2023-04-21 PROCEDURE — 83735 ASSAY OF MAGNESIUM: CPT | Performed by: PATHOLOGY

## 2023-04-21 PROCEDURE — G0463 HOSPITAL OUTPT CLINIC VISIT: HCPCS | Mod: 25 | Performed by: NURSE PRACTITIONER

## 2023-04-21 PROCEDURE — 85007 BL SMEAR W/DIFF WBC COUNT: CPT | Performed by: PATHOLOGY

## 2023-04-21 RX ORDER — MAGNESIUM OXIDE 400 MG/1
800 TABLET ORAL 3 TIMES DAILY
Qty: 120 TABLET | Refills: 2 | COMMUNITY
Start: 2023-04-21 | End: 2023-05-12

## 2023-04-21 NOTE — PROGRESS NOTES
TRANSPLANT SURGERY EARLY POST TRANSPLANT VISIT    Assessment & Plan   # LDKT: Trend down. Increase Mag Ox to TID.   Rutledge output 2400ml yesterday and DALTON drain 30ml. Feeling well. SIPC tomorrow.    - Baseline Creatinine: ~ TBD   - Proteinuria: Not checked recently   - Date DSA Last Checked: Apr/2023      Latest DSA: No DSA at time of transplant   - BK Viremia: No   - Kidney Tx Biopsy: No   - Transplant Ureteral Stent: Yes     # Urine leak:              -RTOR on 4/17/23 due to increase in Scr with large pernephric collection found on U/S              -Stent placed, rutledge replaced. Rutledge for 10d post op     # Immunosuppression: Tacrolimus immediate release (goal 8-10) and Mycophenolate mofetil (dose 750 mg every 12 hours)   - Induction with Recent Transplant:  High Intensity Protocol   - Continue with intensive monitoring of immunosuppression for efficacy and toxicity.   - Changes: Not at this time    # Infection Prophylaxis:   - PJP: Sulfa/TMP (Bactrim)  - CMV: Valganciclovir (Valcyte)    # Hypertension: Controlled;  Goal BP: < 150/90   - Volume status: Euvolemic  EDW ~    - Changes: Not at this time     # Elevated Blood Glucose: Glucose generally running ~ 90s   - Management as per primary team.    # Anemia in Chronic Renal Disease: Hgb: Stable      MICKEY: No   - Iron studies: Not checked recently    # Mineral Bone Disorder:   - Secondary renal hyperparathyroidism; PTH level: Moderately elevated (301-600 pg/ml)        On treatment: None  - Vitamin D; level: Normal        On supplement: No  - Calcium; level: Normal        On supplement: No  - Phosphorus; level: Low - trending up       On supplement: Yes    # Electrolytes:   - Potassium; level: Normal        On supplement: No  - Magnesium; level: Low        On supplement: Yes- increase to TID   - Bicarbonate; level: Normal        On supplement: No  - Sodium; level: Normal    # Ulcerative Colitis:               -  Patient had been maintained on vedolizumab with next dose  previously scheduled 4/22.  Most recent UC flare Dec 2022 with mild chronic active colitis on most recent colonoscopy 2/2022.  Gastroenterology reports (4/16) that ulcerative colitis flare is unlikely at this time. KUB 4/16 with moderate stool burden.               -Tx nephrology does not recommend vedolizumab until 1m post txp              -Continue stool regimen    # Transplant History:  Etiology of Kidney Failure: IgA nephropathy  Tx: LDKT  Transplant: 4/13/2023 (Kidney), 12/5/2014 (Kidney)  Donor Type: Living Donor Class:   Crossmatch at time of Tx: negative  DSA at time of Tx: No  Significant changes in immunosuppression: None  CMV IgG Ab High Risk Discordance (D+/R-): No  EBV IgG Ab High Risk Discordance (D+/R-): No  Significant transplant-related complications: urine leak     Transplant Office Phone Number: [phone]    Assessment and plan was discussed with the patient and she voiced her understanding and agreement.    Return visit: No follow-ups on file.    TANYA Helm CNP    Chief Complaint   Ms. Horan is a 32 year old here for kidney transplant and follow up after hospitalization.     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 She is now s/p LDKT on 4/13/22 with Dr. Sepulveda. Patient returned to OR 4/17/23 for re-exploration and intra-op biopsy d/t concern for urine leak vs rejection which found urine leak at anastomosis and hydronephrotic ureter without signs of obstruction - anastomosis redone and ureteral stent placed    Feeling well. No N/V. Eating and drinking well. Pain controlled.   No constipation, stools are soft but not watery.     DALTON put out 30ml yesterday. [place] 2400ml yesterday.     Problem List   Patient Active Problem List   Diagnosis     Metabolic acidosis     IgA nephropathy     Kidney replaced by transplant      Immunosuppressed status (H)     Hypomagnesemia     Aftercare following organ transplant     EBV (Nicole-Barr virus) viremia     Anemia, iron deficiency     Vitamin B12 deficiency     Ulcerative pancolitis with rectal bleeding (H)     Acute rejection of kidney transplant     Ulcerative colitis (H)     Vitamin D deficiency     Dialysis patient (H)     Awaiting organ transplant     ESRD (end stage renal disease) (H)     Pre-diabetes     Encounter for pregnancy test     Urine leak from transplanted ureter     Acute post-operative pain     Constipation     Hypophosphatemia       Allergies   Allergies   Allergen Reactions     Bumetanide Other (See Comments)     Causes paralysis     Amoxicillin Rash       Medications   Current Outpatient Medications   Medication Sig     magnesium oxide (MAG-OX) 400 MG tablet Take 2 tablets (800 mg) by mouth 3 times daily     acetaminophen (TYLENOL) 325 MG tablet Take 2 tablets (650 mg) by mouth every 4 hours as needed for other (For optimal non-opioid multimodal pain management to improve pain control.)     bisacodyl (DULCOLAX) 5 MG EC tablet Take 1-2 tablets (5-10 mg) by mouth 2 times daily as needed for constipation     hydrOXYzine (ATARAX) 25 MG tablet Take 1 tablet (25 mg) by mouth every 4 hours as needed for anxiety or other (adjuvant pain)     levonorgestrel (MIRENA) 20 MCG/DAY IUD 1 each by Intrauterine route Replacement every 7 years. Placed 1/2019     methocarbamol (ROBAXIN) 500 MG tablet Take 1 tablet (500 mg) by mouth 4 times daily as needed for muscle spasms     mycophenolate (GENERIC EQUIVALENT) 250 MG capsule Take 3 capsules (750 mg) by mouth 2 times daily     ondansetron (ZOFRAN ODT) 4 MG ODT tab Take 1 tablet (4 mg) by mouth every 6 hours as needed for nausea or vomiting     oxyCODONE (ROXICODONE) 5 MG tablet Take 1 tablet (5 mg) by mouth every 6 hours as needed for moderate pain     phosphorus tablet 250 mg (PHOSPHA 250 NEUTRAL) 250 MG per tablet Take 2 tablets (500 mg)  by mouth 3 times daily     simethicone (MYLICON) 80 MG chewable tablet Take 1 tablet (80 mg) by mouth every 6 hours as needed for cramping     sulfamethoxazole-trimethoprim (BACTRIM) 400-80 MG tablet Take 1 tablet by mouth daily     tacrolimus (GENERIC EQUIVALENT) 0.5 MG capsule Take 1 capsule (0.5 mg) by mouth 2 times daily To have available for dose adjustments per transplant team. Discharge dose = 1 mg BID.     tacrolimus (GENERIC EQUIVALENT) 1 MG capsule Take 1 capsule (1 mg) by mouth 2 times daily Hold 4/20 evening dose. Start 1 mg BID tomorrow.     valGANciclovir (VALCYTE) 450 MG tablet Take 2 tablets (900 mg) by mouth daily     vedolizumab (ENTYVIO) 60 MG/ML injection Inject 300 mg into the vein once every six weeks     No current facility-administered medications for this visit.     Medications Discontinued During This Encounter   Medication Reason     magnesium oxide (MAG-OX) 400 MG tablet        Physical Exam   Vital Signs: There were no vitals taken for this visit.    GENERAL APPEARANCE: alert and no distress  HENT: mouth without ulcers or lesions  LYMPHATICS: no cervical or supraclavicular nodes  RESP: lungs clear to auscultation - no rales, rhonchi or wheezes  CV: regular rhythm, normal rate, no rub, no murmur  EDEMA: no LE edema bilaterally  ABDOMEN: soft, nondistended, nontender, bowel sounds normal  MS: extremities normal - no gross deformities noted, no evidence of inflammation in joints, no muscle tenderness  SKIN: no rash  SKIN: lower abd incision intact with surgical glue. No erythema or drainage. DALTON site intact.     Data         Latest Ref Rng & Units 4/21/2023     7:55 AM 4/20/2023     6:09 AM 4/19/2023     8:27 AM   Renal   Sodium 136 - 145 mmol/L 138   139   138     K 3.4 - 5.3 mmol/L 4.5   4.5   4.2     Cl 98 - 107 mmol/L 103   104   105     Cl (external) 98 - 107 mmol/L 103   104   105     CO2 22 - 29 mmol/L 27   25   26     Urea Nitrogen 6.0 - 20.0 mg/dL 6.6   7.6   8.3     Creatinine 0.51  - 0.95 mg/dL 1.29   1.38   1.30     Glucose 70 - 99 mg/dL 112   90   100     Calcium 8.6 - 10.0 mg/dL 8.8   8.4   8.2     Magnesium 1.7 - 2.3 mg/dL 1.3   1.4   1.5           Latest Ref Rng & Units 4/21/2023     7:55 AM 4/20/2023     6:09 AM 4/19/2023     8:27 AM   Bone Health   Phosphorus 2.5 - 4.5 mg/dL 2.1   1.9   1.6           Latest Ref Rng & Units 4/21/2023     7:55 AM 4/20/2023     6:09 AM 4/19/2023     8:27 AM   Heme   WBC 4.0 - 11.0 10e3/uL 2.5   3.2   3.4     Hgb 11.7 - 15.7 g/dL 8.6   7.3   7.0     Plt 150 - 450 10e3/uL 276   211   199     ABSOLUTE NEUTROPHIL 1.6 - 8.3 10e3/uL 2.0   2.9   2.8     ABSOLUTE LYMPHOCYTES 0.8 - 5.3 10e3/uL 0.2   0.1   0.1     ABSOLUTE MONOCYTES 0.0 - 1.3 10e3/uL 0.2   0.2   0.3     ABSOLUTE EOSINOPHILS 0.0 - 0.7 10e3/uL 0.1   0.1   0.1           Latest Ref Rng & Units 4/11/2023     8:32 AM 4/3/2023     3:56 PM 1/25/2023    12:21 PM   Liver   AP 35 - 104 U/L 70   75   67     TBili <=1.2 mg/dL 0.6   0.5   0.4     Bilirubin Direct 0.00 - 0.30 mg/dL <0.20    <0.20     ALT 10 - 35 U/L 15   9   <5     AST 10 - 35 U/L 23   19   14     Tot Protein 6.4 - 8.3 g/dL 8.6   8.4   6.8     Albumin 3.5 - 5.2 g/dL 5.1   5.0   4.3           Latest Ref Rng & Units 4/3/2023     3:56 PM   Pancreas   A1C <5.7 % 5.3           Latest Ref Rng & Units 4/3/2023     3:56 PM 12/22/2022     6:08 AM 6/27/2019     7:42 AM   Iron studies   Iron 37 - 145 ug/dL 186   80   99     Iron Saturation Index 15 - 46 %   34     Iron Sat Index 15 - 46 % 71   36      Ferritin 6 - 175 ng/mL 902   361   233           Latest Ref Rng & Units 4/13/2023     1:23 PM 4/3/2023     3:56 PM 12/5/2022    12:12 PM   [facility] Txp Virology   CMV QUANT IU/ML Not Detected IU/mL Not Detected       EBV CAPSID ANTIBODY IGG No detectable antibody.  Positive   Positive     EBV DNA COPIES/ML Not Detected copies/mL   <500     EBV DNA LOG OF COPIES    <2.7          Recent Labs   Lab Test 12/03/20  0915 01/14/21  1200 04/16/21  1005 12/24/22  0548  04/18/23  0649 04/19/23  0827 04/20/23  0609   DOSTAC Not Provided LAST DOSE 1.14.21 AT 1000 Not Provided  --   --   --   --    TACROL 4.6* 10.0 4.0*   < > 17.7* 14.3 15.3*    < > = values in this interval not displayed.     Recent Labs   Lab Test 03/02/15  0810 03/09/15  0821 05/24/17  0725   DOSMPA Not Provided 2130 3/8/15 2230,5/23/17   MPACID 4.29* 3.30 0.75*   MPAG 39.0 41.1 10.2*

## 2023-04-21 NOTE — LETTER
OUTPATIENT LABORATORY TEST ORDER     Patient Name: Caity Horan   YOB: 1990     [facility] MR# [if applicable]: [id number]   Date & Time: April 17, 2023  11:58 AM  Expiration Date: 1 year after date issued      Diagnosis: Kidney Transplant (ICD-10 Z94.0)    Aftercare following organ transplant (ICD-10 Z48.288)    Long term use of medications (ICD-10 Z79.899)    Contact with and (suspected) exposure to other viral communicable   diseases (Z20.828)     We ask your assistance in obtaining the following laboratory tests, which are part of our routine surveillance program for Solid Organ Transplant patients.     Please fax each result to [phone], same day as resulted/available    Critical lab results page [phone]  Monday - Friday 8 am to 5 pm  Evening/Weekend/Holiday communicate Critical labs results [phone]    First 8 weeks post-transplant (4/13/2023 - 6/13/2023)  Labs 2x/week (Monday and Thursday)  CBC with platelets  Basic Metabolic Panel (Sodium, Potassium, Chloride, Creatinine, CO2, Urea Nitrogen, glucose, Calcium)  Tacrolimus/Prograf/ drug level    Labs weekly (Monday)  Phosphorus  Magnesium    Labs every 2 weeks  Mycophenolic Acid Level    Months 2-4 post-transplant (6/14/2023 - 8/14/2023)  Labs 1x weekly (Monday or Tuesday)  CBC with platelets  Basic Metabolic Panel (Sodium, Potassium, Chloride, Creatinine, CO2, Urea Nitrogen, glucose, Calcium)  Tacrolimus/Prograf/ drug level  Labs monthly   Phosphorus  Magnesium  Months 4-7 post-transplant (8/15/2023 - 11/15/2023)  Labs every other week  CBC with platelets  Basic Metabolic Panel (Sodium, Potassium, Chloride, Creatinine, CO2, Urea Nitrogen, glucose, Calcium)  Tacrolimus/Prograf/ drug level  Monthly  Phosphorus  Magnesium  BK (Polyoma Virus) PCR Quantitative/Plasma    Months 7-12 post-transplant (11/16/2023 - 4/13/2024)  Monthly  CBC with platelets  Basic Metabolic Panel (Sodium, Potassium, Chloride,  Creatinine, CO2, Urea Nitrogen, glucose, Calcium)  Tacrolimus/Prograf/ drug level  BK (Polyoma Virus) PCR Quantitative/Plasma  CMV PCR QT    At 1-month post-transplant (Due: 5/13/2023)  DSA PRA (patient to supply )   BK Virus PCR Quantitative/Plasma  Urine protein/creatinine  Iron Panel  Vitamin D Deficiency Screening  PTH Intact  HBV, HCV, HIV by WENCESLAO testing  If unable to conduct WENCESLAO testing, please substitute the following:   Hepatitis B DNA Quantitative, Real-Time PCR   Hepatitis C RNA, Quantitation   HIV 1 RNA, Quantitation   HIV 2 RNA, Quantitation     At 2 months post-transplant (Due: 6/13/2023)   DSA PRA (patient to supply  kit)  BK Virus PCR Quantitative/Plasma  Urine protein/creatinine    At month 3 only (Due: 7/13/2023)  BK (Polyoma virus) PCR Quantitative/Plasma    At 4 months post-transplant (Due: 8/13/2023)  DSA PRA (patient to supply )  PTH  Urine protein/creatinine      At 6 months post-transplant (Fasting Labs) (Due: 10/23/2023)  Hepatic panel  Hemoglobin A1c  Uric Acid  Lipid panel  Urine protein/creatinine    At 7 months post-transplant (Due: 11/23/2023)   PRA/DSA (patient to supply )    At 9 months post-transplant (Due: 1/23/2024)   Urine protein/creatinine    At 12 months post-transplant (Fasting labs) Due: 4/13/2024   Hepatic panel  Hemoglobin A1c  Uric Acid  Lipid panel  Urine protein/creatinine  PTH  Vitamin D    If you have any questions please call the Transplant Center at [phone]. All lab results should be faxed to [phone]    .

## 2023-04-21 NOTE — LETTER
4/21/2023         RE: Caity Horan  [address]  [address]  [place] [address]        Dear Colleague,    Thank you for referring your patient, Caity Horan, to the [facility]. Please see a copy of my visit note below.    TRANSPLANT SURGERY EARLY POST TRANSPLANT VISIT    Assessment & Plan   # LDKT: Trend down. Increase Mag Ox to TID.   Rutledge output 2400ml yesterday and DALTON drain 30ml. Feeling well. SIPC tomorrow.    - Baseline Creatinine: ~ TBD   - Proteinuria: Not checked recently   - Date DSA Last Checked: Apr/2023      Latest DSA: No DSA at time of transplant   - BK Viremia: No   - Kidney Tx Biopsy: No   - Transplant Ureteral Stent: Yes     # Urine leak:              -RTOR on 4/17/23 due to increase in Scr with large pernephric collection found on U/S              -Stent placed, rutledge replaced. Rutledge for 10d post op     # Immunosuppression: Tacrolimus immediate release (goal 8-10) and Mycophenolate mofetil (dose 750 mg every 12 hours)   - Induction with Recent Transplant:  High Intensity Protocol   - Continue with intensive monitoring of immunosuppression for efficacy and toxicity.   - Changes: Not at this time    # Infection Prophylaxis:   - PJP: Sulfa/TMP (Bactrim)  - CMV: Valganciclovir (Valcyte)    # Hypertension: Controlled;  Goal BP: < 150/90   - Volume status: Euvolemic  EDW ~    - Changes: Not at this time     # Elevated Blood Glucose: Glucose generally running ~ 90s   - Management as per primary team.    # Anemia in Chronic Renal Disease: Hgb: Stable      MICKEY: No   - Iron studies: Not checked recently    # Mineral Bone Disorder:   - Secondary renal hyperparathyroidism; PTH level: Moderately elevated (301-600 pg/ml)        On treatment: None  - Vitamin D; level: Normal        On supplement: No  - Calcium; level: Normal        On supplement: No  - Phosphorus; level: Low - trending up       On supplement: Yes    # Electrolytes:   - Potassium; level:  Normal        On supplement: No  - Magnesium; level: Low        On supplement: Yes- increase to TID   - Bicarbonate; level: Normal        On supplement: No  - Sodium; level: Normal    # Ulcerative Colitis:               -  Patient had been maintained on vedolizumab with next dose previously scheduled 4/22.  Most recent UC flare Dec 2022 with mild chronic active colitis on most recent colonoscopy 2/2022.  Gastroenterology reports (4/16) that ulcerative colitis flare is unlikely at this time. KUB 4/16 with moderate stool burden.               -Tx nephrology does not recommend vedolizumab until 1m post txp              -Continue stool regimen    # Transplant History:  Etiology of Kidney Failure: IgA nephropathy  Tx: LDKT  Transplant: 4/13/2023 (Kidney), 12/5/2014 (Kidney)  Donor Type: Living Donor Class:   Crossmatch at time of Tx: negative  DSA at time of Tx: No  Significant changes in immunosuppression: None  CMV IgG Ab High Risk Discordance (D+/R-): No  EBV IgG Ab High Risk Discordance (D+/R-): No  Significant transplant-related complications: urine leak     Transplant Office Phone Number: [phone]    Assessment and plan was discussed with the patient and she voiced her understanding and agreement.    Return visit: No follow-ups on file.    TANYA Helm CNP    Chief Complaint   Ms. Horan is a 32 year old here for kidney transplant and follow up after hospitalization.     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 She is now s/p LDKT on 4/13/22 with Dr. Sepulveda. Patient returned to OR 4/17/23 for re-exploration and intra-op biopsy d/t concern for urine leak vs rejection which found urine leak at anastomosis and hydronephrotic ureter without signs of obstruction - anastomosis redone and ureteral stent placed    Feeling well. No N/V. Eating and  drinking well. Pain controlled.   No constipation, stools are soft but not watery.     DALTON put out 30ml yesterday. Wharton 2400ml yesterday.     Problem List   Patient Active Problem List   Diagnosis    Metabolic acidosis    IgA nephropathy    Kidney replaced by transplant    Immunosuppressed status (H)    Hypomagnesemia    Aftercare following organ transplant    EBV (Nicole-Barr virus) viremia    Anemia, iron deficiency    Vitamin B12 deficiency    Ulcerative pancolitis with rectal bleeding (H)    Acute rejection of kidney transplant    Ulcerative colitis (H)    Vitamin D deficiency    Dialysis patient (H)    Awaiting organ transplant    ESRD (end stage renal disease) (H)    Pre-diabetes    Encounter for pregnancy test    Urine leak from transplanted ureter    Acute post-operative pain    Constipation    Hypophosphatemia       Allergies   Allergies   Allergen Reactions    Bumetanide Other (See Comments)     Causes paralysis    Amoxicillin Rash       Medications   Current Outpatient Medications   Medication Sig    magnesium oxide (MAG-OX) 400 MG tablet Take 2 tablets (800 mg) by mouth 3 times daily    acetaminophen (TYLENOL) 325 MG tablet Take 2 tablets (650 mg) by mouth every 4 hours as needed for other (For optimal non-opioid multimodal pain management to improve pain control.)    bisacodyl (DULCOLAX) 5 MG EC tablet Take 1-2 tablets (5-10 mg) by mouth 2 times daily as needed for constipation    hydrOXYzine (ATARAX) 25 MG tablet Take 1 tablet (25 mg) by mouth every 4 hours as needed for anxiety or other (adjuvant pain)    levonorgestrel (MIRENA) 20 MCG/DAY IUD 1 each by Intrauterine route Replacement every 7 years. Placed 1/2019    methocarbamol (ROBAXIN) 500 MG tablet Take 1 tablet (500 mg) by mouth 4 times daily as needed for muscle spasms    mycophenolate (GENERIC EQUIVALENT) 250 MG capsule Take 3 capsules (750 mg) by mouth 2 times daily    ondansetron (ZOFRAN ODT) 4 MG ODT tab Take 1 tablet (4 mg) by mouth every  6 hours as needed for nausea or vomiting    oxyCODONE (ROXICODONE) 5 MG tablet Take 1 tablet (5 mg) by mouth every 6 hours as needed for moderate pain    phosphorus tablet 250 mg (PHOSPHA 250 NEUTRAL) 250 MG per tablet Take 2 tablets (500 mg) by mouth 3 times daily    simethicone (MYLICON) 80 MG chewable tablet Take 1 tablet (80 mg) by mouth every 6 hours as needed for cramping    sulfamethoxazole-trimethoprim (BACTRIM) 400-80 MG tablet Take 1 tablet by mouth daily    tacrolimus (GENERIC EQUIVALENT) 0.5 MG capsule Take 1 capsule (0.5 mg) by mouth 2 times daily To have available for dose adjustments per transplant team. Discharge dose = 1 mg BID.    tacrolimus (GENERIC EQUIVALENT) 1 MG capsule Take 1 capsule (1 mg) by mouth 2 times daily Hold 4/20 evening dose. Start 1 mg BID tomorrow.    valGANciclovir (VALCYTE) 450 MG tablet Take 2 tablets (900 mg) by mouth daily    vedolizumab (ENTYVIO) 60 MG/ML injection Inject 300 mg into the vein once every six weeks     No current facility-administered medications for this visit.     Medications Discontinued During This Encounter   Medication Reason    magnesium oxide (MAG-OX) 400 MG tablet        Physical Exam   Vital Signs: There were no vitals taken for this visit.    GENERAL APPEARANCE: alert and no distress  HENT: mouth without ulcers or lesions  LYMPHATICS: no cervical or supraclavicular nodes  RESP: lungs clear to auscultation - no rales, rhonchi or wheezes  CV: regular rhythm, normal rate, no rub, no murmur  EDEMA: no LE edema bilaterally  ABDOMEN: soft, nondistended, nontender, bowel sounds normal  MS: extremities normal - no gross deformities noted, no evidence of inflammation in joints, no muscle tenderness  SKIN: no rash  SKIN: lower abd incision intact with surgical glue. No erythema or drainage. DALTON site intact.     Data         Latest Ref Rng & Units 4/21/2023     7:55 AM 4/20/2023     6:09 AM 4/19/2023     8:27 AM   Renal   Sodium 136 - 145 mmol/L 138   139    138     K 3.4 - 5.3 mmol/L 4.5   4.5   4.2     Cl 98 - 107 mmol/L 103   104   105     Cl (external) 98 - 107 mmol/L 103   104   105     CO2 22 - 29 mmol/L 27   25   26     Urea Nitrogen 6.0 - 20.0 mg/dL 6.6   7.6   8.3     Creatinine 0.51 - 0.95 mg/dL 1.29   1.38   1.30     Glucose 70 - 99 mg/dL 112   90   100     Calcium 8.6 - 10.0 mg/dL 8.8   8.4   8.2     Magnesium 1.7 - 2.3 mg/dL 1.3   1.4   1.5           Latest Ref Rng & Units 4/21/2023     7:55 AM 4/20/2023     6:09 AM 4/19/2023     8:27 AM   Bone Health   Phosphorus 2.5 - 4.5 mg/dL 2.1   1.9   1.6           Latest Ref Rng & Units 4/21/2023     7:55 AM 4/20/2023     6:09 AM 4/19/2023     8:27 AM   Heme   WBC 4.0 - 11.0 10e3/uL 2.5   3.2   3.4     Hgb 11.7 - 15.7 g/dL 8.6   7.3   7.0     Plt 150 - 450 10e3/uL 276   211   199     ABSOLUTE NEUTROPHIL 1.6 - 8.3 10e3/uL 2.0   2.9   2.8     ABSOLUTE LYMPHOCYTES 0.8 - 5.3 10e3/uL 0.2   0.1   0.1     ABSOLUTE MONOCYTES 0.0 - 1.3 10e3/uL 0.2   0.2   0.3     ABSOLUTE EOSINOPHILS 0.0 - 0.7 10e3/uL 0.1   0.1   0.1           Latest Ref Rng & Units 4/11/2023     8:32 AM 4/3/2023     3:56 PM 1/25/2023    12:21 PM   Liver   AP 35 - 104 U/L 70   75   67     TBili <=1.2 mg/dL 0.6   0.5   0.4     Bilirubin Direct 0.00 - 0.30 mg/dL <0.20    <0.20     ALT 10 - 35 U/L 15   9   <5     AST 10 - 35 U/L 23   19   14     Tot Protein 6.4 - 8.3 g/dL 8.6   8.4   6.8     Albumin 3.5 - 5.2 g/dL 5.1   5.0   4.3           Latest Ref Rng & Units 4/3/2023     3:56 PM   Pancreas   A1C <5.7 % 5.3           Latest Ref Rng & Units 4/3/2023     3:56 PM 12/22/2022     6:08 AM 6/27/2019     7:42 AM   Iron studies   Iron 37 - 145 ug/dL 186   80   99     Iron Saturation Index 15 - 46 %   34     Iron Sat Index 15 - 46 % 71   36      Ferritin 6 - 175 ng/mL 902   361   233           Latest Ref Rng & Units 4/13/2023     1:23 PM 4/3/2023     3:56 PM 12/5/2022    12:12 PM   [facility] Txp Virology   CMV QUANT IU/ML Not Detected IU/mL Not Detected       EBV CAPSID  ANTIBODY IGG No detectable antibody.  Positive   Positive     EBV DNA COPIES/ML Not Detected copies/mL   <500     EBV DNA LOG OF COPIES    <2.7          Recent Labs   Lab Test 12/03/20  0915 01/14/21  1200 04/16/21  1005 12/24/22  0548 04/18/23  0649 04/19/23  0827 04/20/23  0609   DOSTAC Not Provided LAST DOSE 1.14.21 AT 1000 Not Provided  --   --   --   --    TACROL 4.6* 10.0 4.0*   < > 17.7* 14.3 15.3*    < > = values in this interval not displayed.     Recent Labs   Lab Test 03/02/15  0810 03/09/15  0821 05/24/17  0725   DOSMPA Not Provided 2130 3/8/15 2230,5/23/17   MPACID 4.29* 3.30 0.75*   MPAG 39.0 41.1 10.2*           TANYA Helm CNP

## 2023-04-21 NOTE — PROGRESS NOTES
"Caity Horan came to [facility] today for a lab and assess following a kidney transplant on 4/13/23.      Discharge date: 4/20/23  Transplant coordinator: Eleanor Lund RN  Phone number patient can be reached at: [phone]      Physical Assessment:  See physical assessment located under \"Document Flowsheets\".  Incision site: approximated, closed with dermabond. CDI  Lines: DALTON drain to LLQ putting out serous drainage, 30 ml since discharge  Wharton: draining clear yellow urine, 2300 ml since discharge  Urine clarity: clear, yellow urine  Hydration: reports drinking at least 2-3L  Nutrition: poor appetite, abdominal cramping r/t UC. Patient has barely eaten.    Last BM: 4/20  Pain: mild pain, tolerable with oxycodone and robaxin   My transplant place videos watched: yes    Labs drawn by [facility] staff No    Plan of care for today:   -Labs drawn  -Assessment by RN and Rosalie Crews NP  -Education completed  -Medication reconciliation  -RN talked with Eleanor Lund RN regarding future appts  -Message sent to scheduling, Eleanor Lund RN, Dr. Zhang Barrera and Rupert Dsouza PAJULIETH regarding Entyvio infusion planned 1 month post transplant    Medication changes:   Change Magnesium to three times daily    Medications administered:  Pt self-administered AM meds in [facility]    Patient education:    The following teaching topics were addressed: Importance of drinking 2L of non-caffeinated fluids daily, Incisional care, Signs/symptoms of infection, Good handwashing, Medications (purposes, doses and times of administration), Phone numbers to call with concenrs (Transplant coordinator, [address] and [facility]), 7A discharge check list, Plan of care, Drain care and Wharton cath care   Patient and sister verbalized understanding and all questions answered.    Drug level:  Patient took 1 mg of tacrolimus yesterday morning in hospital, advised to hold HS dose yesterday.  Care coordinator to follow up with the result.    Face to face time: 45 " min  Discharge Plan    Pt will follow up with [facility] tomorrow 4/22  Discharge instructions reviewed with patient: YES  Patient/Representative verbalized understanding, all questions answered: YES    Discharged from unit at 1010 with sister to home.    Rukhsana Brand RN      /75 (BP Location: Left arm, Patient Position: Sitting, Cuff Size: Adult Regular)   Pulse 103   Temp 97.8  F (36.6  C) (Oral)   Resp 14   Wt 60.6 kg (133 lb 9.6 oz)   SpO2 97%   BMI 23.67 kg/m

## 2023-04-21 NOTE — LETTER
Hello and congratulation on your newly transplanted organ!     Please review the information below about how and when to contact your solid organ transplant (SOT) team members.     WHO to contact:     Eleanor Lund RN  Transplant Care Coordinator  [phone]     HOW to contact:     During the working business days, please reach your transplant coordinator at [phone].     If concerns arise after business hours (or holidays or weekends), please reach the on-call transplant coordinator.  [phone].  An  will answer, you will need to ask them for the on-call abdominal transplant coordinator.     WHEN to contact:  **Please record the parameters below on the front of your transplant lab record book.     Please check and record your weight every AM. This should be done on the same scale in about the same wardrobe each day.  Call your coordinator if:  Your weight INCREASES or DECREASES by more than 4 pounds in 1 day.     Please check and record your blood pressure (BP) 2 times daily and as needed.  Call your coordinator if:  Your BP is GREATER than 160 / 95.  Your BP is LESS than 105 / 65.     Please check and record your pulse (heart rate) 2 times daily.  Call your coordinator if:  Your heart rate is GREATER than 100.  Your heart rate is LESS than 60.               Please check your temperature 2 times daily and as needed (if you feel feverish or have chills).  Call your coordinator IF:  Your fever is GREATER than 100.5 F  **If this occurs over night or on the weekend, please go directly to the [facility] Emergency Room at [address] [place] [address].  If you are diabetic or had a pancreas transplant, please check and record your FASTING blood sugar daily.     If you have any new onset nausea, vomiting, or diarrhea, please notify your transplant coordinator.        We look forward to working with you as you continue your transplant journey!   Eleanor Lund RN

## 2023-04-21 NOTE — TELEPHONE ENCOUNTER
Kidney and Pancreas Transplant Coordinator Transition to Outpatient Discharge Note    Pt Name: Caity Horan  : 1990    Transplant Date: 23  Hospital Discharge Date: 23    Surgeon (updated in Transplant Information tab): Derick  Nephrologist (updated in Transplant Information tab): Zaid  Transplant Coordinator: Eleanor Lund RN    Caity Horan LDRT d/t IgA nephropathy   Stent was placed.      Immunosuppression:   CNI: Tacro  Antimetabolite: MMF  Prednisone taper: No    Prophylaxis:   CMV: D+/R- : Valcyte 6 months; renal dosing  EBV: D+/R+  Antibiotic: bactrim    High Risk DSA: No.    Lines / drains in place at time of discharge:  Wharton  DALTON drain  If yes, review of parameters to track and when to contact RNCC: Yes.      Pharmacy after discharge:  specialty  Lab after discharge: [facility]  Lab letter faxed to outside lab, if needed: Yes.      Post-op course / additional monitoring:    Education:  Writer spoke with Caity Horan.   We discussed immunosuppression medications, indications, side effects, dose adjustments, and lab monitoring.   Lab draw schedule was provided via ActiveO / mail to the patient and fully explained.    DSA  kits needed:  Yes.    If so, request submitted to centralized team for send-out.  Living Donor:  Yes.    If yes, discussed with patient data collection through [facility] for KFL in effort  to improve eplet matching. This will include additional, non-clinical lab   draw organized by an external organization, [employer] mobile phlebotomy.     MyTransplant Place: RNCC encouraged on-going review, emphasis on   post-transplant content.   MyChart: RNCC encouraged regular self-review of lab values via ActiveO and  educated about importance of utilization for awareness of required follow   up and communication with SOT team members.     Further education was provided on typical post transplant course, labs examined with thresholds, biopsy, infection prevention, office contact  numbers, and when to call.       Discharge Transition Plan:   Pt will transition home, with assistance from family. Pt will not have home care.   Pt states having working BP monitor, scale, and thermometer at home.      Follow Up:  Orders for post-transplant follow-up appointments placed: Yes.  Patient Status Checklist Task updated: Yes.    Special/Additional needs: No.  Pt questions for follow up: n/a        Eleanor Lund RN  Post-Kidney Transplant Coordinator  (ph) [phone]

## 2023-04-21 NOTE — PATIENT INSTRUCTIONS
Dear Caity Horan    Thank you for choosing [facility] ([facility]) for your transplant cares.  The following information is a summary of our appointment as well as important reminders.      Please make sure your phone is available today because your transplant coordinator, Eleanor, may call to update you with your anti-rejection drug levels and possibly make changes to your anti-rejection dosages.    PLAN:  -Increase Magnesium to THREE TIMES DAILY  -Increase dietary intake of phosphorus (dairy products, chocolate, nut butters)  -Continue to hydrate well (70-100oz) 2-3L a day  -Return back to [facility] tomorrow for lab draw and assessment with RN    We look forward in seeing you on your next appointment here at [facility] ([facility]).  Please don t hesitate to call us at [phone] to reschedule any of your appointments or to speak with one of the [facility] registered nurses.  It was a pleasure taking care of you today.    Sincerely,    [facility]  [address] [place] [address]  [id number]  Phone:  [phone]

## 2023-04-21 NOTE — TELEPHONE ENCOUNTER
Caity is a post-kidney (2nd one) transplant patient who discharged 4/20/23.    Patient chooses to receive medications from [facility] specialty [facility].     She's responsible for her own medications. Just be aware that she didn't sound like she'll use the med box, despite my encouragement. She said she has her own system.    Berna Mixon [facility]  [phone]

## 2023-04-22 ENCOUNTER — INFUSION THERAPY VISIT (OUTPATIENT)
Dept: INFUSION THERAPY | Facility: CLINIC | Age: 33
End: 2023-04-22
Attending: INTERNAL MEDICINE
Payer: COMMERCIAL

## 2023-04-22 ENCOUNTER — TELEPHONE (OUTPATIENT)
Dept: TRANSPLANT | Facility: CLINIC | Age: 33
End: 2023-04-22

## 2023-04-22 VITALS
BODY MASS INDEX: 23.13 KG/M2 | OXYGEN SATURATION: 99 % | RESPIRATION RATE: 16 BRPM | TEMPERATURE: 98.2 F | WEIGHT: 130.6 LBS

## 2023-04-22 DIAGNOSIS — E83.42 HYPOMAGNESEMIA: ICD-10-CM

## 2023-04-22 DIAGNOSIS — Z94.0 KIDNEY REPLACED BY TRANSPLANT: Primary | ICD-10-CM

## 2023-04-22 DIAGNOSIS — D50.0 IRON DEFICIENCY ANEMIA DUE TO CHRONIC BLOOD LOSS: ICD-10-CM

## 2023-04-22 LAB
ANION GAP SERPL CALCULATED.3IONS-SCNC: 7 MMOL/L (ref 7–15)
BACTERIA PRT CULT: NO GROWTH
BASOPHILS # BLD MANUAL: 0 10E3/UL (ref 0–0.2)
BASOPHILS NFR BLD MANUAL: 0 %
BUN SERPL-MCNC: 7.2 MG/DL (ref 6–20)
BURR CELLS BLD QL SMEAR: SLIGHT
CALCIUM SERPL-MCNC: 9.1 MG/DL (ref 8.6–10)
CHLORIDE SERPL-SCNC: 108 MMOL/L (ref 98–107)
CREAT SERPL-MCNC: 1.25 MG/DL (ref 0.51–0.95)
DEPRECATED CALCIDIOL+CALCIFEROL SERPL-MC: 42 UG/L (ref 20–75)
DEPRECATED HCO3 PLAS-SCNC: 23 MMOL/L (ref 22–29)
EOSINOPHIL # BLD MANUAL: 0.3 10E3/UL (ref 0–0.7)
EOSINOPHIL NFR BLD MANUAL: 8 %
ERYTHROCYTE [DISTWIDTH] IN BLOOD BY AUTOMATED COUNT: 13.2 % (ref 10–15)
FERRITIN SERPL-MCNC: 1041 NG/ML (ref 6–175)
FRAGMENTS BLD QL SMEAR: SLIGHT
GFR SERPL CREATININE-BSD FRML MDRD: 58 ML/MIN/1.73M2
GLUCOSE SERPL-MCNC: 105 MG/DL (ref 70–99)
HCT VFR BLD AUTO: 28.6 % (ref 35–47)
HGB BLD-MCNC: 8.9 G/DL (ref 11.7–15.7)
IRON BINDING CAPACITY (ROCHE): 145 UG/DL (ref 240–430)
IRON SATN MFR SERPL: 28 % (ref 15–46)
IRON SERPL-MCNC: 40 UG/DL (ref 37–145)
LYMPHOCYTES # BLD MANUAL: 0.4 10E3/UL (ref 0.8–5.3)
LYMPHOCYTES NFR BLD MANUAL: 12 %
MAGNESIUM SERPL-MCNC: 1.4 MG/DL (ref 1.7–2.3)
MCH RBC QN AUTO: 30 PG (ref 26.5–33)
MCHC RBC AUTO-ENTMCNC: 31.1 G/DL (ref 31.5–36.5)
MCV RBC AUTO: 96 FL (ref 78–100)
METAMYELOCYTES # BLD MANUAL: 0 10E3/UL
METAMYELOCYTES NFR BLD MANUAL: 1 %
MONOCYTES # BLD MANUAL: 0.1 10E3/UL (ref 0–1.3)
MONOCYTES NFR BLD MANUAL: 4 %
MYELOCYTES # BLD MANUAL: 0 10E3/UL
MYELOCYTES NFR BLD MANUAL: 1 %
NEUTROPHILS # BLD MANUAL: 2.7 10E3/UL (ref 1.6–8.3)
NEUTROPHILS NFR BLD MANUAL: 74 %
PHOSPHATE SERPL-MCNC: 1.8 MG/DL (ref 2.5–4.5)
PLAT MORPH BLD: ABNORMAL
PLATELET # BLD AUTO: 326 10E3/UL (ref 150–450)
POTASSIUM SERPL-SCNC: 5.3 MMOL/L (ref 3.4–5.3)
PTH-INTACT SERPL-MCNC: 73 PG/ML (ref 15–65)
RBC # BLD AUTO: 2.97 10E6/UL (ref 3.8–5.2)
RBC MORPH BLD: ABNORMAL
SODIUM SERPL-SCNC: 138 MMOL/L (ref 136–145)
TACROLIMUS BLD-MCNC: 15.1 UG/L (ref 5–15)
TME LAST DOSE: ABNORMAL H
TME LAST DOSE: ABNORMAL H
WBC # BLD AUTO: 3.6 10E3/UL (ref 4–11)

## 2023-04-22 PROCEDURE — 96365 THER/PROPH/DIAG IV INF INIT: CPT

## 2023-04-22 PROCEDURE — 80180 DRUG SCRN QUAN MYCOPHENOLATE: CPT

## 2023-04-22 PROCEDURE — 83970 ASSAY OF PARATHORMONE: CPT

## 2023-04-22 PROCEDURE — 36415 COLL VENOUS BLD VENIPUNCTURE: CPT

## 2023-04-22 PROCEDURE — 83735 ASSAY OF MAGNESIUM: CPT

## 2023-04-22 PROCEDURE — 80048 BASIC METABOLIC PNL TOTAL CA: CPT

## 2023-04-22 PROCEDURE — 82306 VITAMIN D 25 HYDROXY: CPT

## 2023-04-22 PROCEDURE — 82728 ASSAY OF FERRITIN: CPT

## 2023-04-22 PROCEDURE — 85007 BL SMEAR W/DIFF WBC COUNT: CPT

## 2023-04-22 PROCEDURE — 250N000011 HC RX IP 250 OP 636: Performed by: NURSE PRACTITIONER

## 2023-04-22 PROCEDURE — 84100 ASSAY OF PHOSPHORUS: CPT

## 2023-04-22 PROCEDURE — 80197 ASSAY OF TACROLIMUS: CPT

## 2023-04-22 PROCEDURE — 85027 COMPLETE CBC AUTOMATED: CPT

## 2023-04-22 PROCEDURE — 83550 IRON BINDING TEST: CPT

## 2023-04-22 RX ORDER — HEPARIN SODIUM,PORCINE 10 UNIT/ML
5 VIAL (ML) INTRAVENOUS
Status: CANCELLED | OUTPATIENT
Start: 2023-04-22

## 2023-04-22 RX ORDER — MEPERIDINE HYDROCHLORIDE 25 MG/ML
25 INJECTION INTRAMUSCULAR; INTRAVENOUS; SUBCUTANEOUS EVERY 30 MIN PRN
Status: CANCELLED | OUTPATIENT
Start: 2023-04-22

## 2023-04-22 RX ORDER — ALBUTEROL SULFATE 0.83 MG/ML
2.5 SOLUTION RESPIRATORY (INHALATION)
Status: CANCELLED | OUTPATIENT
Start: 2023-04-22

## 2023-04-22 RX ORDER — MAGNESIUM SULFATE HEPTAHYDRATE 40 MG/ML
2 INJECTION, SOLUTION INTRAVENOUS ONCE
Status: COMPLETED | OUTPATIENT
Start: 2023-04-22 | End: 2023-04-22

## 2023-04-22 RX ORDER — EPINEPHRINE 1 MG/ML
0.3 INJECTION, SOLUTION INTRAMUSCULAR; SUBCUTANEOUS EVERY 5 MIN PRN
Status: CANCELLED | OUTPATIENT
Start: 2023-04-22

## 2023-04-22 RX ORDER — ALBUTEROL SULFATE 90 UG/1
1-2 AEROSOL, METERED RESPIRATORY (INHALATION)
Status: CANCELLED
Start: 2023-04-22

## 2023-04-22 RX ORDER — DIPHENHYDRAMINE HYDROCHLORIDE 50 MG/ML
50 INJECTION INTRAMUSCULAR; INTRAVENOUS
Status: CANCELLED
Start: 2023-04-22

## 2023-04-22 RX ORDER — METHYLPREDNISOLONE SODIUM SUCCINATE 125 MG/2ML
125 INJECTION, POWDER, LYOPHILIZED, FOR SOLUTION INTRAMUSCULAR; INTRAVENOUS
Status: CANCELLED
Start: 2023-04-22

## 2023-04-22 RX ORDER — HEPARIN SODIUM (PORCINE) LOCK FLUSH IV SOLN 100 UNIT/ML 100 UNIT/ML
5 SOLUTION INTRAVENOUS
Status: CANCELLED | OUTPATIENT
Start: 2023-04-22

## 2023-04-22 RX ORDER — MAGNESIUM SULFATE HEPTAHYDRATE 40 MG/ML
2 INJECTION, SOLUTION INTRAVENOUS ONCE
Status: CANCELLED
Start: 2023-04-22 | End: 2023-04-22

## 2023-04-22 RX ADMIN — MAGNESIUM SULFATE HEPTAHYDRATE 2 G: 40 INJECTION, SOLUTION INTRAVENOUS at 08:51

## 2023-04-22 NOTE — PROGRESS NOTES
"Caity Horan came to [facility] today for a lab and assess following a kidney transplant on 4/13/23.      Discharge date: 4/20/23  Transplant coordinator: Eleanor Lund  Phone number patient can be reached at: [phone] (M)       Physical Assessment:  See physical assessment located under \"Document Flowsheets\".  Incision site: dermabond; CDI  Lines: DALTON drain-did not empty in the last 24 hours  [place]arton: yes; patent. Drained 1,000ml overnight and 2400 yesterday  Urine clarity: clear yellow  Hydration: drinking well  Nutrition: decreased appetite   Last BM: loose;   Pain: controlled with pm oxycodone and muscle relaxers   My transplant place videos watched: yes; also hx of previous txp    Labs drawn by [facility] staff Yes    Plan of care for today: Pt and vitals assessed. Medications reviewed. Some confusion on Tacrolimus dose-pt took 2mg BID yesterday but states 1mg BID in Epic and med card. Will check level today and re-assess. Reviewed labs and plan with Christi Alfonso. Order received to give 2gm Magnesium IV today. Pt will then get labs outpatient starting Monday.     Medication changes: none today    Medications administered:     Administrations This Visit     magnesium sulfate 2 g in 50 mL sterile water intermittent infusion     Admin Date  04/22/2023 Action  $New Bag Dose  2 g Rate  50 mL/hr Route  Intravenous Administered By  More Tidwell RN                Patient education:    The following teaching topics were addressed: Importance of drinking 2L of non-caffeinated fluids daily, Medications (purposes, doses and times of administration) and Plan of care   Patient and sister verbalized understanding and all questions answered.    Drug level:  Patient took 2mg of Tacrolimus last evening at 9:40.  Care coordinator to follow up with the result.    Face to face time: 30 minutes  Discharge Plan    Pt will follow up with labs on Monday at [facility]  Discharge instructions reviewed with patient: YES  Patient/Representative " verbalized understanding, all questions answered: YES    Discharged from unit at 10:00 with whom: family to local apartment.    More Tidwell, RN, RN

## 2023-04-22 NOTE — TELEPHONE ENCOUNTER
ISSUE:   Tacrolimus IR level 15.1 on 4/22, goal 8-10, dose 2 mg BID.      OUTCOME:   [facility] 10 hour trough level. Left message for patient to do a 12 hour trough and explained that want as close to 12 hours as possible and to return call to confirm plan of tacro. Appears as note stated she should decrease to 1 mg BID.

## 2023-04-24 ENCOUNTER — VIRTUAL VISIT (OUTPATIENT)
Dept: PHARMACY | Facility: CLINIC | Age: 33
End: 2023-04-24
Payer: COMMERCIAL

## 2023-04-24 ENCOUNTER — MYC MEDICAL ADVICE (OUTPATIENT)
Dept: GASTROENTEROLOGY | Facility: CLINIC | Age: 33
End: 2023-04-24

## 2023-04-24 ENCOUNTER — LAB (OUTPATIENT)
Dept: LAB | Facility: CLINIC | Age: 33
End: 2023-04-24
Payer: COMMERCIAL

## 2023-04-24 DIAGNOSIS — Z20.828 CONTACT WITH AND (SUSPECTED) EXPOSURE TO OTHER VIRAL COMMUNICABLE DISEASES: ICD-10-CM

## 2023-04-24 DIAGNOSIS — Z48.298 AFTERCARE FOLLOWING ORGAN TRANSPLANT: ICD-10-CM

## 2023-04-24 DIAGNOSIS — G89.18 ACUTE POST-OPERATIVE PAIN: ICD-10-CM

## 2023-04-24 DIAGNOSIS — Z94.0 KIDNEY REPLACED BY TRANSPLANT: ICD-10-CM

## 2023-04-24 DIAGNOSIS — K51.90 ULCERATIVE COLITIS WITHOUT COMPLICATIONS, UNSPECIFIED LOCATION (H): ICD-10-CM

## 2023-04-24 DIAGNOSIS — Z79.899 ENCOUNTER FOR LONG-TERM CURRENT USE OF MEDICATION: ICD-10-CM

## 2023-04-24 DIAGNOSIS — Z94.0 KIDNEY REPLACED BY TRANSPLANT: Primary | ICD-10-CM

## 2023-04-24 DIAGNOSIS — Z76.82 AWAITING ORGAN TRANSPLANT: ICD-10-CM

## 2023-04-24 LAB
ANION GAP SERPL CALCULATED.3IONS-SCNC: 9 MMOL/L (ref 7–15)
BACTERIA PRT CULT: NORMAL
BUN SERPL-MCNC: 9.4 MG/DL (ref 6–20)
CALCIUM SERPL-MCNC: 9.3 MG/DL (ref 8.6–10)
CHLORIDE SERPL-SCNC: 107 MMOL/L (ref 98–107)
CREAT SERPL-MCNC: 1.37 MG/DL (ref 0.51–0.95)
DEPRECATED CALCIDIOL+CALCIFEROL SERPL-MC: 44 UG/L (ref 20–75)
DEPRECATED HCO3 PLAS-SCNC: 23 MMOL/L (ref 22–29)
ERYTHROCYTE [DISTWIDTH] IN BLOOD BY AUTOMATED COUNT: 13.4 % (ref 10–15)
FERRITIN SERPL-MCNC: 886 NG/ML (ref 6–175)
GFR SERPL CREATININE-BSD FRML MDRD: 52 ML/MIN/1.73M2
GLUCOSE SERPL-MCNC: 108 MG/DL (ref 70–99)
HCT VFR BLD AUTO: 29.3 % (ref 35–47)
HGB BLD-MCNC: 9 G/DL (ref 11.7–15.7)
IRON BINDING CAPACITY (ROCHE): 170 UG/DL (ref 240–430)
IRON SATN MFR SERPL: 18 % (ref 15–46)
IRON SERPL-MCNC: 30 UG/DL (ref 37–145)
MCH RBC QN AUTO: 30.6 PG (ref 26.5–33)
MCHC RBC AUTO-ENTMCNC: 30.7 G/DL (ref 31.5–36.5)
MCV RBC AUTO: 100 FL (ref 78–100)
MYCOPHENOLATE SERPL LC/MS/MS-MCNC: 1.42 MG/L (ref 1–3.5)
MYCOPHENOLATE-G SERPL LC/MS/MS-MCNC: 72 MG/L (ref 30–95)
PLATELET # BLD AUTO: 357 10E3/UL (ref 150–450)
POTASSIUM SERPL-SCNC: 5.2 MMOL/L (ref 3.4–5.3)
PTH-INTACT SERPL-MCNC: 71 PG/ML (ref 15–65)
RBC # BLD AUTO: 2.94 10E6/UL (ref 3.8–5.2)
SODIUM SERPL-SCNC: 139 MMOL/L (ref 136–145)
TACROLIMUS BLD-MCNC: 11.5 UG/L (ref 5–15)
TME LAST DOSE: NORMAL H
WBC # BLD AUTO: 3.9 10E3/UL (ref 4–11)

## 2023-04-24 PROCEDURE — 86833 HLA CLASS II HIGH DEFIN QUAL: CPT | Performed by: PATHOLOGY

## 2023-04-24 PROCEDURE — 83550 IRON BINDING TEST: CPT | Performed by: PATHOLOGY

## 2023-04-24 PROCEDURE — 99605 MTMS BY PHARM NP 15 MIN: CPT | Performed by: PHARMACIST

## 2023-04-24 PROCEDURE — 80197 ASSAY OF TACROLIMUS: CPT | Mod: 90 | Performed by: PATHOLOGY

## 2023-04-24 PROCEDURE — 83540 ASSAY OF IRON: CPT | Performed by: PATHOLOGY

## 2023-04-24 PROCEDURE — 86832 HLA CLASS I HIGH DEFIN QUAL: CPT | Performed by: PATHOLOGY

## 2023-04-24 PROCEDURE — 36415 COLL VENOUS BLD VENIPUNCTURE: CPT | Performed by: PATHOLOGY

## 2023-04-24 PROCEDURE — 85027 COMPLETE CBC AUTOMATED: CPT | Performed by: PATHOLOGY

## 2023-04-24 PROCEDURE — 83970 ASSAY OF PARATHORMONE: CPT | Performed by: PATHOLOGY

## 2023-04-24 PROCEDURE — 80048 BASIC METABOLIC PNL TOTAL CA: CPT | Performed by: PATHOLOGY

## 2023-04-24 PROCEDURE — 82306 VITAMIN D 25 HYDROXY: CPT | Mod: 90 | Performed by: PATHOLOGY

## 2023-04-24 PROCEDURE — 86828 HLA CLASS I&II ANTIBODY QUAL: CPT | Mod: XU | Performed by: PATHOLOGY

## 2023-04-24 PROCEDURE — 99000 SPECIMEN HANDLING OFFICE-LAB: CPT | Performed by: PATHOLOGY

## 2023-04-24 PROCEDURE — 82728 ASSAY OF FERRITIN: CPT | Performed by: PATHOLOGY

## 2023-04-24 RX ORDER — CHOLECALCIFEROL (VITAMIN D3) 50 MCG
1 TABLET ORAL DAILY
COMMUNITY
End: 2023-06-23

## 2023-04-24 RX ORDER — BIOTIN 1 MG
500 TABLET ORAL DAILY
COMMUNITY

## 2023-04-24 NOTE — PROGRESS NOTES
Medication Therapy Management (MTM) Encounter    ASSESSMENT:                            Medication Adherence/Access: No issues identified    Kidney Transplant:  Patient may take B12, Biotin, and Vit D3.     UC: Stable.     Pain: Stable.     PLAN:                            1. You can take Vitamin D, B12, and Biotin (keep biotin below 5mg/5000mcg)    Follow-up: 8/22    SUBJECTIVE/OBJECTIVE:                          Caity Horan is a 32 year old female called for a transitions of care visit. She was discharged from [facility] on 4/20 for kidney txp.      Reason for visit: initial post discharge med review.    Allergies/ADRs: Reviewed in chart  Past Medical History: Reviewed in chart  Tobacco: She reports that she has never smoked. She has never used smokeless tobacco.  Alcohol: not currently using  THC/CBD: none    Medication Adherence/Access: Patient uses pill box(es).  Patient takes medications 5 time(s) per day. 9-10am, 9-10pm. 3 times during the day.   Per patient, misses medication 0 times per week.   The patient fills medications at [facility]: NO, fills medications at [facility].     Kidney Transplant:  Current immunosuppressants include Tacrolimus 1mg twice daily and Mycophenolate Mofetil 750mg twice daily.  Pt reports Tremors, used to these. Per patient, Myfortic caused bloating in the past.   Transplant date: 4/13/23, 1st was 12/5/2014  Estimated Creatinine Clearance: 60.4 mL/min (A) (based on SCr of 1.25 mg/dL (H)).  CMV prophylaxis: CrCl >/=60 mL/minute: Valcyte 900mg daily Donor (+), Recipient (-), treat 6 months post tx   PJP prophylaxis: Bactrim S S daily  Supplements: Mag Oxide 800mg 3 TIMES DAILY ( 2 hours from MMF), Phosphorus 500mg 3 TIMES DAILY, wanting to Vitamin D3 2000 units daily, B12 1000mcg daily, Biotin 500mcg daily.   Tx Coordinator: Arthur Meeks MD: Dr. Mar, Using Med Card: Yes  Immunizations: annual flu shot 2022; Pneumovax 23:  2014, 2022; Prevnar 13/15/20: 2017,2021; TDaP:  2017;  Shingrix: unknown, HBV: immunity per last titers, COVID: Pfizer-BioNTech x2  Lab Results   Component Value Date    MAG 1.4 (L) 04/22/2023    MAG 1.3 (L) 04/21/2023    MAG 1.4 (L) 04/20/2023    PHOS 1.8 (L) 04/22/2023    PHOS 2.1 (L) 04/21/2023    PHOS 1.9 (L) 04/20/2023   Vitamin D Deficiency Screening Results:  Lab Results   Component Value Date    VITDT 42 04/22/2023    VITDT 48 04/03/2023    VITDT 17 (L) 03/23/2018    VITDT 33 10/27/2014     UC: Entyvio 60mg/ML once every 6 months. Says stools have been improving, although they have been loose recently. Not using any laxatives. States previously Myfortic caused bloating.     Pain: Pt is taking Oxycodone 5mg at bedtime, Hydroxyzine 25mg at bedtime prn, Methocarbamol 500mg at bedtime prn. Pain is 0-10.     Today's Vitals: There were no vitals taken for this visit.  ----------------  Post Discharge Medication Reconciliation Status: discharge medications reconciled and changed, per note/orders.    I spent 12 minutes with this patient today.  A copy of the visit note was provided to the patient's provider(s).    A summary of these recommendations was sent via [employer].    Km Pope PharmD  MTM Pharmacist    Phone: [phone]     Telemedicine Visit Details  Type of service:  Telephone visit  Start Time: 9:05 AM  End Time: 9:17 AM     Medication Therapy Recommendations  No medication therapy recommendations to display

## 2023-04-24 NOTE — TELEPHONE ENCOUNTER
Left voicemail for patient to call [facility] office to review post transplant appointments, check in on how patient is feeling.     Dr Sepulveda has clinic opening today, sent [employer] message to patient as well.

## 2023-04-24 NOTE — PATIENT INSTRUCTIONS
"Recommendations from today's MTM visit:                                                       1. You can take Vitamin D, B12, and Biotin (keep biotin below 5mg/5000mcg)  2. Let me know if you have any questions!    Follow-up: 8/22    It was great speaking with you today.  I value your experience and would be very thankful for your time in providing feedback in our clinic survey. In the next few days, you may receive an email or text message from [facility] with a link to a survey related to your  clinical pharmacist.\"     To schedule another MTM appointment, please call the clinic directly or you may call the MTM scheduling line at [phone] or toll-free at [phone].     My Clinical Pharmacist's contact information:                                                      Please feel free to contact me with any questions or concerns you have.      Km Pope, PharmD  MTM Pharmacist    Phone: [phone]     "

## 2023-04-24 NOTE — TELEPHONE ENCOUNTER
Patient to come in and see SRINIVASAN tomorrow for DALTON drain assessment. Per patient, drain has been putting out ~10ml/day x2 days. Scheduling request entered for SRINIVASAN appt tomorrow.

## 2023-04-25 ENCOUNTER — OFFICE VISIT (OUTPATIENT)
Dept: TRANSPLANT | Facility: CLINIC | Age: 33
End: 2023-04-25
Attending: NURSE PRACTITIONER
Payer: COMMERCIAL

## 2023-04-25 VITALS — DIASTOLIC BLOOD PRESSURE: 72 MMHG | OXYGEN SATURATION: 100 % | SYSTOLIC BLOOD PRESSURE: 114 MMHG | HEART RATE: 95 BPM

## 2023-04-25 DIAGNOSIS — Z94.0 KIDNEY REPLACED BY TRANSPLANT: ICD-10-CM

## 2023-04-25 PROCEDURE — 99213 OFFICE O/P EST LOW 20 MIN: CPT | Mod: 24 | Performed by: NURSE PRACTITIONER

## 2023-04-25 PROCEDURE — G0463 HOSPITAL OUTPT CLINIC VISIT: HCPCS | Performed by: NURSE PRACTITIONER

## 2023-04-25 NOTE — LETTER
4/25/2023         RE: Caity Horan  [address]  [address]  [facility] [facility] [address]        Dear Colleague,    Thank you for referring your patient, Caity Horan, to the [facility]. Please see a copy of my visit note below.    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 She is now s/p LDKT on 4/13/22 with Dr. Sepulveda. Patient returned to OR 4/17/23 for re-exploration and intra-op biopsy d/t concern for urine leak vs rejection which found urine leak at anastomosis and hydronephrotic ureter without signs of obstruction - anastomosis redone and ureteral stent placed.     No fevers, chills, nausea or vomiting    DALTON less than 10 ml for 4 days.   Transplant History:    Transplant Type:  LDKT  Donor Type: Living   Transplant Date:  4/13/2023 (Kidney), 12/5/2014 (Kidney)   Ureteral Stent:  Yes   Crossmatch:  negative   DSA at Tx:  No  Baseline Cr: TBD   DeNovo DSA: No    Acute Rejection Hx:  No    Present Maintenance Immunosuppression:  Tacrolimus and Mycophenolate mofetil    CMV IgG Ab Discordance:  Yes  EBV IgG Ab Discordance:  No    BK Viremia:  No  EBV Viremia:  No    Transplant Coordinator: Eleanor Lund     Transplant Office Phone Number: [phone]     Immunosuppressant Medications     Immunosuppressive Agents Disp Start End     mycophenolate (GENERIC EQUIVALENT) 250 MG capsule    180 capsule 4/20/2023     Sig - Route: Take 3 capsules (750 mg) by mouth 2 times daily - Oral    Class: E-Prescribe    Renewals     Renewal requests to authorizing provider (Amalia Marie NP) <b>prohibited</b>           tacrolimus (GENERIC EQUIVALENT) 0.5 MG capsule    60 capsule 4/20/2023     Sig - Route: Take 1 capsule (0.5 mg) by mouth 2 times daily To  have available for dose adjustments per transplant team. Discharge dose = 1 mg BID. - Oral    Patient not taking: Reported on 4/24/2023       Class: E-Prescribe    Renewals     Renewal requests to authorizing provider (Amalia Marie NP) <b>prohibited</b>           tacrolimus (GENERIC EQUIVALENT) 1 MG capsule    60 capsule 4/21/2023     Sig - Route: Take 1 capsule (1 mg) by mouth 2 times daily Hold 4/20 evening dose. Start 1 mg BID tomorrow. - Oral    Patient taking differently: Take 2 mg by mouth 2 times daily Hold 4/20 evening dose. Start 1 mg BID tomorrow. Pt has been taking 2mg BID (4/22)       Class: E-Prescribe    Renewals     Renewal requests to authorizing provider (Amalia Marie NP) <b>prohibited</b>                Possible Immunosuppression-related side effects:   []             headache  []             vivid dreams  []             irritability  []             cognitive difficuties  []             fine tremor  []             nausea  []             diarrhea  []             neuropathy      []             edema  []             renal calcineurin toxicity  []             hyperkalemia  []             post-transplant diabetes  []             decreased appetite  []             increased appetite  []             other:  []             none    Prescription Medications as of 4/28/2023       Rx Number Disp Refills Start End Last Dispensed Date Next Fill Date Owning Pharmacy    biotin 1000 MCG TABS tablet            Sig: Take 500 mcg by mouth daily    Class: Historical    Route: Oral    Cyanocobalamin 500 MCG TBDP            Sig: Take 500 mcg by mouth daily    Class: Historical    Route: Oral    hydrOXYzine (ATARAX) 25 MG tablet  15 tablet 0 4/20/2023    [facility] - [place], [place] - [address]    Sig: Take 1 tablet (25 mg) by mouth every 4 hours as needed for anxiety or other (adjuvant pain)    Class: E-Prescribe    Route: Oral    Renewals     Renewal requests to  authorizing provider (Amalia Marie NP) <b>prohibited</b>          levonorgestrel (MIRENA) 20 MCG/DAY IUD            Si each by Intrauterine route Replacement every 7 years. Placed 2019    Class: Historical    Route: Intrauterine    magnesium oxide (MAG-OX) 400 MG tablet  120 tablet 2 2023        Sig: Take 2 tablets (800 mg) by mouth 3 times daily    Class: Historical    Route: Oral    methocarbamol (ROBAXIN) 500 MG tablet  20 tablet 0 2023    [address]    Sig: Take 1 tablet (500 mg) by mouth 4 times daily as needed for muscle spasms    Class: E-Prescribe    Route: Oral    mycophenolate (GENERIC EQUIVALENT) 250 MG capsule  180 capsule 11 2023    [address]    Sig: Take 3 capsules (750 mg) by mouth 2 times daily    Class: E-Prescribe    Route: Oral    Renewals     Renewal requests to authorizing provider (Amalia Marie NP) <b>prohibited</b>          ondansetron (ZOFRAN ODT) 4 MG ODT tab  10 tablet 0 2023    [address]    Sig: Take 1 tablet (4 mg) by mouth every 6 hours as needed for nausea or vomiting    Class: E-Prescribe    Route: Oral    oxyCODONE (ROXICODONE) 5 MG tablet  15 tablet 0 2023    [address]    Sig: Take 1 tablet (5 mg) by mouth every 6 hours as needed for moderate pain    Class: E-Prescribe    Earliest Fill Date: 2023    Route: Oral    phosphorus tablet 250 mg (PHOSPHA 250 NEUTRAL) 250 MG per tablet  60 tablet 0 2023    [address]    Sig: Take 2 tablets (500 mg) by mouth 3 times daily    Class: E-Prescribe    Route: Oral    Renewals     Renewal requests to authorizing provider (Amalia Marie NP) <b>prohibited</b>           sulfamethoxazole-trimethoprim (BACTRIM) 400-80 MG tablet  30 tablet 11 4/21/2023    [address]    Sig: Take 1 tablet by mouth daily    Class: E-Prescribe    Route: Oral    Renewals     Renewal requests to authorizing provider (Amalia Marie NP) <b>prohibited</b>          tacrolimus (GENERIC EQUIVALENT) 0.5 MG capsule  60 capsule 11 4/20/2023    [address]    Sig: Take 1 capsule (0.5 mg) by mouth 2 times daily To have available for dose adjustments per transplant team. Discharge dose = 1 mg BID.    Class: E-Prescribe    Route: Oral    Renewals     Renewal requests to authorizing provider (Amalia Marie NP) <b>prohibited</b>          tacrolimus (GENERIC EQUIVALENT) 1 MG capsule  60 capsule 11 4/21/2023    [address]    Sig: Take 1 capsule (1 mg) by mouth 2 times daily Hold 4/20 evening dose. Start 1 mg BID tomorrow.    Class: E-Prescribe    Route: Oral    Renewals     Renewal requests to authorizing provider (Amalia Marie NP) <b>prohibited</b>          valGANciclovir (VALCYTE) 450 MG tablet  60 tablet 5 4/21/2023    [address]    Sig: Take 2 tablets (900 mg) by mouth daily    Class: E-Prescribe    Route: Oral    vedolizumab (ENTYVIO) 60 MG/ML injection            Sig: Inject 300 mg into the vein once every six weeks    Class: Historical    Route: Intravenous    vitamin D3 (CHOLECALCIFEROL) 50 mcg (2000 units) tablet            Sig: Take 1 tablet by mouth daily    Class: Historical    Route: Oral      Hospital Medications as of 4/28/2023       Dose Frequency Start End    furosemide (LASIX) injection 20 mg (Completed) 20 mg ONCE 4/28/2023 4/28/2023    Class: E-Prescribe    Route: Intravenous    technetium mertiatide Tc99m (MAG3) radioisotope injection 8-12  millicurie (Completed) 8-12 millicurie ONCE 4/28/2023 4/28/2023    Class: E-Prescribe    Route: Intravenous          O:      General Appearance: in no apparent distress.   Skin: Normal, no rashes or jaundice  Heart: regular rate and rhythm, normal S1 and S2  Lungs: easy respirations, no audible wheezing.  Abdomen: flat, The wound is dry and intact, without hernia. The abdomen is non-tender. The kidney graft is not tender.  There is no ascites.  Extremities: Tremor absent.   Edema: absent.         Latest Ref Rng & Units 4/28/2023     9:29 AM 4/27/2023     9:51 AM 4/24/2023     8:26 AM 4/22/2023     7:45 AM 4/21/2023     7:55 AM   Transplant Immunosuppression Labs   Creat 0.51 - 0.95 mg/dL 1.78   1.75   1.37   1.25   1.29     Urea Nitrogen 6.0 - 20.0 mg/dL 9.5   11.6   9.4   7.2   6.6     WBC 4.0 - 11.0 10e3/uL 2.4   3.0   3.9   3.6   2.5     Neutrophil %    74   78     ANEU 1.6 - 8.3 10e3/uL    2.7   2.0         Chemistries:   Recent Labs   Lab Test 04/28/23  0929   BUN 9.5   CR 1.78*   GFRESTIMATED 38*   *     Lab Results   Component Value Date    A1C 5.3 04/03/2023     Recent Labs   Lab Test 04/11/23  0832   ALBUMIN 5.1   BILITOTAL 0.6   ALKPHOS 70   AST 23   ALT 15     Urine Studies:  Recent Labs   Lab Test 12/05/22  1200   COLOR Light Yellow   APPEARANCE Clear   URINEGLC 50*   URINEBILI Negative   URINEKETONE Negative   SG 1.012   UBLD Moderate*   URINEPH 7.5*   PROTEIN 300*   NITRITE Negative   LEUKEST Negative   RBCU 4*   WBCU 8*     Recent Labs   Lab Test 01/14/21  1200 12/03/20  0915   UTPG 2.09* 1.36*     Hematology:   Recent Labs   Lab Test 04/28/23  0929 04/27/23  0951 04/24/23  0826   HGB 8.1* 8.3* 9.0*    363 357   WBC 2.4* 3.0* 3.9*     Coags:   Recent Labs   Lab Test 04/17/23  0904 04/03/23  1556   INR 1.27* 1.07     HLA antibodies:   SA1 Hi Risk David   Date Value Ref Range Status   11/11/2019 None  Final     SA1 HI RISK DAVID   Date Value Ref Range Status   04/24/2023 None  Final     SA1  Mod Risk David   Date Value Ref Range Status   11/11/2019 A:2 B:42 76  Final     SA1 MOD RISK DAVID   Date Value Ref Range Status   04/24/2023 B:45 76  Final     SA2 Hi Risk David   Date Value Ref Range Status   11/11/2019 DQ:2 DQA:05  Final     SA2 HI RISK DAVID   Date Value Ref Range Status   04/24/2023 DQA:05  Final     SA2 Mod Risk David   Date Value Ref Range Status   11/11/2019 DQ:7 DQA:04 06  Final     SA2 MOD RISK DAVID   Date Value Ref Range Status   04/24/2023 DQ:7  Final       Assessment: Caity Horan is doing fairly well s/p LDKT:  Issues we addressed during her visit include:    Plan:    1. Graft function: cr is stable  2. Immunosuppression Management: No change continue same IS .  Complexity of management:Medium.  Contributing factors: recent txp and urine leak  3. DALTON removed  Followup: as directed    Total Time: 25 min,   Counselling Time: 15 min.            Again, thank you for allowing me to participate in the care of your patient.        Sincerely,        Christi Alfonso NP

## 2023-04-26 ENCOUNTER — TELEPHONE (OUTPATIENT)
Dept: TRANSPLANT | Facility: CLINIC | Age: 33
End: 2023-04-26
Payer: COMMERCIAL

## 2023-04-26 LAB
B45: 947
DONOR IDENTIFICATION: NORMAL
DSA COMMENTS: NORMAL
DSA PRESENT: YES
DSA TEST METHOD: NORMAL
INT SUB RESULT: NORMAL
INTERF SUBSTANCE: NORMAL
INTSUB TEST METHOD: NORMAL
ORGAN: NORMAL
SA 1 CELL: NORMAL
SA 1 TEST METHOD: NORMAL
SA 2 CELL: NORMAL
SA 2 TEST METHOD: NORMAL
SA1 HI RISK ABY: NORMAL
SA1 MOD RISK ABY: NORMAL
SA2 HI RISK ABY: NORMAL
SA2 MOD RISK ABY: NORMAL
UNACCEPTABLE ANTIGENS: NORMAL
UNOS CPRA: 82
ZZZINT SUB COMMENTS: NORMAL
ZZZSA 1  COMMENTS: NORMAL
ZZZSA 2 COMMENTS: NORMAL

## 2023-04-26 NOTE — TELEPHONE ENCOUNTER
Post Kidney and Pancreas Transplant Team Conference  Date: 4/26/2023  Transplant Coordinator: Eleanor Lund     Attendees:  [x]  Dr. Mar [x] Sujey Ku, MARIS [x] Radha Rowe LPN     []  Dr. Agustin [x] Mable Dewitt RN [] Angela Carson LPN   [x]  Dr. Arora [x] Eleanor Lund RN    [x]  Dr. Barrera [] Ping Diaz RN [x] Km Pope, PharmD   [] Dr. Guo [] Tena Brennan, MARIS    [] Dr. Sepulveda [] Jair Mcarthur RN    [x] Dr. Ingram [] Carlie Serrato, MARIS [x] Jazmine Acosta RN   [] Dr. Romero [] Mela Ray RN    []  Dr. Bernard [] More Sidhu, MARIS    [] Dr. Haines [x] Amalia Tavarez, MARIS    [x] Christi Alfonso, ALEJANDRO [] Peri Desir RN        Verbal Plan Read Back:   Schedule with Dr. Mar before patient travels home    Routed to RN Coordinator   Radha Rowe LPN

## 2023-04-27 ENCOUNTER — INFUSION THERAPY VISIT (OUTPATIENT)
Dept: INFUSION THERAPY | Facility: CLINIC | Age: 33
End: 2023-04-27
Attending: NURSE PRACTITIONER
Payer: COMMERCIAL

## 2023-04-27 ENCOUNTER — LAB (OUTPATIENT)
Dept: LAB | Facility: CLINIC | Age: 33
End: 2023-04-27
Attending: INTERNAL MEDICINE
Payer: COMMERCIAL

## 2023-04-27 ENCOUNTER — HOSPITAL ENCOUNTER (OUTPATIENT)
Dept: ULTRASOUND IMAGING | Facility: CLINIC | Age: 33
Discharge: HOME | End: 2023-04-27
Attending: NURSE PRACTITIONER
Payer: COMMERCIAL

## 2023-04-27 ENCOUNTER — OFFICE VISIT (OUTPATIENT)
Dept: TRANSPLANT | Facility: CLINIC | Age: 33
End: 2023-04-27
Attending: NURSE PRACTITIONER
Payer: COMMERCIAL

## 2023-04-27 DIAGNOSIS — Z79.899 ENCOUNTER FOR LONG-TERM CURRENT USE OF MEDICATION: ICD-10-CM

## 2023-04-27 DIAGNOSIS — Z20.828 CONTACT WITH AND (SUSPECTED) EXPOSURE TO OTHER VIRAL COMMUNICABLE DISEASES: ICD-10-CM

## 2023-04-27 DIAGNOSIS — Z48.298 AFTERCARE FOLLOWING ORGAN TRANSPLANT: ICD-10-CM

## 2023-04-27 DIAGNOSIS — E83.42 HYPOMAGNESEMIA: ICD-10-CM

## 2023-04-27 DIAGNOSIS — D50.0 IRON DEFICIENCY ANEMIA DUE TO CHRONIC BLOOD LOSS: ICD-10-CM

## 2023-04-27 DIAGNOSIS — Z94.0 KIDNEY REPLACED BY TRANSPLANT: Primary | ICD-10-CM

## 2023-04-27 DIAGNOSIS — Z94.0 KIDNEY REPLACED BY TRANSPLANT: ICD-10-CM

## 2023-04-27 LAB
ANION GAP SERPL CALCULATED.3IONS-SCNC: 6 MMOL/L (ref 7–15)
BUN SERPL-MCNC: 11.6 MG/DL (ref 6–20)
CALCIUM SERPL-MCNC: 9.4 MG/DL (ref 8.6–10)
CHLORIDE SERPL-SCNC: 105 MMOL/L (ref 98–107)
CREAT SERPL-MCNC: 1.75 MG/DL (ref 0.51–0.95)
DEPRECATED HCO3 PLAS-SCNC: 24 MMOL/L (ref 22–29)
ERYTHROCYTE [DISTWIDTH] IN BLOOD BY AUTOMATED COUNT: 13.2 % (ref 10–15)
GFR SERPL CREATININE-BSD FRML MDRD: 39 ML/MIN/1.73M2
GLUCOSE SERPL-MCNC: 109 MG/DL (ref 70–99)
HCT VFR BLD AUTO: 27.5 % (ref 35–47)
HGB BLD-MCNC: 8.3 G/DL (ref 11.7–15.7)
MAGNESIUM SERPL-MCNC: 1.8 MG/DL (ref 1.7–2.3)
MCH RBC QN AUTO: 29.6 PG (ref 26.5–33)
MCHC RBC AUTO-ENTMCNC: 30.2 G/DL (ref 31.5–36.5)
MCV RBC AUTO: 98 FL (ref 78–100)
PHOSPHATE SERPL-MCNC: 2 MG/DL (ref 2.5–4.5)
PLATELET # BLD AUTO: 363 10E3/UL (ref 150–450)
POTASSIUM SERPL-SCNC: 5.2 MMOL/L (ref 3.4–5.3)
RBC # BLD AUTO: 2.8 10E6/UL (ref 3.8–5.2)
SODIUM SERPL-SCNC: 135 MMOL/L (ref 136–145)
TACROLIMUS BLD-MCNC: 8.2 UG/L (ref 5–15)
TME LAST DOSE: NORMAL H
TME LAST DOSE: NORMAL H
WBC # BLD AUTO: 3 10E3/UL (ref 4–11)

## 2023-04-27 PROCEDURE — 83735 ASSAY OF MAGNESIUM: CPT | Performed by: PATHOLOGY

## 2023-04-27 PROCEDURE — 99214 OFFICE O/P EST MOD 30 MIN: CPT | Mod: 24 | Performed by: NURSE PRACTITIONER

## 2023-04-27 PROCEDURE — 80197 ASSAY OF TACROLIMUS: CPT | Mod: 90 | Performed by: PATHOLOGY

## 2023-04-27 PROCEDURE — 36415 COLL VENOUS BLD VENIPUNCTURE: CPT | Performed by: PATHOLOGY

## 2023-04-27 PROCEDURE — 76776 US EXAM K TRANSPL W/DOPPLER: CPT

## 2023-04-27 PROCEDURE — 80048 BASIC METABOLIC PNL TOTAL CA: CPT | Performed by: PATHOLOGY

## 2023-04-27 PROCEDURE — 99000 SPECIMEN HANDLING OFFICE-LAB: CPT | Performed by: PATHOLOGY

## 2023-04-27 PROCEDURE — 84100 ASSAY OF PHOSPHORUS: CPT | Performed by: PATHOLOGY

## 2023-04-27 PROCEDURE — 96360 HYDRATION IV INFUSION INIT: CPT

## 2023-04-27 PROCEDURE — 85027 COMPLETE CBC AUTOMATED: CPT | Performed by: PATHOLOGY

## 2023-04-27 PROCEDURE — 76776 US EXAM K TRANSPL W/DOPPLER: CPT | Mod: 26 | Performed by: STUDENT IN AN ORGANIZED HEALTH CARE EDUCATION/TRAINING PROGRAM

## 2023-04-27 PROCEDURE — 258N000003 HC RX IP 258 OP 636: Performed by: NURSE PRACTITIONER

## 2023-04-27 RX ORDER — DIPHENHYDRAMINE HYDROCHLORIDE 50 MG/ML
50 INJECTION INTRAMUSCULAR; INTRAVENOUS
Status: CANCELLED
Start: 2023-04-27

## 2023-04-27 RX ORDER — ALBUTEROL SULFATE 90 UG/1
1-2 AEROSOL, METERED RESPIRATORY (INHALATION)
Status: CANCELLED
Start: 2023-04-27

## 2023-04-27 RX ORDER — METHYLPREDNISOLONE SODIUM SUCCINATE 125 MG/2ML
125 INJECTION, POWDER, LYOPHILIZED, FOR SOLUTION INTRAMUSCULAR; INTRAVENOUS
Status: CANCELLED
Start: 2023-04-27

## 2023-04-27 RX ORDER — HEPARIN SODIUM (PORCINE) LOCK FLUSH IV SOLN 100 UNIT/ML 100 UNIT/ML
5 SOLUTION INTRAVENOUS
Status: CANCELLED | OUTPATIENT
Start: 2023-04-27

## 2023-04-27 RX ORDER — MEPERIDINE HYDROCHLORIDE 25 MG/ML
25 INJECTION INTRAMUSCULAR; INTRAVENOUS; SUBCUTANEOUS EVERY 30 MIN PRN
Status: CANCELLED | OUTPATIENT
Start: 2023-04-27

## 2023-04-27 RX ORDER — ALBUTEROL SULFATE 0.83 MG/ML
2.5 SOLUTION RESPIRATORY (INHALATION)
Status: CANCELLED | OUTPATIENT
Start: 2023-04-27

## 2023-04-27 RX ORDER — EPINEPHRINE 1 MG/ML
0.3 INJECTION, SOLUTION, CONCENTRATE INTRAVENOUS EVERY 5 MIN PRN
Status: CANCELLED | OUTPATIENT
Start: 2023-04-27

## 2023-04-27 RX ORDER — HEPARIN SODIUM,PORCINE 10 UNIT/ML
5 VIAL (ML) INTRAVENOUS
Status: CANCELLED | OUTPATIENT
Start: 2023-04-27

## 2023-04-27 RX ORDER — EPINEPHRINE 1 MG/ML
0.3 INJECTION, SOLUTION INTRAMUSCULAR; SUBCUTANEOUS EVERY 5 MIN PRN
Status: CANCELLED | OUTPATIENT
Start: 2023-04-27

## 2023-04-27 RX ADMIN — SODIUM CHLORIDE 1000 ML: 9 INJECTION, SOLUTION INTRAVENOUS at 11:30

## 2023-04-27 NOTE — LETTER
4/27/2023         RE: Caity Horan  [address]  [address]  [facility] [address]        Dear Colleague,    Thank you for referring your patient, Caity Horan, to the [facility]. Please see a copy of my visit note below.    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 She is now s/p LDKT on 4/13/22 with Dr. Sepulveda. Patient returned to OR 4/17/23 for re-exploration and intra-op biopsy d/t concern for urine leak vs rejection which found urine leak at anastomosis and hydronephrotic ureter without signs of obstruction - anastomosis redone and ureteral stent placed.     No fevers, chills, nausea or vomiting    Presents for rutledge removal.  States she feels dry.    Transplant History:    Transplant Type:  LDKT  Donor Type: Living   Transplant Date:  4/13/2023 (Kidney), 12/5/2014 (Kidney)   Ureteral Stent:  Yes   Crossmatch:  negative   DSA at Tx:  No  Baseline Cr: TBD   DeNovo DSA: No    Acute Rejection Hx:  No    Present Maintenance Immunosuppression:  Tacrolimus and Mycophenolate mofetil    CMV IgG Ab Discordance:  Yes  EBV IgG Ab Discordance:  No    BK Viremia:  No  EBV Viremia:  No    Transplant Coordinator: Eleanor Lund     Transplant Office Phone Number: [phone]     Immunosuppressant Medications     Immunosuppressive Agents Disp Start End     mycophenolate (GENERIC EQUIVALENT) 250 MG capsule    180 capsule 4/20/2023     Sig - Route: Take 3 capsules (750 mg) by mouth 2 times daily - Oral    Class: E-Prescribe    Renewals     Renewal requests to authorizing provider (Amalia Marie NP) <b>prohibited</b>           tacrolimus (GENERIC EQUIVALENT) 0.5 MG capsule    60 capsule 4/20/2023     Sig - Route: Take 1 capsule (0.5 mg) by  mouth 2 times daily To have available for dose adjustments per transplant team. Discharge dose = 1 mg BID. - Oral    Patient not taking: Reported on 4/24/2023       Class: E-Prescribe    Renewals     Renewal requests to authorizing provider (Amalia Marie NP) <b>prohibited</b>           tacrolimus (GENERIC EQUIVALENT) 1 MG capsule    60 capsule 4/21/2023     Sig - Route: Take 1 capsule (1 mg) by mouth 2 times daily Hold 4/20 evening dose. Start 1 mg BID tomorrow. - Oral    Patient taking differently: Take 2 mg by mouth 2 times daily Hold 4/20 evening dose. Start 1 mg BID tomorrow. Pt has been taking 2mg BID (4/22)       Class: E-Prescribe    Renewals     Renewal requests to authorizing provider (Amalia Marie NP) <b>prohibited</b>                Possible Immunosuppression-related side effects:   []             headache  []             vivid dreams  []             irritability  []             cognitive difficuties  []             fine tremor  []             nausea  []             diarrhea  []             neuropathy      []             edema  []             renal calcineurin toxicity  []             hyperkalemia  []             post-transplant diabetes  []             decreased appetite  []             increased appetite  []             other:  []             none    Prescription Medications as of 4/28/2023       Rx Number Disp Refills Start End Last Dispensed Date Next Fill Date Owning Pharmacy    biotin 1000 MCG TABS tablet            Sig: Take 500 mcg by mouth daily    Class: Historical    Route: Oral    Cyanocobalamin 500 MCG TBDP            Sig: Take 500 mcg by mouth daily    Class: Historical    Route: Oral    hydrOXYzine (ATARAX) 25 MG tablet  15 tablet 0 4/20/2023    [facility] - [place], [place] - [address]    Sig: Take 1 tablet (25 mg) by mouth every 4 hours as needed for anxiety or other (adjuvant pain)    Class: E-Prescribe    Route: Oral    Renewals     Renewal  requests to authorizing provider (Amalia Marie NP) <b>prohibited</b>          levonorgestrel (MIRENA) 20 MCG/DAY IUD            Si each by Intrauterine route Replacement every 7 years. Placed 2019    Class: Historical    Route: Intrauterine    magnesium oxide (MAG-OX) 400 MG tablet  120 tablet 2 2023        Sig: Take 2 tablets (800 mg) by mouth 3 times daily    Class: Historical    Route: Oral    methocarbamol (ROBAXIN) 500 MG tablet  20 tablet 0 2023    [address]    Sig: Take 1 tablet (500 mg) by mouth 4 times daily as needed for muscle spasms    Class: E-Prescribe    Route: Oral    mycophenolate (GENERIC EQUIVALENT) 250 MG capsule  180 capsule 11 2023    [address]    Sig: Take 3 capsules (750 mg) by mouth 2 times daily    Class: E-Prescribe    Route: Oral    Renewals     Renewal requests to authorizing provider (Amalia Marie NP) <b>prohibited</b>          ondansetron (ZOFRAN ODT) 4 MG ODT tab  10 tablet 0 2023    [address]    Sig: Take 1 tablet (4 mg) by mouth every 6 hours as needed for nausea or vomiting    Class: E-Prescribe    Route: Oral    oxyCODONE (ROXICODONE) 5 MG tablet  15 tablet 0 2023    [address]    Sig: Take 1 tablet (5 mg) by mouth every 6 hours as needed for moderate pain    Class: E-Prescribe    Earliest Fill Date: 2023    Route: Oral    phosphorus tablet 250 mg (PHOSPHA 250 NEUTRAL) 250 MG per tablet  60 tablet 0 2023    [address]    Sig: Take 2 tablets (500 mg) by mouth 3 times daily    Class: E-Prescribe    Route: Oral    Renewals     Renewal requests to authorizing provider (Amalia Marie NP) <b>prohibited</b>           sulfamethoxazole-trimethoprim (BACTRIM) 400-80 MG tablet  30 tablet 11 4/21/2023    [address]    Sig: Take 1 tablet by mouth daily    Class: E-Prescribe    Route: Oral    Renewals     Renewal requests to authorizing provider (Amalia Marie NP) <b>prohibited</b>          tacrolimus (GENERIC EQUIVALENT) 0.5 MG capsule  60 capsule 11 4/20/2023    [address]    Sig: Take 1 capsule (0.5 mg) by mouth 2 times daily To have available for dose adjustments per transplant team. Discharge dose = 1 mg BID.    Class: E-Prescribe    Route: Oral    Renewals     Renewal requests to authorizing provider (Amalia Marie NP) <b>prohibited</b>          tacrolimus (GENERIC EQUIVALENT) 1 MG capsule  60 capsule 11 4/21/2023    [address]    Sig: Take 1 capsule (1 mg) by mouth 2 times daily Hold 4/20 evening dose. Start 1 mg BID tomorrow.    Class: E-Prescribe    Route: Oral    Renewals     Renewal requests to authorizing provider (Amalia Marie NP) <b>prohibited</b>          valGANciclovir (VALCYTE) 450 MG tablet  60 tablet 5 4/21/2023    [address]    Sig: Take 2 tablets (900 mg) by mouth daily    Class: E-Prescribe    Route: Oral    vedolizumab (ENTYVIO) 60 MG/ML injection            Sig: Inject 300 mg into the vein once every six weeks    Class: Historical    Route: Intravenous    vitamin D3 (CHOLECALCIFEROL) 50 mcg (2000 units) tablet            Sig: Take 1 tablet by mouth daily    Class: Historical    Route: Oral      Hospital Medications as of 4/28/2023       Dose Frequency Start End    furosemide (LASIX) injection 20 mg (Completed) 20 mg ONCE 4/28/2023 4/28/2023    Class: E-Prescribe    Route: Intravenous    technetium mertiatide Tc99m (MAG3) radioisotope injection 8-12  millicurie (Completed) 8-12 millicurie ONCE 4/28/2023 4/28/2023    Class: E-Prescribe    Route: Intravenous          O:      General Appearance: in no apparent distress.   Skin: Normal, no rashes or jaundice  Heart: regular rate and rhythm, normal S1 and S2  Lungs: easy respirations, no audible wheezing.  Abdomen: flat, The wound is dry and intact, without hernia. The abdomen is non-tender. The kidney graft is not tender.  There is no ascites.  Extremities: Tremor absent.   Edema: absent.         Latest Ref Rng & Units 4/28/2023     9:29 AM 4/27/2023     9:51 AM 4/24/2023     8:26 AM 4/22/2023     7:45 AM 4/21/2023     7:55 AM   Transplant Immunosuppression Labs   Creat 0.51 - 0.95 mg/dL 1.78   1.75   1.37   1.25   1.29     Urea Nitrogen 6.0 - 20.0 mg/dL 9.5   11.6   9.4   7.2   6.6     WBC 4.0 - 11.0 10e3/uL 2.4   3.0   3.9   3.6   2.5     Neutrophil %    74   78     ANEU 1.6 - 8.3 10e3/uL    2.7   2.0         Chemistries:   Recent Labs   Lab Test 04/28/23  0929   BUN 9.5   CR 1.78*   GFRESTIMATED 38*   *     Lab Results   Component Value Date    A1C 5.3 04/03/2023     Recent Labs   Lab Test 04/11/23  0832   ALBUMIN 5.1   BILITOTAL 0.6   ALKPHOS 70   AST 23   ALT 15     Urine Studies:  Recent Labs   Lab Test 12/05/22  1200   COLOR Light Yellow   APPEARANCE Clear   URINEGLC 50*   URINEBILI Negative   URINEKETONE Negative   SG 1.012   UBLD Moderate*   URINEPH 7.5*   PROTEIN 300*   NITRITE Negative   LEUKEST Negative   RBCU 4*   WBCU 8*     Recent Labs   Lab Test 01/14/21  1200 12/03/20  0915   UTPG 2.09* 1.36*     Hematology:   Recent Labs   Lab Test 04/28/23  0929 04/27/23  0951 04/24/23  0826   HGB 8.1* 8.3* 9.0*    363 357   WBC 2.4* 3.0* 3.9*     Coags:   Recent Labs   Lab Test 04/17/23  0904 04/03/23  1556   INR 1.27* 1.07     HLA antibodies:   SA1 Hi Risk David   Date Value Ref Range Status   11/11/2019 None  Final     SA1 HI RISK DAVID   Date Value Ref Range Status   04/24/2023 None  Final     SA1  Mod Risk David   Date Value Ref Range Status   11/11/2019 A:2 B:42 76  Final     SA1 MOD RISK DAVID   Date Value Ref Range Status   04/24/2023 B:45 76  Final     SA2 Hi Risk David   Date Value Ref Range Status   11/11/2019 DQ:2 DQA:05  Final     SA2 HI RISK DAVID   Date Value Ref Range Status   04/24/2023 DQA:05  Final     SA2 Mod Risk David   Date Value Ref Range Status   11/11/2019 DQ:7 DQA:04 06  Final     SA2 MOD RISK DAVID   Date Value Ref Range Status   04/24/2023 DQ:7  Final       Assessment: Caity Horan is doing fairly well s/p LDKT:  Issues we addressed during her visit include:    Plan:    1. Graft function: cr is elevated  2. Immunosuppression Management: No change continue same IS .  Complexity of management:Medium.  Contributing factors: recent txp and urine leak  3. Based on- elevated cr.  Will get US, give 1L ivf and NM renogram tomorrow to rule out urine murphy  Followup: as directed    Total Time: 25 min,   Counselling Time: 15 min.          Again, thank you for allowing me to participate in the care of your patient.        Sincerely,        Christi Alfonso NP

## 2023-04-27 NOTE — PROGRESS NOTES
Nursing Note  Caity Horan presents today to [facility] for:   Chief Complaint   Patient presents with     Infusion     IVF     During today's [facility] appointment, orders from Christi Alfonso were completed.  Frequency: once    Progress note:  Patient identification verified by name and date of birth.  Assessment completed.  Vitals recorded in Doc Flowsheets.  Patient was provided with education regarding medication/procedure and possible side effects.  Patient verbalized understanding.     present during visit today: Not Applicable.    Treatment Conditions: Non-applicable.    Infusion length and rate:  infusion given over approximately 1 hours    Labs: were drawn prior to appointment on 4/27.    Vascular access: peripheral IV placed today.    Is the next appt scheduled? no    Post Infusion Assessment:  Patient tolerated infusion without incident.     Discharge Plan:   Follow up plan of care with: transplant coordinator. and ordering provider as scheduled.  Discharge instructions were reviewed with patient.  Patient/representative verbalized understanding of discharge instructions and all questions answered.  Patient discharged from [facility] in stable condition.    More Tidwell RN    Administrations This Visit     0.9% sodium chloride BOLUS     Admin Date  04/27/2023 Action  $New Bag Dose  1,000 mL Route  Intravenous Administered By  More Tidwell RN

## 2023-04-28 ENCOUNTER — TELEPHONE (OUTPATIENT)
Dept: TRANSPLANT | Facility: CLINIC | Age: 33
End: 2023-04-28

## 2023-04-28 ENCOUNTER — LAB (OUTPATIENT)
Dept: LAB | Facility: CLINIC | Age: 33
End: 2023-04-28
Payer: COMMERCIAL

## 2023-04-28 ENCOUNTER — HOSPITAL ENCOUNTER (OUTPATIENT)
Dept: NUCLEAR MEDICINE | Facility: CLINIC | Age: 33
Setting detail: NUCLEAR MEDICINE
Discharge: HOME | End: 2023-04-28
Attending: NURSE PRACTITIONER | Admitting: NURSE PRACTITIONER
Payer: COMMERCIAL

## 2023-04-28 ENCOUNTER — HOSPITAL ENCOUNTER (INPATIENT)
Facility: CLINIC | Age: 33
LOS: 2 days | Discharge: HOME | DRG: 699 | End: 2023-05-01
Attending: TRANSPLANT SURGERY | Admitting: TRANSPLANT SURGERY
Payer: COMMERCIAL

## 2023-04-28 DIAGNOSIS — T86.10 COMPLICATION OF TRANSPLANTED KIDNEY, UNSPECIFIED COMPLICATION: Primary | ICD-10-CM

## 2023-04-28 DIAGNOSIS — Z94.0 KIDNEY REPLACED BY TRANSPLANT: ICD-10-CM

## 2023-04-28 DIAGNOSIS — E83.39 HYPOPHOSPHATEMIA: ICD-10-CM

## 2023-04-28 DIAGNOSIS — D84.9 IMMUNOSUPPRESSED STATUS (H): ICD-10-CM

## 2023-04-28 DIAGNOSIS — T86.11 ACUTE REJECTION OF KIDNEY TRANSPLANT: ICD-10-CM

## 2023-04-28 LAB
ANION GAP SERPL CALCULATED.3IONS-SCNC: 7 MMOL/L (ref 7–15)
BUN SERPL-MCNC: 9.5 MG/DL (ref 6–20)
CALCIUM SERPL-MCNC: 9.5 MG/DL (ref 8.6–10)
CHLORIDE SERPL-SCNC: 104 MMOL/L (ref 98–107)
CREAT SERPL-MCNC: 1.78 MG/DL (ref 0.51–0.95)
DEPRECATED HCO3 PLAS-SCNC: 24 MMOL/L (ref 22–29)
ERYTHROCYTE [DISTWIDTH] IN BLOOD BY AUTOMATED COUNT: 13.2 % (ref 10–15)
GFR SERPL CREATININE-BSD FRML MDRD: 38 ML/MIN/1.73M2
GLUCOSE SERPL-MCNC: 105 MG/DL (ref 70–99)
HCT VFR BLD AUTO: 26.9 % (ref 35–47)
HGB BLD-MCNC: 8.1 G/DL (ref 11.7–15.7)
MCH RBC QN AUTO: 30 PG (ref 26.5–33)
MCHC RBC AUTO-ENTMCNC: 30.1 G/DL (ref 31.5–36.5)
MCV RBC AUTO: 100 FL (ref 78–100)
PLATELET # BLD AUTO: 348 10E3/UL (ref 150–450)
POTASSIUM SERPL-SCNC: 4.9 MMOL/L (ref 3.4–5.3)
RBC # BLD AUTO: 2.7 10E6/UL (ref 3.8–5.2)
SARS-COV-2 RNA RESP QL NAA+PROBE: NEGATIVE
SODIUM SERPL-SCNC: 135 MMOL/L (ref 136–145)
WBC # BLD AUTO: 2.4 10E3/UL (ref 4–11)

## 2023-04-28 PROCEDURE — 343N000001 HC RX 343: Performed by: NURSE PRACTITIONER

## 2023-04-28 PROCEDURE — 250N000013 HC RX MED GY IP 250 OP 250 PS 637: Performed by: PHYSICIAN ASSISTANT

## 2023-04-28 PROCEDURE — 85027 COMPLETE CBC AUTOMATED: CPT | Performed by: PATHOLOGY

## 2023-04-28 PROCEDURE — 78708 K FLOW/FUNCT IMAGE W/DRUG: CPT

## 2023-04-28 PROCEDURE — 78708 K FLOW/FUNCT IMAGE W/DRUG: CPT | Mod: 26 | Performed by: RADIOLOGY

## 2023-04-28 PROCEDURE — U0003 INFECTIOUS AGENT DETECTION BY NUCLEIC ACID (DNA OR RNA); SEVERE ACUTE RESPIRATORY SYNDROME CORONAVIRUS 2 (SARS-COV-2) (CORONAVIRUS DISEASE [COVID-19]), AMPLIFIED PROBE TECHNIQUE, MAKING USE OF HIGH THROUGHPUT TECHNOLOGIES AS DESCRIBED BY CMS-2020-01-R: HCPCS | Performed by: PHYSICIAN ASSISTANT

## 2023-04-28 PROCEDURE — 250N000012 HC RX MED GY IP 250 OP 636 PS 637: Performed by: PHYSICIAN ASSISTANT

## 2023-04-28 PROCEDURE — A9562 TC99M MERTIATIDE: HCPCS | Performed by: NURSE PRACTITIONER

## 2023-04-28 PROCEDURE — 80048 BASIC METABOLIC PNL TOTAL CA: CPT | Performed by: PATHOLOGY

## 2023-04-28 PROCEDURE — 250N000011 HC RX IP 250 OP 636: Performed by: NURSE PRACTITIONER

## 2023-04-28 PROCEDURE — 36415 COLL VENOUS BLD VENIPUNCTURE: CPT | Performed by: PATHOLOGY

## 2023-04-28 RX ORDER — SODIUM CHLORIDE 9 MG/ML
INJECTION, SOLUTION INTRAVENOUS CONTINUOUS
Status: DISCONTINUED | OUTPATIENT
Start: 2023-04-29 | End: 2023-04-30

## 2023-04-28 RX ORDER — PROCHLORPERAZINE MALEATE 10 MG
10 TABLET ORAL EVERY 6 HOURS PRN
Status: DISCONTINUED | OUTPATIENT
Start: 2023-04-28 | End: 2023-05-01 | Stop reason: HOSPADM

## 2023-04-28 RX ORDER — MYCOPHENOLATE MOFETIL 250 MG/1
750 CAPSULE ORAL 2 TIMES DAILY
Status: DISCONTINUED | OUTPATIENT
Start: 2023-04-28 | End: 2023-05-01 | Stop reason: HOSPADM

## 2023-04-28 RX ORDER — TACROLIMUS 1 MG/1
2 CAPSULE ORAL 2 TIMES DAILY
Status: DISCONTINUED | OUTPATIENT
Start: 2023-04-28 | End: 2023-04-28

## 2023-04-28 RX ORDER — ONDANSETRON 4 MG/1
4 TABLET, ORALLY DISINTEGRATING ORAL EVERY 6 HOURS PRN
Status: DISCONTINUED | OUTPATIENT
Start: 2023-04-28 | End: 2023-05-01 | Stop reason: HOSPADM

## 2023-04-28 RX ORDER — SODIUM CHLORIDE 9 MG/ML
INJECTION, SOLUTION INTRAVENOUS CONTINUOUS
Status: DISCONTINUED | OUTPATIENT
Start: 2023-04-28 | End: 2023-04-28

## 2023-04-28 RX ORDER — PROCHLORPERAZINE 25 MG
25 SUPPOSITORY, RECTAL RECTAL EVERY 12 HOURS PRN
Status: DISCONTINUED | OUTPATIENT
Start: 2023-04-28 | End: 2023-05-01 | Stop reason: HOSPADM

## 2023-04-28 RX ORDER — VALGANCICLOVIR 450 MG/1
450 TABLET, FILM COATED ORAL DAILY
Status: DISCONTINUED | OUTPATIENT
Start: 2023-04-29 | End: 2023-05-01 | Stop reason: HOSPADM

## 2023-04-28 RX ORDER — SULFAMETHOXAZOLE AND TRIMETHOPRIM 400; 80 MG/1; MG/1
1 TABLET ORAL DAILY
Status: DISCONTINUED | OUTPATIENT
Start: 2023-04-28 | End: 2023-05-01 | Stop reason: HOSPADM

## 2023-04-28 RX ORDER — VALGANCICLOVIR 450 MG/1
900 TABLET, FILM COATED ORAL DAILY
Status: DISCONTINUED | OUTPATIENT
Start: 2023-04-29 | End: 2023-04-28

## 2023-04-28 RX ORDER — TACROLIMUS 1 MG/1
1 CAPSULE ORAL 2 TIMES DAILY
Status: DISCONTINUED | OUTPATIENT
Start: 2023-04-29 | End: 2023-05-01

## 2023-04-28 RX ORDER — FUROSEMIDE 10 MG/ML
20 INJECTION INTRAMUSCULAR; INTRAVENOUS ONCE
Status: COMPLETED | OUTPATIENT
Start: 2023-04-28 | End: 2023-04-28

## 2023-04-28 RX ORDER — ONDANSETRON 2 MG/ML
4 INJECTION INTRAMUSCULAR; INTRAVENOUS EVERY 6 HOURS PRN
Status: DISCONTINUED | OUTPATIENT
Start: 2023-04-28 | End: 2023-05-01 | Stop reason: HOSPADM

## 2023-04-28 RX ORDER — ONDANSETRON 4 MG/1
4 TABLET, ORALLY DISINTEGRATING ORAL EVERY 6 HOURS PRN
Status: DISCONTINUED | OUTPATIENT
Start: 2023-04-28 | End: 2023-04-28

## 2023-04-28 RX ORDER — MAGNESIUM OXIDE 400 MG/1
800 TABLET ORAL 3 TIMES DAILY
Status: DISCONTINUED | OUTPATIENT
Start: 2023-04-28 | End: 2023-05-01 | Stop reason: HOSPADM

## 2023-04-28 RX ORDER — LIDOCAINE 40 MG/G
CREAM TOPICAL
Status: DISCONTINUED | OUTPATIENT
Start: 2023-04-28 | End: 2023-05-01 | Stop reason: HOSPADM

## 2023-04-28 RX ORDER — VITAMIN B COMPLEX
50 TABLET ORAL DAILY
Status: DISCONTINUED | OUTPATIENT
Start: 2023-04-29 | End: 2023-05-01 | Stop reason: HOSPADM

## 2023-04-28 RX ORDER — HYDROXYZINE HYDROCHLORIDE 25 MG/1
25 TABLET, FILM COATED ORAL EVERY 4 HOURS PRN
Status: DISCONTINUED | OUTPATIENT
Start: 2023-04-28 | End: 2023-05-01 | Stop reason: HOSPADM

## 2023-04-28 RX ADMIN — TECHNESCAN TC 99M MERTIATIDE 10.2 MILLICURIE: 1 INJECTION, POWDER, LYOPHILIZED, FOR SOLUTION INTRAVENOUS at 08:25

## 2023-04-28 RX ADMIN — FUROSEMIDE 20 MG: 10 INJECTION, SOLUTION INTRAMUSCULAR; INTRAVENOUS at 08:45

## 2023-04-28 RX ADMIN — Medication 800 MG: at 23:24

## 2023-04-28 RX ADMIN — HYDROXYZINE HYDROCHLORIDE 25 MG: 25 TABLET, FILM COATED ORAL at 23:27

## 2023-04-28 RX ADMIN — SODIUM PHOSPHATE, DIBASIC, ANHYDROUS, POTASSIUM PHOSPHATE, MONOBASIC, AND SODIUM PHOSPHATE, MONOBASIC, MONOHYDRATE 500 MG: 852; 155; 130 TABLET, COATED ORAL at 20:57

## 2023-04-28 RX ADMIN — MYCOPHENOLATE MOFETIL 750 MG: 250 CAPSULE ORAL at 20:57

## 2023-04-28 RX ADMIN — TACROLIMUS 1 MG: 1 CAPSULE ORAL at 20:57

## 2023-04-28 ASSESSMENT — COLUMBIA-SUICIDE SEVERITY RATING SCALE - C-SSRS
3. HAVE YOU BEEN THINKING ABOUT HOW YOU MIGHT KILL YOURSELF?: NO
6. HAVE YOU EVER DONE ANYTHING, STARTED TO DO ANYTHING, OR PREPARED TO DO ANYTHING TO END YOUR LIFE?: NO
1. IN THE PAST MONTH, HAVE YOU WISHED YOU WERE DEAD OR WISHED YOU COULD GO TO SLEEP AND NOT WAKE UP?: NO
4. HAVE YOU HAD THESE THOUGHTS AND HAD SOME INTENTION OF ACTING ON THEM?: NO
2. HAVE YOU ACTUALLY HAD ANY THOUGHTS OF KILLING YOURSELF IN THE PAST MONTH?: NO
5. HAVE YOU STARTED TO WORK OUT OR WORKED OUT THE DETAILS OF HOW TO KILL YOURSELF? DO YOU INTEND TO CARRY OUT THIS PLAN?: NO

## 2023-04-28 ASSESSMENT — ACTIVITIES OF DAILY LIVING (ADL)
ADLS_ACUITY_SCORE: 35

## 2023-04-28 NOTE — PROGRESS NOTES
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 She is now s/p LDKT on 4/13/22 with Dr. Sepulveda. Patient returned to OR 4/17/23 for re-exploration and intra-op biopsy d/t concern for urine leak vs rejection which found urine leak at anastomosis and hydronephrotic ureter without signs of obstruction - anastomosis redone and ureteral stent placed.     No fevers, chills, nausea or vomiting    Presents for rutledge removal.  States she feels dry.    Transplant History:    Transplant Type:  LDKT  Donor Type: Living   Transplant Date:  4/13/2023 (Kidney), 12/5/2014 (Kidney)   Ureteral Stent:  Yes   Crossmatch:  negative   DSA at Tx:  No  Baseline Cr: TBD   DeNovo DSA: No    Acute Rejection Hx:  No    Present Maintenance Immunosuppression:  Tacrolimus and Mycophenolate mofetil    CMV IgG Ab Discordance:  Yes  EBV IgG Ab Discordance:  No    BK Viremia:  No  EBV Viremia:  No    Transplant Coordinator: Eleanor Lund     Transplant Office Phone Number: [phone]     Immunosuppressant Medications     Immunosuppressive Agents Disp Start End     mycophenolate (GENERIC EQUIVALENT) 250 MG capsule    180 capsule 4/20/2023     Sig - Route: Take 3 capsules (750 mg) by mouth 2 times daily - Oral    Class: E-Prescribe    Renewals     Renewal requests to authorizing provider (Amalia Marie NP) <b>prohibited</b>           tacrolimus (GENERIC EQUIVALENT) 0.5 MG capsule    60 capsule 4/20/2023     Sig - Route: Take 1 capsule (0.5 mg) by mouth 2 times daily To have available for dose adjustments per transplant team. Discharge dose = 1 mg BID. - Oral    Patient not taking: Reported on 4/24/2023       Class: E-Prescribe    Renewals     Renewal requests to authorizing provider (Amalia Marie  ALEJANDRO Velez) <b>prohibited</b>           tacrolimus (GENERIC EQUIVALENT) 1 MG capsule    60 capsule 2023     Sig - Route: Take 1 capsule (1 mg) by mouth 2 times daily Hold  evening dose. Start 1 mg BID tomorrow. - Oral    Patient taking differently: Take 2 mg by mouth 2 times daily Hold  evening dose. Start 1 mg BID tomorrow. Pt has been taking 2mg BID ()       Class: E-Prescribe    Renewals     Renewal requests to authorizing provider (Amalia Marie NP) <b>prohibited</b>                Possible Immunosuppression-related side effects:   []             headache  []             vivid dreams  []             irritability  []             cognitive difficuties  []             fine tremor  []             nausea  []             diarrhea  []             neuropathy      []             edema  []             renal calcineurin toxicity  []             hyperkalemia  []             post-transplant diabetes  []             decreased appetite  []             increased appetite  []             other:  []             none    Prescription Medications as of 2023       Rx Number Disp Refills Start End Last Dispensed Date Next Fill Date Owning Pharmacy    biotin 1000 MCG TABS tablet            Sig: Take 500 mcg by mouth daily    Class: Historical    Route: Oral    Cyanocobalamin 500 MCG TBDP            Sig: Take 500 mcg by mouth daily    Class: Historical    Route: Oral    hydrOXYzine (ATARAX) 25 MG tablet  15 tablet 0 2023    [place], [place] - [address] [facility]    Sig: Take 1 tablet (25 mg) by mouth every 4 hours as needed for anxiety or other (adjuvant pain)    Class: E-Prescribe    Route: Oral    Renewals     Renewal requests to authorizing provider (Amalia Marie NP) <b>prohibited</b>          levonorgestrel (MIRENA) 20 MCG/DAY IUD            Si each by Intrauterine route Replacement every 7 years. Placed 2019    Class: Historical    Route: Intrauterine     magnesium oxide (MAG-OX) 400 MG tablet  120 tablet 2 4/21/2023        Sig: Take 2 tablets (800 mg) by mouth 3 times daily    Class: Historical    Route: Oral    methocarbamol (ROBAXIN) 500 MG tablet  20 tablet 0 4/20/2023    [address]    Sig: Take 1 tablet (500 mg) by mouth 4 times daily as needed for muscle spasms    Class: E-Prescribe    Route: Oral    mycophenolate (GENERIC EQUIVALENT) 250 MG capsule  180 capsule 11 4/20/2023    [address]    Sig: Take 3 capsules (750 mg) by mouth 2 times daily    Class: E-Prescribe    Route: Oral    Renewals     Renewal requests to authorizing provider (Amalia Marie NP) <b>prohibited</b>          ondansetron (ZOFRAN ODT) 4 MG ODT tab  10 tablet 0 4/20/2023    [address]    Sig: Take 1 tablet (4 mg) by mouth every 6 hours as needed for nausea or vomiting    Class: E-Prescribe    Route: Oral    oxyCODONE (ROXICODONE) 5 MG tablet  15 tablet 0 4/20/2023    [address]    Sig: Take 1 tablet (5 mg) by mouth every 6 hours as needed for moderate pain    Class: E-Prescribe    Earliest Fill Date: 4/20/2023    Route: Oral    phosphorus tablet 250 mg (PHOSPHA 250 NEUTRAL) 250 MG per tablet  60 tablet 0 4/20/2023    [address]    Sig: Take 2 tablets (500 mg) by mouth 3 times daily    Class: E-Prescribe    Route: Oral    Renewals     Renewal requests to authorizing provider (Amalia Marie NP) <b>prohibited</b>          sulfamethoxazole-trimethoprim (BACTRIM) 400-80 MG tablet  30 tablet 11 4/21/2023    [address]    Sig: Take 1 tablet by mouth daily    Class: E-Prescribe    Route: Oral    Renewals     Renewal requests to  authorizing provider (Amalia Marie NP) <b>prohibited</b>          tacrolimus (GENERIC EQUIVALENT) 0.5 MG capsule  60 capsule 11 4/20/2023    [address]    Sig: Take 1 capsule (0.5 mg) by mouth 2 times daily To have available for dose adjustments per transplant team. Discharge dose = 1 mg BID.    Class: E-Prescribe    Route: Oral    Renewals     Renewal requests to authorizing provider (Amalia Marie NP) <b>prohibited</b>          tacrolimus (GENERIC EQUIVALENT) 1 MG capsule  60 capsule 11 4/21/2023    [address]    Sig: Take 1 capsule (1 mg) by mouth 2 times daily Hold 4/20 evening dose. Start 1 mg BID tomorrow.    Class: E-Prescribe    Route: Oral    Renewals     Renewal requests to authorizing provider (Amalia Marie NP) <b>prohibited</b>          valGANciclovir (VALCYTE) 450 MG tablet  60 tablet 5 4/21/2023    [address]    Sig: Take 2 tablets (900 mg) by mouth daily    Class: E-Prescribe    Route: Oral    vedolizumab (ENTYVIO) 60 MG/ML injection            Sig: Inject 300 mg into the vein once every six weeks    Class: Historical    Route: Intravenous    vitamin D3 (CHOLECALCIFEROL) 50 mcg (2000 units) tablet            Sig: Take 1 tablet by mouth daily    Class: Historical    Route: Oral      Hospital Medications as of 4/28/2023       Dose Frequency Start End    furosemide (LASIX) injection 20 mg (Completed) 20 mg ONCE 4/28/2023 4/28/2023    Class: E-Prescribe    Route: Intravenous    technetium mertiatide Tc99m (MAG3) radioisotope injection 8-12 millicurie (Completed) 8-12 millicurie ONCE 4/28/2023 4/28/2023    Class: E-Prescribe    Route: Intravenous          O:      General Appearance: in no apparent distress.   Skin: Normal, no rashes or jaundice  Heart: regular rate and rhythm, normal S1 and S2  Lungs:  easy respirations, no audible wheezing.  Abdomen: flat, The wound is dry and intact, without hernia. The abdomen is non-tender. The kidney graft is not tender.  There is no ascites.  Extremities: Tremor absent.   Edema: absent.         Latest Ref Rng & Units 4/28/2023     9:29 AM 4/27/2023     9:51 AM 4/24/2023     8:26 AM 4/22/2023     7:45 AM 4/21/2023     7:55 AM   Transplant Immunosuppression Labs   Creat 0.51 - 0.95 mg/dL 1.78   1.75   1.37   1.25   1.29     Urea Nitrogen 6.0 - 20.0 mg/dL 9.5   11.6   9.4   7.2   6.6     WBC 4.0 - 11.0 10e3/uL 2.4   3.0   3.9   3.6   2.5     Neutrophil %    74   78     ANEU 1.6 - 8.3 10e3/uL    2.7   2.0         Chemistries:   Recent Labs   Lab Test 04/28/23  0929   BUN 9.5   CR 1.78*   GFRESTIMATED 38*   *     Lab Results   Component Value Date    A1C 5.3 04/03/2023     Recent Labs   Lab Test 04/11/23  0832   ALBUMIN 5.1   BILITOTAL 0.6   ALKPHOS 70   AST 23   ALT 15     Urine Studies:  Recent Labs   Lab Test 12/05/22  1200   COLOR Light Yellow   APPEARANCE Clear   URINEGLC 50*   URINEBILI Negative   URINEKETONE Negative   SG 1.012   UBLD Moderate*   URINEPH 7.5*   PROTEIN 300*   NITRITE Negative   LEUKEST Negative   RBCU 4*   WBCU 8*     Recent Labs   Lab Test 01/14/21  1200 12/03/20  0915   UTPG 2.09* 1.36*     Hematology:   Recent Labs   Lab Test 04/28/23  0929 04/27/23  0951 04/24/23  0826   HGB 8.1* 8.3* 9.0*    363 357   WBC 2.4* 3.0* 3.9*     Coags:   Recent Labs   Lab Test 04/17/23  0904 04/03/23  1556   INR 1.27* 1.07     HLA antibodies:   SA1 Hi Risk David   Date Value Ref Range Status   11/11/2019 None  Final     SA1 HI RISK DAVID   Date Value Ref Range Status   04/24/2023 None  Final     SA1 Mod Risk David   Date Value Ref Range Status   11/11/2019 A:2 B:42 76  Final     SA1 MOD RISK DAVID   Date Value Ref Range Status   04/24/2023 B:45 76  Final     SA2 Hi Risk David   Date Value Ref Range Status   11/11/2019 DQ:2 DQA:05  Final     SA2 HI RISK DAVID   Date Value  Ref Range Status   04/24/2023 DQA:05  Final     SA2 Mod Risk David   Date Value Ref Range Status   11/11/2019 DQ:7 DQA:04 06  Final     SA2 MOD RISK DAVID   Date Value Ref Range Status   04/24/2023 DQ:7  Final       Assessment: Caity Horan is doing fairly well s/p LDKT:  Issues we addressed during her visit include:    Plan:    1. Graft function: cr is elevated  2. Immunosuppression Management: No change continue same IS .  Complexity of management:Medium.  Contributing factors: recent txp and urine leak  3. Based on- elevated cr.  Will get US, give 1L ivf and NM renogram tomorrow to rule out urine murphy  Followup: as directed    Total Time: 25 min,   Counselling Time: 15 min.

## 2023-04-28 NOTE — PROGRESS NOTES
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 She is now s/p LDKT on 4/13/22 with Dr. Sepulveda. Patient returned to OR 4/17/23 for re-exploration and intra-op biopsy d/t concern for urine leak vs rejection which found urine leak at anastomosis and hydronephrotic ureter without signs of obstruction - anastomosis redone and ureteral stent placed.     No fevers, chills, nausea or vomiting    DALTON less than 10 ml for 4 days.   Transplant History:    Transplant Type:  LDKT  Donor Type: Living   Transplant Date:  4/13/2023 (Kidney), 12/5/2014 (Kidney)   Ureteral Stent:  Yes   Crossmatch:  negative   DSA at Tx:  No  Baseline Cr: TBD   DeNovo DSA: No    Acute Rejection Hx:  No    Present Maintenance Immunosuppression:  Tacrolimus and Mycophenolate mofetil    CMV IgG Ab Discordance:  Yes  EBV IgG Ab Discordance:  No    BK Viremia:  No  EBV Viremia:  No    Transplant Coordinator: Eleanor Lund     Transplant Office Phone Number: [phone]     Immunosuppressant Medications     Immunosuppressive Agents Disp Start End     mycophenolate (GENERIC EQUIVALENT) 250 MG capsule    180 capsule 4/20/2023     Sig - Route: Take 3 capsules (750 mg) by mouth 2 times daily - Oral    Class: E-Prescribe    Renewals     Renewal requests to authorizing provider (Amalia Marie NP) <b>prohibited</b>           tacrolimus (GENERIC EQUIVALENT) 0.5 MG capsule    60 capsule 4/20/2023     Sig - Route: Take 1 capsule (0.5 mg) by mouth 2 times daily To have available for dose adjustments per transplant team. Discharge dose = 1 mg BID. - Oral    Patient not taking: Reported on 4/24/2023       Class: E-Prescribe    Renewals     Renewal requests to authorizing provider (Amalia Marie NP)  <b>prohibited</b>           tacrolimus (GENERIC EQUIVALENT) 1 MG capsule    60 capsule 2023     Sig - Route: Take 1 capsule (1 mg) by mouth 2 times daily Hold  evening dose. Start 1 mg BID tomorrow. - Oral    Patient taking differently: Take 2 mg by mouth 2 times daily Hold  evening dose. Start 1 mg BID tomorrow. Pt has been taking 2mg BID ()       Class: E-Prescribe    Renewals     Renewal requests to authorizing provider (Amalia Marie NP) <b>prohibited</b>                Possible Immunosuppression-related side effects:   []             headache  []             vivid dreams  []             irritability  []             cognitive difficuties  []             fine tremor  []             nausea  []             diarrhea  []             neuropathy      []             edema  []             renal calcineurin toxicity  []             hyperkalemia  []             post-transplant diabetes  []             decreased appetite  []             increased appetite  []             other:  []             none    Prescription Medications as of 2023       Rx Number Disp Refills Start End Last Dispensed Date Next Fill Date Owning Pharmacy    biotin 1000 MCG TABS tablet            Sig: Take 500 mcg by mouth daily    Class: Historical    Route: Oral    Cyanocobalamin 500 MCG TBDP            Sig: Take 500 mcg by mouth daily    Class: Historical    Route: Oral    hydrOXYzine (ATARAX) 25 MG tablet  15 tablet 0 2023    [place], [place] - [address] [facility]    Sig: Take 1 tablet (25 mg) by mouth every 4 hours as needed for anxiety or other (adjuvant pain)    Class: E-Prescribe    Route: Oral    Renewals     Renewal requests to authorizing provider (Amalia Marie NP) <b>prohibited</b>          levonorgestrel (MIRENA) 20 MCG/DAY IUD            Si each by Intrauterine route Replacement every 7 years. Placed 2019    Class: Historical    Route: Intrauterine     magnesium oxide (MAG-OX) 400 MG tablet  120 tablet 2 4/21/2023        Sig: Take 2 tablets (800 mg) by mouth 3 times daily    Class: Historical    Route: Oral    methocarbamol (ROBAXIN) 500 MG tablet  20 tablet 0 4/20/2023    [address]    Sig: Take 1 tablet (500 mg) by mouth 4 times daily as needed for muscle spasms    Class: E-Prescribe    Route: Oral    mycophenolate (GENERIC EQUIVALENT) 250 MG capsule  180 capsule 11 4/20/2023    [address]    Sig: Take 3 capsules (750 mg) by mouth 2 times daily    Class: E-Prescribe    Route: Oral    Renewals     Renewal requests to authorizing provider (Amalia Marie NP) <b>prohibited</b>          ondansetron (ZOFRAN ODT) 4 MG ODT tab  10 tablet 0 4/20/2023    [address]    Sig: Take 1 tablet (4 mg) by mouth every 6 hours as needed for nausea or vomiting    Class: E-Prescribe    Route: Oral    oxyCODONE (ROXICODONE) 5 MG tablet  15 tablet 0 4/20/2023    [address]    Sig: Take 1 tablet (5 mg) by mouth every 6 hours as needed for moderate pain    Class: E-Prescribe    Earliest Fill Date: 4/20/2023    Route: Oral    phosphorus tablet 250 mg (PHOSPHA 250 NEUTRAL) 250 MG per tablet  60 tablet 0 4/20/2023    [address]    Sig: Take 2 tablets (500 mg) by mouth 3 times daily    Class: E-Prescribe    Route: Oral    Renewals     Renewal requests to authorizing provider (Amalia Marie NP) <b>prohibited</b>          sulfamethoxazole-trimethoprim (BACTRIM) 400-80 MG tablet  30 tablet 11 4/21/2023    [address]    Sig: Take 1 tablet by mouth daily    Class: E-Prescribe    Route: Oral    Renewals     Renewal requests to  authorizing provider (Amalia Marie NP) <b>prohibited</b>          tacrolimus (GENERIC EQUIVALENT) 0.5 MG capsule  60 capsule 11 4/20/2023    [address]    Sig: Take 1 capsule (0.5 mg) by mouth 2 times daily To have available for dose adjustments per transplant team. Discharge dose = 1 mg BID.    Class: E-Prescribe    Route: Oral    Renewals     Renewal requests to authorizing provider (Amalia Marie NP) <b>prohibited</b>          tacrolimus (GENERIC EQUIVALENT) 1 MG capsule  60 capsule 11 4/21/2023    [address]    Sig: Take 1 capsule (1 mg) by mouth 2 times daily Hold 4/20 evening dose. Start 1 mg BID tomorrow.    Class: E-Prescribe    Route: Oral    Renewals     Renewal requests to authorizing provider (Amalia Marie NP) <b>prohibited</b>          valGANciclovir (VALCYTE) 450 MG tablet  60 tablet 5 4/21/2023    [address]    Sig: Take 2 tablets (900 mg) by mouth daily    Class: E-Prescribe    Route: Oral    vedolizumab (ENTYVIO) 60 MG/ML injection            Sig: Inject 300 mg into the vein once every six weeks    Class: Historical    Route: Intravenous    vitamin D3 (CHOLECALCIFEROL) 50 mcg (2000 units) tablet            Sig: Take 1 tablet by mouth daily    Class: Historical    Route: Oral      Hospital Medications as of 4/28/2023       Dose Frequency Start End    furosemide (LASIX) injection 20 mg (Completed) 20 mg ONCE 4/28/2023 4/28/2023    Class: E-Prescribe    Route: Intravenous    technetium mertiatide Tc99m (MAG3) radioisotope injection 8-12 millicurie (Completed) 8-12 millicurie ONCE 4/28/2023 4/28/2023    Class: E-Prescribe    Route: Intravenous          O:      General Appearance: in no apparent distress.   Skin: Normal, no rashes or jaundice  Heart: regular rate and rhythm, normal S1 and S2  Lungs:  easy respirations, no audible wheezing.  Abdomen: flat, The wound is dry and intact, without hernia. The abdomen is non-tender. The kidney graft is not tender.  There is no ascites.  Extremities: Tremor absent.   Edema: absent.         Latest Ref Rng & Units 4/28/2023     9:29 AM 4/27/2023     9:51 AM 4/24/2023     8:26 AM 4/22/2023     7:45 AM 4/21/2023     7:55 AM   Transplant Immunosuppression Labs   Creat 0.51 - 0.95 mg/dL 1.78   1.75   1.37   1.25   1.29     Urea Nitrogen 6.0 - 20.0 mg/dL 9.5   11.6   9.4   7.2   6.6     WBC 4.0 - 11.0 10e3/uL 2.4   3.0   3.9   3.6   2.5     Neutrophil %    74   78     ANEU 1.6 - 8.3 10e3/uL    2.7   2.0         Chemistries:   Recent Labs   Lab Test 04/28/23  0929   BUN 9.5   CR 1.78*   GFRESTIMATED 38*   *     Lab Results   Component Value Date    A1C 5.3 04/03/2023     Recent Labs   Lab Test 04/11/23  0832   ALBUMIN 5.1   BILITOTAL 0.6   ALKPHOS 70   AST 23   ALT 15     Urine Studies:  Recent Labs   Lab Test 12/05/22  1200   COLOR Light Yellow   APPEARANCE Clear   URINEGLC 50*   URINEBILI Negative   URINEKETONE Negative   SG 1.012   UBLD Moderate*   URINEPH 7.5*   PROTEIN 300*   NITRITE Negative   LEUKEST Negative   RBCU 4*   WBCU 8*     Recent Labs   Lab Test 01/14/21  1200 12/03/20  0915   UTPG 2.09* 1.36*     Hematology:   Recent Labs   Lab Test 04/28/23  0929 04/27/23  0951 04/24/23  0826   HGB 8.1* 8.3* 9.0*    363 357   WBC 2.4* 3.0* 3.9*     Coags:   Recent Labs   Lab Test 04/17/23  0904 04/03/23  1556   INR 1.27* 1.07     HLA antibodies:   SA1 Hi Risk David   Date Value Ref Range Status   11/11/2019 None  Final     SA1 HI RISK DAVID   Date Value Ref Range Status   04/24/2023 None  Final     SA1 Mod Risk David   Date Value Ref Range Status   11/11/2019 A:2 B:42 76  Final     SA1 MOD RISK DAVID   Date Value Ref Range Status   04/24/2023 B:45 76  Final     SA2 Hi Risk David   Date Value Ref Range Status   11/11/2019 DQ:2 DQA:05  Final     SA2 HI RISK DAVID   Date Value  Ref Range Status   04/24/2023 DQA:05  Final     SA2 Mod Risk David   Date Value Ref Range Status   11/11/2019 DQ:7 DQA:04 06  Final     SA2 MOD RISK DAVID   Date Value Ref Range Status   04/24/2023 DQ:7  Final       Assessment: Caity Horan is doing fairly well s/p LDKT:  Issues we addressed during her visit include:    Plan:    1. Graft function: cr is stable  2. Immunosuppression Management: No change continue same IS .  Complexity of management:Medium.  Contributing factors: recent txp and urine leak  3. DALTON removed  Followup: as directed    Total Time: 25 min,   Counselling Time: 15 min.

## 2023-04-28 NOTE — TELEPHONE ENCOUNTER
Confirmed with Dr. Doan and Rupert Dsouza - patient okay to infuse Entyvio on 5/17 (1 month post-transplant).    Called patient to discuss Entyvio logistics. Caity lives in [facility], but has a residence in [place] as well because she has her transplant care here at the [facility]. She returns to [place] regularly. She wishes to maintain her GI care with us at the [facility] as well. Logistically, having Entyvio infusions available to the patient every 6 weeks in two different states is quite complicated. Will discuss with providers and reach out to [facility] infusion for further planning. Of note, she spends a lot of time in [place] as well.

## 2023-04-28 NOTE — TELEPHONE ENCOUNTER
Spoke with patient.  No urine leak noted on NM renogram.    Elevated cr. Needs to be admitted for kidney biopsy

## 2023-04-28 NOTE — H&P
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 She is now s/p LDKT on 4/13/23 with Dr. Sepulveda. Patient returned to OR 4/17/23 for re-exploration and intra-op biopsy d/t concern for urine leak vs rejection which found urine leak at anastomosis and hydronephrotic ureter without signs of obstruction - anastomosis redone and ureteral stent placed. Patient was discharged and Cr improved to 1.3 then increased to 1.8 on 4/27. At that time patient was given IVF, and US was done showing 4.1 cm mar-nephric fluid collection and a renogram suggestive of ATN. Wharton remained in place. Repeat Cr on admission remains 1.8. Admit for IVF and graft biopsy by IR.      s/p LDKT 4/13/23 c/b Urine leak: Cr improved after ureteral anastomosis revision and stent placement 4/17/23 as below. US 4/17 patent with resolved fluid collections. Intra-op biopsy with pathology negative for rejection. Cr decreased to 1.3.    - Planning to remove in clinic today, will discuss when to remove    - Drain to be removed post-Wharton removal  ASHLEY: Cr up to 1.8 today after IVF.   -4/27 US 4.1 mar-nephric fluid collection  -4/27 renogram ATN  -Will give additional IVF   -Consult transplant ID  -Send UA/UC  -Send urine protein  -Consult IR for graft biopsy   De naeem DSA: 4.24 B45, mfi [phone]. Will repeat with biopsy on 4/29.      Immunosuppressed status 2/2 medications:   Induction: via high risk induction (cPRA 82, no DSA) with Thymoglobulin 350 mg (6 mg/kg) and steroids.  Maintenance:    -  mg BID   - Tac Goal level 8-10. 4/27 level 8.2 (12 hour trough). Repeat level 4/29.   Infection prophylaxis: viral (Valcyte  x 6 months), PCP (Bactrim indefinitely)     Neuro:   Anxiety: PRN hydroxyzine      Hematology:   Anemia of chronic disease: Hgb 8.1 on admission. Continue to monitor.  Leukopenia: WBC 2.4, WNL at time of transplant.      Cardiorespiratory:   HTN: Continue to hold PTA antihypertensives.      GI:   Hx Ulcerative colitis: HOLD Entyvio infusion x1 month post-transplant per Transplant nephrology.      Fluid/Electrolytes:   Hypomagnesemia: Continue PO Mag-Ox 800 mg TID. Mg 1.8 today.   Hypophosphatemia: Continue PO Phos 500 mg TID. Phos 2.0 today.     Transplant coordinator: Eleanor Lund [phone]  Donor type:  Live  DSA at time of transplant:  No DSA 4/17/23, DSA 4/24 B45, mfi [phone]  Ureteral stent: Yes  CMV:  Donor + / Recipient -  EBV:  Donor + / Recipient +  Thymoglobulin: 350 mg (6 mg/kg)    Juve Rosales,   General Surgery, PGY-1  [phone]         Chief Complaint:   Admit for increased Cr.     History is obtained from the patient    Patient denies abdominal pain, SOB, constipation, diarrhea, chest pain or fever. She states that she feels in her normal state of health. She denies being in any active distress. She is a direct admission from clinic due to elevated creatinine.          Past Medical History:     Past Medical History:   Diagnosis Date     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     Anemia in stage 5 chronic kidney disease (H) 10/27/2014     ESRD (end stage renal disease) on dialysis (H)      HTN (hypertension) 10/27/2014     Hypertension 10/2014     IgA nephropathy     biopsy proven     Metabolic acidosis      Ulcerative pancolitis (H) 2012     Vitamin D deficiency             Past Surgical History:     Past Surgical History:   Procedure Laterality Date     BENCH KIDNEY  4/13/2023    Procedure: Bench kidney;  Surgeon: Ivy Sepulveda MD;  Location: [facility] OR     BIOPSY  2021    renal     COLONOSCOPY N/A 03/12/2018    Procedure: COMBINED COLONOSCOPY, SINGLE OR MULTIPLE BIOPSY/POLYPECTOMY BY  BIOPSY;  EGD/Colonoscopy ;  Surgeon: Kory Massey MD;  Location: [facility] GI     COLONOSCOPY N/A 09/10/2018    Procedure: COMBINED COLONOSCOPY, SINGLE OR MULTIPLE BIOPSY/POLYPECTOMY BY BIOPSY;  Colonoscopy;  Surgeon: Yenifer Doan MD;  [facility] OR     COLONOSCOPY N/A 2/3/2023    Procedure: COLONOSCOPY, WITH BIOPSY;  Surgeon: Yaakov Loving MD;  Location: [facility] GI     EXTRACTION(S) DENTAL       IR CVC TUNNEL PLACEMENT > 5 YRS OF AGE  2022     IR CVC TUNNEL REMOVAL RIGHT  2023     PERCUTANEOUS BIOPSY KIDNEY Right 2018    Procedure: Right Kidney Biopsy;  Surgeon: Otilio Mar MD;  Location: [facility] OR     RETURN KIDNEY TRANSPLANT N/A 2023    Procedure: Exploration of return kidney transplant, kidney biopsy, revision of ureteral anastomosis, ureteral stent placement;  Surgeon: Ivy Sepulveda MD;  Location: [facility] OR     TRANSPLANT KIDNEY RECIPIENT LIVING RELATED N/A 2014    Procedure: TRANSPLANT KIDNEY RECIPIENT LIVING RELATED;  Surgeon: Dale Middleton MD;  Location: [facility] OR     WISDOM TOOTH EXTRACTION Bilateral             Social History:     Social History     Tobacco Use     Smoking status: Never     Smokeless tobacco: Never   Vaping Use     Vaping status: Not on file   Substance Use Topics     Alcohol use: Not Currently     Alcohol/week: 3.0 - 9.0 standard drinks of alcohol     Types: 1 - 3 Glasses of wine, 1 - 3 Cans of beer, 1 - 3 Shots of liquor per week          Family History:     Family History   Problem Relation Age of Onset     No Known Problems Mother      Alcoholism Father          in early 50s     Prostate Cancer Paternal Grandmother      Kidney Disease No family hx of      Family history reviewed and updated in [facility]         Immunizations:   Immunization status is unknown         Allergies:     Allergies   Allergen Reactions     Bumetanide Other (See Comments)     Causes paralysis     Amoxicillin Rash            Medications:   See Med rec          Review  of Systems:   The Review of Systems is negative other than noted in the HPI    GENERAL: NAD, resting comfortably in bed  HEENT: atraumatic, normocephalic, no scleral icterus  CARDIO: normal rate and regular rhythm, no LE edema  PULM: no increased WOB, CTAB  ABD: non-distended, soft, and non-tender; LLQ incision C/D/I  : rutledge in place with light yellow urine  NEURO: CN II-XII grossly intact, no focal neuro deficits  PSYCH: appropriate affect and behavior         Data:   Cr 1.8.

## 2023-04-28 NOTE — TELEPHONE ENCOUNTER
Call from [facility] NP  (outpatient )   Dr Sepulveda requesting admission for a kidney transplant biopsy          Issue increase creatinine - IV fluids given    Ultrasound and Renogram completed     Called admissions requesting bed ( Dr Delta Haines accepting provider )  called 7a for bed availability

## 2023-04-29 ENCOUNTER — APPOINTMENT (OUTPATIENT)
Dept: INTERVENTIONAL RADIOLOGY/VASCULAR | Facility: CLINIC | Age: 33
DRG: 699 | End: 2023-04-29
Attending: TRANSPLANT SURGERY
Payer: COMMERCIAL

## 2023-04-29 PROBLEM — T86.10 COMPLICATION OF TRANSPLANTED KIDNEY: Status: ACTIVE | Noted: 2023-04-29

## 2023-04-29 LAB
ALBUMIN MFR UR ELPH: 22.9 MG/DL (ref 1–14)
ALBUMIN UR-MCNC: 20 MG/DL
ANION GAP SERPL CALCULATED.3IONS-SCNC: 10 MMOL/L (ref 7–15)
APPEARANCE UR: CLEAR
BACTERIA #/AREA URNS HPF: ABNORMAL /HPF
BILIRUB UR QL STRIP: NEGATIVE
BUN SERPL-MCNC: 10.9 MG/DL (ref 6–20)
CALCIUM SERPL-MCNC: 9.1 MG/DL (ref 8.6–10)
CHLORIDE SERPL-SCNC: 107 MMOL/L (ref 98–107)
COLOR UR AUTO: ABNORMAL
CREAT SERPL-MCNC: 1.86 MG/DL (ref 0.51–0.95)
CREAT UR-MCNC: 76.7 MG/DL
DEPRECATED HCO3 PLAS-SCNC: 22 MMOL/L (ref 22–29)
ERYTHROCYTE [DISTWIDTH] IN BLOOD BY AUTOMATED COUNT: 13.1 % (ref 10–15)
GFR SERPL CREATININE-BSD FRML MDRD: 36 ML/MIN/1.73M2
GLUCOSE SERPL-MCNC: 97 MG/DL (ref 70–99)
GLUCOSE UR STRIP-MCNC: NEGATIVE MG/DL
HCT VFR BLD AUTO: 25.6 % (ref 35–47)
HGB BLD-MCNC: 7.6 G/DL (ref 11.7–15.7)
HGB UR QL STRIP: NEGATIVE
INR PPP: 1.14 (ref 0.85–1.15)
KETONES UR STRIP-MCNC: NEGATIVE MG/DL
LEUKOCYTE ESTERASE UR QL STRIP: NEGATIVE
MAGNESIUM SERPL-MCNC: 1.7 MG/DL (ref 1.7–2.3)
MCH RBC QN AUTO: 29.6 PG (ref 26.5–33)
MCHC RBC AUTO-ENTMCNC: 29.7 G/DL (ref 31.5–36.5)
MCV RBC AUTO: 100 FL (ref 78–100)
NITRATE UR QL: NEGATIVE
PH UR STRIP: 7.5 [PH] (ref 5–7)
PHOSPHATE SERPL-MCNC: 3.2 MG/DL (ref 2.5–4.5)
PLATELET # BLD AUTO: 352 10E3/UL (ref 150–450)
POTASSIUM SERPL-SCNC: 4.9 MMOL/L (ref 3.4–5.3)
PROT/CREAT 24H UR: 0.3 MG/MG CR (ref 0–0.2)
RBC # BLD AUTO: 2.57 10E6/UL (ref 3.8–5.2)
RBC URINE: 2 /HPF
SODIUM SERPL-SCNC: 139 MMOL/L (ref 136–145)
SP GR UR STRIP: 1.01 (ref 1–1.03)
SQUAMOUS EPITHELIAL: 1 /HPF
TACROLIMUS BLD-MCNC: 11.6 UG/L (ref 5–15)
TME LAST DOSE: NORMAL H
TME LAST DOSE: NORMAL H
UROBILINOGEN UR STRIP-MCNC: NORMAL MG/DL
WBC # BLD AUTO: 2.3 10E3/UL (ref 4–11)
WBC URINE: 2 /HPF

## 2023-04-29 PROCEDURE — 86828 HLA CLASS I&II ANTIBODY QUAL: CPT | Performed by: PHYSICIAN ASSISTANT

## 2023-04-29 PROCEDURE — 88346 IMFLUOR 1ST 1ANTB STAIN PX: CPT | Mod: 26 | Performed by: PATHOLOGY

## 2023-04-29 PROCEDURE — 88348 ELECTRON MICROSCOPY DX: CPT | Mod: 26 | Performed by: PATHOLOGY

## 2023-04-29 PROCEDURE — 86833 HLA CLASS II HIGH DEFIN QUAL: CPT | Performed by: PHYSICIAN ASSISTANT

## 2023-04-29 PROCEDURE — 250N000011 HC RX IP 250 OP 636: Performed by: RADIOLOGY

## 2023-04-29 PROCEDURE — 83735 ASSAY OF MAGNESIUM: CPT | Performed by: PHYSICIAN ASSISTANT

## 2023-04-29 PROCEDURE — 80048 BASIC METABOLIC PNL TOTAL CA: CPT | Performed by: PHYSICIAN ASSISTANT

## 2023-04-29 PROCEDURE — 80180 DRUG SCRN QUAN MYCOPHENOLATE: CPT | Mod: 90 | Performed by: PATHOLOGY

## 2023-04-29 PROCEDURE — 88350 IMFLUOR EA ADDL 1ANTB STN PX: CPT | Mod: 26 | Performed by: PATHOLOGY

## 2023-04-29 PROCEDURE — 50200 RENAL BIOPSY PERQ: CPT | Mod: LT | Performed by: RADIOLOGY

## 2023-04-29 PROCEDURE — 250N000013 HC RX MED GY IP 250 OP 250 PS 637: Performed by: PHYSICIAN ASSISTANT

## 2023-04-29 PROCEDURE — 99223 1ST HOSP IP/OBS HIGH 75: CPT | Mod: 24 | Performed by: NURSE PRACTITIONER

## 2023-04-29 PROCEDURE — 87086 URINE CULTURE/COLONY COUNT: CPT | Performed by: PHYSICIAN ASSISTANT

## 2023-04-29 PROCEDURE — 84156 ASSAY OF PROTEIN URINE: CPT | Performed by: PHYSICIAN ASSISTANT

## 2023-04-29 PROCEDURE — 76942 ECHO GUIDE FOR BIOPSY: CPT | Mod: 26 | Performed by: RADIOLOGY

## 2023-04-29 PROCEDURE — 0TB13ZX EXCISION OF LEFT KIDNEY, PERCUTANEOUS APPROACH, DIAGNOSTIC: ICD-10-PCS | Performed by: RADIOLOGY

## 2023-04-29 PROCEDURE — 258N000003 HC RX IP 258 OP 636: Performed by: NURSE PRACTITIONER

## 2023-04-29 PROCEDURE — 85014 HEMATOCRIT: CPT | Performed by: PHYSICIAN ASSISTANT

## 2023-04-29 PROCEDURE — 88305 TISSUE EXAM BY PATHOLOGIST: CPT | Mod: TC | Performed by: PHYSICIAN ASSISTANT

## 2023-04-29 PROCEDURE — 80197 ASSAY OF TACROLIMUS: CPT | Performed by: PHYSICIAN ASSISTANT

## 2023-04-29 PROCEDURE — 81001 URINALYSIS AUTO W/SCOPE: CPT | Performed by: PHYSICIAN ASSISTANT

## 2023-04-29 PROCEDURE — 258N000003 HC RX IP 258 OP 636: Performed by: STUDENT IN AN ORGANIZED HEALTH CARE EDUCATION/TRAINING PROGRAM

## 2023-04-29 PROCEDURE — 250N000013 HC RX MED GY IP 250 OP 250 PS 637: Performed by: TRANSPLANT SURGERY

## 2023-04-29 PROCEDURE — 250N000011 HC RX IP 250 OP 636: Performed by: NURSE PRACTITIONER

## 2023-04-29 PROCEDURE — 88313 SPECIAL STAINS GROUP 2: CPT | Mod: 26 | Performed by: PATHOLOGY

## 2023-04-29 PROCEDURE — 250N000012 HC RX MED GY IP 250 OP 636 PS 637: Performed by: STUDENT IN AN ORGANIZED HEALTH CARE EDUCATION/TRAINING PROGRAM

## 2023-04-29 PROCEDURE — 250N000009 HC RX 250: Performed by: RADIOLOGY

## 2023-04-29 PROCEDURE — 86832 HLA CLASS I HIGH DEFIN QUAL: CPT | Performed by: PHYSICIAN ASSISTANT

## 2023-04-29 PROCEDURE — 250N000013 HC RX MED GY IP 250 OP 250 PS 637: Performed by: NURSE PRACTITIONER

## 2023-04-29 PROCEDURE — 88305 TISSUE EXAM BY PATHOLOGIST: CPT | Mod: 26 | Performed by: PATHOLOGY

## 2023-04-29 PROCEDURE — 272N000505 IR RENAL BIOPSY LEFT

## 2023-04-29 PROCEDURE — 84100 ASSAY OF PHOSPHORUS: CPT | Performed by: PHYSICIAN ASSISTANT

## 2023-04-29 PROCEDURE — 250N000012 HC RX MED GY IP 250 OP 636 PS 637: Performed by: PHYSICIAN ASSISTANT

## 2023-04-29 PROCEDURE — 999N000248 HC STATISTIC IV INSERT WITH US BY RN

## 2023-04-29 PROCEDURE — 36415 COLL VENOUS BLD VENIPUNCTURE: CPT | Performed by: PATHOLOGY

## 2023-04-29 PROCEDURE — 88350 IMFLUOR EA ADDL 1ANTB STN PX: CPT | Mod: TC | Performed by: PHYSICIAN ASSISTANT

## 2023-04-29 PROCEDURE — 99152 MOD SED SAME PHYS/QHP 5/>YRS: CPT

## 2023-04-29 PROCEDURE — 99000 SPECIMEN HANDLING OFFICE-LAB: CPT | Performed by: PATHOLOGY

## 2023-04-29 PROCEDURE — 85610 PROTHROMBIN TIME: CPT | Performed by: PHYSICIAN ASSISTANT

## 2023-04-29 PROCEDURE — 120N000011 HC R&B TRANSPLANT UMMC

## 2023-04-29 PROCEDURE — 99152 MOD SED SAME PHYS/QHP 5/>YRS: CPT | Mod: GC | Performed by: RADIOLOGY

## 2023-04-29 RX ORDER — FLUMAZENIL 0.1 MG/ML
0.2 INJECTION, SOLUTION INTRAVENOUS
Status: DISCONTINUED | OUTPATIENT
Start: 2023-04-29 | End: 2023-04-29 | Stop reason: HOSPADM

## 2023-04-29 RX ORDER — FENTANYL CITRATE 50 UG/ML
25-50 INJECTION, SOLUTION INTRAMUSCULAR; INTRAVENOUS EVERY 5 MIN PRN
Status: DISCONTINUED | OUTPATIENT
Start: 2023-04-29 | End: 2023-04-29 | Stop reason: HOSPADM

## 2023-04-29 RX ORDER — NALOXONE HYDROCHLORIDE 0.4 MG/ML
0.4 INJECTION, SOLUTION INTRAMUSCULAR; INTRAVENOUS; SUBCUTANEOUS
Status: DISCONTINUED | OUTPATIENT
Start: 2023-04-29 | End: 2023-04-29 | Stop reason: HOSPADM

## 2023-04-29 RX ORDER — NALOXONE HYDROCHLORIDE 0.4 MG/ML
0.2 INJECTION, SOLUTION INTRAMUSCULAR; INTRAVENOUS; SUBCUTANEOUS
Status: DISCONTINUED | OUTPATIENT
Start: 2023-04-29 | End: 2023-04-29 | Stop reason: HOSPADM

## 2023-04-29 RX ADMIN — FENTANYL CITRATE 25 MCG: 50 INJECTION, SOLUTION INTRAMUSCULAR; INTRAVENOUS at 15:13

## 2023-04-29 RX ADMIN — MIDAZOLAM 0.5 MG: 1 INJECTION INTRAMUSCULAR; INTRAVENOUS at 15:13

## 2023-04-29 RX ADMIN — FENTANYL CITRATE 50 MCG: 50 INJECTION, SOLUTION INTRAMUSCULAR; INTRAVENOUS at 15:07

## 2023-04-29 RX ADMIN — Medication 800 MG: at 12:07

## 2023-04-29 RX ADMIN — MIDAZOLAM 0.5 MG: 1 INJECTION INTRAMUSCULAR; INTRAVENOUS at 15:23

## 2023-04-29 RX ADMIN — TACROLIMUS 1 MG: 1 CAPSULE ORAL at 19:47

## 2023-04-29 RX ADMIN — SODIUM PHOSPHATE, DIBASIC, ANHYDROUS, POTASSIUM PHOSPHATE, MONOBASIC, AND SODIUM PHOSPHATE, MONOBASIC, MONOHYDRATE 500 MG: 852; 155; 130 TABLET, COATED ORAL at 19:48

## 2023-04-29 RX ADMIN — MYCOPHENOLATE MOFETIL 750 MG: 250 CAPSULE ORAL at 19:47

## 2023-04-29 RX ADMIN — SODIUM PHOSPHATE, DIBASIC, ANHYDROUS, POTASSIUM PHOSPHATE, MONOBASIC, AND SODIUM PHOSPHATE, MONOBASIC, MONOHYDRATE 500 MG: 852; 155; 130 TABLET, COATED ORAL at 08:04

## 2023-04-29 RX ADMIN — LIDOCAINE HYDROCHLORIDE 10 ML: 10 INJECTION, SOLUTION EPIDURAL; INFILTRATION; INTRACAUDAL; PERINEURAL at 15:13

## 2023-04-29 RX ADMIN — SODIUM CHLORIDE 500 MG: 9 INJECTION, SOLUTION INTRAVENOUS at 15:59

## 2023-04-29 RX ADMIN — MIDAZOLAM 1 MG: 1 INJECTION INTRAMUSCULAR; INTRAVENOUS at 15:00

## 2023-04-29 RX ADMIN — MYCOPHENOLATE MOFETIL 750 MG: 250 CAPSULE ORAL at 08:04

## 2023-04-29 RX ADMIN — Medication 800 MG: at 10:50

## 2023-04-29 RX ADMIN — FENTANYL CITRATE 25 MCG: 50 INJECTION, SOLUTION INTRAMUSCULAR; INTRAVENOUS at 15:18

## 2023-04-29 RX ADMIN — Medication 50 MCG: at 08:05

## 2023-04-29 RX ADMIN — SODIUM CHLORIDE: 9 INJECTION, SOLUTION INTRAVENOUS at 16:58

## 2023-04-29 RX ADMIN — FENTANYL CITRATE 50 MCG: 50 INJECTION, SOLUTION INTRAMUSCULAR; INTRAVENOUS at 15:00

## 2023-04-29 RX ADMIN — Medication 800 MG: at 22:03

## 2023-04-29 RX ADMIN — FENTANYL CITRATE 25 MCG: 50 INJECTION, SOLUTION INTRAMUSCULAR; INTRAVENOUS at 15:24

## 2023-04-29 RX ADMIN — TACROLIMUS 1 MG: 1 CAPSULE ORAL at 08:04

## 2023-04-29 RX ADMIN — MIDAZOLAM 0.5 MG: 1 INJECTION INTRAMUSCULAR; INTRAVENOUS at 15:18

## 2023-04-29 RX ADMIN — VALGANCICLOVIR 450 MG: 450 TABLET, FILM COATED ORAL at 08:03

## 2023-04-29 RX ADMIN — MIDAZOLAM 1 MG: 1 INJECTION INTRAMUSCULAR; INTRAVENOUS at 15:07

## 2023-04-29 RX ADMIN — SULFAMETHOXAZOLE AND TRIMETHOPRIM 1 TABLET: 400; 80 TABLET ORAL at 08:04

## 2023-04-29 RX ADMIN — SODIUM CHLORIDE: 9 INJECTION, SOLUTION INTRAVENOUS at 06:33

## 2023-04-29 ASSESSMENT — ACTIVITIES OF DAILY LIVING (ADL)
ADLS_ACUITY_SCORE: 33
ADLS_ACUITY_SCORE: 33
WALKING_OR_CLIMBING_STAIRS_DIFFICULTY: NO
ADLS_ACUITY_SCORE: 33
DIFFICULTY_EATING/SWALLOWING: NO
WEAR_GLASSES_OR_BLIND: NO
DRESSING/BATHING_DIFFICULTY: NO
ADLS_ACUITY_SCORE: 33
TOILETING_ISSUES: NO
DOING_ERRANDS_INDEPENDENTLY_DIFFICULTY: NO
ADLS_ACUITY_SCORE: 33
ADLS_ACUITY_SCORE: 18
CHANGE_IN_FUNCTIONAL_STATUS_SINCE_ONSET_OF_CURRENT_ILLNESS/INJURY: NO
CONCENTRATING,_REMEMBERING_OR_MAKING_DECISIONS_DIFFICULTY: NO
FALL_HISTORY_WITHIN_LAST_SIX_MONTHS: NO
ADLS_ACUITY_SCORE: 18
HEARING_DIFFICULTY_OR_DEAF: NO
DIFFICULTY_COMMUNICATING: NO
ADLS_ACUITY_SCORE: 33

## 2023-04-29 NOTE — PROCEDURES
[facility]    Procedure: IR LLQ transplant kidney biopsy    Date/Time: 4/29/2023 3:35 PM    Performed by: Ru Schaefer MD  Authorized by: Ru Schaefer MD      UNIVERSAL PROTOCOL   Site Marked: NA  Prior Images Obtained and Reviewed:  Yes  Required items: Required blood products, implants, devices and special equipment available    Patient identity confirmed:  Verbally with patient, arm band, provided demographic data and hospital-assigned identification number  Patient was reevaluated immediately before administering moderate or deep sedation or anesthesia  Confirmation Checklist:  Patient's identity using two indicators, relevant allergies, procedure was appropriate and matched the consent or emergent situation and correct equipment/implants were available  Time out: Immediately prior to the procedure a time out was called    Universal Protocol: the Joint Commission Universal Protocol was followed    Preparation: Patient was prepped and draped in usual sterile fashion       ANESTHESIA    Anesthesia: Local infiltration  Local Anesthetic:  Lidocaine 1% without epinephrine  Anesthetic Total (mL):  10      SEDATION  Patient Sedated: Yes    Sedation Type:  Moderate (conscious) sedation  Sedation:  Fentanyl and midazolam  Vital signs: Vital signs monitored during sedation    See dictated procedure note for full details.  Findings: Left lower quadrant transplant kidney biopsy performed.  Samples preserved in multiple different solutions per protocol and submitted to lab for urgent pathology    Specimens: none    Complications: None    Condition: Stable      PROCEDURE    Patient Tolerance:  Patient tolerated the procedure well with no immediate complications  Length of time physician/provider present for 1:1 monitoring during sedation: 30

## 2023-04-29 NOTE — PROVIDER NOTIFICATION
Cross cover MD Barnett paged re: patient stating to her RN that she wants her Wharton removed and her MIV shut off. This writer requested MD Barnett come see the patient (and mother in her room) in person to explain why she needs to keep her Wharton in and her MIV fluids running.

## 2023-04-29 NOTE — PROGRESS NOTES
Admitted/transferred from:   Time of arrival on unit 1830 per report  2 RN full  skin assessment completed by writer and Tomasa PHILIP RN  Skin assessment finding: other old abd incision and DALTON site, warts on bottom of L foot   Interventions/actions: other NA     Will continue to monitor.

## 2023-04-29 NOTE — IR NOTE
Patient Name: Caity Horan  Medical Record Number: [id number]  Today's Date: 4/29/2023    Procedure: left lower quadrant kidney biopsy  Proceduralist: MD Hernandez, MD Schaefer  Pathology present: Not available - MD Hernandez aware and wanting to proceed; 4 cores obtained   Brief completed: N/A    Procedure Start: 1509  Procedure end: 1529  Sedation medications administered: 3.5 mg of versed, 175 mcg of fentanyl  Sedation time: 29 minutes (5784-3753)    Report given to: Lisa NIXON RN  : N/A    Other Notes: Pt arrived to IR room 07 from . Consent reviewed. Pt denies any questions or concerns regarding procedure. Pt positioned supine and monitored per protocol. Pt tolerated procedure without any noted complications. Left lower quadrant site cleansed and dressed per protocol. Specimens sent per order; given to Yolanda SIMON in lab and notified them that they need to be rushed STAT to [facility] to be read by pathology. Bedrest x2 hours till 1730. Pt transferred back to .

## 2023-04-29 NOTE — PROVIDER NOTIFICATION
Toan Barnett paged again re: patient telling her RN that she and the MD did not speak about this particular instance of her declining a MIV. As of this writing, patient is also declining to have a PIV placed.

## 2023-04-29 NOTE — PLAN OF CARE
/84   Pulse 76   Temp 98.2  F (36.8  C) (Oral)   Resp 18   Wt 59.2 kg (130 lb 8 oz)   SpO2 99%   BMI 23.12 kg/m       5193-9243  Patient went to IR for kidney biopsy @ 1430 due to creatinine continuing to increase. NPO all day until after procedure. Biopsy went well. Started on steroids- to get a jump start on treatment if comes back rejection. Patient is aware of care plan and has spoken with team. Left biopsy site; remains covered, dry and intact. Adequate urine output; hat in bathroom. Negative PVR's. Left PIV infusing with maintenance fluid NS @ 100 ml/hr. Diet progressed back to regular this evening. Fair appetite and PO intake. Denies pain and nausea. Patient and family are very involved in care plan- all are very optimistic and did great today! Awaiting pathology results from biopsy- then will progress with treatment as/if needed. Patient is aware of plan. Continue plan of care, please notify MD with any changes.

## 2023-04-29 NOTE — PLAN OF CARE
/80 (BP Location: Right arm)   Pulse 90   Temp 98.4  F (36.9  C) (Oral)   Resp 16   Wt 59.2 kg (130 lb 8 oz)   SpO2 100%   BMI 23.12 kg/m       [id number]   Patient arrived to  from home this evening. Anticipated to be NPO at midnight for a kidney biopsy tomorrow (4/29). Up ad matthias; calls appropriately. Wharton remains in place. AVSS on RA. Creatinine- continues to be elevated. Continue plan of care; please notify MD with any changes.

## 2023-04-29 NOTE — CONSULTS
INTERVENTIONAL RADIOLOGY CONSULT NOTE    Reason for referral:   Transplant kidney biopsy    History:   32-year-old female with past medical history of IgA nephropathy status post kidney transplant 2014 with graft failure.  Patient underwent repeat transplant 4/13/2023 and had Intra-Op biopsy later on on 4/17/2023 for urine leak versus rejection.  Creatinine initially improved.  Now patient has increased creatinine on repeat admission and IR was requested for repeat urgent transplant kidney biopsy due to concerns for graft rejection.    Labs:  Lab Results   Component Value Date    HGB 7.6 04/29/2023    HGB 10.6 01/14/2021     Lab Results   Component Value Date     04/29/2023     01/14/2021     Lab Results   Component Value Date    WBC 2.3 04/29/2023    WBC 6.3 12/03/2020       Lab Results   Component Value Date    INR 1.14 04/29/2023    INR 1.00 12/19/2018       Lab Results   Component Value Date    PROTTOTAL 8.6 04/11/2023    PROTTOTAL 6.7 04/16/2021      Lab Results   Component Value Date    ALBUMIN 5.1 04/11/2023    ALBUMIN 3.3 04/16/2021     Lab Results   Component Value Date    BILITOTAL 0.6 04/11/2023    BILITOTAL 0.4 04/16/2021     No results found for: BILICONJ   Lab Results   Component Value Date    ALKPHOS 70 04/11/2023    ALKPHOS 47 04/16/2021     Lab Results   Component Value Date    AST 23 04/11/2023    AST 13 04/16/2021     Lab Results   Component Value Date    ALT 15 04/11/2023    ALT 14 04/16/2021       Lab Results   Component Value Date    CR 1.86 04/29/2023    CR 1.31 04/16/2021     Lab Results   Component Value Date    BUN 10.9 04/29/2023    BUN 33 04/16/2021       Imaging:   Ultrasound Doppler/17/2023 demonstrates a transplant kidney in the left lower quadrant with adequate percutaneous approach for biopsy.    Assessment:   32-year-old female with recent kidney transplant concerns of rejection.  Plan for image guided urgent renal biopsy due to concerns for acute rejection.  Renal  pathology would be present per the referring team.    Plan:   Transplant kidney biopsy today in the afternoon.  1 to 2 PM      Ru WINKLER  Fellow  Interventional Radiology  Pager: [phone]

## 2023-04-29 NOTE — PROGRESS NOTES
/88 (BP Location: Right arm)   Pulse 86   Temp 98.2  F (36.8  C) (Oral)   Resp 16   Wt 59.2 kg (130 lb 8 oz)   SpO2 100%   BMI 23.12 kg/m      Shift: 9102-0038  Neuro: AOx4, frustrated   Cardio: WDL  Respiratory: WDL  GI/: rutledge in place, at first wanted pt it removed now, provider paged & spoke to pt in room- provider ok to remove now, pt would like to remove in AM (ordered).  [facility] 4/27  Skin: 2 RN skin check completed by writer and Tomasa PHILIP, no issue other than warts on bottom of L foot and old abd incision  Diet: Reg, NPO at MN for kidney bx  Labs: Ordered  BG: NA  LDA: Rutledge; pt refused PIV, MIVF- d/w provider- ok w/o IV & IVF overnight, will need to place tomorrow AM, pt aware and agreeable   Mobility: Ind   Pain/nausea/PRNS: denies

## 2023-04-29 NOTE — PLAN OF CARE
VS: /56 (BP Location: Right arm)   Pulse 78   Temp 98.7  F (37.1  C) (Oral)   Resp 18   Wt 59.2 kg (130 lb 8 oz)   SpO2 97%   BMI 23.12 kg/m      Cares: 2300 - 0730     Neuro: Aox4   Cardio: WDL  Respiratory: WDL on RA, denies SOB.   GI/: rutledge overnight - good output (removed at 0630) - still needs 2 PVRs, LBM 4-27  Skin: old L DALTON site - dressing C/D/I   Diet: NPO since 0000  Labs: waiting on morning lab results   BG: none   LDA: PIV - NS @ 100mL/hr  Mobility: Ind.   Pain/Nausea: denies pain or nausea    PRN medications: atarax x1  Plan of Care: plan for a kidney biopsy today. Continue with current POC and update MD with any changes

## 2023-04-29 NOTE — PROGRESS NOTES
Transplant Surgery  Inpatient Daily Progress Note  04/29/2023    Assessment & Plan: 33yo with history of IgA nephropathy, KT 2014 & LDKT 4/13/23 c/b urine leak, UC, and HTN. Re-admitted with ASHLEY and de naeem DSA + B45, r/o rejection.    Graft function:  Kidney: Cr 1.9, frank 1.3. 4/28 renogram: no leak. Biopsy today.    Immunosuppressed status secondary to medications: Received full thymoglobulin induction post-transplant.  -Tacro goal level 8-10  -MMF 750mg BID  -Methylpred 500mg today after biopsy    Hematology:   Anemia of chronic disease: Hgb 7.6, monitor.    Cardiorespiratory:   HTN: BP controlled.    GI/Nutrition:   Diet: NPO for biopsy    Endocrine: No acute issues.     Fluid/Electrolytes: No acute issues.     : Wharton removed on admission.    Infectious disease: Afebrile    Prophylaxis: DVT, pneumocystis (TMP/sulfa), viral (Valcyte)    Disposition: 7A     SRINIVASAN/Fellow/Resident Provider: Rosy Caldera NP [id number]    Faculty: Delta Haines MD   _________________________________________________________________    Interval History: History is obtained from the patient  Overnight events: Feels OK    ROS:   A 10-point review of systems was negative except as noted above.    Meds:    magnesium oxide  800 mg Oral TID     methylPREDNISolone  500 mg Intravenous Once     mycophenolate  750 mg Oral BID     phosphorus tablet 250 mg  500 mg Oral BID     sodium chloride (PF)  3 mL Intracatheter Q8H     sulfamethoxazole-trimethoprim  1 tablet Oral Daily     tacrolimus  1 mg Oral BID     valGANciclovir  450 mg Oral Daily     vitamin D3  50 mcg Oral Daily       Physical Exam:     Admit Weight: 59.2 kg (130 lb 8 oz)    Current vitals:   /76 (BP Location: Right arm)   Pulse 83   Temp 98.6  F (37  C) (Oral)   Resp 18   Wt 59.2 kg (130 lb 8 oz)   SpO2 99%   BMI 23.12 kg/m      Vital sign ranges:    Temp:  [98.2  F (36.8  C)-98.7  F (37.1  C)] 98.6  F (37  C)  Pulse:  [78-90] 83  Resp:  [16-18] 18  BP: (107-126)/(56-88)  123/76  SpO2:  [97 %-100 %] 99 %    General Appearance: in no apparent distress.   Skin: Warm, perfused  Heart: Perfused  Lungs: Room air  Abdomen: The abdomen is Soft, nontender  : no rutledge  Extremities: edema: None, strength 5/5  Neurologic: awake, alert and oriented. Tremor absent.     Data:   CMP  Recent Labs   Lab 04/29/23  0547 04/28/23  0929 04/27/23  0951    135* 135*   POTASSIUM 4.9 4.9 5.2   CHLORIDE 107 104 105   CO2 22 24 24   GLC 97 105* 109*   BUN 10.9 9.5 11.6   CR 1.86* 1.78* 1.75*   GFRESTIMATED 36* 38* 39*   MICAH 9.1 9.5 9.4   MAG 1.7  --  1.8   PHOS 3.2  --  2.0*     CBC  Recent Labs   Lab 04/29/23  0547 04/28/23  0929   HGB 7.6* 8.1*   WBC 2.3* 2.4*    348

## 2023-04-29 NOTE — PROGRESS NOTES
Pt decline PIV until the time of procedure. Please page vascular when closer to procedure tomorrow. Attempted to contact bedside (Radhika MARIN) MARIS x2, no answer.

## 2023-04-29 NOTE — PRE-PROCEDURE
GENERAL PRE-PROCEDURE:   Procedure:  Kidney biopsy  Date/Time:  4/29/2023 2:49 PM    Written consent obtained?: Yes    Risks and benefits: Risks, benefits and alternatives were discussed    Consent given by:  Patient  Patient states understanding of procedure being performed: Yes    Patient's understanding of procedure matches consent: Yes    Procedure consent matches procedure scheduled: Yes    Expected level of sedation:  Moderate  Appropriately NPO:  Yes  Mallampati  :  Grade 2- soft palate, base of uvula, tonsillar pillars, and portion of posterior pharyngeal wall visible  Lungs:  Lungs clear with good breath sounds bilaterally  Heart:  Normal heart sounds and rate  History & Physical reviewed:  History and physical reviewed and no updates needed  Statement of review:  I have reviewed the lab findings, diagnostic data, medications, and the plan for sedation

## 2023-04-29 NOTE — CONSULTS
[facility]  Transplant Nephrology Consult  Date of Admission:  4/28/2023  Today's Date: 04/29/2023  Requesting physician: Delta Haines MD    Recommendations:  - recommend kidney txp biopsy    Assessment & Plan   # LDKT: Trend up. Biopsy recommended    - Baseline Creatinine: ~ TBD   - Proteinuria: Not checked post transplant   - Date DSA Last Checked: Apr/2023      Latest DSA: Yes, B45   - BK Viremia: No   - Kidney Tx Biopsy: No    # Immunosuppression: Tacrolimus immediate release (goal 8-10) and Mycophenolate mofetil (dose 750 mg every 12 hours)   - Changes: Not at this time    # Infection Prophylaxis:   - PJP: Sulfa/TMP (Bactrim)  - CMV: Valganciclovir (Valcyte)    # Hypertension: Controlled;  Goal BP: < 140/90   - Volume status: Euvolemic  EDW ~ 59kg   - Changes: Not at this time    # Anemia in Chronic Renal Disease: Hgb: Trend down      MICKEY: No   - Iron studies: Not checked recently    # Mineral Bone Disorder:   - Secondary renal hyperparathyroidism; PTH level: Minimally elevated ( pg/ml)        On treatment: None  - Vitamin D; level: Normal        On supplement: Yes  - Calcium; level: Normal        On supplement: No  - Phosphorus; level: Normal        On supplement: No    # Electrolytes:   - Potassium; level: Normal        On supplement: No  - Magnesium; level: Normal        On supplement: Yes  - Bicarbonate; level: Normal        On supplement: No  - Sodium; level: Normal    # Ulcerative colitis:              - diagnosed in 2018. 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    Seen and discussed with Dr. Zaid Alfonso, NP  Pager: [phone]    Physician Attestation     I saw and evaluated Caity Horan as part of a shared APRN/PA visit.     I personally reviewed the vital signs, medications and labs.    I personally performed the substantive portion of the medical decision making for this visit - please see the SRINIVASAN's documentation for full details.    Key management decisions made by me and carried out under my direction: No acute indications for dialysis.  Would make no changes in immunosuppression.  Discussed potential causes of ASHLEY with patient.  Agree with plan for kidney transplant biopsy.  Discussed rush nature with Surgical Pathology.    Otilio Mar MD  Date of Service (when I saw the patient): 04/29/23    REASON FOR CONSULT   Kidney transplant    History of Present Illness   Caity Horan is a 32 year old female with ESKD 2/2 IgA nephropathy, s/p LDKTx 2014 failed in Dec 2022 due to recurrent IgA nephropathy and chronic rejection presenting for LDKTx via paired exchange 4/13.     Patient returned to OR 4/17/23 for re-exploration and intra-op biopsy d/t concern for urine leak vs rejection which found urine leak at anastomosis and hydronephrotic ureter without signs of obstruction - anastomosis redone and ureteral stent placed.  Patient had her DALTON drain removed 4/25.  Patient presented to clinic on 4/27 to have her Wharton removed.    Due to elevated creatinine Wharton was not placed ultrasound was ordered and nuc med renogram was ordered.  Renogram did not show urine leak however creatinine was still elevated and patient was subsequently admitted for a kidney biopsy    Patient denies any fevers, chills, nausea or vomiting.    She denies any urinary symptoms since Wharton was removed.  Patient is extremely anxious and very upset about her being readmitted      Review of Systems    The 10 point  Review of Systems is negative other than noted in the HPI or here.     Past Medical History    I have reviewed this patient's medical history and updated it with pertinent information if needed.   Past Medical History:   Diagnosis Date     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     Anemia in stage 5 chronic kidney disease (H) 10/27/2014     ESRD (end stage renal disease) on dialysis (H)      HTN (hypertension) 10/27/2014     Hypertension 10/2014     IgA nephropathy     biopsy proven     Metabolic acidosis      Ulcerative pancolitis (H) 2012     Vitamin D deficiency        Past Surgical History   I have reviewed this patient's surgical history and updated it with pertinent information if needed.  Past Surgical History:   Procedure Laterality Date     BENCH KIDNEY  4/13/2023    Procedure: Bench kidney;  Surgeon: Ivy Sepulveda MD;  Location: [facility] OR     BIOPSY  2021    renal     COLONOSCOPY N/A 03/12/2018    Procedure: COMBINED COLONOSCOPY, SINGLE OR MULTIPLE BIOPSY/POLYPECTOMY BY BIOPSY;  EGD/Colonoscopy ;  Surgeon: Kory Massey MD;  Location: [facility] GI     COLONOSCOPY N/A 09/10/2018    Procedure: COMBINED COLONOSCOPY, SINGLE OR MULTIPLE BIOPSY/POLYPECTOMY BY BIOPSY;  Colonoscopy;  Surgeon: Yenifer Doan MD;  Location: [facility] OR     COLONOSCOPY N/A 2/3/2023    Procedure: COLONOSCOPY, WITH BIOPSY;  Surgeon: Yaakov Loving MD;  Location: [facility] GI     EXTRACTION(S) DENTAL       IR CVC TUNNEL PLACEMENT > 5 YRS OF AGE  12/22/2022     IR CVC TUNNEL REMOVAL RIGHT  4/20/2023     PERCUTANEOUS BIOPSY KIDNEY Right 12/19/2018    Procedure: Right Kidney Biopsy;  Surgeon: Otilio Mar MD;  Location: [facility] OR     RETURN KIDNEY TRANSPLANT N/A 4/17/2023    Procedure: Exploration of return kidney transplant, kidney biopsy, revision of ureteral anastomosis, ureteral stent placement;  Surgeon: Ivy Sepulveda MD;  Location: [facility] OR     TRANSPLANT KIDNEY RECIPIENT LIVING RELATED N/A 12/05/2014    Procedure:  TRANSPLANT KIDNEY RECIPIENT LIVING RELATED;  Surgeon: Dale Middleton MD;  Location: [facility] OR     WISDOM TOOTH EXTRACTION Bilateral        Family History   I have reviewed this patient's family history and updated it with pertinent information if needed.   Family History   Problem Relation Age of Onset     No Known Problems Mother      Alcoholism Father          in early 50s     Prostate Cancer Paternal Grandmother      Kidney Disease No family hx of        Social History   I have reviewed this patient's social history and updated it with pertinent information if needed. Caity Horan  reports that she has never smoked. She has never used smokeless tobacco. She reports that she does not currently use alcohol after a past usage of about 3.0 - 9.0 standard drinks of alcohol per week. She reports that she does not use drugs.    Allergies   Allergies   Allergen Reactions     Bumetanide Other (See Comments)     Causes paralysis     Amoxicillin Rash     Prior to Admission Medications     magnesium oxide  800 mg Oral TID     mycophenolate  750 mg Oral BID     phosphorus tablet 250 mg  500 mg Oral BID     sodium chloride (PF)  3 mL Intracatheter Q8H     sulfamethoxazole-trimethoprim  1 tablet Oral Daily     tacrolimus  1 mg Oral BID     valGANciclovir  450 mg Oral Daily     vitamin D3  50 mcg Oral Daily       sodium chloride 100 mL/hr at 23 0633       Physical Exam   Temp  Av.6  F (37  C)  Min: 97.5  F (36.4  C)  Max: 100.9  F (38.3  C)      Pulse  Av.1  Min: 78  Max: 125 Resp  Av.1  Min: 7  Max: 35  SpO2  Av.3 %  Min: 82 %  Max: 100 %     /56 (BP Location: Right arm)   Pulse 78   Temp 98.7  F (37.1  C) (Oral)   Resp 18   Wt 59.2 kg (130 lb 8 oz)   SpO2 97%   BMI 23.12 kg/m     Date 23 0700 - 23 0659   Shift 7057-7509 0748-2372 5109-4529 24 Hour Total   INTAKE   Shift Total(mL/kg)       OUTPUT   Urine 200   200   Shift Total(mL/kg) 200(3.38)   200(3.38)   Weight (kg)  59.19 59.19 59.19 59.19      Admit Weight: 59.2 kg (130 lb 8 oz)     GENERAL APPEARANCE: alert and no distress  HENT: mouth without ulcers or lesions  LYMPHATICS: no cervical or supraclavicular nodes  RESP: lungs clear to auscultation - no rales, rhonchi or wheezes  CV: regular rhythm, normal rate, no rub, no murmur  EDEMA: no LE edema bilaterally  ABDOMEN: soft, nondistended, nontender, bowel sounds normal  MS: extremities normal - no gross deformities noted, no evidence of inflammation in joints, no muscle tenderness  SKIN: no rash    Data   CMP  Recent Labs   Lab 04/29/23  0547 04/28/23  0929 04/27/23  0951 04/24/23  0826    135* 135* 139   POTASSIUM 4.9 4.9 5.2 5.2   CHLORIDE 107 104 105 107   CO2 22 24 24 23   ANIONGAP 10 7 6* 9   GLC 97 105* 109* 108*   BUN 10.9 9.5 11.6 9.4   CR 1.86* 1.78* 1.75* 1.37*   GFRESTIMATED 36* 38* 39* 52*   MICAH 9.1 9.5 9.4 9.3   MAG 1.7  --  1.8  --    PHOS 3.2  --  2.0*  --      CBC  Recent Labs   Lab 04/29/23  0547 04/28/23  0929 04/27/23  0951 04/24/23  0826   HGB 7.6* 8.1* 8.3* 9.0*   WBC 2.3* 2.4* 3.0* 3.9*   RBC 2.57* 2.70* 2.80* 2.94*   HCT 25.6* 26.9* 27.5* 29.3*    100 98 100   MCH 29.6 30.0 29.6 30.6   MCHC 29.7* 30.1* 30.2* 30.7*   RDW 13.1 13.2 13.2 13.4    348 363 357     INR  Recent Labs   Lab 04/29/23  0547   INR 1.14     ABGNo lab results found in last 7 days.   Urine Studies  Recent Labs   Lab Test 12/05/22  1200 12/19/18  0615   COLOR Light Yellow Yellow   APPEARANCE Clear Clear   URINEGLC 50* Negative   URINEBILI Negative Negative   URINEKETONE Negative Negative   SG 1.012 1.019   UBLD Moderate* Negative   URINEPH 7.5* 5.0   PROTEIN 300* Negative   NITRITE Negative Negative   LEUKEST Negative Negative   RBCU 4* 1   WBCU 8* 2     Recent Labs   Lab Test 01/14/21  1200 12/03/20  0915 06/18/20  0950 11/11/19  0718 07/18/19  0809 06/27/19  0746 12/19/18  0615 11/01/18  0745 02/01/18  0656 07/19/17  0727 12/13/16  0747 05/19/16  0801 12/08/15  1210  06/30/15  0822   UTPG 2.09* 1.36* 1.23* 0.17 0.25* 0.83* 0.12 0.07 0.05 0.10 0.10 0.07 0.08 0.06     PTH  Recent Labs   Lab Test 04/24/23  0826 04/22/23  0745 04/03/23  1556   PTHI 71* 73* 309*     Iron Studies  Recent Labs   Lab Test 04/24/23  0826 04/22/23  0745 04/03/23  1556 12/22/22  0608 06/27/19  0742 11/01/18  0737 08/23/18  0743 07/26/18  0830 05/17/18  0740 05/03/18  1320 03/29/18  0729 02/01/18  0702 12/01/17  1417 10/12/17  0744 07/25/17  1648   IRON 30* 40 186* 80 99 113 131 73 19* 19* 29* 36 112 14* 21*   * 145* 261 223* 288 262 236* 242 369 321 210* 291 274 423 467*   IRONSAT 18 28 71* 36 34 43 55* 30 5* 6* 14* 12* 41 3* 4*   FRANNY 886* 1,041* 902* 361* 233* 216* 226* 216* 7* 13 90 14 277* 3* 3*       IMAGING:  All imaging studies reviewed by me.

## 2023-04-29 NOTE — PROGRESS NOTES
Paged by bedside RN regarding rutledge and IVFs. Pt had some confusion regarding whether rutledge could be removed and was adamant regarding its removal earlier in the evening. Spoke with Dr. Haines who stated rutledge could be removed if pt did not have hydronephrosis on her scan which she did not. Pt stated that she would like the rutledge to come out first thing tomorrow morning and is aware that rutledge can come out now.     Pt states that she does not want to take IVfs tonight as she states that it has caused a lot of discomfort in her lower abdomen. Pt would like to take oral fluids, and start IVfs tomorrow morning. Pt was informed that we recommend fluids when pt is NPO to maintain euvolemia especially in the setting of renogram demonstrating possible component of ATN. Pt expressed understanding and would like to start fluids in the AM.

## 2023-04-29 NOTE — PROVIDER NOTIFICATION
[id number] [place]   Can you put in a patient care order stating it is ok to go overnight w/ no IV. I will get one placed in the AM. Thanks Yuridia JAIMES, [phone]    MD put in patient care order, ok to have no IV overnight

## 2023-04-30 LAB
ANION GAP SERPL CALCULATED.3IONS-SCNC: 8 MMOL/L (ref 7–15)
BACTERIA UR CULT: NORMAL
BASOPHILS # BLD AUTO: 0 10E3/UL (ref 0–0.2)
BASOPHILS # BLD MANUAL: 0 10E3/UL (ref 0–0.2)
BASOPHILS NFR BLD AUTO: 0 %
BASOPHILS NFR BLD MANUAL: 1 %
BUN SERPL-MCNC: 15.6 MG/DL (ref 6–20)
BURR CELLS BLD QL SMEAR: SLIGHT
CALCIUM SERPL-MCNC: 9 MG/DL (ref 8.6–10)
CHLORIDE SERPL-SCNC: 111 MMOL/L (ref 98–107)
CREAT SERPL-MCNC: 1.59 MG/DL (ref 0.51–0.95)
DEPRECATED HCO3 PLAS-SCNC: 19 MMOL/L (ref 22–29)
ELLIPTOCYTES BLD QL SMEAR: SLIGHT
EOSINOPHIL # BLD AUTO: 0 10E3/UL (ref 0–0.7)
EOSINOPHIL # BLD MANUAL: 0 10E3/UL (ref 0–0.7)
EOSINOPHIL NFR BLD AUTO: 0 %
EOSINOPHIL NFR BLD MANUAL: 0 %
ERYTHROCYTE [DISTWIDTH] IN BLOOD BY AUTOMATED COUNT: 13 % (ref 10–15)
GFR SERPL CREATININE-BSD FRML MDRD: 44 ML/MIN/1.73M2
GLUCOSE BLDC GLUCOMTR-MCNC: 107 MG/DL (ref 70–99)
GLUCOSE BLDC GLUCOMTR-MCNC: 119 MG/DL (ref 70–99)
GLUCOSE BLDC GLUCOMTR-MCNC: 157 MG/DL (ref 70–99)
GLUCOSE SERPL-MCNC: 246 MG/DL (ref 70–99)
HCT VFR BLD AUTO: 25.7 % (ref 35–47)
HGB BLD-MCNC: 7.7 G/DL (ref 11.7–15.7)
HGB BLD-MCNC: 8 G/DL (ref 11.7–15.7)
HOLD SPECIMEN: NORMAL
IMM GRANULOCYTES # BLD: 0.1 10E3/UL
IMM GRANULOCYTES NFR BLD: 3 %
LYMPHOCYTES # BLD AUTO: 0.2 10E3/UL (ref 0.8–5.3)
LYMPHOCYTES # BLD MANUAL: 0.1 10E3/UL (ref 0.8–5.3)
LYMPHOCYTES NFR BLD AUTO: 8 %
LYMPHOCYTES NFR BLD MANUAL: 9 %
MAGNESIUM SERPL-MCNC: 1.5 MG/DL (ref 1.7–2.3)
MCH RBC QN AUTO: 29.7 PG (ref 26.5–33)
MCHC RBC AUTO-ENTMCNC: 30 G/DL (ref 31.5–36.5)
MCV RBC AUTO: 99 FL (ref 78–100)
MONOCYTES # BLD AUTO: 0.3 10E3/UL (ref 0–1.3)
MONOCYTES # BLD MANUAL: 0 10E3/UL (ref 0–1.3)
MONOCYTES NFR BLD AUTO: 9 %
MONOCYTES NFR BLD MANUAL: 0 %
NEUTROPHILS # BLD AUTO: 2.4 10E3/UL (ref 1.6–8.3)
NEUTROPHILS # BLD MANUAL: 1.4 10E3/UL (ref 1.6–8.3)
NEUTROPHILS NFR BLD AUTO: 80 %
NEUTROPHILS NFR BLD MANUAL: 90 %
NRBC # BLD AUTO: 0 10E3/UL
NRBC # BLD AUTO: 0 10E3/UL
NRBC BLD AUTO-RTO: 0 /100
NRBC BLD AUTO-RTO: 0 /100
PATH REV: ABNORMAL
PHOSPHATE SERPL-MCNC: 1.7 MG/DL (ref 2.5–4.5)
PLAT MORPH BLD: ABNORMAL
PLATELET # BLD AUTO: 378 10E3/UL (ref 150–450)
POTASSIUM SERPL-SCNC: 5.3 MMOL/L (ref 3.4–5.3)
POTASSIUM SERPL-SCNC: 5.8 MMOL/L (ref 3.4–5.3)
RBC # BLD AUTO: 2.59 10E6/UL (ref 3.8–5.2)
RBC MORPH BLD: ABNORMAL
SODIUM SERPL-SCNC: 138 MMOL/L (ref 136–145)
WBC # BLD AUTO: 1.6 10E3/UL (ref 4–11)
WBC # BLD AUTO: 3.1 10E3/UL (ref 4–11)
WBC # BLD AUTO: 3.1 10E3/UL (ref 4–11)

## 2023-04-30 PROCEDURE — 99233 SBSQ HOSP IP/OBS HIGH 50: CPT | Mod: 24 | Performed by: NURSE PRACTITIONER

## 2023-04-30 PROCEDURE — 85048 AUTOMATED LEUKOCYTE COUNT: CPT | Performed by: NURSE PRACTITIONER

## 2023-04-30 PROCEDURE — 86828 HLA CLASS I&II ANTIBODY QUAL: CPT | Performed by: NURSE PRACTITIONER

## 2023-04-30 PROCEDURE — 250N000011 HC RX IP 250 OP 636: Performed by: TRANSPLANT SURGERY

## 2023-04-30 PROCEDURE — 120N000011 HC R&B TRANSPLANT UMMC

## 2023-04-30 PROCEDURE — 83735 ASSAY OF MAGNESIUM: CPT | Performed by: PHYSICIAN ASSISTANT

## 2023-04-30 PROCEDURE — 36415 COLL VENOUS BLD VENIPUNCTURE: CPT | Performed by: NURSE PRACTITIONER

## 2023-04-30 PROCEDURE — 86833 HLA CLASS II HIGH DEFIN QUAL: CPT | Performed by: NURSE PRACTITIONER

## 2023-04-30 PROCEDURE — 258N000003 HC RX IP 258 OP 636: Performed by: STUDENT IN AN ORGANIZED HEALTH CARE EDUCATION/TRAINING PROGRAM

## 2023-04-30 PROCEDURE — 250N000013 HC RX MED GY IP 250 OP 250 PS 637: Performed by: PHYSICIAN ASSISTANT

## 2023-04-30 PROCEDURE — 85018 HEMOGLOBIN: CPT | Performed by: NURSE PRACTITIONER

## 2023-04-30 PROCEDURE — 250N000012 HC RX MED GY IP 250 OP 636 PS 637: Performed by: PHYSICIAN ASSISTANT

## 2023-04-30 PROCEDURE — 999N000127 HC STATISTIC PERIPHERAL IV START W US GUIDANCE

## 2023-04-30 PROCEDURE — 258N000003 HC RX IP 258 OP 636: Performed by: TRANSPLANT SURGERY

## 2023-04-30 PROCEDURE — 250N000013 HC RX MED GY IP 250 OP 250 PS 637: Performed by: TRANSPLANT SURGERY

## 2023-04-30 PROCEDURE — 85007 BL SMEAR W/DIFF WBC COUNT: CPT | Performed by: NURSE PRACTITIONER

## 2023-04-30 PROCEDURE — 258N000003 HC RX IP 258 OP 636: Performed by: NURSE PRACTITIONER

## 2023-04-30 PROCEDURE — 250N000011 HC RX IP 250 OP 636: Performed by: NURSE PRACTITIONER

## 2023-04-30 PROCEDURE — 84132 ASSAY OF SERUM POTASSIUM: CPT | Performed by: NURSE PRACTITIONER

## 2023-04-30 PROCEDURE — 86832 HLA CLASS I HIGH DEFIN QUAL: CPT | Performed by: NURSE PRACTITIONER

## 2023-04-30 PROCEDURE — 80048 BASIC METABOLIC PNL TOTAL CA: CPT | Performed by: PHYSICIAN ASSISTANT

## 2023-04-30 PROCEDURE — 250N000012 HC RX MED GY IP 250 OP 636 PS 637: Performed by: STUDENT IN AN ORGANIZED HEALTH CARE EDUCATION/TRAINING PROGRAM

## 2023-04-30 PROCEDURE — 250N000013 HC RX MED GY IP 250 OP 250 PS 637: Performed by: NURSE PRACTITIONER

## 2023-04-30 PROCEDURE — 36415 COLL VENOUS BLD VENIPUNCTURE: CPT | Performed by: PHYSICIAN ASSISTANT

## 2023-04-30 PROCEDURE — 84100 ASSAY OF PHOSPHORUS: CPT | Performed by: PHYSICIAN ASSISTANT

## 2023-04-30 RX ORDER — DIPHENHYDRAMINE HCL 25 MG
50 CAPSULE ORAL ONCE
Status: COMPLETED | OUTPATIENT
Start: 2023-04-30 | End: 2023-04-30

## 2023-04-30 RX ORDER — DIPHENHYDRAMINE HCL 25 MG
25-50 CAPSULE ORAL ONCE
Status: COMPLETED | OUTPATIENT
Start: 2023-04-30 | End: 2023-04-30

## 2023-04-30 RX ORDER — MAGNESIUM SULFATE HEPTAHYDRATE 40 MG/ML
2 INJECTION, SOLUTION INTRAVENOUS ONCE
Status: COMPLETED | OUTPATIENT
Start: 2023-04-30 | End: 2023-04-30

## 2023-04-30 RX ORDER — SODIUM BICARBONATE 650 MG/1
650 TABLET ORAL 2 TIMES DAILY
Status: DISCONTINUED | OUTPATIENT
Start: 2023-04-30 | End: 2023-05-01

## 2023-04-30 RX ORDER — ACETAMINOPHEN 325 MG/1
650 TABLET ORAL ONCE
Status: COMPLETED | OUTPATIENT
Start: 2023-04-30 | End: 2023-04-30

## 2023-04-30 RX ORDER — DIPHENHYDRAMINE HCL 12.5MG/5ML
25-50 LIQUID (ML) ORAL ONCE
Status: COMPLETED | OUTPATIENT
Start: 2023-04-30 | End: 2023-04-30

## 2023-04-30 RX ADMIN — SODIUM PHOSPHATE, DIBASIC, ANHYDROUS, POTASSIUM PHOSPHATE, MONOBASIC, AND SODIUM PHOSPHATE, MONOBASIC, MONOHYDRATE 500 MG: 852; 155; 130 TABLET, COATED ORAL at 15:40

## 2023-04-30 RX ADMIN — Medication 50 MCG: at 08:26

## 2023-04-30 RX ADMIN — DIPHENHYDRAMINE HYDROCHLORIDE 50 MG: 25 CAPSULE ORAL at 12:47

## 2023-04-30 RX ADMIN — SODIUM PHOSPHATE, DIBASIC, ANHYDROUS, POTASSIUM PHOSPHATE, MONOBASIC, AND SODIUM PHOSPHATE, MONOBASIC, MONOHYDRATE 500 MG: 852; 155; 130 TABLET, COATED ORAL at 20:31

## 2023-04-30 RX ADMIN — Medication 800 MG: at 10:49

## 2023-04-30 RX ADMIN — SULFAMETHOXAZOLE AND TRIMETHOPRIM 1 TABLET: 400; 80 TABLET ORAL at 08:27

## 2023-04-30 RX ADMIN — MYCOPHENOLATE MOFETIL 750 MG: 250 CAPSULE ORAL at 17:50

## 2023-04-30 RX ADMIN — Medication 800 MG: at 22:16

## 2023-04-30 RX ADMIN — TACROLIMUS 1 MG: 1 CAPSULE ORAL at 17:50

## 2023-04-30 RX ADMIN — DIPHENHYDRAMINE HYDROCHLORIDE 50 MG: 25 CAPSULE ORAL at 16:51

## 2023-04-30 RX ADMIN — SODIUM BICARBONATE 650 MG: 650 TABLET ORAL at 20:31

## 2023-04-30 RX ADMIN — MYCOPHENOLATE MOFETIL 750 MG: 250 CAPSULE ORAL at 08:27

## 2023-04-30 RX ADMIN — ACETAMINOPHEN 650 MG: 325 TABLET, FILM COATED ORAL at 12:47

## 2023-04-30 RX ADMIN — MAGNESIUM SULFATE HEPTAHYDRATE 2 G: 40 INJECTION, SOLUTION INTRAVENOUS at 08:35

## 2023-04-30 RX ADMIN — VALGANCICLOVIR 450 MG: 450 TABLET, FILM COATED ORAL at 08:27

## 2023-04-30 RX ADMIN — SODIUM CHLORIDE 500 MG: 9 INJECTION, SOLUTION INTRAVENOUS at 16:51

## 2023-04-30 RX ADMIN — ACETAMINOPHEN 650 MG: 325 TABLET, FILM COATED ORAL at 16:51

## 2023-04-30 RX ADMIN — TACROLIMUS 1 MG: 1 CAPSULE ORAL at 08:27

## 2023-04-30 RX ADMIN — SODIUM BICARBONATE 650 MG: 650 TABLET ORAL at 12:16

## 2023-04-30 RX ADMIN — SODIUM PHOSPHATE, DIBASIC, ANHYDROUS, POTASSIUM PHOSPHATE, MONOBASIC, AND SODIUM PHOSPHATE, MONOBASIC, MONOHYDRATE 500 MG: 852; 155; 130 TABLET, COATED ORAL at 08:26

## 2023-04-30 RX ADMIN — SODIUM CHLORIDE: 9 INJECTION, SOLUTION INTRAVENOUS at 02:38

## 2023-04-30 RX ADMIN — HEPARIN SODIUM 125 MG: 1000 INJECTION INTRAVENOUS; SUBCUTANEOUS at 17:37

## 2023-04-30 RX ADMIN — Medication 800 MG: at 15:40

## 2023-04-30 ASSESSMENT — ACTIVITIES OF DAILY LIVING (ADL)
ADLS_ACUITY_SCORE: 18

## 2023-04-30 NOTE — PROGRESS NOTES
/82 (BP Location: Right arm)   Pulse 78   Temp 98.1  F (36.7  C) (Oral)   Resp 16   Wt 59.2 kg (130 lb 8 oz)   SpO2 95%   BMI 23.12 kg/m    Shift: 0224-4646  Reason for Admission: elevated creatinine   Neuro: A/Ox4   Behavior: appropriate  Activity: independent in room   LDAs: PIV x2  Cardiac: WDL   Respiratory: WDL on RA   GI/: voiding appropriately. LBM 4/29   Skin: old abdominal incision, old DALTON site, recent biopsy site   Pain: none reported  Diet: regular   Labs/Imaging: Mag replacement (1.5)  Plan: waiting biopsy results and continue plan of care

## 2023-04-30 NOTE — PROGRESS NOTES
[facility]   Transplant Nephrology Progress Note  Date of Admission:  4/28/2023  Today's Date: 04/30/2023    Recommendations:  - No acute indications for dialysis.   - Kidney transplant biopsy preliminary readings show acute cellular-mediated rejection 1B.  Will discuss treatment plan with Transplant Surgery, but usual is protocol is Solumedrol, although patient does have DSA and could consider polyclonal antibody treatment.  - Agree with starting phos   - Agree with mag replacement   - Recommend start sodium bicarb 650mg BID    Assessment & Plan   # LDKT: Trend down in creatinine, but still above initial frank, in setting of steroids.   - Baseline Creatinine: ~ TBD   - Proteinuria: Normal (<0.2 grams)   - Date DSA Last Checked: Apr/2023      Latest DSA: Yes, B45   - BK Viremia: No   - Kidney Tx Biopsy: Apr 29, 2023; Result: (Prelim) Acute cellular-mediated rejection, Banff 1B at this point with further studies pending.   - Transplant Ureteral Stent: Yes    # Immunosuppression: Tacrolimus immediate release (goal 8-10) and Mycophenolate mofetil (dose 750 mg every 12 hours)   - Induction with Recent Transplant:  High Intensity Protocol   - Changes: Not at this time    # Infection Prophylaxis:   - PJP: Sulfa/TMP (Bactrim)  - CMV: Valganciclovir (Valcyte)    # Hypertension: Controlled;  Goal BP: < 140/90   - Volume status: Euvolemic  EDW ~ 59kg   - Changes: Not at this time    # Elevated Blood Glucose: Glucose generally running ~ 120-130s   - Management as per primary team.    # Anemia in Chronic Renal Disease: Hgb: Trend up      MICKEY: No   - Iron studies: Low iron saturation, but high ferritin    # Mineral Bone Disorder:   - Secondary renal hyperparathyroidism; PTH level: Minimally elevated ( pg/ml)        On treatment: None  - Vitamin D; level: Normal        On supplement: Yes  - Calcium; level: Normal        On supplement: No  - Phosphorus; level: Low        On  supplement: Yes. Agree with starting supplement    # Electrolytes:   - Potassium; level: Normal        On supplement: No  - Magnesium; level: Low        On supplement: Yes . Agree with IV replacement  - Bicarbonate; level: Low        On supplement: No. Recommend starting sodium bicarb 650mg BID  - Sodium; level: Normal    # Ulcerative colitis: Diagnosed in 2018. 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    Seen and discussed with Dr. Zaid Alfonso NP   Pager: [phone]    Physician Attestation     I saw and evaluated Caity Horan as part of a shared APRN/PA visit.     I personally reviewed the vital signs, medications and labs.    I personally performed the substantive portion of the medical decision making for this visit - please see the SRINIVASAN's documentation for full details.    Key management decisions made by me and carried out under my direction: No acute indications for dialysis.  Would continue on present immunosuppression.  With (prelim) Banff 1B ACR, would treat with at least Solumedrol, but may consider Thymo if patient refuses to stay on prednisone or any suggestion of ABMR.  Will follow up on further biopsy results tomorrow.    Otilio Mar MD  Date of Service (when I saw the patient): 04/30/23    Interval History   Ms. Horan's creatinine is 1.59 (04/30 0532); Trend down.  Good urine output.  Other significant labs/tests/vitals: VSS  No events overnight.  No chest pain or shortness of breath.  No leg swelling.  No nausea and vomiting.  Bowel  movements are soft.  No  fever, sweats or chills.    Review of Systems   4 point ROS was obtained and negative except as noted in the Interval History.    MEDICATIONS:    magnesium oxide  800 mg Oral TID     mycophenolate  750 mg Oral BID     phosphorus tablet 250 mg  500 mg Oral TID     sodium chloride (PF)  3 mL Intracatheter Q8H     sulfamethoxazole-trimethoprim  1 tablet Oral Daily     tacrolimus  1 mg Oral BID     valGANciclovir  450 mg Oral Daily     vitamin D3  50 mcg Oral Daily         Physical Exam   Temp  Av.6  F (37  C)  Min: 97.5  F (36.4  C)  Max: 100.9  F (38.3  C)      Pulse  Av.5  Min: 71  Max: 125 Resp  Av.4  Min: 7  Max: 35  SpO2  Av.3 %  Min: 82 %  Max: 100 %     /87 (BP Location: Right arm)   Pulse 80   Temp 98.5  F (36.9  C) (Oral)   Resp 16   Wt 59.2 kg (130 lb 8 oz)   SpO2 95%   BMI 23.12 kg/m      Admit Weight: 59.2 kg (130 lb 8 oz)     GENERAL APPEARANCE: alert and no distress  HENT: mouth without ulcers or lesions  RESP: lungs clear to auscultation - no rales, rhonchi or wheezes  CV: regular rhythm, normal rate, no rub, no murmur  EDEMA: no LE edema bilaterally  ABDOMEN: soft, nondistended, nontender, bowel sounds normal  MS: extremities normal - no gross deformities noted, no evidence of inflammation in joints, no muscle tenderness  SKIN: no rash    Data   All labs reviewed by me.  CMP  Recent Labs   Lab 23  0750 23  0532 23  0547 23  0929 23  0951   NA  --  138 139 135* 135*   POTASSIUM 5.3 5.8* 4.9 4.9 5.2   CHLORIDE  --  111* 107 104 105   CO2  --  19* 22 24 24   ANIONGAP  --  8 10 7 6*   GLC  --  246* 97 105* 109*   BUN  --  15.6 10.9 9.5 11.6   CR  --  1.59* 1.86* 1.78* 1.75*   GFRESTIMATED  --  44* 36* 38* 39*   MICAH  --  9.0 9.1 9.5 9.4   MAG  --  1.5* 1.7  --  1.8   PHOS  --  1.7* 3.2  --  2.0*     CBC  Recent Labs   Lab 23  0532 23  0547 23  0929 23  0951 23  0826   HGB 8.0* 7.6* 8.1* 8.3*  9.0*   WBC  --  2.3* 2.4* 3.0* 3.9*   RBC  --  2.57* 2.70* 2.80* 2.94*   HCT  --  25.6* 26.9* 27.5* 29.3*   MCV  --  100 100 98 100   MCH  --  29.6 30.0 29.6 30.6   MCHC  --  29.7* 30.1* 30.2* 30.7*   RDW  --  13.1 13.2 13.2 13.4   PLT  --  352 348 363 357     INR  Recent Labs   Lab 04/29/23  0547   INR 1.14     ABGNo lab results found in last 7 days.   Urine Studies  Recent Labs   Lab Test 04/29/23  1211 12/05/22  1200 12/19/18  0615   COLOR Light Yellow Light Yellow Yellow   APPEARANCE Clear Clear Clear   URINEGLC Negative 50* Negative   URINEBILI Negative Negative Negative   URINEKETONE Negative Negative Negative   SG 1.013 1.012 1.019   UBLD Negative Moderate* Negative   URINEPH 7.5* 7.5* 5.0   PROTEIN 20* 300* Negative   NITRITE Negative Negative Negative   LEUKEST Negative Negative Negative   RBCU 2 4* 1   WBCU 2 8* 2     Recent Labs   Lab Test 01/14/21  1200 12/03/20  0915 06/18/20  0950 11/11/19  0718 07/18/19  0809 06/27/19  0746 12/19/18  0615 11/01/18  0745 02/01/18  0656 07/19/17  0727 12/13/16  0747 05/19/16  0801 12/08/15  1210 06/30/15  0822   UTPG 2.09* 1.36* 1.23* 0.17 0.25* 0.83* 0.12 0.07 0.05 0.10 0.10 0.07 0.08 0.06     PTH  Recent Labs   Lab Test 04/24/23  0826 04/22/23  0745 04/03/23  1556   PTHI 71* 73* 309*     Iron Studies  Recent Labs   Lab Test 04/24/23  0826 04/22/23  0745 04/03/23  1556 12/22/22  0608 06/27/19  0742 11/01/18  0737 08/23/18  0743 07/26/18  0830 05/17/18  0740 05/03/18  1320 03/29/18  0729 02/01/18  0702 12/01/17  1417 10/12/17  0744 07/25/17  1648   IRON 30* 40 186* 80 99 113 131 73 19* 19* 29* 36 112 14* 21*   * 145* 261 223* 288 262 236* 242 369 321 210* 291 274 423 467*   IRONSAT 18 28 71* 36 34 43 55* 30 5* 6* 14* 12* 41 3* 4*   FRANNY 886* 1,041* 902* 361* 233* 216* 226* 216* 7* 13 90 14 277* 3* 3*       IMAGING:  All imaging studies reviewed by me.

## 2023-04-30 NOTE — PLAN OF CARE
VS: /82 (BP Location: Right arm)   Pulse 78   Temp 98.1  F (36.7  C) (Oral)   Resp 16   Wt 59.2 kg (130 lb 8 oz)   SpO2 95%   BMI 23.12 kg/m      Cares: 1900 - 0730     Neuro: Aox4   Cardio: WDL   Respiratory: WDL on RA, denies SOB.   GI/: voiding adequately w/out issues, LBM 4-29  Skin: LLQ biopsy site, and left old DALTON site   Diet: Regular   Labs: creatinine 1.59 this AM - slightly decreased from yesterday.   BG: none   LDA: Left PIV - NS @ 100mL/hr   Mobility: Ind.   Pain/Nausea: denies pain or nausea    PRN medications: none given   Plan of Care: continue with current POC and update MD with any changes

## 2023-04-30 NOTE — PROGRESS NOTES
Transplant Surgery  Inpatient Daily Progress Note  04/30/2023    Assessment & Plan: 31yo with history of IgA nephropathy, KT 2014 & LDKT 4/13/23 c/b urine leak, UC, and HTN. Re-admitted with ASHLEY and de naeem DSA + B45, r/o rejection.    Graft function:  Kidney: Cr 1.9->1.6, frank 1.3. 4/28 renogram: no leak. 4/29 biopsy: prelim result ACR 1B, IF pending. Recheck DSA.    Immunosuppressed status secondary to medications: Received full thymoglobulin induction post-transplant.  -Tacro goal level 8-10  -MMF 750mg BID  -Methylpred 500mg 4/29 & 4/30  -Thymo 2mg/kg x1 today    Hematology:   Anemia of chronic disease: Hgb 8, monitor.    Cardiorespiratory:   HTN: BP controlled.    GI/Nutrition:   Diet: Regular diet    Endocrine:   Steroid induced hyperglycemia: Glucose 246, monitor today.    Fluid/Electrolytes:   Hypophosphatemia: Start Phospha.  Hypomagnesemia: On MagOx and 2g Mag sulf today.    : Wharton removed on admission.    Infectious disease: Afebrile    Prophylaxis: DVT, pneumocystis (TMP/sulfa), viral (Valcyte)    Disposition: 7A     SRINIVASAN/Fellow/Resident Provider: Rosy Caldera NP [id number]    Faculty: Delta Haines MD   _________________________________________________________________    Interval History: History is obtained from the patient  Overnight events: Feels OK    ROS:   A 10-point review of systems was negative except as noted above.    Meds:    magnesium oxide  800 mg Oral TID     methylPREDNISolone  500 mg Intravenous Once     mycophenolate  750 mg Oral BID     phosphorus tablet 250 mg  500 mg Oral TID     sodium bicarbonate  650 mg Oral BID     sodium chloride (PF)  3 mL Intracatheter Q8H     sulfamethoxazole-trimethoprim  1 tablet Oral Daily     tacrolimus  1 mg Oral BID     valGANciclovir  450 mg Oral Daily     vitamin D3  50 mcg Oral Daily       Physical Exam:     Admit Weight: 59.2 kg (130 lb 8 oz)    Current vitals:   /87 (BP Location: Right arm)   Pulse 80   Temp 98.5  F (36.9  C) (Oral)   Resp 16    Wt 59.2 kg (130 lb 8 oz)   SpO2 95%   BMI 23.12 kg/m      Vital sign ranges:    Temp:  [98.1  F (36.7  C)-98.5  F (36.9  C)] 98.5  F (36.9  C)  Pulse:  [71-94] 80  Resp:  [15-24] 16  BP: (108-131)/(65-87) 114/87  SpO2:  [93 %-100 %] 95 %    General Appearance: in no apparent distress.   Skin: Warm, perfused  Heart: Perfused  Lungs: Room air  Abdomen: The abdomen is soft, nontender  : no rutledge  Extremities: edema: None, strength 5/5  Neurologic: awake, alert and oriented x4. Tremor absent.     Data:   CMP  Recent Labs   Lab 04/30/23  0750 04/30/23  0532 04/29/23  0547   NA  --  138 139   POTASSIUM 5.3 5.8* 4.9   CHLORIDE  --  111* 107   CO2  --  19* 22   GLC  --  246* 97   BUN  --  15.6 10.9   CR  --  1.59* 1.86*   GFRESTIMATED  --  44* 36*   MICAH  --  9.0 9.1   MAG  --  1.5* 1.7   PHOS  --  1.7* 3.2     CBC  Recent Labs   Lab 04/30/23  0532 04/29/23  0547 04/28/23  0929   HGB 8.0* 7.6* 8.1*   WBC  --  2.3* 2.4*   PLT  --  352 348

## 2023-05-01 ENCOUNTER — LAB REQUISITION (OUTPATIENT)
Dept: LAB | Facility: CLINIC | Age: 33
End: 2023-05-01
Payer: COMMERCIAL

## 2023-05-01 VITALS
WEIGHT: 133.7 LBS | OXYGEN SATURATION: 98 % | HEART RATE: 93 BPM | TEMPERATURE: 99.2 F | DIASTOLIC BLOOD PRESSURE: 90 MMHG | SYSTOLIC BLOOD PRESSURE: 119 MMHG | RESPIRATION RATE: 18 BRPM | BODY MASS INDEX: 23.68 KG/M2

## 2023-05-01 LAB
ANION GAP SERPL CALCULATED.3IONS-SCNC: 13 MMOL/L (ref 7–15)
BASOPHILS # BLD MANUAL: 0 10E3/UL (ref 0–0.2)
BASOPHILS NFR BLD MANUAL: 0 %
BUN SERPL-MCNC: 14.7 MG/DL (ref 6–20)
BURR CELLS BLD QL SMEAR: ABNORMAL
CALCIUM SERPL-MCNC: 8.9 MG/DL (ref 8.6–10)
CHLORIDE SERPL-SCNC: 109 MMOL/L (ref 98–107)
CREAT SERPL-MCNC: 1.37 MG/DL (ref 0.51–0.95)
DEPRECATED HCO3 PLAS-SCNC: 18 MMOL/L (ref 22–29)
DONOR IDENTIFICATION: NORMAL
DONOR IDENTIFICATION: NORMAL
DSA COMMENTS: NORMAL
DSA COMMENTS: NORMAL
DSA PRESENT: NO
DSA PRESENT: NO
DSA TEST METHOD: NORMAL
DSA TEST METHOD: NORMAL
ELLIPTOCYTES BLD QL SMEAR: SLIGHT
EOSINOPHIL # BLD MANUAL: 0 10E3/UL (ref 0–0.7)
EOSINOPHIL NFR BLD MANUAL: 0 %
ERYTHROCYTE [DISTWIDTH] IN BLOOD BY AUTOMATED COUNT: 13.2 % (ref 10–15)
FRAGMENTS BLD QL SMEAR: SLIGHT
GFR SERPL CREATININE-BSD FRML MDRD: 52 ML/MIN/1.73M2
GLUCOSE BLDC GLUCOMTR-MCNC: 117 MG/DL (ref 70–99)
GLUCOSE BLDC GLUCOMTR-MCNC: 178 MG/DL (ref 70–99)
GLUCOSE SERPL-MCNC: 200 MG/DL (ref 70–99)
HCT VFR BLD AUTO: 24.9 % (ref 35–47)
HGB BLD-MCNC: 7.4 G/DL (ref 11.7–15.7)
INT SUB RESULT: NORMAL
INT SUB RESULT: NORMAL
INTERF SUBSTANCE: NORMAL
INTERF SUBSTANCE: NORMAL
INTSUB TEST METHOD: NORMAL
INTSUB TEST METHOD: NORMAL
LYMPHOCYTES # BLD MANUAL: 0 10E3/UL (ref 0.8–5.3)
LYMPHOCYTES NFR BLD MANUAL: 2 %
MAGNESIUM SERPL-MCNC: 1.6 MG/DL (ref 1.7–2.3)
MCH RBC QN AUTO: 29.7 PG (ref 26.5–33)
MCHC RBC AUTO-ENTMCNC: 29.7 G/DL (ref 31.5–36.5)
MCV RBC AUTO: 100 FL (ref 78–100)
METAMYELOCYTES # BLD MANUAL: 0 10E3/UL
METAMYELOCYTES NFR BLD MANUAL: 1 %
MONOCYTES # BLD MANUAL: 0.1 10E3/UL (ref 0–1.3)
MONOCYTES NFR BLD MANUAL: 4 %
MYCOPHENOLATE SERPL LC/MS/MS-MCNC: 4.86 MG/L (ref 1–3.5)
MYCOPHENOLATE-G SERPL LC/MS/MS-MCNC: 93.7 MG/L (ref 30–95)
NEUTROPHILS # BLD MANUAL: 1.8 10E3/UL (ref 1.6–8.3)
NEUTROPHILS NFR BLD MANUAL: 93 %
ORGAN: NORMAL
ORGAN: NORMAL
PHOSPHATE SERPL-MCNC: 3.3 MG/DL (ref 2.5–4.5)
PLAT MORPH BLD: ABNORMAL
PLATELET # BLD AUTO: 295 10E3/UL (ref 150–450)
POTASSIUM SERPL-SCNC: 4.7 MMOL/L (ref 3.4–5.3)
RBC # BLD AUTO: 2.49 10E6/UL (ref 3.8–5.2)
RBC MORPH BLD: ABNORMAL
SA 1 CELL: NORMAL
SA 1 CELL: NORMAL
SA 1 TEST METHOD: NORMAL
SA 1 TEST METHOD: NORMAL
SA 2 CELL: NORMAL
SA 2 CELL: NORMAL
SA 2 TEST METHOD: NORMAL
SA 2 TEST METHOD: NORMAL
SA1 HI RISK ABY: NORMAL
SA1 HI RISK ABY: NORMAL
SA1 MOD RISK ABY: NORMAL
SA1 MOD RISK ABY: NORMAL
SA2 HI RISK ABY: NORMAL
SA2 HI RISK ABY: NORMAL
SA2 MOD RISK ABY: NORMAL
SA2 MOD RISK ABY: NORMAL
SODIUM SERPL-SCNC: 140 MMOL/L (ref 136–145)
TACROLIMUS BLD-MCNC: 6.3 UG/L (ref 5–15)
TME LAST DOSE: ABNORMAL H
TME LAST DOSE: ABNORMAL H
TME LAST DOSE: NORMAL H
TME LAST DOSE: NORMAL H
UNACCEPTABLE ANTIGENS: NORMAL
UNACCEPTABLE ANTIGENS: NORMAL
UNOS CPRA: 82
UNOS CPRA: 82
WBC # BLD AUTO: 1.9 10E3/UL (ref 4–11)
ZZZINT SUB COMMENTS: NORMAL
ZZZINT SUB COMMENTS: NORMAL
ZZZSA 1  COMMENTS: NORMAL
ZZZSA 1  COMMENTS: NORMAL
ZZZSA 2 COMMENTS: NORMAL
ZZZSA 2 COMMENTS: NORMAL

## 2023-05-01 PROCEDURE — 258N000003 HC RX IP 258 OP 636: Performed by: PHYSICIAN ASSISTANT

## 2023-05-01 PROCEDURE — 250N000013 HC RX MED GY IP 250 OP 250 PS 637: Performed by: NURSE PRACTITIONER

## 2023-05-01 PROCEDURE — 99233 SBSQ HOSP IP/OBS HIGH 50: CPT | Mod: 24

## 2023-05-01 PROCEDURE — 85007 BL SMEAR W/DIFF WBC COUNT: CPT | Performed by: NURSE PRACTITIONER

## 2023-05-01 PROCEDURE — 250N000012 HC RX MED GY IP 250 OP 636 PS 637: Performed by: PHYSICIAN ASSISTANT

## 2023-05-01 PROCEDURE — 250N000012 HC RX MED GY IP 250 OP 636 PS 637: Performed by: STUDENT IN AN ORGANIZED HEALTH CARE EDUCATION/TRAINING PROGRAM

## 2023-05-01 PROCEDURE — 85027 COMPLETE CBC AUTOMATED: CPT | Performed by: NURSE PRACTITIONER

## 2023-05-01 PROCEDURE — 250N000013 HC RX MED GY IP 250 OP 250 PS 637: Performed by: PHYSICIAN ASSISTANT

## 2023-05-01 PROCEDURE — 82310 ASSAY OF CALCIUM: CPT | Performed by: PHYSICIAN ASSISTANT

## 2023-05-01 PROCEDURE — 250N000011 HC RX IP 250 OP 636: Performed by: PHYSICIAN ASSISTANT

## 2023-05-01 PROCEDURE — 36415 COLL VENOUS BLD VENIPUNCTURE: CPT | Performed by: PHYSICIAN ASSISTANT

## 2023-05-01 PROCEDURE — 83735 ASSAY OF MAGNESIUM: CPT | Performed by: PHYSICIAN ASSISTANT

## 2023-05-01 PROCEDURE — 250N000013 HC RX MED GY IP 250 OP 250 PS 637: Performed by: TRANSPLANT SURGERY

## 2023-05-01 PROCEDURE — 80197 ASSAY OF TACROLIMUS: CPT | Performed by: PHYSICIAN ASSISTANT

## 2023-05-01 PROCEDURE — 84100 ASSAY OF PHOSPHORUS: CPT | Performed by: PHYSICIAN ASSISTANT

## 2023-05-01 RX ORDER — TACROLIMUS 0.5 MG/1
0.5 CAPSULE ORAL 2 TIMES DAILY
Qty: 60 CAPSULE | Refills: 11
Start: 2023-05-01 | End: 2023-05-11

## 2023-05-01 RX ORDER — TACROLIMUS 1 MG/1
1 CAPSULE ORAL 2 TIMES DAILY
Qty: 60 CAPSULE | Refills: 11
Start: 2023-05-01 | End: 2023-05-11

## 2023-05-01 RX ORDER — PREDNISONE 10 MG/1
TABLET ORAL
Qty: 63 TABLET | Refills: 0 | Status: SHIPPED | OUTPATIENT
Start: 2023-05-01 | End: 2023-05-16

## 2023-05-01 RX ORDER — PREDNISONE 5 MG/1
5 TABLET ORAL DAILY
Qty: 30 TABLET | Refills: 11 | Status: SHIPPED | OUTPATIENT
Start: 2023-05-16 | End: 2023-05-16

## 2023-05-01 RX ORDER — VALGANCICLOVIR 450 MG/1
450 TABLET, FILM COATED ORAL DAILY
Qty: 60 TABLET | Refills: 5 | Status: SHIPPED | OUTPATIENT
Start: 2023-05-01 | End: 2023-05-22

## 2023-05-01 RX ORDER — SODIUM BICARBONATE 650 MG/1
1300 TABLET ORAL 2 TIMES DAILY
Qty: 120 TABLET | Refills: 0 | Status: SHIPPED | OUTPATIENT
Start: 2023-05-01 | End: 2023-05-04

## 2023-05-01 RX ORDER — SODIUM BICARBONATE 650 MG/1
1300 TABLET ORAL 2 TIMES DAILY
Status: DISCONTINUED | OUTPATIENT
Start: 2023-05-01 | End: 2023-05-01 | Stop reason: HOSPADM

## 2023-05-01 RX ORDER — MAGNESIUM SULFATE HEPTAHYDRATE 40 MG/ML
2 INJECTION, SOLUTION INTRAVENOUS ONCE
Status: COMPLETED | OUTPATIENT
Start: 2023-05-01 | End: 2023-05-01

## 2023-05-01 RX ADMIN — VALGANCICLOVIR 450 MG: 450 TABLET, FILM COATED ORAL at 07:47

## 2023-05-01 RX ADMIN — SODIUM CHLORIDE 500 MG: 9 INJECTION, SOLUTION INTRAVENOUS at 15:06

## 2023-05-01 RX ADMIN — MYCOPHENOLATE MOFETIL 750 MG: 250 CAPSULE ORAL at 07:46

## 2023-05-01 RX ADMIN — Medication 800 MG: at 10:55

## 2023-05-01 RX ADMIN — SODIUM PHOSPHATE, DIBASIC, ANHYDROUS, POTASSIUM PHOSPHATE, MONOBASIC, AND SODIUM PHOSPHATE, MONOBASIC, MONOHYDRATE 500 MG: 852; 155; 130 TABLET, COATED ORAL at 07:46

## 2023-05-01 RX ADMIN — MAGNESIUM SULFATE HEPTAHYDRATE 2 G: 40 INJECTION, SOLUTION INTRAVENOUS at 10:57

## 2023-05-01 RX ADMIN — HYDROXYZINE HYDROCHLORIDE 25 MG: 25 TABLET, FILM COATED ORAL at 05:00

## 2023-05-01 RX ADMIN — HYDROXYZINE HYDROCHLORIDE 25 MG: 25 TABLET, FILM COATED ORAL at 00:58

## 2023-05-01 RX ADMIN — SULFAMETHOXAZOLE AND TRIMETHOPRIM 1 TABLET: 400; 80 TABLET ORAL at 07:48

## 2023-05-01 RX ADMIN — Medication 50 MCG: at 07:48

## 2023-05-01 RX ADMIN — TACROLIMUS 1 MG: 1 CAPSULE ORAL at 07:47

## 2023-05-01 RX ADMIN — SODIUM BICARBONATE 650 MG: 650 TABLET ORAL at 07:48

## 2023-05-01 ASSESSMENT — ACTIVITIES OF DAILY LIVING (ADL)
ADLS_ACUITY_SCORE: 18

## 2023-05-01 NOTE — PROGRESS NOTES
[facility]   Transplant Nephrology Progress Note  Date of Admission:  4/28/2023  Today's Date: 05/01/2023    Recommendations:  - No acute indications for dialysis.    - Recommend increasing bicarbonate to 1300 mg PO twice daily.  - Agree with magnesium sulfate 2 grams IV today.  - Transplant surgery team to increase tacrolimus to meet goal.  - Agree with third dose of methylprednisolone 500 mg iv today to treat ACMR followed by prednisone taper then maintenance prednisone 5 mg po every day  - DSA monitoring per high risk protocol, most recent check 4/30 neg   - will need a closure bx~2-4 weeks depending on Cr trends and DSA monitoring    Assessment & Plan   # LDKT: Trend down in creatinine, but still above initial frank of 1.25, in setting of steroids.   - Baseline Creatinine: ~ TBD   - Proteinuria: Minimal (0.2-0.5 grams) 4/29/23   - Date DSA Last Checked: Apr/2023      Latest DSA: Yes, B45   - BK Viremia: No   - Kidney Tx Biopsy: Apr 29, 2023; Result: (Prelim) Acute cellular-mediated rejection, Banff 1B at this point with further studies pending.C4d neg.PTC 1+   - Received thymo and two doses of methylprednisolone to date (5/1/23).   - Transplant Ureteral Stent: Yes    # Immunosuppression: Tacrolimus immediate release (goal 8-10) and Mycophenolate mofetil (dose 750 mg every 12 hours)   - Induction with Recent Transplant:  High Intensity Protocol   - Changes: Yes - Transplant surgery team to increase tacrolimus to meet goal.    # Infection Prophylaxis:   - PJP: Sulfa/TMP (Bactrim)  - CMV: Valganciclovir (Valcyte)    # Hypertension: Controlled;  Goal BP: < 140/90   - Volume status: Euvolemic  EDW ~ 59kg   - Changes: Not at this time    # Elevated Blood Glucose: Glucose generally running ~ 107-170   - Management as per primary team.    # Anemia in Chronic Renal Disease: Hgb: Trend down      MICKEY: No   - Iron studies: Low iron saturation, but high ferritin    # Mineral Bone  Disorder:   - Secondary renal hyperparathyroidism; PTH level: Minimally elevated ( pg/ml)        On treatment: None  - Vitamin D; level: Normal        On supplement: Yes  - Calcium; level: Normal        On supplement: No  - Phosphorus; level: Normal        On supplement: Yes, Phosphorus tablet 500 mg daily.    # Electrolytes:   - Potassium; level: Normal        On supplement: No  - Magnesium; level: Low        On supplement: Yes, magnesium oxide 800 mg TID.  Agree with magnesium sulfate 2 grams IV  - Bicarbonate; level: Low        On supplement: No. Recommend starting sodium bicarb 650mg BID  - Sodium; level: Normal    # Ulcerative colitis: Diagnosed in 2018. 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    Seen and discussed with Dr. Ulysses Cameron, TANYA CNP   Pager: [phone]        Physician Attestation     I saw and evaluated Caity Horan as part of a shared APRN/PA visit.     I personally reviewed the vital signs, medications, labs, and imaging.    I personally performed the substantive portion of the medical decision making for this visit - please see the SRINIVASAN's documentation for full details.    Key management decisions made by me and carried out under my direction: Cr downtrending to 1.3, ALC-0 s/p 1 dose of rATG 4/30, 3rd dose of  mg iv today followed by prednisone taper then prednisone 5 mg po every day. Prelim kidney bx [id number], C4d neg, mild PTC1+    Darian Arora MD  Date of Service (when I saw the patient): 05/01/23    Interval History   Ms.  Marline's creatinine is 1.37 (538); Trend down.  2250 ml urine output.  Other significant labs/tests/vitals: VSS  No acute events overnight.  No chest pain or shortness of breath.  No leg swelling.  No N/V/D.  No fever, sweats or chills    Review of Systems   4 point ROS was obtained and negative except as noted in the Interval History.    MEDICATIONS:   magnesium oxide  800 mg Oral TID    methylPREDNISolone  500 mg Intravenous Once    mycophenolate  750 mg Oral BID    [START ON 2023] phosphorus tablet 250 mg  500 mg Oral Daily    sodium bicarbonate  1,300 mg Oral BID    sodium chloride (PF)  3 mL Intracatheter Q8H    [Held by provider] sulfamethoxazole-trimethoprim  1 tablet Oral Daily    tacrolimus  1.5 mg Oral BID    valGANciclovir  450 mg Oral Daily    vitamin D3  50 mcg Oral Daily         Physical Exam   Temp  Av.6  F (37  C)  Min: 97.5  F (36.4  C)  Max: 100.9  F (38.3  C)      Pulse  Av.5  Min: 71  Max: 125 Resp  Av.4  Min: 7  Max: 35  SpO2  Av.3 %  Min: 82 %  Max: 100 %     BP (!) 127/94 (BP Location: Left arm)   Pulse 84   Temp 98.4  F (36.9  C) (Oral)   Resp 16   Wt 60.6 kg (133 lb 11.2 oz)   SpO2 100%   BMI 23.68 kg/m      Admit Weight: 59.2 kg (130 lb 8 oz)     GENERAL APPEARANCE: alert and no distress  RESP: lungs clear to auscultation - no rales, rhonchi or wheezes  CV: regular rhythm, normal rate, no rub, no murmur  EDEMA: no LE edema bilaterally  ABDOMEN: soft, nondistended, nontender, bowel sounds normal  MS: extremities normal - no gross deformities noted, no evidence of inflammation in joints, no muscle tenderness  SKIN: no rash    Data   All labs reviewed by me.  CMP  Recent Labs   Lab 23  0623 23  0538 23  2154 23  1750 23  1447 23  0750 23  0532 23  0547 23  0929 23  0951   NA  --  140  --   --   --   --  138 139 135* 135*   POTASSIUM  --  4.7  --   --   --  5.3 5.8* 4.9 4.9 5.2   CHLORIDE  --  109*   --   --   --   --  111* 107 104 105   CO2  --  18*  --   --   --   --  19* 22 24 24   ANIONGAP  --  13  --   --   --   --  8 10 7 6*   * 200* 157* 119*   < >  --  246* 97 105* 109*   BUN  --  14.7  --   --   --   --  15.6 10.9 9.5 11.6   CR  --  1.37*  --   --   --   --  1.59* 1.86* 1.78* 1.75*   GFRESTIMATED  --  52*  --   --   --   --  44* 36* 38* 39*   MICAH  --  8.9  --   --   --   --  9.0 9.1 9.5 9.4   MAG  --  1.6*  --   --   --   --  1.5* 1.7  --  1.8   PHOS  --  3.3  --   --   --   --  1.7* 3.2  --  2.0*    < > = values in this interval not displayed.     CBC  Recent Labs   Lab 05/01/23  0538 04/30/23  1344 04/30/23  0532 04/29/23  0547 04/28/23  0929   HGB 7.4* 7.7* 8.0* 7.6* 8.1*   WBC 1.9* 3.1*  3.1* 1.6* 2.3* 2.4*   RBC 2.49* 2.59*  --  2.57* 2.70*   HCT 24.9* 25.7*  --  25.6* 26.9*    99  --  100 100   MCH 29.7 29.7  --  29.6 30.0   MCHC 29.7* 30.0*  --  29.7* 30.1*   RDW 13.2 13.0  --  13.1 13.2    378  --  352 348     INR  Recent Labs   Lab 04/29/23  0547   INR 1.14     ABGNo lab results found in last 7 days.   Urine Studies  Recent Labs   Lab Test 04/29/23  1211 12/05/22  1200 12/19/18  0615   COLOR Light Yellow Light Yellow Yellow   APPEARANCE Clear Clear Clear   URINEGLC Negative 50* Negative   URINEBILI Negative Negative Negative   URINEKETONE Negative Negative Negative   SG 1.013 1.012 1.019   UBLD Negative Moderate* Negative   URINEPH 7.5* 7.5* 5.0   PROTEIN 20* 300* Negative   NITRITE Negative Negative Negative   LEUKEST Negative Negative Negative   RBCU 2 4* 1   WBCU 2 8* 2     Recent Labs   Lab Test 01/14/21  1200 12/03/20  0915 06/18/20  0950 11/11/19  0718 07/18/19  0809 06/27/19  0746 12/19/18  0615 11/01/18  0745 02/01/18  0656 07/19/17  0727 12/13/16  0747 05/19/16  0801 12/08/15  1210 06/30/15  0822   UTPG 2.09* 1.36* 1.23* 0.17 0.25* 0.83* 0.12 0.07 0.05 0.10 0.10 0.07 0.08 0.06     PTH  Recent Labs   Lab Test 04/24/23  0826 04/22/23  0745 04/03/23  1556   PTHI 71*  73* 309*     Iron Studies  Recent Labs   Lab Test 04/24/23  0826 04/22/23  0745 04/03/23  1556 12/22/22  0608 06/27/19  0742 11/01/18  0737 08/23/18  0743 07/26/18  0830 05/17/18  0740 05/03/18  1320 03/29/18  0729 02/01/18  0702 12/01/17  1417 10/12/17  0744 07/25/17  1648   IRON 30* 40 186* 80 99 113 131 73 19* 19* 29* 36 112 14* 21*   * 145* 261 223* 288 262 236* 242 369 321 210* 291 274 423 467*   IRONSAT 18 28 71* 36 34 43 55* 30 5* 6* 14* 12* 41 3* 4*   FRANNY 886* 1,041* 902* 361* 233* 216* 226* 216* 7* 13 90 14 277* 3* 3*       IMAGING:  All imaging studies reviewed by me.

## 2023-05-01 NOTE — PROGRESS NOTES
CHW changed pt's lab appts from a Monday Thursday schedule to a Monday Wednesday Friday schedule. (Monday appts were kept the same as previously scheduled.)    Appt kept the same on Thursday 5/4 and Monday 5/8.    Then next appt changed from Thurs 5/11 to Wed 5/10 at 7:45 am.     Appts will continue every Monday Wednesday and Friday, at slightly different times for each appt between 7-8 am, until Memorial day week 5/29- that week the Tuesday 5/30 and Thursday 6/1 appts previously scheduled were kept.       Negin Sherwood   7A/B Community Health Worker   Phone: [phone]

## 2023-05-01 NOTE — PROGRESS NOTES
Transplant Surgery  Inpatient Daily Progress Note  05/01/2023    Assessment & Plan: 31yo with history of IgA nephropathy, KT 2014 & LDKT 4/13/23 c/b urine leak, UC, and HTN. Re-admitted with ASHLEY and de naeem DSA + B45, r/o rejection.    Graft function:  Kidney: Cr 1.37, frank 1.3. 4/28 renogram: no leak. 4/29 biopsy: prelim result ACR 1B, IF pending. Recheck DSA.    Immunosuppressed status secondary to medications: Received full thymoglobulin induction post-transplant.  -Tacro goal level 8-10  -MMF 750mg BID  -s/p Methylpred 500mg 4/29 & 4/30  -Thymo 2mg/kg x1 on 4/30    Hematology:   Anemia of chronic disease: Hgb 8, monitor.    Cardiorespiratory:   HTN: BP controlled.    GI/Nutrition:   Diet: Regular diet    Endocrine:   Steroid induced hyperglycemia: Glucose 100s-200 past 24 hours, monitor today.    Fluid/Electrolytes:   Hypophosphatemia: continue Phospha.  Hypomagnesemia: replete PRN    : Wharton removed on admission.    Infectious disease: Afebrile    Prophylaxis: DVT, pneumocystis (TMP/sulfa), viral (Valcyte)    Disposition: 7A     SRINIVASAN/Fellow/Resident Provider: Bharat Rosales MD [id number]    Faculty: Ivy Sepulveda MD   _________________________________________________________________    Interval History: History is obtained from the patient  Overnight events: She denies being in any acute distress. Pt denies any N/V, and had 2.25L of UOP on 4/30 as well as 900cc since midnight.    ROS:   A 10-point review of systems was negative except as noted above.    Meds:    magnesium oxide  800 mg Oral TID     magnesium sulfate  2 g Intravenous Once     mycophenolate  750 mg Oral BID     [START ON 5/2/2023] phosphorus tablet 250 mg  500 mg Oral Daily     sodium bicarbonate  650 mg Oral BID     sodium chloride (PF)  3 mL Intracatheter Q8H     [Held by provider] sulfamethoxazole-trimethoprim  1 tablet Oral Daily     tacrolimus  1 mg Oral BID     valGANciclovir  450 mg Oral Daily     vitamin D3  50 mcg Oral Daily        Physical Exam:     Admit Weight: 59.2 kg (130 lb 8 oz)    Current vitals:   BP (!) 127/94 (BP Location: Left arm)   Pulse 84   Temp 98.4  F (36.9  C) (Oral)   Resp 16   Wt 60.6 kg (133 lb 11.2 oz)   SpO2 100%   BMI 23.68 kg/m      Vital sign ranges:    Temp:  [98.2  F (36.8  C)-99.1  F (37.3  C)] 98.4  F (36.9  C)  Pulse:  [69-86] 84  Resp:  [15-16] 16  BP: (114-132)/(59-97) 127/94  SpO2:  [92 %-100 %] 100 %    GENERAL: NAD, resting comfortably in bed  HEENT: atraumatic, normocephalic, no scleral icterus  CARDIO: normal rate and regular rhythm, no LE edema  PULM: no increased WOB, CTAB  ABD: non-distended, soft, and non-tender  INCISION: C/D/I without surrounding erythema  NEURO: CN II-XII grossly intact, no focal neuro deficits    Data:   CMP  Recent Labs   Lab 05/01/23  0623 05/01/23  0538 04/30/23  1447 04/30/23  0750 04/30/23  0532   NA  --  140  --   --  138   POTASSIUM  --  4.7  --  5.3 5.8*   CHLORIDE  --  109*  --   --  111*   CO2  --  18*  --   --  19*   * 200*   < >  --  246*   BUN  --  14.7  --   --  15.6   CR  --  1.37*  --   --  1.59*   GFRESTIMATED  --  52*  --   --  44*   MICAH  --  8.9  --   --  9.0   MAG  --  1.6*  --   --  1.5*   PHOS  --  3.3  --   --  1.7*    < > = values in this interval not displayed.     CBC  Recent Labs   Lab 05/01/23 0538 04/30/23  1344   HGB 7.4* 7.7*   WBC 1.9* 3.1*  3.1*    378

## 2023-05-01 NOTE — PLAN OF CARE
VS: BP (!) 119/90 (BP Location: Left arm)   Pulse 93   Temp 99.2  F (37.3  C) (Oral)   Resp 18   Wt 60.6 kg (133 lb 11.2 oz)   SpO2 98%   BMI 23.68 kg/m      Neuro: Aox4   P/N: Denies pain   Diet: Regular   Access: : Leftt PIV SL, R PIV solumedrol   : voiding adequately without issues   GI:No BM  SKIN: LLQ biopsy site CDI, open to air   Mobility : ad matthias  Drains: None

## 2023-05-01 NOTE — PLAN OF CARE
DISCHARGE:  Patient with orders to discharge to local hotel.     Education Provided:   Specialty clinic appointments scheduled and discussed with patient; documented in AVS.  LDAs removed.    Discharge instructions, medications & follow ups reviewed with patient. Copy of discharge summary given to patient. PIVs removed. Belongings returned. [facility] notified.  Patient in stable condition. AVSS. No further questions regarding discharge instructions and medications. Patient transferred out independently & left with mom.  Plan for follow up with transplant team in clinic.

## 2023-05-01 NOTE — DISCHARGE SUMMARY
[facility]    Discharge Summary  Transplant Surgery    Date of Admission:  4/28/2023  Date of Discharge:  5/1/2023  5:40 PM  Discharging Provider: Jessica Song PA-C/ Dr. 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 Re-admitted with ASHLEY and de naeem DSA + B45, r/o rejection.    Hospital Course   S/p Kidney Transplant: Cr 1.37, frank 1.3. 4/28 renogram: no leak. 4/29 biopsy: prelim result ACR 1B, C4d returned negative. Recheck DSA negative. See below for treatment.  ACR Banff 1B: de naeem DSA, B45. Treated with Solu- Medrol 500 mg IV daily x 3 (4/29-5/1) while inpatient. Will receive 2 week taper down to prednisone 5 mg daily as an outpatient. Also received thymo 125 mg on 4/30 due to DSA.     Immunosuppressed status secondary to medications: Received full thymoglobulin induction post-transplant.  -Tacro goal level 8-10  -MMF 750mg BID  Prophylaxis: pneumocystis (TMP/sulfa), viral (Valcyte)    Hematology:   Anemia of chronic disease: Hgb 8, monitor.    Endocrine:   Steroid induced hyperglycemia: Will not discharge on insulin     Fluid/Electrolytes:   Hypophosphatemia: continue Phospha.  Hypomagnesemia: replete PRN    Transplant coordinator: Eleanor Lund [phone]  Donor type:  Live  DSA at time of transplant:  No DSA 4/17/23  Ureteral stent: Yes  CMV:  Donor + / Recipient -  EBV:  Donor + / Recipient  +  Thymoglobulin: 350 mg (6 mg/kg)    Discharge Disposition   Discharged to stay [facility]  Condition at discharge: Stable    Pending Results   These results will be followed up by transplant team  Unresulted Labs Ordered in the Past 30 Days of this Admission     Date and Time Order Name Status Description    4/28/2023  6:52 PM Surgical Pathology Exam In process     4/18/2023  8:09 AM Prepare red blood cells (unit) Preliminary     4/17/2023 10:03 AM Fungal or Yeast Culture Routine Preliminary     4/17/2023  9:56 AM Prepare red blood cells (unit) Preliminary     4/17/2023  9:56 AM Prepare red blood cells (unit) Preliminary         Final pathology results: Pending at time of discharge, prelim Banff 1B with PTC 1, C4d negative  Primary Care Physician   Physician No Ref-Primary    Physical Exam   Temp: 99.2  F (37.3  C) Temp src: Oral BP: (!) 119/90 Pulse: 93   Resp: 18 SpO2: 98 % O2 Device: None (Room air)    Vitals:    04/28/23 1900 05/01/23 0424   Weight: 59.2 kg (130 lb 8 oz) 60.6 kg (133 lb 11.2 oz)     Vital Signs with Ranges  Temp:  [99.2  F (37.3  C)] 99.2  F (37.3  C)  Pulse:  [93] 93  Resp:  [18] 18  BP: (119)/(90) 119/90  SpO2:  [98 %] 98 %  I/O last 3 completed shifts:  In: 2246.67 [P.O.:1200; I.V.:1046.67]  Out: 3400 [Urine:3400]    Constitutional: Awake, alert, cooperative, no apparent distress, and appears stated age.  Eyes: Lids and lashes normal, pupils equal, round, extra ocular muscles intact, sclera clear, conjunctiva normal.  ENT: Normocephalic, without obvious abnormality, atraumatic  Respiratory: Nolabored resps on RA  Cardiovascular: Perfused  GI: Soft, non-distended, non-tender, incision healing  Genitourinary:  Voiding  Neurologic: Awake, alert, oriented to name, place and time.    Neuropsychiatric: Calm, normal eye contact, alert, normal affect, oriented to self, place, time and situation, memory for past and recent events intact and thought process normal.    Consultations This Hospital Stay    NEPHROLOGY KIDNEY/PANCREAS TRANSPLANT ADULT IP CONSULT  INTERVENTIONAL RADIOLOGY ADULT/PEDS IP CONSULT  NURSING TO CONSULT FOR VASCULAR ACCESS CARE IP CONSULT  NURSING TO CONSULT FOR VASCULAR ACCESS CARE IP CONSULT  NURSING TO CONSULT FOR VASCULAR ACCESS CARE IP CONSULT    Time Spent on this Encounter   I have spent greater than 30 minutes on this discharge.    Discharge Orders   Discharge Medications   Discharge Medication List as of 5/1/2023  3:32 PM      START taking these medications    Details   !! predniSONE (DELTASONE) 10 MG tablet Take 4 tablets (40 mg) by mouth 2 times daily for 3 days, THEN 6 tablets (60 mg) daily for 3 days, THEN 4 tablets (40 mg) daily for 3 days, THEN 2 tablets (20 mg) daily for 3 days, THEN 1 tablet (10 mg) daily for 3 days., Disp-63 tablet, R-0, E-Prescribe      !! predniSONE (DELTASONE) 5 MG tablet Take 1 tablet (5 mg) by mouth daily Or until discussed in clinic, Disp-30 tablet, R-11, E-Prescribe      sodium bicarbonate 650 MG tablet Take 2 tablets (1,300 mg) by mouth 2 times daily, Disp-120 tablet, R-0, E-Prescribe       !! - Potential duplicate medications found. Please discuss with provider.      CONTINUE these medications which have CHANGED    Details   phosphorus tablet 250 mg (PHOSPHA 250 NEUTRAL) 250 MG per tablet Take 2 tablets (500 mg) by mouth daily, Disp-60 tablet, R-0, No Print Out      tacrolimus (GENERIC EQUIVALENT) 0.5 MG capsule Take 1 capsule (0.5 mg) by mouth 2 times daily Take 1.5 mg by mouth twice daily, Disp-60 capsule, R-11, No Print Out      tacrolimus (GENERIC EQUIVALENT) 1 MG capsule Take 1 capsule (1 mg) by mouth 2 times daily Take 1.5 mg by mouth twice daily, Disp-60 capsule, R-11, No Print Out      valGANciclovir (VALCYTE) 450 MG tablet Take 1 tablet (450 mg) by mouth daily Until directed to increase dose per transplant team based on improving renal function., Disp-60 tablet, R-5, E-Prescribe         CONTINUE these medications which have NOT CHANGED     Details   biotin 1000 MCG TABS tablet Take 500 mcg by mouth daily, Historical      Cyanocobalamin 500 MCG TBDP Take 500 mcg by mouth daily, Historical      hydrOXYzine (ATARAX) 25 MG tablet Take 1 tablet (25 mg) by mouth every 4 hours as needed for anxiety or other (adjuvant pain), Disp-15 tablet, R-0, E-Prescribe      levonorgestrel (MIRENA) 20 MCG/DAY IUD 1 each by Intrauterine route Replacement every 7 years. Placed 1/2019Historical      magnesium oxide (MAG-OX) 400 MG tablet Take 2 tablets (800 mg) by mouth 3 times daily, Disp-120 tablet, R-2, Historical      methocarbamol (ROBAXIN) 500 MG tablet Take 1 tablet (500 mg) by mouth 4 times daily as needed for muscle spasms, Disp-20 tablet, R-0, E-Prescribe      mycophenolate (GENERIC EQUIVALENT) 250 MG capsule Take 3 capsules (750 mg) by mouth 2 times daily, Disp-180 capsule, R-11, E-Prescribe      ondansetron (ZOFRAN ODT) 4 MG ODT tab Take 1 tablet (4 mg) by mouth every 6 hours as needed for nausea or vomiting, Disp-10 tablet, R-0, E-Prescribe      oxyCODONE (ROXICODONE) 5 MG tablet Take 1 tablet (5 mg) by mouth every 6 hours as needed for moderate pain, Disp-15 tablet, R-0, E-Prescribe      sulfamethoxazole-trimethoprim (BACTRIM) 400-80 MG tablet Take 1 tablet by mouth daily, Disp-30 tablet, R-11, E-Prescribe      vedolizumab (ENTYVIO) 60 MG/ML injection Inject 300 mg into the vein once every six weeks, Historical      vitamin D3 (CHOLECALCIFEROL) 50 mcg (2000 units) tablet Take 1 tablet by mouth daily, Historical                Reason for your hospital stay    Patient was admitted with acute rejection of her transplant kidney and treated with Solu-medrol and one dose of thymo.     Activity    Your activity upon discharge: Walk at least four times a day, lift no greater than 10 pounds for 6 weeks from the time of surgery.  After 6 weeks time please return to your normal exercise routine.  No driving while taking narcotics or until 3 weeks after surgery.     Adult  [facility] Follow-up and recommended labs and tests    [facility] FOLLOW UP:     -Follow up with Transplant Nephrologist as scheduled on 5/22    -Ureteral stent removal in 4-6 weeks, as scheduled on 5/22.     -Follow up with your primary care provider in ~8 weeks. Patient to schedule.     Remember to always bring an updated medication list to all appointments.        Call your Transplant Coordinator ([phone]) with questions about Transplant Center appointment scheduling.     LABS:     CBC, BMP, magnesium, phosphorus, tacrolimus level to be drawn every Monday, Wednesday, and Friday x 3 weeks at an outpatient lab. Further schedule per nephrology follow up.    DSA high risk protocol:  To be drawn on Monday, May 8 and Monday, May 15     When to contact your care team    WHEN TO CONTACT YOUR  COORDINATOR:     Transplant Coordinator [phone]     Notify your coordinator if you have pain over your kidney, increased redness or drainage from your incision, fever greater than 100F, decreased urine output or new or increased amount of blood in urine.     Notify your coordinator immediately if you are ever unable to take your immunosuppressive medications for any reason.     If you have URGENT concerns after office hours, please call the hospital switchboard at [phone] and ask to have the organ transplant nurse on-call paged. If you have a life-threatening emergency, go to the nearest emergency room.     Wound care and dressings    Instructions to care for your wound at home: If you have staples in place, they will be removed in 3 weeks after operation. Wash incision daily with soap and water. Do not soak or scrub.     Diet    Follow this diet upon discharge: Diet recommendations post-transplant: Heart healthy dietary habits long term (low saturated/trans fat, low sodium). High protein diet x 8 weeks. Practice food safety precautions - no fish/seafood x 3 weeks.       Data   Most Recent 3  Creatinines:  Recent Labs   Lab Test 05/01/23  0538 04/30/23  0532 04/29/23  0547   CR 1.37* 1.59* 1.86*     I have reviewed history, examined patient and discussed plan with the fellow/resident/SRINIVASAN.    I concur with the findings in this note.    Time spent on discharge activities: 45 minutes.

## 2023-05-01 NOTE — PLAN OF CARE
VS: BP (!) 132/97 (BP Location: Left arm)   Pulse 69   Temp 98.6  F (37  C) (Oral)   Resp 15   Wt 60.6 kg (133 lb 11.2 oz)   SpO2 98%   BMI 23.68 kg/m      Cares: 2300 - 0730    Neuro: Aox4    Cardio: WDL   Respiratory: WDL on RA  GI/: voiding adequately w/out issues, LBM 4-30  Skin: LLQ biopsy site, and Left old DALTON site  Diet: Regular   Labs: waiting on morning lab results   BG: ACHS  (178 this AM)  LDA: x2 PIV SL   Mobility: Ind.   Pain/Nausea: denies pain or nausea    PRN medications: atarax x2  Plan of Care: Continue with current POC and update MD with any changes

## 2023-05-01 NOTE — PLAN OF CARE
Goal Outcome Evaluation:    Vital stable, denies pain or nausea. Up independently in room. Fair appetite on food brought in from outside. Blood sugars 107, 119 and 157. Voiding adequately, no BM this shift.  Thymo dose #1 currently running uneventfully over six hours.

## 2023-05-02 DIAGNOSIS — Z94.0 KIDNEY REPLACED BY TRANSPLANT: Primary | ICD-10-CM

## 2023-05-04 ENCOUNTER — LAB (OUTPATIENT)
Dept: LAB | Facility: CLINIC | Age: 33
End: 2023-05-04
Payer: COMMERCIAL

## 2023-05-04 DIAGNOSIS — Z94.0 KIDNEY REPLACED BY TRANSPLANT: ICD-10-CM

## 2023-05-04 DIAGNOSIS — E83.39 HYPOPHOSPHATEMIA: ICD-10-CM

## 2023-05-04 DIAGNOSIS — Z48.298 AFTERCARE FOLLOWING ORGAN TRANSPLANT: ICD-10-CM

## 2023-05-04 DIAGNOSIS — Z20.828 CONTACT WITH AND (SUSPECTED) EXPOSURE TO OTHER VIRAL COMMUNICABLE DISEASES: ICD-10-CM

## 2023-05-04 DIAGNOSIS — T86.10 COMPLICATION OF TRANSPLANTED KIDNEY, UNSPECIFIED COMPLICATION: ICD-10-CM

## 2023-05-04 DIAGNOSIS — Z79.899 ENCOUNTER FOR LONG-TERM CURRENT USE OF MEDICATION: ICD-10-CM

## 2023-05-04 LAB
ANION GAP SERPL CALCULATED.3IONS-SCNC: 7 MMOL/L (ref 7–15)
BUN SERPL-MCNC: 15.6 MG/DL (ref 6–20)
CALCIUM SERPL-MCNC: 9.4 MG/DL (ref 8.6–10)
CHLORIDE SERPL-SCNC: 107 MMOL/L (ref 98–107)
CREAT SERPL-MCNC: 1.13 MG/DL (ref 0.51–0.95)
DEPRECATED HCO3 PLAS-SCNC: 26 MMOL/L (ref 22–29)
ERYTHROCYTE [DISTWIDTH] IN BLOOD BY AUTOMATED COUNT: 13.2 % (ref 10–15)
GFR SERPL CREATININE-BSD FRML MDRD: 66 ML/MIN/1.73M2
GLUCOSE SERPL-MCNC: 128 MG/DL (ref 70–99)
HCT VFR BLD AUTO: 28.9 % (ref 35–47)
HGB BLD-MCNC: 8.9 G/DL (ref 11.7–15.7)
MAGNESIUM SERPL-MCNC: 1.6 MG/DL (ref 1.7–2.3)
MCH RBC QN AUTO: 30 PG (ref 26.5–33)
MCHC RBC AUTO-ENTMCNC: 30.8 G/DL (ref 31.5–36.5)
MCV RBC AUTO: 97 FL (ref 78–100)
PATH REPORT.COMMENTS IMP SPEC: NORMAL
PATH REPORT.FINAL DX SPEC: NORMAL
PATH REPORT.GROSS SPEC: NORMAL
PATH REPORT.MICROSCOPIC SPEC OTHER STN: NORMAL
PATH REPORT.RELEVANT HX SPEC: NORMAL
PHOSPHATE SERPL-MCNC: 1.6 MG/DL (ref 2.5–4.5)
PHOTO IMAGE: NORMAL
PLATELET # BLD AUTO: 436 10E3/UL (ref 150–450)
POTASSIUM SERPL-SCNC: 5.3 MMOL/L (ref 3.4–5.3)
RBC # BLD AUTO: 2.97 10E6/UL (ref 3.8–5.2)
SODIUM SERPL-SCNC: 140 MMOL/L (ref 136–145)
TACROLIMUS BLD-MCNC: 12.4 UG/L (ref 5–15)
TME LAST DOSE: NORMAL H
TME LAST DOSE: NORMAL H
WBC # BLD AUTO: 2.1 10E3/UL (ref 4–11)

## 2023-05-04 PROCEDURE — 36415 COLL VENOUS BLD VENIPUNCTURE: CPT | Performed by: PATHOLOGY

## 2023-05-04 PROCEDURE — 84100 ASSAY OF PHOSPHORUS: CPT | Performed by: PATHOLOGY

## 2023-05-04 PROCEDURE — 85027 COMPLETE CBC AUTOMATED: CPT | Performed by: PATHOLOGY

## 2023-05-04 PROCEDURE — 80197 ASSAY OF TACROLIMUS: CPT | Mod: 90 | Performed by: PATHOLOGY

## 2023-05-04 PROCEDURE — 99000 SPECIMEN HANDLING OFFICE-LAB: CPT | Performed by: PATHOLOGY

## 2023-05-04 PROCEDURE — 83735 ASSAY OF MAGNESIUM: CPT | Performed by: PATHOLOGY

## 2023-05-04 PROCEDURE — 80048 BASIC METABOLIC PNL TOTAL CA: CPT | Performed by: PATHOLOGY

## 2023-05-04 RX ORDER — SODIUM BICARBONATE 650 MG/1
650 TABLET ORAL 2 TIMES DAILY
Qty: 60 TABLET | Refills: 1 | Status: SHIPPED | OUTPATIENT
Start: 2023-05-04 | End: 2023-05-10

## 2023-05-05 DIAGNOSIS — E83.39 HYPOPHOSPHATEMIA: ICD-10-CM

## 2023-05-08 ENCOUNTER — LAB (OUTPATIENT)
Dept: LAB | Facility: CLINIC | Age: 33
End: 2023-05-08
Payer: COMMERCIAL

## 2023-05-08 DIAGNOSIS — Z79.899 ENCOUNTER FOR LONG-TERM CURRENT USE OF MEDICATION: ICD-10-CM

## 2023-05-08 DIAGNOSIS — Z20.828 CONTACT WITH AND (SUSPECTED) EXPOSURE TO OTHER VIRAL COMMUNICABLE DISEASES: ICD-10-CM

## 2023-05-08 DIAGNOSIS — Z48.298 AFTERCARE FOLLOWING ORGAN TRANSPLANT: ICD-10-CM

## 2023-05-08 DIAGNOSIS — Z94.0 KIDNEY REPLACED BY TRANSPLANT: ICD-10-CM

## 2023-05-08 LAB
ANION GAP SERPL CALCULATED.3IONS-SCNC: 8 MMOL/L (ref 7–15)
BUN SERPL-MCNC: 17.8 MG/DL (ref 6–20)
CALCIUM SERPL-MCNC: 9.5 MG/DL (ref 8.6–10)
CHLORIDE SERPL-SCNC: 107 MMOL/L (ref 98–107)
CREAT SERPL-MCNC: 1.07 MG/DL (ref 0.51–0.95)
DEPRECATED HCO3 PLAS-SCNC: 24 MMOL/L (ref 22–29)
ERYTHROCYTE [DISTWIDTH] IN BLOOD BY AUTOMATED COUNT: 14.3 % (ref 10–15)
GFR SERPL CREATININE-BSD FRML MDRD: 70 ML/MIN/1.73M2
GLUCOSE SERPL-MCNC: 101 MG/DL (ref 70–99)
HCT VFR BLD AUTO: 34.6 % (ref 35–47)
HGB BLD-MCNC: 10.7 G/DL (ref 11.7–15.7)
MAGNESIUM SERPL-MCNC: 1.5 MG/DL (ref 1.7–2.3)
MCH RBC QN AUTO: 30.3 PG (ref 26.5–33)
MCHC RBC AUTO-ENTMCNC: 30.9 G/DL (ref 31.5–36.5)
MCV RBC AUTO: 98 FL (ref 78–100)
PHOSPHATE SERPL-MCNC: 1.3 MG/DL (ref 2.5–4.5)
PLATELET # BLD AUTO: 434 10E3/UL (ref 150–450)
POTASSIUM SERPL-SCNC: 4.9 MMOL/L (ref 3.4–5.3)
RBC # BLD AUTO: 3.53 10E6/UL (ref 3.8–5.2)
SODIUM SERPL-SCNC: 139 MMOL/L (ref 136–145)
TACROLIMUS BLD-MCNC: 12.7 UG/L (ref 5–15)
TME LAST DOSE: NORMAL H
TME LAST DOSE: NORMAL H
WBC # BLD AUTO: 5.2 10E3/UL (ref 4–11)

## 2023-05-08 PROCEDURE — 83735 ASSAY OF MAGNESIUM: CPT | Performed by: PATHOLOGY

## 2023-05-08 PROCEDURE — 99000 SPECIMEN HANDLING OFFICE-LAB: CPT | Performed by: PATHOLOGY

## 2023-05-08 PROCEDURE — 80197 ASSAY OF TACROLIMUS: CPT | Mod: 90 | Performed by: PATHOLOGY

## 2023-05-08 PROCEDURE — 84100 ASSAY OF PHOSPHORUS: CPT | Performed by: PATHOLOGY

## 2023-05-08 PROCEDURE — 85027 COMPLETE CBC AUTOMATED: CPT | Performed by: PATHOLOGY

## 2023-05-08 PROCEDURE — 36415 COLL VENOUS BLD VENIPUNCTURE: CPT | Performed by: PATHOLOGY

## 2023-05-08 PROCEDURE — 80048 BASIC METABOLIC PNL TOTAL CA: CPT | Performed by: PATHOLOGY

## 2023-05-10 ENCOUNTER — LAB (OUTPATIENT)
Dept: LAB | Facility: CLINIC | Age: 33
End: 2023-05-10
Payer: COMMERCIAL

## 2023-05-10 ENCOUNTER — TELEPHONE (OUTPATIENT)
Dept: TRANSPLANT | Facility: CLINIC | Age: 33
End: 2023-05-10

## 2023-05-10 DIAGNOSIS — Z48.298 AFTERCARE FOLLOWING ORGAN TRANSPLANT: ICD-10-CM

## 2023-05-10 DIAGNOSIS — Z94.0 KIDNEY REPLACED BY TRANSPLANT: ICD-10-CM

## 2023-05-10 DIAGNOSIS — Z20.828 CONTACT WITH AND (SUSPECTED) EXPOSURE TO OTHER VIRAL COMMUNICABLE DISEASES: ICD-10-CM

## 2023-05-10 DIAGNOSIS — Z79.899 ENCOUNTER FOR LONG-TERM CURRENT USE OF MEDICATION: ICD-10-CM

## 2023-05-10 LAB
ANION GAP SERPL CALCULATED.3IONS-SCNC: 6 MMOL/L (ref 7–15)
BUN SERPL-MCNC: 19.2 MG/DL (ref 6–20)
CALCIUM SERPL-MCNC: 9.6 MG/DL (ref 8.6–10)
CHLORIDE SERPL-SCNC: 107 MMOL/L (ref 98–107)
CREAT SERPL-MCNC: 1.11 MG/DL (ref 0.51–0.95)
DEPRECATED HCO3 PLAS-SCNC: 26 MMOL/L (ref 22–29)
ERYTHROCYTE [DISTWIDTH] IN BLOOD BY AUTOMATED COUNT: 14.8 % (ref 10–15)
GFR SERPL CREATININE-BSD FRML MDRD: 67 ML/MIN/1.73M2
GLUCOSE SERPL-MCNC: 106 MG/DL (ref 70–99)
HCT VFR BLD AUTO: 33.9 % (ref 35–47)
HGB BLD-MCNC: 10.5 G/DL (ref 11.7–15.7)
MAGNESIUM SERPL-MCNC: 1.5 MG/DL (ref 1.7–2.3)
MCH RBC QN AUTO: 30 PG (ref 26.5–33)
MCHC RBC AUTO-ENTMCNC: 31 G/DL (ref 31.5–36.5)
MCV RBC AUTO: 97 FL (ref 78–100)
MYCOPHENOLATE SERPL LC/MS/MS-MCNC: 7.38 MG/L (ref 1–3.5)
MYCOPHENOLATE-G SERPL LC/MS/MS-MCNC: 72.5 MG/L (ref 30–95)
PHOSPHATE SERPL-MCNC: 1.8 MG/DL (ref 2.5–4.5)
PLATELET # BLD AUTO: 381 10E3/UL (ref 150–450)
POTASSIUM SERPL-SCNC: 5.3 MMOL/L (ref 3.4–5.3)
RBC # BLD AUTO: 3.5 10E6/UL (ref 3.8–5.2)
SODIUM SERPL-SCNC: 139 MMOL/L (ref 136–145)
TACROLIMUS BLD-MCNC: 14 UG/L (ref 5–15)
TME LAST DOSE: ABNORMAL H
TME LAST DOSE: ABNORMAL H
TME LAST DOSE: NORMAL H
TME LAST DOSE: NORMAL H
WBC # BLD AUTO: 6.2 10E3/UL (ref 4–11)

## 2023-05-10 PROCEDURE — 84100 ASSAY OF PHOSPHORUS: CPT | Performed by: PATHOLOGY

## 2023-05-10 PROCEDURE — 80180 DRUG SCRN QUAN MYCOPHENOLATE: CPT | Mod: 90 | Performed by: PATHOLOGY

## 2023-05-10 PROCEDURE — 80048 BASIC METABOLIC PNL TOTAL CA: CPT | Performed by: PATHOLOGY

## 2023-05-10 PROCEDURE — 83735 ASSAY OF MAGNESIUM: CPT | Performed by: PATHOLOGY

## 2023-05-10 PROCEDURE — 36415 COLL VENOUS BLD VENIPUNCTURE: CPT | Performed by: PATHOLOGY

## 2023-05-10 PROCEDURE — 85027 COMPLETE CBC AUTOMATED: CPT | Performed by: PATHOLOGY

## 2023-05-10 PROCEDURE — 99000 SPECIMEN HANDLING OFFICE-LAB: CPT | Performed by: PATHOLOGY

## 2023-05-10 PROCEDURE — 80197 ASSAY OF TACROLIMUS: CPT | Mod: 90 | Performed by: PATHOLOGY

## 2023-05-10 NOTE — TELEPHONE ENCOUNTER
Patient notified to stop sodium bicarbonate. Decrease dietary potassium.   OK to travel home end of May, will set up labs locally.   
Post Kidney and Pancreas Transplant Team Conference  Date: 5/10/2023  Transplant Coordinator: Eleanor Lund     Attendees:  []  Dr. Mar [] Sujey Ku, RN [] Radha Rowe LPN     []  Dr. Agustin [] Mable Dewitt, MARIS [] Angela Carson LPN   []  Dr. Arora [] Eleanor Lund RN    []  Dr. Barrera [] Ping Diaz RN [] Km Pope, PharmD   [] Dr. Guo [] Tena Brennan RN    [] Dr. Sepulveda [] Jair Mcarthur RN    [] Dr. Ingram [] Carlie Serrato RN [] Jazmine Acosta RN   [] Dr. Romero [] Mela Ray RN    []  Dr. Bernard [] More Sidhu RN    [] Dr. Haines [] Amalia Tavarez, MARIS    [] Christi Alfonso, NP [] Peri Desir RN        Verbal Plan Read Back:   Pt ok to go home end of May  Ok to stop bicarb  Watch potassium intake    Routed to RN Coordinator   Radha Rowe LPN      
Additional Progress Note...

## 2023-05-11 ENCOUNTER — TELEPHONE (OUTPATIENT)
Dept: TRANSPLANT | Facility: CLINIC | Age: 33
End: 2023-05-11

## 2023-05-11 DIAGNOSIS — D84.9 IMMUNOSUPPRESSED STATUS (H): ICD-10-CM

## 2023-05-11 DIAGNOSIS — Z94.0 KIDNEY REPLACED BY TRANSPLANT: Primary | ICD-10-CM

## 2023-05-11 LAB — CMV DNA SPEC NAA+PROBE-ACNC: NOT DETECTED IU/ML

## 2023-05-11 RX ORDER — TACROLIMUS 1 MG/1
1 CAPSULE ORAL 2 TIMES DAILY
Qty: 60 CAPSULE | Refills: 11 | Status: SHIPPED | OUTPATIENT
Start: 2023-05-11 | End: 2023-07-27

## 2023-05-11 RX ORDER — TACROLIMUS 0.5 MG/1
0.5 CAPSULE ORAL EVERY MORNING
Qty: 30 CAPSULE | Refills: 11 | Status: SHIPPED | OUTPATIENT
Start: 2023-05-11 | End: 2023-07-27

## 2023-05-11 NOTE — TELEPHONE ENCOUNTER
ISSUE:   Tacrolimus IR level 14 on 5/10, goal 8-10, dose 1.5 mg BID.    PLAN:   Please call patient and confirm this was an accurate 12-hour trough. Verify Tacrolimus IR dose 1.5 mg BID. Confirm no new medications or illness. Confirm no missed doses. If accurate trough and accurate dose, decrease Tacrolimus IR dose to 1.5/1 mg and repeat labs in 4 days.    Eleanor Lund RN BSN  Transplant Care Coordinator    OUTCOME:   Spoke with patient, they confirm accurate trough level and current dose 1.5 mg BID. Patient confirmed dose change to 1.5/1 mg BID and to repeat labs in 4 days. Orders sent to preferred pharmacy for dose change and lab for repeat labs. Patient voiced understanding of plan.

## 2023-05-12 ENCOUNTER — LAB (OUTPATIENT)
Dept: LAB | Facility: CLINIC | Age: 33
End: 2023-05-12
Payer: COMMERCIAL

## 2023-05-12 ENCOUNTER — APPOINTMENT (OUTPATIENT)
Dept: URBAN - METROPOLITAN AREA CLINIC 258 | Age: 33
Setting detail: DERMATOLOGY
End: 2023-05-12

## 2023-05-12 VITALS — WEIGHT: 124 LBS | HEIGHT: 63 IN

## 2023-05-12 DIAGNOSIS — E83.42 HYPOMAGNESEMIA: ICD-10-CM

## 2023-05-12 DIAGNOSIS — Z94.0 KIDNEY REPLACED BY TRANSPLANT: ICD-10-CM

## 2023-05-12 DIAGNOSIS — B07.8 OTHER VIRAL WARTS: ICD-10-CM

## 2023-05-12 DIAGNOSIS — Z20.828 CONTACT WITH AND (SUSPECTED) EXPOSURE TO OTHER VIRAL COMMUNICABLE DISEASES: ICD-10-CM

## 2023-05-12 DIAGNOSIS — Z79.899 ENCOUNTER FOR LONG-TERM CURRENT USE OF MEDICATION: ICD-10-CM

## 2023-05-12 DIAGNOSIS — Z48.298 AFTERCARE FOLLOWING ORGAN TRANSPLANT: ICD-10-CM

## 2023-05-12 LAB
ALBUMIN MFR UR ELPH: 50.1 MG/DL (ref 1–14)
ANION GAP SERPL CALCULATED.3IONS-SCNC: 11 MMOL/L (ref 7–15)
BUN SERPL-MCNC: 18 MG/DL (ref 6–20)
CALCIUM SERPL-MCNC: 9.1 MG/DL (ref 8.6–10)
CHLORIDE SERPL-SCNC: 107 MMOL/L (ref 98–107)
CREAT SERPL-MCNC: 1.07 MG/DL (ref 0.51–0.95)
CREAT UR-MCNC: 77.5 MG/DL
DEPRECATED HCO3 PLAS-SCNC: 22 MMOL/L (ref 22–29)
ERYTHROCYTE [DISTWIDTH] IN BLOOD BY AUTOMATED COUNT: 14.9 % (ref 10–15)
GFR SERPL CREATININE-BSD FRML MDRD: 70 ML/MIN/1.73M2
GLUCOSE SERPL-MCNC: 102 MG/DL (ref 70–99)
HCT VFR BLD AUTO: 35.3 % (ref 35–47)
HGB BLD-MCNC: 10.9 G/DL (ref 11.7–15.7)
MAGNESIUM SERPL-MCNC: 1.6 MG/DL (ref 1.7–2.3)
MCH RBC QN AUTO: 30.5 PG (ref 26.5–33)
MCHC RBC AUTO-ENTMCNC: 30.9 G/DL (ref 31.5–36.5)
MCV RBC AUTO: 99 FL (ref 78–100)
MYCOPHENOLATE SERPL LC/MS/MS-MCNC: 3.65 MG/L (ref 1–3.5)
MYCOPHENOLATE-G SERPL LC/MS/MS-MCNC: 52.6 MG/L (ref 30–95)
PHOSPHATE SERPL-MCNC: 2.5 MG/DL (ref 2.5–4.5)
PLATELET # BLD AUTO: 337 10E3/UL (ref 150–450)
POTASSIUM SERPL-SCNC: 3.8 MMOL/L (ref 3.4–5.3)
PROT/CREAT 24H UR: 0.65 MG/MG CR (ref 0–0.2)
RBC # BLD AUTO: 3.57 10E6/UL (ref 3.8–5.2)
SODIUM SERPL-SCNC: 140 MMOL/L (ref 136–145)
TACROLIMUS BLD-MCNC: 11.8 UG/L (ref 5–15)
TME LAST DOSE: ABNORMAL H
TME LAST DOSE: ABNORMAL H
TME LAST DOSE: NORMAL H
TME LAST DOSE: NORMAL H
WBC # BLD AUTO: 6.6 10E3/UL (ref 4–11)

## 2023-05-12 PROCEDURE — OTHER BENIGN DESTRUCTION: OTHER

## 2023-05-12 PROCEDURE — 87521 HEPATITIS C PROBE&RVRS TRNSC: CPT | Mod: 90 | Performed by: PATHOLOGY

## 2023-05-12 PROCEDURE — OTHER COUNSELING: OTHER

## 2023-05-12 PROCEDURE — 83735 ASSAY OF MAGNESIUM: CPT | Performed by: PATHOLOGY

## 2023-05-12 PROCEDURE — 80180 DRUG SCRN QUAN MYCOPHENOLATE: CPT | Mod: 90 | Performed by: PATHOLOGY

## 2023-05-12 PROCEDURE — OTHER ADDITIONAL NOTES: OTHER

## 2023-05-12 PROCEDURE — 17110 DESTRUCT B9 LESION 1-14: CPT

## 2023-05-12 PROCEDURE — 86828 HLA CLASS I&II ANTIBODY QUAL: CPT | Mod: XU | Performed by: PATHOLOGY

## 2023-05-12 PROCEDURE — 36415 COLL VENOUS BLD VENIPUNCTURE: CPT | Performed by: PATHOLOGY

## 2023-05-12 PROCEDURE — 80197 ASSAY OF TACROLIMUS: CPT | Mod: 90 | Performed by: PATHOLOGY

## 2023-05-12 PROCEDURE — 87535 HIV-1 PROBE&REVERSE TRNSCRPJ: CPT | Mod: 90 | Performed by: PATHOLOGY

## 2023-05-12 PROCEDURE — 85027 COMPLETE CBC AUTOMATED: CPT | Performed by: PATHOLOGY

## 2023-05-12 PROCEDURE — OTHER MIPS QUALITY: OTHER

## 2023-05-12 PROCEDURE — 87799 DETECT AGENT NOS DNA QUANT: CPT | Mod: 90 | Performed by: PATHOLOGY

## 2023-05-12 PROCEDURE — 87516 HEPATITIS B DNA AMP PROBE: CPT | Mod: 90 | Performed by: PATHOLOGY

## 2023-05-12 PROCEDURE — 86833 HLA CLASS II HIGH DEFIN QUAL: CPT | Performed by: PATHOLOGY

## 2023-05-12 PROCEDURE — 84100 ASSAY OF PHOSPHORUS: CPT | Performed by: PATHOLOGY

## 2023-05-12 PROCEDURE — 86832 HLA CLASS I HIGH DEFIN QUAL: CPT | Performed by: PATHOLOGY

## 2023-05-12 PROCEDURE — 84156 ASSAY OF PROTEIN URINE: CPT | Performed by: PATHOLOGY

## 2023-05-12 PROCEDURE — 80048 BASIC METABOLIC PNL TOTAL CA: CPT | Performed by: PATHOLOGY

## 2023-05-12 PROCEDURE — 99000 SPECIMEN HANDLING OFFICE-LAB: CPT | Performed by: PATHOLOGY

## 2023-05-12 RX ORDER — MAGNESIUM OXIDE 400 MG/1
800 TABLET ORAL 3 TIMES DAILY
Qty: 180 TABLET | Refills: 2 | Status: SHIPPED | OUTPATIENT
Start: 2023-05-12 | End: 2023-05-15

## 2023-05-12 ASSESSMENT — LOCATION DETAILED DESCRIPTION DERM
LOCATION DETAILED: LEFT MEDIAL PLANTAR 2ND TOE
LOCATION DETAILED: LEFT PLANTAR FOREFOOT OVERLYING 4TH METATARSAL
LOCATION DETAILED: LEFT PLANTAR FOREFOOT OVERLYING 3RD METATARSAL
LOCATION DETAILED: LEFT MEDIAL PLANTAR 1ST TOE
LOCATION DETAILED: LEFT PLANTAR FOREFOOT OVERLYING 2ND METATARSAL
LOCATION DETAILED: LEFT LATERAL PLANTAR 1ST TOE
LOCATION DETAILED: LEFT PLANTAR FOREFOOT OVERLYING 5TH METATARSAL
LOCATION DETAILED: TIP OF LEFT 3RD TOE

## 2023-05-12 ASSESSMENT — LOCATION ZONE DERM
LOCATION ZONE: TOE
LOCATION ZONE: FEET

## 2023-05-12 ASSESSMENT — LOCATION SIMPLE DESCRIPTION DERM
LOCATION SIMPLE: PLANTAR SURFACE OF LEFT 1ST TOE
LOCATION SIMPLE: LEFT PLANTAR SURFACE
LOCATION SIMPLE: PLANTAR SURFACE OF LEFT 3RD TOE
LOCATION SIMPLE: PLANTAR SURFACE OF LEFT 2ND TOE

## 2023-05-12 NOTE — PROCEDURE: BENIGN DESTRUCTION
Add 52 Modifier (Optional): no
Anesthesia Volume In Cc: 0.5
Post-Care Instructions: I reviewed with the patient in detail post-care instructions. Patient is to wear sunprotection, and avoid picking at any of the treated lesions. Pt may apply Vaseline to crusted or scabbing areas.
Medical Necessity Clause: This procedure was medically necessary because the lesions that were treated were:
Consent: The patient's consent was obtained including but not limited to risks of crusting, scabbing, blistering, scarring, darker or lighter pigmentary change, recurrence, incomplete removal and infection.
Treatment Number (Will Not Render If 0): 0
Medical Necessity Information: It is in your best interest to select a reason for this procedure from the list below. All of these items fulfill various CMS LCD requirements except the new and changing color options.
Detail Level: Detailed

## 2023-05-12 NOTE — PROCEDURE: ADDITIONAL NOTES
Render Risk Assessment In Note?: no
Detail Level: Simple
Additional Notes: Patient was cleared by her medical team for topical wart treatment.\\nPatient will return to clinic for one more treatment before moving back home.

## 2023-05-15 ENCOUNTER — TELEPHONE (OUTPATIENT)
Dept: NEPHROLOGY | Facility: CLINIC | Age: 33
End: 2023-05-15

## 2023-05-15 ENCOUNTER — LAB (OUTPATIENT)
Dept: LAB | Facility: CLINIC | Age: 33
End: 2023-05-15
Payer: COMMERCIAL

## 2023-05-15 DIAGNOSIS — Z20.828 CONTACT WITH AND (SUSPECTED) EXPOSURE TO OTHER VIRAL COMMUNICABLE DISEASES: ICD-10-CM

## 2023-05-15 DIAGNOSIS — Z94.0 KIDNEY REPLACED BY TRANSPLANT: ICD-10-CM

## 2023-05-15 DIAGNOSIS — E83.42 HYPOMAGNESEMIA: ICD-10-CM

## 2023-05-15 DIAGNOSIS — Z48.298 AFTERCARE FOLLOWING ORGAN TRANSPLANT: ICD-10-CM

## 2023-05-15 DIAGNOSIS — Z79.899 ENCOUNTER FOR LONG-TERM CURRENT USE OF MEDICATION: ICD-10-CM

## 2023-05-15 DIAGNOSIS — Z94.0 KIDNEY REPLACED BY TRANSPLANT: Primary | ICD-10-CM

## 2023-05-15 LAB
ANION GAP SERPL CALCULATED.3IONS-SCNC: 6 MMOL/L (ref 7–15)
BACTERIA PRT CULT: NO GROWTH
BKV DNA # SPEC NAA+PROBE: NOT DETECTED COPIES/ML
BUN SERPL-MCNC: 17.3 MG/DL (ref 6–20)
CALCIUM SERPL-MCNC: 9 MG/DL (ref 8.6–10)
CHLORIDE SERPL-SCNC: 109 MMOL/L (ref 98–107)
CREAT SERPL-MCNC: 1 MG/DL (ref 0.51–0.95)
DEPRECATED HCO3 PLAS-SCNC: 24 MMOL/L (ref 22–29)
DONOR IDENTIFICATION: NORMAL
DSA COMMENTS: NORMAL
DSA PRESENT: NO
DSA TEST METHOD: NORMAL
ERYTHROCYTE [DISTWIDTH] IN BLOOD BY AUTOMATED COUNT: 15.7 % (ref 10–15)
GFR SERPL CREATININE-BSD FRML MDRD: 76 ML/MIN/1.73M2
GLUCOSE SERPL-MCNC: 94 MG/DL (ref 70–99)
HBV DNA SERPL QL NAA+PROBE: NORMAL
HCT VFR BLD AUTO: 32.6 % (ref 35–47)
HCV RNA SERPL QL NAA+PROBE: NORMAL
HGB BLD-MCNC: 9.9 G/DL (ref 11.7–15.7)
HIV1+2 RNA SERPL QL NAA+PROBE: NORMAL
INT SUB RESULT: NORMAL
INTERF SUBSTANCE: NORMAL
INTSUB TEST METHOD: NORMAL
MAGNESIUM SERPL-MCNC: 1.5 MG/DL (ref 1.7–2.3)
MCH RBC QN AUTO: 30.5 PG (ref 26.5–33)
MCHC RBC AUTO-ENTMCNC: 30.4 G/DL (ref 31.5–36.5)
MCV RBC AUTO: 100 FL (ref 78–100)
ORGAN: NORMAL
PHOSPHATE SERPL-MCNC: 2 MG/DL (ref 2.5–4.5)
PLATELET # BLD AUTO: 223 10E3/UL (ref 150–450)
POTASSIUM SERPL-SCNC: 4.5 MMOL/L (ref 3.4–5.3)
RBC # BLD AUTO: 3.25 10E6/UL (ref 3.8–5.2)
SA 1 CELL: NORMAL
SA 1 TEST METHOD: NORMAL
SA 2 CELL: NORMAL
SA 2 TEST METHOD: NORMAL
SA1 HI RISK ABY: NORMAL
SA1 MOD RISK ABY: NORMAL
SA2 HI RISK ABY: NORMAL
SA2 MOD RISK ABY: NORMAL
SODIUM SERPL-SCNC: 139 MMOL/L (ref 136–145)
TACROLIMUS BLD-MCNC: 10.1 UG/L (ref 5–15)
TME LAST DOSE: NORMAL H
TME LAST DOSE: NORMAL H
UNACCEPTABLE ANTIGENS: NORMAL
UNOS CPRA: 82
WBC # BLD AUTO: 5 10E3/UL (ref 4–11)
ZZZINT SUB COMMENTS: NORMAL
ZZZSA 1  COMMENTS: NORMAL
ZZZSA 2 COMMENTS: NORMAL

## 2023-05-15 PROCEDURE — 99000 SPECIMEN HANDLING OFFICE-LAB: CPT | Performed by: PATHOLOGY

## 2023-05-15 PROCEDURE — 85027 COMPLETE CBC AUTOMATED: CPT | Performed by: PATHOLOGY

## 2023-05-15 PROCEDURE — 84100 ASSAY OF PHOSPHORUS: CPT | Performed by: PATHOLOGY

## 2023-05-15 PROCEDURE — 80197 ASSAY OF TACROLIMUS: CPT | Mod: 90 | Performed by: PATHOLOGY

## 2023-05-15 PROCEDURE — 83735 ASSAY OF MAGNESIUM: CPT | Performed by: PATHOLOGY

## 2023-05-15 PROCEDURE — 80048 BASIC METABOLIC PNL TOTAL CA: CPT | Performed by: PATHOLOGY

## 2023-05-15 PROCEDURE — 36415 COLL VENOUS BLD VENIPUNCTURE: CPT | Performed by: PATHOLOGY

## 2023-05-15 RX ORDER — MAGNESIUM OXIDE 400 MG/1
800 TABLET ORAL 3 TIMES DAILY
Qty: 180 TABLET | Refills: 2 | Status: SHIPPED | OUTPATIENT
Start: 2023-05-15 | End: 2023-05-22

## 2023-05-15 NOTE — TELEPHONE ENCOUNTER
PA Initiation    Medication: Magnesium - pa pending  Insurance Company: [employer] - Phone [phone] Fax [phone]  [facility] [address] [place] [address]  Filling Pharmacy Phone:   Filling Pharmacy Fax:    Start Date: 5/15/2023

## 2023-05-15 NOTE — TELEPHONE ENCOUNTER
[facility] Call Center    Phone Message    May a detailed message be left on voicemail: yes     Reason for Call: Rosalie,  from [facility] [employer] called to check status on patient. She would like to speak with one of the nurses. Please call [phone]. Thank you.    Action Taken: Message routed to:  [facility] ([facility] nephrology    Travel Screening: Not Applicable

## 2023-05-16 DIAGNOSIS — D84.9 IMMUNOSUPPRESSED STATUS (H): ICD-10-CM

## 2023-05-16 DIAGNOSIS — T86.11 ACUTE REJECTION OF KIDNEY TRANSPLANT: ICD-10-CM

## 2023-05-16 DIAGNOSIS — Z94.0 KIDNEY REPLACED BY TRANSPLANT: ICD-10-CM

## 2023-05-16 RX ORDER — SULFAMETHOXAZOLE AND TRIMETHOPRIM 400; 80 MG/1; MG/1
1 TABLET ORAL DAILY
Qty: 30 TABLET | Refills: 11 | Status: SHIPPED | OUTPATIENT
Start: 2023-05-16 | End: 2024-01-12

## 2023-05-16 RX ORDER — PREDNISONE 5 MG/1
5 TABLET ORAL DAILY
Qty: 30 TABLET | Refills: 11 | Status: SHIPPED | OUTPATIENT
Start: 2023-05-16 | End: 2024-01-12

## 2023-05-17 ENCOUNTER — INFUSION THERAPY VISIT (OUTPATIENT)
Dept: INFUSION THERAPY | Facility: CLINIC | Age: 33
End: 2023-05-17
Attending: PHYSICIAN ASSISTANT
Payer: COMMERCIAL

## 2023-05-17 VITALS
DIASTOLIC BLOOD PRESSURE: 74 MMHG | SYSTOLIC BLOOD PRESSURE: 117 MMHG | RESPIRATION RATE: 16 BRPM | HEART RATE: 89 BPM | TEMPERATURE: 97.8 F

## 2023-05-17 DIAGNOSIS — K51.011 ULCERATIVE PANCOLITIS WITH RECTAL BLEEDING (H): Primary | ICD-10-CM

## 2023-05-17 LAB
ALBUMIN SERPL BCG-MCNC: 4.1 G/DL (ref 3.5–5.2)
ALP SERPL-CCNC: 66 U/L (ref 35–104)
ALT SERPL W P-5'-P-CCNC: 13 U/L (ref 10–35)
ANION GAP SERPL CALCULATED.3IONS-SCNC: 8 MMOL/L (ref 7–15)
AST SERPL W P-5'-P-CCNC: 19 U/L (ref 10–35)
BASOPHILS # BLD MANUAL: 0 10E3/UL (ref 0–0.2)
BASOPHILS NFR BLD MANUAL: 0 %
BILIRUB DIRECT SERPL-MCNC: <0.2 MG/DL (ref 0–0.3)
BILIRUB SERPL-MCNC: 0.3 MG/DL
BUN SERPL-MCNC: 14.6 MG/DL (ref 6–20)
CALCIUM SERPL-MCNC: 9 MG/DL (ref 8.6–10)
CHLORIDE SERPL-SCNC: 107 MMOL/L (ref 98–107)
CREAT SERPL-MCNC: 0.93 MG/DL (ref 0.51–0.95)
CRP SERPL-MCNC: <3 MG/L
DACRYOCYTES BLD QL SMEAR: SLIGHT
DEPRECATED HCO3 PLAS-SCNC: 22 MMOL/L (ref 22–29)
EOSINOPHIL # BLD MANUAL: 0.1 10E3/UL (ref 0–0.7)
EOSINOPHIL NFR BLD MANUAL: 2 %
ERYTHROCYTE [DISTWIDTH] IN BLOOD BY AUTOMATED COUNT: 15.9 % (ref 10–15)
ERYTHROCYTE [SEDIMENTATION RATE] IN BLOOD BY WESTERGREN METHOD: 18 MM/HR (ref 0–20)
FRAGMENTS BLD QL SMEAR: SLIGHT
GFR SERPL CREATININE-BSD FRML MDRD: 83 ML/MIN/1.73M2
GLUCOSE SERPL-MCNC: 99 MG/DL (ref 70–99)
HCT VFR BLD AUTO: 31 % (ref 35–47)
HGB BLD-MCNC: 9.6 G/DL (ref 11.7–15.7)
LYMPHOCYTES # BLD MANUAL: 0.3 10E3/UL (ref 0.8–5.3)
LYMPHOCYTES NFR BLD MANUAL: 6 %
MCH RBC QN AUTO: 30.4 PG (ref 26.5–33)
MCHC RBC AUTO-ENTMCNC: 31 G/DL (ref 31.5–36.5)
MCV RBC AUTO: 98 FL (ref 78–100)
METAMYELOCYTES # BLD MANUAL: 0.1 10E3/UL
METAMYELOCYTES NFR BLD MANUAL: 2 %
MONOCYTES # BLD MANUAL: 0.1 10E3/UL (ref 0–1.3)
MONOCYTES NFR BLD MANUAL: 2 %
NEUTROPHILS # BLD MANUAL: 3.9 10E3/UL (ref 1.6–8.3)
NEUTROPHILS NFR BLD MANUAL: 88 %
PLAT MORPH BLD: ABNORMAL
PLATELET # BLD AUTO: 197 10E3/UL (ref 150–450)
POTASSIUM SERPL-SCNC: 4.7 MMOL/L (ref 3.4–5.3)
PROT SERPL-MCNC: 6.6 G/DL (ref 6.4–8.3)
RBC # BLD AUTO: 3.16 10E6/UL (ref 3.8–5.2)
RBC MORPH BLD: ABNORMAL
SODIUM SERPL-SCNC: 137 MMOL/L (ref 136–145)
WBC # BLD AUTO: 4.4 10E3/UL (ref 4–11)

## 2023-05-17 PROCEDURE — 85007 BL SMEAR W/DIFF WBC COUNT: CPT | Performed by: PHYSICIAN ASSISTANT

## 2023-05-17 PROCEDURE — 85014 HEMATOCRIT: CPT | Performed by: PHYSICIAN ASSISTANT

## 2023-05-17 PROCEDURE — 250N000011 HC RX IP 250 OP 636: Performed by: PHYSICIAN ASSISTANT

## 2023-05-17 PROCEDURE — 36415 COLL VENOUS BLD VENIPUNCTURE: CPT | Performed by: PHYSICIAN ASSISTANT

## 2023-05-17 PROCEDURE — 85652 RBC SED RATE AUTOMATED: CPT | Performed by: PHYSICIAN ASSISTANT

## 2023-05-17 PROCEDURE — 86140 C-REACTIVE PROTEIN: CPT | Performed by: PHYSICIAN ASSISTANT

## 2023-05-17 PROCEDURE — 80053 COMPREHEN METABOLIC PANEL: CPT | Performed by: PHYSICIAN ASSISTANT

## 2023-05-17 PROCEDURE — 82248 BILIRUBIN DIRECT: CPT | Performed by: PHYSICIAN ASSISTANT

## 2023-05-17 PROCEDURE — 258N000003 HC RX IP 258 OP 636: Performed by: PHYSICIAN ASSISTANT

## 2023-05-17 PROCEDURE — 96365 THER/PROPH/DIAG IV INF INIT: CPT

## 2023-05-17 RX ORDER — DIPHENHYDRAMINE HYDROCHLORIDE 50 MG/ML
50 INJECTION INTRAMUSCULAR; INTRAVENOUS
Status: CANCELLED
Start: 2023-05-31

## 2023-05-17 RX ORDER — METHYLPREDNISOLONE SODIUM SUCCINATE 125 MG/2ML
125 INJECTION, POWDER, LYOPHILIZED, FOR SOLUTION INTRAMUSCULAR; INTRAVENOUS
Status: CANCELLED
Start: 2023-05-31

## 2023-05-17 RX ORDER — HEPARIN SODIUM,PORCINE 10 UNIT/ML
5 VIAL (ML) INTRAVENOUS
Status: CANCELLED | OUTPATIENT
Start: 2023-05-31

## 2023-05-17 RX ORDER — MEPERIDINE HYDROCHLORIDE 25 MG/ML
25 INJECTION INTRAMUSCULAR; INTRAVENOUS; SUBCUTANEOUS EVERY 30 MIN PRN
Status: CANCELLED | OUTPATIENT
Start: 2023-05-31

## 2023-05-17 RX ORDER — HEPARIN SODIUM (PORCINE) LOCK FLUSH IV SOLN 100 UNIT/ML 100 UNIT/ML
5 SOLUTION INTRAVENOUS
Status: CANCELLED | OUTPATIENT
Start: 2023-05-31

## 2023-05-17 RX ORDER — EPINEPHRINE 1 MG/ML
0.3 INJECTION, SOLUTION INTRAMUSCULAR; SUBCUTANEOUS EVERY 5 MIN PRN
Status: CANCELLED | OUTPATIENT
Start: 2023-05-31

## 2023-05-17 RX ORDER — ALBUTEROL SULFATE 90 UG/1
1-2 AEROSOL, METERED RESPIRATORY (INHALATION)
Status: CANCELLED
Start: 2023-05-31

## 2023-05-17 RX ORDER — ALBUTEROL SULFATE 0.83 MG/ML
2.5 SOLUTION RESPIRATORY (INHALATION)
Status: CANCELLED | OUTPATIENT
Start: 2023-05-31

## 2023-05-17 RX ADMIN — VEDOLIZUMAB 300 MG: 300 INJECTION, POWDER, LYOPHILIZED, FOR SOLUTION INTRAVENOUS at 15:52

## 2023-05-17 NOTE — PROGRESS NOTES
Infusion Nursing Note:  Caity Horan presents today for Entyvio    Patient seen by provider today: No   present during visit today: Not Applicable.    Note: Entyvio infused over 30 minutes.      Intravenous Access:  Labs drawn without difficulty.  Peripheral IV placed.    Treatment Conditions:  Biological Infusion Checklist:  ~~~ NOTE: If the patient answers yes to any of the questions below, hold the infusion and contact ordering provider or on-call provider.    1. Have you recently had an elevated temperature, fever, chills, productive cough, coughing for 3 weeks or longer or hemoptysis, abnormal vital signs, night sweats,  chest pain or have you noticed a decrease in your appetite, unexplained weight loss or fatigue? No  2. Do you have any open wounds or new incisions? Had a kidney transplant last month 4/13/23.  3. Do you have any recent or upcoming hospitalizations, surgeries or dental procedures? Yes. Pt had kidney transplant a month ago.  4. Do you currently have or recently have had any signs of illness or infection or are you on any antibiotics? Yes. On prophylaxis antibiotic post kidney transplant. Per patient and Charge Nurse Janette no need to page the provider. It's already been coordinated. Ok, to proceed with the infusion.  5. Have you had any new, sudden or worsening abdominal pain? No  6. Have you or anyone in your household received a live vaccination in the past 4 weeks? Please note:  No live vaccines while on biologic/chemotherapy until 6 months after the last treatment.  Patient can receive the flu vaccine (shot only) and the pneumovax.  It is optimal for the patient to get these vaccines mid cycle, but they can be given at any time as long as it is not on the day of the infusion. No  7. Have you recently been diagnosed with any new nervous system diseases (ie. Multiple sclerosis, Guillain [place], seizures, neurological changes) or cancer diagnosis? No  8. Are you on any form of radiation  or chemotherapy? No  9. Are you pregnant or breast feeding or do you have plans of pregnancy in the future? No  10. Have you been having any signs of worsening depression or suicidal ideations?  (benlysta only) No  11. Have there been any other new onset medical symptoms? No        Post Infusion Assessment:  Patient tolerated infusion without incident.  Blood return noted pre and post infusion.  Site patent and intact, free from redness, edema or discomfort.  No evidence of extravasations.  Access discontinued per protocol.  Biologic Infusion Post Education: Call the triage nurse at your clinic or seek medical attention if you have chills and/or temperature greater than or equal to 100.5, uncontrolled nausea/vomiting, diarrhea, constipation, dizziness, shortness of breath, chest pain, heart palpitations, weakness or any other new or concerning symptoms, questions or concerns.  You cannot have any live virus vaccines prior to or during treatment or up to 6 months post infusion.  If you have an upcoming surgery, medical procedure or dental procedure during treatment, this should be discussed with your ordering physician and your surgeon/dentist.  If you are having any concerning symptom, if you are unsure if you should get your next infusion or wish to speak to a provider before your next infusion, please call your care coordinator or triage nurse at your clinic to notify them so we can adequately serve you.       Discharge Plan:   Discharge instructions reviewed with: Patient.  Patient and/or family verbalized understanding of discharge instructions and all questions answered.  AVS to patient via [facility].  Patient will return after 6 weeks. Per patient her coordinator will book for her next appointment.  Patient discharged in stable condition accompanied by: self.  Departure Mode: Ambulatory.    Administrations This Visit     vedolizumab (ENTYVIO) 300 mg in sodium chloride 0.9 % 280 mL infusion     Admin  Date  05/17/2023 Action  $New Bag Dose  300 mg Rate  560 mL/hr Route  Intravenous Administered By  Jazmine Hannon, MARIS              /74 (BP Location: Right arm, Patient Position: Sitting, Cuff Size: Adult Regular)   Pulse 89   Temp 97.8  F (36.6  C) (Oral)   Resp 16      Jazmine Hannon, RN

## 2023-05-17 NOTE — PATIENT INSTRUCTIONS
Dear Caity Horan    Thank you for choosing [facility] ([facility]) for your infusion.  The following information is a summary of our appointment as well as important reminders.          We look forward in seeing you on your next appointment here at [facility] ([facility]).  Please don t hesitate to call us at [phone] to reschedule any of your appointments or to speak with one of the [facility] registered nurses.  It was a pleasure taking care of you today.    Sincerely,    [facility]  [address] [place] [address]  [address]  Phone:  [phone]

## 2023-05-17 NOTE — TELEPHONE ENCOUNTER
PRIOR AUTHORIZATION DENIED    Medication:  Magnesium  Insurance Company: [employer] - Phone [phone] Fax [phone]  Denial Date: 5/16/2023  Denial Rational:       Appeal Information:         Patient Notified: Yes

## 2023-05-18 PROBLEM — T86.11 KIDNEY TRANSPLANT REJECTION: Status: ACTIVE | Noted: 2023-04-29

## 2023-05-19 ENCOUNTER — TELEPHONE (OUTPATIENT)
Dept: TRANSPLANT | Facility: CLINIC | Age: 33
End: 2023-05-19

## 2023-05-19 ENCOUNTER — LAB (OUTPATIENT)
Dept: LAB | Facility: CLINIC | Age: 33
End: 2023-05-19
Payer: COMMERCIAL

## 2023-05-19 DIAGNOSIS — Z48.298 AFTERCARE FOLLOWING ORGAN TRANSPLANT: ICD-10-CM

## 2023-05-19 DIAGNOSIS — Z20.828 CONTACT WITH AND (SUSPECTED) EXPOSURE TO OTHER VIRAL COMMUNICABLE DISEASES: ICD-10-CM

## 2023-05-19 DIAGNOSIS — Z79.899 ENCOUNTER FOR LONG-TERM CURRENT USE OF MEDICATION: ICD-10-CM

## 2023-05-19 DIAGNOSIS — Z94.0 KIDNEY REPLACED BY TRANSPLANT: ICD-10-CM

## 2023-05-19 LAB
ANION GAP SERPL CALCULATED.3IONS-SCNC: 7 MMOL/L (ref 7–15)
BUN SERPL-MCNC: 9.7 MG/DL (ref 6–20)
CALCIUM SERPL-MCNC: 9.8 MG/DL (ref 8.6–10)
CHLORIDE SERPL-SCNC: 106 MMOL/L (ref 98–107)
CREAT SERPL-MCNC: 1.19 MG/DL (ref 0.51–0.95)
DEPRECATED HCO3 PLAS-SCNC: 26 MMOL/L (ref 22–29)
ERYTHROCYTE [DISTWIDTH] IN BLOOD BY AUTOMATED COUNT: 15.9 % (ref 10–15)
GFR SERPL CREATININE-BSD FRML MDRD: 62 ML/MIN/1.73M2
GLUCOSE SERPL-MCNC: 101 MG/DL (ref 70–99)
HCT VFR BLD AUTO: 32.7 % (ref 35–47)
HGB BLD-MCNC: 10.2 G/DL (ref 11.7–15.7)
MAGNESIUM SERPL-MCNC: 1.7 MG/DL (ref 1.7–2.3)
MCH RBC QN AUTO: 30.8 PG (ref 26.5–33)
MCHC RBC AUTO-ENTMCNC: 31.2 G/DL (ref 31.5–36.5)
MCV RBC AUTO: 99 FL (ref 78–100)
PHOSPHATE SERPL-MCNC: 1.8 MG/DL (ref 2.5–4.5)
PLATELET # BLD AUTO: 185 10E3/UL (ref 150–450)
POTASSIUM SERPL-SCNC: 5.5 MMOL/L (ref 3.4–5.3)
RBC # BLD AUTO: 3.31 10E6/UL (ref 3.8–5.2)
SODIUM SERPL-SCNC: 139 MMOL/L (ref 136–145)
TACROLIMUS BLD-MCNC: 9.4 UG/L (ref 5–15)
TME LAST DOSE: NORMAL H
TME LAST DOSE: NORMAL H
WBC # BLD AUTO: 3.4 10E3/UL (ref 4–11)

## 2023-05-19 PROCEDURE — 99000 SPECIMEN HANDLING OFFICE-LAB: CPT | Performed by: PATHOLOGY

## 2023-05-19 PROCEDURE — 36415 COLL VENOUS BLD VENIPUNCTURE: CPT | Performed by: PATHOLOGY

## 2023-05-19 PROCEDURE — 83735 ASSAY OF MAGNESIUM: CPT | Performed by: PATHOLOGY

## 2023-05-19 PROCEDURE — 80048 BASIC METABOLIC PNL TOTAL CA: CPT | Performed by: PATHOLOGY

## 2023-05-19 PROCEDURE — 80197 ASSAY OF TACROLIMUS: CPT | Mod: 90 | Performed by: PATHOLOGY

## 2023-05-19 PROCEDURE — 84100 ASSAY OF PHOSPHORUS: CPT | Performed by: PATHOLOGY

## 2023-05-19 PROCEDURE — 85027 COMPLETE CBC AUTOMATED: CPT | Performed by: PATHOLOGY

## 2023-05-19 NOTE — TELEPHONE ENCOUNTER
ISSUE:  Increased creatinine  Increase potassium    PLAN:  Ensure adequate oral hydration.  Ensure 2-3L hydrating fluid intake and no caffeine.  N/V/D?  BP?  Limit potassium foods     Push fluids, repeat labs Monday    OUTCOME:  Patient states she really didn't drink as much the day prior to labs. She's feeling great, no diarrhea/vomiting. No urinary symptoms. BP stable.     Will push fluids, limit potassium foods, and repeat labs Monday. Patient v/u.

## 2023-05-22 ENCOUNTER — OFFICE VISIT (OUTPATIENT)
Dept: NEPHROLOGY | Facility: CLINIC | Age: 33
End: 2023-05-22
Attending: INTERNAL MEDICINE
Payer: COMMERCIAL

## 2023-05-22 ENCOUNTER — OFFICE VISIT (OUTPATIENT)
Dept: TRANSPLANT | Facility: CLINIC | Age: 33
End: 2023-05-22
Attending: INTERNAL MEDICINE
Payer: COMMERCIAL

## 2023-05-22 ENCOUNTER — LAB (OUTPATIENT)
Dept: LAB | Facility: CLINIC | Age: 33
End: 2023-05-22
Attending: INTERNAL MEDICINE
Payer: COMMERCIAL

## 2023-05-22 ENCOUNTER — OFFICE VISIT (OUTPATIENT)
Dept: TRANSPLANT | Facility: CLINIC | Age: 33
End: 2023-05-22
Attending: SURGERY
Payer: COMMERCIAL

## 2023-05-22 VITALS
OXYGEN SATURATION: 100 % | HEART RATE: 96 BPM | BODY MASS INDEX: 22.34 KG/M2 | WEIGHT: 126.1 LBS | DIASTOLIC BLOOD PRESSURE: 78 MMHG | SYSTOLIC BLOOD PRESSURE: 108 MMHG | TEMPERATURE: 98.2 F

## 2023-05-22 DIAGNOSIS — T86.11 BANFF TYPE IB ACUTE CELLULAR REJECTION OF TRANSPLANTED KIDNEY: ICD-10-CM

## 2023-05-22 DIAGNOSIS — E83.42 HYPOMAGNESEMIA: ICD-10-CM

## 2023-05-22 DIAGNOSIS — Z94.0 KIDNEY REPLACED BY TRANSPLANT: ICD-10-CM

## 2023-05-22 DIAGNOSIS — Z94.0 KIDNEY REPLACED BY TRANSPLANT: Primary | ICD-10-CM

## 2023-05-22 DIAGNOSIS — Z48.298 AFTERCARE FOLLOWING ORGAN TRANSPLANT: ICD-10-CM

## 2023-05-22 DIAGNOSIS — K51.011 ULCERATIVE PANCOLITIS WITH RECTAL BLEEDING (H): ICD-10-CM

## 2023-05-22 DIAGNOSIS — Z79.899 ENCOUNTER FOR LONG-TERM CURRENT USE OF MEDICATION: ICD-10-CM

## 2023-05-22 DIAGNOSIS — E55.9 HYPOVITAMINOSIS D: ICD-10-CM

## 2023-05-22 DIAGNOSIS — T86.898 URINE LEAK FROM TRANSPLANTED URETER: ICD-10-CM

## 2023-05-22 DIAGNOSIS — N02.B9 IGA NEPHROPATHY: ICD-10-CM

## 2023-05-22 DIAGNOSIS — D84.9 IMMUNOSUPPRESSION (H): ICD-10-CM

## 2023-05-22 DIAGNOSIS — Z20.828 CONTACT WITH AND (SUSPECTED) EXPOSURE TO OTHER VIRAL COMMUNICABLE DISEASES: ICD-10-CM

## 2023-05-22 LAB
ANION GAP SERPL CALCULATED.3IONS-SCNC: 9 MMOL/L (ref 7–15)
BUN SERPL-MCNC: 12.2 MG/DL (ref 6–20)
CALCIUM SERPL-MCNC: 9.4 MG/DL (ref 8.6–10)
CHLORIDE SERPL-SCNC: 106 MMOL/L (ref 98–107)
CREAT SERPL-MCNC: 1.16 MG/DL (ref 0.51–0.95)
DEPRECATED HCO3 PLAS-SCNC: 23 MMOL/L (ref 22–29)
ERYTHROCYTE [DISTWIDTH] IN BLOOD BY AUTOMATED COUNT: 15.3 % (ref 10–15)
GFR SERPL CREATININE-BSD FRML MDRD: 64 ML/MIN/1.73M2
GLUCOSE SERPL-MCNC: 103 MG/DL (ref 70–99)
HCT VFR BLD AUTO: 34.1 % (ref 35–47)
HGB BLD-MCNC: 10.5 G/DL (ref 11.7–15.7)
MAGNESIUM SERPL-MCNC: 1.5 MG/DL (ref 1.7–2.3)
MCH RBC QN AUTO: 30.6 PG (ref 26.5–33)
MCHC RBC AUTO-ENTMCNC: 30.8 G/DL (ref 31.5–36.5)
MCV RBC AUTO: 99 FL (ref 78–100)
PHOSPHATE SERPL-MCNC: 1.8 MG/DL (ref 2.5–4.5)
PLATELET # BLD AUTO: 183 10E3/UL (ref 150–450)
POTASSIUM SERPL-SCNC: 4.5 MMOL/L (ref 3.4–5.3)
RBC # BLD AUTO: 3.43 10E6/UL (ref 3.8–5.2)
SODIUM SERPL-SCNC: 138 MMOL/L (ref 136–145)
TACROLIMUS BLD-MCNC: 10.6 UG/L (ref 5–15)
TME LAST DOSE: NORMAL H
TME LAST DOSE: NORMAL H
WBC # BLD AUTO: 4.2 10E3/UL (ref 4–11)

## 2023-05-22 PROCEDURE — 84100 ASSAY OF PHOSPHORUS: CPT | Performed by: PATHOLOGY

## 2023-05-22 PROCEDURE — 80197 ASSAY OF TACROLIMUS: CPT | Mod: 90 | Performed by: PATHOLOGY

## 2023-05-22 PROCEDURE — 99000 SPECIMEN HANDLING OFFICE-LAB: CPT | Performed by: PATHOLOGY

## 2023-05-22 PROCEDURE — 85027 COMPLETE CBC AUTOMATED: CPT | Performed by: PATHOLOGY

## 2023-05-22 PROCEDURE — 52310 CYSTOSCOPY AND TREATMENT: CPT | Performed by: NURSE PRACTITIONER

## 2023-05-22 PROCEDURE — 83735 ASSAY OF MAGNESIUM: CPT | Performed by: PATHOLOGY

## 2023-05-22 PROCEDURE — G0463 HOSPITAL OUTPT CLINIC VISIT: HCPCS | Performed by: INTERNAL MEDICINE

## 2023-05-22 PROCEDURE — 250N000009 HC RX 250: Performed by: NURSE PRACTITIONER

## 2023-05-22 PROCEDURE — 36415 COLL VENOUS BLD VENIPUNCTURE: CPT | Performed by: PATHOLOGY

## 2023-05-22 PROCEDURE — 99213 OFFICE O/P EST LOW 20 MIN: CPT | Mod: 24 | Performed by: SURGERY

## 2023-05-22 PROCEDURE — 250N000013 HC RX MED GY IP 250 OP 250 PS 637: Performed by: NURSE PRACTITIONER

## 2023-05-22 PROCEDURE — 80048 BASIC METABOLIC PNL TOTAL CA: CPT | Performed by: PATHOLOGY

## 2023-05-22 PROCEDURE — 99215 OFFICE O/P EST HI 40 MIN: CPT | Mod: 24 | Performed by: INTERNAL MEDICINE

## 2023-05-22 RX ORDER — VALGANCICLOVIR 450 MG/1
900 TABLET, FILM COATED ORAL DAILY
Qty: 60 TABLET | Refills: 5 | Status: SHIPPED | OUTPATIENT
Start: 2023-05-22 | End: 2024-03-04

## 2023-05-22 RX ORDER — MAGNESIUM OXIDE 400 MG/1
800 TABLET ORAL 2 TIMES DAILY
Qty: 180 TABLET | Refills: 2 | Status: SHIPPED | OUTPATIENT
Start: 2023-05-22 | End: 2023-06-19 | Stop reason: ALTCHOICE

## 2023-05-22 RX ORDER — LIDOCAINE HYDROCHLORIDE 20 MG/ML
JELLY TOPICAL ONCE
Status: COMPLETED | OUTPATIENT
Start: 2023-05-22 | End: 2023-05-22

## 2023-05-22 RX ORDER — LEVOFLOXACIN 250 MG/1
500 TABLET, FILM COATED ORAL ONCE
Status: COMPLETED | OUTPATIENT
Start: 2023-05-22 | End: 2023-05-22

## 2023-05-22 RX ADMIN — LEVOFLOXACIN 500 MG: 250 TABLET, FILM COATED ORAL at 11:38

## 2023-05-22 RX ADMIN — LIDOCAINE HYDROCHLORIDE: 20 JELLY TOPICAL at 11:38

## 2023-05-22 ASSESSMENT — PAIN SCALES - GENERAL: PAINLEVEL: NO PAIN (0)

## 2023-05-22 NOTE — LETTER
5/22/2023       RE: Caity Horan  [address]  [address]  [facility] [id number]     Dear Colleague,    Thank you for referring your patient, Caity Horan, to the [facility] [place] at [facility]. Please see a copy of my visit note below.    TRANSPLANT NEPHROLOGY EARLY POST TRANSPLANT VISIT    Assessment & Plan   # LDKT: slight uptrend from frank 0.9 to 1.19 on 5/19, recent treatment for [id number] cellular rejection, de naeem DSA yet not meeting criteria for ABMR on bx with neg c4d only PTC 1+, treated with a dose of r-ATG 2mg/kg 4/30 then 3 doses of  mg x 3 with improvement in renal function Cr down from 1.9 to frank of 0.9.   Will need closure biopsy ~1st week of June              - Baseline Creatinine: ~ TBD, frank so far 0.9 now up to 1.19              - Proteinuria: Minimal (0.2-0.5 grams) 4/29/23              - Date DSA Last Checked: May.2023    Latest DSA: Yes, [id number] MFI 4/24 cleared on recheck 4/29 and remains negative              - BK Viremia: No   - urine leak s/p repair:  OR 4/17/23 for re-exploration, which found urine leak at anastomosis and hydronephrotic ureter without signs of obstruction - anastomosis  redone and ureteral stent placed. Renorgam 4/28 no leak              - Kidney Tx Biopsy: Apr 29, 2023; Result: Acute cell-mediated rejection, Banff class Ib, with moderate interstitial inflammation, severe tubulitis, and no vasculitis (i2 t3 v0)     Mild peritubular capillaritis, associated with no other signs of active antibody-mediated rejection (g0 ptc1 v0 c4d0). 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 Chronic changes of the parenchyma, including: focal global glomerulosclerosis (1%  glomeruli). no significant tubular atrophy and interstitial fibrosis. severe arterial sclerosis(ct0 ci0 cv3 ah0 mm0  cg0)               - Received thymo and  two doses of methylprednisolone to date (5/1/23).   - received a dose of r- mg 4.30 then 3 doses of SM 500mg for [id number] rejection              - Transplant Ureteral Stent: Yes-removal scheduled today      # Immunosuppression: Tacrolimus immediate release (goal 8-10) and Mycophenolate mofetil (dose 750 mg every 12 hours) Prednisone 5              - Induction with Recent Transplant:  High Intensity Protocol              - Changes: no     # Infection Prophylaxis:   - PJP: Sulfa/TMP (Bactrim)  - CMV: discordant (CMV D+/R-) Valganciclovir (Valcyte) x 6 months-- till 10/30   - EBV: non discordant (EBV D+/R+) hx of EBV viremia post 1st transplant: last EBV<500 12/2022,      # Hypertension: Controlled;   Goal BP: < 140/90              - Volume status: Euvolemic                EDW ~ 59kg              - Changes: Not at this time      # Anemia in Chronic Renal Disease: Hgb: stable     MICKEY: No              - Iron studies: Low iron saturation, but high ferritin     # Mineral Bone Disorder:   - Secondary renal hyperparathyroidism; PTH level: Minimally elevated ( pg/ml)        On treatment: None  - Vitamin D; level: Normal        On supplement: Yes  - Calcium; level: Normal        On supplement: No  - Phosphorus; level: Normal        On supplement: self stopped and level is ok ~1.8 ok to remain off     # Electrolytes:   - Potassium; level: normal    On supplement: No high   - Magnesium; level: Low        On supplement: Yes, Mg oxide 800 mg po bid (intolerance to tid dosing) will continue current dose and if additional needed add 's Best  - Bicarbonate; level: normal      On supplement: No  - Sodium; level: Normal     # Ulcerative colitis: Diagnosed in 2018. 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    Return visit: 1 month    Darian Arora MD    Chief Complaint   Ms. Horan is a 32 year old here for kidney transplant and immunosuppression management.     History of Present Illness   Recent hospitalization for Banff1 B cellular rejection and de naeem DSA low titer -received a dose of r-ATG 2mg.kg and  mg iv x 3 with subsequent improvement in renal function Cr down from 1.9 to frank 0.9 and now between 1-1.1.  Caity feels better overall  Feels much better now since receiving her entyvio infusion. 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    Ulcerative colitis (H)    Vitamin D deficiency    Dialysis patient (H)    Awaiting organ transplant    ESRD (end stage renal disease) (H)    Pre-diabetes    Encounter for pregnancy test    Urine leak from transplanted ureter    Acute post-operative pain    Constipation    Hypophosphatemia    Kidney transplant rejection       Allergies   Allergies   Allergen Reactions    Bumetanide Other (See Comments)     Causes paralysis     Amoxicillin Rash       Medications   Current Outpatient Medications   Medication Sig    biotin 1000 MCG TABS tablet Take 500 mcg by mouth daily    Cyanocobalamin 500 MCG TBDP Take 500 mcg by mouth daily    hydrOXYzine (ATARAX) 25 MG tablet Take 1 tablet (25 mg) by mouth every 4 hours as needed for anxiety or other (adjuvant pain)    levonorgestrel (MIRENA) 20 MCG/DAY IUD 1 each by Intrauterine route Replacement every 7 years. Placed 1/2019    magnesium oxide (MAG-OX) 400 MG tablet Take 2 tablets (800 mg) by mouth 3 times daily    methocarbamol (ROBAXIN) 500 MG tablet Take 1 tablet (500 mg) by mouth 4 times daily as needed for muscle spasms    mycophenolate (GENERIC EQUIVALENT) 250 MG capsule Take 3 capsules (750 mg) by mouth 2 times daily    ondansetron (ZOFRAN ODT) 4 MG ODT tab Take 1 tablet (4 mg) by mouth every 6 hours as needed for nausea or vomiting    oxyCODONE (ROXICODONE) 5 MG tablet Take 1 tablet (5 mg) by mouth every 6 hours as needed for moderate pain    phosphorus tablet 250 mg (PHOSPHA 250 NEUTRAL) 250 MG per tablet Take 2 tablets (500 mg) by mouth daily    predniSONE (DELTASONE) 5 MG tablet Take 1 tablet (5 mg) by mouth daily Or until discussed in clinic    sulfamethoxazole-trimethoprim (BACTRIM) 400-80 MG tablet Take 1 tablet by mouth daily    tacrolimus (GENERIC EQUIVALENT) 0.5 MG capsule Take 1 capsule (0.5 mg) by mouth every morning Total dose = 1.5 mg in the AM and 1 mg in the PM    tacrolimus (GENERIC EQUIVALENT) 1 MG capsule Take 1 capsule (1 mg) by mouth 2 times daily Total dose = 1.5 mg in the AM and 1 mg in the PM    valGANciclovir (VALCYTE) 450 MG tablet Take 1 tablet (450 mg) by mouth daily Until directed to increase dose per transplant team based on improving renal function.    vedolizumab (ENTYVIO) 60 MG/ML injection Inject 300 mg into the vein once every six weeks    vitamin D3 (CHOLECALCIFEROL) 50 mcg (2000 units) tablet Take 1 tablet by mouth daily     No current facility-administered  medications for this visit.     There are no discontinued medications.    Physical Exam   Vital Signs: There were no vitals taken for this visit.    GENERAL APPEARANCE: alert and no distress  HENT: mouth without ulcers or lesions  LYMPHATICS: no cervical or supraclavicular nodes  RESP: lungs clear to auscultation - no rales, rhonchi or wheezes  CV: regular rhythm, normal rate, no rub, no murmur  EDEMA: no LE edema bilaterally  ABDOMEN: soft, nondistended, nontender, bowel sounds normal nontender LLQ allograft  MS: extremities normal - no gross deformities noted, no evidence of inflammation in joints, no muscle tenderness  SKIN: no rash    Data         Latest Ref Rng & Units 5/19/2023     8:11 AM 5/17/2023     3:31 PM 5/15/2023     8:42 AM   Renal   Sodium 136 - 145 mmol/L 139   137   139     K 3.4 - 5.3 mmol/L 5.5   4.7   4.5     Cl 98 - 107 mmol/L 106   107   109     Cl (external) 98 - 107 mmol/L 106   107   109     CO2 22 - 29 mmol/L 26   22   24     Urea Nitrogen 6.0 - 20.0 mg/dL 9.7   14.6   17.3     Creatinine 0.51 - 0.95 mg/dL 1.19   0.93   1.00     Glucose 70 - 99 mg/dL 101   99   94     Calcium 8.6 - 10.0 mg/dL 9.8   9.0   9.0     Magnesium 1.7 - 2.3 mg/dL 1.7    1.5           Latest Ref Rng & Units 5/19/2023     8:11 AM 5/15/2023     8:42 AM 5/12/2023     7:40 AM   Bone Health   Phosphorus 2.5 - 4.5 mg/dL 1.8   2.0   2.5           Latest Ref Rng & Units 5/19/2023     8:11 AM 5/17/2023     3:31 PM 5/15/2023     8:42 AM   Heme   WBC 4.0 - 11.0 10e3/uL 3.4   4.4   5.0     Hgb 11.7 - 15.7 g/dL 10.2   9.6   9.9     Plt 150 - 450 10e3/uL 185   197   223     ABSOLUTE NEUTROPHIL 1.6 - 8.3 10e3/uL  3.9      ABSOLUTE LYMPHOCYTES 0.8 - 5.3 10e3/uL  0.3      ABSOLUTE MONOCYTES 0.0 - 1.3 10e3/uL  0.1      ABSOLUTE EOSINOPHILS 0.0 - 0.7 10e3/uL  0.1            Latest Ref Rng & Units 5/17/2023     3:31 PM 4/11/2023     8:32 AM 4/3/2023     3:56 PM   Liver   AP 35 - 104 U/L 66   70   75     TBili <=1.2 mg/dL 0.3   0.6    0.5     Bilirubin Direct 0.00 - 0.30 mg/dL <0.20   <0.20      ALT 10 - 35 U/L 13   15   9     AST 10 - 35 U/L 19   23   19     Tot Protein 6.4 - 8.3 g/dL 6.6   8.6   8.4     Albumin 3.5 - 5.2 g/dL 4.1   5.1   5.0           Latest Ref Rng & Units 4/3/2023     3:56 PM   Pancreas   A1C <5.7 % 5.3           Latest Ref Rng & Units 4/24/2023     8:26 AM 4/22/2023     7:45 AM 4/3/2023     3:56 PM   Iron studies   Iron 37 - 145 ug/dL 30   40   186     Iron Sat Index 15 - 46 % 18   28   71     Ferritin 6 - 175 ng/mL 886   1,041   902           Latest Ref Rng & Units 5/10/2023     8:05 AM 4/13/2023     1:23 PM 4/3/2023     3:56 PM   [facility] Txp Virology   CMV QUANT IU/ML Not Detected IU/mL Not Detected   Not Detected      EBV CAPSID ANTIBODY IGG No detectable antibody.   Positive          Recent Labs   Lab Test 05/12/23  0740 05/15/23  0842 05/19/23  0811   DOSTAC 5/11/2023 5/14/2023 5/18/2023   TACROL 11.8 10.1 9.4     Recent Labs   Lab Test 04/29/23  0547 05/10/23  0805 05/12/23  0740   DOSMPA  --  5/9/2023   9:40 PM 5/11/2023   9:50 PM   MPACID 4.86* 7.38* 3.65*   MPAG 93.7 72.5 52.6     Darian Arora MD

## 2023-05-22 NOTE — LETTER
5/22/2023         RE: Caity Horan  [address]  [address]  [facility] [address]        Dear Colleague,    Thank you for referring your patient, Caity Horan, to the [facility]. Please see a copy of my visit note below.    See procedure note      Again, thank you for allowing me to participate in the care of your patient.        Sincerely,        Christi Alfonso NP     Stretcher

## 2023-05-22 NOTE — PROGRESS NOTES
TRANSPLANT NEPHROLOGY EARLY POST TRANSPLANT VISIT    Assessment & Plan   # LDKT: slight uptrend from frank 0.9 to 1.19 on 5/19, recent treatment for [id number] cellular rejection, de naeem DSA yet not meeting criteria for ABMR on bx with neg c4d only PTC 1+, treated with a dose of r-ATG 2mg/kg 4/30 then 3 doses of  mg x 3 with improvement in renal function Cr down from 1.9 to frank of 0.9.   Will need closure biopsy ~1st week of June              - Baseline Creatinine: ~ TBD, frank so far 0.9 now up to 1.19              - Proteinuria: Minimal (0.2-0.5 grams) 4/29/23              - Date DSA Last Checked: May.2023    Latest DSA: Yes, [id number] MFI 4/24 cleared on recheck 4/29 and remains negative              - BK Viremia: No   - urine leak s/p repair:  OR 4/17/23 for re-exploration, which found urine leak at anastomosis and hydronephrotic ureter without signs of obstruction - anastomosis  redone and ureteral stent placed. Renorgam 4/28 no leak              - Kidney Tx Biopsy: Apr 29, 2023; Result: Acute cell-mediated rejection, Banff class Ib, with moderate interstitial inflammation, severe tubulitis, and no vasculitis (i2 t3 v0)     Mild peritubular capillaritis, associated with no other signs of active antibody-mediated rejection (g0 ptc1 v0 c4d0). 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 Chronic changes of the parenchyma, including: focal global glomerulosclerosis (1%  glomeruli). no significant tubular atrophy and interstitial fibrosis. severe arterial sclerosis(ct0 ci0 cv3 ah0 mm0  cg0)               - Received thymo and two doses of methylprednisolone to date (5/1/23).   - received a dose of r- mg 4.30 then 3 doses of SM 500mg for Qlfic1E rejection              - Transplant Ureteral Stent: Yes-removal scheduled today      # Immunosuppression: Tacrolimus immediate release (goal 8-10) and Mycophenolate mofetil (dose 750 mg every  12 hours) Prednisone 5              - Induction with Recent Transplant:  High Intensity Protocol              - Changes: no     # Infection Prophylaxis:   - PJP: Sulfa/TMP (Bactrim)  - CMV: discordant (CMV D+/R-) Valganciclovir (Valcyte) x 6 months-- till 10/30   - EBV: non discordant (EBV D+/R+) hx of EBV viremia post 1st transplant: last EBV<500 12/2022,      # Hypertension: Controlled;   Goal BP: < 140/90              - Volume status: Euvolemic                EDW ~ 59kg              - Changes: Not at this time      # Anemia in Chronic Renal Disease: Hgb: stable     MICKEY: No              - Iron studies: Low iron saturation, but high ferritin     # Mineral Bone Disorder:   - Secondary renal hyperparathyroidism; PTH level: Minimally elevated ( pg/ml)        On treatment: None  - Vitamin D; level: Normal        On supplement: Yes  - Calcium; level: Normal        On supplement: No  - Phosphorus; level: Normal        On supplement: self stopped and level is ok ~1.8 ok to remain off     # Electrolytes:   - Potassium; level: normal    On supplement: No high   - Magnesium; level: Low        On supplement: Yes, Mg oxide 800 mg po bid (intolerance to tid dosing) will continue current dose and if additional needed add Christen Finn  - Bicarbonate; level: normal      On supplement: No  - Sodium; level: Normal     # Ulcerative colitis: Diagnosed in 2018. 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    Return visit: 1 month    Darian Arora MD    Chief Complaint   Ms. Horan is a 32 year old here for kidney transplant and immunosuppression management.     History of Present Illness   Recent hospitalization for Banff1 B cellular rejection and de naeem DSA low titer -received a dose of r-ATG 2mg.kg and  mg iv x 3 with subsequent improvement in renal function Cr down from 1.9 to frank 0.9 and now between 1-1.1.  Caity feels better overall  Feels much better now since receiving her entyvio infusion. 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     Ulcerative colitis (H)     Vitamin D deficiency     Dialysis patient (H)     Awaiting organ transplant     ESRD (end stage renal disease) (H)     Pre-diabetes     Encounter for pregnancy test     Urine leak from transplanted ureter     Acute post-operative pain     Constipation     Hypophosphatemia     Kidney transplant rejection       Allergies   Allergies   Allergen Reactions     Bumetanide Other (See Comments)     Causes paralysis     Amoxicillin Rash       Medications   Current Outpatient Medications   Medication Sig     biotin 1000 MCG TABS tablet Take 500 mcg by mouth daily     Cyanocobalamin 500 MCG TBDP Take 500 mcg by mouth daily     hydrOXYzine (ATARAX) 25 MG tablet Take 1 tablet (25 mg) by mouth every 4 hours as needed for  anxiety or other (adjuvant pain)     levonorgestrel (MIRENA) 20 MCG/DAY IUD 1 each by Intrauterine route Replacement every 7 years. Placed 1/2019     magnesium oxide (MAG-OX) 400 MG tablet Take 2 tablets (800 mg) by mouth 3 times daily     methocarbamol (ROBAXIN) 500 MG tablet Take 1 tablet (500 mg) by mouth 4 times daily as needed for muscle spasms     mycophenolate (GENERIC EQUIVALENT) 250 MG capsule Take 3 capsules (750 mg) by mouth 2 times daily     ondansetron (ZOFRAN ODT) 4 MG ODT tab Take 1 tablet (4 mg) by mouth every 6 hours as needed for nausea or vomiting     oxyCODONE (ROXICODONE) 5 MG tablet Take 1 tablet (5 mg) by mouth every 6 hours as needed for moderate pain     phosphorus tablet 250 mg (PHOSPHA 250 NEUTRAL) 250 MG per tablet Take 2 tablets (500 mg) by mouth daily     predniSONE (DELTASONE) 5 MG tablet Take 1 tablet (5 mg) by mouth daily Or until discussed in clinic     sulfamethoxazole-trimethoprim (BACTRIM) 400-80 MG tablet Take 1 tablet by mouth daily     tacrolimus (GENERIC EQUIVALENT) 0.5 MG capsule Take 1 capsule (0.5 mg) by mouth every morning Total dose = 1.5 mg in the AM and 1 mg in the PM     tacrolimus (GENERIC EQUIVALENT) 1 MG capsule Take 1 capsule (1 mg) by mouth 2 times daily Total dose = 1.5 mg in the AM and 1 mg in the PM     valGANciclovir (VALCYTE) 450 MG tablet Take 1 tablet (450 mg) by mouth daily Until directed to increase dose per transplant team based on improving renal function.     vedolizumab (ENTYVIO) 60 MG/ML injection Inject 300 mg into the vein once every six weeks     vitamin D3 (CHOLECALCIFEROL) 50 mcg (2000 units) tablet Take 1 tablet by mouth daily     No current facility-administered medications for this visit.     There are no discontinued medications.    Physical Exam   Vital Signs: There were no vitals taken for this visit.    GENERAL APPEARANCE: alert and no distress  HENT: mouth without ulcers or lesions  LYMPHATICS: no cervical or supraclavicular  nodes  RESP: lungs clear to auscultation - no rales, rhonchi or wheezes  CV: regular rhythm, normal rate, no rub, no murmur  EDEMA: no LE edema bilaterally  ABDOMEN: soft, nondistended, nontender, bowel sounds normal nontender LLQ allograft  MS: extremities normal - no gross deformities noted, no evidence of inflammation in joints, no muscle tenderness  SKIN: no rash    Data         Latest Ref Rng & Units 5/19/2023     8:11 AM 5/17/2023     3:31 PM 5/15/2023     8:42 AM   Renal   Sodium 136 - 145 mmol/L 139   137   139     K 3.4 - 5.3 mmol/L 5.5   4.7   4.5     Cl 98 - 107 mmol/L 106   107   109     Cl (external) 98 - 107 mmol/L 106   107   109     CO2 22 - 29 mmol/L 26   22   24     Urea Nitrogen 6.0 - 20.0 mg/dL 9.7   14.6   17.3     Creatinine 0.51 - 0.95 mg/dL 1.19   0.93   1.00     Glucose 70 - 99 mg/dL 101   99   94     Calcium 8.6 - 10.0 mg/dL 9.8   9.0   9.0     Magnesium 1.7 - 2.3 mg/dL 1.7    1.5           Latest Ref Rng & Units 5/19/2023     8:11 AM 5/15/2023     8:42 AM 5/12/2023     7:40 AM   Bone Health   Phosphorus 2.5 - 4.5 mg/dL 1.8   2.0   2.5           Latest Ref Rng & Units 5/19/2023     8:11 AM 5/17/2023     3:31 PM 5/15/2023     8:42 AM   Heme   WBC 4.0 - 11.0 10e3/uL 3.4   4.4   5.0     Hgb 11.7 - 15.7 g/dL 10.2   9.6   9.9     Plt 150 - 450 10e3/uL 185   197   223     ABSOLUTE NEUTROPHIL 1.6 - 8.3 10e3/uL  3.9      ABSOLUTE LYMPHOCYTES 0.8 - 5.3 10e3/uL  0.3      ABSOLUTE MONOCYTES 0.0 - 1.3 10e3/uL  0.1      ABSOLUTE EOSINOPHILS 0.0 - 0.7 10e3/uL  0.1            Latest Ref Rng & Units 5/17/2023     3:31 PM 4/11/2023     8:32 AM 4/3/2023     3:56 PM   Liver   AP 35 - 104 U/L 66   70   75     TBili <=1.2 mg/dL 0.3   0.6   0.5     Bilirubin Direct 0.00 - 0.30 mg/dL <0.20   <0.20      ALT 10 - 35 U/L 13   15   9     AST 10 - 35 U/L 19   23   19     Tot Protein 6.4 - 8.3 g/dL 6.6   8.6   8.4     Albumin 3.5 - 5.2 g/dL 4.1   5.1   5.0           Latest Ref Rng & Units 4/3/2023     3:56 PM    Pancreas   A1C <5.7 % 5.3           Latest Ref Rng & Units 4/24/2023     8:26 AM 4/22/2023     7:45 AM 4/3/2023     3:56 PM   Iron studies   Iron 37 - 145 ug/dL 30   40   186     Iron Sat Index 15 - 46 % 18   28   71     Ferritin 6 - 175 ng/mL 886   1,041   902           Latest Ref Rng & Units 5/10/2023     8:05 AM 4/13/2023     1:23 PM 4/3/2023     3:56 PM   [facility] Txp Virology   CMV QUANT IU/ML Not Detected IU/mL Not Detected   Not Detected      EBV CAPSID ANTIBODY IGG No detectable antibody.   Positive          Recent Labs   Lab Test 05/12/23  0740 05/15/23  0842 05/19/23  0811   DOSTAC 5/11/2023 5/14/2023 5/18/2023   TACROL 11.8 10.1 9.4     Recent Labs   Lab Test 04/29/23  0547 05/10/23  0805 05/12/23  0740   DOSMPA  --  5/9/2023   9:40 PM 5/11/2023   9:50 PM   MPACID 4.86* 7.38* 3.65*   MPAG 93.7 72.5 52.6

## 2023-05-22 NOTE — PROGRESS NOTES
Transplant Surgery Progress Note    Transplants:  4/13/2023 (Kidney), 12/5/2014 (Kidney); Postoperative day:  39   Caity Horan is a 31 yo with history of IgA nephropathy, KT 2014 & LDKT 4/13/23 c/b urine leak, UC, and HTN. Re-admitted 4/28-5/1 with ASHLEY and de naeem DSA + B45. Treated with solu-medrol 4/29-5/1 and was taper down to prednisone 5mg daily. Also received thymo 125mg on 4/30 due to DSA. Recheck DSA negative.     S: Doing well today. Patient received entyvio infusion on 5/17 and her symptoms improved dramatically after that. Today no fever/chills or night sweats. Eating well. Having regular BMs. Adequate urine output. No blood in urine. Does endorse some numbness over left thigh and left groin.     Transplant History:    Transplant Type:  LDKT  Donor Type: Living   Transplant Date:  4/13/2023 (Kidney), 12/5/2014 (Kidney)   Ureteral Stent:  Yes. Removed today 5/22/23   Crossmatch:  negative   DSA at Tx:  No  Baseline Cr: 1.37, frank 0.93  DeNovo DSA: Yes: Date DSA Last Checked: May.2023    Latest DSA: Yes, B45 847 MFI 4/24 cleared on recheck 4/29 and remains negative    Acute Rejection Hx:  Yes: Treated with solu-medrol 4/29-5/1 and was tapered down to prednisone 5mg daily.     Present Maintenance Immunosuppression:  Tacrolimus, Mycophenolate mofetil and Prednisone    Transplant Coordinator: Eleanor Lund     Transplant Office Phone Number: [phone]     Immunosuppressant Medications     Immunosuppressive Agents Disp Start End     mycophenolate (GENERIC EQUIVALENT) 250 MG capsule    180 capsule 4/20/2023     Sig - Route: Take 3 capsules (750 mg) by mouth 2 times daily - Oral    Class: E-Prescribe    Renewals     Renewal requests to authorizing provider (Amalia Marie NP) <b>prohibited</b>           tacrolimus (GENERIC EQUIVALENT) 0.5 MG capsule    30 capsule 5/11/2023     Sig - Route: Take 1 capsule (0.5 mg) by mouth every morning Total dose = 1.5 mg in the AM and 1 mg in the PM - Oral     Class: E-Prescribe     tacrolimus (GENERIC EQUIVALENT) 1 MG capsule    60 capsule 2023     Sig - Route: Take 1 capsule (1 mg) by mouth 2 times daily Total dose = 1.5 mg in the AM and 1 mg in the PM - Oral    Class: E-Prescribe          Possible Immunosuppression-related side effects:   []             headache  []             vivid dreams  []             irritability  []             cognitive difficuties  []             fine tremor  []             nausea  []             diarrhea  []             neuropathy      []             edema  []             renal calcineurin toxicity  []             hyperkalemia  []             post-transplant diabetes  []             decreased appetite  []             increased appetite  []             other:  [x]             none    Prescription Medications as of 2023       Rx Number Disp Refills Start End Last Dispensed Date Next Fill Date Owning Pharmacy    biotin 1000 MCG TABS tablet            Sig: Take 500 mcg by mouth daily    Class: Historical    Route: Oral    Cyanocobalamin 500 MCG TBDP            Sig: Take 500 mcg by mouth daily    Class: Historical    Route: Oral    hydrOXYzine (ATARAX) 25 MG tablet  15 tablet 0 2023    [facility] - [place], [place] - [address]    Sig: Take 1 tablet (25 mg) by mouth every 4 hours as needed for anxiety or other (adjuvant pain)    Class: E-Prescribe    Route: Oral    Renewals     Renewal requests to authorizing provider (Amalia Marie NP) <b>prohibited</b>          levonorgestrel (MIRENA) 20 MCG/DAY IUD            Si each by Intrauterine route Replacement every 7 years. Placed 2019    Class: Historical    Route: Intrauterine    magnesium oxide (MAG-OX) 400 MG tablet  180 tablet 2 2023    [facility] - [place], [place] - [address] [place] [address] SE    Sig: Take 2 tablets (800 mg) by mouth 2 times daily    Class: E-Prescribe    Route: Oral    methocarbamol (ROBAXIN) 500 MG tablet   20 tablet 0 4/20/2023    [address]    Sig: Take 1 tablet (500 mg) by mouth 4 times daily as needed for muscle spasms    Class: E-Prescribe    Route: Oral    mycophenolate (GENERIC EQUIVALENT) 250 MG capsule  180 capsule 11 4/20/2023    [address]    Sig: Take 3 capsules (750 mg) by mouth 2 times daily    Class: E-Prescribe    Route: Oral    Renewals     Renewal requests to authorizing provider (Amalia Marie NP) <b>prohibited</b>          ondansetron (ZOFRAN ODT) 4 MG ODT tab  10 tablet 0 4/20/2023    [address]    Sig: Take 1 tablet (4 mg) by mouth every 6 hours as needed for nausea or vomiting    Class: E-Prescribe    Route: Oral    oxyCODONE (ROXICODONE) 5 MG tablet  15 tablet 0 4/20/2023    [address]    Sig: Take 1 tablet (5 mg) by mouth every 6 hours as needed for moderate pain    Class: E-Prescribe    Earliest Fill Date: 4/20/2023    Route: Oral    predniSONE (DELTASONE) 5 MG tablet  30 tablet 11 5/16/2023    [phone]    Sig: Take 1 tablet (5 mg) by mouth daily Or until discussed in clinic    Class: E-Prescribe    Notes to Pharmacy: TXP DT 4/13/2023 (Kidney), 12/5/2014 (Kidney) TXP Dischg DT 4/20/2023 DX Kidney replaced by transplant Z94.0 TX [facility] ([place], [place])    Route: Oral    sulfamethoxazole-trimethoprim (BACTRIM) 400-80 MG tablet  30 tablet 11 5/16/2023    [phone]    Sig: Take 1 tablet by mouth daily    Class: E-Prescribe    Route: Oral    tacrolimus (GENERIC EQUIVALENT) 0.5 MG capsule  30 capsule 11 5/11/2023    [facility]  [place] - [address]    Sig: Take 1 capsule (0.5 mg) by mouth every morning Total dose = 1.5 mg in the AM and 1 mg in the PM    Class: E-Prescribe    Route: Oral    tacrolimus (GENERIC EQUIVALENT) 1 MG capsule  60 capsule 11 5/11/2023    [facility] [place] [address] [address]    Sig: Take 1 capsule (1 mg) by mouth 2 times daily Total dose = 1.5 mg in the AM and 1 mg in the PM    Class: E-Prescribe    Route: Oral    valGANciclovir (VALCYTE) 450 MG tablet  60 tablet 5 5/22/2023    [facility] [place] [address] [address]    Sig: Take 2 tablets (900 mg) by mouth daily Until directed to increase dose per transplant team based on improving renal function.    Class: E-Prescribe    Route: Oral    vedolizumab (ENTYVIO) 60 MG/ML injection            Sig: Inject 300 mg into the vein once every six weeks    Class: Historical    Route: Intravenous    vitamin D3 (CHOLECALCIFEROL) 50 mcg (2000 units) tablet            Sig: Take 1 tablet by mouth daily    Class: Historical    Route: Oral      Clinic-Administered Medications as of 5/22/2023       Dose Frequency Start End    levofloxacin (LEVAQUIN) tablet 500 mg (Completed) 500 mg ONCE 5/22/2023 5/22/2023    Admin Instructions: Administer at least 2 hours before or 4 hours after aluminum, calcium, iron, zinc or magnesium containing medications. May be taken with food or on an empty stomach.  Do not administer alone with a dairy product like milk or yogurt or calcium-fortified juice,  but may be administered with a meal containing dairy.    Hold tube feeding 1 hour before and 1 hour after administration    Route: Oral    lidocaine (XYLOCAINE) 2 % external gel (Completed)  ONCE 5/22/2023 5/22/2023    Class: E-Prescribe    Route: Urethral          O:         Latest Ref Rng & Units 5/22/2023     9:03 AM 5/19/2023     8:11 AM 5/17/2023     3:31 PM 5/15/2023     8:42 AM 5/12/2023     7:40 AM   Transplant Immunosuppression  Labs   Creat 0.51 - 0.95 mg/dL 1.16   1.19   0.93   1.00   1.07     Urea Nitrogen 6.0 - 20.0 mg/dL 12.2   9.7   14.6   17.3   18.0     WBC 4.0 - 11.0 10e3/uL 4.2   3.4   4.4   5.0   6.6     Neutrophil %   88       ANEU 1.6 - 8.3 10e3/uL   3.9           Chemistries:   Recent Labs   Lab Test 05/22/23  0903   BUN 12.2   CR 1.16*   GFRESTIMATED 64   *     Lab Results   Component Value Date    A1C 5.3 04/03/2023     Recent Labs   Lab Test 05/17/23  1531   ALBUMIN 4.1   BILITOTAL 0.3   ALKPHOS 66   AST 19   ALT 13     Urine Studies:  Recent Labs   Lab Test 04/29/23  1211   COLOR Light Yellow   APPEARANCE Clear   URINEGLC Negative   URINEBILI Negative   URINEKETONE Negative   SG 1.013   UBLD Negative   URINEPH 7.5*   PROTEIN 20*   NITRITE Negative   LEUKEST Negative   RBCU 2   WBCU 2     Recent Labs   Lab Test 01/14/21  1200 12/03/20  0915   UTPG 2.09* 1.36*     Hematology:   Recent Labs   Lab Test 05/22/23  0903 05/19/23  0811 05/17/23  1531   HGB 10.5* 10.2* 9.6*    185 197   WBC 4.2 3.4* 4.4     Coags:   Recent Labs   Lab Test 04/29/23  0547 04/17/23  0904   INR 1.14 1.27*     HLA antibodies:   SA1 Hi Risk David   Date Value Ref Range Status   11/11/2019 None  Final     SA1 HI RISK DAVID   Date Value Ref Range Status   05/12/2023 B:76  Final     SA1 Mod Risk David   Date Value Ref Range Status   11/11/2019 A:2 B:42 76  Final     SA1 MOD RISK DAVID   Date Value Ref Range Status   05/12/2023 A:36B:42  Final     SA2 Hi Risk David   Date Value Ref Range Status   11/11/2019 DQ:2 DQA:05  Final     SA2 HI RISK DAVID   Date Value Ref Range Status   05/12/2023 DQA:05  Final     SA2 Mod Risk David   Date Value Ref Range Status   11/11/2019 DQ:7 DQA:04 06  Final     SA2 MOD RISK DAVID   Date Value Ref Range Status   05/12/2023 DQ:7  Final       Assessment: Caity Horan is doing well s/p LDKT:  Issues we addressed during her visit include:    Plan:    1. Graft function: Cr 1.16, adequate urine output. Current plan for closure biopsy  1st week of June per nephrology.   2. Counseled on exercise. Ok to slowly resume exercise at 6 weeks post-op but advised to avoid weights >20lbs or core activities.   Followup: When back in town either August 9th or 10th    Abida Ramos MD  PGY 1    I have reviewed history, examined patient and discussed plan with the fellow/resident/SRINIVASAN.  I concur with the findings in this note.       Patient was counseled on complications of incision and short and long term care to minimize infection/hernia risk.    Immunosuppressive regimen, management and long term risks discussed in detail. Changes, when applicable made as per orders.      Total Time: 20 min,   Counselling Time: 10 min.    .

## 2023-05-22 NOTE — LETTER
5/22/2023         RE: Caity Horan  [address]  [address]  [place] [address]        Dear Colleague,    Thank you for referring your patient, Caity Horan, to the [facility]. Please see a copy of my visit note below.    Transplant Surgery Progress Note    Transplants:  4/13/2023 (Kidney), 12/5/2014 (Kidney); Postoperative day:  39   Caity Horan is a 33 yo with history of IgA nephropathy, KT 2014 & LDKT 4/13/23 c/b urine leak, UC, and HTN. Re-admitted 4/28-5/1 with ASHLEY and de naeem DSA + B45. Treated with solu-medrol 4/29-5/1 and was taper down to prednisone 5mg daily. Also received thymo 125mg on 4/30 due to DSA. Recheck DSA negative.     S: Doing well today. Patient received entyvio infusion on 5/17 and her symptoms improved dramatically after that. Today no fever/chills or night sweats. Eating well. Having regular BMs. Adequate urine output. No blood in urine. Does endorse some numbness over left thigh and left groin.     Transplant History:    Transplant Type:  LDKT  Donor Type: Living   Transplant Date:  4/13/2023 (Kidney), 12/5/2014 (Kidney)   Ureteral Stent:  Yes. Removed today 5/22/23   Crossmatch:  negative   DSA at Tx:  No  Baseline Cr: 1.37, frank 0.93  DeNovo DSA: Yes: Date DSA Last Checked: May.2023    Latest DSA: Yes, B45 847 MFI 4/24 cleared on recheck 4/29 and remains negative    Acute Rejection Hx:  Yes: Treated with solu-medrol 4/29-5/1 and was tapered down to prednisone 5mg daily.     Present Maintenance Immunosuppression:  Tacrolimus, Mycophenolate mofetil and Prednisone    Transplant Coordinator: Eleanor Lund     Transplant Office Phone Number: [phone]     Immunosuppressant Medications       Immunosuppressive Agents Disp Start End     mycophenolate (GENERIC EQUIVALENT) 250 MG capsule    180 capsule 4/20/2023     Sig - Route: Take 3 capsules (750 mg) by mouth 2 times daily - Oral    Class: E-Prescribe    Renewals       Renewal requests to authorizing  provider (Amalia Marie NP) <b>prohibited</b>             tacrolimus (GENERIC EQUIVALENT) 0.5 MG capsule    30 capsule 2023     Sig - Route: Take 1 capsule (0.5 mg) by mouth every morning Total dose = 1.5 mg in the AM and 1 mg in the PM - Oral    Class: E-Prescribe     tacrolimus (GENERIC EQUIVALENT) 1 MG capsule    60 capsule 2023     Sig - Route: Take 1 capsule (1 mg) by mouth 2 times daily Total dose = 1.5 mg in the AM and 1 mg in the PM - Oral    Class: E-Prescribe            Possible Immunosuppression-related side effects:   []             headache  []             vivid dreams  []             irritability  []             cognitive difficuties  []             fine tremor  []             nausea  []             diarrhea  []             neuropathy      []             edema  []             renal calcineurin toxicity  []             hyperkalemia  []             post-transplant diabetes  []             decreased appetite  []             increased appetite  []             other:  [x]             none    Prescription Medications as of 2023         Rx Number Disp Refills Start End Last Dispensed Date Next Fill Date Owning Pharmacy    biotin 1000 MCG TABS tablet            Sig: Take 500 mcg by mouth daily    Class: Historical    Route: Oral    Cyanocobalamin 500 MCG TBDP            Sig: Take 500 mcg by mouth daily    Class: Historical    Route: Oral    hydrOXYzine (ATARAX) 25 MG tablet  15 tablet 0 2023    [place], [place] - [address] [facility]    Sig: Take 1 tablet (25 mg) by mouth every 4 hours as needed for anxiety or other (adjuvant pain)    Class: E-Prescribe    Route: Oral    Renewals       Renewal requests to authorizing provider (Amalia Marie NP) <b>prohibited</b>            levonorgestrel (MIRENA) 20 MCG/DAY IUD            Si each by Intrauterine route Replacement every 7 years. Placed 2019    Class: Historical    Route: Intrauterine     magnesium oxide (MAG-OX) 400 MG tablet  180 tablet 2 5/22/2023    [facility] - [place], [place] - [address] [facility]    Sig: Take 2 tablets (800 mg) by mouth 2 times daily    Class: E-Prescribe    Route: Oral    methocarbamol (ROBAXIN) 500 MG tablet  20 tablet 0 4/20/2023    [address]    Sig: Take 1 tablet (500 mg) by mouth 4 times daily as needed for muscle spasms    Class: E-Prescribe    Route: Oral    mycophenolate (GENERIC EQUIVALENT) 250 MG capsule  180 capsule 11 4/20/2023    [address]    Sig: Take 3 capsules (750 mg) by mouth 2 times daily    Class: E-Prescribe    Route: Oral    Renewals       Renewal requests to authorizing provider (Amalia Marie, NP) <b>prohibited</b>            ondansetron (ZOFRAN ODT) 4 MG ODT tab  10 tablet 0 4/20/2023    [address]    Sig: Take 1 tablet (4 mg) by mouth every 6 hours as needed for nausea or vomiting    Class: E-Prescribe    Route: Oral    oxyCODONE (ROXICODONE) 5 MG tablet  15 tablet 0 4/20/2023    [address]    Sig: Take 1 tablet (5 mg) by mouth every 6 hours as needed for moderate pain    Class: E-Prescribe    Earliest Fill Date: 4/20/2023    Route: Oral    predniSONE (DELTASONE) 5 MG tablet  30 tablet 11 5/16/2023    [place], [place] - [address] [facility] [phone]    Sig: Take 1 tablet (5 mg) by mouth daily Or until discussed in clinic    Class: E-Prescribe    Notes to Pharmacy: TXP DT 4/13/2023 (Kidney), 12/5/2014 (Kidney) TXP Dischg DT 4/20/2023 DX Kidney replaced by transplant Z94.0 TX Center [facility] ([place], [place])    Route: Oral    sulfamethoxazole-trimethoprim (BACTRIM) 400-80 MG tablet  30 tablet 11 5/16/2023     [phone]    Sig: Take 1 tablet by mouth daily    Class: E-Prescribe    Route: Oral    tacrolimus (GENERIC EQUIVALENT) 0.5 MG capsule  30 capsule 11 5/11/2023    [facility] [address] [address]     Sig: Take 1 capsule (0.5 mg) by mouth every morning Total dose = 1.5 mg in the AM and 1 mg in the PM    Class: E-Prescribe    Route: Oral    tacrolimus (GENERIC EQUIVALENT) 1 MG capsule  60 capsule 11 5/11/2023    [place] [address] [address]    Sig: Take 1 capsule (1 mg) by mouth 2 times daily Total dose = 1.5 mg in the AM and 1 mg in the PM    Class: E-Prescribe    Route: Oral    valGANciclovir (VALCYTE) 450 MG tablet  60 tablet 5 5/22/2023    [place] [address] [address]     Sig: Take 2 tablets (900 mg) by mouth daily Until directed to increase dose per transplant team based on improving renal function.    Class: E-Prescribe    Route: Oral    vedolizumab (ENTYVIO) 60 MG/ML injection            Sig: Inject 300 mg into the vein once every six weeks    Class: Historical    Route: Intravenous    vitamin D3 (CHOLECALCIFEROL) 50 mcg (2000 units) tablet            Sig: Take 1 tablet by mouth daily    Class: Historical    Route: Oral          Clinic-Administered Medications as of 5/22/2023         Dose Frequency Start End    levofloxacin (LEVAQUIN) tablet 500 mg (Completed) 500 mg ONCE 5/22/2023 5/22/2023    Admin Instructions: Administer at least 2 hours before or 4 hours after aluminum, calcium, iron, zinc or magnesium containing medications. May be taken with food or on an empty stomach.  Do not administer alone with a dairy product like milk or yogurt or calcium-fortified juice,  but may be administered with a meal containing dairy.    Hold tube feeding 1 hour before and 1 hour after administration    Route: Oral    lidocaine  (XYLOCAINE) 2 % external gel (Completed)  ONCE 5/22/2023 5/22/2023    Class: E-Prescribe    Route: Urethral            O:         Latest Ref Rng & Units 5/22/2023     9:03 AM 5/19/2023     8:11 AM 5/17/2023     3:31 PM 5/15/2023     8:42 AM 5/12/2023     7:40 AM   Transplant Immunosuppression Labs   Creat 0.51 - 0.95 mg/dL 1.16   1.19   0.93   1.00   1.07     Urea Nitrogen 6.0 - 20.0 mg/dL 12.2   9.7   14.6   17.3   18.0     WBC 4.0 - 11.0 10e3/uL 4.2   3.4   4.4   5.0   6.6     Neutrophil %   88       ANEU 1.6 - 8.3 10e3/uL   3.9           Chemistries:   Recent Labs   Lab Test 05/22/23  0903   BUN 12.2   CR 1.16*   GFRESTIMATED 64   *     Lab Results   Component Value Date    A1C 5.3 04/03/2023     Recent Labs   Lab Test 05/17/23  1531   ALBUMIN 4.1   BILITOTAL 0.3   ALKPHOS 66   AST 19   ALT 13     Urine Studies:  Recent Labs   Lab Test 04/29/23  1211   COLOR Light Yellow   APPEARANCE Clear   URINEGLC Negative   URINEBILI Negative   URINEKETONE Negative   SG 1.013   UBLD Negative   URINEPH 7.5*   PROTEIN 20*   NITRITE Negative   LEUKEST Negative   RBCU 2   WBCU 2     Recent Labs   Lab Test 01/14/21  1200 12/03/20  0915   UTPG 2.09* 1.36*     Hematology:   Recent Labs   Lab Test 05/22/23  0903 05/19/23  0811 05/17/23  1531   HGB 10.5* 10.2* 9.6*    185 197   WBC 4.2 3.4* 4.4     Coags:   Recent Labs   Lab Test 04/29/23  0547 04/17/23  0904   INR 1.14 1.27*     HLA antibodies:   SA1 Hi Risk David   Date Value Ref Range Status   11/11/2019 None  Final     SA1 HI RISK DAVID   Date Value Ref Range Status   05/12/2023 B:76  Final     SA1 Mod Risk David   Date Value Ref Range Status   11/11/2019 A:2 B:42 76  Final     SA1 MOD RISK DAVID   Date Value Ref Range Status   05/12/2023 A:36B:42  Final     SA2 Hi Risk David   Date Value Ref Range Status   11/11/2019 DQ:2 DQA:05  Final     SA2 HI RISK DAVID   Date Value Ref Range Status   05/12/2023 DQA:05  Final     SA2 Mod Risk David   Date Value Ref Range Status   11/11/2019  DQ:7 DQA:04 06  Final     SA2 MOD RISK DAVID   Date Value Ref Range Status   05/12/2023 DQ:7  Final       Assessment: Caity Horan is doing well s/p LDKT:  Issues we addressed during her visit include:    Plan:    1. Graft function: Cr 1.16, adequate urine output. Current plan for closure biopsy 1st week of June per nephrology.   2. Counseled on exercise. Ok to slowly resume exercise at 6 weeks post-op but advised to avoid weights >20lbs or core activities.   Followup: When back in town either August 9th or 10th    Abida Ramos MD  PGY 1    I have reviewed history, examined patient and discussed plan with the fellow/resident/SRINIVASAN.  I concur with the findings in this note.       Patient was counseled on complications of incision and short and long term care to minimize infection/hernia risk.    Immunosuppressive regimen, management and long term risks discussed in detail. Changes, when applicable made as per orders.      Total Time: 20 min,   Counselling Time: 10 min.    .

## 2023-05-22 NOTE — PATIENT INSTRUCTIONS
Increase valcyte to 900 mg po every day    Ok to stay on the current dose of Mg oxide 800 mg po bid, if Mg<1.5, will need to adjust the dose or switch to a different form (Dr's Best)    Ok to stay off the phos tabs, try to eat high phosphorus diet (dairy)    Stent removal by transplant surgery today

## 2023-05-22 NOTE — NURSING NOTE
Cystoscopy - urethral stent removal    Prepped patient for procedure with no complications.   2% lidocaine gel injected into urethra via urojet.   Assisted Christi Alfonso NP with stent removal.  Administered 500mg Levaquin PO after procedure.  Patient was discharged in stable condition.    There were no vitals taken for this visit.       Allergies   Allergen Reactions     Bumetanide Other (See Comments)     Causes paralysis     Amoxicillin Rash         NATALIE BUTLER RN on 5/22/2023 at 11:37 AM

## 2023-05-22 NOTE — NURSING NOTE
Chief Complaint   Patient presents with     RECHECK       /78   Pulse 96   Temp 98.2  F (36.8  C) (Oral)   Wt 57.2 kg (126 lb 1.6 oz)   SpO2 100%   BMI 22.34 kg/m      Hiro Song on 5/22/2023 at 9:14 AM

## 2023-05-22 NOTE — PROCEDURES
Transplant Surgery  Operative Note    Preop dx: Status post Living Unrelated Donor kidney  Re-transplant.  Post op dx: same   Procedure: Flexible cystoscopy and ureteral stent removal   Surgeon: symone quintana cnp  Assistant: horace quiroz RN  Anesthesia: local  EBL: 0   Specimens: none.  Findings: none abnormal. Stent inspected and noted to be intact.   Indication: The patient is status post kidney transplant and the ureteral stent is no longer needed.    Procedure: The patient was positioned supine on the table.  The groin was sterilely prepped and draped in the usual fashion. Time out was done. Urojet was applied to the urethra. A flexible cystoscope was inserted and advanced thru the urethra into the bladder. The stent was visualized and grasped. The cystoscope, grasper and stent were removed en-mass. The stent was visualized to be intact.  The bladder mucosa was normal in appearance. Antibiotics were administered. The patient tolerated the procedure well and was asked to void before leaving the clinic.

## 2023-05-24 ENCOUNTER — TELEPHONE (OUTPATIENT)
Dept: TRANSPLANT | Facility: CLINIC | Age: 33
End: 2023-05-24

## 2023-05-24 ENCOUNTER — LAB (OUTPATIENT)
Dept: LAB | Facility: CLINIC | Age: 33
End: 2023-05-24
Payer: COMMERCIAL

## 2023-05-24 DIAGNOSIS — Z94.0 KIDNEY REPLACED BY TRANSPLANT: ICD-10-CM

## 2023-05-24 DIAGNOSIS — Z20.828 CONTACT WITH AND (SUSPECTED) EXPOSURE TO OTHER VIRAL COMMUNICABLE DISEASES: ICD-10-CM

## 2023-05-24 DIAGNOSIS — Z48.298 AFTERCARE FOLLOWING ORGAN TRANSPLANT: ICD-10-CM

## 2023-05-24 DIAGNOSIS — Z79.899 ENCOUNTER FOR LONG-TERM CURRENT USE OF MEDICATION: ICD-10-CM

## 2023-05-24 LAB
ANION GAP SERPL CALCULATED.3IONS-SCNC: 7 MMOL/L (ref 7–15)
BUN SERPL-MCNC: 9.1 MG/DL (ref 6–20)
CALCIUM SERPL-MCNC: 9.6 MG/DL (ref 8.6–10)
CHLORIDE SERPL-SCNC: 107 MMOL/L (ref 98–107)
CREAT SERPL-MCNC: 1.12 MG/DL (ref 0.51–0.95)
DEPRECATED HCO3 PLAS-SCNC: 24 MMOL/L (ref 22–29)
ERYTHROCYTE [DISTWIDTH] IN BLOOD BY AUTOMATED COUNT: 15 % (ref 10–15)
GFR SERPL CREATININE-BSD FRML MDRD: 67 ML/MIN/1.73M2
GLUCOSE SERPL-MCNC: 99 MG/DL (ref 70–99)
HCT VFR BLD AUTO: 33.7 % (ref 35–47)
HGB BLD-MCNC: 10.6 G/DL (ref 11.7–15.7)
MAGNESIUM SERPL-MCNC: 1.5 MG/DL (ref 1.7–2.3)
MCH RBC QN AUTO: 31.1 PG (ref 26.5–33)
MCHC RBC AUTO-ENTMCNC: 31.5 G/DL (ref 31.5–36.5)
MCV RBC AUTO: 99 FL (ref 78–100)
MYCOPHENOLATE SERPL LC/MS/MS-MCNC: 4.89 MG/L (ref 1–3.5)
MYCOPHENOLATE-G SERPL LC/MS/MS-MCNC: 74.5 MG/L (ref 30–95)
PHOSPHATE SERPL-MCNC: 2.5 MG/DL (ref 2.5–4.5)
PLATELET # BLD AUTO: 180 10E3/UL (ref 150–450)
POTASSIUM SERPL-SCNC: 4.9 MMOL/L (ref 3.4–5.3)
RBC # BLD AUTO: 3.41 10E6/UL (ref 3.8–5.2)
SODIUM SERPL-SCNC: 138 MMOL/L (ref 136–145)
TACROLIMUS BLD-MCNC: 12.2 UG/L (ref 5–15)
TME LAST DOSE: ABNORMAL H
TME LAST DOSE: ABNORMAL H
TME LAST DOSE: NORMAL H
TME LAST DOSE: NORMAL H
WBC # BLD AUTO: 3.8 10E3/UL (ref 4–11)

## 2023-05-24 PROCEDURE — 84100 ASSAY OF PHOSPHORUS: CPT | Performed by: PATHOLOGY

## 2023-05-24 PROCEDURE — 83735 ASSAY OF MAGNESIUM: CPT | Performed by: PATHOLOGY

## 2023-05-24 PROCEDURE — 85027 COMPLETE CBC AUTOMATED: CPT | Performed by: PATHOLOGY

## 2023-05-24 PROCEDURE — 80197 ASSAY OF TACROLIMUS: CPT | Mod: 90 | Performed by: PATHOLOGY

## 2023-05-24 PROCEDURE — 80180 DRUG SCRN QUAN MYCOPHENOLATE: CPT | Mod: 90 | Performed by: PATHOLOGY

## 2023-05-24 PROCEDURE — 80048 BASIC METABOLIC PNL TOTAL CA: CPT | Performed by: PATHOLOGY

## 2023-05-24 PROCEDURE — 99000 SPECIMEN HANDLING OFFICE-LAB: CPT | Performed by: PATHOLOGY

## 2023-05-24 PROCEDURE — 36415 COLL VENOUS BLD VENIPUNCTURE: CPT | Performed by: PATHOLOGY

## 2023-05-24 NOTE — TELEPHONE ENCOUNTER
Post Kidney and Pancreas Transplant Team Conference  Date: 5/24/2023  Transplant Coordinator: Eleanor Lund     Attendees:  [x]  Dr. Mar [x] Sujey Ku, RN [x] Radha Rowe LPN     []  Dr. Agustin [x] Mable Dewitt RN [] Angela Carson LPN   [x]  Dr. Arora [x] Eleanor Lund RN    [x]  Dr. Barrera [] Ping Diaz RN [x] Km Pope, PharmD   [] Dr. Guo [] Tena Brennan, MARIS    [] Dr. Sepulveda [] Jair Mcarthur RN    [] Dr. Ingram [] Carlie Serrato RN [] Jazmine Acosta RN   [] Dr. Romero [] Mela Ray, MARIS    []  Dr. Bernard [] More Sidhu RN    [x] Dr. Haines [x] Amalia Tavarez, MARIS    [x] Christi Alfonso NP [] Peri Desir RN        Verbal Plan Read Back:   Cr baseline = 1 - 1.2  Closure biopsy not recommended at this time    Routed to RN Coordinator   Radha Rowe LPN    Reviewed w patient. Patient v/u of plan. Repeat labs on Tuesday.

## 2023-05-25 NOTE — PROGRESS NOTES
----- Message from Anais Evans sent at 5/25/2023 10:13 AM CDT -----  Patient called and stated that he need a refill of his tramadol called into [facility] on  if any questions please give her a call at [phone]     GI CLINIC VISIT    CC/REFERRING MD:  Raquel Mensah  REASON FOR CONSULTATION: Rectal bleeding and change in bowel habits    ASSESSMENT/PLAN:  27-year-old female with past medical history of iron deficiency anemia, IgA nephropathy status post kidney transplantation in 2014 who presents to the GI clinic for consultation regarding rectal bleeding and change in bowel habits:    1.  Change in bowel habits with rectal bleeding: Patient's current presentation is of course worrisome for inflammatory bowel disease, in the setting of other autoimmune disease and chronic iron deficiency anemia.  She continues to have periods of formed stool during these episodes and seems to be less likely consistent with an infection.  At this time we will obtain baseline laboratory studies and obtain a colonoscopy to evaluate for any inflammatory causes of patient's symptoms.  Important to determine source of blood loss so we will also obtain an upper endoscopy at the same time.  --Laboratory studies to be obtained at patient's earliest convenience  --Colonoscopy and upper endoscopy in the near future  --Given significant constipation in the past suggested patient to start MiraLAX  twice daily.  I have also encouraged patient to start a clear liquid diet at least 2 days prior to prep.    2.  Iron deficiency anemia: Patient follows closely with hematology and her transplant team.  She has also been evaluated by infectious disease.  Various etiologies have been proposed as potential causes.  She has light menstrual periods however with vegetarian diet unlikely she takes in enough iron to make up for menstrual blood loss.  There has been a question of PTLD as a potential source, although no other suggestion that she would have PTLD with most recent imaging and no clinical features.  --Pending findings of endoscopy, determine next steps in treatment and effective iron replacement    RTC after upper endoscopy and colonoscopy     Thank you for  this consultation.  It was a pleasure to participate in the care of this patient; please contact us with any further questions.       Rupert Dsouza PA-C  Division of Gastroenterology, Hepatology and Nutrition  [facility] [place]      HPI  27-year-old female with past medical history of iron deficiency anemia, IgA nephropathy status post kidney transplantation in 2014 who presents to the GI clinic for consultation regarding rectal bleeding and change in bowel habits.  Patient reports that she first began having GI problems with alternating constipation and diarrhea in spring 2012.  She attributes this change in bowel pattern to stress with college.  She was evaluated by [place] [facility] and at that time symptoms were attributed to IBS and instructed to initiate fiber and MiraLAX.  It was recommended to undergo a colonoscopy and upper endoscopy but patient never completed either.  Patient initiated these changes and identified triggers to symptoms to include spinach and red sauces.  She also feels that fiber was effective in helping symptoms.  Symptoms largely resolved for a period of time.  She developed lower extremity edema, fatigue and hypertension and diagnosed with IgA nephropathy in September 2014 followed by dialysis in November 2014 and kidney transplantation in December 2014.  She was again asymptomatic, completely for approximately 2 years.  She reports no GI related problems during this time.  Winter 2016 transplant team discovered an elevated EBV level and new found anemia.  During this time patient developed significant diarrhea with blood in her stool.  It was recommended for the patient to undergo a colonoscopy and upper endoscopy at this time however patient had yet to complete this.  Patient had symptoms consistent with bloating, alternation of constipation and diarrhea with occasional accidents.  Symptoms went on until May 2017 and then completely resolved.  GI symptoms again appeared  in late December 2017.  This episode was accompanied by weight loss and blood in the stool with each bowel movement.  These symptoms are consistent with what is currently present.  She reports urgency but no fecal incontinence or accidents.  She notes abdominal pain right before the bowel movement in her lower abdomen and sometimes while having a bowel movement particularly with excess flatulence or formed stool.  She is not experiencing any bloating and does not feel any discomfort unless prior to a bowel movement or during a bowel movement.  She notes hesitancy passing flatus.  She denies any upper GI symptoms such as nausea or vomiting.  She is currently having anywhere from 4-8 bowel movements.  She may have more bowel movements if she drinks coffee that day.  Consistency is described as [place] stool scale 4-7 and often is either type VII or type IV.  She denies any melanotic stools.  Blood in the stool is described as bright red blood in the toilet and occasionally will see clots.  She has nighttime stools approximately 2-3 times per night and is awake starting at 3 AM with bowel movements. No rashes, no joint pain, no eye problems. No IBD or CRC in family.    ROS:    No fevers or chills  + weight loss (7 lbs over last year)  No blurry vision, double vision or change in vision  No sore throat  No lymphadenopathy  + headache  No paraesthesias, or weakness in a limb  + shortness of breath or wheezing  No chest pain or pressure  No arthralgias or myalgias  No rashes or skin changes  No odynophagia or dysphagia  + BRBPR, hematochezia  No melena  No dysuria, frequency or urgency  + cold intolerance   + anxiety     PROBLEM LIST  Patient Active Problem List    Diagnosis Date Noted     Anemia, iron deficiency 10/13/2017     Priority: Medium     EBV (Nicole-Barr virus) viremia 08/15/2017     Priority: Medium     Aftercare following organ transplant 12/18/2016     Priority: Medium     Hypomagnesemia 02/25/2015      Priority: Medium     Immunosuppressed status (H) 12/10/2014     Priority: Medium     Kidney replaced by transplant 12/05/2014     Priority: Medium     Living 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) 12/5/2014       IgA nephropathy 11/23/2014     Priority: Medium     biopsy proven         PERTINENT PAST MEDICAL HISTORY:  Past Medical History:   Diagnosis Date     Anemia in stage 5 chronic kidney disease (H)      Anemia in stage 5 chronic kidney disease (H) 10/27/2014     ESRD (end stage renal disease) on dialysis (H)      HTN (hypertension) 10/27/2014     Hypertension 10/2014     IgA nephropathy     biopsy proven       PREVIOUS SURGERIES:  Past Surgical History:   Procedure Laterality Date     EXTRACTION(S) DENTAL       TRANSPLANT KIDNEY RECIPIENT LIVING RELATED N/A 12/5/2014    Procedure: TRANSPLANT KIDNEY RECIPIENT LIVING RELATED;  Surgeon: Dale Middleton MD;  Location: [facility]       PREVIOUS ENDOSCOPY:  None    ALLERGIES:     Allergies   Allergen Reactions     Amoxicillin Rash       PERTINENT MEDICATIONS:    Current Outpatient Prescriptions:      magnesium oxide (MAG-OX) 400 MG tablet, Take 1 tablet (400 mg) by mouth daily, Disp: 90 tablet, Rfl: 3     tacrolimus (GENERIC EQUIVALENT) 0.5 MG capsule, Take 3 capsules (1.5 mg) by mouth 2 times daily, Disp: 180 capsule, Rfl: 11     mycophenolate (CELLCEPT - GENERIC EQUIVALENT) 250 MG capsule, Take 1 capsule (250 mg) by mouth 2 times daily, Disp: 120 capsule, Rfl: 0     Cyanocobalamin (B-12) 1000 MCG TBCR, Take 1,000 mcg by mouth daily (Patient not taking: Reported on 2/22/2018), Disp: 100 tablet, Rfl: 1    SOCIAL HISTORY:  Social History     Social History     Marital status: Single     Spouse name: N/A     Number of children: N/A     Years of education: 16     Occupational History     advertising [employer]     Social History Main Topics     Smoking status: Never Smoker     Smokeless tobacco: Never Used     Alcohol use Yes     Drug use: No     Sexual activity: No     Other  "Topics Concern     Blood Transfusions No     Seat Belt Yes     Social History Narrative   Advertising and     FAMILY HISTORY:  FH of CRC: None  FH of IBD: None  Family History   Problem Relation Age of Onset     KIDNEY DISEASE No family hx of      Past/family/social history reviewed and no changes    PHYSICAL EXAMINATION:  Constitutional: aaox3, cooperative, pleasant, not dyspneic/diaphoretic, no acute distress  Vitals reviewed: /66 (BP Location: Left arm, Patient Position: Chair, Cuff Size: Adult Regular)  Pulse 97  Temp 98.6  F (37  C)  Ht 1.6 m (5' 2.99\")  Wt 59 kg (130 lb)  SpO2 99%  BMI 23.04 kg/m2  Wt:   Wt Readings from Last 2 Encounters:   02/22/18 59 kg (130 lb)   12/01/17 62.6 kg (137 lb 14.4 oz)      Eyes: Sclera anicteric/injected  Ears/nose/mouth/throat: Normal oropharynx without ulcers or exudate, mucus membranes moist, hearing intact  Neck: supple, thyroid normal size  CV: No edema  Respiratory: Unlabored breathing  Lymph: No axillary, submandibular, supraclavicular or inguinal lymphadenopathy  Abd: Nondistended, +bs, no hepatosplenomegaly, nontender, no peritoneal signs  Skin: warm, perfused, no jaundice  Psych: Normal affect  MSK: Normal gait      PERTINENT STUDIES:    Orders Only on 02/01/2018   Component Date Value Ref Range Status     WBC 02/01/2018 9.8  4.0 - 11.0 10e9/L Final     RBC Count 02/01/2018 3.10* 3.8 - 5.2 10e12/L Final     Hemoglobin 02/01/2018 9.5* 11.7 - 15.7 g/dL Final     Hematocrit 02/01/2018 30.6* 35.0 - 47.0 % Final     MCV 02/01/2018 99  78 - 100 fl Final     MCH 02/01/2018 30.6  26.5 - 33.0 pg Final     MCHC 02/01/2018 31.0* 31.5 - 36.5 g/dL Final     RDW 02/01/2018 13.6  10.0 - 15.0 % Final     Platelet Count 02/01/2018 323  150 - 450 10e9/L Final     Sodium 02/01/2018 140  133 - 144 mmol/L Final     Potassium 02/01/2018 4.4  3.4 - 5.3 mmol/L Final     Chloride 02/01/2018 108  94 - 109 mmol/L Final     Carbon Dioxide 02/01/2018 26  20 - 32 mmol/L " Final     Anion Gap 02/01/2018 7  3 - 14 mmol/L Final     Glucose 02/01/2018 103* 70 - 99 mg/dL Final     Urea Nitrogen 02/01/2018 17  7 - 30 mg/dL Final     Creatinine 02/01/2018 1.04  0.52 - 1.04 mg/dL Final     GFR Estimate 02/01/2018 63  >60 mL/min/1.7m2 Final     GFR Estimate If Black 02/01/2018 77  >60 mL/min/1.7m2 Final     Calcium 02/01/2018 8.3* 8.5 - 10.1 mg/dL Final     Tacrolimus Last Dose 02/01/2018 01/31/18  2145   Final     Tacrolimus Level 02/01/2018 6.1  5.0 - 15.0 ug/L Final     EBV DNA Copies/mL 02/01/2018 EBV DNA Not Detected  EBVNEG^EBV DNA Not Detected [Copies]/mL Final     EBV DNA Log of Copies 02/01/2018 Not Calculated  <2.7 [Log_copies]/mL Final     BK Virus Specimen 02/01/2018 Plasma   Final     BK Virus Result 02/01/2018 BK Virus DNA Not Detected  BKNEG^BK Virus DNA Not Detected copies/mL Final     BK Virus Log 02/01/2018 Not Calculated  <2.7 Log copies/mL Final     Protein Random Urine 02/01/2018 0.08  g/L Final     Protein Total Urine g/gr Creatinine 02/01/2018 0.05  0 - 0.2 g/g Cr Final     Iron 02/01/2018 36  35 - 180 ug/dL Final     Iron Binding Cap 02/01/2018 291  240 - 430 ug/dL Final     Iron Saturation Index 02/01/2018 12* 15 - 46 % Final     Ferritin 02/01/2018 14  12 - 150 ng/mL Final     Creatinine Urine 02/01/2018 151  mg/dL Final

## 2023-05-26 ENCOUNTER — TELEPHONE (OUTPATIENT)
Dept: PHARMACY | Facility: CLINIC | Age: 33
End: 2023-05-26

## 2023-05-26 NOTE — TELEPHONE ENCOUNTER
Clinical Pharmacy Consult:                                                      Transplant Specific: 1 month post transplant mediaction review  Date of Transplant: 04/13/2023  Type of Transplant: kidney  First Transplant: no  History of rejection: yes    Immunosuppression Regimen   TAC 1.5mg qAM & 1mg qPM, Prednisone 5mg qAM  and MMF 750mg qAM & 750mg qPM  Patient specific goal: 8-10  Most recent level: 12.2, date 5/24/23  Immunosuppressant Levels:  Supratherapeutic  Pt adherent to lab draws: yes  Scr:   Lab Results   Component Value Date    CR 1.12 05/24/2023    CR 1.31 04/16/2021     Side effects:      Prophylactic Medications  Antibacterial:  Bactrim ss daily  Scheduled Discontinue Date: Lifelong    Antifungal: Not needed thus far  Scheduled Discontinue Date: N/A    Antiviral: CrCl >/=60 mL/minute: Valcyte 900mg daily   Scheduled Discontinue Date: 6 months    Acid Reducer: not on  Scheduled Reviewed Date: N/A    Thrombosis Prevention: not on   Scheduled Discontinue Date:      Blood Pressure Management  Frequency of home Blood Pressure checks:   Most recent home BP:    Patient Blood pressure goal: <140/90  Patient blood pressure at goal:       Hospitalizations/ER visits since last assessment: 0      Med rec/DUR performed: yes  Med Rec Discrepancies: no    Could not contact for 1 month med review. Last tacro level was high but dose not reduced.    Chacorta Bowles RPH

## 2023-06-03 ENCOUNTER — HEALTH MAINTENANCE LETTER (OUTPATIENT)
Age: 33
End: 2023-06-03

## 2023-06-07 ENCOUNTER — TELEPHONE (OUTPATIENT)
Dept: TRANSPLANT | Facility: CLINIC | Age: 33
End: 2023-06-07
Payer: COMMERCIAL

## 2023-06-07 NOTE — TELEPHONE ENCOUNTER
Post Kidney and Pancreas Transplant Team Conference  Date: 6/7/2023  Transplant Coordinator: Eleanor Lund     Attendees:  [x]  Dr. Mar [x] Sujey Ku, RN [x] Radha Rowe LPN     []  Dr. Agustin [x] Mable Dewitt RN [] Angela Carson LPN   []  Dr. Arora [x] Eleanor Lund, MARIS    [x]  Dr. Barrera [] Ping Diaz RN [x] Km oPpe, PharmD   [] Dr. Guo [] Tena Brennan RN    [] Dr. Sepulveda [] Jair Mcarthur RN    [] Dr. Ingram [] Carlie Serrato RN [] Jazmine Acosta RN   [] Dr. Romero [] Mela Ray RN    []  Dr. Bernard [] More Sidhu RN    [] Dr. Haines [x] Amalia Tavarez RN    [x] Christi Alfonso, ALEJANDRO [] Peri Desir RN      Verbal Plan Read Back:   Continue current treatment plan    Routed to RN Coordinator   Radha Rowe LPN

## 2023-06-19 DIAGNOSIS — Z94.0 KIDNEY REPLACED BY TRANSPLANT: Primary | ICD-10-CM

## 2023-06-19 DIAGNOSIS — E83.42 HYPOMAGNESEMIA: Primary | ICD-10-CM

## 2023-06-22 NOTE — PROGRESS NOTES
Marilyn Moreno Bobbi J, RN Hi Bobbi,     Would you please remove the infusion appointment request line from the therapy plan for Entyvio?     Thank you,   Marilyn SALEEM [facility] Scheduling   [phone] (option 3, then option 2)           Previous Messages       ----- Message -----   From: Fiona Rios   Sent: 6/19/2023  12:23 PM CDT   To: Marilyn Moreno; [facility]   Subject: RE: ARLEN Fraire,   It looks like she is.  This is want I am seeing in the notes.  Infusion/SOC in [facility] IS on Monday 6/26 at 1pm.  I hope this helps.   Thank you   ----- Message -----   From: Marilyn Moreno   Sent: 6/19/2023  12:08 PM CDT   To: [facility]   Subject: ENTYVIO                                           Hello,     Can you please confirm Caity is doing her Entyvio appts through [facility]?     Thank you,   Marilyn SALEEM [facility]   [facility] Scheduling   [phone] (option 3, then option 2)

## 2023-06-26 ENCOUNTER — LAB REQUISITION (OUTPATIENT)
Dept: LAB | Facility: CLINIC | Age: 33
End: 2023-06-26
Payer: COMMERCIAL

## 2023-06-26 DIAGNOSIS — K51.011 ULCERATIVE (CHRONIC) PANCOLITIS WITH RECTAL BLEEDING (H): ICD-10-CM

## 2023-06-26 LAB
ALBUMIN SERPL BCG-MCNC: 4.3 G/DL (ref 3.5–5.2)
ALP SERPL-CCNC: 70 U/L (ref 35–104)
ALT SERPL W P-5'-P-CCNC: 12 U/L (ref 0–50)
ANION GAP SERPL CALCULATED.3IONS-SCNC: 14 MMOL/L (ref 7–15)
AST SERPL W P-5'-P-CCNC: 27 U/L (ref 0–45)
BASOPHILS # BLD MANUAL: 0 10E3/UL (ref 0–0.2)
BASOPHILS NFR BLD MANUAL: 0 %
BILIRUB DIRECT SERPL-MCNC: <0.2 MG/DL (ref 0–0.3)
BILIRUB SERPL-MCNC: 0.2 MG/DL
BUN SERPL-MCNC: 12.4 MG/DL (ref 6–20)
BURR CELLS BLD QL SMEAR: SLIGHT
CALCIUM SERPL-MCNC: 9.3 MG/DL (ref 8.6–10)
CHLORIDE SERPL-SCNC: 103 MMOL/L (ref 98–107)
CREAT SERPL-MCNC: 0.95 MG/DL (ref 0.51–0.95)
CRP SERPL-MCNC: <3 MG/L
DEPRECATED HCO3 PLAS-SCNC: 17 MMOL/L (ref 22–29)
EOSINOPHIL # BLD MANUAL: 0.1 10E3/UL (ref 0–0.7)
EOSINOPHIL NFR BLD MANUAL: 2 %
ERYTHROCYTE [DISTWIDTH] IN BLOOD BY AUTOMATED COUNT: 13.1 % (ref 10–15)
ERYTHROCYTE [SEDIMENTATION RATE] IN BLOOD BY WESTERGREN METHOD: 15 MM/HR (ref 0–20)
GFR SERPL CREATININE-BSD FRML MDRD: 81 ML/MIN/1.73M2
GLUCOSE SERPL-MCNC: 98 MG/DL (ref 70–99)
HCT VFR BLD AUTO: 35.5 % (ref 35–47)
HGB BLD-MCNC: 11.6 G/DL (ref 11.7–15.7)
HOLD SPECIMEN: NORMAL
LYMPHOCYTES # BLD MANUAL: 0.2 10E3/UL (ref 0.8–5.3)
LYMPHOCYTES NFR BLD MANUAL: 6 %
MCH RBC QN AUTO: 32 PG (ref 26.5–33)
MCHC RBC AUTO-ENTMCNC: 32.7 G/DL (ref 31.5–36.5)
MCV RBC AUTO: 98 FL (ref 78–100)
MONOCYTES # BLD MANUAL: 0.2 10E3/UL (ref 0–1.3)
MONOCYTES NFR BLD MANUAL: 5 %
NEUTROPHILS # BLD MANUAL: 3 10E3/UL (ref 1.6–8.3)
NEUTROPHILS NFR BLD MANUAL: 87 %
NRBC # BLD AUTO: 0 10E3/UL
NRBC BLD MANUAL-RTO: 1 %
PLAT MORPH BLD: ABNORMAL
PLATELET # BLD AUTO: 222 10E3/UL (ref 150–450)
POTASSIUM SERPL-SCNC: 4.4 MMOL/L (ref 3.4–5.3)
PROT SERPL-MCNC: 7 G/DL (ref 6.4–8.3)
RBC # BLD AUTO: 3.63 10E6/UL (ref 3.8–5.2)
RBC MORPH BLD: ABNORMAL
SODIUM SERPL-SCNC: 134 MMOL/L (ref 136–145)
WBC # BLD AUTO: 3.5 10E3/UL (ref 4–11)

## 2023-06-26 PROCEDURE — 86140 C-REACTIVE PROTEIN: CPT | Performed by: PHYSICIAN ASSISTANT

## 2023-06-26 PROCEDURE — 85007 BL SMEAR W/DIFF WBC COUNT: CPT | Performed by: PHYSICIAN ASSISTANT

## 2023-06-26 PROCEDURE — 82248 BILIRUBIN DIRECT: CPT | Performed by: PHYSICIAN ASSISTANT

## 2023-06-26 PROCEDURE — 80053 COMPREHEN METABOLIC PANEL: CPT | Performed by: PHYSICIAN ASSISTANT

## 2023-06-26 PROCEDURE — 85027 COMPLETE CBC AUTOMATED: CPT | Performed by: PHYSICIAN ASSISTANT

## 2023-06-26 PROCEDURE — 85652 RBC SED RATE AUTOMATED: CPT | Performed by: PHYSICIAN ASSISTANT

## 2023-06-27 ENCOUNTER — LAB (OUTPATIENT)
Dept: LAB | Facility: CLINIC | Age: 33
End: 2023-06-27
Attending: INTERNAL MEDICINE
Payer: COMMERCIAL

## 2023-06-27 ENCOUNTER — OFFICE VISIT (OUTPATIENT)
Dept: TRANSPLANT | Facility: CLINIC | Age: 33
End: 2023-06-27
Attending: INTERNAL MEDICINE
Payer: COMMERCIAL

## 2023-06-27 ENCOUNTER — APPOINTMENT (OUTPATIENT)
Dept: URBAN - METROPOLITAN AREA CLINIC 258 | Age: 33
Setting detail: DERMATOLOGY
End: 2023-06-27

## 2023-06-27 VITALS
HEART RATE: 85 BPM | OXYGEN SATURATION: 99 % | TEMPERATURE: 97.9 F | BODY MASS INDEX: 23.22 KG/M2 | WEIGHT: 131.1 LBS | RESPIRATION RATE: 16 BRPM | SYSTOLIC BLOOD PRESSURE: 106 MMHG | DIASTOLIC BLOOD PRESSURE: 72 MMHG

## 2023-06-27 VITALS — HEIGHT: 63 IN | WEIGHT: 124 LBS

## 2023-06-27 DIAGNOSIS — L90.5 SCAR CONDITIONS AND FIBROSIS OF SKIN: ICD-10-CM

## 2023-06-27 DIAGNOSIS — L81.4 OTHER MELANIN HYPERPIGMENTATION: ICD-10-CM

## 2023-06-27 DIAGNOSIS — Z94.0 KIDNEY REPLACED BY TRANSPLANT: ICD-10-CM

## 2023-06-27 DIAGNOSIS — E55.9 VITAMIN D DEFICIENCY: ICD-10-CM

## 2023-06-27 DIAGNOSIS — Z48.298 AFTERCARE FOLLOWING ORGAN TRANSPLANT: Primary | ICD-10-CM

## 2023-06-27 DIAGNOSIS — D84.9 IMMUNOSUPPRESSED STATUS (H): ICD-10-CM

## 2023-06-27 DIAGNOSIS — N18.2 CKD (CHRONIC KIDNEY DISEASE) STAGE 2, GFR 60-89 ML/MIN: ICD-10-CM

## 2023-06-27 DIAGNOSIS — B07.8 OTHER VIRAL WARTS: ICD-10-CM

## 2023-06-27 DIAGNOSIS — Z48.298 AFTERCARE FOLLOWING ORGAN TRANSPLANT: ICD-10-CM

## 2023-06-27 DIAGNOSIS — E83.42 HYPOMAGNESEMIA: ICD-10-CM

## 2023-06-27 DIAGNOSIS — D63.1 ANEMIA IN STAGE 2 CHRONIC KIDNEY DISEASE: ICD-10-CM

## 2023-06-27 DIAGNOSIS — Z29.89 NEED FOR PNEUMOCYSTIS PROPHYLAXIS: ICD-10-CM

## 2023-06-27 DIAGNOSIS — N18.2 ANEMIA IN STAGE 2 CHRONIC KIDNEY DISEASE: ICD-10-CM

## 2023-06-27 DIAGNOSIS — Z79.899 ENCOUNTER FOR LONG-TERM CURRENT USE OF MEDICATION: ICD-10-CM

## 2023-06-27 DIAGNOSIS — Z20.828 CONTACT WITH AND (SUSPECTED) EXPOSURE TO OTHER VIRAL COMMUNICABLE DISEASES: ICD-10-CM

## 2023-06-27 LAB
ANION GAP SERPL CALCULATED.3IONS-SCNC: 9 MMOL/L (ref 7–15)
BUN SERPL-MCNC: 9.4 MG/DL (ref 6–20)
CALCIUM SERPL-MCNC: 9.5 MG/DL (ref 8.6–10)
CHLORIDE SERPL-SCNC: 107 MMOL/L (ref 98–107)
CREAT SERPL-MCNC: 1.13 MG/DL (ref 0.51–0.95)
DEPRECATED HCO3 PLAS-SCNC: 22 MMOL/L (ref 22–29)
ERYTHROCYTE [DISTWIDTH] IN BLOOD BY AUTOMATED COUNT: 13.1 % (ref 10–15)
GFR SERPL CREATININE-BSD FRML MDRD: 66 ML/MIN/1.73M2
GLUCOSE SERPL-MCNC: 96 MG/DL (ref 70–99)
HCT VFR BLD AUTO: 36.3 % (ref 35–47)
HGB BLD-MCNC: 11.7 G/DL (ref 11.7–15.7)
MAGNESIUM SERPL-MCNC: 1.5 MG/DL (ref 1.7–2.3)
MCH RBC QN AUTO: 31.4 PG (ref 26.5–33)
MCHC RBC AUTO-ENTMCNC: 32.2 G/DL (ref 31.5–36.5)
MCV RBC AUTO: 97 FL (ref 78–100)
PHOSPHATE SERPL-MCNC: 3.1 MG/DL (ref 2.5–4.5)
PLATELET # BLD AUTO: 221 10E3/UL (ref 150–450)
POTASSIUM SERPL-SCNC: 4.5 MMOL/L (ref 3.4–5.3)
RBC # BLD AUTO: 3.73 10E6/UL (ref 3.8–5.2)
SODIUM SERPL-SCNC: 138 MMOL/L (ref 136–145)
TACROLIMUS BLD-MCNC: 12.7 UG/L (ref 5–15)
TME LAST DOSE: NORMAL H
TME LAST DOSE: NORMAL H
WBC # BLD AUTO: 3 10E3/UL (ref 4–11)

## 2023-06-27 PROCEDURE — G0463 HOSPITAL OUTPT CLINIC VISIT: HCPCS | Performed by: INTERNAL MEDICINE

## 2023-06-27 PROCEDURE — 80197 ASSAY OF TACROLIMUS: CPT | Performed by: INTERNAL MEDICINE

## 2023-06-27 PROCEDURE — 85027 COMPLETE CBC AUTOMATED: CPT | Performed by: PATHOLOGY

## 2023-06-27 PROCEDURE — 84100 ASSAY OF PHOSPHORUS: CPT | Performed by: PATHOLOGY

## 2023-06-27 PROCEDURE — 83735 ASSAY OF MAGNESIUM: CPT | Performed by: PATHOLOGY

## 2023-06-27 PROCEDURE — 99215 OFFICE O/P EST HI 40 MIN: CPT | Mod: 24 | Performed by: INTERNAL MEDICINE

## 2023-06-27 PROCEDURE — OTHER PRESCRIPTION MEDICATION MANAGEMENT: OTHER

## 2023-06-27 PROCEDURE — OTHER MIPS QUALITY: OTHER

## 2023-06-27 PROCEDURE — 36415 COLL VENOUS BLD VENIPUNCTURE: CPT | Performed by: PATHOLOGY

## 2023-06-27 PROCEDURE — 87799 DETECT AGENT NOS DNA QUANT: CPT | Performed by: INTERNAL MEDICINE

## 2023-06-27 PROCEDURE — OTHER ADDITIONAL NOTES: OTHER

## 2023-06-27 PROCEDURE — 99213 OFFICE O/P EST LOW 20 MIN: CPT | Mod: 25

## 2023-06-27 PROCEDURE — 99000 SPECIMEN HANDLING OFFICE-LAB: CPT | Performed by: PATHOLOGY

## 2023-06-27 PROCEDURE — 80048 BASIC METABOLIC PNL TOTAL CA: CPT | Performed by: PATHOLOGY

## 2023-06-27 PROCEDURE — OTHER COUNSELING: OTHER

## 2023-06-27 PROCEDURE — OTHER PRESCRIPTION: OTHER

## 2023-06-27 PROCEDURE — OTHER BENIGN DESTRUCTION: OTHER

## 2023-06-27 PROCEDURE — 17110 DESTRUCT B9 LESION 1-14: CPT

## 2023-06-27 RX ORDER — HYDROQUINONE 4 %
4% CREAM (GRAM) TOPICAL QHS
Qty: 30 | Refills: 1 | Status: ERX | COMMUNITY
Start: 2023-06-27

## 2023-06-27 ASSESSMENT — LOCATION ZONE DERM
LOCATION ZONE: FEET
LOCATION ZONE: TRUNK
LOCATION ZONE: TOE

## 2023-06-27 ASSESSMENT — LOCATION SIMPLE DESCRIPTION DERM
LOCATION SIMPLE: PLANTAR SURFACE OF LEFT 3RD TOE
LOCATION SIMPLE: LEFT PLANTAR SURFACE
LOCATION SIMPLE: PLANTAR SURFACE OF LEFT 2ND TOE
LOCATION SIMPLE: ABDOMEN
LOCATION SIMPLE: PLANTAR SURFACE OF LEFT 1ST TOE

## 2023-06-27 ASSESSMENT — LOCATION DETAILED DESCRIPTION DERM
LOCATION DETAILED: RIGHT LATERAL ABDOMEN
LOCATION DETAILED: LEFT LATERAL PLANTAR 1ST TOE
LOCATION DETAILED: LEFT LATERAL ABDOMEN
LOCATION DETAILED: TIP OF LEFT 3RD TOE
LOCATION DETAILED: LEFT MEDIAL PLANTAR 1ST TOE
LOCATION DETAILED: PERIUMBILICAL SKIN
LOCATION DETAILED: LEFT PLANTAR FOREFOOT OVERLYING 2ND METATARSAL
LOCATION DETAILED: LEFT MEDIAL PLANTAR 2ND TOE
LOCATION DETAILED: LEFT PLANTAR FOREFOOT OVERLYING 5TH METATARSAL
LOCATION DETAILED: LEFT PLANTAR FOREFOOT OVERLYING 4TH METATARSAL

## 2023-06-27 ASSESSMENT — PAIN SCALES - GENERAL: PAINLEVEL: NO PAIN (0)

## 2023-06-27 NOTE — PROCEDURE: BENIGN DESTRUCTION
Add [id number] Modifier (Optional): no
Consent: The patient's verbal consent was obtained including but not limited to risks of crusting, scabbing, blistering, scarring, darker or lighter pigmentary change, recurrence, incomplete removal and infection.
Medical Necessity Clause: This procedure was medically necessary because the lesions that were treated were:
Post-Care Instructions: I reviewed with the patient in detail post-care instructions. Patient is to wear sunprotection, and avoid picking at any of the treated lesions. Pt may apply Vaseline to crusted or scabbing areas.
Medical Necessity Information: It is in your best interest to select a reason for this procedure from the list below. All of these items fulfill various CMS LCD requirements except the new and changing color options.
Treatment Number (Will Not Render If 0): 0
Anesthesia Volume In Cc: 0.5
Detail Level: Detailed

## 2023-06-27 NOTE — PROCEDURE: ADDITIONAL NOTES
Additional Notes: Right side: hyperpigmented scar from previous kidney surgery. Discussed HQ 4% QHS x 8 weeks. Obtain approval with nephrology prior to use given recent kidney transplant. \\n\\nLeft side: Pink, well healed scar. Discussed silicone gel sheets daily to improve color & texture.

## 2023-06-27 NOTE — LETTER
6/27/2023         RE: Caity Horan  [address]  [address]  [facility] [address]        Dear Colleague,    Thank you for referring your patient, Caity Horan, to the [facility]. Please see a copy of my visit note below.    TRANSPLANT NEPHROLOGY EARLY POST TRANSPLANT VISIT    Assessment & Plan    # LDKT: Stable kidney function following early post transplant issues with a urine leak and acute cellular-mediated rejection.   - Baseline Creatinine: ~ 1.0-1.2   - Proteinuria: Mild (0.5-1.0 grams)   - Date DSA Last Checked: May/2023      Latest DSA: No cPRA: 82%   - BK Viremia: No   - Kidney Tx Biopsy: Apr 29, 2023; Result: Acute cellular-mediated rejection, Banff 1B.   - Transplant Ureteral Stent: Removed    # Immunosuppression: Tacrolimus immediate release (goal 8-10), Mycophenolate mofetil (dose 750 mg every 12 hours) and Prednisone (dose 5 mg daily)   - Induction with Recent Transplant:  High Intensity Protocol   - Continue with intensive monitoring of immunosuppression for efficacy and toxicity.   - Changes: Not at this time; Patient would really like to come off prednisone, but discussed increased risk of another acute rejection.  Will discuss more in the future.    # Infection Prophylaxis:   - PJP: Sulfa/TMP (Bactrim)  - CMV: Valganciclovir (Valcyte); Patient is CMV IgG Ab discordant (D+/R-) and will continue on Valcyte x 6 months, then check CMV PCR monthly until 12 months post transplant.    # Blood Pressure: Controlled;  Goal BP: < 140/90   - Volume status: Euvolemic  EDW ~ 126-128 lbs   - Changes: No    # Anemia in Chronic Renal Disease: Hgb: Stable, near normal      MICKEY: No   - Iron studies: Low iron saturation, but high ferritin    # Mineral Bone Disorder:   - Secondary renal hyperparathyroidism; PTH level: Minimally elevated ( pg/ml)        On treatment: None  - Vitamin D; level: Normal        On supplement: Yes  - Calcium; level: Normal        On supplement: No    #  Electrolytes:   - Potassium; level: Normal        On supplement: No  - Magnesium; level: Stable low        On supplement: Yes  - Bicarbonate; level: Low        On supplement: No; If bicarbonate level remains low, would look to start sodium bicarbonate supplement.    # Ulcerative Colitis: Stable with minimal symptoms on vedolizumab q8 weeks.  Followed by GI.    # Alopecia: Mild symptoms, likely related to stress of surgery and tacrolimus.  Will follow for now.    # Medical Compliance: Yes    # Health Maintenance and Vaccination Review: Not Reviewed     # Transplant History:  Etiology of Kidney Failure: IgA nephropathy  Tx: LDKT  Transplant: 4/13/2023 (Kidney), 12/5/2014 (Kidney)  Donor Type: Living Donor Class:   Crossmatch at time of Tx: negative  DSA at time of Tx: No  Significant changes in immunosuppression: None  CMV IgG Ab High Risk Discordance (D+/R-): Yes  EBV IgG Ab High Risk Discordance (D+/R-): No  Significant transplant-related complications: Acute cellular-mediated rejection    Transplant Office Phone Number: [phone]    Assessment and plan was discussed with the patient and he voiced his understanding and agreement.    Return visit: Return for 4 month post transplant visit.    Otilio Mar MD  # LDKT: slight uptrend from frank 0.9 to 1.19 on 5/19, recent treatment for [id number] cellular rejection, de naeem DSA yet not meeting criteria for ABMR on bx with neg c4d only PTC 1+, treated with a dose of r-ATG 2mg/kg 4/30 then 3 doses of  mg x 3 with improvement in renal function Cr down from 1.9 to frank of 0.9.   Will need closure biopsy ~1st week of June              - Baseline Creatinine: ~ TBD, frank so far 0.9 now up to 1.19              - Proteinuria: Minimal (0.2-0.5 grams) 4/29/23              - Date DSA Last Checked: May.2023    Latest DSA: Yes, B45 [id number] MFI 4/24 cleared on recheck 4/29 and remains negative              - BK Viremia: No   - urine leak s/p repair:  OR 4/17/23 for  re-exploration, which found urine leak at anastomosis and hydronephrotic ureter without signs of obstruction - anastomosis  redone and ureteral stent placed. Renorgam 4/28 no leak              - Kidney Tx Biopsy: Apr 29, 2023; Result: Acute cell-mediated rejection, Banff class Ib, with moderate interstitial inflammation, severe tubulitis, and no vasculitis (i2 t3 v0)     Mild peritubular capillaritis, associated with no other signs of active antibody-mediated rejection (g0 ptc1 v0 c4d0). 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 Chronic changes of the parenchyma, including: focal global glomerulosclerosis (1%  glomeruli). no significant tubular atrophy and interstitial fibrosis. severe arterial sclerosis(ct0 ci0 cv3 ah0 mm0  cg0)               - Received thymo and two doses of methylprednisolone to date (5/1/23).   - received a dose of r- mg 4.30 then 3 doses of SM 500mg for Kyijo9F rejection              - Transplant Ureteral Stent: Yes-removal scheduled today      # Immunosuppression: Tacrolimus immediate release (goal 8-10) and Mycophenolate mofetil (dose 750 mg every 12 hours) Prednisone 5              - Induction with Recent Transplant:  High Intensity Protocol              - Changes: no     # Infection Prophylaxis:   - PJP: Sulfa/TMP (Bactrim)  - CMV: discordant (CMV D+/R-) Valganciclovir (Valcyte) x 6 months-- till 10/30   - EBV: non discordant (EBV D+/R+) hx of EBV viremia post 1st transplant: last EBV<500 12/2022,      # Hypertension: Controlled;   Goal BP: < 140/90              - Volume status: Euvolemic                EDW ~ 59kg              - Changes: Not at this time      # Anemia in Chronic Renal Disease: Hgb: stable     MICKEY: No              - Iron studies: Low iron saturation, but high ferritin     # Mineral Bone Disorder:   - Secondary renal hyperparathyroidism; PTH level: Minimally elevated ( pg/ml)        On  treatment: None  - Vitamin D; level: Normal        On supplement: Yes  - Calcium; level: Normal        On supplement: No  - Phosphorus; level: Normal        On supplement: self stopped and level is ok ~1.8 ok to remain off     # Electrolytes:   - Potassium; level: normal    On supplement: No high   - Magnesium; level: Low        On supplement: Yes, Mg oxide 800 mg po bid (intolerance to tid dosing) will continue current dose and if additional needed add Christen Finn  - Bicarbonate; level: normal      On supplement: No  - Sodium; level: Normal     # Ulcerative colitis: Diagnosed in 2018. 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    Return visit: 1 month    Otilio Mar MD    Chief Complaint   Ms. Horan is a 33 year old here for kidney transplant and immunosuppression management.     History of Present Illness    Ms. Horan reports feeling good overall with some medical complaints.  Since last clinic visit, patient reports no hospitalizations or new medical complaints and has been doing well overall.  Her energy level is improved, now really pretty good and close to normal.  She is active and getting some exercise.  Denies any chest pain or shortness of breath with exertion.  No leg swelling.    Her appetite is okay, but not the best yet as she can feel full quickly and a little bloated.  She is pushing fluids.  Patient eats a vegetarian diet.  Weight is mostly stable.   No nausea, vomiting or diarrhea, but more in the way of constipated symptoms.  No abdominal pain and ulcerative colitis seems to be under control.  No fever, sweats or chills.  No night sweats.  She is started to get what she thinks are menstrual symptoms, but no menses yet post transplant.    Home BP: 100/70s wiht no lightheadedness.    Problem List   Patient Active Problem List   Diagnosis     Metabolic acidosis     IgA nephropathy     Kidney replaced by transplant     Immunosuppressed status (H)     Hypomagnesemia     Aftercare following organ transplant     EBV (Nicole-Barr virus) viremia     Anemia in chronic renal disease     Vitamin B12 deficiency     Ulcerative pancolitis with rectal bleeding (H)     Banff type IB acute cellular rejection of transplanted kidney     Ulcerative colitis (H)     Vitamin D deficiency     Urine leak from transplanted ureter     Constipation     Kidney transplant rejection     Need for pneumocystis prophylaxis     CKD (chronic kidney disease) stage 2, GFR 60-89 ml/min       Allergies   Allergies   Allergen Reactions     Bumetanide Other (See Comments)     Causes paralysis     Amoxicillin Rash       Medications   Current Outpatient Medications   Medication Sig      magnesium glycinate lysinate chelate (DOCTOR'S BEST) 100 MG TABS tablet Take 2 tablets (200 mg) by mouth 2 times daily     biotin 1000 MCG TABS tablet Take 500 mcg by mouth daily     Cyanocobalamin 500 MCG TBDP Take 500 mcg by mouth daily     levonorgestrel (MIRENA) 20 MCG/DAY IUD 1 each by Intrauterine route Replacement every 7 years. Placed 1/2019     mycophenolate (GENERIC EQUIVALENT) 250 MG capsule Take 3 capsules (750 mg) by mouth 2 times daily     predniSONE (DELTASONE) 5 MG tablet Take 1 tablet (5 mg) by mouth daily Or until discussed in clinic     sulfamethoxazole-trimethoprim (BACTRIM) 400-80 MG tablet Take 1 tablet by mouth daily     tacrolimus (GENERIC EQUIVALENT) 0.5 MG capsule Take 1 capsule (0.5 mg) by  mouth every morning Total dose = 1.5 mg in the AM and 1 mg in the PM     tacrolimus (GENERIC EQUIVALENT) 1 MG capsule Take 1 capsule (1 mg) by mouth 2 times daily Total dose = 1.5 mg in the AM and 1 mg in the PM     valGANciclovir (VALCYTE) 450 MG tablet Take 2 tablets (900 mg) by mouth daily Until directed to increase dose per transplant team based on improving renal function.     vedolizumab (ENTYVIO) 60 MG/ML injection Inject 300 mg into the vein once every six weeks     vitamin D3 (CHOLECALCIFEROL) 50 mcg (2000 units) tablet Take 1 tablet (50 mcg) by mouth daily     No current facility-administered medications for this visit.     Medications Discontinued During This Encounter   Medication Reason     hydrOXYzine (ATARAX) 25 MG tablet      ondansetron (ZOFRAN ODT) 4 MG ODT tab      oxyCODONE (ROXICODONE) 5 MG tablet      methocarbamol (ROBAXIN) 500 MG tablet        Physical Exam   Vital Signs: /72 (BP Location: Left arm, Patient Position: Sitting, Cuff Size: Adult Regular)   Pulse 85   Temp 97.9  F (36.6  C) (Oral)   Resp 16   Wt 59.5 kg (131 lb 1.6 oz)   SpO2 99%   BMI 23.22 kg/m      GENERAL APPEARANCE: alert and no distress  HENT: mouth without ulcers or lesions  LYMPHATICS: no cervical or supraclavicular nodes  RESP: lungs clear to auscultation - no rales, rhonchi or wheezes  CV: regular rhythm, normal rate, no rub, no murmur  EDEMA: no LE edema bilaterally  ABDOMEN: soft, nondistended, nontender, bowel sounds normal  MS: extremities normal - no gross deformities noted, no evidence of inflammation in joints, no muscle tenderness  SKIN: no rash  TX KIDNEY: normal  DIALYSIS ACCESS: none    Data         Latest Ref Rng & Units 6/27/2023     7:28 AM 6/26/2023    12:55 PM 6/20/2023     8:33 AM   Renal   Sodium 136 - 145 mmol/L 138  134     Na (external) 135 - 145 mEq/L   138       K 3.4 - 5.3 mmol/L 4.5  4.4     K (external) 3.6 - 5.2 mEq/L   4.7       Cl 98 - 107 mmol/L 107  103  106       Cl (external)  98 - 107 mmol/L 107  103  106       CO2 22 - 29 mmol/L 22  17     CO2 (external) 22 - 32 mEq/L   25       Urea Nitrogen 6.0 - 20.0 mg/dL 9.4  12.4     BUN (external) 8 - 21 mg/dL   13       Creatinine 0.51 - 0.95 mg/dL 1.13  0.95     Cr (external) 0.38 - 1.02 mg/dL   1.10       Glucose 70 - 99 mg/dL 96  98     Glucose (external) 62 - 125 mg/dL   88       Calcium 8.6 - 10.0 mg/dL 9.5  9.3     Ca (external) 8.9 - 10.2 mg/dL   9.2       Magnesium 1.7 - 2.3 mg/dL 1.5          This result is from an external source.         Latest Ref Rng & Units 6/27/2023     7:28 AM 6/12/2023     8:25 AM 6/5/2023     8:44 AM   Bone Health   Phosphorus 2.5 - 4.5 mg/dL 3.1      Phos (external) 2.5 - 4.5 mg/dL  3.0  3.2          Latest Ref Rng & Units 6/27/2023     7:28 AM 6/26/2023    12:55 PM 6/20/2023     8:33 AM   Heme   WBC 4.0 - 11.0 10e3/uL 3.0  3.5     WBC (external) 4.3 - 10.0 THOU/uL   2.76       Hgb 11.7 - 15.7 g/dL 11.7  11.6     Hgb (external) 11.5 - 15.5 g/dL   11.7       Plt 150 - 450 10e3/uL 221  222     Plt (external) 150 - 400 THOU/uL   233       ABSOLUTE NEUTROPHIL 1.6 - 8.3 10e3/uL  3.0     ABSOLUTE LYMPHOCYTES 0.8 - 5.3 10e3/uL  0.2     ABSOLUTE MONOCYTES 0.0 - 1.3 10e3/uL  0.2     ABSOLUTE EOSINOPHILS 0.0 - 0.7 10e3/uL  0.1         This result is from an external source.         Latest Ref Rng & Units 6/26/2023    12:55 PM 5/17/2023     3:31 PM 4/11/2023     8:32 AM   Liver   AP 35 - 104 U/L 70  66  70    TBili <=1.2 mg/dL 0.2  0.3  0.6    Bilirubin Direct 0.00 - 0.30 mg/dL <0.20  <0.20  <0.20    ALT 0 - 50 U/L 12  13  15    AST 0 - 45 U/L 27  19  23    Tot Protein 6.4 - 8.3 g/dL 7.0  6.6  8.6    Albumin 3.5 - 5.2 g/dL 4.3  4.1  5.1          Latest Ref Rng & Units 4/3/2023     3:56 PM   Pancreas   A1C <5.7 % 5.3          Latest Ref Rng & Units 4/24/2023     8:26 AM 4/22/2023     7:45 AM 4/3/2023     3:56 PM   Iron studies   Iron 37 - 145 ug/dL 30  40  186    Iron Sat Index 15 - 46 % 18  28  71    Ferritin 6 - 175  ng/mL 886  1,041  902          Latest Ref Rng & Units 5/10/2023     8:05 AM 4/13/2023     1:23 PM 4/3/2023     3:56 PM   [facility] Txp Virology   CMV QUANT IU/ML Not Detected IU/mL Not Detected  Not Detected     EBV CAPSID ANTIBODY IGG No detectable antibody.   Positive         Recent Labs   Lab Test 05/22/23  0903 05/24/23  0811 06/27/23  0728   DOSTAC 5/21/2023 5/23/2023 6/26/2023   TACROL 10.6 12.2 12.7     Recent Labs   Lab Test 05/10/23  0805 05/12/23  0740 05/24/23  0811   DOSMPA 5/9/2023   9:40 PM 5/11/2023   9:50 PM 5/23/2023   9:30 PM   MPACID 7.38* 3.65* 4.89*   MPAG 72.5 52.6 74.5       Again, thank you for allowing me to participate in the care of your patient.        Sincerely,        Otilio Mar MD

## 2023-06-27 NOTE — PROCEDURE: ADDITIONAL NOTES
Additional Notes: Discussed finding a dermatologist in [place], [place] where she could see someone for treatment every 2-3 weeks. If she is unable, we will continue seeing her when she is in town. Patient will be back in town in August and October 2023.\\nPestherent's last visit from 5/12/23 was printed and given to patient. Additional Notes: Discussed finding a dermatologist in [place], [place] where she could see someone for treatment every 2-3 weeks. If she is unable, we will continue seeing her when she is in town. Patient will be back in town in August and October 2023.\\nPestherent's last visit from 5/12/23 was printed and given to patient.

## 2023-06-27 NOTE — LETTER
6/27/2023      RE: Caity Horan  [address]  [address]  [facility] [address]       TRANSPLANT NEPHROLOGY EARLY POST TRANSPLANT VISIT    Assessment & Plan    # LDKT: Stable kidney function following early post transplant issues with a urine leak and acute cellular-mediated rejection.   - Baseline Creatinine: ~ 1.0-1.2   - Proteinuria: Mild (0.5-1.0 grams)   - Date DSA Last Checked: May/2023      Latest DSA: No cPRA: 82%   - BK Viremia: No   - Kidney Tx Biopsy: Apr 29, 2023; Result: Acute cellular-mediated rejection, Banff 1B.   - Transplant Ureteral Stent: Removed    # Immunosuppression: Tacrolimus immediate release (goal 8-10), Mycophenolate mofetil (dose 750 mg every 12 hours) and Prednisone (dose 5 mg daily)   - Induction with Recent Transplant:  High Intensity Protocol   - Continue with intensive monitoring of immunosuppression for efficacy and toxicity.   - Changes: Not at this time; Patient would really like to come off prednisone, but discussed increased risk of another acute rejection.  Will discuss more in the future.    # Infection Prophylaxis:   - PJP: Sulfa/TMP (Bactrim)  - CMV: Valganciclovir (Valcyte); Patient is CMV IgG Ab discordant (D+/R-) and will continue on Valcyte x 6 months, then check CMV PCR monthly until 12 months post transplant.    # Blood Pressure: Controlled;  Goal BP: < 140/90   - Volume status: Euvolemic  EDW ~ 126-128 lbs   - Changes: No    # Anemia in Chronic Renal Disease: Hgb: Stable, near normal      MICKEY: No   - Iron studies: Low iron saturation, but high ferritin    # Mineral Bone Disorder:   - Secondary renal hyperparathyroidism; PTH level: Minimally elevated ( pg/ml)        On treatment: None  - Vitamin D; level: Normal        On supplement: Yes  - Calcium; level: Normal        On supplement: No    # Electrolytes:   - Potassium; level: Normal        On supplement: No  - Magnesium; level: Stable low        On supplement: Yes  - Bicarbonate; level: Low        On  supplement: No; If bicarbonate level remains low, would look to start sodium bicarbonate supplement.    # Ulcerative Colitis: Stable with minimal symptoms on vedolizumab q8 weeks.  Followed by GI.    # Alopecia: Mild symptoms, likely related to stress of surgery and tacrolimus.  Will follow for now.    # Medical Compliance: Yes    # Health Maintenance and Vaccination Review: Not Reviewed     # Transplant History:  Etiology of Kidney Failure: IgA nephropathy  Tx: LDKT  Transplant: 4/13/2023 (Kidney), 12/5/2014 (Kidney)  Donor Type: Living Donor Class:   Crossmatch at time of Tx: negative  DSA at time of Tx: No  Significant changes in immunosuppression: None  CMV IgG Ab High Risk Discordance (D+/R-): Yes  EBV IgG Ab High Risk Discordance (D+/R-): No  Significant transplant-related complications: Acute cellular-mediated rejection    Transplant Office Phone Number: [phone]    Assessment and plan was discussed with the patient and he voiced his understanding and agreement.    Return visit: Return for 4 month post transplant visit.    Otilio Mar MD  # LDKT: slight uptrend from frank 0.9 to 1.19 on 5/19, recent treatment for [id number] cellular rejection, de naeem DSA yet not meeting criteria for ABMR on bx with neg c4d only PTC 1+, treated with a dose of r-ATG 2mg/kg 4/30 then 3 doses of  mg x 3 with improvement in renal function Cr down from 1.9 to frank of 0.9.   Will need closure biopsy ~1st week of June              - Baseline Creatinine: ~ TBD, frank so far 0.9 now up to 1.19              - Proteinuria: Minimal (0.2-0.5 grams) 4/29/23              - Date DSA Last Checked: May.2023    Latest DSA: Yes, [id number] MFI 4/24 cleared on recheck 4/29 and remains negative              - BK Viremia: No   - urine leak s/p repair:  OR 4/17/23 for re-exploration, which found urine leak at anastomosis and hydronephrotic ureter without signs of obstruction - anastomosis  redone and ureteral stent placed. Mary  4/28 no leak              - Kidney Tx Biopsy: Apr 29, 2023; Result: Acute cell-mediated rejection, Banff class Ib, with moderate interstitial inflammation, severe tubulitis, and no vasculitis (i2 t3 v0)     Mild peritubular capillaritis, associated with no other signs of active antibody-mediated rejection (g0 ptc1 v0 c4d0). 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 Chronic changes of the parenchyma, including: focal global glomerulosclerosis (1%  glomeruli). no significant tubular atrophy and interstitial fibrosis. severe arterial sclerosis(ct0 ci0 cv3 ah0 mm0  cg0)               - Received thymo and two doses of methylprednisolone to date (5/1/23).   - received a dose of r- mg 4.30 then 3 doses of SM 500mg for [id number] rejection              - Transplant Ureteral Stent: Yes-removal scheduled today      # Immunosuppression: Tacrolimus immediate release (goal 8-10) and Mycophenolate mofetil (dose 750 mg every 12 hours) Prednisone 5              - Induction with Recent Transplant:  High Intensity Protocol              - Changes: no     # Infection Prophylaxis:   - PJP: Sulfa/TMP (Bactrim)  - CMV: discordant (CMV D+/R-) Valganciclovir (Valcyte) x 6 months-- till 10/30   - EBV: non discordant (EBV D+/R+) hx of EBV viremia post 1st transplant: last EBV<500 12/2022,      # Hypertension: Controlled;   Goal BP: < 140/90              - Volume status: Euvolemic                EDW ~ 59kg              - Changes: Not at this time      # Anemia in Chronic Renal Disease: Hgb: stable     MICKEY: No              - Iron studies: Low iron saturation, but high ferritin     # Mineral Bone Disorder:   - Secondary renal hyperparathyroidism; PTH level: Minimally elevated ( pg/ml)        On treatment: None  - Vitamin D; level: Normal        On supplement: Yes  - Calcium; level: Normal        On supplement: No  - Phosphorus; level: Normal        On  supplement: self stopped and level is ok ~1.8 ok to remain off     # Electrolytes:   - Potassium; level: normal    On supplement: No high   - Magnesium; level: Low        On supplement: Yes, Mg oxide 800 mg po bid (intolerance to tid dosing) will continue current dose and if additional needed add Christen Finn  - Bicarbonate; level: normal      On supplement: No  - Sodium; level: Normal     # Ulcerative colitis: Diagnosed in 2018. 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    Return visit: 1 month    Otilio Mar MD    Chief Complaint   Ms. Horan is a 33 year old here for kidney transplant and immunosuppression management.     History of Present Illness    Ms. Horan reports feeling good overall with some medical complaints.  Since last clinic visit, patient reports no hospitalizations or new medical complaints and has been doing well overall.  Her energy level is improved, now really pretty good and close to normal.  She is active and getting some exercise.  Denies any chest pain or shortness of breath with exertion.  No leg swelling.    Her appetite is okay, but not the best yet as she can feel full quickly and a little bloated.  She is pushing fluids.  Patient eats a vegetarian diet.  Weight is mostly stable.  No nausea, vomiting or diarrhea, but more in the way of constipated symptoms.  No abdominal pain and ulcerative colitis seems to be under control.  No fever,  sweats or chills.  No night sweats.  She is started to get what she thinks are menstrual symptoms, but no menses yet post transplant.    Home BP: 100/70s wiht no lightheadedness.    Problem List   Patient Active Problem List   Diagnosis     Metabolic acidosis     IgA nephropathy     Kidney replaced by transplant     Immunosuppressed status (H)     Hypomagnesemia     Aftercare following organ transplant     EBV (Nicole-Barr virus) viremia     Anemia in chronic renal disease     Vitamin B12 deficiency     Ulcerative pancolitis with rectal bleeding (H)     Banff type IB acute cellular rejection of transplanted kidney     Ulcerative colitis (H)     Vitamin D deficiency     Urine leak from transplanted ureter     Constipation     Kidney transplant rejection     Need for pneumocystis prophylaxis     CKD (chronic kidney disease) stage 2, GFR 60-89 ml/min       Allergies   Allergies   Allergen Reactions     Bumetanide Other (See Comments)     Causes paralysis     Amoxicillin Rash       Medications   Current Outpatient Medications   Medication Sig      magnesium glycinate lysinate chelate (DOCTOR'S BEST) 100 MG TABS tablet Take 2 tablets (200 mg) by mouth 2 times daily     biotin 1000 MCG TABS tablet Take 500 mcg by mouth daily     Cyanocobalamin 500 MCG TBDP Take 500 mcg by mouth daily     levonorgestrel (MIRENA) 20 MCG/DAY IUD 1 each by Intrauterine route Replacement every 7 years. Placed 1/2019     mycophenolate (GENERIC EQUIVALENT) 250 MG capsule Take 3 capsules (750 mg) by mouth 2 times daily     predniSONE (DELTASONE) 5 MG tablet Take 1 tablet (5 mg) by mouth daily Or until discussed in clinic     sulfamethoxazole-trimethoprim (BACTRIM) 400-80 MG tablet Take 1 tablet by mouth daily     tacrolimus (GENERIC EQUIVALENT) 0.5 MG capsule Take 1 capsule (0.5 mg) by mouth every morning Total dose = 1.5 mg in the AM and 1 mg in the PM     tacrolimus (GENERIC EQUIVALENT) 1 MG capsule Take 1 capsule (1 mg) by mouth 2 times daily  Total dose = 1.5 mg in the AM and 1 mg in the PM     valGANciclovir (VALCYTE) 450 MG tablet Take 2 tablets (900 mg) by mouth daily Until directed to increase dose per transplant team based on improving renal function.     vedolizumab (ENTYVIO) 60 MG/ML injection Inject 300 mg into the vein once every six weeks     vitamin D3 (CHOLECALCIFEROL) 50 mcg (2000 units) tablet Take 1 tablet (50 mcg) by mouth daily     No current facility-administered medications for this visit.     Medications Discontinued During This Encounter   Medication Reason     hydrOXYzine (ATARAX) 25 MG tablet      ondansetron (ZOFRAN ODT) 4 MG ODT tab      oxyCODONE (ROXICODONE) 5 MG tablet      methocarbamol (ROBAXIN) 500 MG tablet        Physical Exam   Vital Signs: /72 (BP Location: Left arm, Patient Position: Sitting, Cuff Size: Adult Regular)   Pulse 85   Temp 97.9  F (36.6  C) (Oral)   Resp 16   Wt 59.5 kg (131 lb 1.6 oz)   SpO2 99%   BMI 23.22 kg/m      GENERAL APPEARANCE: alert and no distress  HENT: mouth without ulcers or lesions  LYMPHATICS: no cervical or supraclavicular nodes  RESP: lungs clear to auscultation - no rales, rhonchi or wheezes  CV: regular rhythm, normal rate, no rub, no murmur  EDEMA: no LE edema bilaterally  ABDOMEN: soft, nondistended, nontender, bowel sounds normal  MS: extremities normal - no gross deformities noted, no evidence of inflammation in joints, no muscle tenderness  SKIN: no rash  TX KIDNEY: normal  DIALYSIS ACCESS: none    Data         Latest Ref Rng & Units 6/27/2023     7:28 AM 6/26/2023    12:55 PM 6/20/2023     8:33 AM   Renal   Sodium 136 - 145 mmol/L 138  134     Na (external) 135 - 145 mEq/L   138       K 3.4 - 5.3 mmol/L 4.5  4.4     K (external) 3.6 - 5.2 mEq/L   4.7       Cl 98 - 107 mmol/L 107  103  106       Cl (external) 98 - 107 mmol/L 107  103  106       CO2 22 - 29 mmol/L 22  17     CO2 (external) 22 - 32 mEq/L   25       Urea Nitrogen 6.0 - 20.0 mg/dL 9.4  12.4     BUN  (external) 8 - 21 mg/dL   13       Creatinine 0.51 - 0.95 mg/dL 1.13  0.95     Cr (external) 0.38 - 1.02 mg/dL   1.10       Glucose 70 - 99 mg/dL 96  98     Glucose (external) 62 - 125 mg/dL   88       Calcium 8.6 - 10.0 mg/dL 9.5  9.3     Ca (external) 8.9 - 10.2 mg/dL   9.2       Magnesium 1.7 - 2.3 mg/dL 1.5          This result is from an external source.         Latest Ref Rng & Units 6/27/2023     7:28 AM 6/12/2023     8:25 AM 6/5/2023     8:44 AM   Bone Health   Phosphorus 2.5 - 4.5 mg/dL 3.1      Phos (external) 2.5 - 4.5 mg/dL  3.0  3.2          Latest Ref Rng & Units 6/27/2023     7:28 AM 6/26/2023    12:55 PM 6/20/2023     8:33 AM   Heme   WBC 4.0 - 11.0 10e3/uL 3.0  3.5     WBC (external) 4.3 - 10.0 THOU/uL   2.76       Hgb 11.7 - 15.7 g/dL 11.7  11.6     Hgb (external) 11.5 - 15.5 g/dL   11.7       Plt 150 - 450 10e3/uL 221  222     Plt (external) 150 - 400 THOU/uL   233       ABSOLUTE NEUTROPHIL 1.6 - 8.3 10e3/uL  3.0     ABSOLUTE LYMPHOCYTES 0.8 - 5.3 10e3/uL  0.2     ABSOLUTE MONOCYTES 0.0 - 1.3 10e3/uL  0.2     ABSOLUTE EOSINOPHILS 0.0 - 0.7 10e3/uL  0.1         This result is from an external source.         Latest Ref Rng & Units 6/26/2023    12:55 PM 5/17/2023     3:31 PM 4/11/2023     8:32 AM   Liver   AP 35 - 104 U/L 70  66  70    TBili <=1.2 mg/dL 0.2  0.3  0.6    Bilirubin Direct 0.00 - 0.30 mg/dL <0.20  <0.20  <0.20    ALT 0 - 50 U/L 12  13  15    AST 0 - 45 U/L 27  19  23    Tot Protein 6.4 - 8.3 g/dL 7.0  6.6  8.6    Albumin 3.5 - 5.2 g/dL 4.3  4.1  5.1          Latest Ref Rng & Units 4/3/2023     3:56 PM   Pancreas   A1C <5.7 % 5.3          Latest Ref Rng & Units 4/24/2023     8:26 AM 4/22/2023     7:45 AM 4/3/2023     3:56 PM   Iron studies   Iron 37 - 145 ug/dL 30  40  186    Iron Sat Index 15 - 46 % 18  28  71    Ferritin 6 - 175 ng/mL 886  1,041  902          Latest Ref Rng & Units 5/10/2023     8:05 AM 4/13/2023     1:23 PM 4/3/2023     3:56 PM   [facility] Txp Virology   CMV QUANT IU/ML  Not Detected IU/mL Not Detected  Not Detected     EBV CAPSID ANTIBODY IGG No detectable antibody.   Positive         Recent Labs   Lab Test 05/22/23  0903 05/24/23  0811 06/27/23  0728   DOSTAC 5/21/2023 5/23/2023 6/26/2023   TACROL 10.6 12.2 12.7     Recent Labs   Lab Test 05/10/23  0805 05/12/23  0740 05/24/23  0811   DOSMPA 5/9/2023   9:40 PM 5/11/2023   9:50 PM 5/23/2023   9:30 PM   MPACID 7.38* 3.65* 4.89*   MPAG 72.5 52.6 74.5       Otilio Mar MD

## 2023-06-27 NOTE — PROCEDURE: PRESCRIPTION MEDICATION MANAGEMENT
Render In Strict Bullet Format?: No
Detail Level: Zone
Plan: Discussed plan for hyperpigmentation under scar diagnosis.

## 2023-06-27 NOTE — PATIENT INSTRUCTIONS
Patient Recommendations:  - No new recommendations at this time.    Transplant Patient Information  Your Post Transplant Coordinator is: Romy Cheema  For non urgent items, we encourage you to contact your coordinator/care team online via [employer]  You and your care team can also contact your transplant coordinator Monday - Friday, 8am - 5pm at [phone] (Option 2 to reach the coordinator or Option 4 to schedule an appointment).  After hours for urgent matters, please call [facility] at [phone].

## 2023-06-27 NOTE — NURSING NOTE
"Chief Complaint   Patient presents with     RECHECK     S/P Kidney TX 4/13/2023     Vital signs:  Temp: 97.9  F (36.6  C) Temp src: Oral BP: 106/72 Pulse: 85   Resp: 16 SpO2: 99 %       Weight: 59.5 kg (131 lb 1.6 oz)  Estimated body mass index is 23.22 kg/m  as calculated from the following:    Height as of 4/13/23: 1.6 m (5' 3\").    Weight as of this encounter: 59.5 kg (131 lb 1.6 oz).      Marilyn King, [facility]  6/27/2023 7:39 AM      "

## 2023-06-27 NOTE — HPI: WARTS (VERRUCA)
How Severe Are Your Warts?: moderate
Is This A New Presentation, Or A Follow-Up?: Follow Up Ingrid
Additional History: Patient notes that she has no concerns for warts on the right foot and that they have gone away. However, she is still noticing warts on her left foot but is unsure if they are better due to dead skin on the top layer. Patient is okay continuing treatment today.

## 2023-06-28 LAB — BKV DNA # SPEC NAA+PROBE: NOT DETECTED COPIES/ML

## 2023-06-29 ENCOUNTER — TELEPHONE (OUTPATIENT)
Dept: GASTROENTEROLOGY | Facility: CLINIC | Age: 33
End: 2023-06-29

## 2023-06-29 NOTE — TELEPHONE ENCOUNTER
Writer received a message from MARIS MEYER to help get Pt scheduled for a follow up visit. Writer called and left a message, along with call back number for the Pt.

## 2023-07-02 PROBLEM — Z29.89 NEED FOR PNEUMOCYSTIS PROPHYLAXIS: Status: ACTIVE | Noted: 2023-07-02

## 2023-07-02 PROBLEM — Z99.2 DIALYSIS PATIENT (H): Status: RESOLVED | Noted: 2022-12-21 | Resolved: 2023-07-02

## 2023-07-02 PROBLEM — R73.03 PRE-DIABETES: Status: RESOLVED | Noted: 2023-04-13 | Resolved: 2023-07-02

## 2023-07-02 PROBLEM — Z76.82 AWAITING ORGAN TRANSPLANT: Status: RESOLVED | Noted: 2023-04-13 | Resolved: 2023-07-02

## 2023-07-02 PROBLEM — Z32.00 ENCOUNTER FOR PREGNANCY TEST: Status: RESOLVED | Noted: 2023-04-13 | Resolved: 2023-07-02

## 2023-07-02 PROBLEM — G89.18 ACUTE POST-OPERATIVE PAIN: Status: RESOLVED | Noted: 2023-04-18 | Resolved: 2023-07-02

## 2023-07-02 PROBLEM — N18.9 ANEMIA IN CHRONIC RENAL DISEASE: Status: ACTIVE | Noted: 2017-10-13

## 2023-07-02 PROBLEM — D63.1 ANEMIA IN CHRONIC RENAL DISEASE: Status: ACTIVE | Noted: 2017-10-13

## 2023-07-02 PROBLEM — N18.2 CKD (CHRONIC KIDNEY DISEASE) STAGE 2, GFR 60-89 ML/MIN: Status: ACTIVE | Noted: 2023-07-02

## 2023-07-02 PROBLEM — N18.6 ESRD (END STAGE RENAL DISEASE) (H): Status: RESOLVED | Noted: 2023-04-13 | Resolved: 2023-07-02

## 2023-07-02 PROBLEM — E83.39 HYPOPHOSPHATEMIA: Status: RESOLVED | Noted: 2023-04-18 | Resolved: 2023-07-02

## 2023-07-02 NOTE — PROGRESS NOTES
TRANSPLANT NEPHROLOGY EARLY POST TRANSPLANT VISIT    Assessment & Plan    # LDKT: Stable kidney function following early post transplant issues with a urine leak and acute cellular-mediated rejection.   - Baseline Creatinine: ~ 1.0-1.2   - Proteinuria: Mild (0.5-1.0 grams)   - Date DSA Last Checked: May/2023      Latest DSA: No cPRA: 82%   - BK Viremia: No   - Kidney Tx Biopsy: Apr 29, 2023; Result: Acute cellular-mediated rejection, Banff 1B.   - Transplant Ureteral Stent: Removed    # Immunosuppression: Tacrolimus immediate release (goal 8-10), Mycophenolate mofetil (dose 750 mg every 12 hours) and Prednisone (dose 5 mg daily)   - Induction with Recent Transplant:  High Intensity Protocol   - Continue with intensive monitoring of immunosuppression for efficacy and toxicity.   - Changes: Not at this time; Patient would really like to come off prednisone, but discussed increased risk of another acute rejection.  Will discuss more in the future.    # Infection Prophylaxis:   - PJP: Sulfa/TMP (Bactrim)  - CMV: Valganciclovir (Valcyte); Patient is CMV IgG Ab discordant (D+/R-) and will continue on Valcyte x 6 months, then check CMV PCR monthly until 12 months post transplant.    # Blood Pressure: Controlled;  Goal BP: < 140/90   - Volume status: Euvolemic  EDW ~ 126-128 lbs   - Changes: No    # Anemia in Chronic Renal Disease: Hgb: Stable, near normal      MICKEY: No   - Iron studies: Low iron saturation, but high ferritin    # Mineral Bone Disorder:   - Secondary renal hyperparathyroidism; PTH level: Minimally elevated ( pg/ml)        On treatment: None  - Vitamin D; level: Normal        On supplement: Yes  - Calcium; level: Normal        On supplement: No    # Electrolytes:   - Potassium; level: Normal        On supplement: No  - Magnesium; level: Stable low        On supplement: Yes  - Bicarbonate; level: Low        On supplement: No; If bicarbonate level remains low, would look to start sodium bicarbonate  supplement.    # Ulcerative Colitis: Stable with minimal symptoms on vedolizumab q8 weeks.  Followed by GI.    # Alopecia: Mild symptoms, likely related to stress of surgery and tacrolimus.  Will follow for now.    # Medical Compliance: Yes    # Health Maintenance and Vaccination Review: Not Reviewed     # Transplant History:  Etiology of Kidney Failure: IgA nephropathy  Tx: LDKT  Transplant: 4/13/2023 (Kidney), 12/5/2014 (Kidney)  Donor Type: Living Donor Class:   Crossmatch at time of Tx: negative  DSA at time of Tx: No  Significant changes in immunosuppression: None  CMV IgG Ab High Risk Discordance (D+/R-): Yes  EBV IgG Ab High Risk Discordance (D+/R-): No  Significant transplant-related complications: Acute cellular-mediated rejection    Transplant Office Phone Number: [phone]    Assessment and plan was discussed with the patient and he voiced his understanding and agreement.    Return visit: Return for 4 month post transplant visit.    Otilio Mar MD  # LDKT: slight uptrend from frank 0.9 to 1.19 on 5/19, recent treatment for [id number] cellular rejection, de naeem DSA yet not meeting criteria for ABMR on bx with neg c4d only PTC 1+, treated with a dose of r-ATG 2mg/kg 4/30 then 3 doses of  mg x 3 with improvement in renal function Cr down from 1.9 to frank of 0.9.   Will need closure biopsy ~1st week of June              - Baseline Creatinine: ~ TBD, frank so far 0.9 now up to 1.19              - Proteinuria: Minimal (0.2-0.5 grams) 4/29/23              - Date DSA Last Checked: May.2023    Latest DSA: Yes, [id number] MFI 4/24 cleared on recheck 4/29 and remains negative              - BK Viremia: No   - urine leak s/p repair:  OR 4/17/23 for re-exploration, which found urine leak at anastomosis and hydronephrotic ureter without signs of obstruction - anastomosis  redone and ureteral stent placed. Renorgam 4/28 no leak              - Kidney Tx Biopsy: Apr 29, 2023; Result: Acute cell-mediated  rejection, Banff class Ib, with moderate interstitial inflammation, severe tubulitis, and no vasculitis (i2 t3 v0)     Mild peritubular capillaritis, associated with no other signs of active antibody-mediated rejection (g0 ptc1 v0 c4d0). 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 Chronic changes of the parenchyma, including: focal global glomerulosclerosis (1%  glomeruli). no significant tubular atrophy and interstitial fibrosis. severe arterial sclerosis(ct0 ci0 cv3 ah0 mm0  cg0)               - Received thymo and two doses of methylprednisolone to date (5/1/23).   - received a dose of r- mg 4.30 then 3 doses of SM 500mg for Bmoew9H rejection              - Transplant Ureteral Stent: Yes-removal scheduled today      # Immunosuppression: Tacrolimus immediate release (goal 8-10) and Mycophenolate mofetil (dose 750 mg every 12 hours) Prednisone 5              - Induction with Recent Transplant:  High Intensity Protocol              - Changes: no     # Infection Prophylaxis:   - PJP: Sulfa/TMP (Bactrim)  - CMV: discordant (CMV D+/R-) Valganciclovir (Valcyte) x 6 months-- till 10/30   - EBV: non discordant (EBV D+/R+) hx of EBV viremia post 1st transplant: last EBV<500 12/2022,      # Hypertension: Controlled;   Goal BP: < 140/90              - Volume status: Euvolemic                EDW ~ 59kg              - Changes: Not at this time      # Anemia in Chronic Renal Disease: Hgb: stable     MICKEY: No              - Iron studies: Low iron saturation, but high ferritin     # Mineral Bone Disorder:   - Secondary renal hyperparathyroidism; PTH level: Minimally elevated ( pg/ml)        On treatment: None  - Vitamin D; level: Normal        On supplement: Yes  - Calcium; level: Normal        On supplement: No  - Phosphorus; level: Normal        On supplement: self stopped and level is ok ~1.8 ok to remain off     # Electrolytes:   -  Potassium; level: normal    On supplement: No high   - Magnesium; level: Low        On supplement: Yes, Mg oxide 800 mg po bid (intolerance to tid dosing) will continue current dose and if additional needed add Christen Finn  - Bicarbonate; level: normal      On supplement: No  - Sodium; level: Normal     # Ulcerative colitis: Diagnosed in 2018. 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    Return visit: 1 month    Otilio Mar MD    Chief Complaint   Ms. Horan is a 33 year old here for kidney transplant and immunosuppression management.     History of Present Illness    Ms. Horan reports feeling good overall with some medical complaints.  Since last clinic visit, patient reports no hospitalizations or new medical complaints and has been doing well overall.  Her energy level is improved, now really pretty good and close to normal.  She is active and getting some exercise.  Denies any chest pain or shortness of breath with exertion.  No leg swelling.    Her appetite is okay, but not the best yet as she can feel full quickly and a little bloated.  She is pushing fluids.  Patient eats a vegetarian diet.  Weight is mostly stable.  No nausea, vomiting or diarrhea, but more in the way of constipated symptoms.  No abdominal pain and ulcerative colitis seems to be under control.  No fever, sweats or chills.  No night sweats.  She is started to get what she thinks are  menstrual symptoms, but no menses yet post transplant.    Home BP: 100/70s wiht no lightheadedness.    Problem List   Patient Active Problem List   Diagnosis     Metabolic acidosis     IgA nephropathy     Kidney replaced by transplant     Immunosuppressed status (H)     Hypomagnesemia     Aftercare following organ transplant     EBV (Nicole-Barr virus) viremia     Anemia in chronic renal disease     Vitamin B12 deficiency     Ulcerative pancolitis with rectal bleeding (H)     Banff type IB acute cellular rejection of transplanted kidney     Ulcerative colitis (H)     Vitamin D deficiency     Urine leak from transplanted ureter     Constipation     Kidney transplant rejection     Need for pneumocystis prophylaxis     CKD (chronic kidney disease) stage 2, GFR 60-89 ml/min       Allergies   Allergies   Allergen Reactions     Bumetanide Other (See Comments)     Causes paralysis     Amoxicillin Rash       Medications   Current Outpatient Medications   Medication Sig      magnesium glycinate lysinate chelate (DOCTOR'S BEST) 100 MG TABS tablet Take 2 tablets (200 mg) by mouth 2 times daily     biotin 1000 MCG TABS tablet Take 500 mcg by mouth daily     Cyanocobalamin 500 MCG TBDP Take 500 mcg by mouth daily     levonorgestrel (MIRENA) 20 MCG/DAY IUD 1 each by Intrauterine route Replacement every 7 years. Placed 1/2019     mycophenolate (GENERIC EQUIVALENT) 250 MG capsule Take 3 capsules (750 mg) by mouth 2 times daily     predniSONE (DELTASONE) 5 MG tablet Take 1 tablet (5 mg) by mouth daily Or until discussed in clinic     sulfamethoxazole-trimethoprim (BACTRIM) 400-80 MG tablet Take 1 tablet by mouth daily     tacrolimus (GENERIC EQUIVALENT) 0.5 MG capsule Take 1 capsule (0.5 mg) by mouth every morning Total dose = 1.5 mg in the AM and 1 mg in the PM     tacrolimus (GENERIC EQUIVALENT) 1 MG capsule Take 1 capsule (1 mg) by mouth 2 times daily Total dose = 1.5 mg in the AM and 1 mg in the PM     valGANciclovir (VALCYTE)  450 MG tablet Take 2 tablets (900 mg) by mouth daily Until directed to increase dose per transplant team based on improving renal function.     vedolizumab (ENTYVIO) 60 MG/ML injection Inject 300 mg into the vein once every six weeks     vitamin D3 (CHOLECALCIFEROL) 50 mcg (2000 units) tablet Take 1 tablet (50 mcg) by mouth daily     No current facility-administered medications for this visit.     Medications Discontinued During This Encounter   Medication Reason     hydrOXYzine (ATARAX) 25 MG tablet      ondansetron (ZOFRAN ODT) 4 MG ODT tab      oxyCODONE (ROXICODONE) 5 MG tablet      methocarbamol (ROBAXIN) 500 MG tablet        Physical Exam   Vital Signs: /72 (BP Location: Left arm, Patient Position: Sitting, Cuff Size: Adult Regular)   Pulse 85   Temp 97.9  F (36.6  C) (Oral)   Resp 16   Wt 59.5 kg (131 lb 1.6 oz)   SpO2 99%   BMI 23.22 kg/m      GENERAL APPEARANCE: alert and no distress  HENT: mouth without ulcers or lesions  LYMPHATICS: no cervical or supraclavicular nodes  RESP: lungs clear to auscultation - no rales, rhonchi or wheezes  CV: regular rhythm, normal rate, no rub, no murmur  EDEMA: no LE edema bilaterally  ABDOMEN: soft, nondistended, nontender, bowel sounds normal  MS: extremities normal - no gross deformities noted, no evidence of inflammation in joints, no muscle tenderness  SKIN: no rash  TX KIDNEY: normal  DIALYSIS ACCESS: none    Data         Latest Ref Rng & Units 6/27/2023     7:28 AM 6/26/2023    12:55 PM 6/20/2023     8:33 AM   Renal   Sodium 136 - 145 mmol/L 138  134     Na (external) 135 - 145 mEq/L   138       K 3.4 - 5.3 mmol/L 4.5  4.4     K (external) 3.6 - 5.2 mEq/L   4.7       Cl 98 - 107 mmol/L 107  103  106       Cl (external) 98 - 107 mmol/L 107  103  106       CO2 22 - 29 mmol/L 22  17     CO2 (external) 22 - 32 mEq/L   25       Urea Nitrogen 6.0 - 20.0 mg/dL 9.4  12.4     BUN (external) 8 - 21 mg/dL   13       Creatinine 0.51 - 0.95 mg/dL 1.13  0.95     Cr  (external) 0.38 - 1.02 mg/dL   1.10       Glucose 70 - 99 mg/dL 96  98     Glucose (external) 62 - 125 mg/dL   88       Calcium 8.6 - 10.0 mg/dL 9.5  9.3     Ca (external) 8.9 - 10.2 mg/dL   9.2       Magnesium 1.7 - 2.3 mg/dL 1.5          This result is from an external source.         Latest Ref Rng & Units 6/27/2023     7:28 AM 6/12/2023     8:25 AM 6/5/2023     8:44 AM   Bone Health   Phosphorus 2.5 - 4.5 mg/dL 3.1      Phos (external) 2.5 - 4.5 mg/dL  3.0  3.2          Latest Ref Rng & Units 6/27/2023     7:28 AM 6/26/2023    12:55 PM 6/20/2023     8:33 AM   Heme   WBC 4.0 - 11.0 10e3/uL 3.0  3.5     WBC (external) 4.3 - 10.0 THOU/uL   2.76       Hgb 11.7 - 15.7 g/dL 11.7  11.6     Hgb (external) 11.5 - 15.5 g/dL   11.7       Plt 150 - 450 10e3/uL 221  222     Plt (external) 150 - 400 THOU/uL   233       ABSOLUTE NEUTROPHIL 1.6 - 8.3 10e3/uL  3.0     ABSOLUTE LYMPHOCYTES 0.8 - 5.3 10e3/uL  0.2     ABSOLUTE MONOCYTES 0.0 - 1.3 10e3/uL  0.2     ABSOLUTE EOSINOPHILS 0.0 - 0.7 10e3/uL  0.1         This result is from an external source.         Latest Ref Rng & Units 6/26/2023    12:55 PM 5/17/2023     3:31 PM 4/11/2023     8:32 AM   Liver   AP 35 - 104 U/L 70  66  70    TBili <=1.2 mg/dL 0.2  0.3  0.6    Bilirubin Direct 0.00 - 0.30 mg/dL <0.20  <0.20  <0.20    ALT 0 - 50 U/L 12  13  15    AST 0 - 45 U/L 27  19  23    Tot Protein 6.4 - 8.3 g/dL 7.0  6.6  8.6    Albumin 3.5 - 5.2 g/dL 4.3  4.1  5.1          Latest Ref Rng & Units 4/3/2023     3:56 PM   Pancreas   A1C <5.7 % 5.3          Latest Ref Rng & Units 4/24/2023     8:26 AM 4/22/2023     7:45 AM 4/3/2023     3:56 PM   Iron studies   Iron 37 - 145 ug/dL 30  40  186    Iron Sat Index 15 - 46 % 18  28  71    Ferritin 6 - 175 ng/mL 886  1,041  902          Latest Ref Rng & Units 5/10/2023     8:05 AM 4/13/2023     1:23 PM 4/3/2023     3:56 PM   [facility] Txp Virology   CMV QUANT IU/ML Not Detected IU/mL Not Detected  Not Detected     EBV CAPSID ANTIBODY IGG No  detectable antibody.   Positive         Recent Labs   Lab Test 05/22/23  0903 05/24/23  0811 06/27/23  0728   DOSTAC 5/21/2023 5/23/2023 6/26/2023   TACROL 10.6 12.2 12.7     Recent Labs   Lab Test 05/10/23  0805 05/12/23  0740 05/24/23  0811   DOSMPA 5/9/2023   9:40 PM 5/11/2023   9:50 PM 5/23/2023   9:30 PM   MPACID 7.38* 3.65* 4.89*   MPAG 72.5 52.6 74.5

## 2023-07-10 LAB — SCANNED LAB RESULT: NORMAL

## 2023-07-12 ENCOUNTER — TELEPHONE (OUTPATIENT)
Dept: TRANSPLANT | Facility: CLINIC | Age: 33
End: 2023-07-12
Payer: COMMERCIAL

## 2023-07-12 NOTE — TELEPHONE ENCOUNTER
Post Kidney and Pancreas Transplant Team Conference  Date: 7/12/2023  Transplant Coordinator: Romy Cheema     Attendees:  [x]  Dr. Mar [x] Sujey Ku, RN [x] Radha Roew LPN     []  Dr. Agustin [] Mable Dewitt RN [] Angela Carson LPN   [x]  Dr. Arora [x] Romy Cheema, MARIS    []  Dr. Barrera [] Ping Diaz RN [x] Km Pope, PharmD   [] Dr. Guo [] Tena Brennan, MARIS    [] Dr. Sepulveda [] Jair Mcarthur RN    [] Dr. Ingram [] Carlie Serrato, MARIS [x] Jazmine Acosta RN   [] Dr. Romero [] Mela Ray RN    []  Dr. Bernard [] More Sidhu RN    [] Dr. Haines [x] Amalia Tavarez RN    [x] Christi Alfonso, ALEJANDRO [] Peri Desir RN        Verbal Plan Read Back:   Continue current treatment plan    Routed to RN Coordinator   Radha Rowe LPN

## 2023-07-26 DIAGNOSIS — Z94.0 KIDNEY REPLACED BY TRANSPLANT: Primary | ICD-10-CM

## 2023-07-26 DIAGNOSIS — D84.9 IMMUNOSUPPRESSED STATUS (H): ICD-10-CM

## 2023-07-26 NOTE — TELEPHONE ENCOUNTER
ISSUE: tac below goal 8-10    PLAN/LPN TASK;     Please call pt with following message:    Your recent  Tacrolimus IR drug level was 6.4.    Please confirm this was an accurate 12-hour trough and verify your current Tacrolimus IR dose of 1.5 mg in the am and 1 mg in the pm   If both are accurate, please increase your  Tacrolimus IR dose to 1.5 mg  BID and repeat labs in 1 week.     Please adjust med list accordingly

## 2023-07-26 NOTE — TELEPHONE ENCOUNTER
Left message and sent [email or web address] message to patient regarding:  tac below goal 8-10     PLAN/LPN TASK;      Please call pt with following message:     Your recent  Tacrolimus IR drug level was 6.4.    Please confirm this was an accurate 12-hour trough and verify your current Tacrolimus IR dose of 1.5 mg in the am and 1 mg in the pm   If both are accurate, please increase your  Tacrolimus IR dose to 1.5 mg  BID and repeat labs in 1 week

## 2023-07-27 RX ORDER — TACROLIMUS 0.5 MG/1
0.5 CAPSULE ORAL 2 TIMES DAILY
Qty: 60 CAPSULE | Refills: 11 | Status: SHIPPED | OUTPATIENT
Start: 2023-07-27 | End: 2023-08-18

## 2023-07-27 RX ORDER — TACROLIMUS 1 MG/1
1 CAPSULE ORAL 2 TIMES DAILY
Qty: 60 CAPSULE | Refills: 11 | Status: SHIPPED | OUTPATIENT
Start: 2023-07-27 | End: 2023-08-18

## 2023-07-27 NOTE — TELEPHONE ENCOUNTER
Patient confirms this was an accurate 12-hour trough and verified current Tacrolimus IR dose of 1.5 mg in the am and 1 mg in the pm   Patient confirms increase Tacrolimus IR dose to 1.5 mg  BID and repeat labs in 1 week

## 2023-08-07 ENCOUNTER — OFFICE VISIT (OUTPATIENT)
Dept: TRANSPLANT | Facility: CLINIC | Age: 33
End: 2023-08-07
Attending: INTERNAL MEDICINE
Payer: COMMERCIAL

## 2023-08-07 VITALS
DIASTOLIC BLOOD PRESSURE: 83 MMHG | HEART RATE: 101 BPM | SYSTOLIC BLOOD PRESSURE: 124 MMHG | BODY MASS INDEX: 23.33 KG/M2 | OXYGEN SATURATION: 99 % | WEIGHT: 131.7 LBS

## 2023-08-07 DIAGNOSIS — Z94.0 KIDNEY REPLACED BY TRANSPLANT: ICD-10-CM

## 2023-08-07 PROCEDURE — 99213 OFFICE O/P EST LOW 20 MIN: CPT | Mod: GC | Performed by: SURGERY

## 2023-08-07 PROCEDURE — G0463 HOSPITAL OUTPT CLINIC VISIT: HCPCS | Performed by: SURGERY

## 2023-08-07 ASSESSMENT — PAIN SCALES - GENERAL: PAINLEVEL: NO PAIN (0)

## 2023-08-07 NOTE — LETTER
8/7/2023         RE: Caity Horan  [address]  [address]  [facility] [address]        Dear Colleague,    Thank you for referring your patient, Caity Horan, to the [facility]. Please see a copy of my visit note below.    Transplant Surgery Progress Note    Transplants:  4/13/2023 (Kidney), 12/5/2014 (Kidney); Postoperative day:  116   Caity Horan is a 33 yo with history of IgA nephropathy, KT 2014 & LDKT 4/13/23 c/b urine leak, UC, and HTN. Re-admitted 4/28-5/1 with ASHLEY and de naeem DSA + B45. Treated with solu-medrol 4/29-5/1 and was taper down to prednisone 5mg daily. Also received thymo 125mg on 4/30 due to DSA. Recheck DSA negative.     S: Doing well today. Patient received entyvio infusion on 5/17 and her symptoms improved dramatically after that. Today no fever/chills or night sweats. Eating well. Having regular BMs. Adequate urine output. No blood in urine. Does endorse some numbness over left thigh and left groin.     Transplant History:    Transplant Type:  LDKT  Donor Type: Living   Transplant Date:  4/13/2023 (Kidney), 12/5/2014 (Kidney)   Ureteral Stent:  Yes. Removed today 5/22/23   Crossmatch:  negative   DSA at Tx:  No  Baseline Cr: 1.37, frank 0.93  DeNovo DSA: Yes: Date DSA Last Checked: May.2023    Latest DSA: Yes, B45 847 MFI 4/24 cleared on recheck 4/29 and remains negative    Acute Rejection Hx:  Yes: Treated with solu-medrol 4/29-5/1 and was tapered down to prednisone 5mg daily.     Present Maintenance Immunosuppression:  Tacrolimus, Mycophenolate mofetil and Prednisone    Transplant Coordinator: Eleanor Lund     Transplant Office Phone Number: [phone]     Immunosuppressant Medications       Immunosuppressive Agents Disp Start End     mycophenolate (GENERIC EQUIVALENT) 250 MG capsule    180 capsule 4/20/2023     Sig - Route: Take 3 capsules (750 mg) by mouth 2 times daily - Oral    Class: E-Prescribe    Renewals       Renewal requests to authorizing  provider (Amalia Marie NP) <b>prohibited</b>             tacrolimus (GENERIC EQUIVALENT) 0.5 MG capsule    30 capsule 2023     Sig - Route: Take 1 capsule (0.5 mg) by mouth every morning Total dose = 1.5 mg in the AM and 1 mg in the PM - Oral    Class: E-Prescribe     tacrolimus (GENERIC EQUIVALENT) 1 MG capsule    60 capsule 2023     Sig - Route: Take 1 capsule (1 mg) by mouth 2 times daily Total dose = 1.5 mg in the AM and 1 mg in the PM - Oral    Class: E-Prescribe            Possible Immunosuppression-related side effects:   []             headache  []             vivid dreams  []             irritability  []             cognitive difficuties  []             fine tremor  []             nausea  []             diarrhea  []             neuropathy      []             edema  []             renal calcineurin toxicity  []             hyperkalemia  []             post-transplant diabetes  []             decreased appetite  []             increased appetite  []             other:  [x]             none    Prescription Medications as of 2023         Rx Number Disp Refills Start End Last Dispensed Date Next Fill Date Owning Pharmacy     magnesium glycinate lysinate chelate (DOCTOR'S BEST) 100 MG TABS tablet  360 tablet 3 2023    [facility] - [place], [place] - [address]    Sig: Take 2 tablets (200 mg) by mouth 2 times daily    Class: E-Prescribe    Route: Oral    biotin 1000 MCG TABS tablet            Sig: Take 500 mcg by mouth daily    Class: Historical    Route: Oral    Cyanocobalamin 500 MCG TBDP            Sig: Take 500 mcg by mouth daily    Class: Historical    Route: Oral    levonorgestrel (MIRENA) 20 MCG/DAY IUD            Si each by Intrauterine route Replacement every 7 years. Placed 2019    Class: Historical    Route: Intrauterine    mycophenolate (GENERIC EQUIVALENT) 250 MG capsule  180 capsule 11 2023    [facility] -  [place], [place] - [address]    Sig: Take 3 capsules (750 mg) by mouth 2 times daily    Class: E-Prescribe    Route: Oral    Renewals       Renewal requests to authorizing provider (Amalia Marie NP) <b>prohibited</b>            predniSONE (DELTASONE) 5 MG tablet  30 tablet 11 5/16/2023    [address]    Sig: Take 1 tablet (5 mg) by mouth daily Or until discussed in clinic    Class: E-Prescribe    Notes to Pharmacy: TXP DT 4/13/2023 (Kidney), 12/5/2014 (Kidney) TXP Dischg DT 4/20/2023 DX Kidney replaced by transplant Z94.0 [facility] ([place], [place])    Route: Oral    sulfamethoxazole-trimethoprim (BACTRIM) 400-80 MG tablet  30 tablet 11 5/16/2023    [address]    Sig: Take 1 tablet by mouth daily    Class: E-Prescribe    Route: Oral    tacrolimus (GENERIC EQUIVALENT) 0.5 MG capsule  60 capsule 11 7/27/2023    [facility]    Sig: Take 1 capsule (0.5 mg) by mouth 2 times daily Total dose = 1.5 mg twice per day    Class: E-Prescribe    Route: Oral    tacrolimus (GENERIC EQUIVALENT) 1 MG capsule  60 capsule 11 7/27/2023    [facility]/[address]    Sig: Take 1 capsule (1 mg) by mouth 2 times daily Total dose = 1.5 mg twice per day    Class: E-Prescribe    Route: Oral    valGANciclovir (VALCYTE) 450 MG tablet  60 tablet 5 5/22/2023    [facility]    Sig: Take 2 tablets (900 mg) by mouth daily Until directed to increase dose per transplant team based on improving renal function.    Class: E-Prescribe    Route: Oral    vedolizumab (ENTYVIO) 60 MG/ML injection            Sig: Inject 300 mg into the vein once every six weeks    Class: Historical    Route:  Intravenous    vitamin D3 (CHOLECALCIFEROL) 50 mcg (2000 units) tablet  30 tablet 5 6/23/2023    [facility] - [place], [place] - [address]    Sig: Take 1 tablet (50 mcg) by mouth daily    Class: E-Prescribe    Route: Oral            O:         Latest Ref Rng & Units 6/27/2023     7:28 AM 6/26/2023    12:55 PM 5/24/2023     8:11 AM 5/22/2023     9:03 AM 5/19/2023     8:11 AM   Transplant Immunosuppression Labs   Creat 0.51 - 0.95 mg/dL 1.13  0.95  1.12  1.16  1.19    Urea Nitrogen 6.0 - 20.0 mg/dL 9.4  12.4  9.1  12.2  9.7    WBC 4.0 - 11.0 10e3/uL 3.0  3.5  3.8  4.2  3.4    Neutrophil %  87       ANEU 1.6 - 8.3 10e3/uL  3.0           Chemistries:   Recent Labs   Lab Test 06/27/23  0728   BUN 9.4   CR 1.13*   GFRESTIMATED 66   GLC 96     Lab Results   Component Value Date    A1C 5.3 04/03/2023     Recent Labs   Lab Test 06/26/23  1255   ALBUMIN 4.3   BILITOTAL 0.2   ALKPHOS 70   AST 27   ALT 12     Urine Studies:  Recent Labs   Lab Test 04/29/23  1211   COLOR Light Yellow   APPEARANCE Clear   URINEGLC Negative   URINEBILI Negative   URINEKETONE Negative   SG 1.013   UBLD Negative   URINEPH 7.5*   PROTEIN 20*   NITRITE Negative   LEUKEST Negative   RBCU 2   WBCU 2     Recent Labs   Lab Test 01/14/21  1200 12/03/20  0915   UTPG 2.09* 1.36*     Hematology:   Recent Labs   Lab Test 06/27/23  0728 06/26/23  1255 05/24/23  0811   HGB 11.7 11.6* 10.6*    222 180   WBC 3.0* 3.5* 3.8*     Coags:   Recent Labs   Lab Test 04/29/23  0547 04/17/23  0904   INR 1.14 1.27*     HLA antibodies:   SA1 Hi Risk David   Date Value Ref Range Status   11/11/2019 None  Final     SA1 HI RISK DAVID   Date Value Ref Range Status   05/12/2023 B:76  Final     SA1 Mod Risk David   Date Value Ref Range Status   11/11/2019 A:2 B:42 76  Final     SA1 MOD RISK DAVID   Date Value Ref Range Status   05/12/2023 A:36B:42  Final     SA2 Hi Risk David   Date Value Ref Range Status   11/11/2019 DQ:2 DQA:05  Final     SA2 HI RISK DAVID    Date Value Ref Range Status   05/12/2023 DQA:05  Final     SA2 Mod Risk David   Date Value Ref Range Status   11/11/2019 DQ:7 DQA:04 06  Final     SA2 MOD RISK DAVID   Date Value Ref Range Status   05/12/2023 DQ:7  Final       Assessment: Caity Horan is doing well s/p LDKT:  Issues we addressed during her visit include:    Plan:    1. Graft function: Cr 1.16, adequate urine output. Current plan for closure biopsy 1st week of Deepthi per nephrology.   2. Counseled on exercise. Ok to slowly resume exercise at 6 weeks post-op but advised to avoid weights >20lbs or core activities.   Followup: At 1 year post op    Art Kent MD    I have reviewed history, examined patient and discussed plan with the fellow/resident/SRINIVASAN.  I concur with the findings in this note.    Immunosuppressive regimen, management and long term risks discussed in detail. Changes, when applicable made as per orders.    Total time: 20 min  Counseling time: 10 min                       .

## 2023-08-07 NOTE — PROGRESS NOTES
Transplant Surgery Progress Note    Transplants:  4/13/2023 (Kidney), 12/5/2014 (Kidney); Postoperative day:  116   Caity Horan is a 33 yo with history of IgA nephropathy, KT 2014 & LDKT 4/13/23 c/b urine leak, UC, and HTN. Re-admitted 4/28-5/1 with ASHLEY and de naeem DSA + B45. Treated with solu-medrol 4/29-5/1 and was taper down to prednisone 5mg daily. Also received thymo 125mg on 4/30 due to DSA. Recheck DSA negative.     S: Doing well today. No issues, feeling well. Creatinine 0.9. Tac was a bit low at 6.4 last week, her nephrologist increased her tac dose. Here from [place] visiting.     Transplant History:    Transplant Type:  LDKT  Donor Type: Living   Transplant Date:  4/13/2023 (Kidney), 12/5/2014 (Kidney)   Ureteral Stent:  Yes. Removed today 5/22/23   Crossmatch:  negative   DSA at Tx:  No  Baseline Cr: 1.37, frank 0.93  DeNovo DSA: Yes: Date DSA Last Checked: May.2023    Latest DSA: Yes, B45 847 MFI 4/24 cleared on recheck 4/29 and remains negative    Acute Rejection Hx:  Yes: Treated with solu-medrol 4/29-5/1 and was tapered down to prednisone 5mg daily.     Present Maintenance Immunosuppression:  Tacrolimus, Mycophenolate mofetil and Prednisone    Transplant Coordinator: Eleanor Lund     Transplant Office Phone Number: [phone]     Immunosuppressant Medications       Immunosuppressive Agents Disp Start End     mycophenolate (GENERIC EQUIVALENT) 250 MG capsule    180 capsule 4/20/2023     Sig - Route: Take 3 capsules (750 mg) by mouth 2 times daily - Oral    Class: E-Prescribe    Renewals       Renewal requests to authorizing provider (Amalia Marie NP) <b>prohibited</b>             tacrolimus (GENERIC EQUIVALENT) 0.5 MG capsule    30 capsule 5/11/2023     Sig - Route: Take 1 capsule (0.5 mg) by mouth every morning Total dose = 1.5 mg in the AM and 1 mg in the PM - Oral    Class: E-Prescribe     tacrolimus (GENERIC EQUIVALENT) 1 MG capsule    60 capsule 5/11/2023     Sig - Route: Take 1  capsule (1 mg) by mouth 2 times daily Total dose = 1.5 mg in the AM and 1 mg in the PM - Oral    Class: E-Prescribe            Possible Immunosuppression-related side effects:   []             headache  []             vivid dreams  []             irritability  []             cognitive difficuties  []             fine tremor  []             nausea  []             diarrhea  []             neuropathy      []             edema  []             renal calcineurin toxicity  []             hyperkalemia  []             post-transplant diabetes  []             decreased appetite  []             increased appetite  []             other:  [x]             none    Prescription Medications as of 2023         Rx Number Disp Refills Start End Last Dispensed Date Next Fill Date Owning Pharmacy     magnesium glycinate lysinate chelate ([facility]ST) 100 MG TABS tablet  360 tablet 3 2023    [facility] - [place], [place] - [address] [place] [address]    Sig: Take 2 tablets (200 mg) by mouth 2 times daily    Class: E-Prescribe    Route: Oral    biotin 1000 MCG TABS tablet            Sig: Take 500 mcg by mouth daily    Class: Historical    Route: Oral    Cyanocobalamin 500 MCG TBDP            Sig: Take 500 mcg by mouth daily    Class: Historical    Route: Oral    levonorgestrel (MIRENA) 20 MCG/DAY IUD            Si each by Intrauterine route Replacement every 7 years. Placed 2019    Class: Historical    Route: Intrauterine    mycophenolate (GENERIC EQUIVALENT) 250 MG capsule  180 capsule 11 2023    [place], [place] - [address]    Sig: Take 3 capsules (750 mg) by mouth 2 times daily    Class: E-Prescribe    Route: Oral    Renewals       Renewal requests to authorizing provider (Amalia Marie NP) <b>prohibited</b>            predniSONE (DELTASONE) 5 MG tablet  30 tablet 11 2023    [place], [place] - [address] [place] [address]  [address]    Sig: Take 1 tablet (5 mg) by mouth daily Or until discussed in clinic    Class: E-Prescribe    Notes to Pharmacy: TXP DT 4/13/2023 (Kidney), 12/5/2014 (Kidney) TXP Dischg DT 4/20/2023 DX Kidney replaced by transplant Z94.0 TX Center [facility] ([place], [place])    Route: Oral    sulfamethoxazole-trimethoprim (BACTRIM) 400-80 MG tablet  30 tablet 11 5/16/2023    [address]    Sig: Take 1 tablet by mouth daily    Class: E-Prescribe    Route: Oral    tacrolimus (GENERIC EQUIVALENT) 0.5 MG capsule  60 capsule 11 7/27/2023    [facility] [address]     Sig: Take 1 capsule (0.5 mg) by mouth 2 times daily Total dose = 1.5 mg twice per day    Class: E-Prescribe    Route: Oral    tacrolimus (GENERIC EQUIVALENT) 1 MG capsule  60 capsule 11 7/27/2023    [facility]/[place] [address]     Sig: Take 1 capsule (1 mg) by mouth 2 times daily Total dose = 1.5 mg twice per day    Class: E-Prescribe    Route: Oral    valGANciclovir (VALCYTE) 450 MG tablet  60 tablet 5 5/22/2023    [facility] [address] [address] [facility]    Sig: Take 2 tablets (900 mg) by mouth daily Until directed to increase dose per transplant team based on improving renal function.    Class: E-Prescribe    Route: Oral    vedolizumab (ENTYVIO) 60 MG/ML injection            Sig: Inject 300 mg into the vein once every six weeks    Class: Historical    Route: Intravenous    vitamin D3 (CHOLECALCIFEROL) 50 mcg (2000 units) tablet  30 tablet 5 6/23/2023    [facility] [address]     Sig: Take 1 tablet (50 mcg) by mouth daily    Class: E-Prescribe    Route: Oral            O:         Latest Ref Rng & Units 6/27/2023     7:28 AM 6/26/2023    12:55 PM 5/24/2023     8:11 AM 5/22/2023     9:03 AM  5/19/2023     8:11 AM   Transplant Immunosuppression Labs   Creat 0.51 - 0.95 mg/dL 1.13  0.95  1.12  1.16  1.19    Urea Nitrogen 6.0 - 20.0 mg/dL 9.4  12.4  9.1  12.2  9.7    WBC 4.0 - 11.0 10e3/uL 3.0  3.5  3.8  4.2  3.4    Neutrophil %  87       ANEU 1.6 - 8.3 10e3/uL  3.0           Chemistries:   Recent Labs   Lab Test 06/27/23  0728   BUN 9.4   CR 1.13*   GFRESTIMATED 66   GLC 96     Lab Results   Component Value Date    A1C 5.3 04/03/2023     Recent Labs   Lab Test 06/26/23  1255   ALBUMIN 4.3   BILITOTAL 0.2   ALKPHOS 70   AST 27   ALT 12     Urine Studies:  Recent Labs   Lab Test 04/29/23  1211   COLOR Light Yellow   APPEARANCE Clear   URINEGLC Negative   URINEBILI Negative   URINEKETONE Negative   SG 1.013   UBLD Negative   URINEPH 7.5*   PROTEIN 20*   NITRITE Negative   LEUKEST Negative   RBCU 2   WBCU 2     Recent Labs   Lab Test 01/14/21  1200 12/03/20  0915   UTPG 2.09* 1.36*     Hematology:   Recent Labs   Lab Test 06/27/23  0728 06/26/23  1255 05/24/23  0811   HGB 11.7 11.6* 10.6*    222 180   WBC 3.0* 3.5* 3.8*     Coags:   Recent Labs   Lab Test 04/29/23  0547 04/17/23  0904   INR 1.14 1.27*     HLA antibodies:   SA1 Hi Risk David   Date Value Ref Range Status   11/11/2019 None  Final     SA1 HI RISK DAVID   Date Value Ref Range Status   05/12/2023 B:76  Final     SA1 Mod Risk David   Date Value Ref Range Status   11/11/2019 A:2 B:42 76  Final     SA1 MOD RISK DAVID   Date Value Ref Range Status   05/12/2023 A:36B:42  Final     SA2 Hi Risk David   Date Value Ref Range Status   11/11/2019 DQ:2 DQA:05  Final     SA2 HI RISK DAVID   Date Value Ref Range Status   05/12/2023 DQA:05  Final     SA2 Mod Risk David   Date Value Ref Range Status   11/11/2019 DQ:7 DQA:04 06  Final     SA2 MOD RISK DAVID   Date Value Ref Range Status   05/12/2023 DQ:7  Final       Assessment: Caity Horan is doing well s/p LDKT:  Issues we addressed during her visit include:    Plan:    1. Graft function: Cr 0.97, adequate urine  output.   Follow up: At 1 year post op    Art Kent MD    I have reviewed history, examined patient and discussed plan with the fellow/resident/SRINIVASAN.  I concur with the findings in this note.    Immunosuppressive regimen, management and long term risks discussed in detail. Changes, when applicable made as per orders.    Total time: 20 min  Counseling time: 10 min                       .

## 2023-08-08 ENCOUNTER — DOCUMENTATION ONLY (OUTPATIENT)
Dept: PHARMACY | Facility: CLINIC | Age: 33
End: 2023-08-08
Payer: COMMERCIAL

## 2023-08-08 NOTE — PROGRESS NOTES
Skilled Nurse visit in the [facility] to administer Entyvio .  No recent elevated temperature, fever, chills, productive cough, coughing for 3 weeks or longer or hemoptysis, abnormal vital signs, night sweats, chest pain. No  decrease in your appetite, unexplained weight loss or fatigue.  No other new onset medical symptoms.  Current weight 128 lbs.  Peripheral IVright AC, 1attempt.  Infusion completed without complication or reaction. Pt reports therapy is effective in managing symptoms related to therapy.

## 2023-08-09 ENCOUNTER — LAB (OUTPATIENT)
Dept: LAB | Facility: CLINIC | Age: 33
End: 2023-08-09
Payer: COMMERCIAL

## 2023-08-09 ENCOUNTER — MYC MEDICAL ADVICE (OUTPATIENT)
Dept: GASTROENTEROLOGY | Facility: CLINIC | Age: 33
End: 2023-08-09

## 2023-08-09 ENCOUNTER — TELEPHONE (OUTPATIENT)
Dept: TRANSPLANT | Facility: CLINIC | Age: 33
End: 2023-08-09

## 2023-08-09 ENCOUNTER — OFFICE VISIT (OUTPATIENT)
Dept: TRANSPLANT | Facility: CLINIC | Age: 33
End: 2023-08-09
Attending: INTERNAL MEDICINE
Payer: COMMERCIAL

## 2023-08-09 VITALS
SYSTOLIC BLOOD PRESSURE: 126 MMHG | WEIGHT: 131.6 LBS | BODY MASS INDEX: 23.32 KG/M2 | DIASTOLIC BLOOD PRESSURE: 84 MMHG | HEIGHT: 63 IN | HEART RATE: 83 BPM | OXYGEN SATURATION: 100 %

## 2023-08-09 DIAGNOSIS — Z48.298 AFTERCARE FOLLOWING ORGAN TRANSPLANT: ICD-10-CM

## 2023-08-09 DIAGNOSIS — E83.42 HYPOMAGNESEMIA: ICD-10-CM

## 2023-08-09 DIAGNOSIS — Z94.0 KIDNEY REPLACED BY TRANSPLANT: ICD-10-CM

## 2023-08-09 DIAGNOSIS — T86.11 BANFF TYPE IB ACUTE CELLULAR REJECTION OF TRANSPLANTED KIDNEY: ICD-10-CM

## 2023-08-09 DIAGNOSIS — Z94.0 KIDNEY REPLACED BY TRANSPLANT: Primary | ICD-10-CM

## 2023-08-09 DIAGNOSIS — Z29.89 NEED FOR PNEUMOCYSTIS PROPHYLAXIS: ICD-10-CM

## 2023-08-09 DIAGNOSIS — D84.9 IMMUNOSUPPRESSION (H): ICD-10-CM

## 2023-08-09 DIAGNOSIS — D70.9 NEUTROPENIA, UNSPECIFIED TYPE (H): Primary | ICD-10-CM

## 2023-08-09 DIAGNOSIS — D70.2 OTHER DRUG-INDUCED NEUTROPENIA (H): ICD-10-CM

## 2023-08-09 DIAGNOSIS — Z20.828 CONTACT WITH AND (SUSPECTED) EXPOSURE TO OTHER VIRAL COMMUNICABLE DISEASES: ICD-10-CM

## 2023-08-09 DIAGNOSIS — Z79.899 ENCOUNTER FOR LONG-TERM CURRENT USE OF MEDICATION: ICD-10-CM

## 2023-08-09 DIAGNOSIS — N02.B9 IGA NEPHROPATHY: ICD-10-CM

## 2023-08-09 DIAGNOSIS — T86.11 ACUTE REJECTION OF KIDNEY TRANSPLANT: ICD-10-CM

## 2023-08-09 LAB
ALBUMIN MFR UR ELPH: <6 MG/DL
ANION GAP SERPL CALCULATED.3IONS-SCNC: 11 MMOL/L (ref 7–15)
BASOPHILS # BLD MANUAL: 0 10E3/UL (ref 0–0.2)
BASOPHILS NFR BLD MANUAL: 4 %
BUN SERPL-MCNC: 13.3 MG/DL (ref 6–20)
BURR CELLS BLD QL SMEAR: SLIGHT
CALCIUM SERPL-MCNC: 9.8 MG/DL (ref 8.6–10)
CHLORIDE SERPL-SCNC: 107 MMOL/L (ref 98–107)
CMV DNA SPEC NAA+PROBE-ACNC: NOT DETECTED IU/ML
CREAT SERPL-MCNC: 1.11 MG/DL (ref 0.51–0.95)
CREAT UR-MCNC: 10.6 MG/DL
DACRYOCYTES BLD QL SMEAR: SLIGHT
DEPRECATED HCO3 PLAS-SCNC: 23 MMOL/L (ref 22–29)
EOSINOPHIL # BLD MANUAL: 0.1 10E3/UL (ref 0–0.7)
EOSINOPHIL NFR BLD MANUAL: 5 %
ERYTHROCYTE [DISTWIDTH] IN BLOOD BY AUTOMATED COUNT: 12.3 % (ref 10–15)
GFR SERPL CREATININE-BSD FRML MDRD: 67 ML/MIN/1.73M2
GLUCOSE SERPL-MCNC: 106 MG/DL (ref 70–99)
HCT VFR BLD AUTO: 38 % (ref 35–47)
HGB BLD-MCNC: 12.3 G/DL (ref 11.7–15.7)
LYMPHOCYTES # BLD MANUAL: 0.4 10E3/UL (ref 0.8–5.3)
LYMPHOCYTES NFR BLD MANUAL: 40 %
MCH RBC QN AUTO: 31.9 PG (ref 26.5–33)
MCHC RBC AUTO-ENTMCNC: 32.4 G/DL (ref 31.5–36.5)
MCV RBC AUTO: 98 FL (ref 78–100)
MONOCYTES # BLD MANUAL: 0.1 10E3/UL (ref 0–1.3)
MONOCYTES NFR BLD MANUAL: 11 %
NEUTROPHILS # BLD MANUAL: 0.4 10E3/UL (ref 1.6–8.3)
NEUTROPHILS NFR BLD MANUAL: 40 %
PLAT MORPH BLD: ABNORMAL
PLATELET # BLD AUTO: 209 10E3/UL (ref 150–450)
POTASSIUM SERPL-SCNC: 4.4 MMOL/L (ref 3.4–5.3)
PROT/CREAT 24H UR: NORMAL MG/G{CREAT}
PTH-INTACT SERPL-MCNC: 47 PG/ML (ref 15–65)
RBC # BLD AUTO: 3.86 10E6/UL (ref 3.8–5.2)
RBC MORPH BLD: ABNORMAL
SODIUM SERPL-SCNC: 141 MMOL/L (ref 136–145)
TACROLIMUS BLD-MCNC: 6.3 UG/L (ref 5–15)
TME LAST DOSE: NORMAL H
TME LAST DOSE: NORMAL H
WBC # BLD AUTO: 1.1 10E3/UL (ref 4–11)

## 2023-08-09 PROCEDURE — 83970 ASSAY OF PARATHORMONE: CPT | Performed by: PATHOLOGY

## 2023-08-09 PROCEDURE — 86833 HLA CLASS II HIGH DEFIN QUAL: CPT | Performed by: INTERNAL MEDICINE

## 2023-08-09 PROCEDURE — G0463 HOSPITAL OUTPT CLINIC VISIT: HCPCS | Performed by: INTERNAL MEDICINE

## 2023-08-09 PROCEDURE — 80048 BASIC METABOLIC PNL TOTAL CA: CPT | Performed by: PATHOLOGY

## 2023-08-09 PROCEDURE — 99000 SPECIMEN HANDLING OFFICE-LAB: CPT | Performed by: PATHOLOGY

## 2023-08-09 PROCEDURE — 99214 OFFICE O/P EST MOD 30 MIN: CPT | Performed by: INTERNAL MEDICINE

## 2023-08-09 PROCEDURE — 86832 HLA CLASS I HIGH DEFIN QUAL: CPT | Performed by: INTERNAL MEDICINE

## 2023-08-09 PROCEDURE — 80197 ASSAY OF TACROLIMUS: CPT | Performed by: INTERNAL MEDICINE

## 2023-08-09 PROCEDURE — 85007 BL SMEAR W/DIFF WBC COUNT: CPT | Performed by: PATHOLOGY

## 2023-08-09 PROCEDURE — 85027 COMPLETE CBC AUTOMATED: CPT | Performed by: PATHOLOGY

## 2023-08-09 PROCEDURE — 36415 COLL VENOUS BLD VENIPUNCTURE: CPT | Performed by: PATHOLOGY

## 2023-08-09 PROCEDURE — 87799 DETECT AGENT NOS DNA QUANT: CPT | Performed by: INTERNAL MEDICINE

## 2023-08-09 PROCEDURE — 84156 ASSAY OF PROTEIN URINE: CPT | Performed by: PATHOLOGY

## 2023-08-09 RX ORDER — EPINEPHRINE 1 MG/ML
0.3 INJECTION, SOLUTION, CONCENTRATE INTRAVENOUS EVERY 5 MIN PRN
Status: CANCELLED | OUTPATIENT
Start: 2023-08-09

## 2023-08-09 RX ORDER — ALBUTEROL SULFATE 0.83 MG/ML
2.5 SOLUTION RESPIRATORY (INHALATION)
Status: CANCELLED | OUTPATIENT
Start: 2023-08-09

## 2023-08-09 RX ORDER — MEPERIDINE HYDROCHLORIDE 25 MG/ML
25 INJECTION INTRAMUSCULAR; INTRAVENOUS; SUBCUTANEOUS EVERY 30 MIN PRN
Status: CANCELLED | OUTPATIENT
Start: 2023-08-09

## 2023-08-09 RX ORDER — METHYLPREDNISOLONE SODIUM SUCCINATE 125 MG/2ML
125 INJECTION, POWDER, LYOPHILIZED, FOR SOLUTION INTRAMUSCULAR; INTRAVENOUS
Status: CANCELLED
Start: 2023-08-09

## 2023-08-09 RX ORDER — DIPHENHYDRAMINE HYDROCHLORIDE 50 MG/ML
50 INJECTION INTRAMUSCULAR; INTRAVENOUS
Status: CANCELLED
Start: 2023-08-09

## 2023-08-09 RX ORDER — ALBUTEROL SULFATE 90 UG/1
1-2 AEROSOL, METERED RESPIRATORY (INHALATION)
Status: CANCELLED
Start: 2023-08-09

## 2023-08-09 ASSESSMENT — PAIN SCALES - GENERAL: PAINLEVEL: NO PAIN (0)

## 2023-08-09 NOTE — PROGRESS NOTES
TRANSPLANT NEPHROLOGY EARLY POST TRANSPLANT VISIT    Assessment & Plan     # LDKT: Stable kidney function following early post transplant issues with a urine leak and acute cellular-mediated rejection.              - Baseline Creatinine: ~ 1-1.2              - Proteinuria: Mild (0.5-1.0 grams)              - Date DSA Last Checked: May/2023      Latest DSA: No     cPRA: 82%              - BK Viremia: No              - Kidney Tx Biopsy: Apr 29, 2023; Result: Acute cellular-mediated rejection, Banff 1B.              - Transplant Ureteral Stent: Removed     # Immunosuppression: Tacrolimus immediate release (goal 8-10), Mycophenolate mofetil (dose 750 mg every 12 hours) and Prednisone (dose 5 mg daily)              - Induction with Recent Transplant:  High Intensity Protocol              - Continue with intensive monitoring of immunosuppression for efficacy and toxicity.              - Changes: Not at this time     # Infection Prophylaxis:   - PJP: Sulfa/TMP (Bactrim)  - CMV: Valganciclovir (Valcyte); Patient is CMV IgG Ab discordant (D+/R-) and will continue on Valcyte x 6 months till 10/30 (last ATG dose 4/30), then check CMV PCR monthly until April 2024     # Blood Pressure: Controlled;          Goal BP: < 130/80              - Volume status: Euvolemic                EDW ~ 126-128 lbs              - Changes: No     # Leukopenia:   - add diff and CMV pcr   - G-CSF if ANC-0.5 or lower   - likely med related too, r-ATG. MMF, bactrim    # Anemia in Chronic Renal Disease: Hgb: Stable, near normal      MICKEY: No              - Iron studies: Low iron saturation, but high ferritin     # Mineral Bone Disorder:   - Secondary renal hyperparathyroidism; PTH level: Minimally elevated ( pg/ml)        On treatment: None  - Vitamin D; level: Normal        On supplement: Yes  - Calcium; level: Normal        On supplement: No     # Electrolytes:   - Potassium; level: Normal        On supplement: No  - Magnesium; level: Stable       On supplement: Yes  - Bicarbonate; level:normal       On supplement:      # Ulcerative Colitis: Stable on vedolizumab q8 weeks.  Followed by GI.     # Medical Compliance: Yes     # Health Maintenance and Vaccination Review: Not Reviewed      # Transplant History:  Etiology of Kidney Failure: IgA nephropathy  Tx: LDKT  Transplant: 4/13/2023 (Kidney), 12/5/2014 (Kidney)  Donor Type: Living      Donor Class:   Crossmatch at time of Tx: negative  DSA at time of Tx: No  Significant changes in immunosuppression: None  CMV IgG Ab High Risk Discordance (D+/R-): Yes  EBV IgG Ab High Risk Discordance (D+/R-): No  Significant transplant-related complications: Acute cellular-mediated rejection  Transplant Office Phone Number: [phone]    Assessment and plan was discussed with the patient and she voiced her understanding and agreement.    Return visit: 2 months for 6 month post transplant visit    Darian Arora MD    Chief Complaint   Ms. Horan is a 33 year old here for kidney transplant and immunosuppression management.     History of Present Illness   Feels great and reports no new concerns  Energy level is good. Denies any fevers, chills, weight loss, night sweats.sore throat, cough, shortness of breath. No nausea, vomiting, abdominal pain, diarrhea and symptoms have been controlled on vedolizumab. No chest pain, sob, leg swelling. No recent illness.    Labs show leukopenia (added diff), add diff cmv pcr, recent Fk subTx 6.8 and the dose was increased     Current IS: FK1.5/1.5 PJI162/750 pred 5  Ppx bactrim, valcyte      Problem List   Patient Active Problem List   Diagnosis    Metabolic acidosis    IgA nephropathy    Kidney replaced by transplant    Immunosuppressed status (H)    Hypomagnesemia    Aftercare following organ transplant    EBV (Nicole-Barr virus) viremia    Anemia in chronic renal disease    Vitamin B12 deficiency    Ulcerative pancolitis with rectal bleeding (H)    Banff type IB acute cellular  "rejection of transplanted kidney    Ulcerative colitis (H)    Vitamin D deficiency    Urine leak from transplanted ureter    Constipation    Kidney transplant rejection    Need for pneumocystis prophylaxis    CKD (chronic kidney disease) stage 2, GFR 60-89 ml/min       Allergies   Allergies   Allergen Reactions    Bumetanide Other (See Comments)     Causes paralysis    Amoxicillin Rash       Medications   Current Outpatient Medications   Medication Sig     magnesium glycinate lysinate chelate (DOCTOR'S BEST) 100 MG TABS tablet Take 2 tablets (200 mg) by mouth 2 times daily    biotin 1000 MCG TABS tablet Take 500 mcg by mouth daily    Cyanocobalamin 500 MCG TBDP Take 500 mcg by mouth daily    levonorgestrel (MIRENA) 20 MCG/DAY IUD 1 each by Intrauterine route Replacement every 7 years. Placed 1/2019    mycophenolate (GENERIC EQUIVALENT) 250 MG capsule Take 3 capsules (750 mg) by mouth 2 times daily    predniSONE (DELTASONE) 5 MG tablet Take 1 tablet (5 mg) by mouth daily Or until discussed in clinic    sulfamethoxazole-trimethoprim (BACTRIM) 400-80 MG tablet Take 1 tablet by mouth daily    tacrolimus (GENERIC EQUIVALENT) 0.5 MG capsule Take 1 capsule (0.5 mg) by mouth 2 times daily Total dose = 1.5 mg twice per day    tacrolimus (GENERIC EQUIVALENT) 1 MG capsule Take 1 capsule (1 mg) by mouth 2 times daily Total dose = 1.5 mg twice per day    valGANciclovir (VALCYTE) 450 MG tablet Take 2 tablets (900 mg) by mouth daily Until directed to increase dose per transplant team based on improving renal function.    vedolizumab (ENTYVIO) 60 MG/ML injection Inject 300 mg into the vein once every six weeks    vitamin D3 (CHOLECALCIFEROL) 50 mcg (2000 units) tablet Take 1 tablet (50 mcg) by mouth daily     No current facility-administered medications for this visit.     There are no discontinued medications.    Physical Exam   Vital Signs: /84   Pulse 83   Ht 1.6 m (5' 3\")   Wt 59.7 kg (131 lb 9.6 oz)   SpO2 100%   " BMI 23.31 kg/m      GENERAL APPEARANCE: alert and no distress  HENT: mouth without ulcers or lesions  LYMPHATICS: no cervical or supraclavicular nodes  RESP: lungs clear to auscultation - no rales, rhonchi or wheezes  CV: regular rhythm, normal rate, no rub, no murmur  EDEMA: no LE edema bilaterally  ABDOMEN: soft, nondistended, nontender, bowel sounds normal  MS: extremities normal - no gross deformities noted, no evidence of inflammation in joints, no muscle tenderness  SKIN: no rash    Data         Latest Ref Rng & Units 7/24/2023     8:18 AM 7/18/2023     8:08 AM 7/11/2023     8:16 AM   Renal   Na (external) 135 - 145 mEq/L 136  139  140    K (external) 3.6 - 5.2 mEq/L 4.8  4.2  4.5    Cl 98 - 108 mEq/L 106  105  107    Cl (external) 98 - 108 mEq/L 106  105  107    CO2 (external) 22 - 32 mEq/L 21  24  24    BUN (external) 8 - 21 mg/dL 19  17  15    Cr (external) 0.38 - 1.02 mg/dL 0.97  1.02  1.05    Glucose (external) 62 - 125 mg/dL 90  90  94    Ca (external) 8.9 - 10.2 mg/dL 9.1  9.2  9.3    Mg (external) 1.8 - 2.4 mg/dL 1.6  1.5  1.6          Latest Ref Rng & Units 7/24/2023     8:18 AM 7/18/2023     8:08 AM 7/11/2023     8:16 AM   Bone Health   Phos (external) 2.5 - 4.5 mg/dL 3.0     3.4  3.2        This result is from an external source.         Latest Ref Rng & Units 8/9/2023     8:45 AM 7/24/2023     8:18 AM 7/18/2023     8:08 AM   Heme   WBC 4.0 - 11.0 10e3/uL 1.1      WBC (external) 4.3 - 10.0 THOU/uL  2.50  2.42    Hgb 11.7 - 15.7 g/dL 12.3      Hgb (external) 11.5 - 15.5 g/dL  11.6  11.7    Plt 150 - 450 10e3/uL 209      Plt (external) 150 - 400 THOU/uL  231  245          Latest Ref Rng & Units 6/26/2023    12:55 PM 5/17/2023     3:31 PM 4/11/2023     8:32 AM   Liver   AP 35 - 104 U/L 70  66  70    TBili <=1.2 mg/dL 0.2  0.3  0.6    Bilirubin Direct 0.00 - 0.30 mg/dL <0.20  <0.20  <0.20    ALT 0 - 50 U/L 12  13  15    AST 0 - 45 U/L 27  19  23    Tot Protein 6.4 - 8.3 g/dL 7.0  6.6  8.6    Albumin 3.5  - 5.2 g/dL 4.3  4.1  5.1          Latest Ref Rng & Units 4/3/2023     3:56 PM   Pancreas   A1C <5.7 % 5.3          Latest Ref Rng & Units 4/24/2023     8:26 AM 4/22/2023     7:45 AM 4/3/2023     3:56 PM   Iron studies   Iron 37 - 145 ug/dL 30  40  186    Iron Sat Index 15 - 46 % 18  28  71    Ferritin 6 - 175 ng/mL 886  1,041  902          Latest Ref Rng & Units 5/10/2023     8:05 AM 4/13/2023     1:23 PM 4/3/2023     3:56 PM   [facility] Txp Virology   CMV QUANT IU/ML Not Detected IU/mL Not Detected  Not Detected     EBV CAPSID ANTIBODY IGG No detectable antibody.   Positive        Recent Labs   Lab Test 05/22/23  0903 05/24/23  0811 06/27/23  0728   DOSTAC 5/21/2023 5/23/2023 6/26/2023   TACROL 10.6 12.2 12.7     Recent Labs   Lab Test 05/10/23  0805 05/12/23  0740 05/24/23  0811   DOSMPA 5/9/2023   9:40 PM 5/11/2023   9:50 PM 5/23/2023   9:30 PM   MPACID 7.38* 3.65* 4.89*   MPAG 72.5 52.6 74.5

## 2023-08-09 NOTE — TELEPHONE ENCOUNTER
ISSUE: ANC <500    Therapy plan for filgrastim x 3 treatments ordered.     Message sent to [facility] for scheduling.     Pt is aware per clinic visit this am that she would likely need treatment.

## 2023-08-09 NOTE — PROGRESS NOTES
"Chief Complaint   Patient presents with    RECHECK     Post kidney TX     /84   Pulse 83   Ht 1.6 m (5' 3\")   Wt 59.7 kg (131 lb 9.6 oz)   SpO2 100%   BMI 23.31 kg/m        Areli Alcaraz MA   "

## 2023-08-10 ENCOUNTER — TELEPHONE (OUTPATIENT)
Dept: TRANSPLANT | Facility: CLINIC | Age: 33
End: 2023-08-10

## 2023-08-10 DIAGNOSIS — D70.9 NEUTROPENIA (H): Primary | ICD-10-CM

## 2023-08-10 DIAGNOSIS — Z94.0 KIDNEY REPLACED BY TRANSPLANT: ICD-10-CM

## 2023-08-10 LAB — BKV DNA # SPEC NAA+PROBE: NOT DETECTED COPIES/ML

## 2023-08-10 NOTE — LETTER
PHYSICIAN ORDERS      DATE & TIME ISSUED: August 10, 2023 10:27 AM  PATIENT NAME: Caity Horan   : 1990     [facility] MR# [if applicable]: [id number]     DIAGNOSIS:  kidney transplant, neutropenia  ICD-10 CODE: z94.0, D70.9         Please inject GRANIX, 300mcg, subcutaneous, daily x3 doses   Ok to substitute for bio-similar Neupogen or Zarxio, as formulary preferred.     Please draw CBC after second injection      **Please call the patient to schedule.     Any questions please call: [facility], Solid Organ Transplant                                              [phone]          Please fax these results to [phone]      Darian Arora MD

## 2023-08-10 NOTE — TELEPHONE ENCOUNTER
Pt called back. Pt is now scheduled for an appointment with Rupert Dsouza on 12/6/2023 at 10:40am for a video visit.

## 2023-08-10 NOTE — TELEPHONE ENCOUNTER
ISSUE: pt needs neupogen injections, and is leaving town.     RNCC called pt and left  requesting CB. Need to know where to send outpt neupogen orders. MyC message sent as well.     requires a referral and pt to be seen at clinic to receive neupogen.      [phone]  P:  post kidney clinic [phone]    RNCC sent message to pt requesting her to find a local PCP, or give the name of one if she has established with a doctor in [place].         Will order neupogen for self administration via specialty pharmacy to see if it is covered by insurance

## 2023-08-10 NOTE — TELEPHONE ENCOUNTER
Writer received Pt's missed call and is returning Pt's call. Writer left message, along with call back number for the Pt.

## 2023-08-11 RX ORDER — FILGRASTIM 300 UG/ML
300 INJECTION, SOLUTION INTRAVENOUS; SUBCUTANEOUS DAILY
Qty: 3 ML | Refills: 0 | Status: SHIPPED | OUTPATIENT
Start: 2023-08-11 | End: 2023-10-27

## 2023-08-15 LAB
DONOR IDENTIFICATION: NORMAL
DSA COMMENTS: NORMAL
DSA PRESENT: NO
DSA TEST METHOD: NORMAL
ORGAN: NORMAL
SA 1 CELL: NORMAL
SA 1 TEST METHOD: NORMAL
SA 2 CELL: NORMAL
SA 2 TEST METHOD: NORMAL
SA1 HI RISK ABY: NORMAL
SA1 MOD RISK ABY: NORMAL
SA2 HI RISK ABY: NORMAL
SA2 MOD RISK ABY: NORMAL
UNACCEPTABLE ANTIGENS: NORMAL
UNOS CPRA: 82
ZZZSA 1  COMMENTS: NORMAL
ZZZSA 2 COMMENTS: NORMAL

## 2023-08-16 DIAGNOSIS — D70.2 OTHER DRUG-INDUCED NEUTROPENIA (H): Primary | ICD-10-CM

## 2023-08-16 DIAGNOSIS — Z94.0 KIDNEY REPLACED BY TRANSPLANT: ICD-10-CM

## 2023-08-16 RX ORDER — TBO-FILGRASTIM 300 UG/ML
300 INJECTION, SOLUTION SUBCUTANEOUS DAILY
Qty: 3 ML | Refills: 0 | Status: SHIPPED | OUTPATIENT
Start: 2023-08-16 | End: 2023-08-19

## 2023-08-18 DIAGNOSIS — Z94.0 KIDNEY REPLACED BY TRANSPLANT: ICD-10-CM

## 2023-08-18 DIAGNOSIS — D84.9 IMMUNOSUPPRESSED STATUS (H): ICD-10-CM

## 2023-08-18 RX ORDER — TACROLIMUS 0.5 MG/1
CAPSULE ORAL
Qty: 60 CAPSULE | Refills: 11 | Status: SHIPPED | OUTPATIENT
Start: 2023-08-18 | End: 2023-09-18

## 2023-08-18 RX ORDER — TACROLIMUS 1 MG/1
CAPSULE ORAL
Qty: 60 CAPSULE | Refills: 11 | Status: SHIPPED | OUTPATIENT
Start: 2023-08-18 | End: 2023-09-18

## 2023-08-23 NOTE — TELEPHONE ENCOUNTER
Per [facility], Entyvio infusions have been coordinated in [facility]. Patient will receive her September infusion on schedule.

## 2023-09-18 DIAGNOSIS — Z94.0 KIDNEY REPLACED BY TRANSPLANT: ICD-10-CM

## 2023-09-18 DIAGNOSIS — D84.9 IMMUNOSUPPRESSED STATUS (H): ICD-10-CM

## 2023-09-18 RX ORDER — TACROLIMUS 1 MG/1
2 CAPSULE ORAL 2 TIMES DAILY
Qty: 120 CAPSULE | Refills: 11 | Status: SHIPPED | OUTPATIENT
Start: 2023-09-18 | End: 2023-10-03

## 2023-09-18 RX ORDER — TACROLIMUS 0.5 MG/1
CAPSULE ORAL
Qty: 60 CAPSULE | Refills: 11 | Status: SHIPPED | OUTPATIENT
Start: 2023-09-18 | End: 2023-11-22

## 2023-09-18 NOTE — LETTER
PHYSICIAN ORDERS      DATE & TIME ISSUED: 23, 9:41 AM  PATIENT NAME: Caity Horan   : 1990     [facility] MR# [if applicable]: [id number]     DIAGNOSIS:  kidney transplant  ICD-10 CODE: z94.0     Please draw the following labs this week, in addition to standing orders:    BK (Polyoma Virus) PCR Quantitative/Plasma    Any questions please call: [phone]    Please fax these results to [phone]      Darian Arora MD

## 2023-09-18 NOTE — LETTER
OUTPATIENT LABORATORY TEST ORDER     Patient Name: Caity Horan   YOB: 1990     [facility] MR# [if applicable]: [id number]   Date & Time: 9/18/23 0939  Expiration Date: 1 year after date issued      Diagnosis: Kidney Transplant (ICD-10 Z94.0)    Aftercare following organ transplant (ICD-10 Z48.288)    Long term use of medications (ICD-10 Z79.899)    Contact with and (suspected) exposure to other viral communicable    diseases (Z20.828)     We ask your assistance in obtaining the following laboratory tests, which are part of our routine surveillance program for Solid Organ Transplant patients.     Please fax each result to [phone], same day as resulted/available    Critical lab results page [phone]  Monday - Friday 8 am to 5 pm  Evening/Weekend/Holiday communicate Critical labs results [phone]      Months 4-7 post-transplant (8/15/2023 - 11/15/2023)  Labs every other week  CBC with platelets  Basic Metabolic Panel (Sodium, Potassium, Chloride, Creatinine, CO2, Urea Nitrogen, glucose, Calcium)  Tacrolimus/Prograf/ drug level  Monthly  Phosphorus  Magnesium  BK (Polyoma Virus) PCR Quantitative/Plasma    Months 7-12 post-transplant (11/16/2023 - 4/13/2024)  Monthly  CBC with platelets  Basic Metabolic Panel (Sodium, Potassium, Chloride, Creatinine, CO2, Urea Nitrogen, glucose, Calcium)  Tacrolimus/Prograf/ drug level  BK (Polyoma Virus) PCR Quantitative/Plasma  CMV PCR QT    At 1-month post-transplant (Due: 5/13/2023)  DSA PRA (patient to supply )   BK Virus PCR Quantitative/Plasma  Urine protein/creatinine  Iron Panel  Vitamin D Deficiency Screening  PTH Intact  HBV, HCV, HIV by WENCESLAO testing  If unable to conduct WENCESLAO testing, please substitute the following:   Hepatitis B DNA Quantitative, Real-Time PCR   Hepatitis C RNA, Quantitation   HIV 1 RNA, Quantitation   HIV 2 RNA, Quantitation     At 2 months post-transplant (Due: 6/13/2023)   DSA PRA (patient to supply   kit)  BK Virus PCR Quantitative/Plasma  Urine protein/creatinine    At month 3 only (Due: 7/13/2023)  BK (Polyoma virus) PCR Quantitative/Plasma    At 4 months post-transplant (Due: 8/13/2023)  DSA PRA (patient to supply )  PTH  Urine protein/creatinine      At 6 months post-transplant (Fasting Labs) (Due: 10/23/2023)  Hepatic panel  Hemoglobin A1c  Uric Acid  Lipid panel  Urine protein/creatinine    At 7 months post-transplant (Due: 11/23/2023)   PRA/DSA (patient to supply )    At 9 months post-transplant (Due: 1/23/2024)   Urine protein/creatinine    At 12 months post-transplant (Fasting labs) Due: 4/13/2024   Hepatic panel  Hemoglobin A1c  Uric Acid  Lipid panel  Urine protein/creatinine  PTH  Vitamin D    If you have any questions please call the Transplant Center at [phone]. All lab results should be faxed to [phone]    .

## 2023-10-03 DIAGNOSIS — D84.9 IMMUNOSUPPRESSED STATUS (H): ICD-10-CM

## 2023-10-03 DIAGNOSIS — Z94.0 KIDNEY REPLACED BY TRANSPLANT: Primary | ICD-10-CM

## 2023-10-03 RX ORDER — TACROLIMUS 1 MG/1
2 CAPSULE ORAL 2 TIMES DAILY
Qty: 120 CAPSULE | Refills: 11 | Status: SHIPPED | OUTPATIENT
Start: 2023-10-03 | End: 2023-11-22

## 2023-10-12 ENCOUNTER — TELEPHONE (OUTPATIENT)
Dept: TRANSPLANT | Facility: CLINIC | Age: 33
End: 2023-10-12
Payer: COMMERCIAL

## 2023-10-12 NOTE — TELEPHONE ENCOUNTER
ISSUE: cr elevated, tac above goal     RNCC spoke with pt who reports she had labs drawn after a workout. May have been slightly dehydrated, but overall is well hydrated.   Denies any change in UOP, abd pain, swelling, new meds or illness, change in BP.     Pt endorses increased fatigue over the past couple of weeks.     Plan to repeat labs next week along with planned tac dose reduction.   Lab orders sent to local lab.

## 2023-10-12 NOTE — LETTER
PHYSICIAN ORDERS      DATE & TIME ISSUED: 10/12/23, 2515  PATIENT NAME: Caity Horan   : 1990     [facility] MR# [if applicable]: [id number]     DIAGNOSIS:  kidney transplant  ICD-10 CODE: z94.0     Please draw the following labs next week:    CMV PCR Quantitative/Plasma  Tacrolimus drug level (ensure 12 hour trough)  CBC with platelet  BMP  Mycophenolic acid drug level (ensure 12 hour trough)    Any questions please call: [phone]    Please fax these results to [phone]      Darian Arora MD     Patient would like to schedule a lab appt, however does not have an order for lab. Please place and route encounter to PSR so PSR can call patient and schedule

## 2023-10-16 ENCOUNTER — TRANSFERRED RECORDS (OUTPATIENT)
Dept: HEALTH INFORMATION MANAGEMENT | Facility: CLINIC | Age: 33
End: 2023-10-16
Payer: COMMERCIAL

## 2023-10-25 ENCOUNTER — TELEPHONE (OUTPATIENT)
Dept: TRANSPLANT | Facility: CLINIC | Age: 33
End: 2023-10-25
Payer: COMMERCIAL

## 2023-10-25 NOTE — TELEPHONE ENCOUNTER
Post Kidney and Pancreas Transplant Team Conference  Date: 10/25/2023  Transplant Coordinator: Romy Cheema     Attendees:  [x]  Dr. Mar [x] Mable Dewitt, RN [x] Radha Rowe LPN     [x]  Dr. Arora [x] Sujey Ku, RN [] Danielle Carson LPN    [x] Dr. Barrera [x] Isela Tavarez RN    [x] Dr. Bates [x] Romy Cheema, RN [] Jazmine Acosta RN   [] Dr. Ingram [] Carlie Serrato, RN    [] Dr. Haines [] Peri Desir RN    []  Dr. Bernard [] Cassie Ross RN    [] Dr. Sepulveda [] Jair Mcarthur RN    [] Christi Alfonso, ALEJANDRO [] Tena Brennan RN    [x] Rosalie Crews NP [] More Montes RN        Verbal Plan Read Back:   Tac goal closer to 8      Routed to RN Coordinator   Radha Rowe LPN

## 2023-10-27 ENCOUNTER — LAB (OUTPATIENT)
Dept: LAB | Facility: CLINIC | Age: 33
End: 2023-10-27
Attending: INTERNAL MEDICINE
Payer: COMMERCIAL

## 2023-10-27 ENCOUNTER — OFFICE VISIT (OUTPATIENT)
Dept: TRANSPLANT | Facility: CLINIC | Age: 33
End: 2023-10-27
Attending: INTERNAL MEDICINE
Payer: COMMERCIAL

## 2023-10-27 VITALS
WEIGHT: 140.1 LBS | OXYGEN SATURATION: 99 % | BODY MASS INDEX: 24.82 KG/M2 | SYSTOLIC BLOOD PRESSURE: 132 MMHG | DIASTOLIC BLOOD PRESSURE: 84 MMHG | HEART RATE: 80 BPM

## 2023-10-27 DIAGNOSIS — Z48.298 AFTERCARE FOLLOWING ORGAN TRANSPLANT: ICD-10-CM

## 2023-10-27 DIAGNOSIS — Z20.828 CONTACT WITH AND (SUSPECTED) EXPOSURE TO OTHER VIRAL COMMUNICABLE DISEASES: ICD-10-CM

## 2023-10-27 DIAGNOSIS — T86.11 BANFF TYPE IB ACUTE CELLULAR REJECTION OF TRANSPLANTED KIDNEY: ICD-10-CM

## 2023-10-27 DIAGNOSIS — Z23 NEED FOR INFLUENZA VACCINATION: ICD-10-CM

## 2023-10-27 DIAGNOSIS — K51.90 ULCERATIVE COLITIS WITHOUT COMPLICATIONS, UNSPECIFIED LOCATION (H): ICD-10-CM

## 2023-10-27 DIAGNOSIS — T86.898 URINE LEAK FROM TRANSPLANTED URETER: ICD-10-CM

## 2023-10-27 DIAGNOSIS — Z94.0 KIDNEY REPLACED BY TRANSPLANT: ICD-10-CM

## 2023-10-27 DIAGNOSIS — Z23 NEED FOR VACCINATION: ICD-10-CM

## 2023-10-27 DIAGNOSIS — E83.42 HYPOMAGNESEMIA: ICD-10-CM

## 2023-10-27 DIAGNOSIS — Z29.89 NEED FOR PNEUMOCYSTIS PROPHYLAXIS: ICD-10-CM

## 2023-10-27 DIAGNOSIS — Z79.899 ENCOUNTER FOR LONG-TERM CURRENT USE OF MEDICATION: ICD-10-CM

## 2023-10-27 DIAGNOSIS — N02.B9 IGA NEPHROPATHY: ICD-10-CM

## 2023-10-27 DIAGNOSIS — Z94.0 KIDNEY REPLACED BY TRANSPLANT: Primary | ICD-10-CM

## 2023-10-27 DIAGNOSIS — D84.9 IMMUNOSUPPRESSED STATUS (H): ICD-10-CM

## 2023-10-27 LAB
ANION GAP SERPL CALCULATED.3IONS-SCNC: 9 MMOL/L (ref 7–15)
BUN SERPL-MCNC: 12.5 MG/DL (ref 6–20)
CALCIUM SERPL-MCNC: 9.2 MG/DL (ref 8.6–10)
CD3 CELLS # BLD: 192 CELLS/UL (ref 603–2990)
CD3 CELLS NFR BLD: 35 % (ref 49–84)
CD3+CD4+ CELLS # BLD: 84 CELLS/UL (ref 441–2156)
CD3+CD4+ CELLS NFR BLD: 15 % (ref 28–63)
CD3+CD4+ CELLS/CD3+CD8+ CLL BLD: 0.9 % (ref 1.4–2.6)
CD3+CD8+ CELLS # BLD: 93 CELLS/UL (ref 125–1312)
CD3+CD8+ CELLS NFR BLD: 17 % (ref 10–40)
CHLORIDE SERPL-SCNC: 105 MMOL/L (ref 98–107)
CREAT SERPL-MCNC: 1.02 MG/DL (ref 0.51–0.95)
DEPRECATED HCO3 PLAS-SCNC: 24 MMOL/L (ref 22–29)
EGFRCR SERPLBLD CKD-EPI 2021: 74 ML/MIN/1.73M2
ERYTHROCYTE [DISTWIDTH] IN BLOOD BY AUTOMATED COUNT: 12.3 % (ref 10–15)
GLUCOSE SERPL-MCNC: 126 MG/DL (ref 70–99)
HCT VFR BLD AUTO: 39.9 % (ref 35–47)
HGB BLD-MCNC: 12.9 G/DL (ref 11.7–15.7)
MAGNESIUM SERPL-MCNC: 1.6 MG/DL (ref 1.7–2.3)
MCH RBC QN AUTO: 32 PG (ref 26.5–33)
MCHC RBC AUTO-ENTMCNC: 32.3 G/DL (ref 31.5–36.5)
MCV RBC AUTO: 99 FL (ref 78–100)
PHOSPHATE SERPL-MCNC: 2.5 MG/DL (ref 2.5–4.5)
PLATELET # BLD AUTO: 220 10E3/UL (ref 150–450)
POTASSIUM SERPL-SCNC: 4.5 MMOL/L (ref 3.4–5.3)
RBC # BLD AUTO: 4.03 10E6/UL (ref 3.8–5.2)
SODIUM SERPL-SCNC: 138 MMOL/L (ref 135–145)
T CELL COMMENT: ABNORMAL
WBC # BLD AUTO: 3.9 10E3/UL (ref 4–11)

## 2023-10-27 PROCEDURE — 99000 SPECIMEN HANDLING OFFICE-LAB: CPT | Performed by: PATHOLOGY

## 2023-10-27 PROCEDURE — 85027 COMPLETE CBC AUTOMATED: CPT | Performed by: PATHOLOGY

## 2023-10-27 PROCEDURE — G0008 ADMIN INFLUENZA VIRUS VAC: HCPCS | Performed by: INTERNAL MEDICINE

## 2023-10-27 PROCEDURE — 83735 ASSAY OF MAGNESIUM: CPT | Performed by: PATHOLOGY

## 2023-10-27 PROCEDURE — 90480 ADMN SARSCOV2 VAC 1/ONLY CMP: CPT | Performed by: INTERNAL MEDICINE

## 2023-10-27 PROCEDURE — 80048 BASIC METABOLIC PNL TOTAL CA: CPT | Performed by: PATHOLOGY

## 2023-10-27 PROCEDURE — 99215 OFFICE O/P EST HI 40 MIN: CPT | Performed by: INTERNAL MEDICINE

## 2023-10-27 PROCEDURE — 36415 COLL VENOUS BLD VENIPUNCTURE: CPT | Performed by: PATHOLOGY

## 2023-10-27 PROCEDURE — 84100 ASSAY OF PHOSPHORUS: CPT | Performed by: PATHOLOGY

## 2023-10-27 PROCEDURE — 90686 IIV4 VACC NO PRSV 0.5 ML IM: CPT | Performed by: INTERNAL MEDICINE

## 2023-10-27 PROCEDURE — 250N000011 HC RX IP 250 OP 636: Performed by: INTERNAL MEDICINE

## 2023-10-27 PROCEDURE — 91320 SARSCV2 VAC 30MCG TRS-SUC IM: CPT | Performed by: INTERNAL MEDICINE

## 2023-10-27 PROCEDURE — 99213 OFFICE O/P EST LOW 20 MIN: CPT | Mod: 25 | Performed by: INTERNAL MEDICINE

## 2023-10-27 PROCEDURE — 86360 T CELL ABSOLUTE COUNT/RATIO: CPT | Performed by: INTERNAL MEDICINE

## 2023-10-27 PROCEDURE — 87799 DETECT AGENT NOS DNA QUANT: CPT | Performed by: INTERNAL MEDICINE

## 2023-10-27 RX ADMIN — INFLUENZA A VIRUS A/VICTORIA/4897/2022 IVR-238 (H1N1) ANTIGEN (FORMALDEHYDE INACTIVATED), INFLUENZA A VIRUS A/DARWIN/9/2021 SAN-010 (H3N2) ANTIGEN (FORMALDEHYDE INACTIVATED), INFLUENZA B VIRUS B/PHUKET/3073/2013 ANTIGEN (FORMALDEHYDE INACTIVATED), AND INFLUENZA B VIRUS B/MICHIGAN/01/2021 ANTIGEN (FORMALDEHYDE INACTIVATED) 0.5 ML: 15; 15; 15; 15 INJECTION, SUSPENSION INTRAMUSCULAR at 13:30

## 2023-10-27 RX ADMIN — COVID-19 VACCINE, MRNA 30 MCG: 0.05 INJECTION, SUSPENSION INTRAMUSCULAR at 13:21

## 2023-10-27 ASSESSMENT — PAIN SCALES - GENERAL: PAINLEVEL: NO PAIN (0)

## 2023-10-27 NOTE — PROGRESS NOTES
TRANSPLANT NEPHROLOGY EARLY POST TRANSPLANT VISIT    Assessment & Plan     # LDKT: Stable kidney function following early post transplant issues with a urine leak and acute cellular-mediated rejection.              - Baseline Creatinine: ~ 1-1.2              - Proteinuria: Mild (0.5-1.0 grams)              - Date DSA Last Checked: Aug/2023      Latest DSA: No     cPRA: 82%  due for DSA check Nov 2023              - BK Viremia: No              - Kidney Tx Biopsy: Apr 29, 2023; Result: Acute cellular-mediated rejection, Banff 1B.              - Transplant Ureteral Stent: Removed     # Immunosuppression: Tacrolimus immediate release (goal 8-10), Mycophenolate mofetil (dose 750 mg every 12 hours) and Prednisone (dose 5 mg daily)              - Induction with Recent Transplant:  High Intensity Protocol              - Continue with intensive monitoring of immunosuppression for efficacy and toxicity.              - Changes: yes adjust FK goal 6-8 now at 6 months closer to 8 given 2nd kidney transplant and rejection hx    # Infection Prophylaxis:   - PJP: Sulfa/TMP (Bactrim)  - CMV: Valganciclovir (Valcyte); Patient is CMV IgG Ab discordant (D+/R-) and will continue on Valcyte x 6 months will stop in few days on 10/30 (last ATG dose 4/30/2023), then check CMV PCR monthly until April 2024     # Blood Pressure: Controlled;          Goal BP: < 130/80              - Volume status: Euvolemic                EDW ~ 128 lbs              - Changes: No     # Leukopenia: improving to 3.9 now   - likely med related too, r-ATG. MMF, bactrim    # Anemia in Chronic Renal Disease: Hgb: Stable, near normal      MICKEY: No              - Iron studies: Low iron saturation, but high ferritin     # Mineral Bone Disorder:   - Secondary renal hyperparathyroidism; PTH level: Minimally elevated ( pg/ml)        On treatment: None  - Vitamin D; level: Normal        On supplement: Yes  - Calcium; level: Normal        On supplement: No     #  Electrolytes:   - Potassium; level: Normal        On supplement: No  - Magnesium; level: Stable      On supplement: Yes Mg glycinate 200 mg po qd  - Bicarbonate; level:normal       On supplement:      # Ulcerative Colitis: Stable on vedolizumab q8 weeks.  Followed by GI.     # Medical Compliance: Yes     # Health Maintenance and Vaccination Review: COVID and Flu today.      # Transplant History:  Etiology of Kidney Failure: IgA nephropathy  Tx: LDKT  Transplant: 4/13/2023 (Kidney), 12/5/2014 (Kidney)  Donor Type: Living      Donor Class:   Crossmatch at time of Tx: negative  DSA at time of Tx: No  Significant changes in immunosuppression: None  CMV IgG Ab High Risk Discordance (D+/R-): Yes  EBV IgG Ab High Risk Discordance (D+/R-): No  Significant transplant-related complications: Acute cellular-mediated rejection  Transplant Office Phone Number: [phone]    Assessment and plan was discussed with the patient and she voiced her understanding and agreement.    Return visit: 3 months     Darian Arora MD    Chief Complaint   Ms. Horan is a 33 year old here for kidney transplant and immunosuppression management.     History of Present Illness   Caity feels ok but reports some stress at work the last couple weeks. She denies any abdominal pain or changes in bowel habits and ulcerative colitis appear to be stable on vedolizumab. She remains very active and continues to exercise regularly. She is concerned about potential steroid related side effects in terms of weight gain and swelling at times. We discussed the importance of steroids and current immunosuppression to reduce the risk for future rejection episodes.     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 kidney    Ulcerative colitis (H)    Vitamin D deficiency    Urine leak from transplanted ureter    Constipation    Kidney transplant rejection    Need for pneumocystis prophylaxis    CKD (chronic kidney disease) stage 2, GFR 60-89 ml/min    Neutropenia (H24)       Allergies   Allergies   Allergen Reactions    Bumetanide Other (See Comments)     Causes paralysis    Amoxicillin Rash       Medications   Current Outpatient Medications   Medication Sig     magnesium glycinate lysinate chelate (DOCTOR'S BEST) 100 MG TABS tablet Take 2 tablets (200 mg) by mouth 2 times daily    biotin 1000 MCG TABS tablet Take 500 mcg by mouth daily    Cyanocobalamin 500 MCG TBDP Take 500 mcg by mouth daily    filgrastim (NEUPOGEN) 300 MCG/ML injection Inject 1 mL (300 mcg) Subcutaneous daily    levonorgestrel (MIRENA) 20 MCG/DAY IUD 1 each by Intrauterine route Replacement every 7 years. Placed 1/2019    mycophenolate (GENERIC EQUIVALENT) 250 MG capsule Take 3 capsules (750 mg) by mouth 2 times daily    predniSONE (DELTASONE) 5 MG tablet Take 1 tablet (5 mg) by mouth daily Or until discussed in clinic    sulfamethoxazole-trimethoprim (BACTRIM) 400-80 MG tablet Take 1 tablet by mouth daily    tacrolimus (GENERIC EQUIVALENT) 0.5 MG capsule Hold for dose change.    tacrolimus (GENERIC) 1 MG capsule Take 2 capsules (2 mg) by mouth 2 times daily    valGANciclovir (VALCYTE) 450 MG tablet Take 2 tablets (900 mg) by mouth daily Until directed to increase dose per transplant team based on improving renal function.    vedolizumab (ENTYVIO) 60 MG/ML injection Inject 300 mg into the vein once every six weeks    vitamin D3 (CHOLECALCIFEROL) 50 mcg (2000 units) tablet Take 1 tablet (50 mcg) by mouth daily     No current  facility-administered medications for this visit.     There are no discontinued medications.    Physical Exam   Vital Signs: There were no vitals taken for this visit.    GENERAL APPEARANCE: alert and no distress  HENT: mouth without ulcers or lesions  LYMPHATICS: no cervical or supraclavicular nodes  RESP: lungs clear to auscultation - no rales, rhonchi or wheezes  CV: regular rhythm, normal rate, no rub, no murmur  EDEMA: no LE edema bilaterally  ABDOMEN: soft, nondistended, nontender, bowel sounds normal  MS: extremities normal - no gross deformities noted, no evidence of inflammation in joints, no muscle tenderness  SKIN: no rash    Data         Latest Ref Rng & Units 10/16/2023     8:49 AM 10/10/2023     8:20 AM 9/13/2023     8:48 AM   Renal   Na (external) 135 - 145 mEq/L 139  138     140    K (external) 3.6 - 5.2 mEq/L 4.5  4.3     4.4    Cl 98 - 108 mEq/L 106  106     106    Cl (external) 98 - 108 mEq/L 106  106     106    CO2 (external) 22 - 32 mEq/L 25  22     25    BUN (external) 8 - 21 mg/dL 19  19     14    Cr (external) 0.38 - 1.02 mg/dL 1.12  1.29     1.10    Glucose (external) 62 - 125 mg/dL 92  75     88    Ca (external) 8.9 - 10.2 mg/dL 9.2  9.6     9.7    Mg (external) 1.8 - 2.4 mg/dL   1.6        This result is from an external source.         Latest Ref Rng & Units 9/13/2023     8:48 AM 8/9/2023     8:45 AM 7/24/2023     8:18 AM   Bone Health   Phos (external) 2.5 - 4.5 mg/dL 2.0   3.0       Parathyroid Hormone Intact 15 - 65 pg/mL  47         This result is from an external source.         Latest Ref Rng & Units 10/16/2023     8:49 AM 10/10/2023     8:20 AM 9/13/2023     8:48 AM   Heme   WBC (external) 4.3 - 10.0 THOU/uL 2.57  3.04     2.75    Hgb (external) 11.5 - 15.5 g/dL 12.2  13.0     12.1    Plt (external) 150 - 400 THOU/uL 217  224     228        This result is from an external source.         Latest Ref Rng & Units 6/26/2023    12:55 PM 5/17/2023     3:31 PM 4/11/2023     8:32 AM    Liver   AP 35 - 104 U/L 70  66  70    TBili <=1.2 mg/dL 0.2  0.3  0.6    Bilirubin Direct 0.00 - 0.30 mg/dL <0.20  <0.20  <0.20    ALT 0 - 50 U/L 12  13  15    AST 0 - 45 U/L 27  19  23    Tot Protein 6.4 - 8.3 g/dL 7.0  6.6  8.6    Albumin 3.5 - 5.2 g/dL 4.3  4.1  5.1          Latest Ref Rng & Units 4/3/2023     3:56 PM   Pancreas   A1C <5.7 % 5.3          Latest Ref Rng & Units 4/24/2023     8:26 AM 4/22/2023     7:45 AM 4/3/2023     3:56 PM   Iron studies   Iron 37 - 145 ug/dL 30  40  186    Iron Sat Index 15 - 46 % 18  28  71    Ferritin 6 - 175 ng/mL 886  1,041  902          Latest Ref Rng & Units 10/16/2023     8:49 AM 10/10/2023     8:20 AM 9/13/2023     8:48 AM   [facility] Txp Virology   CMV DNA Quant Ext None Detected IU/mL None Detected      LOG IU/ML OF CMVQNT (EXTERNAL) log IU/mL Not calculated      BK Quant Log Ext log IU/mL  Not Calculated     BK Quant Result Ext IU/mL  None detected  Positive    BK Quant Spec Ext   Plasma         Recent Labs   Lab Test 05/24/23  0811 06/27/23  0728 08/09/23  0845   DOSTAC 5/23/2023 6/26/2023 8/8/2023   TACROL 12.2 12.7 6.3     Recent Labs   Lab Test 05/10/23  0805 05/12/23  0740 05/24/23  0811   DOSMPA 5/9/2023   9:40 PM 5/11/2023   9:50 PM 5/23/2023   9:30 PM   MPACID 7.38* 3.65* 4.89*   MPAG 72.5 52.6 74.5     I spent a total of 47 minutes on the date of the encounter doing chart review, performing a history and physical exam, completing documentation and any further activities as noted above.

## 2023-10-27 NOTE — NURSING NOTE
Chief Complaint   Patient presents with    RECHECK     AKT, per RNCC/Request, AasonnT message sent to patient to confirm visit     /84 (BP Location: Right arm, Patient Position: Sitting, Cuff Size: Adult Regular)   Pulse 80   Wt 63.5 kg (140 lb 1.6 oz)   SpO2 99%   BMI 24.82 kg/m    Berna Espinal [facility]

## 2023-10-27 NOTE — LETTER
10/27/2023         RE: Caity Horan  [address]  [address]  [place] [address]        Dear Colleague,    Thank you for referring your patient, Caity Horan, to the [facility]. Please see a copy of my visit note below.    TRANSPLANT NEPHROLOGY EARLY POST TRANSPLANT VISIT    Assessment & Plan    # LDKT: Stable kidney function following early post transplant issues with a urine leak and acute cellular-mediated rejection.              - Baseline Creatinine: ~ 1-1.2              - Proteinuria: Mild (0.5-1.0 grams)              - Date DSA Last Checked: Aug/2023      Latest DSA: No     cPRA: 82%  due for DSA check Nov 2023              - BK Viremia: No              - Kidney Tx Biopsy: Apr 29, 2023; Result: Acute cellular-mediated rejection, Banff 1B.              - Transplant Ureteral Stent: Removed     # Immunosuppression: Tacrolimus immediate release (goal 8-10), Mycophenolate mofetil (dose 750 mg every 12 hours) and Prednisone (dose 5 mg daily)              - Induction with Recent Transplant:  High Intensity Protocol              - Continue with intensive monitoring of immunosuppression for efficacy and toxicity.              - Changes: yes adjust FK goal 6-8 now at 6 months closer to 8 given 2nd kidney transplant and rejection hx    # Infection Prophylaxis:   - PJP: Sulfa/TMP (Bactrim)  - CMV: Valganciclovir (Valcyte); Patient is CMV IgG Ab discordant (D+/R-) and will continue on Valcyte x 6 months will stop in few days on 10/30 (last ATG dose 4/30/2023), then check CMV PCR monthly until April 2024     # Blood Pressure: Controlled;          Goal BP: < 130/80              - Volume status: Euvolemic                EDW ~ 128 lbs              - Changes: No     # Leukopenia: improving to 3.9 now   - likely med related too, r-ATG. MMF, bactrim    # Anemia in Chronic Renal Disease: Hgb: Stable, near normal      MICKEY: No              - Iron studies: Low iron saturation, but high ferritin      # Mineral Bone Disorder:   - Secondary renal hyperparathyroidism; PTH level: Minimally elevated ( pg/ml)        On treatment: None  - Vitamin D; level: Normal        On supplement: Yes  - Calcium; level: Normal        On supplement: No     # Electrolytes:   - Potassium; level: Normal        On supplement: No  - Magnesium; level: Stable      On supplement: Yes Mg glycinate 200 mg po qd  - Bicarbonate; level:normal       On supplement:      # Ulcerative Colitis: Stable on vedolizumab q8 weeks.  Followed by GI.     # Medical Compliance: Yes     # Health Maintenance and Vaccination Review: COVID and Flu today.      # Transplant History:  Etiology of Kidney Failure: IgA nephropathy  Tx: LDKT  Transplant: 4/13/2023 (Kidney), 12/5/2014 (Kidney)  Donor Type: Living      Donor Class:   Crossmatch at time of Tx: negative  DSA at time of Tx: No  Significant changes in immunosuppression: None  CMV IgG Ab High Risk Discordance (D+/R-): Yes  EBV IgG Ab High Risk Discordance (D+/R-): No  Significant transplant-related complications: Acute cellular-mediated rejection  Transplant Office Phone Number: [phone]    Assessment and plan was discussed with the patient and she voiced her understanding and agreement.    Return visit: 3 months     Darian Arora MD    Chief Complaint  Ms. Horan is a 33 year old here for kidney transplant and immunosuppression management.     History of Present Illness  Caity feels ok but reports some stress at work the last couple weeks. She denies any abdominal pain or changes in bowel habits and ulcerative colitis appear to be stable on vedolizumab. She remains very active and continues to exercise regularly. She is concerned about potential steroid related side effects in terms of weight gain and swelling at times. We discussed the importance of steroids and current immunosuppression to reduce the risk for future rejection episodes.     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    Ulcerative colitis (H)    Vitamin D deficiency    Urine leak from transplanted ureter    Constipation    Kidney transplant rejection    Need for pneumocystis prophylaxis    CKD (chronic kidney disease) stage 2, GFR 60-89 ml/min    Neutropenia (H24)       Allergies  Allergies   Allergen Reactions    Bumetanide Other (See Comments)     Causes paralysis    Amoxicillin Rash       Medications  Current Outpatient Medications   Medication Sig     magnesium glycinate lysinate chelate (DOCTOR'S BEST) 100 MG TABS tablet Take 2 tablets (200 mg) by mouth 2 times daily    biotin 1000 MCG TABS tablet Take 500 mcg by mouth daily    Cyanocobalamin 500 MCG TBDP Take 500 mcg by mouth daily    filgrastim (NEUPOGEN) 300 MCG/ML injection Inject 1 mL (300 mcg) Subcutaneous daily    levonorgestrel (MIRENA) 20 MCG/DAY IUD 1 each by Intrauterine route Replacement every 7 years. Placed 1/2019    mycophenolate (GENERIC EQUIVALENT) 250 MG capsule Take 3 capsules (750 mg) by mouth 2 times daily    predniSONE (DELTASONE) 5 MG tablet Take 1 tablet (5 mg) by mouth daily Or until discussed in clinic    sulfamethoxazole-trimethoprim (BACTRIM) 400-80 MG tablet Take 1 tablet by mouth daily    tacrolimus (GENERIC EQUIVALENT) 0.5 MG capsule Hold for dose change.    tacrolimus (GENERIC) 1 MG capsule Take 2 capsules (2 mg) by mouth 2 times daily    valGANciclovir (VALCYTE) 450 MG tablet Take 2 tablets (900 mg) by mouth daily Until directed to increase dose per transplant team  based on improving renal function.    vedolizumab (ENTYVIO) 60 MG/ML injection Inject 300 mg into the vein once every six weeks    vitamin D3 (CHOLECALCIFEROL) 50 mcg (2000 units) tablet Take 1 tablet (50 mcg) by mouth daily     No current facility-administered medications for this visit.     There are no discontinued medications.    Physical Exam  Vital Signs: There were no vitals taken for this visit.    GENERAL APPEARANCE: alert and no distress  HENT: mouth without ulcers or lesions  LYMPHATICS: no cervical or supraclavicular nodes  RESP: lungs clear to auscultation - no rales, rhonchi or wheezes  CV: regular rhythm, normal rate, no rub, no murmur  EDEMA: no LE edema bilaterally  ABDOMEN: soft, nondistended, nontender, bowel sounds normal  MS: extremities normal - no gross deformities noted, no evidence of inflammation in joints, no muscle tenderness  SKIN: no rash    Data        Latest Ref Rng & Units 10/16/2023     8:49 AM 10/10/2023     8:20 AM 9/13/2023     8:48 AM   Renal   Na (external) 135 - 145 mEq/L 139  138     140    K (external) 3.6 - 5.2 mEq/L 4.5  4.3     4.4    Cl 98 - 108 mEq/L 106  106     106    Cl (external) 98 - 108 mEq/L 106  106     106    CO2 (external) 22 - 32 mEq/L 25  22     25    BUN (external) 8 - 21 mg/dL 19  19     14    Cr (external) 0.38 - 1.02 mg/dL 1.12  1.29     1.10    Glucose (external) 62 - 125 mg/dL 92  75     88    Ca (external) 8.9 - 10.2 mg/dL 9.2  9.6     9.7    Mg (external) 1.8 - 2.4 mg/dL   1.6        This result is from an external source.         Latest Ref Rng & Units 9/13/2023     8:48 AM 8/9/2023     8:45 AM 7/24/2023     8:18 AM   Bone Health   Phos (external) 2.5 - 4.5 mg/dL 2.0   3.0       Parathyroid Hormone Intact 15 - 65 pg/mL  47         This result is from an external source.         Latest Ref Rng & Units 10/16/2023     8:49 AM 10/10/2023     8:20 AM 9/13/2023     8:48 AM   Heme   WBC (external) 4.3 - 10.0 THOU/uL 2.57  3.04     2.75    Hgb (external)  11.5 - 15.5 g/dL 12.2  13.0     12.1    Plt (external) 150 - 400 THOU/uL 217  224     228        This result is from an external source.         Latest Ref Rng & Units 6/26/2023    12:55 PM 5/17/2023     3:31 PM 4/11/2023     8:32 AM   Liver   AP 35 - 104 U/L 70  66  70    TBili <=1.2 mg/dL 0.2  0.3  0.6    Bilirubin Direct 0.00 - 0.30 mg/dL <0.20  <0.20  <0.20    ALT 0 - 50 U/L 12  13  15    AST 0 - 45 U/L 27  19  23    Tot Protein 6.4 - 8.3 g/dL 7.0  6.6  8.6    Albumin 3.5 - 5.2 g/dL 4.3  4.1  5.1          Latest Ref Rng & Units 4/3/2023     3:56 PM   Pancreas   A1C <5.7 % 5.3          Latest Ref Rng & Units 4/24/2023     8:26 AM 4/22/2023     7:45 AM 4/3/2023     3:56 PM   Iron studies   Iron 37 - 145 ug/dL 30  40  186    Iron Sat Index 15 - 46 % 18  28  71    Ferritin 6 - 175 ng/mL 886  1,041  902          Latest Ref Rng & Units 10/16/2023     8:49 AM 10/10/2023     8:20 AM 9/13/2023     8:48 AM   [facility] Txp Virology   CMV DNA Quant Ext None Detected IU/mL None Detected      LOG IU/ML OF CMVQNT (EXTERNAL) log IU/mL Not calculated      BK Quant Log Ext log IU/mL  Not Calculated     BK Quant Result Ext IU/mL  None detected  Positive    BK Quant Spec Ext   Plasma         Recent Labs   Lab Test 05/24/23  0811 06/27/23  0728 08/09/23  0845   DOSTAC 5/23/2023 6/26/2023 8/8/2023   TACROL 12.2 12.7 6.3     Recent Labs   Lab Test 05/10/23  0805 05/12/23  0740 05/24/23  0811   DOSMPA 5/9/2023   9:40 PM 5/11/2023   9:50 PM 5/23/2023   9:30 PM   MPACID 7.38* 3.65* 4.89*   MPAG 72.5 52.6 74.5     I spent a total of 47 minutes on the date of the encounter doing chart review, performing a history and physical exam, completing documentation and any further activities as noted above.      Sincerely,        Darian Arora MD

## 2023-10-27 NOTE — PATIENT INSTRUCTIONS
You can stop valcyte as of Oct.30     COVID and Flu vaccine today    You can schedule RSV vaccine    You can also schedule shingrix vaccine series locally       Transplant Patient Information  Your Post Transplant Coordinator is: Romy Cheema  You and your care team can contact your transplant coordinator Monday - Friday, 8am - 5pm at [phone] (Option 2 to reach the coordinator or Option 4 to schedule an appointment).  You can also reach your care team online via [employer].  After hours for urgent matters, please call [facility] at [phone].

## 2023-10-30 LAB — BKV DNA # SPEC NAA+PROBE: NOT DETECTED COPIES/ML

## 2023-11-10 ENCOUNTER — TELEPHONE (OUTPATIENT)
Dept: TRANSPLANT | Facility: CLINIC | Age: 33
End: 2023-11-10
Payer: COMMERCIAL

## 2023-11-10 ASSESSMENT — ENCOUNTER SYMPTOMS: NEW SYMPTOMS OF CORONARY ARTERY DISEASE: 0

## 2023-11-16 ENCOUNTER — TELEPHONE (OUTPATIENT)
Dept: GASTROENTEROLOGY | Facility: CLINIC | Age: 33
End: 2023-11-16
Payer: COMMERCIAL

## 2023-11-16 DIAGNOSIS — K51.011 ULCERATIVE PANCOLITIS WITH RECTAL BLEEDING (H): Primary | ICD-10-CM

## 2023-11-16 RX ORDER — ALBUTEROL SULFATE 90 UG/1
1-2 AEROSOL, METERED RESPIRATORY (INHALATION)
Start: 2023-11-16

## 2023-11-16 RX ORDER — EPINEPHRINE 1 MG/ML
0.3 INJECTION, SOLUTION, CONCENTRATE INTRAVENOUS EVERY 5 MIN PRN
OUTPATIENT
Start: 2023-11-16

## 2023-11-16 RX ORDER — MEPERIDINE HYDROCHLORIDE 25 MG/ML
25 INJECTION INTRAMUSCULAR; INTRAVENOUS; SUBCUTANEOUS EVERY 30 MIN PRN
OUTPATIENT
Start: 2023-11-16

## 2023-11-16 RX ORDER — METHYLPREDNISOLONE SODIUM SUCCINATE 125 MG/2ML
125 INJECTION, POWDER, LYOPHILIZED, FOR SOLUTION INTRAMUSCULAR; INTRAVENOUS
Start: 2023-11-16

## 2023-11-16 RX ORDER — DIPHENHYDRAMINE HYDROCHLORIDE 50 MG/ML
50 INJECTION INTRAMUSCULAR; INTRAVENOUS
Start: 2023-11-16

## 2023-11-16 RX ORDER — ALBUTEROL SULFATE 0.83 MG/ML
2.5 SOLUTION RESPIRATORY (INHALATION)
OUTPATIENT
Start: 2023-11-16

## 2023-11-22 ENCOUNTER — TELEPHONE (OUTPATIENT)
Dept: TRANSPLANT | Facility: CLINIC | Age: 33
End: 2023-11-22
Payer: COMMERCIAL

## 2023-11-22 DIAGNOSIS — D84.9 IMMUNOSUPPRESSED STATUS (H): ICD-10-CM

## 2023-11-22 DIAGNOSIS — Z94.0 KIDNEY REPLACED BY TRANSPLANT: ICD-10-CM

## 2023-11-22 RX ORDER — TACROLIMUS 0.5 MG/1
0.5 CAPSULE ORAL EVERY EVENING
Qty: 60 CAPSULE | Refills: 11 | Status: SHIPPED | OUTPATIENT
Start: 2023-11-22 | End: 2024-01-12

## 2023-11-22 RX ORDER — TACROLIMUS 1 MG/1
CAPSULE ORAL
Qty: 180 CAPSULE | Refills: 3 | Status: SHIPPED | OUTPATIENT
Start: 2023-11-22 | End: 2024-01-12

## 2023-11-22 NOTE — LETTER
OUTPATIENT LABORATORY TEST ORDER     Patient Name: Caity Horan  Client code (MINFUD)   YOB: 1990     [facility] MR# [if applicable]: [id number]   Date & Time: 11/24/2023  8:33 AM  Expiration Date: 1 year after date issued      Diagnosis: Kidney Transplant (ICD-10 Z94.0)    Aftercare following organ transplant (ICD-10 Z48.288)    Long term use of medications (ICD-10 Z79.899)    Contact with and (suspected) exposure to other viral communicable    diseases (Z20.828)     We ask your assistance in obtaining the following laboratory tests, which are part of our routine surveillance program for Solid Organ Transplant patients.     Please fax each result to [phone], same day as resulted/available    Critical lab results page [phone]  Monday - Friday 8 am to 5 pm  Evening/Weekend/Holiday communicate Critical labs results [phone]    Months 7-12 post-transplant (11/16/2023 - 4/13/2024)  Monthly  CBC with platelets  Basic Metabolic Panel (Sodium, Potassium, Chloride, Creatinine, CO2, Urea Nitrogen, glucose, Calcium)  Tacrolimus/Prograf/ drug level  BK (Polyoma Virus) PCR Quantitative/Plasma  CMV PCR QT    At 7 months post-transplant (Due: 11/23/2023)   PRA/DSA (patient to supply )    At 9 months post-transplant (Due: 1/23/2024)   Urine protein/creatinine    At 12 months post-transplant (Fasting labs) Due: 4/13/2024   Hepatic panel  Hemoglobin A1c  Uric Acid  Lipid panel  Urine protein/creatinine  PTH  Vitamin D    If you have any questions please call the Transplant Center at [phone]. All lab results should be faxed to [phone]    .

## 2023-11-22 NOTE — TELEPHONE ENCOUNTER
Received call from [facility];  Tiffany from client services in lab is requesting we add on  Future Lab order's:  Client code (MINFUD)   [facility] can fax us the lab results faster.       Tiffany #[phone]  Ref#[id number]

## 2023-12-06 ENCOUNTER — VIRTUAL VISIT (OUTPATIENT)
Dept: GASTROENTEROLOGY | Facility: CLINIC | Age: 33
End: 2023-12-06
Payer: COMMERCIAL

## 2023-12-06 DIAGNOSIS — K51.00 ULCERATIVE PANCOLITIS WITHOUT COMPLICATION (H): Primary | ICD-10-CM

## 2023-12-06 PROCEDURE — 99214 OFFICE O/P EST MOD 30 MIN: CPT | Mod: VID | Performed by: PHYSICIAN ASSISTANT

## 2023-12-06 NOTE — LETTER
"    12/6/2023         RE: Caity Horan  [address]  [address]  [facility] [address]        Dear Colleague,    Thank you for referring your patient, Caity Horan, to the [facility] [place]. Please see a copy of my visit note below.    Virtual Visit Details    Type of service:  Video Visit     Originating Location (pt. Location): Home    Distant Location (provider location):  Off-site  Platform used for Video Visit: [facility] UC FOLLOW UP      PATIENT: Caity Horan    MRN: [id number]    Date of Birth 1990    Tel: [phone] (home)     PCP: Ritu Little     HPI: Ms. Horan is a 32 year old year old female here for follow up for UC. She had great improvement with prednisone taper and initiation of Vedolizumab on 4/19/18. Iron deficiency anemia improved with iron infusions and healing of UC.  Had increase in EBV viremia, and Dr. Mar recommended decreasing prednisone taper and to follow EBV PCR closely. No changes from ID perspective.     UC history  Spring 2012 -- graduated college, had a lot of life stressors. Experienced severe urgency with incontinence. Some blood in the stool and diarrhea alternating with constipation. Spontaneously resolved after 2 months.   End of Dec 2016 - April 2017: intermittent blood in stool, \"major constipation\" and weight gain, no urgency.    October 2017 -- was found to be anemic (attributed to kidney disease) and had iron infusions.  Sep -Dec 2017 Blood with well formed stool. This continued to worsen.   Worst stretch was mid-Feb to march: weight loss, >8 loose stools per day with blood.  April - now: much improved, no abdominal pain and urgency resolved.    In the past had upper abdominal bloating/pain and LLQ pain. No n/v.     Macroscopic extent of disease (most recent) E3    Constitutional symptoms:  Fever NO  Weight loss YES (in the past -- lost 12 lb since October), now stable    Other GI symptoms present: " none  Total number of IBD surgeries (except perianal): 0    Current IBD Medications:  Vedolizumab every 6 weeks    Current medications: MMF, tacrolimus, Mg, B12     Past IBD Medications:   None    Interval history 10/2018:  Completed prednisone taper in July and has continued with Entyvio every 8 weeks. No recurrence of symptoms.    Current UC symptoms  Bowel frequency in day 1-2, brown well-formed   Bowel frequency in night 0     Urgency of defecation NO  Blood in stool none   General well being 8 (feeling well)  Extracolonic features (multiple select): none     4-6 weeks after kidney transplant had significant hair loss. Took about a year to fully recover and is improving.  8/23/18 shows adequate Entyvio level 41.7 (8 week trough), no change in dose or interval.  Last Entyvio dose was 9/20/18 (2.5 weeks ago)    Interval history 12/11/19:  Feel well. Notices occasionally one week prior to infusion, abd is swollen, tender and bloated (won't do core exercises during that time) and concerns for constipation. Wonders if this is related to inflammation.    Current UC symptoms  Bowel frequency in day 2 formed   Bowel frequency in night 0     Urgency of defecation none  Blood in stool none  General well being well  Extracolonic features (multiple select): None    Next dose is next week (Thursday)    Interval history, 12/28/22  Recently hospitalized due to CKD Stage 5 2/2 IgA nephropathy s/p LDKT in 2014, now with chronic antibody-mediated rejection and recurrent IgA nephropathy on HD and Hypervolemia.  She is awaiting a repeat kidney transplant and in [place] while this is in process. Despite this, she has felt well from a GI standpoint. She has been managed in [place] with a GI provider who renewed her entyvio, however she is back in [place].  Currently having 1-2 BM daily, no blood in the stool or urgency. No nighttime stool.  Living in [place] until she gets her transplant. She is on MMF and Tacrolimus for transplant meds.      HBI  General well-being very well = 0  Abdominal pain None = 0  Number of liquid stools per day 0  Abdominal mass None = 0  Current Complications none    Interval hx 12/6/23  Doing very well. Still on 5mg prednisone (per neph). Baseline bowel pattern. No blood in the stool, no urgency. Compliant with vedolizumab.    Past Medical History:   Diagnosis Date    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    Anemia in stage 5 chronic kidney disease (H) 10/27/2014    ESRD (end stage renal disease) on dialysis (H)     HTN (hypertension) 10/27/2014    Hypertension 10/2014    IgA nephropathy     biopsy proven    Metabolic acidosis     Ulcerative pancolitis (H) 2012    Vitamin D deficiency         Past Surgical History:   Procedure Laterality Date    BENCH KIDNEY  4/13/2023    Procedure: Bench kidney;  Surgeon: Ivy Sepulveda MD;  Location: [facility] OR    BIOPSY  2021    renal    COLONOSCOPY N/A 03/12/2018    Procedure: COMBINED COLONOSCOPY, SINGLE OR MULTIPLE BIOPSY/POLYPECTOMY BY BIOPSY;  EGD/Colonoscopy ;  Surgeon: Kory Massey MD;  Location: [facility] GI    COLONOSCOPY N/A 09/10/2018    Procedure: COMBINED COLONOSCOPY, SINGLE OR MULTIPLE BIOPSY/POLYPECTOMY BY BIOPSY;  Colonoscopy;  Surgeon: Yenifer Doan MD;  [facility]    COLONOSCOPY N/A 2/3/2023    Procedure: COLONOSCOPY, WITH BIOPSY;  Surgeon: Yaakov Loving MD;  Location: [facility] GI    EXTRACTION(S) DENTAL      IR CVC TUNNEL PLACEMENT > 5 YRS OF AGE  12/22/2022    IR CVC TUNNEL REMOVAL RIGHT  4/20/2023    IR RENAL BIOPSY LEFT  4/29/2023    PERCUTANEOUS BIOPSY KIDNEY Right 12/19/2018    Procedure: Right Kidney Biopsy;  Surgeon: Otilio Mar MD;  [facility]    RETURN KIDNEY TRANSPLANT N/A 4/17/2023    Procedure: Exploration of return kidney transplant, kidney biopsy, revision of ureteral anastomosis, ureteral stent placement;  Surgeon: Ivy Sepulveda MD;  Location: [facility] OR    TRANSPLANT KIDNEY RECIPIENT LIVING RELATED N/A 12/05/2014     Procedure: TRANSPLANT KIDNEY RECIPIENT LIVING RELATED;  Surgeon: Dale Middleton MD;  Location: [facility] OR    WISDOM TOOTH EXTRACTION Bilateral        Social History     Tobacco Use    Smoking status: Never    Smokeless tobacco: Never   Substance Use Topics    Alcohol use: Not Currently     Alcohol/week: 3.0 - 9.0 standard drinks of alcohol     Types: 1 - 3 Glasses of wine, 1 - 3 Cans of beer, 1 - 3 Shots of liquor per week       Family History   Problem Relation Age of Onset    No Known Problems Mother     Alcoholism Father          in early 50s    Prostate Cancer Paternal Grandmother     Kidney Disease No family hx of    Negative for IBD. Dad had psoriasis.   Grandfather with BCC. Grandma has several skin lesions removed (does not recall the diagnosis)    Allergies   Allergen Reactions    Bumetanide Other (See Comments)     Causes paralysis    Amoxicillin Rash        Outpatient Encounter Medications as of 2023   Medication Sig Dispense Refill     magnesium glycinate lysinate chelate (DOCTOR'S BEST) 100 MG TABS tablet Take 2 tablets (200 mg) by mouth daily 360 tablet 3    biotin 1000 MCG TABS tablet Take 500 mcg by mouth daily      Cyanocobalamin 500 MCG TBDP Take 500 mcg by mouth daily      levonorgestrel (MIRENA) 20 MCG/DAY IUD 1 each by Intrauterine route Replacement every 7 years. Placed 2019      mycophenolate (GENERIC EQUIVALENT) 250 MG capsule Take 3 capsules (750 mg) by mouth 2 times daily 180 capsule 11    predniSONE (DELTASONE) 5 MG tablet Take 1 tablet (5 mg) by mouth daily Or until discussed in clinic 30 tablet 11    sulfamethoxazole-trimethoprim (BACTRIM) 400-80 MG tablet Take 1 tablet by mouth daily 30 tablet 11    tacrolimus (GENERIC) 0.5 MG capsule Take 1 capsule (0.5 mg) by mouth every evening Total dose: 2 mg in the AM 1.5 mg in the PM 60 capsule 11    tacrolimus (GENERIC) 1 MG capsule Total dose: 2 mg in a.m. 1.5 mg in p.m. 180 capsule 3    valGANciclovir (VALCYTE) 450 MG tablet Take 2  tablets (900 mg) by mouth daily Until directed to increase dose per transplant team based on improving renal function. 60 tablet 5    vedolizumab (ENTYVIO) 60 MG/ML injection Inject 300 mg into the vein once every six weeks      vitamin D3 (CHOLECALCIFEROL) 50 mcg (2000 units) tablet Take 1 tablet (50 mcg) by mouth daily 30 tablet 5     No facility-administered encounter medications on file as of 12/6/2023.      NSAID  NO    Review of Systems  Complete 10 System ROS performed. All are negative except as documented below, in the HPI, or in patient questionnaire from today's visit.    1) Constitutional: No fevers, chills, night sweats or malaise, weight loss or gain  2) Skin: No rash  3) Pulmonary: No wheeze, SOB, cough, sputum or hemoptysis  4) Cardiovascular: No Chest pain or palpitations  5) Genitourinary: No blood in urine or dysuria  6) Endocrine: No increased sweating, hunger, thirst or thyroid problems  7) Hematologic: No bruising and easy bleeding  8) Musculoskeletal: no new pain in joints or limitation in ROM  9) Neurologic: No dizziness, paresthesias or weakness or falls  10) Psychiatric:  not depressed/anxious, no sleep problems    PHYSICAL EXAM  Vitals: There were no vitals taken for this visit.    No Pain (0)     Constitutional - general appearance is well and in no acute distress. Body habitus normal  Eyes - No redness or discharge  Respiratory - No cough, unlabored breathing  Musculoskeletal - range of motion intact: Neck and arms  Skin - No discoloration or lesions  Neurological - No tremors, headaches  Psychiatric - No anxiety, alert & oriented    DATA:  Reviewed in detail past documentation, medications and prior workup available in electronic health records or through outside records.    PERTINENT STUDIES:  Most recent CBC:  WBC   Date Value Ref Range Status   12/03/2020 6.3 4.0 - 11.0 10e9/L Final     WBC Count   Date Value Ref Range Status   10/27/2023 3.9 (L) 4.0 - 11.0 10e3/uL Final  "  ]  Hemoglobin   Date Value Ref Range Status   10/27/2023 12.9 11.7 - 15.7 g/dL Final   01/14/2021 10.6 (L) 11.7 - 15.7 g/dL Final   ]   Platelet Count   Date Value Ref Range Status   10/27/2023 220 150 - 450 10e3/uL Final   01/14/2021 279 150 - 450 10e9/L Final       Most recent coag:  INR   Date Value Ref Range Status   04/29/2023 1.14 0.85 - 1.15 Final   12/19/2018 1.00 0.86 - 1.14 Final       Most recent hepatic panel:  AST   Date Value Ref Range Status   06/26/2023 27 0 - 45 U/L Final     Comment:     Reference intervals for this test were updated on 6/12/2023 to more accurately reflect our healthy population. There may be differences in the flagging of prior results with similar values performed with this method. Interpretation of those prior results can be made in the context of the updated reference intervals.   04/16/2021 13 0 - 45 U/L Final     ALT   Date Value Ref Range Status   06/26/2023 12 0 - 50 U/L Final     Comment:     Reference intervals for this test were updated on 6/12/2023 to more accurately reflect our healthy population. There may be differences in the flagging of prior results with similar values performed with this method. Interpretation of those prior results can be made in the context of the updated reference intervals.     04/16/2021 14 0 - 50 U/L Final     No results found for: \"BILICONJ\"   Bilirubin Total   Date Value Ref Range Status   06/26/2023 0.2 <=1.2 mg/dL Final   04/16/2021 0.4 0.2 - 1.3 mg/dL Final     Albumin   Date Value Ref Range Status   06/26/2023 4.3 3.5 - 5.2 g/dL Final   04/16/2021 3.3 (L) 3.4 - 5.0 g/dL Final     Alkaline Phosphatase   Date Value Ref Range Status   06/26/2023 70 35 - 104 U/L Final   04/16/2021 47 40 - 150 U/L Final       Most recent creatinine:  Creatinine   Date Value Ref Range Status   10/27/2023 1.02 (H) 0.51 - 0.95 mg/dL Final   04/16/2021 1.31 (H) 0.52 - 1.04 mg/dL Final     Endoscopy:   cscope 3/2018 showed Mcbride 3 colitis involving the rectum " to hepatic flexure with sparing of the AC. Normal TI. Biopsies showed moderate chronic active colitis with cryptitis and crypt abscess formation.     Cscope 9/2018 showed Mcbride 0. Biopsies show mild chronic active colitis ascending and transverse, crypt architectural distortion and descending sigmoid and rectum without active inflammation.  Imaging:  CT c/a/p on 7/2017 (indication EBV viremia): no bowel wall thickening. SB appeared normal.     IMPRESSION:  Ms. Horan is a 33 year old year old with diagnosed moderate to severe E3 ulcerative colitis 3/2018, who achieved symptomatic remission on prednisone taper and initiation of vedolizumab induction. She continues on vedolizumab monotherapy.    She is awaiting a repeat kidney transplant. She remains stable from an IBD standpoint on vedolizumab every 6 weeks.     # Ulcerative colitis, severe, E3, now in clinical remission  # Immunosuppressed state (tacrolimus and Cellcept)  # Hair loss without alopecia    PLAN:  --Continue vedolizumab (8/23/18 shows adequate Entyvio level 41.7 (8 week trough), no change in dose or interval)  --Blood work with infusions  --Dysplasia screening due 2026     IBD Health Care Maintenance:  Vaccinations:  -- Influenza (every year): Last given 2023  -- TdaP (every 10 years): Last given 2017  -- Pneumococcal Pneumonia (once then every 5 years): Last given 2017    One time confirmation of immunity or serologies:  -- Hepatitis A (serologies or immunizations): Not documented  -- Hepatitis B (serologies or immunizations): 2001/2002, immune per serologies  -- Varicella: had chickenpox as a child  -- MMR:had all immunization as a child  -- HPV (all aged 18-26): 2007/2008  -- Meningococcal meningitis (all patients at risk for meningitis): 2007   -- Due to the immunosuppression in this patient, I would not advise administration of live vaccines such as varicella/VZV, intranasal influenza, MMR, or yellow fever vaccine (if travelling).      Cancer  Screening:  Colon cancer screening:  Given pancolitis colonoscopy every 2-3 years recommended after 8 years of symptom onset.  Since symptoms started on 2012, Dysplasia screening is recommended 2026 (last scope was 2018).     Cervical cancer screening: Annual due to immunosupression    Skin cancer screening: Annual visual exam of skin by dermatologist since patient is immunocompromised    Bone mineral density screening   -- Recommend all patients supplement with calcium and vitamin D  -- DEXA scan ordered given >3 months steroid use     Depression Screening:  -- Over the last month, have you felt down, depressed, or hopeless? No  -- Over the last month, have you felt little interest or pleasure doing things? No    Misc:  -- Avoid tobacco use  -- Avoid NSAIDs as there is potentially a 25% chance of causing an IBD flare    Immunization History   Administered Date(s) Administered    COVID-19 12+ (2023-24) (Pfizer) 10/27/2023    COVID-19 MONOVALENT 12+ (Pfizer) 10/06/2021, 10/27/2021    HPV Quadrivalent 08/06/2007, 10/08/2007, 02/19/2008    Influenza Vaccine 65+ (Fluzone HD) 11/17/2021, 11/02/2022    Influenza Vaccine >6 months,quad, PF 12/13/2016, 10/03/2017, 10/27/2023    Influenza Vaccine IM 18-49 Yrs, RIV3 10/08/2018    Meningococcal (Menomune ) 08/06/2007    Pneumo Conj 13-V (2010&after) 11/01/2017, 08/11/2021    Pneumococcal 23 valent 11/10/2014, 11/15/2022    TDAP (Adacel,Boostrix) 08/06/2007    TDAP Vaccine (Boostrix) 11/01/2017      Follow up in 6 months        Again, thank you for allowing me to participate in the care of your patient.      Sincerely,    Rupert Dsouza PA-C

## 2023-12-06 NOTE — NURSING NOTE
Is the patient currently in the state of [place]? YES    Visit mode:VIDEO    If the visit is dropped, the patient can be reconnected by: VIDEO VISIT: Send to e-mail at: [email or web address]    Will anyone else be joining the visit? NO  (If patient encounters technical issues they should call [phone]    How would you like to obtain your AVS? MyChart    Are changes needed to the allergy or medication list? No    Reason for visit: RECHECK    Benjy JARRELL

## 2023-12-06 NOTE — PROGRESS NOTES
"Virtual Visit Details    Type of service:  Video Visit     Originating Location (pt. Location): Home    Distant Location (provider location):  Off-site  Platform used for Video Visit: [facility] UC FOLLOW UP      PATIENT: Caity Horan    MRN: [id number]    Date of Birth 1990    Tel: [phone] (home)     PCP: Ritu Little     HPI: Ms. Horan is a 32 year old year old female here for follow up for UC. She had great improvement with prednisone taper and initiation of Vedolizumab on 4/19/18. Iron deficiency anemia improved with iron infusions and healing of UC.  Had increase in EBV viremia, and Dr. Mar recommended decreasing prednisone taper and to follow EBV PCR closely. No changes from ID perspective.     UC history  Spring 2012 -- graduated college, had a lot of life stressors. Experienced severe urgency with incontinence. Some blood in the stool and diarrhea alternating with constipation. Spontaneously resolved after 2 months.   End of Dec 2016 - April 2017: intermittent blood in stool, \"major constipation\" and weight gain, no urgency.    October 2017 -- was found to be anemic (attributed to kidney disease) and had iron infusions.  Sep -Dec 2017 Blood with well formed stool. This continued to worsen.   Worst stretch was mid-Feb to march: weight loss, >8 loose stools per day with blood.  April - now: much improved, no abdominal pain and urgency resolved.    In the past had upper abdominal bloating/pain and LLQ pain. No n/v.     Macroscopic extent of disease (most recent) E3    Constitutional symptoms:  Fever NO  Weight loss YES (in the past -- lost 12 lb since October), now stable    Other GI symptoms present: none  Total number of IBD surgeries (except perianal): 0    Current IBD Medications:  Vedolizumab every 6 weeks    Current medications: MMF, tacrolimus, Mg, B12     Past IBD Medications:   None    Interval history 10/2018:  Completed prednisone taper in July and has " continued with Entyvio every 8 weeks. No recurrence of symptoms.    Current UC symptoms  Bowel frequency in day 1-2, brown well-formed   Bowel frequency in night 0     Urgency of defecation NO  Blood in stool none   General well being 8 (feeling well)  Extracolonic features (multiple select): none     4-6 weeks after kidney transplant had significant hair loss. Took about a year to fully recover and is improving.  8/23/18 shows adequate Entyvio level 41.7 (8 week trough), no change in dose or interval.  Last Entyvio dose was 9/20/18 (2.5 weeks ago)    Interval history 12/11/19:  Feel well. Notices occasionally one week prior to infusion, abd is swollen, tender and bloated (won't do core exercises during that time) and concerns for constipation. Wonders if this is related to inflammation.    Current UC symptoms  Bowel frequency in day 2 formed   Bowel frequency in night 0     Urgency of defecation none  Blood in stool none  General well being well  Extracolonic features (multiple select): None    Next dose is next week (Thursday)    Interval history, 12/28/22  Recently hospitalized due to CKD Stage 5 2/2 IgA nephropathy s/p LDKT in 2014, now with chronic antibody-mediated rejection and recurrent IgA nephropathy on HD and Hypervolemia.  She is awaiting a repeat kidney transplant and in [place] while this is in process. Despite this, she has felt well from a GI standpoint. She has been managed in [place] with a GI provider who renewed her entyvio, however she is back in [place].  Currently having 1-2 BM daily, no blood in the stool or urgency. No nighttime stool.  Living in [place] until she gets her transplant. She is on MMF and Tacrolimus for transplant meds.     HBI  General well-being very well = 0  Abdominal pain None = 0  Number of liquid stools per day 0  Abdominal mass None = 0  Current Complications none    Interval hx 12/6/23  Doing very well. Still on 5mg prednisone (per neph). Baseline bowel pattern. No blood in the  stool, no urgency. Compliant with vedolizumab.    Past Medical History:   Diagnosis Date    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    Anemia in stage 5 chronic kidney disease (H) 10/27/2014    ESRD (end stage renal disease) on dialysis (H)     HTN (hypertension) 10/27/2014    Hypertension 10/2014    IgA nephropathy     biopsy proven    Metabolic acidosis     Ulcerative pancolitis (H) 2012    Vitamin D deficiency         Past Surgical History:   Procedure Laterality Date    BENCH KIDNEY  4/13/2023    Procedure: Bench kidney;  Surgeon: Ivy Sepulveda MD;  Location: [facility] OR    BIOPSY  2021    renal    COLONOSCOPY N/A 03/12/2018    Procedure: COMBINED COLONOSCOPY, SINGLE OR MULTIPLE BIOPSY/POLYPECTOMY BY BIOPSY;  EGD/Colonoscopy ;  Surgeon: Kory Massey MD;  Location: [facility] GI    COLONOSCOPY N/A 09/10/2018    Procedure: COMBINED COLONOSCOPY, SINGLE OR MULTIPLE BIOPSY/POLYPECTOMY BY BIOPSY;  Colonoscopy;  Surgeon: Yenifer Doan MD;  [facility]    COLONOSCOPY N/A 2/3/2023    Procedure: COLONOSCOPY, WITH BIOPSY;  Surgeon: Yaakov Loving MD;  Location: [facility] GI    EXTRACTION(S) DENTAL      IR CVC TUNNEL PLACEMENT > 5 YRS OF AGE  12/22/2022    IR CVC TUNNEL REMOVAL RIGHT  4/20/2023    IR RENAL BIOPSY LEFT  4/29/2023    PERCUTANEOUS BIOPSY KIDNEY Right 12/19/2018    Procedure: Right Kidney Biopsy;  Surgeon: Otilio Mar MD;  [facility]    RETURN KIDNEY TRANSPLANT N/A 4/17/2023    Procedure: Exploration of return kidney transplant, kidney biopsy, revision of ureteral anastomosis, ureteral stent placement;  Surgeon: Ivy Sepulveda MD;  Location: [facility]    TRANSPLANT KIDNEY RECIPIENT LIVING RELATED N/A 12/05/2014    Procedure: TRANSPLANT KIDNEY RECIPIENT LIVING RELATED;  Surgeon: Dale Middleton MD;  Location: [facility] OR    WISDOM TOOTH EXTRACTION Bilateral        Social History     Tobacco Use    Smoking status: Never    Smokeless tobacco: Never   Substance Use Topics    Alcohol use: Not  Currently     Alcohol/week: 3.0 - 9.0 standard drinks of alcohol     Types: 1 - 3 Glasses of wine, 1 - 3 Cans of beer, 1 - 3 Shots of liquor per week       Family History   Problem Relation Age of Onset    No Known Problems Mother     Alcoholism Father          in early 50s    Prostate Cancer Paternal Grandmother     Kidney Disease No family hx of    Negative for IBD. Dad had psoriasis.   Grandfather with BCC. Grandma has several skin lesions removed (does not recall the diagnosis)    Allergies   Allergen Reactions    Bumetanide Other (See Comments)     Causes paralysis    Amoxicillin Rash        Outpatient Encounter Medications as of 2023   Medication Sig Dispense Refill     magnesium glycinate lysinate chelate (DOCTOR'S BEST) 100 MG TABS tablet Take 2 tablets (200 mg) by mouth daily 360 tablet 3    biotin 1000 MCG TABS tablet Take 500 mcg by mouth daily      Cyanocobalamin 500 MCG TBDP Take 500 mcg by mouth daily      levonorgestrel (MIRENA) 20 MCG/DAY IUD 1 each by Intrauterine route Replacement every 7 years. Placed 2019      mycophenolate (GENERIC EQUIVALENT) 250 MG capsule Take 3 capsules (750 mg) by mouth 2 times daily 180 capsule 11    predniSONE (DELTASONE) 5 MG tablet Take 1 tablet (5 mg) by mouth daily Or until discussed in clinic 30 tablet 11    sulfamethoxazole-trimethoprim (BACTRIM) 400-80 MG tablet Take 1 tablet by mouth daily 30 tablet 11    tacrolimus (GENERIC) 0.5 MG capsule Take 1 capsule (0.5 mg) by mouth every evening Total dose: 2 mg in the AM 1.5 mg in the PM 60 capsule 11    tacrolimus (GENERIC) 1 MG capsule Total dose: 2 mg in a.m. 1.5 mg in p.m. 180 capsule 3    valGANciclovir (VALCYTE) 450 MG tablet Take 2 tablets (900 mg) by mouth daily Until directed to increase dose per transplant team based on improving renal function. 60 tablet 5    vedolizumab (ENTYVIO) 60 MG/ML injection Inject 300 mg into the vein once every six weeks      vitamin D3 (CHOLECALCIFEROL) 50 mcg (  units) tablet Take 1 tablet (50 mcg) by mouth daily 30 tablet 5     No facility-administered encounter medications on file as of 12/6/2023.      NSAID  NO    Review of Systems  Complete 10 System ROS performed. All are negative except as documented below, in the HPI, or in patient questionnaire from today's visit.    1) Constitutional: No fevers, chills, night sweats or malaise, weight loss or gain  2) Skin: No rash  3) Pulmonary: No wheeze, SOB, cough, sputum or hemoptysis  4) Cardiovascular: No Chest pain or palpitations  5) Genitourinary: No blood in urine or dysuria  6) Endocrine: No increased sweating, hunger, thirst or thyroid problems  7) Hematologic: No bruising and easy bleeding  8) Musculoskeletal: no new pain in joints or limitation in ROM  9) Neurologic: No dizziness, paresthesias or weakness or falls  10) Psychiatric:  not depressed/anxious, no sleep problems    PHYSICAL EXAM  Vitals: There were no vitals taken for this visit.    No Pain (0)     Constitutional - general appearance is well and in no acute distress. Body habitus normal  Eyes - No redness or discharge  Respiratory - No cough, unlabored breathing  Musculoskeletal - range of motion intact: Neck and arms  Skin - No discoloration or lesions  Neurological - No tremors, headaches  Psychiatric - No anxiety, alert & oriented    DATA:  Reviewed in detail past documentation, medications and prior workup available in electronic health records or through outside records.    PERTINENT STUDIES:  Most recent CBC:  WBC   Date Value Ref Range Status   12/03/2020 6.3 4.0 - 11.0 10e9/L Final     WBC Count   Date Value Ref Range Status   10/27/2023 3.9 (L) 4.0 - 11.0 10e3/uL Final   ]  Hemoglobin   Date Value Ref Range Status   10/27/2023 12.9 11.7 - 15.7 g/dL Final   01/14/2021 10.6 (L) 11.7 - 15.7 g/dL Final   ]   Platelet Count   Date Value Ref Range Status   10/27/2023 220 150 - 450 10e3/uL Final   01/14/2021 279 150 - 450 10e9/L Final       Most recent  "coag:  INR   Date Value Ref Range Status   04/29/2023 1.14 0.85 - 1.15 Final   12/19/2018 1.00 0.86 - 1.14 Final       Most recent hepatic panel:  AST   Date Value Ref Range Status   06/26/2023 27 0 - 45 U/L Final     Comment:     Reference intervals for this test were updated on 6/12/2023 to more accurately reflect our healthy population. There may be differences in the flagging of prior results with similar values performed with this method. Interpretation of those prior results can be made in the context of the updated reference intervals.   04/16/2021 13 0 - 45 U/L Final     ALT   Date Value Ref Range Status   06/26/2023 12 0 - 50 U/L Final     Comment:     Reference intervals for this test were updated on 6/12/2023 to more accurately reflect our healthy population. There may be differences in the flagging of prior results with similar values performed with this method. Interpretation of those prior results can be made in the context of the updated reference intervals.     04/16/2021 14 0 - 50 U/L Final     No results found for: \"BILICONJ\"   Bilirubin Total   Date Value Ref Range Status   06/26/2023 0.2 <=1.2 mg/dL Final   04/16/2021 0.4 0.2 - 1.3 mg/dL Final     Albumin   Date Value Ref Range Status   06/26/2023 4.3 3.5 - 5.2 g/dL Final   04/16/2021 3.3 (L) 3.4 - 5.0 g/dL Final     Alkaline Phosphatase   Date Value Ref Range Status   06/26/2023 70 35 - 104 U/L Final   04/16/2021 47 40 - 150 U/L Final       Most recent creatinine:  Creatinine   Date Value Ref Range Status   10/27/2023 1.02 (H) 0.51 - 0.95 mg/dL Final   04/16/2021 1.31 (H) 0.52 - 1.04 mg/dL Final     Endoscopy:   cscope 3/2018 showed Mcbride 3 colitis involving the rectum to hepatic flexure with sparing of the AC. Normal TI. Biopsies showed moderate chronic active colitis with cryptitis and crypt abscess formation.     Cscope 9/2018 showed Mcbride 0. Biopsies show mild chronic active colitis ascending and transverse, crypt architectural distortion and " descending sigmoid and rectum without active inflammation.  Imaging:  CT c/a/p on 7/2017 (indication EBV viremia): no bowel wall thickening. SB appeared normal.     IMPRESSION:  Ms. Horan is a 33 year old year old with diagnosed moderate to severe E3 ulcerative colitis 3/2018, who achieved symptomatic remission on prednisone taper and initiation of vedolizumab induction. She continues on vedolizumab monotherapy.    She is awaiting a repeat kidney transplant. She remains stable from an IBD standpoint on vedolizumab every 6 weeks.     # Ulcerative colitis, severe, E3, now in clinical remission  # Immunosuppressed state (tacrolimus and Cellcept)  # Hair loss without alopecia    PLAN:  --Continue vedolizumab (8/23/18 shows adequate Entyvio level 41.7 (8 week trough), no change in dose or interval)  --Blood work with infusions  --Dysplasia screening due 2026     IBD Health Care Maintenance:  Vaccinations:  -- Influenza (every year): Last given 2023  -- TdaP (every 10 years): Last given 2017  -- Pneumococcal Pneumonia (once then every 5 years): Last given 2017    One time confirmation of immunity or serologies:  -- Hepatitis A (serologies or immunizations): Not documented  -- Hepatitis B (serologies or immunizations): 2001/2002, immune per serologies  -- Varicella: had chickenpox as a child  -- MMR:had all immunization as a child  -- HPV (all aged 18-26): 2007/2008  -- Meningococcal meningitis (all patients at risk for meningitis): 2007   -- Due to the immunosuppression in this patient, I would not advise administration of live vaccines such as varicella/VZV, intranasal influenza, MMR, or yellow fever vaccine (if travelling).      Cancer Screening:  Colon cancer screening:  Given pancolitis colonoscopy every 2-3 years recommended after 8 years of symptom onset.  Since symptoms started on 2012, Dysplasia screening is recommended 2026 (last scope was 2018).     Cervical cancer screening: Annual due to  immunosupression    Skin cancer screening: Annual visual exam of skin by dermatologist since patient is immunocompromised    Bone mineral density screening   -- Recommend all patients supplement with calcium and vitamin D  -- DEXA scan ordered given >3 months steroid use     Depression Screening:  -- Over the last month, have you felt down, depressed, or hopeless? No  -- Over the last month, have you felt little interest or pleasure doing things? No    Misc:  -- Avoid tobacco use  -- Avoid NSAIDs as there is potentially a 25% chance of causing an IBD flare    Immunization History   Administered Date(s) Administered    COVID-19 12+ (2023-24) (Pfizer) 10/27/2023    COVID-19 MONOVALENT 12+ (Pfizer) 10/06/2021, 10/27/2021    HPV Quadrivalent 08/06/2007, 10/08/2007, 02/19/2008    Influenza Vaccine 65+ (Fluzone HD) 11/17/2021, 11/02/2022    Influenza Vaccine >6 months,quad, PF 12/13/2016, 10/03/2017, 10/27/2023    Influenza Vaccine IM 18-49 Yrs, RIV3 10/08/2018    Meningococcal (Menomune ) 08/06/2007    Pneumo Conj 13-V (2010&after) 11/01/2017, 08/11/2021    Pneumococcal 23 valent 11/10/2014, 11/15/2022    TDAP (Adacel,Boostrix) 08/06/2007    TDAP Vaccine (Boostrix) 11/01/2017      Follow up in 6 months    Rupert Dsouza PA-C  Division of Gastroenterology, Hepatology and Nutrition  [facility]

## 2023-12-06 NOTE — PATIENT INSTRUCTIONS
It was a pleasure taking care of you today.  I've included a brief summary of our discussion and care plan from today's visit below.  Please review this information with your primary care provider.  ______________________________________________________________________    My recommendations are summarized as follows:    -- Continue entyvio every 6 weeks   -- Labs with infusions  -- Next endoscopic assessment: pending stool sample   -- Patient with IBD we recommend supplementation vitamin D 1000 units daily and calcium 500 mg twice daily.  -- No NSAIDs (ibuprofen, or anything containing ibuprofen)     To learn more about Diet and Nutrition in the setting of IBD, check out some of these resources:  https://www.crohnscolitisfoundation.org/diet-and-nutrition/what-should-i-eat  https://www.nimbal.org/  https://ntforibd.org/    For additional resources about inflammatory bowel disease visit http://www.crohnscolitisfoundation.org/      Return to GI Clinic in 6 months to review your progress.    ______________________________________________________________________    How do I schedule labs, imaging studies, or procedures that were ordered in clinic today?     Labs: To schedule lab appointment at the [facility], use my chart or call [phone]. If you have a [facility] closer to home where you are regularly seen you can give them a call.     Procedures: If a colonoscopy, upper endoscopy, breath test, esophageal manometry, or pH impedence was ordered today, our endoscopy team will call you to schedule this. If you have not heard from our endoscopy team within a week, please call [phone] to schedule.     Imaging Studies: If you were scheduled for a CT scan, X-ray, MRI, ultrasound, HIDA scan or other imaging study, please call [phone] to have this scheduled.     Referral: If a referral to another specialty was ordered, expect a phone call or follow instructions above. If you have not heard from  anyone regarding your referral in a week, please call our clinic to check the status.     Who do I call with any questions after my visit?  Please be in touch if there are any further questions that arise following today's visit.  There are multiple ways to contact your gastroenterology care team.      During business hours, you may reach a Gastroenterology nurse at [phone]    To schedule or reschedule an appointment, please call [phone].     You can always send a secure message through [employer].  [employer] messages are answered by your nurse or doctor typically within 24 hours.  Please allow extra time on weekends and holidays.      For urgent/emergent questions after business hours, you may reach the on-call GI Fellow by contacting the [facility]  at [phone].     How will I get the results of any tests ordered?    You will receive all of your results.  If you have signed up for [employer], any tests ordered at your visit will be available to you after your physician reviews them.  Typically this takes 1-2 weeks.  If there are urgent results that require a change in your care plan, your physician or nurse will call you to discuss the next steps.      What is Coolstuff?  Coolstuff is a secure way for you to access all of your healthcare records from the [facility].  It is a web based computer program, so you can sign on to it from any location.  It also allows you to send secure messages to your care team.  I recommend signing up for [employer]stuff access if you have not already done so and are comfortable with using a computer.         Sincerely,    Rupert Dsouza PA-C  [facility]  Division of Gastroenterology

## 2023-12-08 ENCOUNTER — TELEPHONE (OUTPATIENT)
Dept: GASTROENTEROLOGY | Facility: CLINIC | Age: 33
End: 2023-12-08
Payer: COMMERCIAL

## 2023-12-08 NOTE — TELEPHONE ENCOUNTER
BROOKE and sent mychart regarding the following:    Schedule return IBD with Rupert Dsouza (in person or v) around 6/6/24

## 2023-12-12 ENCOUNTER — LAB (OUTPATIENT)
Dept: LAB | Facility: CLINIC | Age: 33
End: 2023-12-12
Payer: COMMERCIAL

## 2023-12-12 DIAGNOSIS — E55.9 HYPOVITAMINOSIS D: ICD-10-CM

## 2023-12-12 DIAGNOSIS — Z76.82 AWAITING ORGAN TRANSPLANT: ICD-10-CM

## 2023-12-12 DIAGNOSIS — K51.011 ULCERATIVE PANCOLITIS WITH RECTAL BLEEDING (H): ICD-10-CM

## 2023-12-12 PROCEDURE — 86833 HLA CLASS II HIGH DEFIN QUAL: CPT

## 2023-12-12 PROCEDURE — 86832 HLA CLASS I HIGH DEFIN QUAL: CPT

## 2023-12-18 NOTE — TELEPHONE ENCOUNTER
VIT D3 (CHOLECALCIFEROL) 50 mcg 2000U     Last Written Prescription Date:  6/23/23  Last Fill Quantity: 30,   # refills: 5  Last Office Visit : 12/6/23  Future Office visit:  NONE  Routing refill request to provider for review/approval because:  Dose clarification / new dose?  Last note : vitamin D 1000 units daily

## 2023-12-21 RX ORDER — CHOLECALCIFEROL (VITAMIN D3) 50 MCG
1 TABLET ORAL DAILY
Qty: 30 TABLET | Refills: 5 | Status: SHIPPED | OUTPATIENT
Start: 2023-12-21 | End: 2024-01-12

## 2024-01-12 ENCOUNTER — TELEPHONE (OUTPATIENT)
Dept: TRANSPLANT | Facility: CLINIC | Age: 34
End: 2024-01-12
Payer: COMMERCIAL

## 2024-01-12 DIAGNOSIS — T86.11 ACUTE REJECTION OF KIDNEY TRANSPLANT: ICD-10-CM

## 2024-01-12 DIAGNOSIS — K51.011 ULCERATIVE PANCOLITIS WITH RECTAL BLEEDING (H): ICD-10-CM

## 2024-01-12 DIAGNOSIS — D84.9 IMMUNOSUPPRESSED STATUS (H): ICD-10-CM

## 2024-01-12 DIAGNOSIS — E55.9 HYPOVITAMINOSIS D: ICD-10-CM

## 2024-01-12 DIAGNOSIS — E83.42 HYPOMAGNESEMIA: Primary | ICD-10-CM

## 2024-01-12 DIAGNOSIS — Z94.0 KIDNEY REPLACED BY TRANSPLANT: ICD-10-CM

## 2024-01-12 RX ORDER — SULFAMETHOXAZOLE AND TRIMETHOPRIM 400; 80 MG/1; MG/1
1 TABLET ORAL DAILY
Qty: 90 TABLET | Refills: 3 | Status: SHIPPED | OUTPATIENT
Start: 2024-01-12

## 2024-01-12 RX ORDER — PREDNISONE 5 MG/1
5 TABLET ORAL DAILY
Qty: 90 TABLET | Refills: 3 | Status: SHIPPED | OUTPATIENT
Start: 2024-01-12 | End: 2024-08-01

## 2024-01-12 RX ORDER — TACROLIMUS 1 MG/1
CAPSULE ORAL
Qty: 180 CAPSULE | Refills: 3 | Status: SHIPPED | OUTPATIENT
Start: 2024-01-12 | End: 2024-01-16

## 2024-01-12 RX ORDER — CHOLECALCIFEROL (VITAMIN D3) 50 MCG
1 TABLET ORAL DAILY
Qty: 90 TABLET | Refills: 3 | Status: SHIPPED | OUTPATIENT
Start: 2024-01-12 | End: 2024-04-30

## 2024-01-12 RX ORDER — MYCOPHENOLATE MOFETIL 250 MG/1
750 CAPSULE ORAL 2 TIMES DAILY
Qty: 540 CAPSULE | Refills: 3 | Status: SHIPPED | OUTPATIENT
Start: 2024-01-12

## 2024-01-12 RX ORDER — TACROLIMUS 0.5 MG/1
0.5 CAPSULE ORAL EVERY EVENING
Qty: 90 CAPSULE | Refills: 3 | Status: SHIPPED | OUTPATIENT
Start: 2024-01-12 | End: 2024-01-16

## 2024-01-12 RX ORDER — MAGNESIUM GLYCINATE 100 MG
200 TABLET ORAL DAILY
Qty: 180 TABLET | Refills: 3 | Status: SHIPPED | OUTPATIENT
Start: 2024-01-12

## 2024-01-12 NOTE — TELEPHONE ENCOUNTER
[facility] Call Center    Phone Message    May a detailed message be left on voicemail: yes     Reason for Call: Other: Patient was contacted to reschedule cancelled appointment. Patient is ok to see Kevyn Hartman or denzel. She is going to be in [place] on 1/26 for another appointment. Nothing open that day. Patient is wondering if we can get patient scheduled around 1230 with either of those providers on that day. Per review Kevyn Hartman is the only one in that day. Would you be ok with me sending this to Eleanor to see if we can add her on outside of providers template?      Action Taken: Message routed to:  [facility] ([facility] RN[facility]    Travel Screening: Not Applicable

## 2024-01-16 DIAGNOSIS — D84.9 IMMUNOSUPPRESSED STATUS (H): ICD-10-CM

## 2024-01-16 DIAGNOSIS — Z94.0 KIDNEY REPLACED BY TRANSPLANT: ICD-10-CM

## 2024-01-16 RX ORDER — TACROLIMUS 1 MG/1
2 CAPSULE ORAL 2 TIMES DAILY
Qty: 360 CAPSULE | Refills: 3 | Status: SHIPPED | OUTPATIENT
Start: 2024-01-16 | End: 2024-02-26

## 2024-01-16 RX ORDER — TACROLIMUS 0.5 MG/1
0.5 CAPSULE ORAL EVERY EVENING
Qty: 90 CAPSULE | Refills: 3 | Status: SHIPPED | OUTPATIENT
Start: 2024-01-16 | End: 2024-02-26

## 2024-02-26 DIAGNOSIS — Z94.0 KIDNEY REPLACED BY TRANSPLANT: ICD-10-CM

## 2024-02-26 DIAGNOSIS — D84.9 IMMUNOSUPPRESSED STATUS (H): ICD-10-CM

## 2024-02-26 RX ORDER — TACROLIMUS 0.5 MG/1
0.5 CAPSULE ORAL EVERY MORNING
Qty: 90 CAPSULE | Refills: 3 | Status: SHIPPED | OUTPATIENT
Start: 2024-02-26 | End: 2024-03-04

## 2024-02-26 RX ORDER — TACROLIMUS 1 MG/1
2 CAPSULE ORAL 2 TIMES DAILY
Qty: 360 CAPSULE | Refills: 3 | Status: SHIPPED | OUTPATIENT
Start: 2024-02-26 | End: 2024-06-06

## 2024-03-04 ENCOUNTER — DOCUMENTATION ONLY (OUTPATIENT)
Dept: PHARMACY | Facility: CLINIC | Age: 34
End: 2024-03-04

## 2024-03-04 ENCOUNTER — VIRTUAL VISIT (OUTPATIENT)
Dept: TRANSPLANT | Facility: CLINIC | Age: 34
End: 2024-03-04
Attending: INTERNAL MEDICINE
Payer: COMMERCIAL

## 2024-03-04 DIAGNOSIS — Z48.298 AFTERCARE FOLLOWING ORGAN TRANSPLANT: ICD-10-CM

## 2024-03-04 DIAGNOSIS — Z94.0 KIDNEY REPLACED BY TRANSPLANT: Primary | ICD-10-CM

## 2024-03-04 DIAGNOSIS — K51.90 ULCERATIVE COLITIS WITHOUT COMPLICATIONS, UNSPECIFIED LOCATION (H): ICD-10-CM

## 2024-03-04 DIAGNOSIS — D84.9 IMMUNOSUPPRESSED STATUS (H): ICD-10-CM

## 2024-03-04 PROBLEM — T86.898 URINE LEAK FROM TRANSPLANTED URETER: Status: RESOLVED | Noted: 2023-04-18 | Resolved: 2024-03-04

## 2024-03-04 PROBLEM — Z29.89 NEED FOR PNEUMOCYSTIS PROPHYLAXIS: Status: RESOLVED | Noted: 2023-07-02 | Resolved: 2024-03-04

## 2024-03-04 PROBLEM — K59.00 CONSTIPATION: Status: RESOLVED | Noted: 2023-04-18 | Resolved: 2024-03-04

## 2024-03-04 PROBLEM — T86.11: Status: RESOLVED | Noted: 2021-11-17 | Resolved: 2024-03-04

## 2024-03-04 PROCEDURE — 99214 OFFICE O/P EST MOD 30 MIN: CPT | Mod: 95 | Performed by: INTERNAL MEDICINE

## 2024-03-04 PROCEDURE — G2211 COMPLEX E/M VISIT ADD ON: HCPCS | Mod: 95 | Performed by: INTERNAL MEDICINE

## 2024-03-04 RX ORDER — TACROLIMUS 0.5 MG/1
0.5 CAPSULE ORAL EVERY MORNING
Qty: 90 CAPSULE | Refills: 3 | Status: SHIPPED | OUTPATIENT
Start: 2024-03-04 | End: 2024-06-06

## 2024-03-04 NOTE — PROGRESS NOTES
Skilled Nurse visit in the [facility] to administer Entyvio .  No recent elevated temperature, fever, chills, productive cough, coughing for 3 weeks or longer or hemoptysis, abnormal vital signs, night sweats, chest pain. No  decrease in your appetite, unexplained weight loss or fatigue.  No other new onset medical symptoms.  Current weight 127 lbs.  Peripheral IV, left AC, 1 attempt.  Infusion completed without complication or reaction. Pt reports therapy is effective in managing symptoms related to therapy.

## 2024-03-04 NOTE — PROGRESS NOTES
Virtual Visit Details    Type of service:  Video Visit   Video start time: 11:30 AM  Video end time: 11:37 AM  Originating Location (pt. Location): Home     Distant Location (provider location):  On-site  Platform used for Video Visit: [facility]      TRANSPLANT NEPHROLOGY CHRONIC POST TRANSPLANT VISIT    Assessment & Plan   # LDKT: Stable    - Baseline Creatinine:  ~ 1-1.2   - Proteinuria: Normal (<0.2 grams), repeat    - Date DSA Last Checked: Dec/2023      Latest DSA: No   - BK Viremia: No   - Kidney Tx Biopsy: Apr 17, 2023; Result: negative for rejection             Apr 29, 2023; Result: Acute cellular-mediated rejection, Banff 1B.    # Immunosuppression: Tacrolimus immediate release (goal 6-8), Mycophenolate mofetil (dose 750 mg every 12 hours) and Prednisone (dose 5 mg daily)   - Continue with intensive monitoring of immunosuppression for efficacy and toxicity.   - Changes: Not at this time    # Infection Prophylaxis:   - PJP: Sulfa/TMP (Bactrim)   -CMV: No longer on valcyte. Patient is CMV IgG Ab discordant (D+/R-).  Will check CMV PCR monthly until 12 months post transplant.    # Blood Pressure: Controlled;  Goal BP: < 130/80   - Changes: Not at this time    # Anemia in Chronic Renal Disease: Hgb: Stable, near normal      MICKEY: No   - Iron studies: Not checked recently    # Mineral Bone Disorder:    - Secondary renal hyperparathyroidism; PTH level: Normal (15-65 pg/ml)        On treatment: None   - Vitamin D; level: Normal        On supplement: Yes   - Calcium; level: Normal        On supplement: No   - Phosphorus; level: Not checked recently        On supplement: No     # Electrolytes:   - Potassium; level: Normal        On supplement: No  - Magnesium; level: Low normal        On supplement: Yes, mag bisglycinate 200mg daily   - Bicarbonate; level: Normal        On supplement: No    # Ulcerative Colitis: Stable on vedolizumab q6 weeks. Followed by GI.    # Skin Cancer Risk:    - Discussed sun protection and  recommend regular follow up with Dermatology.    # Medical Compliance: Yes    # Health Maintenance and Vaccination Review: Recommend:  Shingrix    # Transplant History:  Etiology of Kidney Failure: IgA nephropathy  Tx: LDKT  Transplant: 4/13/2023 (Kidney), 12/5/2014 (Kidney)  Significant changes in immunosuppression: None  Significant transplant-related complications: Acute cellular-mediated rejection and urine leak    Transplant Office Phone Number: [phone]    Assessment and plan was discussed with the patient and she voiced her understanding and agreement.    Return visit: Return in about 3 months (around 6/4/2024) for follow up with Dr. Mar or Dr. Arora.    Zhang Barrera MD     The longitudinal plan of care for kidney transplant was addressed during this visit. Due to the added complexity in care, I will continue to support Caity Horan in the subsequent management of this condition(s) and with the ongoing continuity of care of this condition(s).         Chief Complaint   Ms. Horan is a 33 year old here for kidney transplant, immunosuppression management and routine follow up.    History of Present Illness    The patient overall feels well. She denies any recent hospitalizations. She denies nausea, vomiting, diarrhea, fever, chills, shortness of breath, chest pain, LE edema, unintentional weight loss, nights sweats, dysuria, hematuria.         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     Ulcerative colitis (H)     Vitamin D deficiency     Urine leak from transplanted ureter      Constipation     Kidney transplant rejection     Need for pneumocystis prophylaxis     CKD (chronic kidney disease) stage 2, GFR 60-89 ml/min     Neutropenia (H24)       Allergies   Allergies   Allergen Reactions     Bumetanide Other (See Comments)     Causes paralysis     Amoxicillin Rash       Medications   Current Outpatient Medications   Medication Sig     tacrolimus (GENERIC) 0.5 MG capsule Take 1 capsule (0.5 mg) by mouth every morning Total dose: 2.5 mg in the AM and 2 mg in the PM Profile Rx: patient will contact pharmacy when needed     biotin 1000 MCG TABS tablet Take 500 mcg by mouth daily     Cyanocobalamin 500 MCG TBDP Take 500 mcg by mouth daily     levonorgestrel (MIRENA) 20 MCG/DAY IUD 1 each by Intrauterine route Replacement every 7 years. Placed 1/2019     Magnesium Bisglycinate (MAG GLYCINATE) 100 MG TABS Take 2 tablets (200 mg) by mouth daily     mycophenolate (GENERIC EQUIVALENT) 250 MG capsule Take 3 capsules (750 mg) by mouth 2 times daily     predniSONE (DELTASONE) 5 MG tablet Take 1 tablet (5 mg) by mouth daily Or until discussed in clinic     sulfamethoxazole-trimethoprim (BACTRIM) 400-80 MG tablet Take 1 tablet by mouth daily     tacrolimus (GENERIC) 1 MG capsule Take 2 capsules (2 mg) by mouth 2 times daily Total dose: 2.5 mg in the AM and 2 mg in the PM Profile Rx: patient will contact pharmacy when needed     vedolizumab (ENTYVIO) 60 MG/ML injection Inject 300 mg into the vein once every six weeks     Vitamin D3 50 mcg (2000 units) tablet Take 1 tablet (50 mcg) by mouth daily     No current facility-administered medications for this visit.     Medications Discontinued During This Encounter   Medication Reason     valGANciclovir (VALCYTE) 450 MG tablet      tacrolimus (GENERIC) 0.5 MG capsule Reorder (No AVS)       Physical Exam   Vital Signs: There were no vitals taken for this visit.    GENERAL APPEARANCE: alert and no distress  HENT: no obvious abnormalities on appearance  RESP:  breathing appears unremarkable with normal rate, no audible wheezing or cough and no apparent shortness of breath with conversation  MS: extremities normal - no gross deformities noted  SKIN: no apparent rash and normal skin tone  NEURO: speech is clear with no obvious neurological deficits  PSYCH: mentation appears normal and affect normal      Data         Latest Ref Rng & Units 2/22/2024     7:41 AM 1/12/2024     8:45 AM 12/12/2023     8:56 AM   Renal   Na (external) 130 - 144 mEq/L 138  137  139    K (external) 3.5 - 5.1 mEq/L 4.4  4.4  4.1    Cl 98 - 112 mEq/L 106  105  106    Cl (external) 98 - 112 mEq/L 106  105  106    CO2 (external) 22 - 30 mEq/L 25  23  24    BUN (external) 7 - 17 mg/dL 15  19  14    Cr (external) 0.52 - 1.04 mg/dL 1.00  1.03  1.04    Glucose (external) 74 - 106 mg/dL 99  96  91    Ca (external) 8.4 - 10.2 g/dL 9.5  9.0  9.3          Latest Ref Rng & Units 11/15/2023     8:58 AM 10/27/2023    11:25 AM 9/13/2023     8:48 AM   Bone Health   Phosphorus 2.5 - 4.5 mg/dL  2.5     Phos (external) 2.5 - 4.5 mg/dL 3.1      2.0        This result is from an external source.         Latest Ref Rng & Units 2/22/2024     7:41 AM 1/12/2024     8:45 AM 12/12/2023     8:56 AM   Heme   WBC (external) 4.5 - 11.5 K/UL 4.0  3.97  4.19    Hgb (external) 12.0 - 16.0 gm/dL 13.8  12.6  12.3    Plt (external) 150 - 450 K/mcL 234  210  213    ABSOLUTE NEUTROPHILS (EXTERNAL) 1.80 - 6.80 K/mcL 2.73         ABSOLUTE LYMPHOCYTES (EXTERNAL) 1.50 - 4.00 K/mcL 0.79         ABSOLUTE MONOCYTES (EXTERNAL) 0.45 - 1.30 K/mcL 0.36         ABSOLUTE EOSINOPHILS (EXTERNAL) <=0.60 K/mcL 0.05         ABSOLUTE BASOPHILS (EXTERNAL) <=0.20 K/mcL 0.03             This result is from an external source.         Latest Ref Rng & Units 6/26/2023    12:55 PM 5/17/2023     3:31 PM 4/11/2023     8:32 AM   Liver   AP 35 - 104 U/L 70  66  70    TBili <=1.2 mg/dL 0.2  0.3  0.6    Bilirubin Direct 0.00 - 0.30 mg/dL <0.20  <0.20  <0.20    ALT 0 -  50 U/L 12  13  15    AST 0 - 45 U/L 27  19  23    Tot Protein 6.4 - 8.3 g/dL 7.0  6.6  8.6    Albumin 3.5 - 5.2 g/dL 4.3  4.1  5.1          Latest Ref Rng & Units 4/3/2023     3:56 PM   Pancreas   A1C <5.7 % 5.3          Latest Ref Rng & Units 4/24/2023     8:26 AM 4/22/2023     7:45 AM 4/3/2023     3:56 PM   Iron studies   Iron 37 - 145 ug/dL 30  40  186    Iron Sat Index 15 - 46 % 18  28  71    Ferritin 6 - 175 ng/mL 886  1,041  902          Latest Ref Rng & Units 2/22/2024     7:41 AM 1/12/2024     8:45 AM 12/12/2023     8:56 AM   [facility] Txp Virology   CMV DNA Quant Ext IU/mL Not detected  None detected  None detected       CMV PCR QUANT DNA INTERP (EXTERNAL) Not detected Not detected      LOG IU/ML OF CMVQNT (EXTERNAL) log IU/mL Not detected  Not calculated  None detected       BK Quant Log Ext log IU/mL Not detected  Not calculated     BK Quant Result Ext IU/mL Not detected  None detected     BK Quant Spec Ext   Blood         This result is from an external source.        Recent Labs   Lab Test 05/24/23  0811 06/27/23  0728 08/09/23  0845   DOSTAC 5/23/2023 6/26/2023 8/8/2023   TACROL 12.2 12.7 6.3     Recent Labs   Lab Test 05/10/23  0805 05/12/23  0740 05/24/23  0811   DOSMPA 5/9/2023   9:40 PM 5/11/2023   9:50 PM 5/23/2023   9:30 PM   MPACID 7.38* 3.65* 4.89*   MPAG 72.5 52.6 74.5

## 2024-03-04 NOTE — LETTER
3/4/2024         RE: Caity Horan  [address]  [address]  [facility] [address]        Dear Colleague,    Thank you for referring your patient, Caity Horan, to the [facility]. Please see a copy of my visit note below.    Virtual Visit Details    Type of service:  Video Visit   Video start time: 11:30 AM  Video end time: 11:37 AM  Originating Location (pt. Location): Home     Distant Location (provider location):  On-site  Platform used for Video Visit: [facility]      TRANSPLANT NEPHROLOGY CHRONIC POST TRANSPLANT VISIT    Assessment & Plan   # LDKT: Stable    - Baseline Creatinine:  ~ 1-1.2   - Proteinuria: Normal (<0.2 grams), repeat    - Date DSA Last Checked: Dec/2023      Latest DSA: No   - BK Viremia: No   - Kidney Tx Biopsy: Apr 17, 2023; Result: negative for rejection             Apr 29, 2023; Result: Acute cellular-mediated rejection, Banff 1B.    # Immunosuppression: Tacrolimus immediate release (goal 6-8), Mycophenolate mofetil (dose 750 mg every 12 hours) and Prednisone (dose 5 mg daily)   - Continue with intensive monitoring of immunosuppression for efficacy and toxicity.   - Changes: Not at this time    # Infection Prophylaxis:   - PJP: Sulfa/TMP (Bactrim)   -CMV: No longer on valcyte. Patient is CMV IgG Ab discordant (D+/R-).  Will check CMV PCR monthly until 12 months post transplant.    # Blood Pressure: Controlled;  Goal BP: < 130/80   - Changes: Not at this time    # Anemia in Chronic Renal Disease: Hgb: Stable, near normal      MICKEY: No   - Iron studies: Not checked recently    # Mineral Bone Disorder:    - Secondary renal hyperparathyroidism; PTH level: Normal (15-65 pg/ml)        On treatment: None   - Vitamin D; level: Normal        On supplement: Yes   - Calcium; level: Normal        On supplement: No   - Phosphorus; level: Not checked recently        On supplement: No     # Electrolytes:   - Potassium; level: Normal        On supplement: No  - Magnesium; level:  Low normal        On supplement: Yes, mag bisglycinate 200mg daily   - Bicarbonate; level: Normal        On supplement: No    # Ulcerative Colitis: Stable on vedolizumab q6 weeks. Followed by GI.    # Skin Cancer Risk:    - Discussed sun protection and recommend regular follow up with Dermatology.    # Medical Compliance: Yes    # Health Maintenance and Vaccination Review: Recommend:  Shingrix    # Transplant History:  Etiology of Kidney Failure: IgA nephropathy  Tx: LDKT  Transplant: 4/13/2023 (Kidney), 12/5/2014 (Kidney)  Significant changes in immunosuppression: None  Significant transplant-related complications: Acute cellular-mediated rejection and urine leak    Transplant Office Phone Number: [phone]    Assessment and plan was discussed with the patient and she voiced her understanding and agreement.    Return visit: Return in about 3 months (around 6/4/2024) for follow up with Dr. Mar or Dr. Arora.    Zhang Barrera MD     The longitudinal plan of care for kidney transplant was addressed during this visit. Due to the added complexity in care, I will continue to support Caity Horan in the subsequent management of this condition(s) and with the ongoing continuity of care of this condition(s).         Chief Complaint   Ms. Horan is a 33 year old here for kidney transplant, immunosuppression management and routine follow up.    History of Present Illness    The patient overall feels well. She denies any recent hospitalizations. She denies nausea, vomiting, diarrhea, fever, chills, shortness of breath, chest pain, LE edema, unintentional weight loss, nights sweats, dysuria, hematuria.         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     Ulcerative colitis (H)     Vitamin D deficiency     Urine leak from transplanted ureter     Constipation     Kidney transplant rejection     Need for pneumocystis prophylaxis     CKD (chronic kidney disease) stage 2, GFR 60-89 ml/min     Neutropenia (H24)       Allergies   Allergies   Allergen Reactions     Bumetanide Other (See Comments)     Causes paralysis     Amoxicillin Rash       Medications   Current Outpatient Medications   Medication Sig     tacrolimus (GENERIC) 0.5 MG capsule Take 1 capsule (0.5 mg) by mouth every morning Total dose: 2.5 mg in the AM and 2 mg in the PM Profile Rx: patient will contact pharmacy when needed     biotin 1000 MCG TABS tablet Take 500 mcg by mouth daily     Cyanocobalamin 500 MCG TBDP Take 500 mcg by mouth daily     levonorgestrel (MIRENA) 20 MCG/DAY IUD 1 each by Intrauterine route Replacement every 7 years. Placed 1/2019     Magnesium Bisglycinate (MAG GLYCINATE) 100 MG TABS Take 2 tablets (200 mg) by mouth daily     mycophenolate (GENERIC EQUIVALENT) 250 MG capsule Take 3 capsules (750 mg) by mouth 2 times daily     predniSONE (DELTASONE) 5 MG tablet Take 1 tablet (5 mg) by mouth daily Or until discussed in clinic     sulfamethoxazole-trimethoprim (BACTRIM) 400-80 MG tablet Take 1 tablet by mouth daily     tacrolimus (GENERIC) 1 MG capsule Take 2 capsules (2 mg) by mouth 2 times daily Total dose: 2.5 mg in the AM and 2 mg in the PM Profile Rx: patient will contact pharmacy when needed     vedolizumab (ENTYVIO) 60 MG/ML injection Inject 300 mg into the vein once every six weeks     Vitamin D3 50 mcg (2000 units) tablet Take 1 tablet (50 mcg) by mouth daily     No current facility-administered medications for this visit.     Medications Discontinued During This Encounter   Medication Reason     valGANciclovir (VALCYTE) 450  MG tablet      tacrolimus (GENERIC) 0.5 MG capsule Reorder (No AVS)       Physical Exam   Vital Signs: There were no vitals taken for this visit.    GENERAL APPEARANCE: alert and no distress  HENT: no obvious abnormalities on appearance  RESP: breathing appears unremarkable with normal rate, no audible wheezing or cough and no apparent shortness of breath with conversation  MS: extremities normal - no gross deformities noted  SKIN: no apparent rash and normal skin tone  NEURO: speech is clear with no obvious neurological deficits  PSYCH: mentation appears normal and affect normal      Data         Latest Ref Rng & Units 2/22/2024     7:41 AM 1/12/2024     8:45 AM 12/12/2023     8:56 AM   Renal   Na (external) 130 - 144 mEq/L 138  137  139    K (external) 3.5 - 5.1 mEq/L 4.4  4.4  4.1    Cl 98 - 112 mEq/L 106  105  106    Cl (external) 98 - 112 mEq/L 106  105  106    CO2 (external) 22 - 30 mEq/L 25  23  24    BUN (external) 7 - 17 mg/dL 15  19  14    Cr (external) 0.52 - 1.04 mg/dL 1.00  1.03  1.04    Glucose (external) 74 - 106 mg/dL 99  96  91    Ca (external) 8.4 - 10.2 g/dL 9.5  9.0  9.3          Latest Ref Rng & Units 11/15/2023     8:58 AM 10/27/2023    11:25 AM 9/13/2023     8:48 AM   Bone Health   Phosphorus 2.5 - 4.5 mg/dL  2.5     Phos (external) 2.5 - 4.5 mg/dL 3.1      2.0        This result is from an external source.         Latest Ref Rng & Units 2/22/2024     7:41 AM 1/12/2024     8:45 AM 12/12/2023     8:56 AM   Heme   WBC (external) 4.5 - 11.5 K/UL 4.0  3.97  4.19    Hgb (external) 12.0 - 16.0 gm/dL 13.8  12.6  12.3    Plt (external) 150 - 450 K/mcL 234  210  213    ABSOLUTE NEUTROPHILS (EXTERNAL) 1.80 - 6.80 K/mcL 2.73         ABSOLUTE LYMPHOCYTES (EXTERNAL) 1.50 - 4.00 K/mcL 0.79         ABSOLUTE MONOCYTES (EXTERNAL) 0.45 - 1.30 K/mcL 0.36         ABSOLUTE EOSINOPHILS (EXTERNAL) <=0.60 K/mcL 0.05         ABSOLUTE BASOPHILS (EXTERNAL) <=0.20 K/mcL 0.03             This result is from an external  source.         Latest Ref Rng & Units 6/26/2023    12:55 PM 5/17/2023     3:31 PM 4/11/2023     8:32 AM   Liver   AP 35 - 104 U/L 70  66  70    TBili <=1.2 mg/dL 0.2  0.3  0.6    Bilirubin Direct 0.00 - 0.30 mg/dL <0.20  <0.20  <0.20    ALT 0 - 50 U/L 12  13  15    AST 0 - 45 U/L 27  19  23    Tot Protein 6.4 - 8.3 g/dL 7.0  6.6  8.6    Albumin 3.5 - 5.2 g/dL 4.3  4.1  5.1          Latest Ref Rng & Units 4/3/2023     3:56 PM   Pancreas   A1C <5.7 % 5.3          Latest Ref Rng & Units 4/24/2023     8:26 AM 4/22/2023     7:45 AM 4/3/2023     3:56 PM   Iron studies   Iron 37 - 145 ug/dL 30  40  186    Iron Sat Index 15 - 46 % 18  28  71    Ferritin 6 - 175 ng/mL 886  1,041  902          Latest Ref Rng & Units 2/22/2024     7:41 AM 1/12/2024     8:45 AM 12/12/2023     8:56 AM   [facility] Txp Virology   CMV DNA Quant Ext IU/mL Not detected  None detected  None detected       CMV PCR QUANT DNA INTERP (EXTERNAL) Not detected Not detected      LOG IU/ML OF CMVQNT (EXTERNAL) log IU/mL Not detected  Not calculated  None detected       BK Quant Log Ext log IU/mL Not detected  Not calculated     BK Quant Result Ext IU/mL Not detected  None detected     BK Quant Spec Ext   Blood         This result is from an external source.        Recent Labs   Lab Test 05/24/23  0811 06/27/23  0728 08/09/23  0845   DOSTAC 5/23/2023 6/26/2023 8/8/2023   TACROL 12.2 12.7 6.3     Recent Labs   Lab Test 05/10/23  0805 05/12/23  0740 05/24/23  0811   DOSMPA 5/9/2023   9:40 PM 5/11/2023   9:50 PM 5/23/2023   9:30 PM   MPACID 7.38* 3.65* 4.89*   MPAG 72.5 52.6 74.5       Again, thank you for allowing me to participate in the care of your patient.        Sincerely,        Zhang Barrera MD

## 2024-03-04 NOTE — PATIENT INSTRUCTIONS
Patient Recommendations:  - I recommend Shingrix vaccine    Transplant Patient Information  Your Post Transplant Coordinator is: Romy Cheema  For non urgent items, we encourage you to contact your coordinator/care team online via [employer]  You and your care team can also contact your transplant coordinator Monday - Friday, 8am - 5pm at [phone] (Option 2 to reach the coordinator or Option 4 to schedule an appointment).  After hours for urgent matters, please call [facility] at [phone].

## 2024-03-04 NOTE — NURSING NOTE
Is the patient currently in the state of [place]? YES    Visit mode:VIDEO    If the visit is dropped, the patient can be reconnected by: VIDEO VISIT: Text to cell phone:   Telephone Information:   Mobile [phone]       Will anyone else be joining the visit? NO  (If patient encounters technical issues they should call [phone]    How would you like to obtain your AVS? MyChart    Are changes needed to the allergy or medication list? No    Reason for visit: RECHECK    Pam HERNANDEZF

## 2024-03-05 DIAGNOSIS — Z48.298 AFTERCARE FOLLOWING ORGAN TRANSPLANT: Primary | ICD-10-CM

## 2024-03-21 ENCOUNTER — TELEPHONE (OUTPATIENT)
Dept: GASTROENTEROLOGY | Facility: CLINIC | Age: 34
End: 2024-03-21
Payer: COMMERCIAL

## 2024-03-21 DIAGNOSIS — E83.42 HYPOMAGNESEMIA: Primary | ICD-10-CM

## 2024-03-21 NOTE — TELEPHONE ENCOUNTER
Patient calls with new symptom of burning pain in the RUQ for two days. She reports the pain is worse when she is standing and moving around. She gets relief when sitting or laying down. The burning pain lasts about 5 minutes and then transitions to a dull pain. Denies nausea and vomiting. Stools are completely normal. Denies any other GI symptoms. She is worried because she leaves for a vacation in the [facility] tomorrow.    Discussed with Rupert Dsouza PA-C. Considering time limitation, plan for patient to try omeprazole 20mg BID OTC. When she returns from vacation, she should check  in with the care team for a symptom update and a plan for further work-up. Called patient to relay the plan. She expressed understanding and all questions have been answered at this time. Patient will notify her kidney transplant team as well.

## 2024-03-22 ENCOUNTER — MYC MEDICAL ADVICE (OUTPATIENT)
Dept: GASTROENTEROLOGY | Facility: CLINIC | Age: 34
End: 2024-03-22
Payer: COMMERCIAL

## 2024-03-22 DIAGNOSIS — K51.011 ULCERATIVE PANCOLITIS WITH RECTAL BLEEDING (H): Primary | ICD-10-CM

## 2024-03-22 NOTE — TELEPHONE ENCOUNTER
Discussed with Rupert Dsouza. Recommendation to be evaluated in the ED for quickest work-up prior to patient leaving for vacation. She does not recommend treating with antibiotics without more information about the cause of the pain.    Discussed with Dr. Doan. Provider agreed antibiotics are not warranted. Plan for RUQ US w/ doppler to evaluate gallbladder. Patient should complete labs at earliest convenience.

## 2024-03-29 ENCOUNTER — TELEPHONE (OUTPATIENT)
Dept: GASTROENTEROLOGY | Facility: CLINIC | Age: 34
End: 2024-03-29
Payer: COMMERCIAL

## 2024-03-29 NOTE — TELEPHONE ENCOUNTER
----- Message from Emili Tamayo RN sent at 3/29/2024 10:33 AM CDT -----  Regarding: Fax Lab Orders  Good Morning,    Could you please fax the following orders to the [facility] in [facility]?  [email or web address]    Labs:  -- CMP  -- CRP  -- CBC w/ diff    -- Ultrasound abdomen complete w/ doppler (under imaging tab)    Thank you,  Emili Tamayo  RN GI Care Coordinator   Phone: [phone]

## 2024-03-29 NOTE — TELEPHONE ENCOUNTER
Called to get fax number to fax labs. Per  Medicine they need us to fill out a form they they will fax and have provider fill out and fax. That is the only way that they can do outside lab orders. Also informed to contact patient and not go to get labs until they confirm orders are received.    Called again as no form was received. They stated can fax over orders to [phone].    Patient requested labs to be sent to their outside lab.     Clinic:   [facility] in [facility]     Fax: [phone]     Labs faxed:   -US Abdomen Complete w Doppler Complete   -Comprehensive metabolic panel   -CRP inflammation   -CBC with Platelets & Differential          Confirmed receipt to the facility? Called and checked with  and was told they do not do outside orders. She has been seen by GI there but not a primary so they are not able to do labs. Nurse informed.    Nurse stated no longer needed. Patient was in ER and had some done.    Emma Pérez LPN

## 2024-04-12 NOTE — LETTER
"    8/9/2023         RE: Caity Horan  [address]  [address]  [facility] [address]        Dear Colleague,    Thank you for referring your patient, Caity Horan, to the [facility]. Please see a copy of my visit note below.    Chief Complaint   Patient presents with    RECHECK     Post kidney TX     /84   Pulse 83   Ht 1.6 m (5' 3\")   Wt 59.7 kg (131 lb 9.6 oz)   SpO2 100%   BMI 23.31 kg/m        Areli Alcaraz MA     TRANSPLANT NEPHROLOGY EARLY POST TRANSPLANT VISIT    Assessment & Plan    # LDKT: Stable kidney function following early post transplant issues with a urine leak and acute cellular-mediated rejection.              - Baseline Creatinine: ~ 1-1.2              - Proteinuria: Mild (0.5-1.0 grams)              - Date DSA Last Checked: May/2023      Latest DSA: No     cPRA: 82%              - BK Viremia: No              - Kidney Tx Biopsy: Apr 29, 2023; Result: Acute cellular-mediated rejection, Banff 1B.              - Transplant Ureteral Stent: Removed     # Immunosuppression: Tacrolimus immediate release (goal 8-10), Mycophenolate mofetil (dose 750 mg every 12 hours) and Prednisone (dose 5 mg daily)              - Induction with Recent Transplant:  High Intensity Protocol              - Continue with intensive monitoring of immunosuppression for efficacy and toxicity.              - Changes: Not at this time     # Infection Prophylaxis:   - PJP: Sulfa/TMP (Bactrim)  - CMV: Valganciclovir (Valcyte); Patient is CMV IgG Ab discordant (D+/R-) and will continue on Valcyte x 6 months till 10/30 (last ATG dose 4/30), then check CMV PCR monthly until April 2024     # Blood Pressure: Controlled;          Goal BP: < 130/80              - Volume status: Euvolemic                EDW ~ 126-128 lbs              - Changes: No     # Leukopenia:   - add diff and CMV pcr   - G-CSF if ANC-0.5 or lower   - likely med related too, r-ATG. MMF, bactrim    # Anemia in Chronic Renal Disease: " Physical Therapy Visit    Visit Type: Daily Treatment Note  Visit: 8  Referring Provider: Salazar Mathews MD  Medical Diagnosis (from order): I69.952 - Hemiplegia and hemiparesis following unspecified cerebrovascular disease affecting left dominant side (CMD)     SUBJECTIVE                                                                                                               \"  Getting things out of refrigerator with my Rolling walker has been challenging.\"    Pain / Symptoms  - Location: Discomfort on Left groin area      OBJECTIVE                                                                                                                                    Treatment     Therapeutic Exercise  1.  Sit to stand from regular chair without UE support x 5 reps x 2 sets - for functional LE strengthening  2.  Nu step x 12 min using Bilateral UE/LE level 4 at > 50  steps/min to improve overall strength and endurance          Therapeutic Activity  Performed dynamic activities to improve balance, strength, mobility, and mechanics in order to translate into functional performance and activities of daily living such as transfers, gait, standing tolerance, and tasks of daily living      > Performed between parallel bars:  1. Right upper extremity support on Right lower extremity and bilateral upper extremity support with Left lower extremity stance - single leg forward and back stepping; side stepping and back to midline all; alternating forward and back stepping all x 10 reps each leg for each direction x 2 sets  2. Bilateral upper extremity support:  single leg and alternating step taps forward and side  - 4 inch block x 10 reps each leg for each direction x 2 sets  3.  Static standing with on and off upper extremity support - single arm raises; bilateral arm raises  * Patient tolerated 20 minutes of continuous standing.    4.  Ambulation with Rolling walker toward refrigerator, management of opening and closing  Hgb: Stable, near normal      MICKEY: No              - Iron studies: Low iron saturation, but high ferritin     # Mineral Bone Disorder:   - Secondary renal hyperparathyroidism; PTH level: Minimally elevated ( pg/ml)        On treatment: None  - Vitamin D; level: Normal        On supplement: Yes  - Calcium; level: Normal        On supplement: No     # Electrolytes:   - Potassium; level: Normal        On supplement: No  - Magnesium; level: Stable      On supplement: Yes  - Bicarbonate; level:normal       On supplement:      # Ulcerative Colitis: Stable on vedolizumab q8 weeks.  Followed by GI.     # Medical Compliance: Yes     # Health Maintenance and Vaccination Review: Not Reviewed      # Transplant History:  Etiology of Kidney Failure: IgA nephropathy  Tx: LDKT  Transplant: 4/13/2023 (Kidney), 12/5/2014 (Kidney)  Donor Type: Living      Donor Class:   Crossmatch at time of Tx: negative  DSA at time of Tx: No  Significant changes in immunosuppression: None  CMV IgG Ab High Risk Discordance (D+/R-): Yes  EBV IgG Ab High Risk Discordance (D+/R-): No  Significant transplant-related complications: Acute cellular-mediated rejection  Transplant Office Phone Number: [phone]    Assessment and plan was discussed with the patient and she voiced her understanding and agreement.    Return visit: 2 months for 6 month post transplant visit    Darian Arora MD    Chief Complaint  Ms. Horan is a 33 year old here for kidney transplant and immunosuppression management.     History of Present Illness  Feels great and reports no new concerns  Energy level is good. Denies any fevers, chills, weight loss, night sweats.sore throat, cough, shortness of breath. No nausea, vomiting, abdominal pain, diarrhea and symptoms have been controlled on vedolizumab. No chest pain, sob, leg swelling. No recent illness.    Labs show leukopenia (added diff), add diff cmv pcr, recent Fk subTx 6.8 and the dose was increased     Current IS:  door; reaching for items in the refrigerator.  - Verbal cues provided to improve sequencing and Rolling walker positioning to improve safety and ease  of task performance.    Skilled input: tactile instruction/cues, verbal instruction/cues and posture correction    Writer verbally educated and received verbal consent for hand placement, positioning of patient, and techniques to be performed today from patient   Home Exercise Program  Continue with previously provided home exercise program  > Practice sequencing and Rolling walker placement as instructed when managing refrigerator at home  >  Carry over increasing standing time as practice in therapy      ASSESSMENT                                                                                                            Patient demonstrated positive response to therapy.  VS stable throughout session.  Improving tolerance to standing as she was able to perform 20 minutes of standing exercises/activities.  Continues to required 1-2 upper extremity support to keep balance. Expressed concern with getting food/items out of refrigerator.  Practice activity to improve sequencing and Rolling walker placement to improve safety and ease of task performance.  Patient will take picture of kitchen set up to better simulate in clinic and continue to practice activity.  Patient would continue to benefit from participation in skilled physical therapy intervention to address the deficits, meet established goals, and improve patient's functional mobility and gait.    Education:   - Results of above outlined education: Demonstrates understanding    PLAN                                                                                                                           Suggestions for next session as indicated: Progress per plan of care       Therapy procedure time and total treatment time can be found documented on the Time Entry flowsheet     FK1.5/1.5 BFL976/750 pred 5  Ppx bactrim, valcyte      Problem List  Patient Active Problem List   Diagnosis    Metabolic acidosis    IgA nephropathy    Kidney replaced by transplant    Immunosuppressed status (H)    Hypomagnesemia    Aftercare following organ transplant    EBV (Nicole-Barr virus) viremia    Anemia in chronic renal disease    Vitamin B12 deficiency    Ulcerative pancolitis with rectal bleeding (H)    Banff type IB acute cellular rejection of transplanted kidney    Ulcerative colitis (H)    Vitamin D deficiency    Urine leak from transplanted ureter    Constipation    Kidney transplant rejection    Need for pneumocystis prophylaxis    CKD (chronic kidney disease) stage 2, GFR 60-89 ml/min       Allergies  Allergies   Allergen Reactions    Bumetanide Other (See Comments)     Causes paralysis    Amoxicillin Rash       Medications  Current Outpatient Medications   Medication Sig     magnesium glycinate lysinate chelate (DOCTOR'S BEST) 100 MG TABS tablet Take 2 tablets (200 mg) by mouth 2 times daily    biotin 1000 MCG TABS tablet Take 500 mcg by mouth daily    Cyanocobalamin 500 MCG TBDP Take 500 mcg by mouth daily    levonorgestrel (MIRENA) 20 MCG/DAY IUD 1 each by Intrauterine route Replacement every 7 years. Placed 1/2019    mycophenolate (GENERIC EQUIVALENT) 250 MG capsule Take 3 capsules (750 mg) by mouth 2 times daily    predniSONE (DELTASONE) 5 MG tablet Take 1 tablet (5 mg) by mouth daily Or until discussed in clinic    sulfamethoxazole-trimethoprim (BACTRIM) 400-80 MG tablet Take 1 tablet by mouth daily    tacrolimus (GENERIC EQUIVALENT) 0.5 MG capsule Take 1 capsule (0.5 mg) by mouth 2 times daily Total dose = 1.5 mg twice per day    tacrolimus (GENERIC EQUIVALENT) 1 MG capsule Take 1 capsule (1 mg) by mouth 2 times daily Total dose = 1.5 mg twice per day    valGANciclovir (VALCYTE) 450 MG tablet Take 2 tablets (900 mg) by mouth daily Until directed to increase dose per transplant team based  "on improving renal function.    vedolizumab (ENTYVIO) 60 MG/ML injection Inject 300 mg into the vein once every six weeks    vitamin D3 (CHOLECALCIFEROL) 50 mcg (2000 units) tablet Take 1 tablet (50 mcg) by mouth daily     No current facility-administered medications for this visit.     There are no discontinued medications.    Physical Exam  Vital Signs: /84   Pulse 83   Ht 1.6 m (5' 3\")   Wt 59.7 kg (131 lb 9.6 oz)   SpO2 100%   BMI 23.31 kg/m      GENERAL APPEARANCE: alert and no distress  HENT: mouth without ulcers or lesions  LYMPHATICS: no cervical or supraclavicular nodes  RESP: lungs clear to auscultation - no rales, rhonchi or wheezes  CV: regular rhythm, normal rate, no rub, no murmur  EDEMA: no LE edema bilaterally  ABDOMEN: soft, nondistended, nontender, bowel sounds normal  MS: extremities normal - no gross deformities noted, no evidence of inflammation in joints, no muscle tenderness  SKIN: no rash    Data        Latest Ref Rng & Units 7/24/2023     8:18 AM 7/18/2023     8:08 AM 7/11/2023     8:16 AM   Renal   Na (external) 135 - 145 mEq/L 136  139  140    K (external) 3.6 - 5.2 mEq/L 4.8  4.2  4.5    Cl 98 - 108 mEq/L 106  105  107    Cl (external) 98 - 108 mEq/L 106  105  107    CO2 (external) 22 - 32 mEq/L 21  24  24    BUN (external) 8 - 21 mg/dL 19  17  15    Cr (external) 0.38 - 1.02 mg/dL 0.97  1.02  1.05    Glucose (external) 62 - 125 mg/dL 90  90  94    Ca (external) 8.9 - 10.2 mg/dL 9.1  9.2  9.3    Mg (external) 1.8 - 2.4 mg/dL 1.6  1.5  1.6          Latest Ref Rng & Units 7/24/2023     8:18 AM 7/18/2023     8:08 AM 7/11/2023     8:16 AM   Bone Health   Phos (external) 2.5 - 4.5 mg/dL 3.0     3.4  3.2        This result is from an external source.         Latest Ref Rng & Units 8/9/2023     8:45 AM 7/24/2023     8:18 AM 7/18/2023     8:08 AM   Heme   WBC 4.0 - 11.0 10e3/uL 1.1      WBC (external) 4.3 - 10.0 THOU/uL  2.50  2.42    Hgb 11.7 - 15.7 g/dL 12.3      Hgb (external) 11.5 " - 15.5 g/dL  11.6  11.7    Plt 150 - 450 10e3/uL 209      Plt (external) 150 - 400 THOU/uL  231  245          Latest Ref Rng & Units 6/26/2023    12:55 PM 5/17/2023     3:31 PM 4/11/2023     8:32 AM   Liver   AP 35 - 104 U/L 70  66  70    TBili <=1.2 mg/dL 0.2  0.3  0.6    Bilirubin Direct 0.00 - 0.30 mg/dL <0.20  <0.20  <0.20    ALT 0 - 50 U/L 12  13  15    AST 0 - 45 U/L 27  19  23    Tot Protein 6.4 - 8.3 g/dL 7.0  6.6  8.6    Albumin 3.5 - 5.2 g/dL 4.3  4.1  5.1          Latest Ref Rng & Units 4/3/2023     3:56 PM   Pancreas   A1C <5.7 % 5.3          Latest Ref Rng & Units 4/24/2023     8:26 AM 4/22/2023     7:45 AM 4/3/2023     3:56 PM   Iron studies   Iron 37 - 145 ug/dL 30  40  186    Iron Sat Index 15 - 46 % 18  28  71    Ferritin 6 - 175 ng/mL 886  1,041  902          Latest Ref Rng & Units 5/10/2023     8:05 AM 4/13/2023     1:23 PM 4/3/2023     3:56 PM   [facility] Txp Virology   CMV QUANT IU/ML Not Detected IU/mL Not Detected  Not Detected     EBV CAPSID ANTIBODY IGG No detectable antibody.   Positive        Recent Labs   Lab Test 05/22/23  0903 05/24/23  0811 06/27/23  0728   DOSTAC 5/21/2023 5/23/2023 6/26/2023   TACROL 10.6 12.2 12.7     Recent Labs   Lab Test 05/10/23  0805 05/12/23  0740 05/24/23  0811   DOSMPA 5/9/2023   9:40 PM 5/11/2023   9:50 PM 5/23/2023   9:30 PM   MPACID 7.38* 3.65* 4.89*   MPAG 72.5 52.6 74.5     Darian Arora MD

## 2024-04-26 ENCOUNTER — TELEPHONE (OUTPATIENT)
Dept: TRANSPLANT | Facility: CLINIC | Age: 34
End: 2024-04-26
Payer: COMMERCIAL

## 2024-04-26 DIAGNOSIS — R74.01 ELEVATED ALT MEASUREMENT: Primary | ICD-10-CM

## 2024-04-26 NOTE — LETTER
PHYSICIAN ORDERS      DATE & TIME ISSUED: 24   2:05 PM  PATIENT NAME: Caity Horan   : 1990     [facility] MR# [if applicable]: [id number]     DIAGNOSIS:  kidney transplant, elevated ALT  ICD-10 CODE: z94.0, R74.01  Please obtain with next set of transplant labs:    Hepatic panel with liver function tests    Any questions please call: [phone]    Please fax each result same day as resulted/available    Critical lab results page [phone]    Please fax these results to [phone]      Darian Arora MD

## 2024-04-26 NOTE — TELEPHONE ENCOUNTER
ISSUE: ALT elevated: 61    PLAN:    Darian Booker MD Reilly, Megan, RN  Repeat in 1-2 weeks  Liver US earlier this month showed gallbladder sludge without cholecystitis and unermarkable liver  Any new meds? Still on bactrim? Any RUQ pain?       Orders sent to local lab.

## 2024-04-30 ENCOUNTER — TELEPHONE (OUTPATIENT)
Dept: TRANSPLANT | Facility: CLINIC | Age: 34
End: 2024-04-30
Payer: COMMERCIAL

## 2024-04-30 DIAGNOSIS — E55.9 HYPOVITAMINOSIS D: ICD-10-CM

## 2024-04-30 DIAGNOSIS — K51.011 ULCERATIVE PANCOLITIS WITH RECTAL BLEEDING (H): ICD-10-CM

## 2024-04-30 RX ORDER — CHOLECALCIFEROL (VITAMIN D3) 50 MCG
0.5 TABLET ORAL DAILY
Qty: 45 TABLET | Refills: 3 | Status: SHIPPED | OUTPATIENT
Start: 2024-04-30

## 2024-06-06 DIAGNOSIS — Z94.0 KIDNEY REPLACED BY TRANSPLANT: Primary | ICD-10-CM

## 2024-06-06 DIAGNOSIS — D84.9 IMMUNOSUPPRESSED STATUS (H): ICD-10-CM

## 2024-06-06 RX ORDER — TACROLIMUS 1 MG/1
2 CAPSULE ORAL 2 TIMES DAILY
Qty: 360 CAPSULE | Refills: 3 | Status: SHIPPED | OUTPATIENT
Start: 2024-06-06

## 2024-06-06 RX ORDER — TACROLIMUS 0.5 MG/1
CAPSULE ORAL
Qty: 90 CAPSULE | Refills: 3 | Status: SHIPPED | OUTPATIENT
Start: 2024-06-06

## 2024-06-06 NOTE — TELEPHONE ENCOUNTER
Zhang Barrera MD Lavelle Peterson, Meghan M, RN  Yes, we can     Previous Messages       ----- Message -----  From: Ping Blood RN  Sent: 6/6/2024   9:09 AM CDT  To: Zhang Barrera MD    Can we run her tac 4-6 now?      See MyChart. Tac lowered from 2.5/2mg bid to 2 mg bid. Repeat labs in 1-2 weeks.    LPN task:  Please update med list and lab orders, thanks!

## 2024-06-06 NOTE — LETTER
PHYSICIAN ORDERS      DATE & TIME ISSUED: 2024 12:29 PM  PATIENT NAME: Caity Horan   : 1990     [facility] MR# [if applicable]: [id number]     DIAGNOSIS:  Kidney Transplant  ICD-10 CODE: Z94.0     Please repeat the following labs in 1-2 weeks:  Tacrolimus drug level  CBC  BMP    Any questions please call: [phone]    Please fax each result same day as resulted/available    Critical lab results page [phone]  Please fax lab results to [phone].      Darian Arora MD

## 2024-06-07 ENCOUNTER — TELEPHONE (OUTPATIENT)
Dept: TRANSPLANT | Facility: CLINIC | Age: 34
End: 2024-06-07
Payer: COMMERCIAL

## 2024-06-07 DIAGNOSIS — B25.9 CMV (CYTOMEGALOVIRUS INFECTION) (H): Primary | ICD-10-CM

## 2024-06-07 RX ORDER — VALGANCICLOVIR 450 MG/1
900 TABLET, FILM COATED ORAL 2 TIMES DAILY
Qty: 120 TABLET | Refills: 3 | Status: SHIPPED | OUTPATIENT
Start: 2024-06-07

## 2024-06-07 NOTE — TELEPHONE ENCOUNTER
ISSUE:  New CMV, 199 copies     ISx: tac goal 4-6 (dose just reduced),  mg bid, pred 5 mg daily     Otilio Mar MD Reilly, Megan, RN  New low level CMV viremia in patient that is CMV discordant.  Would recommend starting Valcyte 900 mg twice daily, check CMV PCR weekly and also check CMV IgG antibody level and update DSA and MPA level.    Caity notified, lab orders updated and faxed to local lab.

## 2024-06-07 NOTE — LETTER
PHYSICIAN ORDERS  OUTPATIENT LABORATORY TEST ORDER    Patient Name: Caity Horan   Transplant Date: 4/13/2023   YOB: 1990  Issue Date: June 7, 2024  [facility] MR: [id number] Exp. Date (1 year after date issued)      Diagnoses: Kidney Transplant (ICD-10  Z94.0)   Long term use of medications (ICD-10  Z79.899)   CMV (B29.9)     Lab results to be available on the same day drawn.   Patient should release information to the [facility].  Please fax to the Transplant Center at [phone].    Labs once  ?CMV IgG antibody              ?mycophenolic acid drug level     Labs weekly  ?CBC with differential   ?Basic Metabolic Panel         ?/Tacrolimus/Prograf drug level     ?CMV DNA PCR Quant (blood)                    Labs monthly         ?BK (Polyoma Virus) PCR Quantitative - Plasma                    Every 6 Months  (due now)                ?Urine for protein/creatinine ?PRA/DSA level (mailers provided by the patient)     If you have any questions, please call The Transplant Center at [phone] or [phone].    Please fax labs to [phone]  .

## 2024-06-11 ENCOUNTER — VIRTUAL VISIT (OUTPATIENT)
Dept: GASTROENTEROLOGY | Facility: CLINIC | Age: 34
End: 2024-06-11
Payer: COMMERCIAL

## 2024-06-11 DIAGNOSIS — K51.00 ULCERATIVE PANCOLITIS WITHOUT COMPLICATION (H): Primary | ICD-10-CM

## 2024-06-11 PROCEDURE — 99214 OFFICE O/P EST MOD 30 MIN: CPT | Mod: 95 | Performed by: PHYSICIAN ASSISTANT

## 2024-06-11 NOTE — PROGRESS NOTES
"Virtual Visit Details    Type of service:  Video Visit     Originating Location (pt. Location): Home    Distant Location (provider location):  Off-site  Platform used for Video Visit: [facility] UC FOLLOW UP      PATIENT: Caity Horan    MRN: [id number]    Date of Birth 1990    Tel: [phone] (home)     PCP: Ritu Little     HPI: Ms. Horan is a 32 year old year old female here for follow up for UC. She had great improvement with prednisone taper and initiation of Vedolizumab on 4/19/18. Iron deficiency anemia improved with iron infusions and healing of UC.  Had increase in EBV viremia, and Dr. Mar recommended decreasing prednisone taper and to follow EBV PCR closely. No changes from ID perspective.     UC history  Spring 2012 -- graduated college, had a lot of life stressors. Experienced severe urgency with incontinence. Some blood in the stool and diarrhea alternating with constipation. Spontaneously resolved after 2 months.   End of Dec 2016 - April 2017: intermittent blood in stool, \"major constipation\" and weight gain, no urgency.    October 2017 -- was found to be anemic (attributed to kidney disease) and had iron infusions.  Sep -Dec 2017 Blood with well formed stool. This continued to worsen.   Worst stretch was mid-Feb to march: weight loss, >8 loose stools per day with blood.  April - now: much improved, no abdominal pain and urgency resolved.    In the past had upper abdominal bloating/pain and LLQ pain. No n/v.     Macroscopic extent of disease (most recent) E3    Constitutional symptoms:  Fever NO  Weight loss YES (in the past -- lost 12 lb since October), now stable    Other GI symptoms present: none  Total number of IBD surgeries (except perianal): 0    Current IBD Medications:  Vedolizumab every 6 weeks    Current medications: MMF, tacrolimus, Mg, B12     Past IBD Medications:   None    Interval history 10/2018:  Completed prednisone taper in July and has " continued with Entyvio every 8 weeks. No recurrence of symptoms.    Current UC symptoms  Bowel frequency in day 1-2, brown well-formed   Bowel frequency in night 0     Urgency of defecation NO  Blood in stool none   General well being 8 (feeling well)  Extracolonic features (multiple select): none     4-6 weeks after kidney transplant had significant hair loss. Took about a year to fully recover and is improving.  8/23/18 shows adequate Entyvio level 41.7 (8 week trough), no change in dose or interval.  Last Entyvio dose was 9/20/18 (2.5 weeks ago)    Interval history 12/11/19:  Feel well. Notices occasionally one week prior to infusion, abd is swollen, tender and bloated (won't do core exercises during that time) and concerns for constipation. Wonders if this is related to inflammation.    Current UC symptoms  Bowel frequency in day 2 formed   Bowel frequency in night 0     Urgency of defecation none  Blood in stool none  General well being well  Extracolonic features (multiple select): None    Next dose is next week (Thursday)    Interval history, 12/28/22  Recently hospitalized due to CKD Stage 5 2/2 IgA nephropathy s/p LDKT in 2014, now with chronic antibody-mediated rejection and recurrent IgA nephropathy on HD and Hypervolemia.  She is awaiting a repeat kidney transplant and in [place] while this is in process. Despite this, she has felt well from a GI standpoint. She has been managed in [place] with a GI provider who renewed her entyvio, however she is back in [place].  Currently having 1-2 BM daily, no blood in the stool or urgency. No nighttime stool.  Living in [place] until she gets her transplant. She is on MMF and Tacrolimus for transplant meds.     HBI  General well-being very well = 0  Abdominal pain None = 0  Number of liquid stools per day 0  Abdominal mass None = 0  Current Complications none    Interval hx 12/6/23  Doing very well. Still on 5mg prednisone (per neph). Baseline bowel pattern. No blood in the  stool, no urgency. Compliant with vedolizumab.    Interval hx 6/11/24  Feeling better and now back to baseline as of mid May which she attributes to prednisone course (from ED). Compliant with vedolizumab. 1-2 stools per day that are formed, no blood in the stool. No urgency. No upper GI sxs. New low level CMV viremia.  Now back on Valcyte due to CMV.    Past Medical History:   Diagnosis Date    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    Anemia in stage 5 chronic kidney disease (H) 10/27/2014    ESRD (end stage renal disease) on dialysis (H)     HTN (hypertension) 10/27/2014    Hypertension 10/2014    IgA nephropathy     biopsy proven    Metabolic acidosis     Ulcerative pancolitis (H) 2012    Vitamin D deficiency         Past Surgical History:   Procedure Laterality Date    BENCH KIDNEY  4/13/2023    Procedure: Bench kidney;  Surgeon: Ivy Sepulveda MD;  Location: [facility] OR    BIOPSY  2021    renal    COLONOSCOPY N/A 03/12/2018    Procedure: COMBINED COLONOSCOPY, SINGLE OR MULTIPLE BIOPSY/POLYPECTOMY BY BIOPSY;  EGD/Colonoscopy ;  Surgeon: Kory Massey MD;  Location: [facility] GI    COLONOSCOPY N/A 09/10/2018    Procedure: COMBINED COLONOSCOPY, SINGLE OR MULTIPLE BIOPSY/POLYPECTOMY BY BIOPSY;  Colonoscopy;  Surgeon: Yenifer Doan MD;  [facility]    COLONOSCOPY N/A 2/3/2023    Procedure: COLONOSCOPY, WITH BIOPSY;  Surgeon: Yaakov Loving MD;  Location: [facility] GI    EXTRACTION(S) DENTAL      IR CVC TUNNEL PLACEMENT > 5 YRS OF AGE  12/22/2022    IR CVC TUNNEL REMOVAL RIGHT  4/20/2023    IR RENAL BIOPSY LEFT  4/29/2023    PERCUTANEOUS BIOPSY KIDNEY Right 12/19/2018    Procedure: Right Kidney Biopsy;  Surgeon: Otilio Mar MD;  [facility]    RETURN KIDNEY TRANSPLANT N/A 4/17/2023    Procedure: Exploration of return kidney transplant, kidney biopsy, revision of ureteral anastomosis, ureteral stent placement;  Surgeon: Ivy Sepulveda MD;  Location: [facility]    TRANSPLANT KIDNEY RECIPIENT  LIVING RELATED N/A 2014    Procedure: TRANSPLANT KIDNEY RECIPIENT LIVING RELATED;  Surgeon: Dale Middleton MD;  Location: [facility] OR    WISDOM TOOTH EXTRACTION Bilateral        Social History     Tobacco Use    Smoking status: Never    Smokeless tobacco: Never   Substance Use Topics    Alcohol use: Not Currently     Alcohol/week: 3.0 - 9.0 standard drinks of alcohol     Types: 1 - 3 Glasses of wine, 1 - 3 Cans of beer, 1 - 3 Shots of liquor per week       Family History   Problem Relation Age of Onset    No Known Problems Mother     Alcoholism Father          in early 50s    Prostate Cancer Paternal Grandmother     Kidney Disease No family hx of    Negative for IBD. Dad had psoriasis.   Grandfather with BCC. Grandma has several skin lesions removed (does not recall the diagnosis)    Allergies   Allergen Reactions    Bumetanide Other (See Comments)     Causes paralysis    Amoxicillin Rash        Outpatient Encounter Medications as of 2024   Medication Sig Dispense Refill    biotin 1000 MCG TABS tablet Take 500 mcg by mouth daily      Cyanocobalamin 500 MCG TBDP Take 500 mcg by mouth daily      levonorgestrel (MIRENA) 20 MCG/DAY IUD 1 each by Intrauterine route Replacement every 7 years. Placed 2019      Magnesium Bisglycinate (MAG GLYCINATE) 100 MG TABS Take 2 tablets (200 mg) by mouth daily 180 tablet 3    mycophenolate (GENERIC EQUIVALENT) 250 MG capsule Take 3 capsules (750 mg) by mouth 2 times daily 540 capsule 3    predniSONE (DELTASONE) 5 MG tablet Take 1 tablet (5 mg) by mouth daily Or until discussed in clinic 90 tablet 3    sulfamethoxazole-trimethoprim (BACTRIM) 400-80 MG tablet Take 1 tablet by mouth daily 90 tablet 3    tacrolimus (GENERIC EQUIVALENT) 0.5 MG capsule HOLD 90 capsule 3    tacrolimus (GENERIC EQUIVALENT) 1 MG capsule Take 2 capsules (2 mg) by mouth 2 times daily 360 capsule 3    valGANciclovir (VALCYTE) 450 MG tablet Take 2 tablets (900 mg) by mouth 2 times daily 120 tablet  3    vedolizumab (ENTYVIO) 60 MG/ML injection Inject 300 mg into the vein once every six weeks      Vitamin D3 50 mcg (2000 units) tablet Take 0.5 tablets (25 mcg) by mouth daily 45 tablet 3     No facility-administered encounter medications on file as of 6/11/2024.      NSAID  NO    Review of Systems  Complete 10 System ROS performed. All are negative except as documented below, in the HPI, or in patient questionnaire from today's visit.    1) Constitutional: No fevers, chills, night sweats or malaise, weight loss or gain  2) Skin: No rash  3) Pulmonary: No wheeze, SOB, cough, sputum or hemoptysis  4) Cardiovascular: No Chest pain or palpitations  5) Genitourinary: No blood in urine or dysuria  6) Endocrine: No increased sweating, hunger, thirst or thyroid problems  7) Hematologic: No bruising and easy bleeding  8) Musculoskeletal: no new pain in joints or limitation in ROM  9) Neurologic: No dizziness, paresthesias or weakness or falls  10) Psychiatric:  not depressed/anxious, no sleep problems    PHYSICAL EXAM  Vitals: There were no vitals taken for this visit.    No Pain (0)     Constitutional - general appearance is well and in no acute distress. Body habitus normal  Eyes - No redness or discharge  Respiratory - No cough, unlabored breathing  Musculoskeletal - range of motion intact: Neck and arms  Skin - No discoloration or lesions  Neurological - No tremors, headaches  Psychiatric - No anxiety, alert & oriented    DATA:  Reviewed in detail past documentation, medications and prior workup available in electronic health records or through outside records.    PERTINENT STUDIES:  Most recent CBC:  WBC   Date Value Ref Range Status   12/03/2020 6.3 4.0 - 11.0 10e9/L Final     WBC Count   Date Value Ref Range Status   10/27/2023 3.9 (L) 4.0 - 11.0 10e3/uL Final   ]  Hemoglobin   Date Value Ref Range Status   10/27/2023 12.9 11.7 - 15.7 g/dL Final   01/14/2021 10.6 (L) 11.7 - 15.7 g/dL Final   ]   Platelet Count  "  Date Value Ref Range Status   10/27/2023 220 150 - 450 10e3/uL Final   01/14/2021 279 150 - 450 10e9/L Final       Most recent coag:  INR   Date Value Ref Range Status   04/29/2023 1.14 0.85 - 1.15 Final   12/19/2018 1.00 0.86 - 1.14 Final       Most recent hepatic panel:  AST   Date Value Ref Range Status   06/26/2023 27 0 - 45 U/L Final     Comment:     Reference intervals for this test were updated on 6/12/2023 to more accurately reflect our healthy population. There may be differences in the flagging of prior results with similar values performed with this method. Interpretation of those prior results can be made in the context of the updated reference intervals.   04/16/2021 13 0 - 45 U/L Final     ALT   Date Value Ref Range Status   06/26/2023 12 0 - 50 U/L Final     Comment:     Reference intervals for this test were updated on 6/12/2023 to more accurately reflect our healthy population. There may be differences in the flagging of prior results with similar values performed with this method. Interpretation of those prior results can be made in the context of the updated reference intervals.     04/16/2021 14 0 - 50 U/L Final     No results found for: \"BILICONJ\"   Bilirubin Total   Date Value Ref Range Status   06/26/2023 0.2 <=1.2 mg/dL Final   04/16/2021 0.4 0.2 - 1.3 mg/dL Final     Albumin   Date Value Ref Range Status   06/26/2023 4.3 3.5 - 5.2 g/dL Final   04/16/2021 3.3 (L) 3.4 - 5.0 g/dL Final     Alkaline Phosphatase   Date Value Ref Range Status   06/26/2023 70 35 - 104 U/L Final   04/16/2021 47 40 - 150 U/L Final       Most recent creatinine:  Creatinine   Date Value Ref Range Status   10/27/2023 1.02 (H) 0.51 - 0.95 mg/dL Final   04/16/2021 1.31 (H) 0.52 - 1.04 mg/dL Final     Endoscopy:   cscope 3/2018 showed Mcbride 3 colitis involving the rectum to hepatic flexure with sparing of the AC. Normal TI. Biopsies showed moderate chronic active colitis with cryptitis and crypt abscess formation. "     Cscope 9/2018 showed Mcbride 0. Biopsies show mild chronic active colitis ascending and transverse, crypt architectural distortion and descending sigmoid and rectum without active inflammation.  Imaging:  CT c/a/p on 7/2017 (indication EBV viremia): no bowel wall thickening. SB appeared normal.     IMPRESSION:  Ms. Horan is a 34 year old year old with diagnosed moderate to severe E3 ulcerative colitis 3/2018, who achieved symptomatic remission on prednisone taper and initiation of vedolizumab induction. She continues on vedolizumab monotherapy.    She is awaiting a repeat kidney transplant. She remains stable from an IBD standpoint on vedolizumab every 6 weeks.     She had called in about 2 months ago with RUQ pain that was severe.With this pain, stools remained at baseline. PPI was given as she was on a short time frame and leaving for vacation with out ability to get further work up completed. She was in [place] at the time.  Recommended a fecal bhupendra + EGD/colonoscopy. CT abd pelvis showed mild bowel wall thickening in proximal aspect of transverse colon which may be related to underdistention or mild coltis. Abd us showed mild gallbladder slide, no cholithiasis or cholecystitis.  At some point she received prednisone which resolved the pain.    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  -- Fecal calprotectin, if elevated then colonoscopy  --Dysplasia screening due 2026    IBD Health Care Maintenance:  Vaccinations:  -- Influenza (every year): Last given 2023  -- TdaP (every 10 years): Last given 2017  -- Pneumococcal Pneumonia (once then every 5 years): Last given 2017    One time confirmation of immunity or serologies:  -- Hepatitis A (serologies or  immunizations): Not documented  -- Hepatitis B (serologies or immunizations): 2001/2002, immune per serologies  -- Varicella: had chickenpox as a child  -- MMR:had all immunization as a child  -- HPV (all aged 18-26): 2007/2008  -- Meningococcal meningitis (all patients at risk for meningitis): 2007   -- Due to the immunosuppression in this patient, I would not advise administration of live vaccines such as varicella/VZV, intranasal influenza, MMR, or yellow fever vaccine (if travelling).      Cancer Screening:  Colon cancer screening:  Given pancolitis colonoscopy every 2-3 years recommended after 8 years of symptom onset.  Since symptoms started on 2012, Dysplasia screening is recommended 2026 (last scope was 2023).     Cervical cancer screening: Annual due to immunosupression    Skin cancer screening: Annual visual exam of skin by dermatologist since patient is immunocompromised    Bone mineral density screening   -- Recommend all patients supplement with calcium and vitamin D  -- DEXA scan ordered given >3 months steroid use     Depression Screening:  -- Over the last month, have you felt down, depressed, or hopeless? No  -- Over the last month, have you felt little interest or pleasure doing things? No    Misc:  -- Avoid tobacco use  -- Avoid NSAIDs as there is potentially a 25% chance of causing an IBD flare    Immunization History   Administered Date(s) Administered    COVID-19 12+ (2023-24) (Pfizer) 10/27/2023    COVID-19 MONOVALENT 12+ (Pfizer) 10/06/2021, 10/27/2021    HPV Quadrivalent 08/06/2007, 10/08/2007, 02/19/2008    Influenza Vaccine 65+ (Fluzone HD) 11/17/2021, 11/02/2022    Influenza Vaccine >6 months,quad, PF 12/13/2016, 10/03/2017, 10/27/2023    Influenza Vaccine IM 18-49 Yrs, RIV3 10/08/2018    Meningococcal (Menomune ) 08/06/2007    Pneumo Conj 13-V (2010&after) 11/01/2017, 08/11/2021    Pneumococcal 23 valent 11/10/2014, 11/15/2022    TDAP (Adacel,Boostrix) 08/06/2007    TDAP Vaccine  (Boostrix) 11/01/2017      Follow up in 6 months    Rupert Dsouza PA-C  Division of Gastroenterology, Hepatology and Nutrition  [facility]

## 2024-06-11 NOTE — PATIENT INSTRUCTIONS
It was a pleasure taking care of you today.  I've included a brief summary of our discussion and care plan from today's visit below.  Please review this information with your primary care provider.  ______________________________________________________________________    My recommendations are summarized as follows:    -- Continue entyvio every 6 weeks   -- Labs with infusions  -- Next endoscopic assessment: pending stool sample   -- Patient with IBD we recommend supplementation vitamin D 1000 units daily and calcium 500 mg twice daily.  -- No NSAIDs (ibuprofen, or anything containing ibuprofen)     To learn more about Diet and Nutrition in the setting of IBD, check out some of these resources:  https://www.crohnscolitisfoundation.org/diet-and-nutrition/what-should-i-eat  https://www.nimbal.org/  https://ntforibd.org/    For additional resources about inflammatory bowel disease visit [email or web address].crohnscolitisfoundation.org/      Return to GI Clinic in 6 months to review your progress.    ______________________________________________________________________    How do I schedule labs, imaging studies, or procedures that were ordered in clinic today?     Labs: To schedule lab appointment at the [facility], use my chart or call [phone]. If you have a [place] lab closer to home where you are regularly seen you can give them a call.     Procedures: If a colonoscopy, upper endoscopy, breath test, esophageal manometry, or pH impedence was ordered today, our endoscopy team will call you to schedule this. If you have not heard from our endoscopy team within a week, please call [phone] to schedule.     Imaging Studies: If you were scheduled for a CT scan, X-ray, MRI, ultrasound, HIDA scan or other imaging study, please call [phone] to have this scheduled.     Referral: If a referral to another specialty was ordered, expect a phone call or follow instructions above. If you have not heard from  anyone regarding your referral in a week, please call our clinic to check the status.     Who do I call with any questions after my visit?  Please be in touch if there are any further questions that arise following today's visit.  There are multiple ways to contact your gastroenterology care team.      During business hours, you may reach a Gastroenterology nurse at [phone]    To schedule or reschedule an appointment, please call [phone].     You can always send a secure message through [email or web address].  [email or web address] messages are answered by your nurse or doctor typically within 24 hours.  Please allow extra time on weekends and holidays.      For urgent/emergent questions after business hours, you may reach the on-call GI Fellow by contacting the [facility]  at [phone].     How will I get the results of any tests ordered?    You will receive all of your results.  If you have signed up for [employer], any tests ordered at your visit will be available to you after your physician reviews them.  Typically this takes 1-2 weeks.  If there are urgent results that require a change in your care plan, your physician or nurse will call you to discuss the next steps.      What is [email or web address]?  [email or web address] is a secure way for you to access all of your healthcare records from the [facility].  It is a web based computer program, so you can sign on to it from any location.  It also allows you to send secure messages to your care team.  I recommend signing up for [email or web address] access if you have not already done so and are comfortable with using a computer.         Sincerely,    Rupert Dsouza PA-C  [facility]  Division of Gastroenterology

## 2024-06-11 NOTE — LETTER
"6/11/2024      Caity Horan  [address]  [address]  [facility] [address]      Dear Colleague,    Thank you for referring your patient, Caity Horan, to the [facility]. Please see a copy of my visit note below.    Virtual Visit Details    Type of service:  Video Visit     Originating Location (pt. Location): Home    Distant Location (provider location):  Off-site  Platform used for Video Visit: [facility] UC FOLLOW UP      PATIENT: Caity Horan    MRN: [id number]    Date of Birth 1990    Tel: [phone] (home)     PCP: Ritu Little     HPI: Ms. Horan is a 32 year old year old female here for follow up for UC. She had great improvement with prednisone taper and initiation of Vedolizumab on 4/19/18. Iron deficiency anemia improved with iron infusions and healing of UC.  Had increase in EBV viremia, and Dr. Mar recommended decreasing prednisone taper and to follow EBV PCR closely. No changes from ID perspective.     UC history  Spring 2012 -- graduated college, had a lot of life stressors. Experienced severe urgency with incontinence. Some blood in the stool and diarrhea alternating with constipation. Spontaneously resolved after 2 months.   End of Dec 2016 - April 2017: intermittent blood in stool, \"major constipation\" and weight gain, no urgency.    October 2017 -- was found to be anemic (attributed to kidney disease) and had iron infusions.  Sep -Dec 2017 Blood with well formed stool. This continued to worsen.   Worst stretch was mid-Feb to march: weight loss, >8 loose stools per day with blood.  April - now: much improved, no abdominal pain and urgency resolved.    In the past had upper abdominal bloating/pain and LLQ pain. No n/v.     Macroscopic extent of disease (most recent) E3    Constitutional symptoms:  Fever NO  Weight loss YES (in the past -- lost 12 lb since October), now stable    Other GI symptoms present: none  Total " number of IBD surgeries (except perianal): 0    Current IBD Medications:  Vedolizumab every 6 weeks    Current medications: MMF, tacrolimus, Mg, B12     Past IBD Medications:   None    Interval history 10/2018:  Completed prednisone taper in July and has continued with Entyvio every 8 weeks. No recurrence of symptoms.    Current UC symptoms  Bowel frequency in day 1-2, brown well-formed   Bowel frequency in night 0     Urgency of defecation NO  Blood in stool none   General well being 8 (feeling well)  Extracolonic features (multiple select): none     4-6 weeks after kidney transplant had significant hair loss. Took about a year to fully recover and is improving.  8/23/18 shows adequate Entyvio level 41.7 (8 week trough), no change in dose or interval.  Last Entyvio dose was 9/20/18 (2.5 weeks ago)    Interval history 12/11/19:  Feel well. Notices occasionally one week prior to infusion, abd is swollen, tender and bloated (won't do core exercises during that time) and concerns for constipation. Wonders if this is related to inflammation.    Current UC symptoms  Bowel frequency in day 2 formed   Bowel frequency in night 0     Urgency of defecation none  Blood in stool none  General well being well  Extracolonic features (multiple select): None    Next dose is next week (Thursday)    Interval history, 12/28/22  Recently hospitalized due to CKD Stage 5 2/2 IgA nephropathy s/p LDKT in 2014, now with chronic antibody-mediated rejection and recurrent IgA nephropathy on HD and Hypervolemia.  She is awaiting a repeat kidney transplant and in [place] while this is in process. Despite this, she has felt well from a GI standpoint. She has been managed in [place] with a GI provider who renewed her entyvio, however she is back in [place].  Currently having 1-2 BM daily, no blood in the stool or urgency. No nighttime stool.  Living in [place] until she gets her transplant. She is on MMF and Tacrolimus for transplant meds.     HBI  General  well-being very well = 0  Abdominal pain None = 0  Number of liquid stools per day 0  Abdominal mass None = 0  Current Complications none    Interval hx 12/6/23  Doing very well. Still on 5mg prednisone (per neph). Baseline bowel pattern. No blood in the stool, no urgency. Compliant with vedolizumab.    Interval hx 6/11/24  Feeling better and now back to baseline as of mid May which she attributes to prednisone course (from ED). Compliant with vedolizumab. 1-2 stools per day that are formed, no blood in the stool. No urgency. No upper GI sxs. New low level CMV viremia.  Now back on Valcyte due to CMV.    Past Medical History:   Diagnosis Date    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    Anemia in stage 5 chronic kidney disease (H) 10/27/2014    ESRD (end stage renal disease) on dialysis (H)     HTN (hypertension) 10/27/2014    Hypertension 10/2014    IgA nephropathy     biopsy proven    Metabolic acidosis     Ulcerative pancolitis (H) 2012    Vitamin D deficiency         Past Surgical History:   Procedure Laterality Date    BENCH KIDNEY  4/13/2023    Procedure: Bench kidney;  Surgeon: Ivy Sepulveda MD;  Location: [facility] OR    BIOPSY  2021    renal    COLONOSCOPY N/A 03/12/2018    Procedure: COMBINED COLONOSCOPY, SINGLE OR MULTIPLE BIOPSY/POLYPECTOMY BY BIOPSY;  EGD/Colonoscopy ;  Surgeon: Kory Massey MD;  Location: [facility] GI    COLONOSCOPY N/A 09/10/2018    Procedure: COMBINED COLONOSCOPY, SINGLE OR MULTIPLE BIOPSY/POLYPECTOMY BY BIOPSY;  Colonoscopy;  Surgeon: Yenifer Doan MD;  [facility]    COLONOSCOPY N/A 2/3/2023    Procedure: COLONOSCOPY, WITH BIOPSY;  Surgeon: Yaakov Loving MD;  Location: [facility] GI    EXTRACTION(S) DENTAL      IR CVC TUNNEL PLACEMENT > 5 YRS OF AGE  12/22/2022    IR CVC TUNNEL REMOVAL RIGHT  4/20/2023    IR RENAL BIOPSY LEFT  4/29/2023    PERCUTANEOUS BIOPSY KIDNEY Right 12/19/2018    Procedure: Right Kidney Biopsy;  Surgeon: Otilio Mar MD;  [facility]    RETURN KIDNEY TRANSPLANT N/A 2023    Procedure: Exploration of return kidney transplant, kidney biopsy, revision of ureteral anastomosis, ureteral stent placement;  Surgeon: Ivy Sepulveda MD;  Location: [facility] OR    TRANSPLANT KIDNEY RECIPIENT LIVING RELATED N/A 2014    Procedure: TRANSPLANT KIDNEY RECIPIENT LIVING RELATED;  Surgeon: Dale Middleton MD;  Location: [facility] OR    WISDOM TOOTH EXTRACTION Bilateral        Social History     Tobacco Use    Smoking status: Never    Smokeless tobacco: Never   Substance Use Topics    Alcohol use: Not Currently     Alcohol/week: 3.0 - 9.0 standard drinks of alcohol     Types: 1 - 3 Glasses of wine, 1 - 3 Cans of beer, 1 - 3 Shots of liquor per week       Family History   Problem Relation Age of Onset    No Known Problems Mother     Alcoholism Father          in early 50s    Prostate Cancer Paternal Grandmother     Kidney Disease No family hx of    Negative for IBD. Dad had psoriasis.   Grandfather with BCC. Grandma has several skin lesions removed (does not recall the diagnosis)    Allergies   Allergen Reactions    Bumetanide Other (See Comments)     Causes paralysis    Amoxicillin Rash        Outpatient Encounter Medications as of 2024   Medication Sig Dispense Refill    biotin 1000 MCG TABS tablet Take 500 mcg by mouth daily      Cyanocobalamin 500 MCG TBDP Take 500 mcg by mouth daily      levonorgestrel (MIRENA) 20 MCG/DAY IUD 1 each by Intrauterine route Replacement every 7 years. Placed 2019      Magnesium Bisglycinate (MAG GLYCINATE) 100 MG TABS Take 2 tablets (200 mg) by mouth daily 180 tablet 3    mycophenolate (GENERIC EQUIVALENT) 250 MG capsule Take 3 capsules (750 mg) by mouth 2 times daily 540 capsule 3    predniSONE (DELTASONE) 5 MG tablet Take 1 tablet (5 mg) by mouth daily Or until discussed in clinic 90 tablet 3    sulfamethoxazole-trimethoprim (BACTRIM) 400-80 MG tablet Take 1 tablet by mouth daily 90 tablet 3    tacrolimus  (GENERIC EQUIVALENT) 0.5 MG capsule HOLD 90 capsule 3    tacrolimus (GENERIC EQUIVALENT) 1 MG capsule Take 2 capsules (2 mg) by mouth 2 times daily 360 capsule 3    valGANciclovir (VALCYTE) 450 MG tablet Take 2 tablets (900 mg) by mouth 2 times daily 120 tablet 3    vedolizumab (ENTYVIO) 60 MG/ML injection Inject 300 mg into the vein once every six weeks      Vitamin D3 50 mcg (2000 units) tablet Take 0.5 tablets (25 mcg) by mouth daily 45 tablet 3     No facility-administered encounter medications on file as of 6/11/2024.      NSAID  NO    Review of Systems  Complete 10 System ROS performed. All are negative except as documented below, in the HPI, or in patient questionnaire from today's visit.    1) Constitutional: No fevers, chills, night sweats or malaise, weight loss or gain  2) Skin: No rash  3) Pulmonary: No wheeze, SOB, cough, sputum or hemoptysis  4) Cardiovascular: No Chest pain or palpitations  5) Genitourinary: No blood in urine or dysuria  6) Endocrine: No increased sweating, hunger, thirst or thyroid problems  7) Hematologic: No bruising and easy bleeding  8) Musculoskeletal: no new pain in joints or limitation in ROM  9) Neurologic: No dizziness, paresthesias or weakness or falls  10) Psychiatric:  not depressed/anxious, no sleep problems    PHYSICAL EXAM  Vitals: There were no vitals taken for this visit.    No Pain (0)     Constitutional - general appearance is well and in no acute distress. Body habitus normal  Eyes - No redness or discharge  Respiratory - No cough, unlabored breathing  Musculoskeletal - range of motion intact: Neck and arms  Skin - No discoloration or lesions  Neurological - No tremors, headaches  Psychiatric - No anxiety, alert & oriented    DATA:  Reviewed in detail past documentation, medications and prior workup available in electronic health records or through outside records.    PERTINENT STUDIES:  Most recent CBC:  WBC   Date Value Ref Range Status   12/03/2020 6.3 4.0 -  "11.0 10e9/L Final     WBC Count   Date Value Ref Range Status   10/27/2023 3.9 (L) 4.0 - 11.0 10e3/uL Final   ]  Hemoglobin   Date Value Ref Range Status   10/27/2023 12.9 11.7 - 15.7 g/dL Final   01/14/2021 10.6 (L) 11.7 - 15.7 g/dL Final   ]   Platelet Count   Date Value Ref Range Status   10/27/2023 220 150 - 450 10e3/uL Final   01/14/2021 279 150 - 450 10e9/L Final       Most recent coag:  INR   Date Value Ref Range Status   04/29/2023 1.14 0.85 - 1.15 Final   12/19/2018 1.00 0.86 - 1.14 Final       Most recent hepatic panel:  AST   Date Value Ref Range Status   06/26/2023 27 0 - 45 U/L Final     Comment:     Reference intervals for this test were updated on 6/12/2023 to more accurately reflect our healthy population. There may be differences in the flagging of prior results with similar values performed with this method. Interpretation of those prior results can be made in the context of the updated reference intervals.   04/16/2021 13 0 - 45 U/L Final     ALT   Date Value Ref Range Status   06/26/2023 12 0 - 50 U/L Final     Comment:     Reference intervals for this test were updated on 6/12/2023 to more accurately reflect our healthy population. There may be differences in the flagging of prior results with similar values performed with this method. Interpretation of those prior results can be made in the context of the updated reference intervals.     04/16/2021 14 0 - 50 U/L Final     No results found for: \"BILICONJ\"   Bilirubin Total   Date Value Ref Range Status   06/26/2023 0.2 <=1.2 mg/dL Final   04/16/2021 0.4 0.2 - 1.3 mg/dL Final     Albumin   Date Value Ref Range Status   06/26/2023 4.3 3.5 - 5.2 g/dL Final   04/16/2021 3.3 (L) 3.4 - 5.0 g/dL Final     Alkaline Phosphatase   Date Value Ref Range Status   06/26/2023 70 35 - 104 U/L Final   04/16/2021 47 40 - 150 U/L Final       Most recent creatinine:  Creatinine   Date Value Ref Range Status   10/27/2023 1.02 (H) 0.51 - 0.95 mg/dL Final   04/16/2021 " 1.31 (H) 0.52 - 1.04 mg/dL Final     Endoscopy:   cscope 3/2018 showed Mcbride 3 colitis involving the rectum to hepatic flexure with sparing of the AC. Normal TI. Biopsies showed moderate chronic active colitis with cryptitis and crypt abscess formation.     Cscope 9/2018 showed Mcbride 0. Biopsies show mild chronic active colitis ascending and transverse, crypt architectural distortion and descending sigmoid and rectum without active inflammation.  Imaging:  CT c/a/p on 7/2017 (indication EBV viremia): no bowel wall thickening. SB appeared normal.     IMPRESSION:  Ms. Horan is a 34 year old year old with diagnosed moderate to severe E3 ulcerative colitis 3/2018, who achieved symptomatic remission on prednisone taper and initiation of vedolizumab induction. She continues on vedolizumab monotherapy.    She is awaiting a repeat kidney transplant. She remains stable from an IBD standpoint on vedolizumab every 6 weeks.     She had called in about 2 months ago with RUQ pain that was severe.With this pain, stools remained at baseline. PPI was given as she was on a short time frame and leaving for vacation with out ability to get further work up completed. She was in [place] at the time.  Recommended a fecal bhupendra + EGD/colonoscopy. CT abd pelvis showed mild bowel wall thickening in proximal aspect of transverse colon which may be related to underdistention or mild coltis. Abd us showed mild gallbladder slide, no cholithiasis or cholecystitis.  At some point she received prednisone which resolved the pain.    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  -- Fecal calprotectin, if elevated then colonoscopy  --Dysplasia screening due 2026    IBD Health Care  Maintenance:  Vaccinations:  -- Influenza (every year): Last given 2023  -- TdaP (every 10 years): Last given 2017  -- Pneumococcal Pneumonia (once then every 5 years): Last given 2017    One time confirmation of immunity or serologies:  -- Hepatitis A (serologies or immunizations): Not documented  -- Hepatitis B (serologies or immunizations): 2001/2002, immune per serologies  -- Varicella: had chickenpox as a child  -- MMR:had all immunization as a child  -- HPV (all aged 18-26): 2007/2008  -- Meningococcal meningitis (all patients at risk for meningitis): 2007   -- Due to the immunosuppression in this patient, I would not advise administration of live vaccines such as varicella/VZV, intranasal influenza, MMR, or yellow fever vaccine (if travelling).      Cancer Screening:  Colon cancer screening:  Given pancolitis colonoscopy every 2-3 years recommended after 8 years of symptom onset.  Since symptoms started on 2012, Dysplasia screening is recommended 2026 (last scope was 2023).     Cervical cancer screening: Annual due to immunosupression    Skin cancer screening: Annual visual exam of skin by dermatologist since patient is immunocompromised    Bone mineral density screening   -- Recommend all patients supplement with calcium and vitamin D  -- DEXA scan ordered given >3 months steroid use     Depression Screening:  -- Over the last month, have you felt down, depressed, or hopeless? No  -- Over the last month, have you felt little interest or pleasure doing things? No    Misc:  -- Avoid tobacco use  -- Avoid NSAIDs as there is potentially a 25% chance of causing an IBD flare    Immunization History   Administered Date(s) Administered    COVID-19 12+ (2023-24) (Pfizer) 10/27/2023    COVID-19 MONOVALENT 12+ (Pfizer) 10/06/2021, 10/27/2021    HPV Quadrivalent 08/06/2007, 10/08/2007, 02/19/2008    Influenza Vaccine 65+ (Fluzone HD) 11/17/2021, 11/02/2022    Influenza Vaccine >6 months,quad, PF 12/13/2016, 10/03/2017,  10/27/2023    Influenza Vaccine IM 18-49 Yrs, RIV3 10/08/2018    Meningococcal (Menomune ) 08/06/2007    Pneumo Conj 13-V (2010&after) 11/01/2017, 08/11/2021    Pneumococcal 23 valent 11/10/2014, 11/15/2022    TDAP (Adacel,Boostrix) 08/06/2007    TDAP Vaccine (Boostrix) 11/01/2017      Follow up in 6 months        Again, thank you for allowing me to participate in the care of your patient.      Sincerely,    Rupert Dsouza PA-C

## 2024-06-11 NOTE — NURSING NOTE
Is the patient currently in the state of [place]? YES    Visit mode:VIDEO    If the visit is dropped, the patient can be reconnected by: VIDEO VISIT: Text to cell phone:   Telephone Information:   Mobile [phone]       Will anyone else be joining the visit? NO  (If patient encounters technical issues they should call [phone]    How would you like to obtain your AVS? MyChart    Are changes needed to the allergy or medication list? No    Are refills needed on medications prescribed by this physician? NO    Reason for visit: RECHECK    Benjy JARRELL

## 2024-06-16 ENCOUNTER — TELEPHONE (OUTPATIENT)
Dept: GASTROENTEROLOGY | Facility: CLINIC | Age: 34
End: 2024-06-16
Payer: COMMERCIAL

## 2024-06-16 NOTE — TELEPHONE ENCOUNTER
left voice mail with patient (1st attempt) to schedule from active request order created by Rupert Dsouza.         [facility] number to schedule per request - [phone] option 1.    See request for suggested appt date, visit type, and need for any additional testing/imaging/labs required.

## 2024-07-07 ENCOUNTER — HEALTH MAINTENANCE LETTER (OUTPATIENT)
Age: 34
End: 2024-07-07

## 2024-07-12 ENCOUNTER — TELEPHONE (OUTPATIENT)
Dept: TRANSPLANT | Facility: CLINIC | Age: 34
End: 2024-07-12
Payer: COMMERCIAL

## 2024-07-12 NOTE — LETTER
PHYSICIAN ORDERS      DATE & TIME ISSUED: 24     12:05 PM  PATIENT NAME: Caity Horan   : 1990     [facility] MR# [if applicable]: [id number]     DIAGNOSIS:  kidney transplant, elevated ALT  ICD-10 CODE: z94.0  Please obtain the following next week:    CBC with platelets and differential  Basic metabolic panel  Tacrolimus drug level    Any questions please call: [phone]    Please fax each result same day as resulted/available    Critical lab results page [phone]    Please fax these results to [phone]      Darian Arora MD

## 2024-07-12 NOTE — TELEPHONE ENCOUNTER
Pt's GI provider's nurse called with update that WBC is 1.8. no diff was drawn . Will have pt repeat labs next week with diff to ensure no filgrastim is needed.   Orders sent to .

## 2024-07-19 ENCOUNTER — TELEPHONE (OUTPATIENT)
Dept: TRANSPLANT | Facility: CLINIC | Age: 34
End: 2024-07-19
Payer: COMMERCIAL

## 2024-07-19 DIAGNOSIS — B25.9 CYTOMEGALOVIRUS (CMV) VIREMIA (H): Primary | ICD-10-CM

## 2024-07-19 RX ORDER — VALGANCICLOVIR 450 MG/1
900 TABLET, FILM COATED ORAL 2 TIMES DAILY
Qty: 12 TABLET | Refills: 0 | Status: SHIPPED | OUTPATIENT
Start: 2024-07-19 | End: 2024-07-31

## 2024-07-25 ENCOUNTER — MYC MEDICAL ADVICE (OUTPATIENT)
Dept: TRANSPLANT | Facility: CLINIC | Age: 34
End: 2024-07-25
Payer: COMMERCIAL

## 2024-07-31 DIAGNOSIS — B25.9 CYTOMEGALOVIRUS (CMV) VIREMIA (H): ICD-10-CM

## 2024-07-31 RX ORDER — VALGANCICLOVIR 450 MG/1
900 TABLET, FILM COATED ORAL 2 TIMES DAILY
Qty: 12 TABLET | Refills: 0 | Status: SHIPPED | OUTPATIENT
Start: 2024-07-31 | End: 2024-07-31

## 2024-08-01 DIAGNOSIS — T86.11 ACUTE REJECTION OF KIDNEY TRANSPLANT: ICD-10-CM

## 2024-08-01 DIAGNOSIS — Z94.0 KIDNEY TRANSPLANTED: Primary | ICD-10-CM

## 2024-08-01 RX ORDER — PREDNISONE 5 MG/1
5 TABLET ORAL DAILY
Qty: 90 TABLET | Refills: 3 | Status: SHIPPED | OUTPATIENT
Start: 2024-08-01

## 2024-09-05 ENCOUNTER — ENROLLMENT (OUTPATIENT)
Dept: HOME HEALTH SERVICES | Facility: HOME HEALTH | Age: 34
End: 2024-09-05
Payer: COMMERCIAL

## 2024-09-27 DIAGNOSIS — B25.9 CYTOMEGALOVIRUS (CMV) VIREMIA (H): Primary | ICD-10-CM

## 2024-09-27 ASSESSMENT — ENCOUNTER SYMPTOMS: NEW SYMPTOMS OF CORONARY ARTERY DISEASE: 0

## 2024-09-27 NOTE — LETTER
PHYSICIAN ORDERS      DATE & TIME ISSUED: 24      1329  PATIENT NAME: Caity Horan   : 1990     [facility] MR# [if applicable]: [id number]     DIAGNOSIS:  kidney transplant, CMV viremia  ICD-10 CODE: z94.0, B25.9  Please obtain the following labs:    CMV drug resistance panel     Any questions please call: [phone]    Please fax each result same day as resulted/available    Critical lab results page [phone]    Please fax these results to [phone]      Darian Arora MD

## 2024-09-27 NOTE — CONFIDENTIAL NOTE
Issue: stable/ slightly elevated CMV level     Per Dr. Mar will obtain CMV resistance panel with next CMV lab.

## 2024-10-02 ENCOUNTER — MYC MEDICAL ADVICE (OUTPATIENT)
Dept: OTHER | Age: 34
End: 2024-10-02

## 2024-10-02 DIAGNOSIS — Z48.298 AFTERCARE FOLLOWING ORGAN TRANSPLANT: Primary | ICD-10-CM

## 2024-10-07 ENCOUNTER — MYC MEDICAL ADVICE (OUTPATIENT)
Dept: GASTROENTEROLOGY | Facility: CLINIC | Age: 34
End: 2024-10-07
Payer: COMMERCIAL

## 2024-10-07 NOTE — TELEPHONE ENCOUNTER
Notified by [facility] that patient has been discharged from infusion services. Patient has not infused since March 2024. She primarily lives in [place] and receives infusions there. [facility] attempted to contact patient for one month with no response.

## 2024-10-18 DIAGNOSIS — B25.9 CMV (CYTOMEGALOVIRUS INFECTION) (H): Primary | ICD-10-CM

## 2024-10-21 RX ORDER — VALGANCICLOVIR 450 MG/1
900 TABLET, FILM COATED ORAL 2 TIMES DAILY
Qty: 120 TABLET | Refills: 3 | Status: SHIPPED | OUTPATIENT
Start: 2024-10-21

## 2024-10-21 RX ORDER — VALGANCICLOVIR 450 MG/1
900 TABLET, FILM COATED ORAL 2 TIMES DAILY
Qty: 120 TABLET | Refills: 3 | OUTPATIENT
Start: 2024-10-21

## 2024-10-24 ENCOUNTER — VIRTUAL VISIT (OUTPATIENT)
Dept: TRANSPLANT | Facility: CLINIC | Age: 34
End: 2024-10-24
Attending: INTERNAL MEDICINE
Payer: COMMERCIAL

## 2024-10-24 ENCOUNTER — TELEPHONE (OUTPATIENT)
Dept: NEPHROLOGY | Facility: CLINIC | Age: 34
End: 2024-10-24

## 2024-10-24 DIAGNOSIS — K51.011 ULCERATIVE PANCOLITIS WITH RECTAL BLEEDING (H): ICD-10-CM

## 2024-10-24 DIAGNOSIS — N02.B9 IGA NEPHROPATHY: ICD-10-CM

## 2024-10-24 DIAGNOSIS — Z94.0 KIDNEY REPLACED BY TRANSPLANT: ICD-10-CM

## 2024-10-24 DIAGNOSIS — B25.9 CYTOMEGALOVIRUS (CMV) VIREMIA (H): Primary | ICD-10-CM

## 2024-10-24 DIAGNOSIS — N18.2 CKD (CHRONIC KIDNEY DISEASE) STAGE 2, GFR 60-89 ML/MIN: ICD-10-CM

## 2024-10-24 DIAGNOSIS — Z29.89 NEED FOR PNEUMOCYSTIS PROPHYLAXIS: ICD-10-CM

## 2024-10-24 DIAGNOSIS — E83.42 HYPOMAGNESEMIA: ICD-10-CM

## 2024-10-24 DIAGNOSIS — Z48.298 AFTERCARE FOLLOWING ORGAN TRANSPLANT: ICD-10-CM

## 2024-10-24 DIAGNOSIS — T86.11 KIDNEY TRANSPLANT REJECTION: ICD-10-CM

## 2024-10-24 DIAGNOSIS — D84.9 IMMUNOSUPPRESSED STATUS (H): ICD-10-CM

## 2024-10-24 PROCEDURE — 99214 OFFICE O/P EST MOD 30 MIN: CPT | Mod: 95 | Performed by: INTERNAL MEDICINE

## 2024-10-24 PROCEDURE — G2211 COMPLEX E/M VISIT ADD ON: HCPCS | Performed by: INTERNAL MEDICINE

## 2024-10-24 NOTE — LETTER
10/24/2024      Caity Horan  [address]  [address]  [facility] [address]      Dear Colleague,    Thank you for referring your patient, Caity Horan, to the [facility]. Please see a copy of my visit note below.    Virtual Visit Details    Type of service:  Video Visit     Video start time : 3:05 PM  Video end time : 3:15 PM    Originating Location (pt. Location): Home    Distant Location (provider location):  On-site  Platform used for Video Visit: [facility]    TRANSPLANT NEPHROLOGY CLINIC VISIT     Assessment & Plan  # LDKT: CKD Stage 2 - Stable   - Baseline Creatinine: ~ 1-1.2   - Proteinuria: Normal (<0.2 grams)   - DSA Hx: No DSA   - Last cPRA: 82%   - BK Viremia: No   - Kidney Tx Biopsy Hx: Acute cellular-mediated rejection.    # IgA Nephropathy: no signs of recurrence. Last UPCR was WNL    # Immunosuppression: Tacrolimus immediate release (goal 4-6), Mycophenolate mofetil (dose 750 mg every 12 hours), and Prednisone (dose 5 mg daily)   - Induction with Recent Transplant:  High Intensity Protocol   - Continue with intensive monitoring of immunosuppression for efficacy and toxicity.   - Historical Changes in Immunosuppression:  on prednisone after acute cellular medicated rejection   - Changes: Not at this time, but pt wanted to be off of prednisone if and when able. For now continue on current immunosuppression but will try wean it off if able.    # Infection Prevention:      - PJP: Sulfa/TMP (Bactrim)  - CMV: Valganciclovir (Valcyte) on treatment dose of CMV viremia      - CMV IgG Ab High Risk Discordance (D+/R-): Yes  CMV Serostatus: Negative  - EBV IgG Ab High Risk Discordance (D+/R-): No  EBV Serostatus: Positive    # Hypertension: Controlled;  Goal BP: < 130/80   - Changes: Not at this time    # Anemia in Chronic Renal Disease: Hgb: Stable      MICKEY: No   - Iron studies: Not checked recently    # Mineral Bone Disorder:    - Secondary renal hyperparathyroidism; PTH level: Normal  (15-65 pg/ml)        On treatment: None  - Vitamin D; level: High        On supplement: Yes, repeat vitamin D, if high, will discontinue vitamin D  - Calcium; level: Normal        On supplement: No    # Electrolytes:   - Potassium; level: Normal        On supplement: No  - Magnesium; level: Normal        On supplement: Yes,   - Bicarbonate; level: Normal        On supplement: No  - Sodium; level: Normal    # Other Significant PMH:   - Ulcerative Colitis: Stable on vedolizumab q6 weeks. Followed by GI.    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 IgG not checked recently. Will check CMV IgG and IgG to assess if she is seroconverted and or need IVIG.    - Leucopenia : likely related to CMV infection and or treatment dose of valcyte (900 mg BID). Latest CMV is 83 (up from 38) continue on treatment dose Valcyte. If up trending on repeat, likely need CMV resistant panel and referral to ID.     # Skin Cancer Risk:    - Discussed sun protection and recommend regular follow up with Dermatology.    # Transplant History:  Etiology of Kidney Failure: IgA nephropathy  Tx: LDKT  Transplant: 4/13/2023 (Kidney), 12/5/2014 (Kidney)  Significant transplant-related complications: CMV Viremia and Acute cellular-mediated rejection    Transplant Office Phone Number: [phone]    Assessment and plan was discussed with the patient and she voiced her understanding and agreement.    Return visit: Return in about 6 months (around 4/24/2025).    Lynne Bates MD    The longitudinal plan of care for the diagnosis(es)/condition(s) as documented were addressed during this visit. Due to the added complexity in care, I will continue to support Caity in the subsequent management and with ongoing continuity of care.      Chief Complaint  Ms. Horan is a 34 year old here for kidney transplant and immunosuppression management.     History of Present Illness    Ms. Horan reports feeling stable overall.  Since last clinic  visit:   Hospitalizations: No   New Medical Issues: No  Chest pain or shortness of breath: No  Lower extremity swelling: No  Weight change: No  Nausea and vomiting: No  Diarrhea: No  Heartburn symptoms: No  Fever, sweats or chills: No  Urinary complaints: No    Home BP:  not checked , but at clinics been 120's systolic    Problem List  Patient Active Problem List   Diagnosis     Metabolic acidosis     IgA nephropathy     Kidney replaced by transplant     Immunosuppressed status (H)     Hypomagnesemia     Aftercare following organ transplant     EBV (Nicole-Barr virus) viremia     Anemia in chronic renal disease     Vitamin B12 deficiency     Ulcerative pancolitis with rectal bleeding (H)     Ulcerative colitis (H)     Vitamin D deficiency     Kidney transplant rejection     CKD (chronic kidney disease) stage 2, GFR 60-89 ml/min     Neutropenia (H)       Allergies  Allergies   Allergen Reactions     Bumetanide Other (See Comments)     Causes paralysis     Amoxicillin Rash       Medications  Current Outpatient Medications   Medication Sig Dispense Refill     biotin 1000 MCG TABS tablet Take 500 mcg by mouth daily       Cyanocobalamin 500 MCG TBDP Take 500 mcg by mouth daily       levonorgestrel (MIRENA) 20 MCG/DAY IUD 1 each by Intrauterine route Replacement every 7 years. Placed 1/2019       Magnesium Bisglycinate (MAG GLYCINATE) 100 MG TABS Take 2 tablets (200 mg) by mouth daily 180 tablet 3     mycophenolate (GENERIC EQUIVALENT) 250 MG capsule Take 3 capsules (750 mg) by mouth 2 times daily 540 capsule 3     predniSONE (DELTASONE) 5 MG tablet Take 1 tablet (5 mg) by mouth daily 90 tablet 3     sulfamethoxazole-trimethoprim (BACTRIM) 400-80 MG tablet Take 1 tablet by mouth daily 90 tablet 3     tacrolimus (GENERIC EQUIVALENT) 0.5 MG capsule HOLD 90 capsule 3     tacrolimus (GENERIC EQUIVALENT) 1 MG capsule Take 2 capsules (2 mg) by mouth 2 times daily 360 capsule 3     valGANciclovir (VALCYTE) 450 MG tablet Take 2  tablets (900 mg) by mouth 2 times daily. 120 tablet 3     vedolizumab (ENTYVIO) 60 MG/ML injection Inject 300 mg into the vein once every six weeks       Vitamin D3 50 mcg (2000 units) tablet Take 0.5 tablets (25 mcg) by mouth daily 45 tablet 3     No current facility-administered medications for this visit.     There are no discontinued medications.    Physical Exam  Vital Signs: There were no vitals taken for this visit.    GENERAL APPEARANCE: alert and no distress  EYES: eyes grossly normal to inspection  HENT: normal cephalic/atraumatic  RESP: able to speak in full sentences, no audible wheezing and no accessory muscle weakness  EDEMA: no LE edema bilaterally  MS: extremities normal - no gross deformities noted, no evidence of inflammation in joints, no muscle tenderness  SKIN: no rash  NEURO: mentation intact and speech normal  PSYCH: mentation appears normal and affect normal/bright    Data        Latest Ref Rng & Units 9/24/2024     8:45 AM 8/7/2024     8:39 AM 7/22/2024     8:27 AM   Renal   Na (external) 135 - 145 mEq/L 138     139     139       K (external) 3.6 - 5.2 mEq/L 4.1     3.9     4.2       Cl 98 - 108 mEq/L 102     104     102       Cl (external) 98 - 108 mEq/L 102     104     102       CO2 (external) 22 - 32 mEq/L 25     24     27       BUN (external) 8 - 21 mg/dL 22     15     18       Cr (external) 0.38 - 1.02 mg/dL 1.10     1.27     1.29       Glucose (external) 62 - 125 mg/dL 98     93     106       Ca (external) 8.9 - 10.2 mg/dL 9.3     9.7     9.7           This result is from an external source.         Latest Ref Rng & Units 4/24/2024     8:16 AM 11/15/2023     8:58 AM 10/27/2023    11:25 AM   Bone Health   Phosphorus 2.5 - 4.5 mg/dL   2.5    Phos (external) 2.5 - 4.5 mg/dL  3.1        PTHi (external) 12 - 88 pg/mL 44         Vit D Def (external) 20.1 - 50.0 ng/mL 55.9             This result is from an external source.         Latest Ref Rng & Units 9/24/2024     8:45 AM 8/7/2024      8:39 AM 7/22/2024     8:27 AM   Heme   WBC (external) 4.30 - 10.0 Thou/uL 2.26     2.22     1.61       Hgb (external) 11.5 - 15.5 g/dL 13.0     13.8     12.3       Plt (external) 150 - 400 THOU/uL 234     211     228       ABSOLUTE NEUTROPHILS (EXTERNAL) 1.80 - 7.00 THOU/uL  1.38     0.30       ABSOLUTE LYMPHOCYTES (EXTERNAL) 1.00 - 4.80 THOU/uL  0.59     0.58       ABSOLUTE MONOCYTES (EXTERNAL) 0.00 - 0.80 THOU/uL  0.08     0.14       ABSOLUTE EOSINOPHILS (EXTERNAL) 0.00 - 0.50 THOU/uL  0.05     0.06       ABSOLUTE BASOPHILS (EXTERNAL) 0.00 - 0.20 THOU/uL  0.04     0.02           This result is from an external source.         Latest Ref Rng & Units 4/24/2024     8:16 AM 6/26/2023    12:55 PM 5/17/2023     3:31 PM   Liver   AP 35 - 104 U/L  70  66    AP (external) 25 - 100 U/L 67         TBili <=1.2 mg/dL  0.2  0.3    TBili (external) 0.2 - 1.3 mg/dL 0.6         Bilirubin Direct 0.00 - 0.30 mg/dL  <0.20  <0.20    DBili (external) 0.0 - 0.3 mg/dL 0.1         ALT 0 - 50 U/L  12  13    ALT (external) 7 - 33 U/L 61         AST 0 - 45 U/L  27  19    AST (external) 9 - 38 U/L 35         Tot Protein 6.4 - 8.3 g/dL  7.0  6.6    Tot Protein (external) 6.0 - 8.2 g/dL 7.2         Albumin 3.5 - 5.2 g/dL  4.3  4.1    Albumin (external) 3.5 - 5.2 g/dL 3.7             This result is from an external source.         Latest Ref Rng & Units 4/24/2024     8:16 AM 4/3/2023     3:56 PM   Pancreas   A1C <5.7 %  5.3    A1C (external) 4.0 - 6.0 % 5.8            This result is from an external source.         Latest Ref Rng & Units 4/24/2023     8:26 AM 4/22/2023     7:45 AM 4/3/2023     3:56 PM   Iron studies   Iron 37 - 145 ug/dL 30  40  186    Iron Sat Index 15 - 46 % 18  28  71    Ferritin 6 - 175 ng/mL 886  1,041  902          Latest Ref Rng & Units 9/24/2024     8:45 AM 7/22/2024     8:27 AM 6/25/2024     8:42 AM   [facility] Virology   CMV DNA Quant Ext Ndet IU/mL 83.6     38.2     45.7       CMV PCR QUANT DNA INTERP (EXTERNAL)  See  scan         LOG IU/ML OF CMVQNT (EXTERNAL) log IU/mL 1.9     1.6     1.7           This result is from an external source.     Failed to redirect to the Timeline version of the [employer] SmartLink.  Recent Labs   Lab Test 05/24/23  0811 06/27/23  0728 08/09/23  0845   DOSTAC 5/23/2023 6/26/2023 8/8/2023   TACROL 12.2 12.7 6.3     Recent Labs   Lab Test 05/10/23  0805 05/12/23  0740 05/24/23  0811   DOSMPA 5/9/2023   9:40 PM 5/11/2023   9:50 PM 5/23/2023   9:30 PM   MPACID 7.38* 3.65* 4.89*   MPAG 72.5 52.6 74.5    Prescription drug management  35 minutes spent by me on the date of the encounter doing chart review, history and exam, documentation and further activities per the note      Again, thank you for allowing me to participate in the care of your patient.        Sincerely,        Lynne Bates MD

## 2024-10-24 NOTE — PROGRESS NOTES
Virtual Visit Details    Type of service:  Video Visit     Video start time : 3:05 PM  Video end time : 3:15 PM    Originating Location (pt. Location): Home    Distant Location (provider location):  On-site  Platform used for Video Visit: [facility]    TRANSPLANT NEPHROLOGY CLINIC VISIT     Assessment & Plan   # LDKT: CKD Stage 2 - Stable   - Baseline Creatinine: ~ 1-1.2   - Proteinuria: Normal (<0.2 grams)   - DSA Hx: No DSA   - Last cPRA: 82%   - BK Viremia: No   - Kidney Tx Biopsy Hx: Acute cellular-mediated rejection.    # IgA Nephropathy: no signs of recurrence. Last UPCR was WNL    # Immunosuppression: Tacrolimus immediate release (goal 4-6), Mycophenolate mofetil (dose 750 mg every 12 hours), and Prednisone (dose 5 mg daily)   - Induction with Recent Transplant:  High Intensity Protocol   - Continue with intensive monitoring of immunosuppression for efficacy and toxicity.   - Historical Changes in Immunosuppression:  on prednisone after acute cellular medicated rejection   - Changes: Not at this time, but pt wanted to be off of prednisone if and when able. For now continue on current immunosuppression but will try wean it off if able.    # Infection Prevention:      - PJP: Sulfa/TMP (Bactrim)  - CMV: Valganciclovir (Valcyte) on treatment dose of CMV viremia      - CMV IgG Ab High Risk Discordance (D+/R-): Yes  CMV Serostatus: Negative  - EBV IgG Ab High Risk Discordance (D+/R-): No  EBV Serostatus: Positive    # Hypertension: Controlled;  Goal BP: < 130/80   - Changes: Not at this time    # Anemia in Chronic Renal Disease: Hgb: Stable      MICKEY: No   - Iron studies: Not checked recently    # Mineral Bone Disorder:    - Secondary renal hyperparathyroidism; PTH level: Normal (15-65 pg/ml)        On treatment: None  - Vitamin D; level: High        On supplement: Yes, repeat vitamin D, if high, will discontinue vitamin D  - Calcium; level: Normal        On supplement: No    # Electrolytes:   - Potassium; level: Normal         On supplement: No  - Magnesium; level: Normal        On supplement: Yes,   - Bicarbonate; level: Normal        On supplement: No  - Sodium; level: Normal    # Other Significant PMH:   - Ulcerative Colitis: Stable on vedolizumab q6 weeks. Followed by GI.    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 IgG not checked recently. Will check CMV IgG and IgG to assess if she is seroconverted and or need IVIG.    - Leucopenia : likely related to CMV infection and or treatment dose of valcyte (900 mg BID). Latest CMV is 83 (up from 38) continue on treatment dose Valcyte. If up trending on repeat, likely need CMV resistant panel and referral to ID.     # Skin Cancer Risk:    - Discussed sun protection and recommend regular follow up with Dermatology.    # Transplant History:  Etiology of Kidney Failure: IgA nephropathy  Tx: LDKT  Transplant: 4/13/2023 (Kidney), 12/5/2014 (Kidney)  Significant transplant-related complications: CMV Viremia and Acute cellular-mediated rejection    Transplant Office Phone Number: [phone]    Assessment and plan was discussed with the patient and she voiced her understanding and agreement.    Return visit: Return in about 6 months (around 4/24/2025).    Lynne Bates MD    The longitudinal plan of care for the diagnosis(es)/condition(s) as documented were addressed during this visit. Due to the added complexity in care, I will continue to support Caity in the subsequent management and with ongoing continuity of care.      Chief Complaint   Ms. Horan is a 34 year old here for kidney transplant and immunosuppression management.     History of Present Illness     Ms. Horan reports feeling stable overall.  Since last clinic visit:   Hospitalizations: No   New Medical Issues: No  Chest pain or shortness of breath: No  Lower extremity swelling: No  Weight change: No  Nausea and vomiting: No  Diarrhea: No  Heartburn symptoms: No  Fever, sweats or chills: No  Urinary complaints: No    Home  BP:  not checked , but at clinics been 120's systolic    Problem List   Patient Active Problem List   Diagnosis    Metabolic acidosis    IgA nephropathy    Kidney replaced by transplant    Immunosuppressed status (H)    Hypomagnesemia    Aftercare following organ transplant    EBV (Nicole-Barr virus) viremia    Anemia in chronic renal disease    Vitamin B12 deficiency    Ulcerative pancolitis with rectal bleeding (H)    Ulcerative colitis (H)    Vitamin D deficiency    Kidney transplant rejection    CKD (chronic kidney disease) stage 2, GFR 60-89 ml/min    Neutropenia (H)       Allergies   Allergies   Allergen Reactions    Bumetanide Other (See Comments)     Causes paralysis    Amoxicillin Rash       Medications   Current Outpatient Medications   Medication Sig Dispense Refill    biotin 1000 MCG TABS tablet Take 500 mcg by mouth daily      Cyanocobalamin 500 MCG TBDP Take 500 mcg by mouth daily      levonorgestrel (MIRENA) 20 MCG/DAY IUD 1 each by Intrauterine route Replacement every 7 years. Placed 1/2019      Magnesium Bisglycinate (MAG GLYCINATE) 100 MG TABS Take 2 tablets (200 mg) by mouth daily 180 tablet 3    mycophenolate (GENERIC EQUIVALENT) 250 MG capsule Take 3 capsules (750 mg) by mouth 2 times daily 540 capsule 3    predniSONE (DELTASONE) 5 MG tablet Take 1 tablet (5 mg) by mouth daily 90 tablet 3    sulfamethoxazole-trimethoprim (BACTRIM) 400-80 MG tablet Take 1 tablet by mouth daily 90 tablet 3    tacrolimus (GENERIC EQUIVALENT) 0.5 MG capsule HOLD 90 capsule 3    tacrolimus (GENERIC EQUIVALENT) 1 MG capsule Take 2 capsules (2 mg) by mouth 2 times daily 360 capsule 3    valGANciclovir (VALCYTE) 450 MG tablet Take 2 tablets (900 mg) by mouth 2 times daily. 120 tablet 3    vedolizumab (ENTYVIO) 60 MG/ML injection Inject 300 mg into the vein once every six weeks      Vitamin D3 50 mcg (2000 units) tablet Take 0.5 tablets (25 mcg) by mouth daily 45 tablet 3     No current facility-administered medications  for this visit.     There are no discontinued medications.    Physical Exam   Vital Signs: There were no vitals taken for this visit.    GENERAL APPEARANCE: alert and no distress  EYES: eyes grossly normal to inspection  HENT: normal cephalic/atraumatic  RESP: able to speak in full sentences, no audible wheezing and no accessory muscle weakness  EDEMA: no LE edema bilaterally  MS: extremities normal - no gross deformities noted, no evidence of inflammation in joints, no muscle tenderness  SKIN: no rash  NEURO: mentation intact and speech normal  PSYCH: mentation appears normal and affect normal/bright    Data         Latest Ref Rng & Units 9/24/2024     8:45 AM 8/7/2024     8:39 AM 7/22/2024     8:27 AM   Renal   Na (external) 135 - 145 mEq/L 138     139     139       K (external) 3.6 - 5.2 mEq/L 4.1     3.9     4.2       Cl 98 - 108 mEq/L 102     104     102       Cl (external) 98 - 108 mEq/L 102     104     102       CO2 (external) 22 - 32 mEq/L 25     24     27       BUN (external) 8 - 21 mg/dL 22     15     18       Cr (external) 0.38 - 1.02 mg/dL 1.10     1.27     1.29       Glucose (external) 62 - 125 mg/dL 98     93     106       Ca (external) 8.9 - 10.2 mg/dL 9.3     9.7     9.7           This result is from an external source.         Latest Ref Rng & Units 4/24/2024     8:16 AM 11/15/2023     8:58 AM 10/27/2023    11:25 AM   Bone Health   Phosphorus 2.5 - 4.5 mg/dL   2.5    Phos (external) 2.5 - 4.5 mg/dL  3.1        PTHi (external) 12 - 88 pg/mL 44         Vit D Def (external) 20.1 - 50.0 ng/mL 55.9             This result is from an external source.         Latest Ref Rng & Units 9/24/2024     8:45 AM 8/7/2024     8:39 AM 7/22/2024     8:27 AM   Heme   WBC (external) 4.30 - 10.0 Thou/uL 2.26     2.22     1.61       Hgb (external) 11.5 - 15.5 g/dL 13.0     13.8     12.3       Plt (external) 150 - 400 THOU/uL 234     211     228       ABSOLUTE NEUTROPHILS (EXTERNAL) 1.80 - 7.00 THOU/uL  1.38     0.30        ABSOLUTE LYMPHOCYTES (EXTERNAL) 1.00 - 4.80 THOU/uL  0.59     0.58       ABSOLUTE MONOCYTES (EXTERNAL) 0.00 - 0.80 THOU/uL  0.08     0.14       ABSOLUTE EOSINOPHILS (EXTERNAL) 0.00 - 0.50 THOU/uL  0.05     0.06       ABSOLUTE BASOPHILS (EXTERNAL) 0.00 - 0.20 THOU/uL  0.04     0.02           This result is from an external source.         Latest Ref Rng & Units 4/24/2024     8:16 AM 6/26/2023    12:55 PM 5/17/2023     3:31 PM   Liver   AP 35 - 104 U/L  70  66    AP (external) 25 - 100 U/L 67         TBili <=1.2 mg/dL  0.2  0.3    TBili (external) 0.2 - 1.3 mg/dL 0.6         Bilirubin Direct 0.00 - 0.30 mg/dL  <0.20  <0.20    DBili (external) 0.0 - 0.3 mg/dL 0.1         ALT 0 - 50 U/L  12  13    ALT (external) 7 - 33 U/L 61         AST 0 - 45 U/L  27  19    AST (external) 9 - 38 U/L 35         Tot Protein 6.4 - 8.3 g/dL  7.0  6.6    Tot Protein (external) 6.0 - 8.2 g/dL 7.2         Albumin 3.5 - 5.2 g/dL  4.3  4.1    Albumin (external) 3.5 - 5.2 g/dL 3.7             This result is from an external source.         Latest Ref Rng & Units 4/24/2024     8:16 AM 4/3/2023     3:56 PM   Pancreas   A1C <5.7 %  5.3    A1C (external) 4.0 - 6.0 % 5.8            This result is from an external source.         Latest Ref Rng & Units 4/24/2023     8:26 AM 4/22/2023     7:45 AM 4/3/2023     3:56 PM   Iron studies   Iron 37 - 145 ug/dL 30  40  186    Iron Sat Index 15 - 46 % 18  28  71    Ferritin 6 - 175 ng/mL 886  1,041  902          Latest Ref Rng & Units 9/24/2024     8:45 AM 7/22/2024     8:27 AM 6/25/2024     8:42 AM   [facility] Txp Virology   CMV DNA Quant Ext Ndet IU/mL 83.6     38.2     45.7       CMV PCR QUANT DNA INTERP (EXTERNAL)  See scan         LOG IU/ML OF CMVQNT (EXTERNAL) log IU/mL 1.9     1.6     1.7           This result is from an external source.     Failed to redirect to the Timeline version of the [employer] SmartLink.  Recent Labs   Lab Test 05/24/23  0811 06/27/23  0728 08/09/23  0845   DOSTAC 5/23/2023 6/26/2023  8/8/2023   TACROL 12.2 12.7 6.3     Recent Labs   Lab Test 05/10/23  0805 05/12/23  0740 05/24/23  0811   DOSMPA 5/9/2023   9:40 PM 5/11/2023   9:50 PM 5/23/2023   9:30 PM   MPACID 7.38* 3.65* 4.89*   MPAG 72.5 52.6 74.5    Prescription drug management  35 minutes spent by me on the date of the encounter doing chart review, history and exam, documentation and further activities per the note

## 2024-10-24 NOTE — NURSING NOTE
Current patient location: [address]  [address]  [facility] [address]    Is the patient currently in the state of [place]? YES    Visit mode:VIDEO    If the visit is dropped, the patient can be reconnected by: VIDEO VISIT: Text to cell phone:   Telephone Information:   Mobile [phone]       Will anyone else be joining the visit? NO  (If patient encounters technical issues they should call [phone]    Are changes needed to the allergy or medication list? No    Are refills needed on medications prescribed by this physician? NO    Rooming Documentation:  Patient will complete questionnaire(s) in [facility]    Reason for visit: RECHECK    Benjy [email or web address]

## 2024-11-13 ENCOUNTER — MYC MEDICAL ADVICE (OUTPATIENT)
Dept: GASTROENTEROLOGY | Facility: CLINIC | Age: 34
End: 2024-11-13
Payer: COMMERCIAL

## 2024-11-13 DIAGNOSIS — K51.011 ULCERATIVE PANCOLITIS WITH RECTAL BLEEDING (H): Primary | ICD-10-CM

## 2024-11-18 ENCOUNTER — TELEPHONE (OUTPATIENT)
Dept: GASTROENTEROLOGY | Facility: CLINIC | Age: 34
End: 2024-11-18
Payer: COMMERCIAL

## 2024-11-18 ENCOUNTER — TELEPHONE (OUTPATIENT)
Dept: TRANSPLANT | Facility: CLINIC | Age: 34
End: 2024-11-18
Payer: COMMERCIAL

## 2024-11-18 DIAGNOSIS — T86.11 KIDNEY TRANSPLANT REJECTION: ICD-10-CM

## 2024-11-18 NOTE — LETTER
2024    To:   [facility]  Fax [phone]    RE:   Caity Horan  [address]  [address]  [facility] [address]  : 1990  Policy #: [id number]   Case/Reference: [id number]    To Whom It May Concern,    Below is the clinical summary pertinent to the appeal of entyvio pen inject one pen every 14 days. We understand that you are denying our request because the patient hasn't tried and failed the preferred medications.    Background   Diagnosis: Ulcerative pancolitis without complication (H)   Current IBD medications:   - Vedolizumab 300 mg IV infusion started on 18     Previous IBD Therapies:  None   Clinical disease assessment on current medications: Compliant with vedolizumab. 1-2 stools per day that are formed, no blood in the stool. No urgency. No upper GI sxs. New low level CMV viremia. Now back on Valcyte due to CMV.     Objective measures of inflammation:    - Flex-sig/colonoscopy: Inactive (Mcbride Score 0) ulcerative colitis.                          - Pseudopolyps from transverse colon to sigmoid colon.                          - The examined portion of the ileum was normal.                          - Non-bleeding internal hemorrhoids.                          - The examination was otherwise normal on direct and                          retroflexion views.                          - Multiple random biopsies were obtained every 10cm                          from the cecum to the rectum. 9 total jars were                          obtained.      CRP Inflammation   Date Value Ref Range Status   2023 <3.00 <5.00 mg/L Final         Lab Results   Component Value Date    CALPRF 1,123.6 (H) 2018         Rationale for requested therapy  Caity has managed her ulcerative pancolitis effectively with Entyvio (vedolizumab) IV infusions since 2018, maintaining stable remission without recurrence of symptoms, urgency, or blood in stool. Her condition, initially presenting with Mcbride 3  "colitis, has shown substantial improvement, with follow-up colonoscopies indicating decreased inflammation, and her symptoms have consistently been mild or absent on Entyvio (1). Given her stability with Entyvio, a switch to alternative medications such as Humira, Skyrizi, Stelara, or Rinvoq introduces unnecessary risks, as these treatments differ in mechanisms and tolerability, potentially destabilizing her well-controlled condition (2, 3). Furthermore, Caity s history of kidney transplant with immunosuppressive therapies (mycophenolate mofetil and tacrolimus) requires a careful approach to treatment changes, considering possible adverse reactions and drug interactions (4).    Switching from Entyvio IV to Entyvio 108 mg subcutaneous pens is medically appropriate for Caity's ongoing remission and quality of life, enabling her to avoid frequent infusion visits, accommodating her travel needs for both personal and transplant-related reasons (5). The pens are an FDA-approved, equivalent formulation to the infusion, offering a comparable safety and efficacy profile (6). A direct switch from infusion to injection would help maintain remission without risking flare-ups, supporting continued stability and adherence to treatment (1, 6).    How we will measure success:   Based on the above, we will measure success defined as an improvement of both symptoms and inflammatory parameters, specifically a reduction in bowel frequency, urgency, and CRP/fecal calprotectin levels over a 6-12 month timeframe. Should we not achieve these measures we will pursue a different line of therapy.    Duration  12 months    References  Saad Rivera, et al. \"Vedolizumab as induction and maintenance therapy for ulcerative colitis.\" [place] Journal of Medicine 369.8 (2013): 699-710.  Julio César Harris, et al. \"Vedolizumab as induction and maintenance therapy for Crohn s disease.\" [place] Journal of Medicine 369.8 (2013): " "717-725.  Chase Ramírez et al. \"Safety and efficacy of vedolizumab in elderly inflammatory bowel disease patients: A multicentre cohort study.\" Alimentary Pharmacology & Therapeutics 45.5 (2017): 587-594.  Jayden Goldberg, et al. \"Integrating vedolizumab into the inflammatory bowel disease therapy landscape: An expert panel consensus.\" Inflammatory Bowel Diseases 20.9 (2014): 8965-6608.  Priscilla Coburn et al. \"Effectiveness of vedolizumab in anti-TNF naïve patients with Crohn's disease: A prospective cohort study.\" Journal of Crohn's and Colitis 11.9 (2017): 1573-6944.  Johnny Wang et al. \"Treatment algorithms in Crohn's disease.\" [facility] Proceedings. Vol. 95. No. 7. Elsevier, 2020.    Summary  In summary, Entyvio 108 mg subcutaneous pen to inject one pen every 14 days is medically necessary for this patient s medical condition. Please call my office at [phone] if I can provide you with any additional information to approve my request. I look forward to receiving your timely response and approval of this request.     Sincerely,      Rupert Dsouza PA-C  Division of Gastroenterology, Hepatology and Nutrition  [facility]       "

## 2024-11-18 NOTE — TELEPHONE ENCOUNTER
PA Initiation    Medication: ENTYVIO  MG/0.68ML SC SOAJ  Insurance Company: [employer] - Phone [phone] Fax [phone]  Pharmacy Filling the Rx:    Filling Pharmacy Phone:    Filling Pharmacy Fax:    Start Date: 11/18/2024  [id number]

## 2024-11-18 NOTE — TELEPHONE ENCOUNTER
Medication Refill  Route to [facility]    Pharmacy Name: [facility]   Pharmacy Location: [address] [place] [address]   Name of Medication: ValGANciclovir (VALCYTE) 450 MG tablet    Quantity / Dose: 60 / 450MG

## 2024-11-18 NOTE — TELEPHONE ENCOUNTER
MTM referral placed. Notified pharmacy liaison to initiate prior authorization for SubQ Entyvio Q2w.

## 2024-11-20 ENCOUNTER — MYC MEDICAL ADVICE (OUTPATIENT)
Dept: OTHER | Age: 34
End: 2024-11-20

## 2024-11-20 ENCOUNTER — TELEPHONE (OUTPATIENT)
Dept: GASTROENTEROLOGY | Facility: CLINIC | Age: 34
End: 2024-11-20
Payer: COMMERCIAL

## 2024-11-20 NOTE — TELEPHONE ENCOUNTER
MTM referral from: [facility] visit (referral by provider)    MTM referral outreach attempt #2 on November 20, 2024 at 10:48 AM      Outcome: Patient not reachable after several attempts, routed to Pharmacist Team/Provider as an FYI    Use hbc for the carrier/Plan on the flowsheet      Must See [place] Message Sent    Negin Apple CPhT  MTM

## 2024-11-25 NOTE — TELEPHONE ENCOUNTER
MTM referral has been completed. She is scheduled to see Beronica Hyde RPH on 12/02/2025 at 7:30 A.M.

## 2024-11-25 NOTE — TELEPHONE ENCOUNTER
Routed LMN to provider and MTM pool    PRIOR AUTHORIZATION DENIED    Medication: ENTYVIO  MG/0.68ML SC SOAJ  Insurance Company: [employer] - Phone [phone] Fax [phone]  Denial Date: 11/25/2024  Denial Reason(s):      Appeal Information:      Patient Notified:

## 2024-11-29 NOTE — PROGRESS NOTES
Medication Therapy Management (MTM) Encounter    ASSESSMENT:                            Medication Adherence/Access: See below for considerations.    Ulcerative pancolitis:  Caity would benefit from continued treatment with Entyvio. She would benefit from switching to the subcutaneous pen formulation, as infusions are not convenient for her due to frequent travel. Caity is due for some routine maintenance labs including CBC w/platelets, she is up to date on hepatic labs and CRP. Caity is due for annual tuberculosis screening.     Currently trying to secure coverage for Entyvio pens. Reminders for routine cancer screening were provided. Caity is indicated for routine vaccinations including influenza and COVID, and vaccinations due to immunosuppression including Shingrix, which they will consider.     Caity and I discussed calcium supplementation, she states she cannot take calcium supplementation due to her kidney transplant history. Caity is indicated for a DEXA scan due to >3 months exposure to systemic steroids, she declines this at this time.     Post Kidney Transplant:  Follows with [facility] transplant team.      PLAN:                            Continue Entyvio. We will continue to try to get the pens covered, Beronica will be in contact about this via [employer].    When we switch to the pens, we will need you to get labs done every 3 months. You will be due for new labs in January 2025.     If needed for travel, Entyvio pens may be left at room temperature (up to 25 C / 77 F) for a maximum of 7 days.     Caity will consider the following vaccines:   Annual flu shot  Updated COVID-19 vaccine  Shingles (Shingrix 2-dose series) - Please let me know what pharmacy you would like an order sent to via [employer].     Follow-up: 6 months or sooner if needed. Will send [employer] scheduling link.       SUBJECTIVE/OBJECTIVE:                          Caity Horan is a 34 year old female seen for an initial visit.  "She was referred to me from Rupert Dsouza PA-C.      Reason for visit: Discuss SQ Entyvio; currently on Entyvio Q6w infusions -- patient moves across three different states throughout the year. Interested in transitioning to injectables for better flexibility in medication administration.    Allergies/ADRs: Reviewed in chart  Tobacco: She reports that she has never smoked. She has never used smokeless tobacco.  Alcohol: Less than 1 beverages / week - 1 glass of wine every 2 weeks      Medication Adherence/Access: Currently on Entyvio infusions every 6 weeks. Due to frequent travel, it would be more convenient to use subcutaneous Entyvio pen formulation.     Ulcerative pancolitis:   Entyvio 300 mg every 6 weeks   Vitamin D 1000 units once daily   Vitamin B12 500 mcg once daily     She denies abdomonal pain, loose stools, blood in stool. States her Ulcerative colitis symptoms are \"all good.\"     Patient is interested in transitioning to Entyvio pens. She states she has not had side effects or reactions to Entyvio infusions in the past. She feels that at home subcutaneous injections are doable for her. She does ask about quantity of medication that can be sent out at one time. Discussed that this is typically controlled by insurance, but specialty pharmacy can ship to any state, so she can get her medication while she is traveling.       Last provider visit: 6/11/2024 Rupert Dsouza PA-C   Next provider visit: not on file  Last Quantiferon: 12/5/22     Labs done at [facility] 10/1/24 (noted hemolysis present)    Liver  Protein 7.6  Albumin 4.7  Bilirubin Total 1  AST 30  Alk Phos 32  ALT 13    CRP 0.3    CBC done at [facility] 7/9/24  WBC 1.83  RBC 3.66  Hgb 11.7  Hematocrit 37    MCH 32  MCHC 31.9  Platelet Count 242  RDW-C 14.2      Therapy Start Date: 4/2018    Last endoscopic evaluation/MRE: Colonoscopy 2/3/2023 showed inactive (Mcbride score 0) Ulcerative colitis.     Past IBD " Medications:   None       Post Kidney Transplant:  Mycophenolate 750 mg twice daily   Prednisone 5 mg once daily   Bactrim 400-80 mg once daily  Valganciclovir 900 mg twice daily   Magnesium supplement daily    Follows with [facility] transplant team.       IBD Health Maintenance    Vaccinations:  All patients on immunosuppression should avoid live vaccines unless specifically indicated.    -- Influenza (last dose) 10/2023, will consider  -- Tetanus booster (last dose) 11/2017  -- Pneumococcal Pneumonia    PCV-13: 11/2017   PPSV-23: 11/2014, 11/2022   PCV-20/PCV-21: not on file  -- COVID-19    Initial series: 2021   Last Dose: 10/2023, will consider  -- RSV not on file    One time confirmation of immunity or serologies:  -- Hepatitis A (serologies or immunizations)  -- Hepatitis B (serologies or immunizations) serologies indicate immunity  -- Varicella/Zoster    Varicella had chickenpox as a child   Zoster not on file, will consider - Caity is interested, states she will send address for pharmacy she wants to get this at via [employer].  -- MMR not on file  -- HPV (all aged 18-26) 3/3  -- Meningococcal meningitis (all patients at risk for meningitis)-- Menactra x1 2007    Due to the immunosuppression in this patient, I would not advise administration of live vaccines such as varicella/VZV, intranasal influenza, MMR, or yellow fever vaccine (if traveling).      Immunosuppressive Screening:    Lab Results   Component Value Date    AUSAB 293.32 04/13/2023    HEPBANG Nonreactive 04/13/2023    HBCAB Nonreactive 04/13/2023    HCVAB Nonreactive 04/13/2023    TBRES Negative 12/05/2022       Bone mineral density screening   -- Recommend all patients supplement with calcium and vitamin D   Cannot take a calcium supplement due to kidney transplant    Takes vitamin D 1000 units daily  -- Given >3 months prior steroid use recommend DEXA if not already done   Caity declines DEXA scan    Cancer Screening:  Colon cancer  screening:  Given pancolitis colonoscopy every 2-3 years recommended after 8 years of symptom onset.  Since symptoms started on 2012, Dysplasia screening is recommended 2026 (last scope was 2023).     Cervical cancer screening: Annual due to immunosupression. Caity states she needs to re-establish care with an OB/GYN.     Skin cancer screening: Annual visual exam of skin by dermatologist since patient is immunocompromised.     Depression Screening:    PHQ-2 Score:         10/24/2024     2:49 PM 6/11/2024    11:12 AM   PHQ-2 ( 1999 [employer])   Q1: Little interest or pleasure in doing things 0 0    Q2: Feeling down, depressed or hopeless 0 0    PHQ-2 Score 0 0   PHQ-2 Total Score (12-17 Years)- Positive if 3 or more points; Administer PHQ-A if positive 0    Q1: Little interest or pleasure in doing things  Not at all   Q2: Feeling down, depressed or hopeless  Not at all   PHQ-2 Score  0       Patient-reported       Research:  Are you interested in being contacted about enrollment in clinical research studies? Yes    Would you like to receive a quarterly newsletter on research via email. YES. [email or web address]       Misc:  -- Avoid tobacco use  -- Avoid NSAIDs as there is potentially a 25% chance of causing an IBD flare      Today's Vitals: There were no vitals taken for this visit.  ----------------      I spent 23 minutes with this patient today. All changes were made via collaborative practice agreement with Rupert Dsouza. A copy of the visit note was provided to the patient's provider(s).    A summary of these recommendations was sent via [employer].    Telemedicine Visit Details  The patient's medications can be safely assessed via a telemedicine encounter.  Type of service:  Telephone visit  Originating Location (pt. Location): Home    Distant Location (provider location):  Off-site  Start Time:  7:20 AM  End Time: 7:43 AM     Medication Therapy Recommendations  No medication therapy recommendations to display

## 2024-12-02 ENCOUNTER — VIRTUAL VISIT (OUTPATIENT)
Dept: PHARMACY | Facility: CLINIC | Age: 34
End: 2024-12-02
Attending: PHYSICIAN ASSISTANT
Payer: COMMERCIAL

## 2024-12-02 VITALS — HEIGHT: 63 IN | WEIGHT: 126 LBS | BODY MASS INDEX: 22.32 KG/M2

## 2024-12-02 ASSESSMENT — PAIN SCALES - GENERAL: PAINLEVEL_OUTOF10: NO PAIN (0)

## 2024-12-02 NOTE — PATIENT INSTRUCTIONS
"Recommendations from today's MTM visit:                                                    MTM (medication therapy management) is a service provided by a clinical pharmacist designed to help you get the most of out of your medicines.        Continue Entyvio. We will continue to try to get the pens covered, Beronica will be in contact about this via [employer]    If needed for travel, Entyvio pens may be left at room temperature (up to 25 C / 77 F) for a maximum of 7 days.     Ciaty will consider the following vaccines:   Annual flu shot  Updated COVID-19 vaccine  Shingles (Shingrix 2-dose series) - Please let me know what pharmacy you would like an order sent to via [employer]     Follow-up: 6 months or sooner if needed. Will send [employer] scheduling link.       It was great speaking with you today.  I value your experience and would be very thankful for your time in providing feedback in our clinic survey. In the next few days, you may receive an email or text message from [employer] with a link to a survey related to your  clinical pharmacist.\"     To schedule another MTM appointment, please call the clinic directly or you may call the MTM scheduling line at [phone].    My Clinical Pharmacist's contact information:                                                      Please feel free to contact me with any questions or concerns you have.      Beronica Hyde, PharmD  MTM Pharmacist  [facility]     "

## 2024-12-02 NOTE — NURSING NOTE
Current patient location:  [facility]     Is the patient currently in the state of [place]? YES    Visit mode:TELEPHONE    If the visit is dropped, the patient can be reconnected by:TELEPHONE VISIT: Phone number:   Telephone Information:   Mobile [phone]       Will anyone else be joining the visit? NO  (If patient encounters technical issues they should call [phone]    Are changes needed to the allergy or medication list? Pt stated no changes to allergies and Pt stated no med changes    Are refills needed on medications prescribed by this physician? NO    Rooming Documentation:  Not applicable    Reason for visit: LUIS JARRELL

## 2024-12-11 ENCOUNTER — TELEPHONE (OUTPATIENT)
Dept: GASTROENTEROLOGY | Facility: CLINIC | Age: 34
End: 2024-12-11
Payer: COMMERCIAL

## 2024-12-11 NOTE — TELEPHONE ENCOUNTER
Patient returned call. She has an Entyvio infusion scheduled in [facility] on December 23rd. After that, she will be in [place] until April 29th. For her infusion due February 4th, we do not yet have a plan for how/where she will receive this dose. Patient strongly prefers to switch to injections. Will follow-up with pharmacy liaison about options.    We discussed how to establish infusion care in [place]. Patient would need to establish care with a GI provider and have them take over Entyvio orders while she is receiving care in that state. She will work on scheduling an appointment.    Discussed appeal options with pharmacy liaison and [facility] pharmacist. Patient is not eligible for free-drug because infusions are approved. We will attempt to set-up a icll-[id number]-eqio to advocate for injections.

## 2024-12-11 NOTE — TELEPHONE ENCOUNTER
Entyvio injection prior authorization denied after appeal. At this point, we will recommend the patient continue Entyvio infusion therapy. Called patient to discuss this plan. No answer; left voicemail with callback number. Will follow-up with [employer] message.

## 2024-12-20 ENCOUNTER — TELEPHONE (OUTPATIENT)
Dept: GASTROENTEROLOGY | Facility: CLINIC | Age: 34
End: 2024-12-20
Payer: COMMERCIAL

## 2024-12-23 ENCOUNTER — TELEPHONE (OUTPATIENT)
Dept: TRANSPLANT | Facility: CLINIC | Age: 34
End: 2024-12-23
Payer: COMMERCIAL

## 2024-12-23 DIAGNOSIS — T86.11 KIDNEY TRANSPLANT REJECTION: ICD-10-CM

## 2024-12-23 DIAGNOSIS — D84.9 IMMUNOSUPPRESSED STATUS (H): ICD-10-CM

## 2024-12-23 DIAGNOSIS — Z94.0 KIDNEY REPLACED BY TRANSPLANT: ICD-10-CM

## 2024-12-23 RX ORDER — TACROLIMUS 0.5 MG/1
0.5 CAPSULE ORAL 2 TIMES DAILY
Qty: 180 CAPSULE | Refills: 3 | Status: SHIPPED | OUTPATIENT
Start: 2024-12-23

## 2024-12-23 RX ORDER — TACROLIMUS 1 MG/1
1 CAPSULE ORAL 2 TIMES DAILY
Qty: 180 CAPSULE | Refills: 3 | Status: SHIPPED | OUTPATIENT
Start: 2024-12-23

## 2024-12-23 NOTE — TELEPHONE ENCOUNTER
ISSUE:   Tacrolimus IR level 8.7 on 12/20, goal 4-6, dose 2 mg BID.    PLAN:   Call Patient and confirm this was an accurate 12-hour trough.   Verify Tacrolimus IR dose 2 mg BID.   Confirm no new medications or or missed doses.   Confirm no new illness / infection / diarrhea.   If accurate trough and accurate dose, decrease Tacrolimus IR dose to 1.5 mg BID     Is this more than a 50% increase or decrease in current IS dose: No  If YES, justification: NA    Repeat labs in 2 weeks.  *If > 50% change in immunosuppression dose, repeat labs in 1 week.     OUTCOME:   MyC sent to pt with dose change instruction

## 2024-12-23 NOTE — TELEPHONE ENCOUNTER
Health Call Center    Phone Message    May a detailed message be left on voicemail: yes     Reason for Call:   Please manually fax GI referral to [facility]. Transfer of care for ulcerative colitis. Patient due for Entyvio infusion on 2/4/25. Please fax referral to [phone].     Per triage notes   We are unable to complete the HERMAN in Epic due to location/organization not being listed in the system. Thank you!          Action Taken: Message routed to:  [facility] GI     Travel Screening: Not Applicable     Date of Service:                                                                      
Per 12/11/24 mychart encounter, referral was faxed on 12/20/24 by Emili LIZ. Patient updated at that time. Closing encounter.    Loly Dimas LPN    
Statement Selected

## 2024-12-26 ENCOUNTER — TELEPHONE (OUTPATIENT)
Dept: TRANSPLANT | Facility: CLINIC | Age: 34
End: 2024-12-26
Payer: COMMERCIAL

## 2024-12-26 DIAGNOSIS — T86.11 KIDNEY TRANSPLANT REJECTION: ICD-10-CM

## 2024-12-26 NOTE — TELEPHONE ENCOUNTER
Call returned to lab.    CMV DRL- drug resistance panels unable to be processed. Lab reports viral load not sufficient for testing.

## 2024-12-26 NOTE — TELEPHONE ENCOUNTER
General  Route to LPN    Reason for call: Calling regarding test cancellation ref number [id number]    Call back needed? Yes    Return Call Needed  Same as documented in contacts section  When to return call?: Greater than one day: Route standard priority

## 2025-01-02 ENCOUNTER — TELEPHONE (OUTPATIENT)
Dept: TRANSPLANT | Facility: CLINIC | Age: 35
End: 2025-01-02
Payer: COMMERCIAL

## 2025-01-02 DIAGNOSIS — T86.11 KIDNEY TRANSPLANT REJECTION: ICD-10-CM

## 2025-01-02 DIAGNOSIS — Z48.298 AFTERCARE FOLLOWING ORGAN TRANSPLANT: Primary | ICD-10-CM

## 2025-01-02 NOTE — LETTER
PHYSICIAN ORDERS      DATE & TIME ISSUED: 25 1327          PATIENT NAME: Caity Horan   : 1990     [facility] MR# [if applicable]: [id number]     DIAGNOSIS:  kidney transplant, CMV viremia  ICD-10 CODE: z94.0, B25.9      Please add the following to next set of transplant labs:     CMV IgG  IgG    Any questions please call: [phone]    Please fax each result same day as resulted/available    Critical lab results page [phone]    Please fax these results to [phone]      Darian Arora MD

## 2025-01-02 NOTE — TELEPHONE ENCOUNTER
ISSUE: CMV PCR log has been stable 1.6-1.9 for 6 month time period.   On treatment dose Valcyte.   Pt does not have s/s CMV.       Darian Booker MD Reilly, Megan, RN  Recommend CMV Igg and IgG recheck and transplant ID evaluation since she is discordant she has been on treatment dose for a while with low titers and asymptomatic. She may be able to stop vs reduce to prophylactic dose.             OUTCOME:  Lab orders sent.   ID referral placed.   Pt aware.

## 2025-01-07 ENCOUNTER — MYC MEDICAL ADVICE (OUTPATIENT)
Dept: GASTROENTEROLOGY | Facility: CLINIC | Age: 35
End: 2025-01-07
Payer: COMMERCIAL

## 2025-01-13 DIAGNOSIS — T86.11 KIDNEY TRANSPLANT REJECTION: ICD-10-CM

## 2025-01-14 ENCOUNTER — MYC MEDICAL ADVICE (OUTPATIENT)
Dept: OTHER | Age: 35
End: 2025-01-14

## 2025-01-15 DIAGNOSIS — B25.9 CYTOMEGALOVIRAL DISEASE (H): ICD-10-CM

## 2025-01-15 DIAGNOSIS — Z94.0 KIDNEY REPLACED BY TRANSPLANT: Primary | ICD-10-CM

## 2025-01-20 DIAGNOSIS — E83.42 HYPOMAGNESEMIA: ICD-10-CM

## 2025-01-20 DIAGNOSIS — D84.9 IMMUNOSUPPRESSED STATUS: ICD-10-CM

## 2025-01-20 DIAGNOSIS — Z94.0 KIDNEY REPLACED BY TRANSPLANT: ICD-10-CM

## 2025-01-21 RX ORDER — MYCOPHENOLATE MOFETIL 250 MG/1
750 CAPSULE ORAL 2 TIMES DAILY
Qty: 540 CAPSULE | Refills: 3 | Status: SHIPPED | OUTPATIENT
Start: 2025-01-21

## 2025-01-21 RX ORDER — SULFAMETHOXAZOLE AND TRIMETHOPRIM 400; 80 MG/1; MG/1
1 TABLET ORAL DAILY
Qty: 90 TABLET | Refills: 3 | Status: SHIPPED | OUTPATIENT
Start: 2025-01-21

## 2025-01-23 NOTE — CONFIDENTIAL NOTE
DIAGNOSIS:  Aftercare following organ transplant    DATE RECEIVED:  02.27.2025     NOTES (Gather within 2 years) STATUS DETAILS   OFFICE NOTE from referring provider   Internal 01.02.2025 Darian Booker MD    LABS (any labs) Internal    MEDICATION LIST Internal

## 2025-02-24 DIAGNOSIS — T86.11 KIDNEY TRANSPLANT REJECTION: ICD-10-CM

## 2025-02-24 DIAGNOSIS — B25.9 CMV (CYTOMEGALOVIRUS INFECTION) (H): ICD-10-CM

## 2025-02-24 DIAGNOSIS — K51.011 ULCERATIVE PANCOLITIS WITH RECTAL BLEEDING (H): ICD-10-CM

## 2025-02-24 DIAGNOSIS — E55.9 HYPOVITAMINOSIS D: ICD-10-CM

## 2025-02-24 RX ORDER — VALGANCICLOVIR 450 MG/1
900 TABLET, FILM COATED ORAL 2 TIMES DAILY
Qty: 120 TABLET | Refills: 3 | Status: SHIPPED | OUTPATIENT
Start: 2025-02-24

## 2025-02-24 RX ORDER — CHOLECALCIFEROL (VITAMIN D3) 50 MCG
0.5 TABLET ORAL DAILY
Qty: 45 TABLET | Refills: 3 | Status: SHIPPED | OUTPATIENT
Start: 2025-02-24

## 2025-02-27 ENCOUNTER — PRE VISIT (OUTPATIENT)
Dept: INFECTIOUS DISEASES | Facility: CLINIC | Age: 35
End: 2025-02-27
Payer: COMMERCIAL

## 2025-02-27 DIAGNOSIS — T86.11 KIDNEY TRANSPLANT REJECTION: ICD-10-CM

## 2025-04-08 ENCOUNTER — MYC MEDICAL ADVICE (OUTPATIENT)
Dept: OTHER | Age: 35
End: 2025-04-08

## 2025-04-08 DIAGNOSIS — Z48.298 AFTERCARE FOLLOWING ORGAN TRANSPLANT: Primary | ICD-10-CM

## 2025-04-09 ENCOUNTER — VIRTUAL VISIT (OUTPATIENT)
Dept: TRANSPLANT | Facility: CLINIC | Age: 35
End: 2025-04-09
Attending: INTERNAL MEDICINE
Payer: COMMERCIAL

## 2025-04-09 DIAGNOSIS — N02.B9 IGA NEPHROPATHY: ICD-10-CM

## 2025-04-09 DIAGNOSIS — E87.20 METABOLIC ACIDOSIS: ICD-10-CM

## 2025-04-09 DIAGNOSIS — B27.00 EBV (EPSTEIN-BARR VIRUS) VIREMIA: ICD-10-CM

## 2025-04-09 DIAGNOSIS — D70.2 OTHER DRUG-INDUCED NEUTROPENIA: ICD-10-CM

## 2025-04-09 DIAGNOSIS — E53.8 VITAMIN B12 DEFICIENCY: ICD-10-CM

## 2025-04-09 DIAGNOSIS — T86.11 KIDNEY TRANSPLANT REJECTION: ICD-10-CM

## 2025-04-09 DIAGNOSIS — Z79.899 ENCOUNTER FOR LONG-TERM CURRENT USE OF MEDICATION: ICD-10-CM

## 2025-04-09 DIAGNOSIS — Z48.298 AFTERCARE FOLLOWING ORGAN TRANSPLANT: ICD-10-CM

## 2025-04-09 DIAGNOSIS — Z98.890 OTHER SPECIFIED POSTPROCEDURAL STATES: ICD-10-CM

## 2025-04-09 DIAGNOSIS — N18.2 CKD (CHRONIC KIDNEY DISEASE) STAGE 2, GFR 60-89 ML/MIN: ICD-10-CM

## 2025-04-09 DIAGNOSIS — D84.9 IMMUNOSUPPRESSED STATUS: ICD-10-CM

## 2025-04-09 DIAGNOSIS — B25.9 CYTOMEGALOVIRUS (CMV) VIREMIA (H): ICD-10-CM

## 2025-04-09 DIAGNOSIS — Z94.0 KIDNEY REPLACED BY TRANSPLANT: Primary | ICD-10-CM

## 2025-04-09 DIAGNOSIS — E83.42 HYPOMAGNESEMIA: ICD-10-CM

## 2025-04-09 DIAGNOSIS — K51.90 ULCERATIVE COLITIS WITHOUT COMPLICATIONS, UNSPECIFIED LOCATION (H): ICD-10-CM

## 2025-04-09 PROCEDURE — 98007 SYNCH AUDIO-VIDEO EST HI 40: CPT | Performed by: INTERNAL MEDICINE

## 2025-04-09 PROCEDURE — G2211 COMPLEX E/M VISIT ADD ON: HCPCS | Performed by: INTERNAL MEDICINE

## 2025-04-09 PROCEDURE — 1126F AMNT PAIN NOTED NONE PRSNT: CPT | Mod: 95 | Performed by: INTERNAL MEDICINE

## 2025-04-09 NOTE — NURSING NOTE
Current patient location: [address]  [address]  [facility] [address]    Is the patient currently in the state of [place]? YES    Visit mode: VIDEO    If the visit is dropped, the patient can be reconnected by:VIDEO VISIT: Text to cell phone:   Telephone Information:   Mobile [phone]       Will anyone else be joining the visit? NO  (If patient encounters technical issues they should call [phone]    Are changes needed to the allergy or medication list? No    Are refills needed on medications prescribed by this physician? NO    Rooming Documentation:  Questionnaire(s) completed    Reason for visit: RECHECK    Benjy JARRELL

## 2025-04-09 NOTE — Clinical Note
4/9/2025      Caity Horan  [address]  [address]  [place] [address]      Dear Colleague,    Thank you for referring your patient, Caity Horan, to the [facility]. Please see a copy of my visit note below.    Virtual Visit Details    Type of service:  Video Visit     Video start time : 11: 59 AM  Vide end time : 12: 15 PM    Originating Location (pt. Location): Home  {PROVIDER LOCATION On-site should be selected for visits conducted from your clinic location or adjoining [facility] hospital, academic office, or other nearby [facility] building. Off-site should be selected for all other provider locations, including home:170207}  Distant Location (provider location):  Off-site  Platform used for Video Visit: [facility]    TRANSPLANT NEPHROLOGY CLINIC VISIT     Assessment & Plan  # {TX STATUS:258456143}: CKD Stage { :582624} - {trend:063030342}   - Baseline Creatinine: ~ ***   - Proteinuria: { :856568342}   - DSA Hx: {FV RENAL TX DSA LEVEL:787531424}   - Last cPRA: ***%   - BK Viremia: { :553555}   - Kidney Tx Biopsy Hx: { :830974}.    {# Primary/Recurrent Kidney Disease (Optional)    :216148}    {# Pancreas/Liver/Other Transplant (Optional)    :767177847}    # Immunosuppression: {medications :723826983}   - Induction with Recent Transplant:  { :130703}   - Continue with intensive monitoring of immunosuppression for efficacy and toxicity.   - Historical Changes in Immunosuppression: { :170916}   - Changes: { :544593}    # Infection Prevention:   Last CD4 Level: { :079005}  {Infection Prevention Options :583541}      - CMV IgG Ab High Risk Discordance (D+/R-) at time of transplant: {Yes/No    :367428}  Present CMV Serostatus: { :381968}  - EBV IgG Ab High Risk Discordance (D+/R-) at time of transplant: {Yes/No    :211215}  Present EBV Serostatus: { :805828}    {# Blood Pressure    :110127}    {# Diabetes (Optional)    :837435981}    {# Anemia/Hgb/Erythrocytosis (Optional)    :334771154}    # Mineral  Bone Disorder:    {- Bone disorder options:947019743}    {# Electrolytes (Optional)    :912353603}    # Other Significant PMH:   - ***: ***     {# Skin cancer (risk)   :066772326}    # Transplant History:  Etiology of Kidney Failure: { :624408}  Tx: { :653772756}  Transplant: 4/13/2023 (Kidney), 12/5/2014 (Kidney)  Significant transplant-related complications: { :886113099}    Transplant Office Phone Number: [phone]    Assessment and plan was discussed with the patient and she voiced her understanding and agreement.    Return visit: No follow-ups on file.    Lynne Bates MD    The longitudinal plan of care for the diagnosis(es)/condition(s) as documented were addressed during this visit. Due to the added complexity in care, I will continue to support Caity in the subsequent management and with ongoing continuity of care.      Chief Complaint  Ms. Horan is a 34 year old here for { :765641791}.     History of Present Illness   ***    Home BP: { :31298884}    Problem List  Patient Active Problem List   Diagnosis    Metabolic acidosis    IgA nephropathy    Kidney replaced by transplant    Immunosuppressed status    Hypomagnesemia    Aftercare following organ transplant    EBV (Nicole-Barr virus) viremia    Anemia in chronic renal disease    Vitamin B12 deficiency    Ulcerative pancolitis with rectal bleeding (H)    Ulcerative colitis (H)    Vitamin D deficiency    Kidney transplant rejection    CKD (chronic kidney disease) stage 2, GFR 60-89 ml/min    Neutropenia       Allergies  Allergies   Allergen Reactions    Bumetanide Other (See Comments)     Causes paralysis    Amoxicillin Rash       Medications  Current Outpatient Medications   Medication Sig Dispense Refill    biotin 1000 MCG TABS tablet Take 500 mcg by mouth daily      Cyanocobalamin 500 MCG TBDP Take 500 mcg by mouth daily      HIGH ABSORPTION MAGNESIUM 100 MG TABS TAKE TWO TABLETS BY MOUTH ONCE DAILY 180 tablet 3    levonorgestrel (MIRENA) 20  MCG/DAY IUD 1 each by Intrauterine route Replacement every 7 years. Placed 1/2019      mycophenolate (GENERIC EQUIVALENT) 250 MG capsule TAKE THREE CAPSULES BY MOUTH TWICE A  capsule 3    predniSONE (DELTASONE) 5 MG tablet Take 1 tablet (5 mg) by mouth daily 90 tablet 3    sulfamethoxazole-trimethoprim (BACTRIM) 400-80 MG tablet TAKE ONE TABLET BY MOUTH ONCE DAILY 90 tablet 3    tacrolimus (GENERIC EQUIVALENT) 0.5 MG capsule Take 1 capsule (0.5 mg) by mouth 2 times daily. Total dose: 1.5 mg twice daily 180 capsule 3    tacrolimus (GENERIC EQUIVALENT) 1 MG capsule Take 1 capsule (1 mg) by mouth 2 times daily. Total dose: 1.5 mg twice daily 180 capsule 3    valGANciclovir (VALCYTE) 450 MG tablet Take 2 tablets (900 mg) by mouth 2 times daily. 120 tablet 3    vedolizumab (ENTYVIO) 60 MG/ML injection Inject 300 mg into the vein once every six weeks      Vitamin D3 50 mcg (2000 units) tablet Take 0.5 tablets (25 mcg) by mouth daily. 45 tablet 3     No current facility-administered medications for this visit.     There are no discontinued medications.    Physical Exam  Vital Signs: There were no vitals taken for this visit.    GENERAL APPEARANCE: alert and no distress  EYES: eyes grossly normal to inspection  HENT: { :664666496}  RESP: lungs clear to auscultation - no rales, rhonchi or wheezes  CV: regular rhythm, normal rate, no rub, no murmur  EDEMA: no LE edema bilaterally  ABDOMEN: soft, nondistended, nontender, bowel sounds normal  MS: extremities normal - no gross deformities noted, no evidence of inflammation in joints, no muscle tenderness  SKIN: no rash  NEURO: {:929311}  PSYCH: mentation appears normal and affect normal/bright    Data        Latest Ref Rng & Units 12/13/2024     8:35 AM 9/24/2024     8:45 AM 8/7/2024     8:39 AM   Renal   Na (external) 135 - 145 mEq/L 137     138     139       K (external) 3.6 - 5.2 mEq/L 4.5     4.1     3.9       Cl 98 - 108 mEq/L 104     102     104       Cl (external)  98 - 108 mEq/L 104     102     104       CO2 (external) 22 - 32 mEq/L 25     25     24       BUN (external) 8 - 21 mg/dL 12     22     15       Cr (external) 0.38 - 1.02 mg/dL 1.19     1.10     1.27       Glucose (external) 62 - 125 mg/dL 98     98     93       Ca (external) 8.9 - 10.2 mg/dL 9.0     9.3     9.7           This result is from an external source.         Latest Ref Rng & Units 4/24/2024     8:16 AM 11/15/2023     8:58 AM 10/27/2023    11:25 AM   Bone Health   Phosphorus 2.5 - 4.5 mg/dL   2.5    Phos (external) 2.5 - 4.5 mg/dL  3.1        PTHi (external) 12 - 88 pg/mL 44         Vit D Def (external) 20.1 - 50.0 ng/mL 55.9             This result is from an external source.         Latest Ref Rng & Units 12/13/2024     8:35 AM 9/24/2024     8:45 AM 8/7/2024     8:39 AM   Heme   WBC (external) 4.3 - 10.0 THOU/uL 1.72     2.26     2.22       Hgb (external) 11.5 - 15.5 g/dL 12.8     13.0     13.8       Plt (external) 150 - 400 THOU/uL 233     234     211       ABSOLUTE NEUTROPHILS (EXTERNAL) 1.80 - 7.00 THOU/uL   1.38       ABSOLUTE LYMPHOCYTES (EXTERNAL) 1.00 - 4.80 THOU/uL   0.59       ABSOLUTE MONOCYTES (EXTERNAL) 0.00 - 0.80 THOU/uL   0.08       ABSOLUTE EOSINOPHILS (EXTERNAL) 0.00 - 0.50 THOU/uL   0.05       ABSOLUTE BASOPHILS (EXTERNAL) 0.00 - 0.20 THOU/uL   0.04           This result is from an external source.         Latest Ref Rng & Units 4/24/2024     8:16 AM 6/26/2023    12:55 PM 5/17/2023     3:31 PM   Liver   AP 35 - 104 U/L  70  66    AP (external) 25 - 100 U/L 67         TBili <=1.2 mg/dL  0.2  0.3    TBili (external) 0.2 - 1.3 mg/dL 0.6         Bilirubin Direct 0.00 - 0.30 mg/dL  <0.20  <0.20    DBili (external) 0.0 - 0.3 mg/dL 0.1         ALT 0 - 50 U/L  12  13    ALT (external) 7 - 33 U/L 61         AST 0 - 45 U/L  27  19    AST (external) 9 - 38 U/L 35         Tot Protein 6.4 - 8.3 g/dL  7.0  6.6    Tot Protein (external) 6.0 - 8.2 g/dL 7.2         Albumin 3.5 - 5.2 g/dL  4.3  4.1     Albumin (external) 3.5 - 5.2 g/dL 3.7             This result is from an external source.         Latest Ref Rng & Units 4/24/2024     8:16 AM 4/3/2023     3:56 PM   Pancreas   A1C <5.7 %  5.3    A1C (external) 4.0 - 6.0 % 5.8            This result is from an external source.         Latest Ref Rng & Units 4/24/2023     8:26 AM 4/22/2023     7:45 AM 4/3/2023     3:56 PM   Iron studies   Iron 37 - 145 ug/dL 30  40  186    Iron Sat Index 15 - 46 % 18  28  71    Ferritin 6 - 175 ng/mL 886  1,041  902          Latest Ref Rng & Units 12/20/2024     8:51 AM 12/13/2024     8:35 AM 9/24/2024     8:45 AM   [facility] Txp Virology   CMV DNA Quant Ext Not detected IU/mL 75.9     43.8     83.6       CMV PCR QUANT DNA INTERP (EXTERNAL)    See scan       LOG IU/ML OF CMVQNT (EXTERNAL) Not detected log IU/mL 1.9     1.6     1.9           This result is from an external source.     Failed to redirect to the Timeline version of the REVFS SmartLink.  Recent Labs   Lab Test 05/24/23  0811 06/27/23  0728 08/09/23  0845   DOSTAC 5/23/2023 6/26/2023 8/8/2023   TACROL 12.2 12.7 6.3     Recent Labs   Lab Test 05/10/23  0805 05/12/23  0740 05/24/23  0811   DOSMPA 5/9/2023   9:40 PM 5/11/2023   9:50 PM 5/23/2023   9:30 PM   MPACID 7.38* 3.65* 4.89*   MPAG 72.5 52.6 74.5    Prescription drug management  *** minutes spent by me on the date of the encounter doing chart review, history and exam, documentation and further activities per the note      Again, thank you for allowing me to participate in the care of your patient.        Sincerely,        Lynne Bates MD    Electronically signed

## 2025-04-09 NOTE — LETTER
05/12/23  0740 05/24/23  0811   DOSMPA 5/9/2023   9:40 PM 5/11/2023   9:50 PM 5/23/2023   9:30 PM   MPACID 7.38* 3.65* 4.89*   MPAG 72.5 52.6 74.5    Prescription drug management  47 minutes spent by me on the date of the encounter doing chart review, history and exam, documentation and further activities per the note      Again, thank you for allowing me to participate in the care of your patient.        Sincerely,        Lnyne Bates MD    Electronically signed

## 2025-04-09 NOTE — PROGRESS NOTES
Virtual Visit Details    Type of service:  Video Visit     Video start time : 11: 59 AM  Vide end time : 12: 15 PM    Originating Location (pt. Location): Home  {PROVIDER LOCATION On-site should be selected for visits conducted from your clinic location or adjoining [facility] hospital, academic office, or other nearby [facility] building. Off-site should be selected for all other provider locations, including home:175584}  Distant Location (provider location):  Off-site  Platform used for Video Visit: [facility]    TRANSPLANT NEPHROLOGY CLINIC VISIT     Assessment & Plan   # {TX STATUS:005136059}: CKD Stage { :637456} - {trend:505616870}   - Baseline Creatinine: ~ ***   - Proteinuria: { :187313990}   - DSA Hx: {FV RENAL TX DSA LEVEL:269392559}   - Last cPRA: ***%   - BK Viremia: { :632629}   - Kidney Tx Biopsy Hx: { :059374}.    {# Primary/Recurrent Kidney Disease (Optional)    :475761}    {# Pancreas/Liver/Other Transplant (Optional)    :939957532}    # Immunosuppression: {medications :513627994}   - Induction with Recent Transplant:  { :924520}   - Continue with intensive monitoring of immunosuppression for efficacy and toxicity.   - Historical Changes in Immunosuppression: { :520206}   - Changes: { :057933}    # Infection Prevention:   Last CD4 Level: { :650018}  {Infection Prevention Options :423261}      - CMV IgG Ab High Risk Discordance (D+/R-) at time of transplant: {Yes/No    :381326}  Present CMV Serostatus: { :724805}  - EBV IgG Ab High Risk Discordance (D+/R-) at time of transplant: {Yes/No    :778054}  Present EBV Serostatus: { :879421}    {# Blood Pressure    :910400}    {# Diabetes (Optional)    :758727900}    {# Anemia/Hgb/Erythrocytosis (Optional)    :706619033}    # Mineral Bone Disorder:    {- Bone disorder options:905642852}    {# Electrolytes (Optional)    :312706314}    # Other Significant PMH:   - ***: ***     {# Skin cancer (risk)   :283660499}    # Transplant History:  Etiology of Kidney Failure: {  :987531}  Tx: { :816871611}  Transplant: 4/13/2023 (Kidney), 12/5/2014 (Kidney)  Significant transplant-related complications: { :181746160}    Transplant Office Phone Number: [phone]    Assessment and plan was discussed with the patient and she voiced her understanding and agreement.    Return visit: No follow-ups on file.    Lynne Bates MD    The longitudinal plan of care for the diagnosis(es)/condition(s) as documented were addressed during this visit. Due to the added complexity in care, I will continue to support Caity in the subsequent management and with ongoing continuity of care.      Chief Complaint   Ms. Horan is a 34 year old here for { :960190799}.     History of Present Illness    ***    Home BP: { :00642913}    Problem List   Patient Active Problem List   Diagnosis    Metabolic acidosis    IgA nephropathy    Kidney replaced by transplant    Immunosuppressed status    Hypomagnesemia    Aftercare following organ transplant    EBV (Nicole-Barr virus) viremia    Anemia in chronic renal disease    Vitamin B12 deficiency    Ulcerative pancolitis with rectal bleeding (H)    Ulcerative colitis (H)    Vitamin D deficiency    Kidney transplant rejection    CKD (chronic kidney disease) stage 2, GFR 60-89 ml/min    Neutropenia       Allergies   Allergies   Allergen Reactions    Bumetanide Other (See Comments)     Causes paralysis    Amoxicillin Rash       Medications   Current Outpatient Medications   Medication Sig Dispense Refill    biotin 1000 MCG TABS tablet Take 500 mcg by mouth daily      Cyanocobalamin 500 MCG TBDP Take 500 mcg by mouth daily      HIGH ABSORPTION MAGNESIUM 100 MG TABS TAKE TWO TABLETS BY MOUTH ONCE DAILY 180 tablet 3    levonorgestrel (MIRENA) 20 MCG/DAY IUD 1 each by Intrauterine route Replacement every 7 years. Placed 1/2019      mycophenolate (GENERIC EQUIVALENT) 250 MG capsule TAKE THREE CAPSULES BY MOUTH TWICE A  capsule 3    predniSONE (DELTASONE) 5 MG tablet  Take 1 tablet (5 mg) by mouth daily 90 tablet 3    sulfamethoxazole-trimethoprim (BACTRIM) 400-80 MG tablet TAKE ONE TABLET BY MOUTH ONCE DAILY 90 tablet 3    tacrolimus (GENERIC EQUIVALENT) 0.5 MG capsule Take 1 capsule (0.5 mg) by mouth 2 times daily. Total dose: 1.5 mg twice daily 180 capsule 3    tacrolimus (GENERIC EQUIVALENT) 1 MG capsule Take 1 capsule (1 mg) by mouth 2 times daily. Total dose: 1.5 mg twice daily 180 capsule 3    valGANciclovir (VALCYTE) 450 MG tablet Take 2 tablets (900 mg) by mouth 2 times daily. 120 tablet 3    vedolizumab (ENTYVIO) 60 MG/ML injection Inject 300 mg into the vein once every six weeks      Vitamin D3 50 mcg (2000 units) tablet Take 0.5 tablets (25 mcg) by mouth daily. 45 tablet 3     No current facility-administered medications for this visit.     There are no discontinued medications.    Physical Exam   Vital Signs: There were no vitals taken for this visit.    GENERAL APPEARANCE: alert and no distress  EYES: eyes grossly normal to inspection  HENT: { :838467171}  RESP: lungs clear to auscultation - no rales, rhonchi or wheezes  CV: regular rhythm, normal rate, no rub, no murmur  EDEMA: no LE edema bilaterally  ABDOMEN: soft, nondistended, nontender, bowel sounds normal  MS: extremities normal - no gross deformities noted, no evidence of inflammation in joints, no muscle tenderness  SKIN: no rash  NEURO: {:871061}  PSYCH: mentation appears normal and affect normal/bright    Data         Latest Ref Rng & Units 12/13/2024     8:35 AM 9/24/2024     8:45 AM 8/7/2024     8:39 AM   Renal   Na (external) 135 - 145 mEq/L 137     138     139       K (external) 3.6 - 5.2 mEq/L 4.5     4.1     3.9       Cl 98 - 108 mEq/L 104     102     104       Cl (external) 98 - 108 mEq/L 104     102     104       CO2 (external) 22 - 32 mEq/L 25     25     24       BUN (external) 8 - 21 mg/dL 12     22     15       Cr (external) 0.38 - 1.02 mg/dL 1.19     1.10     1.27       Glucose (external) 62 -  125 mg/dL 98     98     93       Ca (external) 8.9 - 10.2 mg/dL 9.0     9.3     9.7           This result is from an external source.         Latest Ref Rng & Units 4/24/2024     8:16 AM 11/15/2023     8:58 AM 10/27/2023    11:25 AM   Bone Health   Phosphorus 2.5 - 4.5 mg/dL   2.5    Phos (external) 2.5 - 4.5 mg/dL  3.1        PTHi (external) 12 - 88 pg/mL 44         Vit D Def (external) 20.1 - 50.0 ng/mL 55.9             This result is from an external source.         Latest Ref Rng & Units 12/13/2024     8:35 AM 9/24/2024     8:45 AM 8/7/2024     8:39 AM   Heme   WBC (external) 4.3 - 10.0 THOU/uL 1.72     2.26     2.22       Hgb (external) 11.5 - 15.5 g/dL 12.8     13.0     13.8       Plt (external) 150 - 400 THOU/uL 233     234     211       ABSOLUTE NEUTROPHILS (EXTERNAL) 1.80 - 7.00 THOU/uL   1.38       ABSOLUTE LYMPHOCYTES (EXTERNAL) 1.00 - 4.80 THOU/uL   0.59       ABSOLUTE MONOCYTES (EXTERNAL) 0.00 - 0.80 THOU/uL   0.08       ABSOLUTE EOSINOPHILS (EXTERNAL) 0.00 - 0.50 THOU/uL   0.05       ABSOLUTE BASOPHILS (EXTERNAL) 0.00 - 0.20 THOU/uL   0.04           This result is from an external source.         Latest Ref Rng & Units 4/24/2024     8:16 AM 6/26/2023    12:55 PM 5/17/2023     3:31 PM   Liver   AP 35 - 104 U/L  70  66    AP (external) 25 - 100 U/L 67         TBili <=1.2 mg/dL  0.2  0.3    TBili (external) 0.2 - 1.3 mg/dL 0.6         Bilirubin Direct 0.00 - 0.30 mg/dL  <0.20  <0.20    DBili (external) 0.0 - 0.3 mg/dL 0.1         ALT 0 - 50 U/L  12  13    ALT (external) 7 - 33 U/L 61         AST 0 - 45 U/L  27  19    AST (external) 9 - 38 U/L 35         Tot Protein 6.4 - 8.3 g/dL  7.0  6.6    Tot Protein (external) 6.0 - 8.2 g/dL 7.2         Albumin 3.5 - 5.2 g/dL  4.3  4.1    Albumin (external) 3.5 - 5.2 g/dL 3.7             This result is from an external source.         Latest Ref Rng & Units 4/24/2024     8:16 AM 4/3/2023     3:56 PM   Pancreas   A1C <5.7 %  5.3    A1C (external) 4.0 - 6.0 % 5.8             This result is from an external source.         Latest Ref Rng & Units 4/24/2023     8:26 AM 4/22/2023     7:45 AM 4/3/2023     3:56 PM   Iron studies   Iron 37 - 145 ug/dL 30  40  186    Iron Sat Index 15 - 46 % 18  28  71    Ferritin 6 - 175 ng/mL 886  1,041  902          Latest Ref Rng & Units 12/20/2024     8:51 AM 12/13/2024     8:35 AM 9/24/2024     8:45 AM   [facility] Txp Virology   CMV DNA Quant Ext Not detected IU/mL 75.9     43.8     83.6       CMV PCR QUANT DNA INTERP (EXTERNAL)    See scan       LOG IU/ML OF CMVQNT (EXTERNAL) Not detected log IU/mL 1.9     1.6     1.9           This result is from an external source.     Failed to redirect to the Timeline version of the REVFS SmartLink.  Recent Labs   Lab Test 05/24/23  0811 06/27/23  0728 08/09/23  0845   DOSTAC 5/23/2023 6/26/2023 8/8/2023   TACROL 12.2 12.7 6.3     Recent Labs   Lab Test 05/10/23  0805 05/12/23  0740 05/24/23  0811   DOSMPA 5/9/2023   9:40 PM 5/11/2023   9:50 PM 5/23/2023   9:30 PM   MPACID 7.38* 3.65* 4.89*   MPAG 72.5 52.6 74.5    Prescription drug management  *** minutes spent by me on the date of the encounter doing chart review, history and exam, documentation and further activities per the note   exam, documentation and further activities per the note

## 2025-04-21 DIAGNOSIS — D84.9 IMMUNOSUPPRESSED STATUS: ICD-10-CM

## 2025-04-21 DIAGNOSIS — Z94.0 KIDNEY REPLACED BY TRANSPLANT: ICD-10-CM

## 2025-04-21 DIAGNOSIS — T86.11 KIDNEY TRANSPLANT REJECTION: ICD-10-CM

## 2025-04-21 RX ORDER — TACROLIMUS 0.5 MG/1
CAPSULE ORAL
Qty: 180 CAPSULE | Refills: 3 | Status: SHIPPED | OUTPATIENT
Start: 2025-04-21

## 2025-04-21 RX ORDER — TACROLIMUS 1 MG/1
1 CAPSULE ORAL 2 TIMES DAILY
Qty: 180 CAPSULE | Refills: 3 | Status: SHIPPED | OUTPATIENT
Start: 2025-04-21

## 2025-04-29 PROBLEM — B25.9 CYTOMEGALOVIRUS (CMV) VIREMIA (H): Status: ACTIVE | Noted: 2025-04-29

## 2025-05-06 ENCOUNTER — MYC MEDICAL ADVICE (OUTPATIENT)
Dept: TRANSPLANT | Facility: CLINIC | Age: 35
End: 2025-05-06
Payer: COMMERCIAL

## 2025-05-06 DIAGNOSIS — T86.11 KIDNEY TRANSPLANT REJECTION: ICD-10-CM

## 2025-05-14 ENCOUNTER — TELEPHONE (OUTPATIENT)
Dept: TRANSPLANT | Facility: CLINIC | Age: 35
End: 2025-05-14
Payer: COMMERCIAL

## 2025-05-14 DIAGNOSIS — T86.11 KIDNEY TRANSPLANT REJECTION: ICD-10-CM

## 2025-05-14 NOTE — TELEPHONE ENCOUNTER
Provider Call: General  Route to LPN    Reason for call: Call from lab- they are missing 2 lab orders on their recrecition  format- They will fax us the form to add on T-cell profile- CD4 and CMV IGG   Call back needed? No

## 2025-05-19 ENCOUNTER — RESULTS FOLLOW-UP (OUTPATIENT)
Dept: TRANSPLANT | Facility: CLINIC | Age: 35
End: 2025-05-19

## 2025-05-19 DIAGNOSIS — T86.11 KIDNEY TRANSPLANT REJECTION: ICD-10-CM

## 2025-05-22 DIAGNOSIS — D84.9 IMMUNOSUPPRESSED STATUS: ICD-10-CM

## 2025-05-22 DIAGNOSIS — T86.11 KIDNEY TRANSPLANT REJECTION: ICD-10-CM

## 2025-05-22 DIAGNOSIS — Z94.0 KIDNEY REPLACED BY TRANSPLANT: ICD-10-CM

## 2025-05-22 RX ORDER — TACROLIMUS 0.5 MG/1
0.5 CAPSULE ORAL EVERY MORNING
Qty: 90 CAPSULE | Refills: 3 | Status: SHIPPED | OUTPATIENT
Start: 2025-05-22

## 2025-05-22 RX ORDER — TACROLIMUS 1 MG/1
1 CAPSULE ORAL 2 TIMES DAILY
Qty: 180 CAPSULE | Refills: 3 | Status: SHIPPED | OUTPATIENT
Start: 2025-05-22

## 2025-06-07 ENCOUNTER — HEALTH MAINTENANCE LETTER (OUTPATIENT)
Age: 35
End: 2025-06-07

## 2025-06-24 ENCOUNTER — TELEPHONE (OUTPATIENT)
Dept: TRANSPLANT | Facility: CLINIC | Age: 35
End: 2025-06-24
Payer: COMMERCIAL

## 2025-06-24 ENCOUNTER — RESULTS FOLLOW-UP (OUTPATIENT)
Dept: TRANSPLANT | Facility: CLINIC | Age: 35
End: 2025-06-24

## 2025-06-24 DIAGNOSIS — T86.11 KIDNEY TRANSPLANT REJECTION: ICD-10-CM

## 2025-06-24 NOTE — TELEPHONE ENCOUNTER
Lynne Bates MD to Me  (Selected Message)  DM      6/24/25  1:48 PM  Hi Romy      Okay to hold but continue CMV PCR.     Thank you  Lynne   Me to Lynne Bates MD   MR    6/24/25  1:04 PM  Result Note  Lab results stable, tac at goal.  CMV remains <35 for 2 months. Pt is anxious to discontinue Valcyte. What do you advise at this time. She is on 900 mg Valcyte daily currently.

## 2025-07-19 ENCOUNTER — HEALTH MAINTENANCE LETTER (OUTPATIENT)
Age: 35
End: 2025-07-19

## 2025-07-30 ENCOUNTER — EXTERNAL ORDER RESULTS (OUTPATIENT)
Dept: LAB | Facility: CLINIC | Age: 35
End: 2025-07-30
Payer: COMMERCIAL

## 2025-08-04 LAB
CMV DNA QUANT (EXTERNAL): 159 IU/ML
LOG IU/ML OF CMVQNT (EXTERNAL): 2.2 LOG IU/ML

## 2025-08-05 ENCOUNTER — TELEPHONE (OUTPATIENT)
Dept: TRANSPLANT | Facility: CLINIC | Age: 35
End: 2025-08-05
Payer: COMMERCIAL

## 2025-08-05 DIAGNOSIS — T86.11 KIDNEY TRANSPLANT REJECTION: ICD-10-CM

## 2025-08-07 DIAGNOSIS — Z94.0 KIDNEY TRANSPLANTED: Primary | ICD-10-CM

## 2025-08-07 RX ORDER — PREDNISONE 5 MG/1
5 TABLET ORAL DAILY
Qty: 90 TABLET | Refills: 3 | Status: SHIPPED | OUTPATIENT
Start: 2025-08-07

## 2025-08-15 ENCOUNTER — EXTERNAL ORDER RESULTS (OUTPATIENT)
Dept: LAB | Facility: CLINIC | Age: 35
End: 2025-08-15
Payer: COMMERCIAL

## 2025-08-19 LAB
CMV DNA QUANT (EXTERNAL): 43.3 IU/ML
LOG IU/ML OF CMVQNT (EXTERNAL): 1.6 LOG IU/ML

## (undated) DEVICE — SUCTION MANIFOLD NEPTUNE 2 SYS 4 PORT 0702-020-000

## (undated) DEVICE — SU PDS II 5-0 RB-1 27" Z303H

## (undated) DEVICE — SOL NACL 0.9% IRRIG 1000ML BOTTLE 2F7124

## (undated) DEVICE — SU MONOCRYL 4-0 PS-2 27" UND Y426H

## (undated) DEVICE — SOL WATER IRRIG 1000ML BOTTLE 2F7114

## (undated) DEVICE — CLIP HORIZON SM RED WIDE SLOT 001201

## (undated) DEVICE — DRSG PRIMAPORE 02X3" 7133

## (undated) DEVICE — VESSEL LOOPS YELLOW MAXI 31145694

## (undated) DEVICE — DRAIN JACKSON PRATT 19FR ROUND SU130-1325

## (undated) DEVICE — SU PROLENE 5-0 RB-1DA 36"  8556H

## (undated) DEVICE — NDL BX MONOPTY 18GAX16CM 121816

## (undated) DEVICE — DRAPE IOBAN INCISE 23X17" 6650EZ

## (undated) DEVICE — ENDO CAP AND TUBING STERILE FOR ENDOGATOR  100130

## (undated) DEVICE — ESU PENCIL SMOKE EVAC W/ROCKER SWITCH 0703-047-000

## (undated) DEVICE — CATH PLUG W/CAP 000076

## (undated) DEVICE — WIPES FOLEY CARE SURESTEP PROVON DFC100

## (undated) DEVICE — PUNCH AORTIC 4MM SINGLE USE 1001-602

## (undated) DEVICE — LINEN TOWEL PACK X6 WHITE 5487

## (undated) DEVICE — SPONGE KITTNER 30-101

## (undated) DEVICE — Device

## (undated) DEVICE — VESSEL LOOPS RED MINI 24000-01R

## (undated) DEVICE — INSERT FOGARTY 33MM TRACTION HYDRAJAW HYDRA33

## (undated) DEVICE — SU SILK 3-0 SH CR 8X18" C013D

## (undated) DEVICE — DEVICE CATH STABILIZATION STATLOCK FOLEY 3-WAY FOL0105

## (undated) DEVICE — SU SILK 4-0 TIE 12X30" A303H

## (undated) DEVICE — DRSG TELFA 3X8" 1238

## (undated) DEVICE — DRAPE SLUSH/WARMER 66X44" ORS-320

## (undated) DEVICE — ENDO FORCEP ENDOJAW BIOPSY 2.8MMX230CM FB-220U

## (undated) DEVICE — SU PDS II 6-0 RB-2DA 30" Z149H

## (undated) DEVICE — DRAIN JACKSON PRATT RESERVOIR 100ML SU130-1305

## (undated) DEVICE — TUBING IRRIG CYSTO/BLADDER SET 81" LF 2C4040

## (undated) DEVICE — SU PDS II 0 TP-1 60" Z991G

## (undated) DEVICE — LINEN TOWEL PACK X30 5481

## (undated) DEVICE — SUTURE BOOTS 051003PBX

## (undated) DEVICE — SU ETHILON 3-0 PS-1 18" 1663H

## (undated) DEVICE — SURGICEL ABSORBABLE HEMOSTAT SNOW 4"X4" 2083

## (undated) DEVICE — ADH SKIN CLOSURE PREMIERPRO EXOFIN 1.0ML 3470

## (undated) DEVICE — SU SILK 2-0 TIE 12X30" A305H

## (undated) DEVICE — SU SILK 0 TIE 6X30" A306H

## (undated) DEVICE — SU SILK 3-0 TIE 12X30" A304H

## (undated) DEVICE — SPECIMEN CONTAINER 3OZ W/FORMALIN 59901

## (undated) DEVICE — NDL COUNTER 20CT 31142493

## (undated) DEVICE — LIGHT HANDLE X1 31140133

## (undated) DEVICE — SU VICRYL 3-0 SH 27" UND J416H

## (undated) DEVICE — SU PDS II 4-0 RB-1 27" Z304H

## (undated) DEVICE — SU PROLENE 6-0 RB-2DA 30" 8711H

## (undated) DEVICE — LINEN GOWN XLG 5407

## (undated) DEVICE — CATH FOLEY 3WAY 18FR 30ML LATEX 0167SI18

## (undated) DEVICE — PREP CHLORAPREP 26ML TINTED HI-LITE ORANGE 930815

## (undated) DEVICE — CLIP HORIZON MED BLUE 002200

## (undated) DEVICE — SU PROLENE 4-0 SHDA 36" 8521H

## (undated) DEVICE — SU SILK 1 TIE 6X30" A307H

## (undated) DEVICE — CLIP HORIZON LG ORANGE 004200

## (undated) DEVICE — CATH TRAY FOLEY 16FR BARDEX W/DRAIN BAG STATLOCK 300316A

## (undated) DEVICE — SOL NACL 0.9% INJ 1000ML BAG 2B1324X

## (undated) RX ORDER — HYDROMORPHONE HYDROCHLORIDE 1 MG/ML
INJECTION, SOLUTION INTRAMUSCULAR; INTRAVENOUS; SUBCUTANEOUS
Status: DISPENSED
Start: 2023-04-13

## (undated) RX ORDER — FENTANYL CITRATE-0.9 % NACL/PF 10 MCG/ML
PLASTIC BAG, INJECTION (ML) INTRAVENOUS
Status: DISPENSED
Start: 2023-04-13

## (undated) RX ORDER — FUROSEMIDE 10 MG/ML
INJECTION INTRAMUSCULAR; INTRAVENOUS
Status: DISPENSED
Start: 2023-04-13

## (undated) RX ORDER — CLINDAMYCIN PHOSPHATE 900 MG/50ML
INJECTION, SOLUTION INTRAVENOUS
Status: DISPENSED
Start: 2022-12-22

## (undated) RX ORDER — FENTANYL CITRATE 50 UG/ML
INJECTION, SOLUTION INTRAMUSCULAR; INTRAVENOUS
Status: DISPENSED
Start: 2023-04-13

## (undated) RX ORDER — HYDROMORPHONE HCL IN WATER/PF 6 MG/30 ML
PATIENT CONTROLLED ANALGESIA SYRINGE INTRAVENOUS
Status: DISPENSED
Start: 2023-04-17

## (undated) RX ORDER — GLYCOPYRROLATE 0.2 MG/ML
INJECTION, SOLUTION INTRAMUSCULAR; INTRAVENOUS
Status: DISPENSED
Start: 2023-04-17

## (undated) RX ORDER — FENTANYL CITRATE 50 UG/ML
INJECTION, SOLUTION INTRAMUSCULAR; INTRAVENOUS
Status: DISPENSED
Start: 2023-04-17

## (undated) RX ORDER — ONDANSETRON 2 MG/ML
INJECTION INTRAMUSCULAR; INTRAVENOUS
Status: DISPENSED
Start: 2023-04-13

## (undated) RX ORDER — FENTANYL CITRATE 50 UG/ML
INJECTION, SOLUTION INTRAMUSCULAR; INTRAVENOUS
Status: DISPENSED
Start: 2023-04-29

## (undated) RX ORDER — VERAPAMIL HYDROCHLORIDE 2.5 MG/ML
INJECTION, SOLUTION INTRAVENOUS
Status: DISPENSED
Start: 2023-04-17

## (undated) RX ORDER — FENTANYL CITRATE 50 UG/ML
INJECTION, SOLUTION INTRAMUSCULAR; INTRAVENOUS
Status: DISPENSED
Start: 2023-02-03

## (undated) RX ORDER — VERAPAMIL HYDROCHLORIDE 2.5 MG/ML
INJECTION, SOLUTION INTRAVENOUS
Status: DISPENSED
Start: 2023-04-13

## (undated) RX ORDER — DIPHENHYDRAMINE HYDROCHLORIDE 50 MG/ML
INJECTION INTRAMUSCULAR; INTRAVENOUS
Status: DISPENSED
Start: 2023-02-03

## (undated) RX ORDER — DIPHENHYDRAMINE HYDROCHLORIDE 50 MG/ML
INJECTION INTRAMUSCULAR; INTRAVENOUS
Status: DISPENSED
Start: 2018-09-10

## (undated) RX ORDER — SIMETHICONE 20 MG/.3ML
EMULSION ORAL
Status: DISPENSED
Start: 2018-03-12

## (undated) RX ORDER — LORAZEPAM 2 MG/ML
INJECTION INTRAMUSCULAR
Status: DISPENSED
Start: 2023-04-13

## (undated) RX ORDER — PAPAVERINE HYDROCHLORIDE 30 MG/ML
INJECTION INTRAMUSCULAR; INTRAVENOUS
Status: DISPENSED
Start: 2023-04-13

## (undated) RX ORDER — LIDOCAINE HYDROCHLORIDE 10 MG/ML
INJECTION, SOLUTION EPIDURAL; INFILTRATION; INTRACAUDAL; PERINEURAL
Status: DISPENSED
Start: 2023-04-20

## (undated) RX ORDER — DEXTROSE, SODIUM CHLORIDE, SODIUM LACTATE, POTASSIUM CHLORIDE, AND CALCIUM CHLORIDE 5; .6; .31; .03; .02 G/100ML; G/100ML; G/100ML; G/100ML; G/100ML
INJECTION, SOLUTION INTRAVENOUS
Status: DISPENSED
Start: 2023-04-13

## (undated) RX ORDER — ONDANSETRON 2 MG/ML
INJECTION INTRAMUSCULAR; INTRAVENOUS
Status: DISPENSED
Start: 2023-04-17

## (undated) RX ORDER — LIDOCAINE HYDROCHLORIDE 10 MG/ML
INJECTION, SOLUTION EPIDURAL; INFILTRATION; INTRACAUDAL; PERINEURAL
Status: DISPENSED
Start: 2023-04-29

## (undated) RX ORDER — CALCIUM CHLORIDE 100 MG/ML
INJECTION INTRAVENOUS; INTRAVENTRICULAR
Status: DISPENSED
Start: 2023-04-13

## (undated) RX ORDER — FENTANYL CITRATE-0.9 % NACL/PF 10 MCG/ML
PLASTIC BAG, INJECTION (ML) INTRAVENOUS
Status: DISPENSED
Start: 2023-04-17

## (undated) RX ORDER — EPINEPHRINE 0.1 MG/ML
INJECTION INTRAVENOUS
Status: DISPENSED
Start: 2023-04-13

## (undated) RX ORDER — ETOMIDATE 2 MG/ML
INJECTION INTRAVENOUS
Status: DISPENSED
Start: 2023-04-13

## (undated) RX ORDER — FENTANYL CITRATE 50 UG/ML
INJECTION, SOLUTION INTRAMUSCULAR; INTRAVENOUS
Status: DISPENSED
Start: 2023-04-20

## (undated) RX ORDER — HEPARIN SODIUM 1000 [USP'U]/ML
INJECTION, SOLUTION INTRAVENOUS; SUBCUTANEOUS
Status: DISPENSED
Start: 2023-04-13

## (undated) RX ORDER — ESMOLOL HYDROCHLORIDE 10 MG/ML
INJECTION INTRAVENOUS
Status: DISPENSED
Start: 2023-04-13

## (undated) RX ORDER — PAPAVERINE HYDROCHLORIDE 30 MG/ML
INJECTION INTRAMUSCULAR; INTRAVENOUS
Status: DISPENSED
Start: 2023-04-17

## (undated) RX ORDER — HEPARIN SODIUM 1000 [USP'U]/ML
INJECTION, SOLUTION INTRAVENOUS; SUBCUTANEOUS
Status: DISPENSED
Start: 2023-04-17

## (undated) RX ORDER — GABAPENTIN 100 MG/1
CAPSULE ORAL
Status: DISPENSED
Start: 2023-04-13

## (undated) RX ORDER — LIDOCAINE HYDROCHLORIDE 10 MG/ML
INJECTION, SOLUTION EPIDURAL; INFILTRATION; INTRACAUDAL; PERINEURAL
Status: DISPENSED
Start: 2022-12-22

## (undated) RX ORDER — HEPARIN SODIUM 1000 [USP'U]/ML
INJECTION, SOLUTION INTRAVENOUS; SUBCUTANEOUS
Status: DISPENSED
Start: 2022-12-22

## (undated) RX ORDER — HYDROMORPHONE HYDROCHLORIDE 1 MG/ML
INJECTION, SOLUTION INTRAMUSCULAR; INTRAVENOUS; SUBCUTANEOUS
Status: DISPENSED
Start: 2023-04-17

## (undated) RX ORDER — PROPOFOL 10 MG/ML
INJECTION, EMULSION INTRAVENOUS
Status: DISPENSED
Start: 2023-04-13

## (undated) RX ORDER — SODIUM CHLORIDE 450 MG/100ML
INJECTION, SOLUTION INTRAVENOUS
Status: DISPENSED
Start: 2023-04-13

## (undated) RX ORDER — FENTANYL CITRATE 50 UG/ML
INJECTION, SOLUTION INTRAMUSCULAR; INTRAVENOUS
Status: DISPENSED
Start: 2022-12-22

## (undated) RX ORDER — PROPOFOL 10 MG/ML
INJECTION, EMULSION INTRAVENOUS
Status: DISPENSED
Start: 2023-04-17

## (undated) RX ORDER — SODIUM CHLORIDE 9 MG/ML
INJECTION, SOLUTION INTRAVENOUS
Status: DISPENSED
Start: 2023-04-13

## (undated) RX ORDER — FENTANYL CITRATE 50 UG/ML
INJECTION, SOLUTION INTRAMUSCULAR; INTRAVENOUS
Status: DISPENSED
Start: 2018-09-10

## (undated) RX ORDER — HYDROMORPHONE HCL IN WATER/PF 6 MG/30 ML
PATIENT CONTROLLED ANALGESIA SYRINGE INTRAVENOUS
Status: DISPENSED
Start: 2023-04-13

## (undated) RX ORDER — LIDOCAINE HYDROCHLORIDE 10 MG/ML
INJECTION, SOLUTION EPIDURAL; INFILTRATION; INTRACAUDAL; PERINEURAL
Status: DISPENSED
Start: 2023-04-17

## (undated) RX ORDER — CEFUROXIME SODIUM 1.5 G/16ML
INJECTION, POWDER, FOR SOLUTION INTRAVENOUS
Status: DISPENSED
Start: 2023-04-13

## (undated) RX ORDER — SIMETHICONE 40MG/0.6ML
SUSPENSION, DROPS(FINAL DOSAGE FORM)(ML) ORAL
Status: DISPENSED
Start: 2023-02-03

## (undated) RX ORDER — FENTANYL CITRATE 50 UG/ML
INJECTION, SOLUTION INTRAMUSCULAR; INTRAVENOUS
Status: DISPENSED
Start: 2018-03-12

## (undated) RX ORDER — ACETAMINOPHEN 325 MG/1
TABLET ORAL
Status: DISPENSED
Start: 2023-04-13

## (undated) RX ORDER — CEFAZOLIN SODIUM 1 G/3ML
INJECTION, POWDER, FOR SOLUTION INTRAMUSCULAR; INTRAVENOUS
Status: DISPENSED
Start: 2023-04-13